# Patient Record
Sex: MALE | Race: WHITE | NOT HISPANIC OR LATINO | Employment: UNEMPLOYED | URBAN - METROPOLITAN AREA
[De-identification: names, ages, dates, MRNs, and addresses within clinical notes are randomized per-mention and may not be internally consistent; named-entity substitution may affect disease eponyms.]

---

## 2020-08-17 ENCOUNTER — HISTORICAL (OUTPATIENT)
Dept: ADMINISTRATIVE | Facility: HOSPITAL | Age: 65
End: 2020-08-17

## 2020-08-17 LAB
ALBUMIN SERPL-MCNC: 3.8 GM/DL (ref 3.4–5)
ALBUMIN/GLOB SERPL: 1.4 RATIO (ref 1.1–2)
ALP SERPL-CCNC: 73 UNIT/L (ref 40–150)
ALT SERPL-CCNC: 33 UNIT/L (ref 0–55)
AST SERPL-CCNC: 20 UNIT/L (ref 5–34)
BILIRUB SERPL-MCNC: 0.5 MG/DL
BILIRUBIN DIRECT+TOT PNL SERPL-MCNC: 0.2 MG/DL (ref 0–0.5)
BILIRUBIN DIRECT+TOT PNL SERPL-MCNC: 0.3 MG/DL (ref 0–0.8)
BUN SERPL-MCNC: 13.2 MG/DL (ref 8.4–25.7)
CALCIUM SERPL-MCNC: 8.2 MG/DL (ref 8.8–10)
CHLORIDE SERPL-SCNC: 99 MMOL/L (ref 98–107)
CO2 SERPL-SCNC: 31 MMOL/L (ref 23–31)
CREAT SERPL-MCNC: 0.98 MG/DL (ref 0.73–1.18)
GLOBULIN SER-MCNC: 2.7 GM/DL (ref 2.4–3.5)
GLUCOSE SERPL-MCNC: 172 MG/DL (ref 82–115)
POTASSIUM SERPL-SCNC: 4.1 MMOL/L (ref 3.5–5.1)
PROT SERPL-MCNC: 6.5 GM/DL (ref 5.8–7.6)
SODIUM SERPL-SCNC: 141 MMOL/L (ref 136–145)
TSH SERPL-ACNC: 0.69 UIU/ML (ref 0.35–4.94)

## 2022-04-10 ENCOUNTER — HISTORICAL (OUTPATIENT)
Dept: ADMINISTRATIVE | Facility: HOSPITAL | Age: 67
End: 2022-04-10

## 2022-04-26 VITALS
DIASTOLIC BLOOD PRESSURE: 80 MMHG | WEIGHT: 181.44 LBS | SYSTOLIC BLOOD PRESSURE: 133 MMHG | HEIGHT: 66 IN | BODY MASS INDEX: 29.16 KG/M2

## 2022-08-29 ENCOUNTER — HOSPITAL ENCOUNTER (EMERGENCY)
Facility: HOSPITAL | Age: 67
Discharge: HOME | End: 2022-08-29
Attending: EMERGENCY MEDICINE
Payer: MEDICARE

## 2022-08-29 VITALS
BODY MASS INDEX: 27.95 KG/M2 | TEMPERATURE: 98 F | HEART RATE: 90 BPM | WEIGHT: 173.94 LBS | DIASTOLIC BLOOD PRESSURE: 89 MMHG | RESPIRATION RATE: 18 BRPM | SYSTOLIC BLOOD PRESSURE: 149 MMHG | HEIGHT: 66 IN | OXYGEN SATURATION: 100 %

## 2022-08-29 DIAGNOSIS — R07.9 CHEST PAIN: ICD-10-CM

## 2022-08-29 DIAGNOSIS — I50.9 CONGESTIVE HEART FAILURE, UNSPECIFIED HF CHRONICITY, UNSPECIFIED HEART FAILURE TYPE: ICD-10-CM

## 2022-08-29 DIAGNOSIS — J44.1 COPD EXACERBATION: Primary | ICD-10-CM

## 2022-08-29 DIAGNOSIS — R06.02 SOB (SHORTNESS OF BREATH): ICD-10-CM

## 2022-08-29 LAB
ALBUMIN SERPL-MCNC: 4.4 GM/DL (ref 3.4–4.8)
ALBUMIN/GLOB SERPL: 1.3 RATIO (ref 1.1–2)
ALP SERPL-CCNC: 53 UNIT/L (ref 40–150)
ALT SERPL-CCNC: 18 UNIT/L (ref 0–55)
AST SERPL-CCNC: 14 UNIT/L (ref 5–34)
BASOPHILS # BLD AUTO: 0.06 X10(3)/MCL (ref 0–0.2)
BASOPHILS NFR BLD AUTO: 0.7 %
BILIRUBIN DIRECT+TOT PNL SERPL-MCNC: 0.6 MG/DL
BNP BLD-MCNC: 265.3 PG/ML
BUN SERPL-MCNC: 23.2 MG/DL (ref 8.4–25.7)
CALCIUM SERPL-MCNC: 9 MG/DL (ref 8.8–10)
CHLORIDE SERPL-SCNC: 96 MMOL/L (ref 98–107)
CK MB SERPL-MCNC: 4.5 NG/ML
CK SERPL-CCNC: 143 U/L (ref 30–200)
CO2 SERPL-SCNC: 26 MMOL/L (ref 23–31)
CREAT SERPL-MCNC: 1 MG/DL (ref 0.73–1.18)
D DIMER PPP IA.FEU-MCNC: 0.69 UG/ML FEU (ref 0–0.5)
EOSINOPHIL # BLD AUTO: 0.12 X10(3)/MCL (ref 0–0.9)
EOSINOPHIL NFR BLD AUTO: 1.4 %
ERYTHROCYTE [DISTWIDTH] IN BLOOD BY AUTOMATED COUNT: 12.2 % (ref 11.5–17)
EST. AVERAGE GLUCOSE BLD GHB EST-MCNC: 217.3 MG/DL
FLUAV AG UPPER RESP QL IA.RAPID: NOT DETECTED
FLUBV AG UPPER RESP QL IA.RAPID: NOT DETECTED
GFR SERPLBLD CREATININE-BSD FMLA CKD-EPI: >60 MLS/MIN/1.73/M2
GLOBULIN SER-MCNC: 3.3 GM/DL (ref 2.4–3.5)
GLUCOSE SERPL-MCNC: 239 MG/DL (ref 70–110)
GLUCOSE SERPL-MCNC: 243 MG/DL (ref 82–115)
HBA1C MFR BLD: 9.2 %
HCT VFR BLD AUTO: 38.6 % (ref 42–52)
HGB BLD-MCNC: 13.4 GM/DL (ref 14–18)
IMM GRANULOCYTES # BLD AUTO: 0.04 X10(3)/MCL (ref 0–0.04)
IMM GRANULOCYTES NFR BLD AUTO: 0.5 %
LYMPHOCYTES # BLD AUTO: 2.05 X10(3)/MCL (ref 0.6–4.6)
LYMPHOCYTES NFR BLD AUTO: 23.3 %
MCH RBC QN AUTO: 30.8 PG (ref 27–31)
MCHC RBC AUTO-ENTMCNC: 34.7 MG/DL (ref 33–36)
MCV RBC AUTO: 88.7 FL (ref 80–94)
MONOCYTES # BLD AUTO: 0.52 X10(3)/MCL (ref 0.1–1.3)
MONOCYTES NFR BLD AUTO: 5.9 %
NEUTROPHILS # BLD AUTO: 6 X10(3)/MCL (ref 2.1–9.2)
NEUTROPHILS NFR BLD AUTO: 68.2 %
NRBC BLD AUTO-RTO: 0 %
PLATELET # BLD AUTO: 217 X10(3)/MCL (ref 130–400)
PMV BLD AUTO: 10.5 FL (ref 7.4–10.4)
POCT GLUCOSE: 239 MG/DL (ref 70–110)
POTASSIUM SERPL-SCNC: 4.3 MMOL/L (ref 3.5–5.1)
PROT SERPL-MCNC: 7.7 GM/DL (ref 5.8–7.6)
RBC # BLD AUTO: 4.35 X10(6)/MCL (ref 4.7–6.1)
SARS-COV-2 RNA RESP QL NAA+PROBE: NOT DETECTED
SODIUM SERPL-SCNC: 135 MMOL/L (ref 136–145)
TROPONIN I SERPL-MCNC: 0.04 NG/ML (ref 0–0.04)
TROPONIN I SERPL-MCNC: 0.04 NG/ML (ref 0–0.04)
WBC # SPEC AUTO: 8.8 X10(3)/MCL (ref 4.5–11.5)

## 2022-08-29 PROCEDURE — 83880 ASSAY OF NATRIURETIC PEPTIDE: CPT | Performed by: EMERGENCY MEDICINE

## 2022-08-29 PROCEDURE — 93005 ELECTROCARDIOGRAM TRACING: CPT

## 2022-08-29 PROCEDURE — 83036 HEMOGLOBIN GLYCOSYLATED A1C: CPT | Performed by: EMERGENCY MEDICINE

## 2022-08-29 PROCEDURE — 96374 THER/PROPH/DIAG INJ IV PUSH: CPT

## 2022-08-29 PROCEDURE — 36415 COLL VENOUS BLD VENIPUNCTURE: CPT | Performed by: EMERGENCY MEDICINE

## 2022-08-29 PROCEDURE — 94640 AIRWAY INHALATION TREATMENT: CPT

## 2022-08-29 PROCEDURE — 25000003 PHARM REV CODE 250: Performed by: EMERGENCY MEDICINE

## 2022-08-29 PROCEDURE — 82553 CREATINE MB FRACTION: CPT | Performed by: EMERGENCY MEDICINE

## 2022-08-29 PROCEDURE — 82550 ASSAY OF CK (CPK): CPT | Performed by: EMERGENCY MEDICINE

## 2022-08-29 PROCEDURE — 25000242 PHARM REV CODE 250 ALT 637 W/ HCPCS: Performed by: EMERGENCY MEDICINE

## 2022-08-29 PROCEDURE — 99285 EMERGENCY DEPT VISIT HI MDM: CPT | Mod: 25

## 2022-08-29 PROCEDURE — 82962 GLUCOSE BLOOD TEST: CPT

## 2022-08-29 PROCEDURE — 87636 SARSCOV2 & INF A&B AMP PRB: CPT | Performed by: EMERGENCY MEDICINE

## 2022-08-29 PROCEDURE — 25500020 PHARM REV CODE 255

## 2022-08-29 PROCEDURE — 84484 ASSAY OF TROPONIN QUANT: CPT | Performed by: EMERGENCY MEDICINE

## 2022-08-29 PROCEDURE — 63600175 PHARM REV CODE 636 W HCPCS: Performed by: EMERGENCY MEDICINE

## 2022-08-29 PROCEDURE — 85379 FIBRIN DEGRADATION QUANT: CPT | Performed by: EMERGENCY MEDICINE

## 2022-08-29 PROCEDURE — 96361 HYDRATE IV INFUSION ADD-ON: CPT

## 2022-08-29 PROCEDURE — 80053 COMPREHEN METABOLIC PANEL: CPT | Performed by: EMERGENCY MEDICINE

## 2022-08-29 PROCEDURE — 85025 COMPLETE CBC W/AUTO DIFF WBC: CPT | Performed by: EMERGENCY MEDICINE

## 2022-08-29 RX ORDER — FLUTICASONE PROPIONATE AND SALMETEROL XINAFOATE 230; 21 UG/1; UG/1
2 AEROSOL, METERED RESPIRATORY (INHALATION) 2 TIMES DAILY
COMMUNITY
Start: 2022-08-12 | End: 2022-08-29 | Stop reason: SDUPTHER

## 2022-08-29 RX ORDER — FUROSEMIDE 20 MG/1
20 TABLET ORAL 3 TIMES DAILY
COMMUNITY
Start: 2022-08-04 | End: 2022-08-29 | Stop reason: SDUPTHER

## 2022-08-29 RX ORDER — CARVEDILOL 3.12 MG/1
3.12 TABLET ORAL
COMMUNITY
End: 2022-08-29 | Stop reason: SDUPTHER

## 2022-08-29 RX ORDER — IBUPROFEN 400 MG/1
400 TABLET ORAL
Status: COMPLETED | OUTPATIENT
Start: 2022-08-29 | End: 2022-08-29

## 2022-08-29 RX ORDER — SPIRONOLACTONE 25 MG/1
25 TABLET ORAL
COMMUNITY
End: 2022-08-29 | Stop reason: SDUPTHER

## 2022-08-29 RX ORDER — FUROSEMIDE 20 MG/1
20 TABLET ORAL 3 TIMES DAILY
Qty: 90 TABLET | Refills: 0 | Status: SHIPPED | OUTPATIENT
Start: 2022-08-29

## 2022-08-29 RX ORDER — PANTOPRAZOLE SODIUM 40 MG/1
40 TABLET, DELAYED RELEASE ORAL
Status: COMPLETED | OUTPATIENT
Start: 2022-08-29 | End: 2022-08-29

## 2022-08-29 RX ORDER — METFORMIN HYDROCHLORIDE 1000 MG/1
1000 TABLET ORAL 2 TIMES DAILY
Qty: 60 TABLET | Refills: 0 | Status: SHIPPED | OUTPATIENT
Start: 2022-08-29

## 2022-08-29 RX ORDER — FUROSEMIDE 20 MG/1
20 TABLET ORAL
Status: COMPLETED | OUTPATIENT
Start: 2022-08-29 | End: 2022-08-29

## 2022-08-29 RX ORDER — LOSARTAN POTASSIUM 25 MG/1
25 TABLET ORAL DAILY
Qty: 30 TABLET | Refills: 0 | Status: SHIPPED | OUTPATIENT
Start: 2022-08-29

## 2022-08-29 RX ORDER — LOSARTAN POTASSIUM 25 MG/1
25 TABLET ORAL ONCE
Status: COMPLETED | OUTPATIENT
Start: 2022-08-29 | End: 2022-08-29

## 2022-08-29 RX ORDER — FLUTICASONE PROPIONATE AND SALMETEROL XINAFOATE 230; 21 UG/1; UG/1
2 AEROSOL, METERED RESPIRATORY (INHALATION) 2 TIMES DAILY
Qty: 12 G | Refills: 1 | Status: SHIPPED | OUTPATIENT
Start: 2022-08-29

## 2022-08-29 RX ORDER — METFORMIN HYDROCHLORIDE 1000 MG/1
1000 TABLET ORAL 2 TIMES DAILY
COMMUNITY
Start: 2022-08-04 | End: 2022-08-29 | Stop reason: SDUPTHER

## 2022-08-29 RX ORDER — CYCLOBENZAPRINE HCL 10 MG
10 TABLET ORAL 3 TIMES DAILY
Qty: 90 TABLET | Refills: 0 | Status: SHIPPED | OUTPATIENT
Start: 2022-08-29

## 2022-08-29 RX ORDER — ACETAMINOPHEN 500 MG
1000 TABLET ORAL
Status: COMPLETED | OUTPATIENT
Start: 2022-08-29 | End: 2022-08-29

## 2022-08-29 RX ORDER — DEXAMETHASONE SODIUM PHOSPHATE 4 MG/ML
6 INJECTION, SOLUTION INTRA-ARTICULAR; INTRALESIONAL; INTRAMUSCULAR; INTRAVENOUS; SOFT TISSUE
Status: COMPLETED | OUTPATIENT
Start: 2022-08-29 | End: 2022-08-29

## 2022-08-29 RX ORDER — CLOPIDOGREL BISULFATE 75 MG/1
75 TABLET ORAL
COMMUNITY
End: 2022-08-29 | Stop reason: SDUPTHER

## 2022-08-29 RX ORDER — PANTOPRAZOLE SODIUM 40 MG/1
40 TABLET, DELAYED RELEASE ORAL DAILY
Qty: 30 TABLET | Refills: 0 | Status: SHIPPED | OUTPATIENT
Start: 2022-08-29 | End: 2022-09-28

## 2022-08-29 RX ORDER — CLOPIDOGREL BISULFATE 75 MG/1
75 TABLET ORAL ONCE
Status: COMPLETED | OUTPATIENT
Start: 2022-08-29 | End: 2022-08-29

## 2022-08-29 RX ORDER — CLOPIDOGREL BISULFATE 75 MG/1
75 TABLET ORAL DAILY
Qty: 30 TABLET | Refills: 0 | Status: SHIPPED | OUTPATIENT
Start: 2022-08-29

## 2022-08-29 RX ORDER — PANTOPRAZOLE SODIUM 40 MG/1
40 TABLET, DELAYED RELEASE ORAL DAILY
COMMUNITY
Start: 2022-08-04 | End: 2022-08-29 | Stop reason: SDUPTHER

## 2022-08-29 RX ORDER — IPRATROPIUM BROMIDE AND ALBUTEROL SULFATE 2.5; .5 MG/3ML; MG/3ML
3 SOLUTION RESPIRATORY (INHALATION)
Status: DISCONTINUED | OUTPATIENT
Start: 2022-08-29 | End: 2022-08-29

## 2022-08-29 RX ORDER — SPIRONOLACTONE 25 MG/1
25 TABLET ORAL DAILY
Qty: 30 TABLET | Refills: 0 | Status: SHIPPED | OUTPATIENT
Start: 2022-08-29 | End: 2022-09-28

## 2022-08-29 RX ORDER — IPRATROPIUM BROMIDE AND ALBUTEROL SULFATE 2.5; .5 MG/3ML; MG/3ML
9 SOLUTION RESPIRATORY (INHALATION) ONCE
Status: COMPLETED | OUTPATIENT
Start: 2022-08-29 | End: 2022-08-29

## 2022-08-29 RX ORDER — CYCLOBENZAPRINE HCL 10 MG
10 TABLET ORAL 3 TIMES DAILY
COMMUNITY
Start: 2022-08-10 | End: 2022-08-29 | Stop reason: SDUPTHER

## 2022-08-29 RX ORDER — CARVEDILOL 3.12 MG/1
3.12 TABLET ORAL DAILY
Qty: 30 TABLET | Refills: 0 | Status: SHIPPED | OUTPATIENT
Start: 2022-08-29 | End: 2022-09-28

## 2022-08-29 RX ORDER — CYCLOBENZAPRINE HCL 10 MG
10 TABLET ORAL
Status: COMPLETED | OUTPATIENT
Start: 2022-08-29 | End: 2022-08-29

## 2022-08-29 RX ORDER — METFORMIN HYDROCHLORIDE 500 MG/1
1000 TABLET ORAL ONCE
Status: COMPLETED | OUTPATIENT
Start: 2022-08-29 | End: 2022-08-29

## 2022-08-29 RX ORDER — LOSARTAN POTASSIUM 25 MG/1
25 TABLET ORAL
COMMUNITY
End: 2022-08-29 | Stop reason: SDUPTHER

## 2022-08-29 RX ORDER — SPIRONOLACTONE 25 MG/1
25 TABLET ORAL
Status: COMPLETED | OUTPATIENT
Start: 2022-08-29 | End: 2022-08-29

## 2022-08-29 RX ORDER — ASPIRIN 325 MG
325 TABLET ORAL
Status: COMPLETED | OUTPATIENT
Start: 2022-08-29 | End: 2022-08-29

## 2022-08-29 RX ADMIN — IOHEXOL 100 ML: 350 INJECTION, SOLUTION INTRAVENOUS at 11:08

## 2022-08-29 RX ADMIN — METFORMIN HYDROCHLORIDE 1000 MG: 500 TABLET ORAL at 09:08

## 2022-08-29 RX ADMIN — LOSARTAN POTASSIUM 25 MG: 25 TABLET, FILM COATED ORAL at 08:08

## 2022-08-29 RX ADMIN — PANTOPRAZOLE SODIUM 40 MG: 40 TABLET, DELAYED RELEASE ORAL at 08:08

## 2022-08-29 RX ADMIN — CLOPIDOGREL 75 MG: 75 TABLET, FILM COATED ORAL at 08:08

## 2022-08-29 RX ADMIN — IPRATROPIUM BROMIDE AND ALBUTEROL SULFATE 9 ML: .5; 3 SOLUTION RESPIRATORY (INHALATION) at 08:08

## 2022-08-29 RX ADMIN — SPIRONOLACTONE 25 MG: 25 TABLET, FILM COATED ORAL at 08:08

## 2022-08-29 RX ADMIN — ACETAMINOPHEN 1000 MG: 500 TABLET ORAL at 08:08

## 2022-08-29 RX ADMIN — IBUPROFEN 400 MG: 400 TABLET ORAL at 08:08

## 2022-08-29 RX ADMIN — SODIUM CHLORIDE, POTASSIUM CHLORIDE, SODIUM LACTATE AND CALCIUM CHLORIDE 1000 ML: 600; 310; 30; 20 INJECTION, SOLUTION INTRAVENOUS at 09:08

## 2022-08-29 RX ADMIN — DEXAMETHASONE SODIUM PHOSPHATE 6 MG: 4 INJECTION, SOLUTION INTRA-ARTICULAR; INTRALESIONAL; INTRAMUSCULAR; INTRAVENOUS; SOFT TISSUE at 08:08

## 2022-08-29 RX ADMIN — FUROSEMIDE 20 MG: 20 TABLET ORAL at 08:08

## 2022-08-29 RX ADMIN — CYCLOBENZAPRINE 10 MG: 10 TABLET, FILM COATED ORAL at 08:08

## 2022-08-29 RX ADMIN — ASPIRIN 325 MG ORAL TABLET 325 MG: 325 PILL ORAL at 08:08

## 2022-08-29 NOTE — ED PROVIDER NOTES
Encounter Date: 8/29/2022       History     Chief Complaint   Patient presents with    Shortness of Breath     Clearance for incarceration, c/o SOB and chest pain since yesterday evening. Hx of COPD, states uses breathing treatments at home every 4 hours.     Tyler Mai is a 68 yo male with history of CHF, COPD, DM, and HTN who presents to the ED by police for clearance due to shortness of breath and chest pain that began yesterday. He says that the episode happened gradually and has been slowly progressing. He also describes cough with some white sputum production. He reports missing his medications recently and that his last breathing treatment was a nebulizer while at the police station earlier today. He reports some relief from the treatment but continues to be symptomatic. He describes his chest pain that is diffuse across his chest but seems to radiate towards his pacemaker. It does not radiate to his back or his left arm. Of note, patient is complaining of chronic neck pain. He reports previous surgeries for his neck but has not any relief from them. He says that the pain radiates down to his legs and is a shock-like sensation. He currently is denying any fevers, chills, palpitations, dysuria, or constipation at this time.     The history is provided by the patient. No  was used.   Shortness of Breath  Associated symptoms include rhinorrhea, cough, wheezing and chest pain. Pertinent negatives include no fever, no headaches, no sore throat, no ear pain, no syncope, no vomiting and no abdominal pain. Associated medical issues include COPD.   Chest Pain  The current episode started yesterday. Chest pain occurs constantly. The quality of the pain is described as pleuritic. The pain does not radiate. Primary symptoms include shortness of breath, cough, wheezing and nausea. Pertinent negatives for primary symptoms include no fever, no fatigue, no syncope, no palpitations, no abdominal pain,  no vomiting and no dizziness.   The shortness of breath began yesterday. The patient's medical history is significant for CHF and COPD.   The sputum is white.   The patient's medical history is significant for COPD.   Pertinent negatives for associated symptoms include no weakness.   Review of patient's allergies indicates:  No Known Allergies  Past Medical History:   Diagnosis Date    CHF (congestive heart failure)     COPD (chronic obstructive pulmonary disease)     Diabetes mellitus     High cholesterol     Hypertension     Pacemaker     S/P triple vessel bypass      Past Surgical History:   Procedure Laterality Date    CHOLECYSTECTOMY      NECK SURGERY       History reviewed. No pertinent family history.  Social History     Tobacco Use    Smoking status: Former     Packs/day: 1.00     Types: Cigarettes     Review of Systems   Constitutional:  Negative for chills, fatigue and fever.   HENT:  Positive for rhinorrhea. Negative for congestion, ear pain, sinus pressure, sinus pain and sore throat.    Eyes:  Negative for photophobia and visual disturbance.   Respiratory:  Positive for cough, shortness of breath and wheezing.    Cardiovascular:  Positive for chest pain. Negative for palpitations and syncope.   Gastrointestinal:  Positive for diarrhea and nausea. Negative for abdominal distention, abdominal pain, constipation and vomiting.   Genitourinary:  Negative for dysuria.   Musculoskeletal:  Negative for myalgias.   Neurological:  Negative for dizziness, syncope, weakness, light-headedness and headaches.   All other systems reviewed and are negative.    Physical Exam     Initial Vitals [08/29/22 0746]   BP Pulse Resp Temp SpO2   (!) 146/80 91 (!) 25 98.3 °F (36.8 °C) 100 %      MAP       --         Physical Exam    Nursing note and vitals reviewed.  Constitutional: He appears well-developed and well-nourished.   HENT:   Head: Normocephalic and atraumatic.   Eyes: Conjunctivae and EOM are normal. Pupils are equal,  round, and reactive to light. No scleral icterus.   Neck: Neck supple.   Pain with head movement; tender to palpation; describes shocklike pain radiating down to his legs   Cardiovascular:  Normal rate, regular rhythm and normal heart sounds.           No murmur heard.  Pulmonary/Chest: He has wheezes. He exhibits no tenderness.   Abdominal: Abdomen is soft. Bowel sounds are normal. He exhibits no distension. There is no abdominal tenderness.   Musculoskeletal:         General: Normal range of motion.      Cervical back: Neck supple.     Neurological: He is alert and oriented to person, place, and time. He has normal reflexes.   Skin: Skin is warm. Capillary refill takes less than 2 seconds.       ED Course   Procedures  Labs Reviewed   COMPREHENSIVE METABOLIC PANEL - Abnormal; Notable for the following components:       Result Value    Sodium Level 135 (*)     Chloride 96 (*)     Glucose Level 243 (*)     Protein Total 7.7 (*)     All other components within normal limits   B-TYPE NATRIURETIC PEPTIDE - Abnormal; Notable for the following components:    Natriuretic Peptide 265.3 (*)     All other components within normal limits   D DIMER, QUANTITATIVE - Abnormal; Notable for the following components:    D-Dimer 0.69 (*)     All other components within normal limits   HEMOGLOBIN A1C - Abnormal; Notable for the following components:    Hemoglobin A1c 9.2 (*)     All other components within normal limits   CBC WITH DIFFERENTIAL - Abnormal; Notable for the following components:    RBC 4.35 (*)     Hgb 13.4 (*)     Hct 38.6 (*)     MPV 10.5 (*)     All other components within normal limits   POCT GLUCOSE, HAND-HELD DEVICE - Abnormal; Notable for the following components:    POC Glucose 239 (*)     All other components within normal limits   POCT GLUCOSE - Abnormal; Notable for the following components:    POCT Glucose 239 (*)     All other components within normal limits   TROPONIN I - Normal   CK - Normal   CK-MB -  Normal   COVID/FLU A&B PCR - Normal   TROPONIN I - Normal   CBC W/ AUTO DIFFERENTIAL    Narrative:     The following orders were created for panel order CBC auto differential.  Procedure                               Abnormality         Status                     ---------                               -----------         ------                     CBC with Differential[[id number]]        Abnormal            Final result                 Please view results for these tests on the individual orders.   EXTRA TUBES    Narrative:     The following orders were created for panel order EXTRA TUBES.  Procedure                               Abnormality         Status                     ---------                               -----------         ------                     Lavender Top Hold[[id number]]                                In process                   Please view results for these tests on the individual orders.   LAVENDER TOP HOLD     EKG Readings: (Independently Interpreted)   Initial Reading: No STEMI. Rhythm: Paced Rhythm. T Waves Flipped: II, III, AVF, V5 and V6. Axis: Normal. Other Findings: Prolonged QT Interval.   ECG Results              EKG 12-lead (Chest Pain) Age >30 (In process)  Result time 08/29/22 08:55:46      In process by Interface, Lab In [facility] (08/29/22 08:55:46)                   Narrative:    Test Reason : R07.9,    Vent. Rate : 088 BPM     Atrial Rate : 088 BPM     P-R Int : 140 ms          QRS Dur : 128 ms      QT Int : 416 ms       P-R-T Axes : 082 051 246 degrees     QTc Int : 503 ms    Atrial-sensed ventricular-paced rhythm  Abnormal ECG  No previous ECGs available    Referred By:             Confirmed By:                                   Imaging Results              CTA Chest Non-Coronary (PE Study) (Final result)  Result time 08/29/22 11:31:19      Final result by Rui Carrillo MD (08/29/22 11:31:19)                   Impression:      1. No pulmonary embolism identified.  2.  Partially visualized abdominal aortic aneurysm measuring up to 44 mm.      Electronically signed by: Rui Carrillo  Date:    08/29/2022  Time:    11:31               Narrative:    EXAMINATION:  CTA CHEST NON CORONARY    CLINICAL HISTORY:  Pulmonary embolism (PE) suspected, positive D-dimer;    TECHNIQUE:  CTA imaging of the chest after the administration of IV contrast. Axial, coronal and sagittal reconstructions, including MIP images, are reviewed. Dose length product 1105 mGycm. Automatic exposure control, adjustment of mA/kV or iterative reconstruction technique used to limit radiation dose.    COMPARISON:  No relevant comparison studies available at the time of dictation.    FINDINGS:  Diagnostic quality: Adequate    Pulmonary embolism: None identified.    Lung parenchyma: Mild emphysema.  No consolidation or suspicious pulmonary nodule.    Pleural effusion: None.    Adenopathy: No pathologically enlarged lymph node.    Heart and great vessels: Left-sided cardiac device with prior CABG.  No pericardial effusion.    Chest wall/axilla: No significant findings.    Upper abdomen: Partially visualized abdominal aortic aneurysm measuring 44 x 40 mm with areas of peripheral plaque.    Bones: No acute osseous process.                                       X-Ray Chest AP Portable (Final result)  Result time 08/29/22 09:28:58      Final result by Rui Carrillo MD (08/29/22 09:28:58)                   Impression:      No acute pulmonary process identified.      Electronically signed by: Rui Carrillo  Date:    08/29/2022  Time:    09:28               Narrative:    EXAMINATION:  XR CHEST AP PORTABLE    CLINICAL HISTORY:  Shortness of breath    TECHNIQUE:  Frontal view(s) of the chest.    COMPARISON:  Radiography 03/05/2018    FINDINGS:  Prior sternotomy with left-sided AICD.  Normal cardiac silhouette.  The lungs are well-inflated.  No consolidation identified.  No significant pleural effusion or discernible  pneumothorax.                                       Medications   aspirin tablet 325 mg (325 mg Oral Given 8/29/22 0835)   dexamethasone injection 6 mg (6 mg Intravenous Given 8/29/22 0835)   spironolactone tablet 25 mg (25 mg Oral Given 8/29/22 0833)   clopidogreL tablet 75 mg (75 mg Oral Given 8/29/22 0835)   cyclobenzaprine tablet 10 mg (10 mg Oral Given 8/29/22 0833)   acetaminophen tablet 1,000 mg (1,000 mg Oral Given 8/29/22 0833)   ibuprofen tablet 400 mg (400 mg Oral Given 8/29/22 0834)   furosemide tablet 20 mg (20 mg Oral Given 8/29/22 0835)   losartan tablet 25 mg (25 mg Oral Given 8/29/22 0852)   metFORMIN tablet 1,000 mg (1,000 mg Oral Given 8/29/22 0939)   pantoprazole EC tablet 40 mg (40 mg Oral Given 8/29/22 0833)   albuterol-ipratropium 2.5 mg-0.5 mg/3 mL nebulizer solution 9 mL (9 mLs Nebulization Given 8/29/22 0844)   lactated ringers bolus 1,000 mL (0 mLs Intravenous Stopped 8/29/22 1048)   iohexoL (OMNIPAQUE 350) 350 mg iodine/mL injection (100 mLs Intravenous Given 8/29/22 1100)     Medical Decision Making:   Initial Assessment:   68 yo male with history of CHF, COPD, DM, and HTN presents to the ED by police for clearance due to SOB and chest pain that started yesterday. He reports not taking his medications for the past few days and was only given one nebulizer treatment before coming to the ED. Patient was tachypneic and continuing to complain of symptoms while in the ED. The pain is diffusely across his chest but does not radiate to his left arm arm or back. Patient denies any fevers, chills, abdominal pain, dysuria, or constipation at this time.   ED Management:  0830  Patient presents to ED with SOB and chest pain. Ordered CBC, CMP, BNP, CK/CK-MB, CXR, D-dimer, troponin, and COVID/flu panel. Will give patient one dose of his home medications as he has missed his medications recently.    0930  Patient's labs resulted which showed mildly elevated D-dimer 0.68. Ordered CTA PE to assess for  pulmonary embolism. Also has elevated . Patient currently reports doing better and improvement in symptoms after receiving his home medications.     1137   CTA PE came back negative for pulmonary embolism. Patient did complain of worsening left sided chest pain after receive contrast the the CT study. Repeat troponins have been drawn to trend. Low suspicion of cardiac cause at this time. Will continue to follow.    1230  Second troponin negative. Patient chest pain improved since last evaluation. Patient stable and cleared for incarceration.                      Clinical Impression:   Final diagnoses:  [R07.9] Chest pain  [R06.02] SOB (shortness of breath)  [J44.1] COPD exacerbation (Primary)  [I50.9] Congestive heart failure, unspecified HF chronicity, unspecified heart failure type        ED Disposition Condition    Discharge Stable          ED Prescriptions    None       Follow-up Information       Follow up With Specialties Details Why Contact Info    [facility] Emergency Medicine  As needed, If symptoms worsen [facility] [id number] [phone]    [facility] Card[facility] Cardiology Schedule an appointment as soon as possible for a visit today  [facility] [address] [phone]             Darnell Nguyễn MD  Resident  08/29/22 [id number]

## 2024-02-02 ENCOUNTER — EXTERNAL HOME HEALTH (OUTPATIENT)
Dept: HOME HEALTH SERVICES | Facility: HOSPITAL | Age: 69
End: 2024-02-02

## 2024-02-19 ENCOUNTER — DOCUMENT SCAN (OUTPATIENT)
Dept: HOME HEALTH SERVICES | Facility: HOSPITAL | Age: 69
End: 2024-02-19
Payer: MEDICARE

## 2024-02-28 ENCOUNTER — DOCUMENT SCAN (OUTPATIENT)
Dept: HOME HEALTH SERVICES | Facility: HOSPITAL | Age: 69
End: 2024-02-28
Payer: MEDICARE

## 2024-08-09 DIAGNOSIS — G95.89 MYELOMALACIA OF CERVICAL CORD: ICD-10-CM

## 2024-08-09 DIAGNOSIS — M54.12 CERVICAL RADICULOPATHY: Primary | ICD-10-CM

## 2024-08-14 ENCOUNTER — TELEPHONE (OUTPATIENT)
Dept: NEUROSURGERY | Facility: CLINIC | Age: 69
End: 2024-08-14
Payer: MEDICARE

## 2024-08-14 NOTE — TELEPHONE ENCOUNTER
Please schedule the patient next available on either my or Lore's schedule. Should the patient wish to proceed with Dr. Zavala, please have the patient complete AP/Lat/Flex/Ext views of the cervical spine prior to the visit. Thanks

## 2024-08-14 NOTE — TELEPHONE ENCOUNTER
"I received the referral for Dr. Omer from Caryl Urias NP for cervical radiculopathy. Please review Caryl Urias's OVN in media for the patients history. Patient had a cervical fusion in 1996. I am currently getting the images from [facility] pushed to us. By reviewing the CT cervical report, " There is probably at least moderate central canal narrowing at C3-C4 and C4-C5. " Please review and advise on scheduling. Thank you!    STAFF: older images from [facility] will need to be obtained. Please ask the patient about any recent testing, any other MD's, other sx's and conservative treatments and obtain. *also need to ask which MD he would like to move forward with as he is being referred to Dr. Omer  "

## 2024-12-03 ENCOUNTER — OFFICE VISIT (OUTPATIENT)
Dept: NEUROSURGERY | Facility: CLINIC | Age: 69
End: 2024-12-03
Payer: MEDICARE

## 2024-12-03 VITALS
HEART RATE: 86 BPM | SYSTOLIC BLOOD PRESSURE: 122 MMHG | DIASTOLIC BLOOD PRESSURE: 66 MMHG | BODY MASS INDEX: 27.8 KG/M2 | WEIGHT: 173 LBS | HEIGHT: 66 IN | RESPIRATION RATE: 18 BRPM

## 2024-12-03 DIAGNOSIS — Z72.0 TOBACCO USE: ICD-10-CM

## 2024-12-03 DIAGNOSIS — M47.12 CERVICAL SPONDYLOSIS WITH MYELOPATHY: Primary | ICD-10-CM

## 2024-12-03 DIAGNOSIS — M48.02 SPINAL STENOSIS, CERVICAL REGION: ICD-10-CM

## 2024-12-03 PROCEDURE — 99205 OFFICE O/P NEW HI 60 MIN: CPT | Mod: ,,, | Performed by: PHYSICIAN ASSISTANT

## 2024-12-03 PROCEDURE — 3008F BODY MASS INDEX DOCD: CPT | Mod: CPTII,,, | Performed by: PHYSICIAN ASSISTANT

## 2024-12-03 PROCEDURE — 3288F FALL RISK ASSESSMENT DOCD: CPT | Mod: CPTII,,, | Performed by: PHYSICIAN ASSISTANT

## 2024-12-03 PROCEDURE — 4010F ACE/ARB THERAPY RXD/TAKEN: CPT | Mod: CPTII,,, | Performed by: PHYSICIAN ASSISTANT

## 2024-12-03 PROCEDURE — 1160F RVW MEDS BY RX/DR IN RCRD: CPT | Mod: CPTII,,, | Performed by: PHYSICIAN ASSISTANT

## 2024-12-03 PROCEDURE — 3078F DIAST BP <80 MM HG: CPT | Mod: CPTII,,, | Performed by: PHYSICIAN ASSISTANT

## 2024-12-03 PROCEDURE — 1159F MED LIST DOCD IN RCRD: CPT | Mod: CPTII,,, | Performed by: PHYSICIAN ASSISTANT

## 2024-12-03 PROCEDURE — 1125F AMNT PAIN NOTED PAIN PRSNT: CPT | Mod: CPTII,,, | Performed by: PHYSICIAN ASSISTANT

## 2024-12-03 PROCEDURE — 1101F PT FALLS ASSESS-DOCD LE1/YR: CPT | Mod: CPTII,,, | Performed by: PHYSICIAN ASSISTANT

## 2024-12-03 PROCEDURE — 3074F SYST BP LT 130 MM HG: CPT | Mod: CPTII,,, | Performed by: PHYSICIAN ASSISTANT

## 2024-12-03 RX ORDER — CARVEDILOL 3.12 MG/1
3.12 TABLET ORAL
COMMUNITY

## 2024-12-03 RX ORDER — PREGABALIN 100 MG/1
CAPSULE ORAL
COMMUNITY
Start: 2024-10-10 | End: 2024-12-03

## 2024-12-03 RX ORDER — OXYCODONE AND ACETAMINOPHEN 10; 325 MG/1; MG/1
1 TABLET ORAL
COMMUNITY

## 2024-12-03 RX ORDER — PREGABALIN 75 MG/1
75 CAPSULE ORAL 2 TIMES DAILY
COMMUNITY
End: 2024-12-03

## 2024-12-03 RX ORDER — SPIRONOLACTONE 25 MG/1
1 TABLET ORAL EVERY MORNING
COMMUNITY

## 2024-12-03 RX ORDER — LINACLOTIDE 145 UG/1
145 CAPSULE, GELATIN COATED ORAL EVERY MORNING
COMMUNITY
Start: 2024-11-18

## 2024-12-03 RX ORDER — ALBUTEROL SULFATE 90 UG/1
1 INHALANT RESPIRATORY (INHALATION) EVERY 4 HOURS PRN
COMMUNITY
Start: 2024-11-18

## 2024-12-03 RX ORDER — ROSUVASTATIN CALCIUM 40 MG/1
80 TABLET, COATED ORAL NIGHTLY
COMMUNITY
Start: 2024-11-18 | End: 2024-12-03

## 2024-12-03 RX ORDER — LOSARTAN POTASSIUM 25 MG/1
25 TABLET ORAL
COMMUNITY

## 2024-12-03 RX ORDER — FLUTICASONE FUROATE, UMECLIDINIUM BROMIDE AND VILANTEROL TRIFENATATE 200; 62.5; 25 UG/1; UG/1; UG/1
1 POWDER RESPIRATORY (INHALATION)
COMMUNITY
Start: 2024-11-20

## 2024-12-03 RX ORDER — SULFAMETHOXAZOLE AND TRIMETHOPRIM 800; 160 MG/1; MG/1
1 TABLET ORAL EVERY 12 HOURS
COMMUNITY
Start: 2024-10-17 | End: 2024-12-03

## 2024-12-03 RX ORDER — RANOLAZINE 500 MG/1
500 TABLET, EXTENDED RELEASE ORAL 2 TIMES DAILY
COMMUNITY

## 2024-12-03 RX ORDER — CLOPIDOGREL BISULFATE 75 MG/1
1 TABLET ORAL EVERY MORNING
COMMUNITY

## 2024-12-03 RX ORDER — ALBUTEROL SULFATE 2.5 MG/.5ML
2.5 SOLUTION RESPIRATORY (INHALATION)
COMMUNITY

## 2024-12-03 RX ORDER — ICOSAPENT ETHYL 1000 MG/1
2 CAPSULE ORAL 2 TIMES DAILY
COMMUNITY
Start: 2024-11-18

## 2024-12-03 RX ORDER — MUPIROCIN 20 MG/G
OINTMENT TOPICAL
COMMUNITY
Start: 2024-10-17 | End: 2024-12-03

## 2024-12-03 RX ORDER — ALBUTEROL SULFATE 0.83 MG/ML
2.5 SOLUTION RESPIRATORY (INHALATION) EVERY 4 HOURS PRN
COMMUNITY
Start: 2024-11-04

## 2024-12-03 NOTE — PROGRESS NOTES
[facility]  History & Physical  Neurosurgery      Tyler Mai   [id number]   1955       SUBJECTIVE:     CHIEF COMPLAINT:  Neck pain, numbness in bilateral upper and lower extremities, generalized weakness      HPI:  Tyler Mai is a 69 y.o. male who presents for neurosurgical evaluation.  The patient has been referred to Dr. Pandey by Caryl Urias NP.  His history is significant for CHF, COPD, DM, dyslipidemia, hypertension, and coronary artery disease.  He also has a pacemaker.  His history is also significant for having undergone C5-6 and C6-7 ACDF in 1995 and repeat C5-6 and C6-7 ACDF in 1996 both with Dr. Wall.  He has had chronic pain since that time.  He notes he was seen by Dr. Bella in the past.  Surgery was recommended at 2 levels above the fusion.  The patient declined surgery at that time.  Per the patient, Dr. Bella strongly encouraged him to have surgery or he would end up with deficits.  The patient was not interested in surgery at that time.    The patient saw significant worsening in his symptoms that began over a year ago.  His neck pain and left-greater-than-right shoulder and arm pain worsened.  He also developed increasing numbness and weakness in upper and lower extremities.  This has continued to worsened.  He has difficulty with dexterity more so on the left than right.  He is no longer able to write.  He is under the care of pain management in [facility].  He has been referred here by Caryl Urias NP.  He has also received pain medications from Gabb[facility] Coon.  In Caryl Urias's note, it is documented that he began to have weakness in his hands and need a wheelchair in 4/2024.  There has been a worsening in his symptoms since that time.  He was taking Percocet 10 three times a day as well as Flexeril.    He complains of constant neck pain with pain radiating into left-greater-than-right trapezius muscles and shoulders.  He also has intermittent pain in his feet.   There is numbness through bilateral arms, hands, and bilateral lower extremities from his waist down.  Intermittently, there is tingling in the left arm in bilateral feet.  He was able to stand on his own.  He requires a rolling walker to ambulate.  He lives in an apartment on the second floor.  He was able to walk up the stairs one at a time holding onto the hand railings.  Two days ago, he adjusted his hand .  He fell back being unable to catch himself.  He hit the back of his head, but did not have a loss of consciousness.  EMS was contacted.  They bandage his head.  The patient did not feel as though he needed to be taken to the hospital.  He reports a caused a whiplash type injury with increased neck pain.  The following morning, he woke up with more tingling and numbness.  His strength has remained the same.    He presents today for evaluation.  He is aware of the significant worsening in his symptoms.  He understands that he is progressively worsening.  He is interested in exploring surgical options to stop the decline in his function.  He has urinary frequency.  Yet, he feels he was able to empty his bladder.  He has constipation which has been a long term problem for him.  He has not undergone a course of therapy.  He does not feel as though he could handle therapy secondary to his pain.  He lives alone.  He presents today alone.  Transportation was provided by his insurance provider.    In addition, he has a history of CAD and CHF requiring pacemaker.  He has peripheral vascular disease.  There has been placement of stents in the left leg.  He notes that he was under the care of Dr. Rodríguez.  He believes he has a AAA, but it was unsure of the size.  He also reports he has 65% blockage in a carotid artery.  He was also under the care of Dr. Beebe from a cardiac standpoint.  Approximately 1 year ago, he was to undergo nuclear medicine stress test.  It was an issue with his insurance approving the test.   He states he was not contacted by the office to proceed with the test.  He did not feel like undergoing the test, so he did not call them back to inquire on the status.      Cardiologist Dr. Beebe  Vascular Dr. Rodríguez      Past Medical History:   Diagnosis Date    CAD (coronary artery disease)     CHF (congestive heart failure)     COPD (chronic obstructive pulmonary disease)     Diabetes mellitus     High cholesterol     Hypertension     Pacemaker     PVD (peripheral vascular disease)     S/P triple vessel bypass     Tobacco use        Past Surgical History:   Procedure Laterality Date    ANTERIOR CERVICAL DISCECTOMY W/ FUSION  1995    C5-6 and C6-7.  Dr. Wall    ANTERIOR CERVICAL DISCECTOMY W/ FUSION  1996    Redo C5-6, C6-7.  Dr. Wall    CHOLECYSTECTOMY      CORONARY ARTERY BYPASS GRAFT      INSERTION OF PACEMAKER         Family History   Problem Relation Name Age of Onset    Cancer Mother      Cancer Father      Lumbar disc disease Brother         Social History     Socioeconomic History    Marital status: Single   Tobacco Use    Smoking status: Every Day     Current packs/day: 1.00     Types: Cigarettes     Social Drivers of Health     Financial Resource Strain: Low Risk  (12/21/2023)    Received from [facility] Subsidiaries and Affiliates, [facility] Subsidi[facility] and Affiliates    Overall Financial Resource Strain (CARDIA)     Difficulty of Paying Living Expenses: Not very hard   Food Insecurity: No Food Insecurity (12/21/2023)    Received from [facility] Its Subsidiaries and Affiliates, [facility] Subsidiaries and Affiliates    Hunger Vital Sign     Worried About Running Out of Food in the Last Year: Never true     Ran Out of Food in the Last Year: Never true   Transportation Needs: No Transportation Needs (12/21/2023)     [facility], [facility] Its [facility] and Affiliates    PRAPARE - Transportation     Lack of Transportation (Medical): No     Lack of Transportation (Non-Medical): No   Housing Stability: Unknown (12/21/2023)    [facility], [facility] Its [facility] and Affiliates    Housing Stability Vital Sign     Unable to Pay for Housing in the Last Year: No     Number of Places Lived in the Last Year: 1        Review of patient's allergies indicates:   Allergen Reactions    Hydrochlorothiazide      Other Reaction(s): Pancreatitis        Current Outpatient Medications   Medication Instructions    albuterol (PROVENTIL) 2.5 mg, Every 4 hours PRN    albuterol (PROVENTIL/VENTOLIN HFA) 90 mcg/actuation inhaler 1 puff, Every 4 hours PRN    albuterol sulfate 2.5 mg    carvediloL (COREG) 3.125 mg    clopidogreL (PLAVIX) 75 mg tablet 1 tablet, Every morning    cyclobenzaprine (FLEXERIL) 10 mg, Oral, 3 times daily    furosemide (LASIX) 20 mg, Oral, 3 times daily    LINZESS 145 mcg, Every morning    losartan (COZAAR) 25 mg    oxyCODONE-acetaminophen (PERCOCET)  mg per tablet 1 tablet    pantoprazole (PROTONIX) 40 mg, Oral, Daily    ranolazine (RANEXA) 500 mg, 2 times daily    spironolactone (ALDACTONE) 25 MG tablet 1 tablet, Every morning    TRELEGY ELLIPTA 200-62.5-25 mcg inhaler 1 puff    VASCEPA 1 gram Cap 2 capsules, 2 times daily       Review of Systems   Constitutional:  Positive for activity change and fatigue. Negative for chills and fever.   HENT:  Negative for nosebleeds and sore throat.    Eyes:  Negative for pain and visual disturbance.   Respiratory:  Negative for cough, chest tightness and shortness of breath.    Cardiovascular:  Negative for chest pain.  "  Gastrointestinal:  Positive for constipation. Negative for diarrhea, nausea and vomiting.   Genitourinary:  Positive for frequency. Negative for difficulty urinating, dysuria and hematuria.   Musculoskeletal:  Positive for back pain, gait problem and neck pain. Negative for myalgias.   Skin:  Negative for rash.   Neurological:  Positive for weakness and numbness. Negative for dizziness, facial asymmetry and headaches.   Psychiatric/Behavioral:  Negative for confusion and sleep disturbance. The patient is not nervous/anxious.        OBJECTIVE:       Visit Vitals  /66 (BP Location: Left arm, Patient Position: Sitting)   Pulse 86   Resp 18   Ht 5' 6" (1.676 m)   Wt 78.5 kg (173 lb)   BMI 27.92 kg/m²        General:  Pleasant, Well-nourished, Well-groomed.    CV:  Neck is supple.  There are no carotid bruits.  Heart has regular rate and rhythm.    Lungs:  Lungs are clear to auscultation bilaterally with non-labored respirations.    Abdomen:  Soft, non-tender, non-distended.    Musculoskeletal:  Cervical ROM:  Severely limited with extension and right rotation, moderately limited with left rotation.  Neck pain is increased in all 4 directions.    Neurological:    Alert and oriented to person, place, time, and situation.  Muscle strength against resistance:  Strength  Deltoids Biceps Triceps Wrist Extension Wrist Flexion Intrinsics   Upper: R 4-/5 4-/5 4-/5 3/5  2/5    L 4-/5 4-/5 4-/5 3/5  1/5     Iliopsoas Quadriceps Knee  Flexion Tibialis  anterior Gastro- cnemius EHL   Lower: R 5-/5 4/5 4-/5       L 5-/5 5-/5 5-/5      Sensation is diffusely diminished in bilateral upper and lower extremities.  Reflexes:   Right Left   Triceps 3+ 3+   Biceps 3+ 3+   Brachioradialis 3+ 3+   Patellar 3+ 3+   Achilles     Woods's sign is positive bilaterally.  Wheelchair      Imaging:  All pertinent neuroimaging independently reviewed. Discussed these findings in detail with the patient.  CT of the cervical spine without " contrast was obtained on 03/04/2024.  This study shows solid fusion at C5-6 and C6-7.At C4-5, there is significant right facet hypertrophy as well as uncinate hypertrophy bilaterally.  This causes severe right and moderate left foraminal stenosis.  There is moderate right-sided central stenosis due to spurring.  There is anterolisthesis of C3 on C4 along with uncinate and facet hypertrophy that causes severe central and left foraminal with moderate right foraminal stenosis.   MRI of the cervical spine from 06/27/2013 is available for review.  This study shows severe central stenosis at C4-5 and moderate stenosis at C3-4.    MRI of the cervical spine was obtained on 12/06/2023 without contrast.  This study shows severe central stenosis at C3-4 greater than C4-5.  There is increased signal within the cord at the C3-4 level.  This has progressed compared to MRI from 07/29/2020.  There is also now moderate to severe stenosis at C2-3.  This is to my reading.    ASSESSMENT:     1. Cervical spondylosis with myelopathy        2. Spinal stenosis, cervical region  MRI Cervical Spine Without Contrast    X-Ray Cervical Spine 5 View W Flex Extxt      3. Tobacco use  Ambulatory referral/consult to Smoking Cessation Program        PLAN:     Options were discussed at length with the patient.  His neurological deficits are secondary to cervical myelopathy.  I discussed at length the need to proceed with surgery to decompress the spinal cord needs to be done as soon as possible.  The patient understands he will continue to worsened without surgery.  However, the patient has not followed up appropriately with his cardiologist.  We will obtain cardiac clearance for surgery.  His cervical MRI is old.  We will have him return to see Dr. Pandey with updated cervical MRI and x-rays.    We discussed the surgical options.  He will need a fusion at least at C3-4 and C4-5.  Risks, benefits, and possible complications were discussed briefly.    We  also discussed the importance of tobacco cessation in regards to his overall health as well as healing for a fusion.      In addition, he has not followed up with his cardiologist or vascular surgeon as was recommended.  We will send out cardiac clearance request.      A total of 40 minutes was spent face-to-face with the patient during this encounter.  Over half of that time was spent on counseling and coordination of care.  An additional 20+ minutes were used to review the notes from pain management, prior cervical MRI images and reports, CTA neck images, and work on office note.      Lore Bland PA-C      Disclaimer:  This note is prepared using voice recognition software and as such is likely to have errors despite attempts at proofreading.

## 2024-12-18 ENCOUNTER — TELEPHONE (OUTPATIENT)
Dept: NEUROSURGERY | Facility: CLINIC | Age: 69
End: 2024-12-18
Payer: MEDICARE

## 2024-12-18 NOTE — TELEPHONE ENCOUNTER
The patient rescheduled his MRI/XR at [facility] in [place] for 12/27/24 which is after the appointment with Dr. Pandey on 12/26/24. I spoke with him to see if there was any way we can get this scan scheduled sooner. He stated he did try but the lady told him that was their next available. I messaged Angelita Lee, RT at [facility] to see if there was any way we can get this scan sooner than 12/27/24. She stated she only has a nurse 1 day/week and she was there this morning. Her next day with them is Friday 12/27. Angelita stated they have to have a Nurse to monitor him due to his pacemaker. She suggested I reach out to [facility] to see if they could get him in sooner. I called [facility] scheduling and spoke with Syeda regarding the scans. Since the patient was originally scheduled at [facility], she has to get approval from [facility] to see if someone will be there to monitor his pacemaker. She will give me a call back as soon as she gets the approval.

## 2024-12-18 NOTE — TELEPHONE ENCOUNTER
Per Lore, we will keep the appointments as scheduled. He will come to see Dr. Pandey on 12/26/24 and Dr. Pandey will call with results of his MRI/XR. I spoke with the patient to advise of this. He verbalized understanding.

## 2024-12-26 ENCOUNTER — TELEPHONE (OUTPATIENT)
Dept: NEUROSURGERY | Facility: CLINIC | Age: 69
End: 2024-12-26

## 2024-12-26 NOTE — TELEPHONE ENCOUNTER
I called the patient.  I expressed to him his condition can no longer weight.  He needs to call the ambulance.  He needs to present to the emergency department.  I recommended he present to [facility].  The patient prefers [facility].  I expressed to him that we can not wait any longer to take care of condition.  He needs to present to the emergency department today.  He voiced understanding.    Estephania, please call tomorrow to make sure that he did though to the emergency department.

## 2024-12-26 NOTE — TELEPHONE ENCOUNTER
I returned the patients call in regards to this appointment scheduled with Dr. Pandey today. He stated he is just not doing good. He is having trouble walking right now. His sciatic nerve is really bothering him today and does not know how long this pain will last. He did tell me that he fell yesterday and EMS went to his house to help him to get up. He knows it is vital that he sees Dr. Pandey and completes the MRI. Dr. Pandey does not have an availability until 1/13/25. I told him I would send the message to our NP to see if she could possibly see him or advise on scheduling. I advised him to call [facility] to get his MRI/XR rescheduled in the meantime while I am working on this. He verbalized understanding. Angelique, from Lore's last note on 12/3/24, he is needing sx with Dr. Pandey STBRANDON. On the other hand, Dr. Pandey is not doing any surgeries in January..

## 2024-12-27 NOTE — TELEPHONE ENCOUNTER
The patient did not present to the ED yesterday and also cancelled his MRI that was scheduled for today. I tried to call him to encourage him again to present to the ED but he did not answer and I was unable to leave a vm.

## 2025-01-01 ENCOUNTER — HOSPITAL ENCOUNTER (INPATIENT)
Facility: HOSPITAL | Age: 70
LOS: 14 days | DRG: 870 | End: 2025-03-26
Attending: INTERNAL MEDICINE | Admitting: EMERGENCY MEDICINE
Payer: MEDICARE

## 2025-01-01 VITALS
SYSTOLIC BLOOD PRESSURE: 107 MMHG | BODY MASS INDEX: 24.59 KG/M2 | TEMPERATURE: 99 F | HEIGHT: 70 IN | HEART RATE: 93 BPM | DIASTOLIC BLOOD PRESSURE: 53 MMHG | WEIGHT: 171.75 LBS | OXYGEN SATURATION: 98 % | RESPIRATION RATE: 22 BRPM

## 2025-01-01 DIAGNOSIS — J96.02 ACUTE RESPIRATORY FAILURE WITH HYPOXIA AND HYPERCAPNIA: ICD-10-CM

## 2025-01-01 DIAGNOSIS — U07.1 COVID-19 VIRUS INFECTION: ICD-10-CM

## 2025-01-01 DIAGNOSIS — I50.9 ACUTE CONGESTIVE HEART FAILURE, UNSPECIFIED HEART FAILURE TYPE: Primary | ICD-10-CM

## 2025-01-01 DIAGNOSIS — J96.01 ACUTE RESPIRATORY FAILURE WITH HYPOXIA AND HYPERCAPNIA: ICD-10-CM

## 2025-01-01 DIAGNOSIS — N39.0 URINARY TRACT INFECTION WITHOUT HEMATURIA, SITE UNSPECIFIED: ICD-10-CM

## 2025-01-01 DIAGNOSIS — Z13.6 SCREENING FOR CARDIOVASCULAR CONDITION: ICD-10-CM

## 2025-01-01 DIAGNOSIS — J20.9 ACUTE BRONCHITIS, UNSPECIFIED ORGANISM: ICD-10-CM

## 2025-01-01 DIAGNOSIS — I46.9 CARDIAC ARREST: ICD-10-CM

## 2025-01-01 DIAGNOSIS — J96.90 RESPIRATORY FAILURE: ICD-10-CM

## 2025-01-01 LAB
ABO + RH BLD: NORMAL
ABS NEUT CALC (OHS): 22.39 X10(3)/MCL (ref 2.1–9.2)
ABS NEUT CALC (OHS): 30.39 X10(3)/MCL (ref 2.1–9.2)
ALBUMIN SERPL-MCNC: 1.7 G/DL (ref 3.4–4.8)
ALBUMIN SERPL-MCNC: 1.8 G/DL (ref 3.4–4.8)
ALBUMIN SERPL-MCNC: 2.1 G/DL (ref 3.4–4.8)
ALBUMIN SERPL-MCNC: 2.4 G/DL (ref 3.4–4.8)
ALBUMIN SERPL-MCNC: 2.5 G/DL (ref 3.4–4.8)
ALBUMIN SERPL-MCNC: 2.6 G/DL (ref 3.4–4.8)
ALBUMIN SERPL-MCNC: 2.7 G/DL (ref 3.4–4.8)
ALBUMIN SERPL-MCNC: 2.9 G/DL (ref 3.4–4.8)
ALBUMIN SERPL-MCNC: 3.2 G/DL (ref 3.4–4.8)
ALBUMIN/GLOB SERPL: 0.5 RATIO (ref 1.1–2)
ALBUMIN/GLOB SERPL: 0.6 RATIO (ref 1.1–2)
ALBUMIN/GLOB SERPL: 0.7 RATIO (ref 1.1–2)
ALBUMIN/GLOB SERPL: 0.8 RATIO (ref 1.1–2)
ALBUMIN/GLOB SERPL: 0.9 RATIO (ref 1.1–2)
ALBUMIN/GLOB SERPL: 0.9 RATIO (ref 1.1–2)
ALLENS TEST: ABNORMAL
ALP SERPL-CCNC: 100 UNIT/L (ref 40–150)
ALP SERPL-CCNC: 110 UNIT/L (ref 40–150)
ALP SERPL-CCNC: 120 UNIT/L (ref 40–150)
ALP SERPL-CCNC: 121 UNIT/L (ref 40–150)
ALP SERPL-CCNC: 130 UNIT/L (ref 40–150)
ALP SERPL-CCNC: 135 UNIT/L (ref 40–150)
ALP SERPL-CCNC: 149 UNIT/L (ref 40–150)
ALP SERPL-CCNC: 155 UNIT/L (ref 40–150)
ALP SERPL-CCNC: 177 UNIT/L (ref 40–150)
ALP SERPL-CCNC: 182 UNIT/L (ref 40–150)
ALP SERPL-CCNC: 211 UNIT/L (ref 40–150)
ALT SERPL-CCNC: 146 UNIT/L (ref 0–55)
ALT SERPL-CCNC: 1623 UNIT/L (ref 0–55)
ALT SERPL-CCNC: 243 UNIT/L (ref 0–55)
ALT SERPL-CCNC: 26 UNIT/L (ref 0–55)
ALT SERPL-CCNC: 26 UNIT/L (ref 0–55)
ALT SERPL-CCNC: 268 UNIT/L (ref 0–55)
ALT SERPL-CCNC: 42 UNIT/L (ref 0–55)
ALT SERPL-CCNC: 49 UNIT/L (ref 0–55)
ALT SERPL-CCNC: 661 UNIT/L (ref 0–55)
ALT SERPL-CCNC: 76 UNIT/L (ref 0–55)
ALT SERPL-CCNC: 90 UNIT/L (ref 0–55)
AMORPH URATE CRY URNS QL MICRO: ABNORMAL /HPF
ANION GAP SERPL CALC-SCNC: 10 MEQ/L
ANION GAP SERPL CALC-SCNC: 11 MEQ/L
ANION GAP SERPL CALC-SCNC: 12 MEQ/L
ANION GAP SERPL CALC-SCNC: 13 MEQ/L
ANION GAP SERPL CALC-SCNC: 14 MEQ/L
ANION GAP SERPL CALC-SCNC: 14 MEQ/L
ANION GAP SERPL CALC-SCNC: 15 MEQ/L
ANION GAP SERPL CALC-SCNC: 16 MEQ/L
ANION GAP SERPL CALC-SCNC: 7 MEQ/L
ANION GAP SERPL CALC-SCNC: 8 MEQ/L
ANION GAP SERPL CALC-SCNC: 9 MEQ/L
APICAL FOUR CHAMBER EJECTION FRACTION: 18 %
AST SERPL-CCNC: 24 UNIT/L (ref 5–34)
AST SERPL-CCNC: 258 UNIT/L (ref 11–45)
AST SERPL-CCNC: 27 UNIT/L (ref 5–34)
AST SERPL-CCNC: 29 UNIT/L (ref 5–34)
AST SERPL-CCNC: 2958 UNIT/L (ref 5–34)
AST SERPL-CCNC: 403 UNIT/L (ref 5–34)
AST SERPL-CCNC: 42 UNIT/L (ref 5–34)
AST SERPL-CCNC: 48 UNIT/L (ref 5–34)
AST SERPL-CCNC: 53 UNIT/L (ref 11–45)
AST SERPL-CCNC: 75 UNIT/L (ref 11–45)
AST SERPL-CCNC: 91 UNIT/L (ref 5–34)
AV PEAK GRADIENT: 18 MMHG
AV VALVE AREA BY VELOCITY RATIO: 1.2 CM²
AV VELOCITY RATIO: 0.43
BACTERIA #/AREA URNS AUTO: ABNORMAL /HPF
BACTERIA #/AREA URNS AUTO: ABNORMAL /HPF
BACTERIA BLD CULT: NORMAL
BACTERIA SPEC CULT: ABNORMAL
BACTERIA SPEC CULT: NORMAL
BACTERIA UR CULT: ABNORMAL
BASOPHILS # BLD AUTO: 0 X10(3)/MCL
BASOPHILS # BLD AUTO: 0.01 X10(3)/MCL
BASOPHILS # BLD AUTO: 0.02 X10(3)/MCL
BASOPHILS NFR BLD AUTO: 0 %
BASOPHILS NFR BLD AUTO: 0.1 %
BILIRUB SERPL-MCNC: 0.3 MG/DL
BILIRUB SERPL-MCNC: 0.4 MG/DL
BILIRUB SERPL-MCNC: 0.5 MG/DL
BILIRUB SERPL-MCNC: 0.5 MG/DL
BILIRUB SERPL-MCNC: 0.6 MG/DL
BILIRUB SERPL-MCNC: 0.8 MG/DL
BILIRUB SERPL-MCNC: 1 MG/DL
BILIRUB UR QL STRIP.AUTO: ABNORMAL
BILIRUB UR QL STRIP.AUTO: ABNORMAL
BLD PROD TYP BPU: NORMAL
BLOOD UNIT EXPIRATION DATE: NORMAL
BLOOD UNIT TYPE CODE: 5100
BNP BLD-MCNC: 3135.4 PG/ML
BSA FOR ECHO PROCEDURE: 1.95 M2
BUN SERPL-MCNC: 123 MG/DL (ref 8.4–25.7)
BUN SERPL-MCNC: 123 MG/DL (ref 8.4–25.7)
BUN SERPL-MCNC: 125 MG/DL (ref 8.4–25.7)
BUN SERPL-MCNC: 134 MG/DL (ref 8.4–25.7)
BUN SERPL-MCNC: 144 MG/DL (ref 8.4–25.7)
BUN SERPL-MCNC: 152 MG/DL (ref 8.4–25.7)
BUN SERPL-MCNC: 30 MG/DL (ref 8.4–25.7)
BUN SERPL-MCNC: 34 MG/DL (ref 8.4–25.7)
BUN SERPL-MCNC: 39 MG/DL (ref 8.4–25.7)
BUN SERPL-MCNC: 48 MG/DL (ref 8.4–25.7)
BUN SERPL-MCNC: 49 MG/DL (ref 8.4–25.7)
BUN SERPL-MCNC: 52 MG/DL (ref 8.4–25.7)
BUN SERPL-MCNC: 52 MG/DL (ref 8.4–25.7)
BUN SERPL-MCNC: 54 MG/DL (ref 8.4–25.7)
BUN SERPL-MCNC: 56 MG/DL (ref 8.4–25.7)
BUN SERPL-MCNC: 56 MG/DL (ref 8.4–25.7)
BUN SERPL-MCNC: 99 MG/DL (ref 8.4–25.7)
BUN SERPL-MCNC: >125 MG/DL (ref 8.4–25.7)
CALCIUM SERPL-MCNC: 7.6 MG/DL (ref 8.8–10)
CALCIUM SERPL-MCNC: 7.9 MG/DL (ref 8.8–10)
CALCIUM SERPL-MCNC: 8 MG/DL (ref 8.8–10)
CALCIUM SERPL-MCNC: 8.2 MG/DL (ref 8.8–10)
CALCIUM SERPL-MCNC: 8.2 MG/DL (ref 8.8–10)
CALCIUM SERPL-MCNC: 8.4 MG/DL (ref 8.8–10)
CALCIUM SERPL-MCNC: 8.4 MG/DL (ref 8.8–10)
CALCIUM SERPL-MCNC: 8.5 MG/DL (ref 8.8–10)
CALCIUM SERPL-MCNC: 8.6 MG/DL (ref 8.8–10)
CALCIUM SERPL-MCNC: 8.6 MG/DL (ref 8.8–10)
CALCIUM SERPL-MCNC: 8.7 MG/DL (ref 8.8–10)
CALCIUM SERPL-MCNC: 9.1 MG/DL (ref 8.8–10)
CALCIUM SERPL-MCNC: 9.1 MG/DL (ref 8.8–10)
CALCIUM SERPL-MCNC: 9.2 MG/DL (ref 8.8–10)
CALCIUM SERPL-MCNC: 9.2 MG/DL (ref 8.8–10)
CALCIUM SERPL-MCNC: 9.4 MG/DL (ref 8.8–10)
CHLORIDE SERPL-SCNC: 100 MMOL/L (ref 98–107)
CHLORIDE SERPL-SCNC: 100 MMOL/L (ref 98–107)
CHLORIDE SERPL-SCNC: 102 MMOL/L (ref 98–107)
CHLORIDE SERPL-SCNC: 102 MMOL/L (ref 98–107)
CHLORIDE SERPL-SCNC: 103 MMOL/L (ref 98–107)
CHLORIDE SERPL-SCNC: 103 MMOL/L (ref 98–107)
CHLORIDE SERPL-SCNC: 104 MMOL/L (ref 98–107)
CHLORIDE SERPL-SCNC: 104 MMOL/L (ref 98–107)
CHLORIDE SERPL-SCNC: 105 MMOL/L (ref 98–107)
CHLORIDE SERPL-SCNC: 106 MMOL/L (ref 98–107)
CHLORIDE SERPL-SCNC: 107 MMOL/L (ref 98–107)
CLARITY UR: ABNORMAL
CLARITY UR: ABNORMAL
CO2 SERPL-SCNC: 15 MMOL/L (ref 23–31)
CO2 SERPL-SCNC: 17 MMOL/L (ref 23–31)
CO2 SERPL-SCNC: 18 MMOL/L (ref 23–31)
CO2 SERPL-SCNC: 18 MMOL/L (ref 23–31)
CO2 SERPL-SCNC: 19 MMOL/L (ref 23–31)
CO2 SERPL-SCNC: 19 MMOL/L (ref 23–31)
CO2 SERPL-SCNC: 22 MMOL/L (ref 23–31)
CO2 SERPL-SCNC: 23 MMOL/L (ref 23–31)
CO2 SERPL-SCNC: 26 MMOL/L (ref 23–31)
CO2 SERPL-SCNC: 26 MMOL/L (ref 23–31)
CO2 SERPL-SCNC: 27 MMOL/L (ref 23–31)
CO2 SERPL-SCNC: 28 MMOL/L (ref 23–31)
CO2 SERPL-SCNC: 29 MMOL/L (ref 23–31)
CO2 SERPL-SCNC: 30 MMOL/L (ref 23–31)
COLOR UR AUTO: ABNORMAL
COLOR UR AUTO: ABNORMAL
CREAT SERPL-MCNC: 1.34 MG/DL (ref 0.72–1.25)
CREAT SERPL-MCNC: 1.48 MG/DL (ref 0.72–1.25)
CREAT SERPL-MCNC: 1.58 MG/DL (ref 0.72–1.25)
CREAT SERPL-MCNC: 1.59 MG/DL (ref 0.72–1.25)
CREAT SERPL-MCNC: 1.61 MG/DL (ref 0.72–1.25)
CREAT SERPL-MCNC: 1.65 MG/DL (ref 0.72–1.25)
CREAT SERPL-MCNC: 1.72 MG/DL (ref 0.72–1.25)
CREAT SERPL-MCNC: 1.75 MG/DL (ref 0.72–1.25)
CREAT SERPL-MCNC: 1.79 MG/DL (ref 0.72–1.25)
CREAT SERPL-MCNC: 2.88 MG/DL (ref 0.72–1.25)
CREAT SERPL-MCNC: 4.43 MG/DL (ref 0.72–1.25)
CREAT SERPL-MCNC: 5.12 MG/DL (ref 0.72–1.25)
CREAT SERPL-MCNC: 5.33 MG/DL (ref 0.72–1.25)
CREAT SERPL-MCNC: 5.39 MG/DL (ref 0.72–1.25)
CREAT SERPL-MCNC: 5.56 MG/DL (ref 0.72–1.25)
CREAT SERPL-MCNC: 5.74 MG/DL (ref 0.72–1.25)
CREAT SERPL-MCNC: 5.88 MG/DL (ref 0.72–1.25)
CREAT SERPL-MCNC: 5.94 MG/DL (ref 0.72–1.25)
CREAT/UREA NIT SERPL: 19
CREAT/UREA NIT SERPL: 19
CREAT/UREA NIT SERPL: 22
CREAT/UREA NIT SERPL: 23
CREAT/UREA NIT SERPL: 24
CREAT/UREA NIT SERPL: 24
CREAT/UREA NIT SERPL: 25
CREAT/UREA NIT SERPL: 26
CREAT/UREA NIT SERPL: 26
CREAT/UREA NIT SERPL: 27
CREAT/UREA NIT SERPL: 28
CREAT/UREA NIT SERPL: 33
CREAT/UREA NIT SERPL: 35
CREAT/UREA NIT SERPL: >22
CROSSMATCH INTERPRETATION: NORMAL
CV ECHO LV RWT: 0.48 CM
DELSYS: ABNORMAL
DISPENSE STATUS: NORMAL
DOP CALC AO PEAK VEL: 2.1 M/S
DOP CALC LVOT AREA: 2.8 CM2
DOP CALC LVOT DIAMETER: 1.9 CM
DOP CALC LVOT PEAK VEL: 0.9 M/S
DOP CALC MV VTI: 37.5 CM
E WAVE DECELERATION TIME: 232 MSEC
E/A RATIO: 2.12
ECHO LV POSTERIOR WALL: 1.3 CM (ref 0.6–1.1)
EOSINOPHIL # BLD AUTO: 0 X10(3)/MCL (ref 0–0.9)
EOSINOPHIL # BLD AUTO: 0.01 X10(3)/MCL (ref 0–0.9)
EOSINOPHIL # BLD AUTO: 0.01 X10(3)/MCL (ref 0–0.9)
EOSINOPHIL # BLD AUTO: 0.08 X10(3)/MCL (ref 0–0.9)
EOSINOPHIL # BLD AUTO: 0.15 X10(3)/MCL (ref 0–0.9)
EOSINOPHIL NFR BLD AUTO: 0 %
EOSINOPHIL NFR BLD AUTO: 0.1 %
EOSINOPHIL NFR BLD AUTO: 0.1 %
EOSINOPHIL NFR BLD AUTO: 0.8 %
EOSINOPHIL NFR BLD AUTO: 1.1 %
EOSINOPHIL NFR BLD MANUAL: 0.34 X10(3)/MCL (ref 0–0.9)
EOSINOPHIL NFR BLD MANUAL: 1 % (ref 0–8)
EP: 5
ERYTHROCYTE [DISTWIDTH] IN BLOOD BY AUTOMATED COUNT: 12.7 % (ref 11.5–17)
ERYTHROCYTE [DISTWIDTH] IN BLOOD BY AUTOMATED COUNT: 12.7 % (ref 11.5–17)
ERYTHROCYTE [DISTWIDTH] IN BLOOD BY AUTOMATED COUNT: 12.8 % (ref 11.5–17)
ERYTHROCYTE [DISTWIDTH] IN BLOOD BY AUTOMATED COUNT: 12.9 % (ref 11.5–17)
ERYTHROCYTE [DISTWIDTH] IN BLOOD BY AUTOMATED COUNT: 13.1 % (ref 11.5–17)
ERYTHROCYTE [DISTWIDTH] IN BLOOD BY AUTOMATED COUNT: 13.2 % (ref 11.5–17)
ERYTHROCYTE [DISTWIDTH] IN BLOOD BY AUTOMATED COUNT: 13.2 % (ref 11.5–17)
ERYTHROCYTE [DISTWIDTH] IN BLOOD BY AUTOMATED COUNT: 13.6 % (ref 11.5–17)
ERYTHROCYTE [DISTWIDTH] IN BLOOD BY AUTOMATED COUNT: 13.8 % (ref 11.5–17)
ERYTHROCYTE [DISTWIDTH] IN BLOOD BY AUTOMATED COUNT: 13.8 % (ref 11.5–17)
ERYTHROCYTE [DISTWIDTH] IN BLOOD BY AUTOMATED COUNT: 14 % (ref 11.5–17)
ERYTHROCYTE [DISTWIDTH] IN BLOOD BY AUTOMATED COUNT: 14.1 % (ref 11.5–17)
ERYTHROCYTE [SEDIMENTATION RATE] IN BLOOD BY WESTERGREN METHOD: 16 MM/H
ERYTHROCYTE [SEDIMENTATION RATE] IN BLOOD BY WESTERGREN METHOD: 20 MM/H
FIO2: 100
FIO2: 35
FIO2: 40
FLUAV AG UPPER RESP QL IA.RAPID: NOT DETECTED
FLUBV AG UPPER RESP QL IA.RAPID: NOT DETECTED
FRACTIONAL SHORTENING: 5.6 % (ref 28–44)
GFR SERPLBLD CREATININE-BSD FMLA CKD-EPI: 10 ML/MIN/1.73/M2
GFR SERPLBLD CREATININE-BSD FMLA CKD-EPI: 11 ML/MIN/1.73/M2
GFR SERPLBLD CREATININE-BSD FMLA CKD-EPI: 11 ML/MIN/1.73/M2
GFR SERPLBLD CREATININE-BSD FMLA CKD-EPI: 12 ML/MIN/1.73/M2
GFR SERPLBLD CREATININE-BSD FMLA CKD-EPI: 14 ML/MIN/1.73/M2
GFR SERPLBLD CREATININE-BSD FMLA CKD-EPI: 23 ML/MIN/1.73/M2
GFR SERPLBLD CREATININE-BSD FMLA CKD-EPI: 41 ML/MIN/1.73/M2
GFR SERPLBLD CREATININE-BSD FMLA CKD-EPI: 42 ML/MIN/1.73/M2
GFR SERPLBLD CREATININE-BSD FMLA CKD-EPI: 43 ML/MIN/1.73/M2
GFR SERPLBLD CREATININE-BSD FMLA CKD-EPI: 45 ML/MIN/1.73/M2
GFR SERPLBLD CREATININE-BSD FMLA CKD-EPI: 47 ML/MIN/1.73/M2
GFR SERPLBLD CREATININE-BSD FMLA CKD-EPI: 47 ML/MIN/1.73/M2
GFR SERPLBLD CREATININE-BSD FMLA CKD-EPI: 48 ML/MIN/1.73/M2
GFR SERPLBLD CREATININE-BSD FMLA CKD-EPI: 52 ML/MIN/1.73/M2
GFR SERPLBLD CREATININE-BSD FMLA CKD-EPI: 58 ML/MIN/1.73/M2
GLOBULIN SER-MCNC: 2.9 GM/DL (ref 2.4–3.5)
GLOBULIN SER-MCNC: 3 GM/DL (ref 2.4–3.5)
GLOBULIN SER-MCNC: 3 GM/DL (ref 2.4–3.5)
GLOBULIN SER-MCNC: 3.1 GM/DL (ref 2.4–3.5)
GLOBULIN SER-MCNC: 3.3 GM/DL (ref 2.4–3.5)
GLOBULIN SER-MCNC: 3.3 GM/DL (ref 2.4–3.5)
GLOBULIN SER-MCNC: 3.5 GM/DL (ref 2.4–3.5)
GLOBULIN SER-MCNC: 3.6 GM/DL (ref 2.4–3.5)
GLOBULIN SER-MCNC: 3.7 GM/DL (ref 2.4–3.5)
GLOBULIN SER-MCNC: 3.8 GM/DL (ref 2.4–3.5)
GLOBULIN SER-MCNC: 3.9 GM/DL (ref 2.4–3.5)
GLUCOSE SERPL-MCNC: 115 MG/DL (ref 82–115)
GLUCOSE SERPL-MCNC: 124 MG/DL (ref 82–115)
GLUCOSE SERPL-MCNC: 126 MG/DL (ref 70–110)
GLUCOSE SERPL-MCNC: 134 MG/DL (ref 82–115)
GLUCOSE SERPL-MCNC: 169 MG/DL (ref 82–115)
GLUCOSE SERPL-MCNC: 191 MG/DL (ref 82–115)
GLUCOSE SERPL-MCNC: 195 MG/DL (ref 82–115)
GLUCOSE SERPL-MCNC: 200 MG/DL (ref 82–115)
GLUCOSE SERPL-MCNC: 201 MG/DL (ref 82–115)
GLUCOSE SERPL-MCNC: 206 MG/DL (ref 82–115)
GLUCOSE SERPL-MCNC: 210 MG/DL (ref 82–115)
GLUCOSE SERPL-MCNC: 242 MG/DL (ref 82–115)
GLUCOSE SERPL-MCNC: 266 MG/DL (ref 82–115)
GLUCOSE SERPL-MCNC: 287 MG/DL (ref 82–115)
GLUCOSE SERPL-MCNC: 297 MG/DL (ref 82–115)
GLUCOSE SERPL-MCNC: 308 MG/DL (ref 82–115)
GLUCOSE SERPL-MCNC: 308 MG/DL (ref 82–115)
GLUCOSE SERPL-MCNC: 321 MG/DL (ref 82–115)
GLUCOSE SERPL-MCNC: 64 MG/DL (ref 82–115)
GLUCOSE UR QL STRIP: NEGATIVE
GLUCOSE UR QL STRIP: NEGATIVE
GRAM STN SPEC: ABNORMAL
GRAM STN SPEC: ABNORMAL
GRAN CASTS URNS QL MICRO: ABNORMAL /LPF
GROUP & RH: NORMAL
HBV SURFACE AG SERPL QL IA: NONREACTIVE
HCO3 UR-SCNC: 18.3 MMOL/L (ref 24–28)
HCO3 UR-SCNC: 18.5 MMOL/L (ref 24–28)
HCO3 UR-SCNC: 19.4 MMOL/L (ref 24–28)
HCO3 UR-SCNC: 20.1 MMOL/L (ref 24–28)
HCO3 UR-SCNC: 21.9 MMOL/L (ref 24–28)
HCO3 UR-SCNC: 23.4 MMOL/L (ref 24–28)
HCO3 UR-SCNC: 24.1 MMOL/L (ref 24–28)
HCO3 UR-SCNC: 24.2 MMOL/L (ref 24–28)
HCO3 UR-SCNC: 25.7 MMOL/L (ref 24–28)
HCO3 UR-SCNC: 26.8 MMOL/L (ref 24–28)
HCO3 UR-SCNC: 30.5 MMOL/L (ref 24–28)
HCT VFR BLD AUTO: 21.7 % (ref 42–52)
HCT VFR BLD AUTO: 23.8 % (ref 42–52)
HCT VFR BLD AUTO: 25.1 % (ref 42–52)
HCT VFR BLD AUTO: 26.3 % (ref 42–52)
HCT VFR BLD AUTO: 26.4 % (ref 42–52)
HCT VFR BLD AUTO: 26.5 % (ref 42–52)
HCT VFR BLD AUTO: 26.6 % (ref 42–52)
HCT VFR BLD AUTO: 26.8 % (ref 42–52)
HCT VFR BLD AUTO: 26.8 % (ref 42–52)
HCT VFR BLD AUTO: 26.9 % (ref 42–52)
HCT VFR BLD AUTO: 27.5 % (ref 42–52)
HCT VFR BLD AUTO: 28.4 % (ref 42–52)
HCT VFR BLD AUTO: 28.9 % (ref 42–52)
HCT VFR BLD AUTO: 38.8 % (ref 42–52)
HGB BLD-MCNC: 11.5 G/DL (ref 14–18)
HGB BLD-MCNC: 7.2 G/DL (ref 14–18)
HGB BLD-MCNC: 7.5 G/DL (ref 14–18)
HGB BLD-MCNC: 8.1 G/DL (ref 14–18)
HGB BLD-MCNC: 8.3 G/DL (ref 14–18)
HGB BLD-MCNC: 8.5 G/DL (ref 14–18)
HGB BLD-MCNC: 8.5 G/DL (ref 14–18)
HGB BLD-MCNC: 8.6 G/DL (ref 14–18)
HGB BLD-MCNC: 8.6 G/DL (ref 14–18)
HGB BLD-MCNC: 8.8 G/DL (ref 14–18)
HGB BLD-MCNC: 8.9 G/DL (ref 14–18)
HGB BLD-MCNC: 9.1 G/DL (ref 14–18)
HGB UR QL STRIP: ABNORMAL
HGB UR QL STRIP: ABNORMAL
IMM GRANULOCYTES # BLD AUTO: 0.04 X10(3)/MCL (ref 0–0.04)
IMM GRANULOCYTES # BLD AUTO: 0.05 X10(3)/MCL (ref 0–0.04)
IMM GRANULOCYTES # BLD AUTO: 0.07 X10(3)/MCL (ref 0–0.04)
IMM GRANULOCYTES # BLD AUTO: 0.08 X10(3)/MCL (ref 0–0.04)
IMM GRANULOCYTES # BLD AUTO: 0.08 X10(3)/MCL (ref 0–0.04)
IMM GRANULOCYTES # BLD AUTO: 0.11 X10(3)/MCL (ref 0–0.04)
IMM GRANULOCYTES # BLD AUTO: 0.12 X10(3)/MCL (ref 0–0.04)
IMM GRANULOCYTES # BLD AUTO: 0.13 X10(3)/MCL (ref 0–0.04)
IMM GRANULOCYTES # BLD AUTO: 0.15 X10(3)/MCL (ref 0–0.04)
IMM GRANULOCYTES # BLD AUTO: 0.16 X10(3)/MCL (ref 0–0.04)
IMM GRANULOCYTES # BLD AUTO: 0.18 X10(3)/MCL (ref 0–0.04)
IMM GRANULOCYTES # BLD AUTO: 0.22 X10(3)/MCL (ref 0–0.04)
IMM GRANULOCYTES NFR BLD AUTO: 0.4 %
IMM GRANULOCYTES NFR BLD AUTO: 0.5 %
IMM GRANULOCYTES NFR BLD AUTO: 0.6 %
IMM GRANULOCYTES NFR BLD AUTO: 0.8 %
IMM GRANULOCYTES NFR BLD AUTO: 0.8 %
IMM GRANULOCYTES NFR BLD AUTO: 1.2 %
IMM GRANULOCYTES NFR BLD AUTO: 1.2 %
IMM GRANULOCYTES NFR BLD AUTO: 1.5 %
INDIRECT COOMBS: NORMAL
INTERVENTRICULAR SEPTUM: 1 CM (ref 0.6–1.1)
IP: 10
KETONES UR QL STRIP: ABNORMAL
KETONES UR QL STRIP: ABNORMAL
LACTATE SERPL-SCNC: 0.9 MMOL/L (ref 0.5–2.2)
LEFT ATRIUM SIZE: 4.7 CM
LEFT INTERNAL DIMENSION IN SYSTOLE: 5.1 CM (ref 2.1–4)
LEFT VENTRICLE DIASTOLIC VOLUME INDEX: 72.59 ML/M2
LEFT VENTRICLE DIASTOLIC VOLUME: 143 ML
LEFT VENTRICLE END DIASTOLIC VOLUME APICAL 2 CHAMBER: 0 ML
LEFT VENTRICLE END DIASTOLIC VOLUME APICAL 4 CHAMBER: 113.89 ML
LEFT VENTRICLE MASS INDEX: 126.6 G/M2
LEFT VENTRICLE SYSTOLIC VOLUME INDEX: 62.9 ML/M2
LEFT VENTRICLE SYSTOLIC VOLUME: 124 ML
LEFT VENTRICULAR INTERNAL DIMENSION IN DIASTOLE: 5.4 CM (ref 3.5–6)
LEFT VENTRICULAR MASS: 249.4 G
LEUKOCYTE ESTERASE UR QL STRIP: ABNORMAL
LEUKOCYTE ESTERASE UR QL STRIP: NEGATIVE
LVED V (TEICH): 142.75 ML
LVES V (TEICH): 123.63 ML
LYMPHOCYTES # BLD AUTO: 0.34 X10(3)/MCL (ref 0.6–4.6)
LYMPHOCYTES # BLD AUTO: 0.41 X10(3)/MCL (ref 0.6–4.6)
LYMPHOCYTES # BLD AUTO: 0.44 X10(3)/MCL (ref 0.6–4.6)
LYMPHOCYTES # BLD AUTO: 0.45 X10(3)/MCL (ref 0.6–4.6)
LYMPHOCYTES # BLD AUTO: 0.55 X10(3)/MCL (ref 0.6–4.6)
LYMPHOCYTES # BLD AUTO: 0.58 X10(3)/MCL (ref 0.6–4.6)
LYMPHOCYTES # BLD AUTO: 0.6 X10(3)/MCL (ref 0.6–4.6)
LYMPHOCYTES # BLD AUTO: 0.6 X10(3)/MCL (ref 0.6–4.6)
LYMPHOCYTES # BLD AUTO: 0.63 X10(3)/MCL (ref 0.6–4.6)
LYMPHOCYTES # BLD AUTO: 0.69 X10(3)/MCL (ref 0.6–4.6)
LYMPHOCYTES # BLD AUTO: 0.76 X10(3)/MCL (ref 0.6–4.6)
LYMPHOCYTES # BLD AUTO: 0.87 X10(3)/MCL (ref 0.6–4.6)
LYMPHOCYTES # BLD AUTO: 1.1 X10(3)/MCL (ref 0.6–4.6)
LYMPHOCYTES # BLD AUTO: 1.13 X10(3)/MCL (ref 0.6–4.6)
LYMPHOCYTES NFR BLD AUTO: 1.9 %
LYMPHOCYTES NFR BLD AUTO: 11.1 %
LYMPHOCYTES NFR BLD AUTO: 13.1 %
LYMPHOCYTES NFR BLD AUTO: 2 %
LYMPHOCYTES NFR BLD AUTO: 2 %
LYMPHOCYTES NFR BLD AUTO: 2.7 %
LYMPHOCYTES NFR BLD AUTO: 3.1 %
LYMPHOCYTES NFR BLD AUTO: 3.9 %
LYMPHOCYTES NFR BLD AUTO: 3.9 %
LYMPHOCYTES NFR BLD AUTO: 4.6 %
LYMPHOCYTES NFR BLD AUTO: 4.7 %
LYMPHOCYTES NFR BLD AUTO: 5.3 %
LYMPHOCYTES NFR BLD AUTO: 6 %
LYMPHOCYTES NFR BLD AUTO: 6.2 %
LYMPHOCYTES NFR BLD MANUAL: 1.44 X10(3)/MCL (ref 0.6–4.6)
LYMPHOCYTES NFR BLD MANUAL: 2.05 X10(3)/MCL (ref 0.6–4.6)
LYMPHOCYTES NFR BLD MANUAL: 6 % (ref 13–40)
LYMPHOCYTES NFR BLD MANUAL: 6 % (ref 13–40)
MAGNESIUM SERPL-MCNC: 1.7 MG/DL (ref 1.6–2.6)
MAGNESIUM SERPL-MCNC: 1.7 MG/DL (ref 1.6–2.6)
MAGNESIUM SERPL-MCNC: 1.9 MG/DL (ref 1.6–2.6)
MAGNESIUM SERPL-MCNC: 2.1 MG/DL (ref 1.6–2.6)
MAGNESIUM SERPL-MCNC: 2.2 MG/DL (ref 1.6–2.6)
MAGNESIUM SERPL-MCNC: 2.3 MG/DL (ref 1.6–2.6)
MAGNESIUM SERPL-MCNC: 2.4 MG/DL (ref 1.6–2.6)
MAGNESIUM SERPL-MCNC: 2.5 MG/DL (ref 1.6–2.6)
MAGNESIUM SERPL-MCNC: 2.6 MG/DL (ref 1.6–2.6)
MCH RBC QN AUTO: 28.2 PG (ref 27–31)
MCH RBC QN AUTO: 28.3 PG (ref 27–31)
MCH RBC QN AUTO: 28.4 PG (ref 27–31)
MCH RBC QN AUTO: 28.4 PG (ref 27–31)
MCH RBC QN AUTO: 28.6 PG (ref 27–31)
MCH RBC QN AUTO: 28.7 PG (ref 27–31)
MCH RBC QN AUTO: 28.7 PG (ref 27–31)
MCH RBC QN AUTO: 28.8 PG (ref 27–31)
MCH RBC QN AUTO: 28.9 PG (ref 27–31)
MCH RBC QN AUTO: 28.9 PG (ref 27–31)
MCH RBC QN AUTO: 29 PG (ref 27–31)
MCH RBC QN AUTO: 29 PG (ref 27–31)
MCH RBC QN AUTO: 29.1 PG (ref 27–31)
MCH RBC QN AUTO: 29.3 PG (ref 27–31)
MCHC RBC AUTO-ENTMCNC: 29.6 G/DL (ref 33–36)
MCHC RBC AUTO-ENTMCNC: 31 G/DL (ref 33–36)
MCHC RBC AUTO-ENTMCNC: 31.2 G/DL (ref 33–36)
MCHC RBC AUTO-ENTMCNC: 31.3 G/DL (ref 33–36)
MCHC RBC AUTO-ENTMCNC: 31.3 G/DL (ref 33–36)
MCHC RBC AUTO-ENTMCNC: 31.5 G/DL (ref 33–36)
MCHC RBC AUTO-ENTMCNC: 31.5 G/DL (ref 33–36)
MCHC RBC AUTO-ENTMCNC: 31.7 G/DL (ref 33–36)
MCHC RBC AUTO-ENTMCNC: 31.7 G/DL (ref 33–36)
MCHC RBC AUTO-ENTMCNC: 32.3 G/DL (ref 33–36)
MCHC RBC AUTO-ENTMCNC: 32.4 G/DL (ref 33–36)
MCHC RBC AUTO-ENTMCNC: 32.5 G/DL (ref 33–36)
MCHC RBC AUTO-ENTMCNC: 32.6 G/DL (ref 33–36)
MCHC RBC AUTO-ENTMCNC: 32.7 G/DL (ref 33–36)
MCHC RBC AUTO-ENTMCNC: 33.2 G/DL (ref 33–36)
MCHC RBC AUTO-ENTMCNC: 33.5 G/DL (ref 33–36)
MCV RBC AUTO: 86.5 FL (ref 80–94)
MCV RBC AUTO: 86.6 FL (ref 80–94)
MCV RBC AUTO: 87.5 FL (ref 80–94)
MCV RBC AUTO: 87.5 FL (ref 80–94)
MCV RBC AUTO: 87.6 FL (ref 80–94)
MCV RBC AUTO: 88.1 FL (ref 80–94)
MCV RBC AUTO: 88.8 FL (ref 80–94)
MCV RBC AUTO: 89 FL (ref 80–94)
MCV RBC AUTO: 90.5 FL (ref 80–94)
MCV RBC AUTO: 90.5 FL (ref 80–94)
MCV RBC AUTO: 90.9 FL (ref 80–94)
MCV RBC AUTO: 91.6 FL (ref 80–94)
MCV RBC AUTO: 92.8 FL (ref 80–94)
MCV RBC AUTO: 93 FL (ref 80–94)
MCV RBC AUTO: 93.1 FL (ref 80–94)
MCV RBC AUTO: 97.2 FL (ref 80–94)
MODE: ABNORMAL
MONOCYTES # BLD AUTO: 0.24 X10(3)/MCL (ref 0.1–1.3)
MONOCYTES # BLD AUTO: 0.33 X10(3)/MCL (ref 0.1–1.3)
MONOCYTES # BLD AUTO: 0.41 X10(3)/MCL (ref 0.1–1.3)
MONOCYTES # BLD AUTO: 0.42 X10(3)/MCL (ref 0.1–1.3)
MONOCYTES # BLD AUTO: 0.45 X10(3)/MCL (ref 0.1–1.3)
MONOCYTES # BLD AUTO: 0.48 X10(3)/MCL (ref 0.1–1.3)
MONOCYTES # BLD AUTO: 0.49 X10(3)/MCL (ref 0.1–1.3)
MONOCYTES # BLD AUTO: 0.5 X10(3)/MCL (ref 0.1–1.3)
MONOCYTES # BLD AUTO: 0.53 X10(3)/MCL (ref 0.1–1.3)
MONOCYTES # BLD AUTO: 0.61 X10(3)/MCL (ref 0.1–1.3)
MONOCYTES # BLD AUTO: 0.71 X10(3)/MCL (ref 0.1–1.3)
MONOCYTES # BLD AUTO: 0.77 X10(3)/MCL (ref 0.1–1.3)
MONOCYTES # BLD AUTO: 0.88 X10(3)/MCL (ref 0.1–1.3)
MONOCYTES # BLD AUTO: 0.98 X10(3)/MCL (ref 0.1–1.3)
MONOCYTES NFR BLD AUTO: 1.7 %
MONOCYTES NFR BLD AUTO: 1.8 %
MONOCYTES NFR BLD AUTO: 2.4 %
MONOCYTES NFR BLD AUTO: 2.5 %
MONOCYTES NFR BLD AUTO: 2.7 %
MONOCYTES NFR BLD AUTO: 3.3 %
MONOCYTES NFR BLD AUTO: 3.9 %
MONOCYTES NFR BLD AUTO: 4.7 %
MONOCYTES NFR BLD AUTO: 6.9 %
MONOCYTES NFR BLD AUTO: 8.2 %
MONOCYTES NFR BLD AUTO: 8.9 %
MONOCYTES NFR BLD MANUAL: 0.24 X10(3)/MCL (ref 0.1–1.3)
MONOCYTES NFR BLD MANUAL: 0.68 X10(3)/MCL (ref 0.1–1.3)
MONOCYTES NFR BLD MANUAL: 1 % (ref 2–11)
MONOCYTES NFR BLD MANUAL: 2 % (ref 2–11)
MUCOUS THREADS URNS QL MICRO: ABNORMAL /LPF
MV MEAN GRADIENT: 3 MMHG
MV PEAK A VEL: 0.68 M/S
MV PEAK E VEL: 1.44 M/S
MV PEAK GRADIENT: 8 MMHG
MV STENOSIS PRESSURE HALF TIME: 67.16 MS
MV VALVE AREA P 1/2 METHOD: 3.28 CM2
MYELOCYTES NFR BLD MANUAL: 2 %
NEUTROPHILS # BLD AUTO: 11.34 X10(3)/MCL (ref 2.1–9.2)
NEUTROPHILS # BLD AUTO: 11.55 X10(3)/MCL (ref 2.1–9.2)
NEUTROPHILS # BLD AUTO: 11.7 X10(3)/MCL (ref 2.1–9.2)
NEUTROPHILS # BLD AUTO: 12.06 X10(3)/MCL (ref 2.1–9.2)
NEUTROPHILS # BLD AUTO: 12.06 X10(3)/MCL (ref 2.1–9.2)
NEUTROPHILS # BLD AUTO: 13.26 X10(3)/MCL (ref 2.1–9.2)
NEUTROPHILS # BLD AUTO: 13.3 X10(3)/MCL (ref 2.1–9.2)
NEUTROPHILS # BLD AUTO: 13.61 X10(3)/MCL (ref 2.1–9.2)
NEUTROPHILS # BLD AUTO: 15.76 X10(3)/MCL (ref 2.1–9.2)
NEUTROPHILS # BLD AUTO: 20.6 X10(3)/MCL (ref 2.1–9.2)
NEUTROPHILS # BLD AUTO: 21.36 X10(3)/MCL (ref 2.1–9.2)
NEUTROPHILS # BLD AUTO: 21.63 X10(3)/MCL (ref 2.1–9.2)
NEUTROPHILS # BLD AUTO: 6.74 X10(3)/MCL (ref 2.1–9.2)
NEUTROPHILS # BLD AUTO: 7.76 X10(3)/MCL (ref 2.1–9.2)
NEUTROPHILS NFR BLD AUTO: 78 %
NEUTROPHILS NFR BLD AUTO: 78.7 %
NEUTROPHILS NFR BLD AUTO: 85.3 %
NEUTROPHILS NFR BLD AUTO: 88.8 %
NEUTROPHILS NFR BLD AUTO: 89.5 %
NEUTROPHILS NFR BLD AUTO: 90.7 %
NEUTROPHILS NFR BLD AUTO: 92.1 %
NEUTROPHILS NFR BLD AUTO: 92.2 %
NEUTROPHILS NFR BLD AUTO: 92.7 %
NEUTROPHILS NFR BLD AUTO: 92.8 %
NEUTROPHILS NFR BLD AUTO: 93.1 %
NEUTROPHILS NFR BLD AUTO: 94.8 %
NEUTROPHILS NFR BLD AUTO: 95 %
NEUTROPHILS NFR BLD AUTO: 95.5 %
NEUTROPHILS NFR BLD MANUAL: 89 % (ref 47–80)
NEUTROPHILS NFR BLD MANUAL: 91 % (ref 47–80)
NEUTS BAND NFR BLD MANUAL: 2 % (ref 0–11)
NITRITE UR QL STRIP: NEGATIVE
NITRITE UR QL STRIP: POSITIVE
NRBC BLD AUTO-RTO: 0 %
NRBC BLD AUTO-RTO: 0.1 %
OHS CV RV/LV RATIO: 0 CM
OHS QRS DURATION: 124 MS
OHS QRS DURATION: 136 MS
OHS QTC CALCULATION: 464 MS
OHS QTC CALCULATION: 487 MS
PCO2 BLDA: 29.2 MMHG (ref 35–45)
PCO2 BLDA: 29.8 MMHG (ref 35–45)
PCO2 BLDA: 30.1 MMHG (ref 35–45)
PCO2 BLDA: 32.8 MMHG (ref 35–45)
PCO2 BLDA: 33 MMHG (ref 35–45)
PCO2 BLDA: 33.1 MMHG (ref 35–45)
PCO2 BLDA: 36.9 MMHG (ref 35–45)
PCO2 BLDA: 40.1 MMHG (ref 35–45)
PCO2 BLDA: 47.1 MMHG (ref 35–45)
PCO2 BLDA: 49.6 MMHG (ref 35–45)
PCO2 BLDA: 68 MMHG (ref 35–45)
PEEP: 5
PH SMN: 7.26 [PH] (ref 7.35–7.45)
PH SMN: 7.29 [PH] (ref 7.35–7.45)
PH SMN: 7.36 [PH] (ref 7.35–7.45)
PH SMN: 7.37 [PH] (ref 7.35–7.45)
PH SMN: 7.38 [PH] (ref 7.35–7.45)
PH SMN: 7.38 [PH] (ref 7.35–7.45)
PH SMN: 7.4 [PH] (ref 7.35–7.45)
PH SMN: 7.41 [PH] (ref 7.35–7.45)
PH SMN: 7.43 [PH] (ref 7.35–7.45)
PH SMN: 7.47 [PH] (ref 7.35–7.45)
PH SMN: 7.5 [PH] (ref 7.35–7.45)
PH UR STRIP: 5.5 [PH]
PH UR STRIP: 8.5 [PH]
PHOSPHATE SERPL-MCNC: 2.3 MG/DL (ref 2.3–4.7)
PHOSPHATE SERPL-MCNC: 3.7 MG/DL (ref 2.3–4.7)
PISA MRMAX VEL: 3.57 M/S
PISA TR MAX VEL: 2.4 M/S
PLATELET # BLD AUTO: 102 X10(3)/MCL (ref 130–400)
PLATELET # BLD AUTO: 114 X10(3)/MCL (ref 130–400)
PLATELET # BLD AUTO: 127 X10(3)/MCL (ref 130–400)
PLATELET # BLD AUTO: 139 X10(3)/MCL (ref 130–400)
PLATELET # BLD AUTO: 151 X10(3)/MCL (ref 130–400)
PLATELET # BLD AUTO: 153 X10(3)/MCL (ref 130–400)
PLATELET # BLD AUTO: 157 X10(3)/MCL (ref 130–400)
PLATELET # BLD AUTO: 160 X10(3)/MCL (ref 130–400)
PLATELET # BLD AUTO: 162 X10(3)/MCL (ref 130–400)
PLATELET # BLD AUTO: 189 X10(3)/MCL (ref 130–400)
PLATELET # BLD AUTO: 222 X10(3)/MCL (ref 130–400)
PLATELET # BLD AUTO: 234 X10(3)/MCL (ref 130–400)
PLATELET # BLD AUTO: 235 X10(3)/MCL (ref 130–400)
PLATELET # BLD AUTO: 242 X10(3)/MCL (ref 130–400)
PLATELET # BLD AUTO: 280 X10(3)/MCL (ref 130–400)
PLATELET # BLD AUTO: 88 X10(3)/MCL (ref 130–400)
PLATELET # BLD EST: ADEQUATE 10*3/UL
PLATELET # BLD EST: ADEQUATE 10*3/UL
PMV BLD AUTO: 10.3 FL (ref 7.4–10.4)
PMV BLD AUTO: 10.4 FL (ref 7.4–10.4)
PMV BLD AUTO: 10.4 FL (ref 7.4–10.4)
PMV BLD AUTO: 10.5 FL (ref 7.4–10.4)
PMV BLD AUTO: 10.6 FL (ref 7.4–10.4)
PMV BLD AUTO: 10.7 FL (ref 7.4–10.4)
PMV BLD AUTO: 10.9 FL (ref 7.4–10.4)
PMV BLD AUTO: 11.5 FL (ref 7.4–10.4)
PMV BLD AUTO: 12.1 FL (ref 7.4–10.4)
PMV BLD AUTO: 12.4 FL (ref 7.4–10.4)
PMV BLD AUTO: 12.4 FL (ref 7.4–10.4)
PMV BLD AUTO: 12.6 FL (ref 7.4–10.4)
PMV BLD AUTO: 12.6 FL (ref 7.4–10.4)
PMV BLD AUTO: 12.9 FL (ref 7.4–10.4)
PMV BLD AUTO: 13.6 FL (ref 7.4–10.4)
PMV BLD AUTO: 14 FL (ref 7.4–10.4)
PO2 BLDA: 102 MMHG (ref 80–100)
PO2 BLDA: 225 MMHG (ref 80–100)
PO2 BLDA: 43 MMHG (ref 80–100)
PO2 BLDA: 67 MMHG (ref 80–100)
PO2 BLDA: 74 MMHG (ref 80–100)
PO2 BLDA: 75 MMHG (ref 80–100)
PO2 BLDA: 81 MMHG (ref 80–100)
PO2 BLDA: 89 MMHG (ref 80–100)
PO2 BLDA: 92 MMHG (ref 80–100)
PO2 BLDA: 96 MMHG (ref 80–100)
PO2 BLDA: 97 MMHG (ref 80–100)
POC BE: -2 MMOL/L
POC BE: -2 MMOL/L
POC BE: -3 MMOL/L
POC BE: -4 MMOL/L
POC BE: -6 MMOL/L
POC BE: -6 MMOL/L
POC BE: -7 MMOL/L
POC BE: 1 MMOL/L
POC BE: 1 MMOL/L
POC BE: 3 MMOL/L
POC BE: 3 MMOL/L
POC SATURATED O2: 100 % (ref 95–100)
POC SATURATED O2: 72 % (ref 95–100)
POC SATURATED O2: 89 % (ref 95–100)
POC SATURATED O2: 94 % (ref 95–100)
POC SATURATED O2: 95 % (ref 95–100)
POC SATURATED O2: 96 % (ref 95–100)
POC SATURATED O2: 97 % (ref 95–100)
POC SATURATED O2: 98 % (ref 95–100)
POC TCO2: 19 MMOL/L (ref 23–27)
POC TCO2: 19 MMOL/L (ref 23–27)
POC TCO2: 20 MMOL/L (ref 23–27)
POC TCO2: 21 MMOL/L (ref 23–27)
POC TCO2: 23 MMOL/L (ref 23–27)
POC TCO2: 25 MMOL/L (ref 23–27)
POC TCO2: 25 MMOL/L (ref 23–27)
POC TCO2: 26 MMOL/L (ref 23–27)
POC TCO2: 27 MMOL/L (ref 23–27)
POC TCO2: 28 MMOL/L (ref 23–27)
POC TCO2: 32 MMOL/L (ref 23–27)
POCT GLUCOSE: 110 MG/DL (ref 70–110)
POCT GLUCOSE: 114 MG/DL (ref 70–110)
POCT GLUCOSE: 117 MG/DL (ref 70–110)
POCT GLUCOSE: 118 MG/DL (ref 70–110)
POCT GLUCOSE: 118 MG/DL (ref 70–110)
POCT GLUCOSE: 122 MG/DL (ref 70–110)
POCT GLUCOSE: 126 MG/DL (ref 70–110)
POCT GLUCOSE: 155 MG/DL (ref 70–110)
POCT GLUCOSE: 163 MG/DL (ref 70–110)
POCT GLUCOSE: 165 MG/DL (ref 70–110)
POCT GLUCOSE: 171 MG/DL (ref 70–110)
POCT GLUCOSE: 172 MG/DL (ref 70–110)
POCT GLUCOSE: 181 MG/DL (ref 70–110)
POCT GLUCOSE: 181 MG/DL (ref 70–110)
POCT GLUCOSE: 185 MG/DL (ref 70–110)
POCT GLUCOSE: 189 MG/DL (ref 70–110)
POCT GLUCOSE: 189 MG/DL (ref 70–110)
POCT GLUCOSE: 191 MG/DL (ref 70–110)
POCT GLUCOSE: 192 MG/DL (ref 70–110)
POCT GLUCOSE: 194 MG/DL (ref 70–110)
POCT GLUCOSE: 195 MG/DL (ref 70–110)
POCT GLUCOSE: 199 MG/DL (ref 70–110)
POCT GLUCOSE: 200 MG/DL (ref 70–110)
POCT GLUCOSE: 202 MG/DL (ref 70–110)
POCT GLUCOSE: 202 MG/DL (ref 70–110)
POCT GLUCOSE: 203 MG/DL (ref 70–110)
POCT GLUCOSE: 207 MG/DL (ref 70–110)
POCT GLUCOSE: 214 MG/DL (ref 70–110)
POCT GLUCOSE: 215 MG/DL (ref 70–110)
POCT GLUCOSE: 216 MG/DL (ref 70–110)
POCT GLUCOSE: 219 MG/DL (ref 70–110)
POCT GLUCOSE: 226 MG/DL (ref 70–110)
POCT GLUCOSE: 229 MG/DL (ref 70–110)
POCT GLUCOSE: 232 MG/DL (ref 70–110)
POCT GLUCOSE: 238 MG/DL (ref 70–110)
POCT GLUCOSE: 240 MG/DL (ref 70–110)
POCT GLUCOSE: 243 MG/DL (ref 70–110)
POCT GLUCOSE: 244 MG/DL (ref 70–110)
POCT GLUCOSE: 251 MG/DL (ref 70–110)
POCT GLUCOSE: 259 MG/DL (ref 70–110)
POCT GLUCOSE: 275 MG/DL (ref 70–110)
POCT GLUCOSE: 277 MG/DL (ref 70–110)
POCT GLUCOSE: 295 MG/DL (ref 70–110)
POCT GLUCOSE: 326 MG/DL (ref 70–110)
POCT GLUCOSE: 328 MG/DL (ref 70–110)
POCT GLUCOSE: 339 MG/DL (ref 70–110)
POCT GLUCOSE: 342 MG/DL (ref 70–110)
POCT GLUCOSE: 354 MG/DL (ref 70–110)
POCT GLUCOSE: 354 MG/DL (ref 70–110)
POCT GLUCOSE: 368 MG/DL (ref 70–110)
POCT GLUCOSE: 420 MG/DL (ref 70–110)
POCT GLUCOSE: 63 MG/DL (ref 70–110)
POCT GLUCOSE: 69 MG/DL (ref 70–110)
POCT GLUCOSE: 69 MG/DL (ref 70–110)
POCT GLUCOSE: 82 MG/DL (ref 70–110)
POTASSIUM SERPL-SCNC: 3.4 MMOL/L (ref 3.5–5.1)
POTASSIUM SERPL-SCNC: 3.7 MMOL/L (ref 3.5–5.1)
POTASSIUM SERPL-SCNC: 3.8 MMOL/L (ref 3.5–5.1)
POTASSIUM SERPL-SCNC: 3.9 MMOL/L (ref 3.5–5.1)
POTASSIUM SERPL-SCNC: 4.1 MMOL/L (ref 3.5–5.1)
POTASSIUM SERPL-SCNC: 4.1 MMOL/L (ref 3.5–5.1)
POTASSIUM SERPL-SCNC: 4.4 MMOL/L (ref 3.5–5.1)
POTASSIUM SERPL-SCNC: 4.4 MMOL/L (ref 3.5–5.1)
POTASSIUM SERPL-SCNC: 4.5 MMOL/L (ref 3.5–5.1)
POTASSIUM SERPL-SCNC: 4.7 MMOL/L (ref 3.5–5.1)
POTASSIUM SERPL-SCNC: 5 MMOL/L (ref 3.5–5.1)
POTASSIUM SERPL-SCNC: 5.1 MMOL/L (ref 3.5–5.1)
POTASSIUM SERPL-SCNC: 5.2 MMOL/L (ref 3.5–5.1)
POTASSIUM SERPL-SCNC: 5.3 MMOL/L (ref 3.5–5.1)
POTASSIUM SERPL-SCNC: 5.7 MMOL/L (ref 3.5–5.1)
POTASSIUM SERPL-SCNC: 5.8 MMOL/L (ref 3.5–5.1)
POTASSIUM SERPL-SCNC: 6.3 MMOL/L (ref 3.5–5.1)
POTASSIUM SERPL-SCNC: 6.6 MMOL/L (ref 3.5–5.1)
PROT SERPL-MCNC: 4.7 GM/DL (ref 5.8–7.6)
PROT SERPL-MCNC: 5 GM/DL (ref 5.8–7.6)
PROT SERPL-MCNC: 5.1 GM/DL (ref 5.8–7.6)
PROT SERPL-MCNC: 5.5 GM/DL (ref 5.8–7.6)
PROT SERPL-MCNC: 5.7 GM/DL (ref 5.8–7.6)
PROT SERPL-MCNC: 5.8 GM/DL (ref 5.8–7.6)
PROT SERPL-MCNC: 6.4 GM/DL (ref 5.8–7.6)
PROT SERPL-MCNC: 6.5 GM/DL (ref 5.8–7.6)
PROT SERPL-MCNC: 6.6 GM/DL (ref 5.8–7.6)
PROT SERPL-MCNC: 6.7 GM/DL (ref 5.8–7.6)
PROT SERPL-MCNC: 6.8 GM/DL (ref 5.8–7.6)
PROT UR QL STRIP: ABNORMAL
PROT UR QL STRIP: ABNORMAL
PV PEAK GRADIENT: 5 MMHG
PV PEAK VELOCITY: 1.07 M/S
RBC # BLD AUTO: 2.48 X10(6)/MCL (ref 4.7–6.1)
RBC # BLD AUTO: 2.56 X10(6)/MCL (ref 4.7–6.1)
RBC # BLD AUTO: 2.85 X10(6)/MCL (ref 4.7–6.1)
RBC # BLD AUTO: 2.94 X10(6)/MCL (ref 4.7–6.1)
RBC # BLD AUTO: 2.96 X10(6)/MCL (ref 4.7–6.1)
RBC # BLD AUTO: 3.01 X10(6)/MCL (ref 4.7–6.1)
RBC # BLD AUTO: 3.03 X10(6)/MCL (ref 4.7–6.1)
RBC # BLD AUTO: 3.03 X10(6)/MCL (ref 4.7–6.1)
RBC # BLD AUTO: 3.04 X10(6)/MCL (ref 4.7–6.1)
RBC # BLD AUTO: 3.05 X10(6)/MCL (ref 4.7–6.1)
RBC # BLD AUTO: 3.05 X10(6)/MCL (ref 4.7–6.1)
RBC # BLD AUTO: 3.06 X10(6)/MCL (ref 4.7–6.1)
RBC # BLD AUTO: 3.1 X10(6)/MCL (ref 4.7–6.1)
RBC # BLD AUTO: 3.14 X10(6)/MCL (ref 4.7–6.1)
RBC # BLD AUTO: 3.18 X10(6)/MCL (ref 4.7–6.1)
RBC # BLD AUTO: 3.99 X10(6)/MCL (ref 4.7–6.1)
RBC #/AREA URNS AUTO: >100 /HPF
RBC #/AREA URNS AUTO: ABNORMAL /HPF
RBC MORPH BLD: NORMAL
RBC MORPH BLD: NORMAL
RIGHT VENTRICLE DIASTOLIC BASEL DIMENSION: 0 CM
RIGHT VENTRICULAR END-DIASTOLIC DIMENSION: 0 CM
SAMPLE: ABNORMAL
SARS-COV-2 RNA RESP QL NAA+PROBE: DETECTED
SITE: ABNORMAL
SODIUM SERPL-SCNC: 131 MMOL/L (ref 136–145)
SODIUM SERPL-SCNC: 132 MMOL/L (ref 136–145)
SODIUM SERPL-SCNC: 133 MMOL/L (ref 136–145)
SODIUM SERPL-SCNC: 134 MMOL/L (ref 136–145)
SODIUM SERPL-SCNC: 135 MMOL/L (ref 136–145)
SODIUM SERPL-SCNC: 137 MMOL/L (ref 136–145)
SODIUM SERPL-SCNC: 137 MMOL/L (ref 136–145)
SODIUM SERPL-SCNC: 139 MMOL/L (ref 136–145)
SODIUM SERPL-SCNC: 140 MMOL/L (ref 136–145)
SODIUM SERPL-SCNC: 141 MMOL/L (ref 136–145)
SODIUM SERPL-SCNC: 142 MMOL/L (ref 136–145)
SODIUM SERPL-SCNC: 143 MMOL/L (ref 136–145)
SP GR UR STRIP.AUTO: 1.01 (ref 1–1.03)
SP GR UR STRIP.AUTO: 1.02 (ref 1–1.03)
SP02: 99
SPECIMEN OUTDATE: NORMAL
SPERM URNS QL MICRO: ABNORMAL /HPF
SQUAMOUS #/AREA URNS AUTO: ABNORMAL /HPF
SQUAMOUS #/AREA URNS AUTO: ABNORMAL /HPF
TOXIC GRANULES BLD QL SMEAR: ABNORMAL
TR MAX PG: 24 MMHG
TRI-PHOS CRY UR QL COMP ASSIST: ABNORMAL
TROPONIN I SERPL-MCNC: 0.11 NG/ML (ref 0–0.04)
TROPONIN I SERPL-MCNC: 0.18 NG/ML (ref 0–0.04)
TROPONIN I SERPL-MCNC: 0.2 NG/ML (ref 0–0.04)
TROPONIN I SERPL-MCNC: 0.22 NG/ML (ref 0–0.04)
TROPONIN I SERPL-MCNC: 0.52 NG/ML (ref 0–0.04)
TROPONIN I SERPL-MCNC: 3 NG/ML (ref 0–0.04)
TROPONIN I SERPL-MCNC: 3.43 NG/ML (ref 0–0.04)
UNIT NUMBER: NORMAL
UROBILINOGEN UR STRIP-ACNC: 1
UROBILINOGEN UR STRIP-ACNC: 1
VT: 500
WBC # BLD AUTO: 12.76 X10(3)/MCL (ref 4.5–11.5)
WBC # BLD AUTO: 12.89 X10(3)/MCL (ref 4.5–11.5)
WBC # BLD AUTO: 12.91 X10(3)/MCL (ref 4.5–11.5)
WBC # BLD AUTO: 13.08 X10(3)/MCL (ref 4.5–11.5)
WBC # BLD AUTO: 14.12 X10(3)/MCL (ref 4.5–11.5)
WBC # BLD AUTO: 14.28 X10(3)/MCL (ref 4.5–11.5)
WBC # BLD AUTO: 14.36 X10(3)/MCL (ref 4.5–11.5)
WBC # BLD AUTO: 14.79 X10(3)/MCL (ref 4.5–11.5)
WBC # BLD AUTO: 16.63 X10(3)/MCL (ref 4.5–11.5)
WBC # BLD AUTO: 21.68 X10(3)/MCL (ref 4.5–11.5)
WBC # BLD AUTO: 22.36 X10(3)/MCL (ref 4.5–11.5)
WBC # BLD AUTO: 23.23 X10(3)/MCL (ref 4.5–11.5)
WBC # BLD AUTO: 24.08 X10(3)/MCL (ref 4.5–11.5)
WBC # BLD AUTO: 34.15 X10(3)/MCL (ref 4.5–11.5)
WBC # BLD AUTO: 8.64 X10(3)/MCL (ref 4.5–11.5)
WBC # BLD AUTO: 9.87 X10(3)/MCL (ref 4.5–11.5)
WBC #/AREA URNS AUTO: ABNORMAL /HPF
WBC #/AREA URNS AUTO: ABNORMAL /HPF
Z-SCORE OF LEFT VENTRICULAR DIMENSION IN END DIASTOLE: -0.47
Z-SCORE OF LEFT VENTRICULAR DIMENSION IN END SYSTOLE: 3.02

## 2025-01-01 PROCEDURE — 93010 ELECTROCARDIOGRAM REPORT: CPT | Mod: ,,, | Performed by: INTERNAL MEDICINE

## 2025-01-01 PROCEDURE — 99900026 HC AIRWAY MAINTENANCE (STAT)

## 2025-01-01 PROCEDURE — 80048 BASIC METABOLIC PNL TOTAL CA: CPT | Performed by: INTERNAL MEDICINE

## 2025-01-01 PROCEDURE — 63600175 PHARM REV CODE 636 W HCPCS: Performed by: EMERGENCY MEDICINE

## 2025-01-01 PROCEDURE — 63600175 PHARM REV CODE 636 W HCPCS: Performed by: INTERNAL MEDICINE

## 2025-01-01 PROCEDURE — 94640 AIRWAY INHALATION TREATMENT: CPT

## 2025-01-01 PROCEDURE — 94003 VENT MGMT INPAT SUBQ DAY: CPT

## 2025-01-01 PROCEDURE — 99900035 HC TECH TIME PER 15 MIN (STAT)

## 2025-01-01 PROCEDURE — 27000207 HC ISOLATION

## 2025-01-01 PROCEDURE — 36600 WITHDRAWAL OF ARTERIAL BLOOD: CPT

## 2025-01-01 PROCEDURE — 80053 COMPREHEN METABOLIC PANEL: CPT | Performed by: INTERNAL MEDICINE

## 2025-01-01 PROCEDURE — 51702 INSERT TEMP BLADDER CATH: CPT

## 2025-01-01 PROCEDURE — 36415 COLL VENOUS BLD VENIPUNCTURE: CPT | Performed by: INTERNAL MEDICINE

## 2025-01-01 PROCEDURE — A4216 STERILE WATER/SALINE, 10 ML: HCPCS | Performed by: INTERNAL MEDICINE

## 2025-01-01 PROCEDURE — 82803 BLOOD GASES ANY COMBINATION: CPT

## 2025-01-01 PROCEDURE — 25000003 PHARM REV CODE 250: Performed by: INTERNAL MEDICINE

## 2025-01-01 PROCEDURE — 94761 N-INVAS EAR/PLS OXIMETRY MLT: CPT | Mod: XB

## 2025-01-01 PROCEDURE — 25000003 PHARM REV CODE 250: Performed by: EMERGENCY MEDICINE

## 2025-01-01 PROCEDURE — 83880 ASSAY OF NATRIURETIC PEPTIDE: CPT | Performed by: INTERNAL MEDICINE

## 2025-01-01 PROCEDURE — 27000221 HC OXYGEN, UP TO 24 HOURS

## 2025-01-01 PROCEDURE — 84484 ASSAY OF TROPONIN QUANT: CPT | Performed by: INTERNAL MEDICINE

## 2025-01-01 PROCEDURE — 36415 COLL VENOUS BLD VENIPUNCTURE: CPT | Performed by: EMERGENCY MEDICINE

## 2025-01-01 PROCEDURE — 94761 N-INVAS EAR/PLS OXIMETRY MLT: CPT

## 2025-01-01 PROCEDURE — 27100171 HC OXYGEN HIGH FLOW UP TO 24 HOURS

## 2025-01-01 PROCEDURE — 97530 THERAPEUTIC ACTIVITIES: CPT

## 2025-01-01 PROCEDURE — 25000242 PHARM REV CODE 250 ALT 637 W/ HCPCS: Performed by: EMERGENCY MEDICINE

## 2025-01-01 PROCEDURE — 94644 CONT INHLJ TX 1ST HOUR: CPT

## 2025-01-01 PROCEDURE — 63600175 PHARM REV CODE 636 W HCPCS: Mod: JZ,TB | Performed by: INTERNAL MEDICINE

## 2025-01-01 PROCEDURE — 85025 COMPLETE CBC W/AUTO DIFF WBC: CPT | Performed by: INTERNAL MEDICINE

## 2025-01-01 PROCEDURE — 36000706: Performed by: SURGERY

## 2025-01-01 PROCEDURE — 84484 ASSAY OF TROPONIN QUANT: CPT | Performed by: EMERGENCY MEDICINE

## 2025-01-01 PROCEDURE — 20000000 HC ICU ROOM

## 2025-01-01 PROCEDURE — 83735 ASSAY OF MAGNESIUM: CPT | Performed by: EMERGENCY MEDICINE

## 2025-01-01 PROCEDURE — 86901 BLOOD TYPING SEROLOGIC RH(D): CPT | Performed by: EMERGENCY MEDICINE

## 2025-01-01 PROCEDURE — 51798 US URINE CAPACITY MEASURE: CPT

## 2025-01-01 PROCEDURE — 81003 URINALYSIS AUTO W/O SCOPE: CPT | Performed by: INTERNAL MEDICINE

## 2025-01-01 PROCEDURE — 0240U COVID/FLU A&B PCR: CPT | Performed by: INTERNAL MEDICINE

## 2025-01-01 PROCEDURE — 11000001 HC ACUTE MED/SURG PRIVATE ROOM

## 2025-01-01 PROCEDURE — 94002 VENT MGMT INPAT INIT DAY: CPT

## 2025-01-01 PROCEDURE — 94660 CPAP INITIATION&MGMT: CPT

## 2025-01-01 PROCEDURE — 80053 COMPREHEN METABOLIC PANEL: CPT | Performed by: EMERGENCY MEDICINE

## 2025-01-01 PROCEDURE — 85025 COMPLETE CBC W/AUTO DIFF WBC: CPT | Performed by: EMERGENCY MEDICINE

## 2025-01-01 PROCEDURE — 0BH18EZ INSERTION OF ENDOTRACHEAL AIRWAY INTO TRACHEA, VIA NATURAL OR ARTIFICIAL OPENING ENDOSCOPIC: ICD-10-PCS | Performed by: INTERNAL MEDICINE

## 2025-01-01 PROCEDURE — 83735 ASSAY OF MAGNESIUM: CPT | Performed by: INTERNAL MEDICINE

## 2025-01-01 PROCEDURE — 80100014 HC HEMODIALYSIS 1:1

## 2025-01-01 PROCEDURE — 5A1955Z RESPIRATORY VENTILATION, GREATER THAN 96 CONSECUTIVE HOURS: ICD-10-PCS | Performed by: INTERNAL MEDICINE

## 2025-01-01 PROCEDURE — C1769 GUIDE WIRE: HCPCS | Performed by: SURGERY

## 2025-01-01 PROCEDURE — 87070 CULTURE OTHR SPECIMN AEROBIC: CPT | Performed by: EMERGENCY MEDICINE

## 2025-01-01 PROCEDURE — 63600175 PHARM REV CODE 636 W HCPCS

## 2025-01-01 PROCEDURE — 36000707: Performed by: SURGERY

## 2025-01-01 PROCEDURE — 85007 BL SMEAR W/DIFF WBC COUNT: CPT | Performed by: INTERNAL MEDICINE

## 2025-01-01 PROCEDURE — 84100 ASSAY OF PHOSPHORUS: CPT | Performed by: INTERNAL MEDICINE

## 2025-01-01 PROCEDURE — 06HY33Z INSERTION OF INFUSION DEVICE INTO LOWER VEIN, PERCUTANEOUS APPROACH: ICD-10-PCS | Performed by: SURGERY

## 2025-01-01 PROCEDURE — 99291 CRITICAL CARE FIRST HOUR: CPT

## 2025-01-01 PROCEDURE — 84100 ASSAY OF PHOSPHORUS: CPT | Performed by: EMERGENCY MEDICINE

## 2025-01-01 PROCEDURE — 21400001 HC TELEMETRY ROOM

## 2025-01-01 PROCEDURE — 63600175 PHARM REV CODE 636 W HCPCS: Performed by: SURGERY

## 2025-01-01 PROCEDURE — 37000008 HC ANESTHESIA 1ST 15 MINUTES: Performed by: SURGERY

## 2025-01-01 PROCEDURE — C1751 CATH, INF, PER/CENT/MIDLINE: HCPCS | Performed by: SURGERY

## 2025-01-01 PROCEDURE — 87340 HEPATITIS B SURFACE AG IA: CPT | Performed by: EMERGENCY MEDICINE

## 2025-01-01 PROCEDURE — 31720 CLEARANCE OF AIRWAYS: CPT

## 2025-01-01 PROCEDURE — 83605 ASSAY OF LACTIC ACID: CPT | Performed by: INTERNAL MEDICINE

## 2025-01-01 PROCEDURE — 37000009 HC ANESTHESIA EA ADD 15 MINS: Performed by: SURGERY

## 2025-01-01 PROCEDURE — 93005 ELECTROCARDIOGRAM TRACING: CPT

## 2025-01-01 PROCEDURE — P9016 RBC LEUKOCYTES REDUCED: HCPCS | Performed by: EMERGENCY MEDICINE

## 2025-01-01 PROCEDURE — 97163 PT EVAL HIGH COMPLEX 45 MIN: CPT

## 2025-01-01 PROCEDURE — 94640 AIRWAY INHALATION TREATMENT: CPT | Mod: XB

## 2025-01-01 PROCEDURE — 5A12012 PERFORMANCE OF CARDIAC OUTPUT, SINGLE, MANUAL: ICD-10-PCS | Performed by: EMERGENCY MEDICINE

## 2025-01-01 PROCEDURE — 87040 BLOOD CULTURE FOR BACTERIA: CPT | Performed by: INTERNAL MEDICINE

## 2025-01-01 PROCEDURE — 27200966 HC CLOSED SUCTION SYSTEM

## 2025-01-01 PROCEDURE — 96365 THER/PROPH/DIAG IV INF INIT: CPT

## 2025-01-01 PROCEDURE — 87070 CULTURE OTHR SPECIMN AEROBIC: CPT | Performed by: INTERNAL MEDICINE

## 2025-01-01 PROCEDURE — 94799 UNLISTED PULMONARY SVC/PX: CPT

## 2025-01-01 PROCEDURE — 25000242 PHARM REV CODE 250 ALT 637 W/ HCPCS: Performed by: INTERNAL MEDICINE

## 2025-01-01 PROCEDURE — 5A0935A ASSISTANCE WITH RESPIRATORY VENTILATION, LESS THAN 24 CONSECUTIVE HOURS, HIGH NASAL FLOW/VELOCITY: ICD-10-PCS | Performed by: EMERGENCY MEDICINE

## 2025-01-01 PROCEDURE — 31500 INSERT EMERGENCY AIRWAY: CPT

## 2025-01-01 PROCEDURE — 80048 BASIC METABOLIC PNL TOTAL CA: CPT | Performed by: EMERGENCY MEDICINE

## 2025-01-01 PROCEDURE — 86923 COMPATIBILITY TEST ELECTRIC: CPT | Performed by: EMERGENCY MEDICINE

## 2025-01-01 PROCEDURE — 30233N1 TRANSFUSION OF NONAUTOLOGOUS RED BLOOD CELLS INTO PERIPHERAL VEIN, PERCUTANEOUS APPROACH: ICD-10-PCS | Performed by: EMERGENCY MEDICINE

## 2025-01-01 PROCEDURE — 87077 CULTURE AEROBIC IDENTIFY: CPT | Performed by: INTERNAL MEDICINE

## 2025-01-01 PROCEDURE — 27000190 HC CPAP FULL FACE MASK W/VALVE

## 2025-01-01 RX ORDER — IPRATROPIUM BROMIDE AND ALBUTEROL SULFATE 2.5; .5 MG/3ML; MG/3ML
3 SOLUTION RESPIRATORY (INHALATION) EVERY 6 HOURS
Status: COMPLETED | OUTPATIENT
Start: 2025-01-01 | End: 2025-01-01

## 2025-01-01 RX ORDER — ACETAMINOPHEN 10 MG/ML
1000 INJECTION, SOLUTION INTRAVENOUS ONCE
Status: COMPLETED | OUTPATIENT
Start: 2025-01-01 | End: 2025-01-01

## 2025-01-01 RX ORDER — ONDANSETRON HYDROCHLORIDE 2 MG/ML
4 INJECTION, SOLUTION INTRAVENOUS EVERY 8 HOURS PRN
Status: DISCONTINUED | OUTPATIENT
Start: 2025-01-01 | End: 2025-01-01 | Stop reason: HOSPADM

## 2025-01-01 RX ORDER — MONTELUKAST SODIUM 10 MG/1
10 TABLET ORAL DAILY
COMMUNITY
Start: 2025-01-01

## 2025-01-01 RX ORDER — METOPROLOL TARTRATE 25 MG/1
25 TABLET, FILM COATED ORAL 2 TIMES DAILY
Status: DISCONTINUED | OUTPATIENT
Start: 2025-01-01 | End: 2025-01-01

## 2025-01-01 RX ORDER — FUROSEMIDE 10 MG/ML
20 INJECTION INTRAMUSCULAR; INTRAVENOUS
Status: DISCONTINUED | OUTPATIENT
Start: 2025-01-01 | End: 2025-01-01

## 2025-01-01 RX ORDER — MAGNESIUM SULFATE HEPTAHYDRATE 40 MG/ML
2 INJECTION, SOLUTION INTRAVENOUS ONCE
Status: COMPLETED | OUTPATIENT
Start: 2025-01-01 | End: 2025-01-01

## 2025-01-01 RX ORDER — INSULIN GLARGINE 100 [IU]/ML
15 INJECTION, SOLUTION SUBCUTANEOUS DAILY
Status: DISCONTINUED | OUTPATIENT
Start: 2025-01-01 | End: 2025-01-01 | Stop reason: HOSPADM

## 2025-01-01 RX ORDER — INSULIN ASPART 100 [IU]/ML
0-10 INJECTION, SOLUTION INTRAVENOUS; SUBCUTANEOUS
Status: DISCONTINUED | OUTPATIENT
Start: 2025-01-01 | End: 2025-01-01 | Stop reason: HOSPADM

## 2025-01-01 RX ORDER — PANTOPRAZOLE SODIUM 40 MG/10ML
40 INJECTION, POWDER, LYOPHILIZED, FOR SOLUTION INTRAVENOUS DAILY
Status: DISCONTINUED | OUTPATIENT
Start: 2025-01-01 | End: 2025-01-01

## 2025-01-01 RX ORDER — LEVOFLOXACIN 5 MG/ML
500 INJECTION, SOLUTION INTRAVENOUS
Status: DISCONTINUED | OUTPATIENT
Start: 2025-01-01 | End: 2025-01-01 | Stop reason: HOSPADM

## 2025-01-01 RX ORDER — BUDESONIDE 0.5 MG/2ML
0.5 INHALANT ORAL
Status: COMPLETED | OUTPATIENT
Start: 2025-01-01 | End: 2025-01-01

## 2025-01-01 RX ORDER — ACETAMINOPHEN 325 MG/1
650 TABLET ORAL EVERY 8 HOURS PRN
Status: DISCONTINUED | OUTPATIENT
Start: 2025-01-01 | End: 2025-01-01

## 2025-01-01 RX ORDER — OXYCODONE HYDROCHLORIDE 5 MG/1
10 TABLET ORAL EVERY 4 HOURS PRN
Refills: 0 | Status: DISCONTINUED | OUTPATIENT
Start: 2025-01-01 | End: 2025-01-01 | Stop reason: HOSPADM

## 2025-01-01 RX ORDER — NAPROXEN SODIUM 220 MG/1
81 TABLET, FILM COATED ORAL DAILY
Status: DISCONTINUED | OUTPATIENT
Start: 2025-01-01 | End: 2025-01-01 | Stop reason: HOSPADM

## 2025-01-01 RX ORDER — BISACODYL 5 MG
5 TABLET, DELAYED RELEASE (ENTERIC COATED) ORAL DAILY
Status: DISCONTINUED | OUTPATIENT
Start: 2025-01-01 | End: 2025-01-01 | Stop reason: HOSPADM

## 2025-01-01 RX ORDER — IPRATROPIUM BROMIDE AND ALBUTEROL SULFATE 2.5; .5 MG/3ML; MG/3ML
3 SOLUTION RESPIRATORY (INHALATION)
Status: COMPLETED | OUTPATIENT
Start: 2025-01-01 | End: 2025-01-01

## 2025-01-01 RX ORDER — FUROSEMIDE 10 MG/ML
40 INJECTION INTRAMUSCULAR; INTRAVENOUS ONCE
Status: COMPLETED | OUTPATIENT
Start: 2025-01-01 | End: 2025-01-01

## 2025-01-01 RX ORDER — HEPARIN SODIUM 5000 [USP'U]/ML
INJECTION, SOLUTION INTRAVENOUS; SUBCUTANEOUS
Status: DISCONTINUED | OUTPATIENT
Start: 2025-01-01 | End: 2025-01-01 | Stop reason: HOSPADM

## 2025-01-01 RX ORDER — QUETIAPINE FUMARATE 25 MG/1
25 TABLET, FILM COATED ORAL NIGHTLY
Status: DISCONTINUED | OUTPATIENT
Start: 2025-01-01 | End: 2025-01-01 | Stop reason: HOSPADM

## 2025-01-01 RX ORDER — IPRATROPIUM BROMIDE AND ALBUTEROL SULFATE 2.5; .5 MG/3ML; MG/3ML
3 SOLUTION RESPIRATORY (INHALATION) EVERY 6 HOURS
Status: DISCONTINUED | OUTPATIENT
Start: 2025-01-01 | End: 2025-01-01

## 2025-01-01 RX ORDER — EPINEPHRINE 0.1 MG/ML
INJECTION INTRAVENOUS CODE/TRAUMA/SEDATION MEDICATION
Status: COMPLETED | OUTPATIENT
Start: 2025-01-01 | End: 2025-01-01

## 2025-01-01 RX ORDER — PROPOFOL 10 MG/ML
0-50 INJECTION, EMULSION INTRAVENOUS CONTINUOUS
Status: DISCONTINUED | OUTPATIENT
Start: 2025-01-01 | End: 2025-01-01

## 2025-01-01 RX ORDER — ASPIRIN 325 MG
50000 TABLET, DELAYED RELEASE (ENTERIC COATED) ORAL
COMMUNITY
Start: 2025-01-01

## 2025-01-01 RX ORDER — FUROSEMIDE 10 MG/ML
40 INJECTION INTRAMUSCULAR; INTRAVENOUS 2 TIMES DAILY
Status: DISCONTINUED | OUTPATIENT
Start: 2025-01-01 | End: 2025-01-01

## 2025-01-01 RX ORDER — INSULIN ASPART 100 [IU]/ML
0-10 INJECTION, SOLUTION INTRAVENOUS; SUBCUTANEOUS EVERY 6 HOURS PRN
Status: DISCONTINUED | OUTPATIENT
Start: 2025-01-01 | End: 2025-01-01

## 2025-01-01 RX ORDER — HYDROCODONE BITARTRATE AND ACETAMINOPHEN 500; 5 MG/1; MG/1
TABLET ORAL
Status: DISCONTINUED | OUTPATIENT
Start: 2025-01-01 | End: 2025-01-01 | Stop reason: HOSPADM

## 2025-01-01 RX ORDER — LIDOCAINE HYDROCHLORIDE 10 MG/ML
INJECTION, SOLUTION INFILTRATION; PERINEURAL
Status: DISCONTINUED | OUTPATIENT
Start: 2025-01-01 | End: 2025-01-01 | Stop reason: HOSPADM

## 2025-01-01 RX ORDER — METHYLPREDNISOLONE SOD SUCC 125 MG
80 VIAL (EA) INJECTION EVERY 8 HOURS
Status: COMPLETED | OUTPATIENT
Start: 2025-01-01 | End: 2025-01-01

## 2025-01-01 RX ORDER — GLUCAGON 1 MG
1 KIT INJECTION
Status: DISCONTINUED | OUTPATIENT
Start: 2025-01-01 | End: 2025-01-01 | Stop reason: HOSPADM

## 2025-01-01 RX ORDER — MONTELUKAST SODIUM 10 MG/1
10 TABLET ORAL DAILY
Status: DISCONTINUED | OUTPATIENT
Start: 2025-01-01 | End: 2025-01-01 | Stop reason: HOSPADM

## 2025-01-01 RX ORDER — CEFEPIME HYDROCHLORIDE 1 G/1
1 INJECTION, POWDER, FOR SOLUTION INTRAMUSCULAR; INTRAVENOUS
Status: DISCONTINUED | OUTPATIENT
Start: 2025-01-01 | End: 2025-01-01

## 2025-01-01 RX ORDER — BISACODYL 10 MG/1
10 SUPPOSITORY RECTAL DAILY PRN
Status: DISCONTINUED | OUTPATIENT
Start: 2025-01-01 | End: 2025-01-01 | Stop reason: HOSPADM

## 2025-01-01 RX ORDER — POTASSIUM CHLORIDE 20 MEQ/1
20 TABLET, EXTENDED RELEASE ORAL DAILY
Status: DISCONTINUED | OUTPATIENT
Start: 2025-01-01 | End: 2025-01-01

## 2025-01-01 RX ORDER — PANTOPRAZOLE SODIUM 40 MG/1
40 TABLET, DELAYED RELEASE ORAL DAILY
Status: DISCONTINUED | OUTPATIENT
Start: 2025-01-01 | End: 2025-01-01

## 2025-01-01 RX ORDER — OXYCODONE HYDROCHLORIDE 10 MG/1
10 TABLET ORAL EVERY 4 HOURS PRN
COMMUNITY
Start: 2025-01-01

## 2025-01-01 RX ORDER — LEVOFLOXACIN 5 MG/ML
500 INJECTION, SOLUTION INTRAVENOUS
Status: DISCONTINUED | OUTPATIENT
Start: 2025-01-01 | End: 2025-01-01

## 2025-01-01 RX ORDER — SODIUM CHLORIDE 9 MG/ML
INJECTION, SOLUTION INTRAVENOUS CONTINUOUS
Status: DISCONTINUED | OUTPATIENT
Start: 2025-01-01 | End: 2025-01-01

## 2025-01-01 RX ORDER — RANOLAZINE 500 MG/1
500 TABLET, EXTENDED RELEASE ORAL 2 TIMES DAILY
Status: DISCONTINUED | OUTPATIENT
Start: 2025-01-01 | End: 2025-01-01 | Stop reason: HOSPADM

## 2025-01-01 RX ORDER — MULTIVITAMIN
1 TABLET ORAL DAILY
COMMUNITY

## 2025-01-01 RX ORDER — MORPHINE SULFATE 4 MG/ML
2 INJECTION, SOLUTION INTRAMUSCULAR; INTRAVENOUS ONCE
Status: COMPLETED | OUTPATIENT
Start: 2025-01-01 | End: 2025-01-01

## 2025-01-01 RX ORDER — MUPIROCIN 20 MG/G
OINTMENT TOPICAL 2 TIMES DAILY
Status: DISPENSED | OUTPATIENT
Start: 2025-01-01 | End: 2025-01-01

## 2025-01-01 RX ORDER — CYCLOBENZAPRINE HCL 5 MG
5 TABLET ORAL 3 TIMES DAILY PRN
COMMUNITY
End: 2025-01-01 | Stop reason: ALTCHOICE

## 2025-01-01 RX ORDER — ASCORBIC ACID 500 MG
500 TABLET ORAL DAILY
COMMUNITY

## 2025-01-01 RX ORDER — MORPHINE SULFATE 4 MG/ML
10 INJECTION, SOLUTION INTRAMUSCULAR; INTRAVENOUS
Status: DISCONTINUED | OUTPATIENT
Start: 2025-01-01 | End: 2025-01-01 | Stop reason: HOSPADM

## 2025-01-01 RX ORDER — CEFEPIME HYDROCHLORIDE 2 G/1
2 INJECTION, POWDER, FOR SOLUTION INTRAVENOUS
Status: DISCONTINUED | OUTPATIENT
Start: 2025-01-01 | End: 2025-01-01 | Stop reason: HOSPADM

## 2025-01-01 RX ORDER — MORPHINE SULFATE 4 MG/ML
10 INJECTION, SOLUTION INTRAMUSCULAR; INTRAVENOUS ONCE
Status: COMPLETED | OUTPATIENT
Start: 2025-01-01 | End: 2025-01-01

## 2025-01-01 RX ORDER — ENOXAPARIN SODIUM 100 MG/ML
30 INJECTION SUBCUTANEOUS EVERY 24 HOURS
Status: DISCONTINUED | OUTPATIENT
Start: 2025-01-01 | End: 2025-01-01

## 2025-01-01 RX ORDER — ALBUTEROL SULFATE 0.83 MG/ML
2.5 SOLUTION RESPIRATORY (INHALATION) EVERY 6 HOURS
COMMUNITY
Start: 2025-01-01

## 2025-01-01 RX ORDER — IOPAMIDOL 755 MG/ML
100 INJECTION, SOLUTION INTRAVASCULAR
Status: DISCONTINUED | OUTPATIENT
Start: 2025-01-01 | End: 2025-01-01 | Stop reason: HOSPADM

## 2025-01-01 RX ORDER — ENOXAPARIN SODIUM 100 MG/ML
40 INJECTION SUBCUTANEOUS EVERY 24 HOURS
Status: COMPLETED | OUTPATIENT
Start: 2025-01-01 | End: 2025-01-01

## 2025-01-01 RX ORDER — INSULIN GLARGINE 100 [IU]/ML
5 INJECTION, SOLUTION SUBCUTANEOUS DAILY
Status: DISCONTINUED | OUTPATIENT
Start: 2025-01-01 | End: 2025-01-01

## 2025-01-01 RX ORDER — PROPOFOL 10 MG/ML
INJECTION, EMULSION INTRAVENOUS
Status: DISPENSED
Start: 2025-01-01 | End: 2025-01-01

## 2025-01-01 RX ORDER — METOPROLOL TARTRATE 25 MG/1
12.5 TABLET ORAL 2 TIMES DAILY
Status: DISCONTINUED | OUTPATIENT
Start: 2025-01-01 | End: 2025-01-01

## 2025-01-01 RX ORDER — NALOXONE HCL 0.4 MG/ML
VIAL (ML) INJECTION
Status: COMPLETED
Start: 2025-01-01 | End: 2025-01-01

## 2025-01-01 RX ORDER — DULOXETIN HYDROCHLORIDE 60 MG/1
60 CAPSULE, DELAYED RELEASE ORAL DAILY
COMMUNITY
Start: 2025-01-01

## 2025-01-01 RX ORDER — CLOPIDOGREL BISULFATE 75 MG/1
75 TABLET ORAL DAILY
Status: DISCONTINUED | OUTPATIENT
Start: 2025-01-01 | End: 2025-01-01 | Stop reason: HOSPADM

## 2025-01-01 RX ORDER — INSULIN GLARGINE 100 [IU]/ML
10 INJECTION, SOLUTION SUBCUTANEOUS DAILY
Status: DISCONTINUED | OUTPATIENT
Start: 2025-01-01 | End: 2025-01-01

## 2025-01-01 RX ORDER — ACETAMINOPHEN 325 MG/1
650 TABLET ORAL EVERY 4 HOURS PRN
COMMUNITY

## 2025-01-01 RX ORDER — ACETAMINOPHEN 500 MG
1000 TABLET ORAL EVERY 6 HOURS PRN
Status: DISCONTINUED | OUTPATIENT
Start: 2025-01-01 | End: 2025-01-01 | Stop reason: HOSPADM

## 2025-01-01 RX ORDER — LEVOFLOXACIN 5 MG/ML
750 INJECTION, SOLUTION INTRAVENOUS
Status: DISCONTINUED | OUTPATIENT
Start: 2025-01-01 | End: 2025-01-01

## 2025-01-01 RX ORDER — PREGABALIN 75 MG/1
75 CAPSULE ORAL DAILY
Status: DISCONTINUED | OUTPATIENT
Start: 2025-01-01 | End: 2025-01-01 | Stop reason: HOSPADM

## 2025-01-01 RX ORDER — SODIUM CHLORIDE 0.9 % (FLUSH) 0.9 %
10 SYRINGE (ML) INJECTION EVERY 8 HOURS
Status: DISCONTINUED | OUTPATIENT
Start: 2025-01-01 | End: 2025-01-01 | Stop reason: HOSPADM

## 2025-01-01 RX ORDER — SODIUM CHLORIDE, SODIUM LACTATE, POTASSIUM CHLORIDE, CALCIUM CHLORIDE 600; 310; 30; 20 MG/100ML; MG/100ML; MG/100ML; MG/100ML
INJECTION, SOLUTION INTRAVENOUS CONTINUOUS
Status: DISCONTINUED | OUTPATIENT
Start: 2025-01-01 | End: 2025-01-01

## 2025-01-01 RX ORDER — NALOXONE HCL 0.4 MG/ML
0.4 VIAL (ML) INJECTION
Status: DISCONTINUED | OUTPATIENT
Start: 2025-01-01 | End: 2025-01-01 | Stop reason: HOSPADM

## 2025-01-01 RX ORDER — ATORVASTATIN CALCIUM 40 MG/1
40 TABLET, FILM COATED ORAL DAILY
Status: DISCONTINUED | OUTPATIENT
Start: 2025-01-01 | End: 2025-01-01 | Stop reason: HOSPADM

## 2025-01-01 RX ORDER — ZINC SULFATE 50(220)MG
220 CAPSULE ORAL DAILY
COMMUNITY

## 2025-01-01 RX ORDER — DULOXETIN HYDROCHLORIDE 60 MG/1
60 CAPSULE, DELAYED RELEASE ORAL DAILY
Status: DISCONTINUED | OUTPATIENT
Start: 2025-01-01 | End: 2025-01-01 | Stop reason: HOSPADM

## 2025-01-01 RX ADMIN — OXYCODONE 10 MG: 5 TABLET ORAL at 10:03

## 2025-01-01 RX ADMIN — IPRATROPIUM BROMIDE AND ALBUTEROL SULFATE 3 ML: 2.5; .5 SOLUTION RESPIRATORY (INHALATION) at 07:03

## 2025-01-01 RX ADMIN — SODIUM CHLORIDE, POTASSIUM CHLORIDE, SODIUM LACTATE AND CALCIUM CHLORIDE 500 ML: 600; 310; 30; 20 INJECTION, SOLUTION INTRAVENOUS at 03:03

## 2025-01-01 RX ADMIN — IPRATROPIUM BROMIDE AND ALBUTEROL SULFATE 3 ML: .5; 3 SOLUTION RESPIRATORY (INHALATION) at 07:03

## 2025-01-01 RX ADMIN — CLOPIDOGREL BISULFATE 75 MG: 75 TABLET ORAL at 10:03

## 2025-01-01 RX ADMIN — INSULIN ASPART 4 UNITS: 100 INJECTION, SOLUTION INTRAVENOUS; SUBCUTANEOUS at 07:03

## 2025-01-01 RX ADMIN — CEFEPIME 1 G: 1 INJECTION, POWDER, FOR SOLUTION INTRAMUSCULAR; INTRAVENOUS at 02:03

## 2025-01-01 RX ADMIN — SODIUM CHLORIDE, PRESERVATIVE FREE 10 ML: 5 INJECTION INTRAVENOUS at 05:03

## 2025-01-01 RX ADMIN — INSULIN ASPART 2 UNITS: 100 INJECTION, SOLUTION INTRAVENOUS; SUBCUTANEOUS at 09:03

## 2025-01-01 RX ADMIN — IPRATROPIUM BROMIDE AND ALBUTEROL SULFATE 3 ML: 2.5; .5 SOLUTION RESPIRATORY (INHALATION) at 08:03

## 2025-01-01 RX ADMIN — OXYCODONE 10 MG: 5 TABLET ORAL at 05:03

## 2025-01-01 RX ADMIN — SODIUM CHLORIDE, PRESERVATIVE FREE 10 ML: 5 INJECTION INTRAVENOUS at 06:03

## 2025-01-01 RX ADMIN — SODIUM CHLORIDE, PRESERVATIVE FREE 10 ML: 5 INJECTION INTRAVENOUS at 10:03

## 2025-01-01 RX ADMIN — METOPROLOL TARTRATE 12.5 MG: 25 TABLET, FILM COATED ORAL at 09:03

## 2025-01-01 RX ADMIN — SODIUM CHLORIDE: 9 INJECTION, SOLUTION INTRAVENOUS at 07:03

## 2025-01-01 RX ADMIN — CLOPIDOGREL BISULFATE 75 MG: 75 TABLET ORAL at 09:03

## 2025-01-01 RX ADMIN — RANOLAZINE 500 MG: 500 TABLET, EXTENDED RELEASE ORAL at 09:03

## 2025-01-01 RX ADMIN — CEFEPIME 1 G: 1 INJECTION, POWDER, FOR SOLUTION INTRAMUSCULAR; INTRAVENOUS at 10:03

## 2025-01-01 RX ADMIN — ASPIRIN 81 MG CHEWABLE TABLET 81 MG: 81 TABLET CHEWABLE at 09:03

## 2025-01-01 RX ADMIN — INSULIN GLARGINE 15 UNITS: 100 INJECTION, SOLUTION SUBCUTANEOUS at 09:03

## 2025-01-01 RX ADMIN — INSULIN ASPART 4 UNITS: 100 INJECTION, SOLUTION INTRAVENOUS; SUBCUTANEOUS at 05:03

## 2025-01-01 RX ADMIN — CEFEPIME 1 G: 1 INJECTION, POWDER, FOR SOLUTION INTRAMUSCULAR; INTRAVENOUS at 05:03

## 2025-01-01 RX ADMIN — METHYLPREDNISOLONE SODIUM SUCCINATE 60 MG: 40 INJECTION, POWDER, FOR SOLUTION INTRAMUSCULAR; INTRAVENOUS at 12:03

## 2025-01-01 RX ADMIN — PREGABALIN 75 MG: 75 CAPSULE ORAL at 09:03

## 2025-01-01 RX ADMIN — METHYLPREDNISOLONE SODIUM SUCCINATE 80 MG: 125 INJECTION, POWDER, FOR SOLUTION INTRAMUSCULAR; INTRAVENOUS at 09:03

## 2025-01-01 RX ADMIN — QUETIAPINE FUMARATE 25 MG: 25 TABLET ORAL at 08:03

## 2025-01-01 RX ADMIN — MONTELUKAST 10 MG: 10 TABLET, FILM COATED ORAL at 09:03

## 2025-01-01 RX ADMIN — MONTELUKAST 10 MG: 10 TABLET, FILM COATED ORAL at 08:03

## 2025-01-01 RX ADMIN — OXYCODONE 10 MG: 5 TABLET ORAL at 02:03

## 2025-01-01 RX ADMIN — CEFEPIME 1 G: 1 INJECTION, POWDER, FOR SOLUTION INTRAMUSCULAR; INTRAVENOUS at 09:03

## 2025-01-01 RX ADMIN — IPRATROPIUM BROMIDE AND ALBUTEROL SULFATE 3 ML: 2.5; .5 SOLUTION RESPIRATORY (INHALATION) at 12:03

## 2025-01-01 RX ADMIN — METHYLPREDNISOLONE SODIUM SUCCINATE 60 MG: 40 INJECTION, POWDER, FOR SOLUTION INTRAMUSCULAR; INTRAVENOUS at 06:03

## 2025-01-01 RX ADMIN — ATORVASTATIN CALCIUM 40 MG: 40 TABLET, FILM COATED ORAL at 10:03

## 2025-01-01 RX ADMIN — LEVOFLOXACIN 750 MG: 5 INJECTION, SOLUTION INTRAVENOUS at 02:03

## 2025-01-01 RX ADMIN — BISACODYL 5 MG: 5 TABLET, COATED ORAL at 09:03

## 2025-01-01 RX ADMIN — NALOXONE HYDROCHLORIDE 0.4 MG: 0.4 INJECTION, SOLUTION INTRAMUSCULAR; INTRAVENOUS; SUBCUTANEOUS at 01:03

## 2025-01-01 RX ADMIN — IPRATROPIUM BROMIDE AND ALBUTEROL SULFATE 3 ML: 2.5; .5 SOLUTION RESPIRATORY (INHALATION) at 01:03

## 2025-01-01 RX ADMIN — ACETAMINOPHEN 1000 MG: 10 INJECTION, SOLUTION INTRAVENOUS at 01:03

## 2025-01-01 RX ADMIN — IPRATROPIUM BROMIDE AND ALBUTEROL SULFATE 3 ML: 2.5; .5 SOLUTION RESPIRATORY (INHALATION) at 10:03

## 2025-01-01 RX ADMIN — PIPERACILLIN SODIUM AND TAZOBACTAM SODIUM 4.5 G: 4; .5 INJECTION, POWDER, LYOPHILIZED, FOR SOLUTION INTRAVENOUS at 10:03

## 2025-01-01 RX ADMIN — EPINEPHRINE 1 MG: 0.1 INJECTION, SOLUTION INTRAVENOUS at 02:03

## 2025-01-01 RX ADMIN — ATORVASTATIN CALCIUM 40 MG: 40 TABLET, FILM COATED ORAL at 09:03

## 2025-01-01 RX ADMIN — ENOXAPARIN SODIUM 40 MG: 40 INJECTION SUBCUTANEOUS at 06:03

## 2025-01-01 RX ADMIN — MAGNESIUM SULFATE HEPTAHYDRATE 2 G: 40 INJECTION, SOLUTION INTRAVENOUS at 10:03

## 2025-01-01 RX ADMIN — QUETIAPINE FUMARATE 25 MG: 25 TABLET ORAL at 09:03

## 2025-01-01 RX ADMIN — METHYLPREDNISOLONE SODIUM SUCCINATE 60 MG: 40 INJECTION, POWDER, FOR SOLUTION INTRAMUSCULAR; INTRAVENOUS at 05:03

## 2025-01-01 RX ADMIN — INSULIN ASPART 6 UNITS: 100 INJECTION, SOLUTION INTRAVENOUS; SUBCUTANEOUS at 04:03

## 2025-01-01 RX ADMIN — CEFEPIME 1 G: 1 INJECTION, POWDER, FOR SOLUTION INTRAMUSCULAR; INTRAVENOUS at 01:03

## 2025-01-01 RX ADMIN — LEVOFLOXACIN 500 MG: 500 INJECTION, SOLUTION INTRAVENOUS at 02:03

## 2025-01-01 RX ADMIN — PREGABALIN 100 MG: 25 CAPSULE ORAL at 09:03

## 2025-01-01 RX ADMIN — MONTELUKAST 10 MG: 10 TABLET, FILM COATED ORAL at 10:03

## 2025-01-01 RX ADMIN — IPRATROPIUM BROMIDE AND ALBUTEROL SULFATE 3 ML: .5; 3 SOLUTION RESPIRATORY (INHALATION) at 12:03

## 2025-01-01 RX ADMIN — DULOXETINE 60 MG: 60 CAPSULE, DELAYED RELEASE ORAL at 10:03

## 2025-01-01 RX ADMIN — SODIUM CHLORIDE, PRESERVATIVE FREE 10 ML: 5 INJECTION INTRAVENOUS at 02:03

## 2025-01-01 RX ADMIN — ENOXAPARIN SODIUM 40 MG: 40 INJECTION SUBCUTANEOUS at 04:03

## 2025-01-01 RX ADMIN — GLYCERIN 1 DROP: .002; .002; .01 SOLUTION/ DROPS OPHTHALMIC at 09:03

## 2025-01-01 RX ADMIN — PANTOPRAZOLE SODIUM 40 MG: 40 INJECTION, POWDER, FOR SOLUTION INTRAVENOUS at 09:03

## 2025-01-01 RX ADMIN — IPRATROPIUM BROMIDE AND ALBUTEROL SULFATE 3 ML: .5; 3 SOLUTION RESPIRATORY (INHALATION) at 01:03

## 2025-01-01 RX ADMIN — OXYCODONE 10 MG: 5 TABLET ORAL at 11:03

## 2025-01-01 RX ADMIN — INSULIN ASPART 4 UNITS: 100 INJECTION, SOLUTION INTRAVENOUS; SUBCUTANEOUS at 03:03

## 2025-01-01 RX ADMIN — GLYCERIN 1 DROP: .002; .002; .01 SOLUTION/ DROPS OPHTHALMIC at 11:03

## 2025-01-01 RX ADMIN — DULOXETINE 60 MG: 60 CAPSULE, DELAYED RELEASE ORAL at 09:03

## 2025-01-01 RX ADMIN — CEFEPIME 2 G: 2 INJECTION, POWDER, FOR SOLUTION INTRAVENOUS at 05:03

## 2025-01-01 RX ADMIN — METHYLPREDNISOLONE SODIUM SUCCINATE 60 MG: 40 INJECTION, POWDER, FOR SOLUTION INTRAMUSCULAR; INTRAVENOUS at 11:03

## 2025-01-01 RX ADMIN — PREGABALIN 100 MG: 25 CAPSULE ORAL at 02:03

## 2025-01-01 RX ADMIN — RANOLAZINE 500 MG: 500 TABLET, EXTENDED RELEASE ORAL at 08:03

## 2025-01-01 RX ADMIN — OXYCODONE 10 MG: 5 TABLET ORAL at 06:03

## 2025-01-01 RX ADMIN — ASPIRIN 81 MG CHEWABLE TABLET 81 MG: 81 TABLET CHEWABLE at 11:03

## 2025-01-01 RX ADMIN — CLOPIDOGREL BISULFATE 75 MG: 75 TABLET ORAL at 08:03

## 2025-01-01 RX ADMIN — INSULIN ASPART 2 UNITS: 100 INJECTION, SOLUTION INTRAVENOUS; SUBCUTANEOUS at 11:03

## 2025-01-01 RX ADMIN — Medication 0.4 MG: at 01:03

## 2025-01-01 RX ADMIN — MUPIROCIN 1 G: 20 OINTMENT TOPICAL at 08:03

## 2025-01-01 RX ADMIN — FUROSEMIDE 40 MG: 10 INJECTION, SOLUTION INTRAVENOUS at 09:03

## 2025-01-01 RX ADMIN — METOPROLOL TARTRATE 12.5 MG: 25 TABLET, FILM COATED ORAL at 08:03

## 2025-01-01 RX ADMIN — INSULIN ASPART 10 UNITS: 100 INJECTION, SOLUTION INTRAVENOUS; SUBCUTANEOUS at 10:03

## 2025-01-01 RX ADMIN — METHYLPREDNISOLONE SODIUM SUCCINATE 60 MG: 40 INJECTION, POWDER, FOR SOLUTION INTRAMUSCULAR; INTRAVENOUS at 01:03

## 2025-01-01 RX ADMIN — SODIUM CHLORIDE, PRESERVATIVE FREE 10 ML: 5 INJECTION INTRAVENOUS at 11:03

## 2025-01-01 RX ADMIN — ASPIRIN 81 MG CHEWABLE TABLET 81 MG: 81 TABLET CHEWABLE at 10:03

## 2025-01-01 RX ADMIN — MUPIROCIN 1 G: 20 OINTMENT TOPICAL at 09:03

## 2025-01-01 RX ADMIN — ACETAMINOPHEN 650 MG: 325 TABLET, FILM COATED ORAL at 10:03

## 2025-01-01 RX ADMIN — BISACODYL 5 MG: 5 TABLET, COATED ORAL at 08:03

## 2025-01-01 RX ADMIN — FUROSEMIDE 40 MG: 10 INJECTION, SOLUTION INTRAVENOUS at 01:03

## 2025-01-01 RX ADMIN — INSULIN ASPART 8 UNITS: 100 INJECTION, SOLUTION INTRAVENOUS; SUBCUTANEOUS at 04:03

## 2025-01-01 RX ADMIN — INSULIN GLARGINE 10 UNITS: 100 INJECTION, SOLUTION SUBCUTANEOUS at 09:03

## 2025-01-01 RX ADMIN — CEFEPIME 1 G: 1 INJECTION, POWDER, FOR SOLUTION INTRAMUSCULAR; INTRAVENOUS at 06:03

## 2025-01-01 RX ADMIN — MORPHINE SULFATE 2 MG: 4 INJECTION INTRAVENOUS at 10:03

## 2025-01-01 RX ADMIN — SODIUM CHLORIDE, PRESERVATIVE FREE 10 ML: 5 INJECTION INTRAVENOUS at 01:03

## 2025-01-01 RX ADMIN — INSULIN ASPART 2 UNITS: 100 INJECTION, SOLUTION INTRAVENOUS; SUBCUTANEOUS at 06:03

## 2025-01-01 RX ADMIN — METOPROLOL TARTRATE 25 MG: 25 TABLET, FILM COATED ORAL at 09:03

## 2025-01-01 RX ADMIN — OXYCODONE 10 MG: 5 TABLET ORAL at 08:03

## 2025-01-01 RX ADMIN — CLOPIDOGREL BISULFATE 75 MG: 75 TABLET ORAL at 11:03

## 2025-01-01 RX ADMIN — OXYCODONE 10 MG: 5 TABLET ORAL at 07:03

## 2025-01-01 RX ADMIN — INSULIN GLARGINE 15 UNITS: 100 INJECTION, SOLUTION SUBCUTANEOUS at 10:03

## 2025-01-01 RX ADMIN — INSULIN GLARGINE 5 UNITS: 100 INJECTION, SOLUTION SUBCUTANEOUS at 10:03

## 2025-01-01 RX ADMIN — INSULIN ASPART 4 UNITS: 100 INJECTION, SOLUTION INTRAVENOUS; SUBCUTANEOUS at 04:03

## 2025-01-01 RX ADMIN — METOPROLOL TARTRATE 12.5 MG: 25 TABLET, FILM COATED ORAL at 10:03

## 2025-01-01 RX ADMIN — SODIUM CHLORIDE, PRESERVATIVE FREE 10 ML: 5 INJECTION INTRAVENOUS at 09:03

## 2025-01-01 RX ADMIN — INSULIN ASPART 2 UNITS: 100 INJECTION, SOLUTION INTRAVENOUS; SUBCUTANEOUS at 12:03

## 2025-01-01 RX ADMIN — OXYCODONE 10 MG: 5 TABLET ORAL at 03:03

## 2025-01-01 RX ADMIN — INSULIN ASPART 4 UNITS: 100 INJECTION, SOLUTION INTRAVENOUS; SUBCUTANEOUS at 09:03

## 2025-01-01 RX ADMIN — PREGABALIN 75 MG: 75 CAPSULE ORAL at 10:03

## 2025-01-01 RX ADMIN — INSULIN ASPART 2 UNITS: 100 INJECTION, SOLUTION INTRAVENOUS; SUBCUTANEOUS at 04:03

## 2025-01-01 RX ADMIN — INSULIN ASPART 4 UNITS: 100 INJECTION, SOLUTION INTRAVENOUS; SUBCUTANEOUS at 10:03

## 2025-01-01 RX ADMIN — METHYLPREDNISOLONE SODIUM SUCCINATE 80 MG: 125 INJECTION, POWDER, FOR SOLUTION INTRAMUSCULAR; INTRAVENOUS at 05:03

## 2025-01-01 RX ADMIN — OXYCODONE 10 MG: 5 TABLET ORAL at 09:03

## 2025-01-01 RX ADMIN — INSULIN ASPART 4 UNITS: 100 INJECTION, SOLUTION INTRAVENOUS; SUBCUTANEOUS at 11:03

## 2025-01-01 RX ADMIN — MORPHINE SULFATE 10 MG: 4 INJECTION INTRAVENOUS at 04:03

## 2025-01-01 RX ADMIN — SODIUM CHLORIDE, POTASSIUM CHLORIDE, SODIUM LACTATE AND CALCIUM CHLORIDE: 600; 310; 30; 20 INJECTION, SOLUTION INTRAVENOUS at 11:03

## 2025-01-01 RX ADMIN — PANTOPRAZOLE SODIUM 40 MG: 40 TABLET, DELAYED RELEASE ORAL at 08:03

## 2025-01-01 RX ADMIN — SODIUM CHLORIDE, POTASSIUM CHLORIDE, SODIUM LACTATE AND CALCIUM CHLORIDE: 600; 310; 30; 20 INJECTION, SOLUTION INTRAVENOUS at 08:03

## 2025-01-01 RX ADMIN — PANTOPRAZOLE SODIUM 40 MG: 40 INJECTION, POWDER, FOR SOLUTION INTRAVENOUS at 10:03

## 2025-01-01 RX ADMIN — DEXTROSE MONOHYDRATE 12.5 G: 25 INJECTION, SOLUTION INTRAVENOUS at 07:03

## 2025-01-01 RX ADMIN — BISACODYL 10 MG: 10 SUPPOSITORY RECTAL at 04:03

## 2025-01-01 RX ADMIN — BUDESONIDE INHALATION 0.5 MG: 0.5 SUSPENSION RESPIRATORY (INHALATION) at 10:03

## 2025-01-01 RX ADMIN — INSULIN ASPART 2 UNITS: 100 INJECTION, SOLUTION INTRAVENOUS; SUBCUTANEOUS at 05:03

## 2025-01-01 RX ADMIN — ENOXAPARIN SODIUM 40 MG: 40 INJECTION SUBCUTANEOUS at 05:03

## 2025-01-01 RX ADMIN — SODIUM CHLORIDE, PRESERVATIVE FREE 10 ML: 5 INJECTION INTRAVENOUS at 03:03

## 2025-01-01 RX ADMIN — SODIUM CHLORIDE, POTASSIUM CHLORIDE, SODIUM LACTATE AND CALCIUM CHLORIDE: 600; 310; 30; 20 INJECTION, SOLUTION INTRAVENOUS at 06:03

## 2025-01-01 RX ADMIN — PREGABALIN 100 MG: 25 CAPSULE ORAL at 04:03

## 2025-01-01 RX ADMIN — INSULIN ASPART 1 UNITS: 100 INJECTION, SOLUTION INTRAVENOUS; SUBCUTANEOUS at 03:03

## 2025-01-01 RX ADMIN — SODIUM CHLORIDE: 9 INJECTION, SOLUTION INTRAVENOUS at 03:03

## 2025-01-01 RX ADMIN — SODIUM CHLORIDE: 9 INJECTION, SOLUTION INTRAVENOUS at 02:03

## 2025-01-01 RX ADMIN — PANTOPRAZOLE SODIUM 40 MG: 40 TABLET, DELAYED RELEASE ORAL at 09:03

## 2025-01-01 RX ADMIN — ATORVASTATIN CALCIUM 40 MG: 40 TABLET, FILM COATED ORAL at 08:03

## 2025-01-01 RX ADMIN — RANOLAZINE 500 MG: 500 TABLET, EXTENDED RELEASE ORAL at 10:03

## 2025-01-01 RX ADMIN — ATORVASTATIN CALCIUM 40 MG: 40 TABLET, FILM COATED ORAL at 11:03

## 2025-01-01 RX ADMIN — METHYLPREDNISOLONE SODIUM SUCCINATE 80 MG: 125 INJECTION, POWDER, FOR SOLUTION INTRAMUSCULAR; INTRAVENOUS at 10:03

## 2025-01-01 RX ADMIN — METOPROLOL TARTRATE 25 MG: 25 TABLET, FILM COATED ORAL at 11:03

## 2025-01-01 RX ADMIN — SODIUM CHLORIDE, POTASSIUM CHLORIDE, SODIUM LACTATE AND CALCIUM CHLORIDE: 600; 310; 30; 20 INJECTION, SOLUTION INTRAVENOUS at 04:03

## 2025-01-01 RX ADMIN — BISACODYL 5 MG: 5 TABLET, COATED ORAL at 10:03

## 2025-01-01 RX ADMIN — METHYLPREDNISOLONE SODIUM SUCCINATE 80 MG: 125 INJECTION, POWDER, FOR SOLUTION INTRAMUSCULAR; INTRAVENOUS at 02:03

## 2025-01-01 RX ADMIN — SODIUM CHLORIDE, POTASSIUM CHLORIDE, SODIUM LACTATE AND CALCIUM CHLORIDE: 600; 310; 30; 20 INJECTION, SOLUTION INTRAVENOUS at 01:03

## 2025-01-01 RX ADMIN — OXYCODONE 10 MG: 5 TABLET ORAL at 12:03

## 2025-01-01 RX ADMIN — MORPHINE SULFATE 10 MG: 4 INJECTION INTRAVENOUS at 12:03

## 2025-01-01 RX ADMIN — FUROSEMIDE 40 MG: 10 INJECTION, SOLUTION INTRAVENOUS at 10:03

## 2025-01-01 RX ADMIN — PREGABALIN 100 MG: 25 CAPSULE ORAL at 10:03

## 2025-01-01 RX ADMIN — INSULIN ASPART 2 UNITS: 100 INJECTION, SOLUTION INTRAVENOUS; SUBCUTANEOUS at 08:03

## 2025-01-01 RX ADMIN — LEVOFLOXACIN 750 MG: 5 INJECTION, SOLUTION INTRAVENOUS at 01:03

## 2025-01-01 RX ADMIN — INSULIN ASPART 6 UNITS: 100 INJECTION, SOLUTION INTRAVENOUS; SUBCUTANEOUS at 03:03

## 2025-01-01 RX ADMIN — SODIUM CHLORIDE: 9 INJECTION, SOLUTION INTRAVENOUS at 01:03

## 2025-01-01 RX ADMIN — POTASSIUM CHLORIDE 20 MEQ: 1500 TABLET, EXTENDED RELEASE ORAL at 10:03

## 2025-01-01 RX ADMIN — MUPIROCIN 1 G: 20 OINTMENT TOPICAL at 10:03

## 2025-01-01 RX ADMIN — PANTOPRAZOLE SODIUM 40 MG: 40 TABLET, DELAYED RELEASE ORAL at 10:03

## 2025-01-01 RX ADMIN — INSULIN ASPART 6 UNITS: 100 INJECTION, SOLUTION INTRAVENOUS; SUBCUTANEOUS at 12:03

## 2025-01-01 RX ADMIN — INSULIN ASPART 6 UNITS: 100 INJECTION, SOLUTION INTRAVENOUS; SUBCUTANEOUS at 05:03

## 2025-01-01 RX ADMIN — INSULIN ASPART 8 UNITS: 100 INJECTION, SOLUTION INTRAVENOUS; SUBCUTANEOUS at 03:03

## 2025-01-01 RX ADMIN — ASPIRIN 81 MG CHEWABLE TABLET 81 MG: 81 TABLET CHEWABLE at 08:03

## 2025-01-01 RX ADMIN — FUROSEMIDE 40 MG: 10 INJECTION, SOLUTION INTRAMUSCULAR; INTRAVENOUS at 09:03

## 2025-01-01 RX ADMIN — CEFEPIME 2 G: 2 INJECTION, POWDER, FOR SOLUTION INTRAVENOUS at 12:03

## 2025-01-01 RX ADMIN — DULOXETINE 60 MG: 60 CAPSULE, DELAYED RELEASE ORAL at 08:03

## 2025-01-01 RX ADMIN — SODIUM CHLORIDE: 9 INJECTION, SOLUTION INTRAVENOUS at 05:03

## 2025-01-01 RX ADMIN — OXYCODONE 10 MG: 5 TABLET ORAL at 01:03

## 2025-01-01 RX ADMIN — PREGABALIN 100 MG: 25 CAPSULE ORAL at 08:03

## 2025-01-01 RX ADMIN — INSULIN ASPART 5 UNITS: 100 INJECTION, SOLUTION INTRAVENOUS; SUBCUTANEOUS at 12:03

## 2025-01-01 RX ADMIN — SODIUM CHLORIDE: 9 INJECTION, SOLUTION INTRAVENOUS at 10:03

## 2025-01-01 RX ADMIN — FUROSEMIDE 40 MG: 10 INJECTION, SOLUTION INTRAMUSCULAR; INTRAVENOUS at 07:03

## 2025-01-01 RX ADMIN — METOPROLOL TARTRATE 12.5 MG: 25 TABLET, FILM COATED ORAL at 02:03

## 2025-01-01 RX ADMIN — OXYCODONE 10 MG: 5 TABLET ORAL at 04:03

## 2025-01-01 RX ADMIN — IPRATROPIUM BROMIDE AND ALBUTEROL SULFATE 3 ML: .5; 3 SOLUTION RESPIRATORY (INHALATION) at 10:03

## 2025-01-01 RX ADMIN — INSULIN ASPART 6 UNITS: 100 INJECTION, SOLUTION INTRAVENOUS; SUBCUTANEOUS at 10:03

## 2025-01-14 ENCOUNTER — TELEPHONE (OUTPATIENT)
Dept: NEUROSURGERY | Facility: CLINIC | Age: 70
End: 2025-01-14
Payer: MEDICARE

## 2025-01-14 ENCOUNTER — HOSPITAL ENCOUNTER (INPATIENT)
Facility: HOSPITAL | Age: 70
LOS: 17 days | Discharge: NURSING, REHAB OR LONG-TERM CARE | DRG: 430 | End: 2025-01-31
Attending: STUDENT IN AN ORGANIZED HEALTH CARE EDUCATION/TRAINING PROGRAM | Admitting: STUDENT IN AN ORGANIZED HEALTH CARE EDUCATION/TRAINING PROGRAM
Payer: MEDICARE

## 2025-01-14 DIAGNOSIS — I49.9 ARRHYTHMIA: ICD-10-CM

## 2025-01-14 DIAGNOSIS — I49.9 IRREGULAR CARDIAC RHYTHM: ICD-10-CM

## 2025-01-14 DIAGNOSIS — R53.1 WEAKNESS: ICD-10-CM

## 2025-01-14 DIAGNOSIS — G95.20 SPINAL CORD COMPRESSION: Primary | ICD-10-CM

## 2025-01-14 DIAGNOSIS — I25.10 CAD (CORONARY ARTERY DISEASE): ICD-10-CM

## 2025-01-14 DIAGNOSIS — R07.9 CHEST PAIN: ICD-10-CM

## 2025-01-14 PROBLEM — D64.9 ANEMIA: Status: ACTIVE | Noted: 2025-01-14

## 2025-01-14 PROBLEM — E87.1 HYPONATREMIA: Status: ACTIVE | Noted: 2025-01-14

## 2025-01-14 LAB
ALBUMIN SERPL-MCNC: 3.5 G/DL (ref 3.4–4.8)
ALBUMIN/GLOB SERPL: 1 RATIO (ref 1.1–2)
ALP SERPL-CCNC: 117 UNIT/L (ref 40–150)
ALT SERPL-CCNC: 24 UNIT/L (ref 0–55)
ANION GAP SERPL CALC-SCNC: 9 MEQ/L
APTT PPP: 27.5 SECONDS (ref 23.2–33.7)
AST SERPL-CCNC: 24 UNIT/L (ref 5–34)
BASOPHILS # BLD AUTO: 0.07 X10(3)/MCL
BASOPHILS NFR BLD AUTO: 0.6 %
BILIRUB SERPL-MCNC: 0.6 MG/DL
BUN SERPL-MCNC: 46.5 MG/DL (ref 8.4–25.7)
CALCIUM SERPL-MCNC: 9.8 MG/DL (ref 8.8–10)
CHLORIDE SERPL-SCNC: 103 MMOL/L (ref 98–107)
CO2 SERPL-SCNC: 23 MMOL/L (ref 23–31)
CREAT SERPL-MCNC: 1.83 MG/DL (ref 0.72–1.25)
CREAT/UREA NIT SERPL: 25
EOSINOPHIL # BLD AUTO: 0.36 X10(3)/MCL (ref 0–0.9)
EOSINOPHIL NFR BLD AUTO: 3.3 %
ERYTHROCYTE [DISTWIDTH] IN BLOOD BY AUTOMATED COUNT: 12.3 % (ref 11.5–17)
GFR SERPLBLD CREATININE-BSD FMLA CKD-EPI: 40 ML/MIN/1.73/M2
GLOBULIN SER-MCNC: 3.4 GM/DL (ref 2.4–3.5)
GLUCOSE SERPL-MCNC: 109 MG/DL (ref 82–115)
GROUP & RH: NORMAL
HCT VFR BLD AUTO: 34 % (ref 42–52)
HGB BLD-MCNC: 11.7 G/DL (ref 14–18)
IMM GRANULOCYTES # BLD AUTO: 0.18 X10(3)/MCL (ref 0–0.04)
IMM GRANULOCYTES NFR BLD AUTO: 1.6 %
INDIRECT COOMBS: NORMAL
INR PPP: 1
LYMPHOCYTES # BLD AUTO: 1.88 X10(3)/MCL (ref 0.6–4.6)
LYMPHOCYTES NFR BLD AUTO: 17.2 %
MCH RBC QN AUTO: 31.2 PG (ref 27–31)
MCHC RBC AUTO-ENTMCNC: 34.4 G/DL (ref 33–36)
MCV RBC AUTO: 90.7 FL (ref 80–94)
MONOCYTES # BLD AUTO: 1.15 X10(3)/MCL (ref 0.1–1.3)
MONOCYTES NFR BLD AUTO: 10.5 %
NEUTROPHILS # BLD AUTO: 7.3 X10(3)/MCL (ref 2.1–9.2)
NEUTROPHILS NFR BLD AUTO: 66.8 %
NRBC BLD AUTO-RTO: 0 %
PLATELET # BLD AUTO: 248 X10(3)/MCL (ref 130–400)
PMV BLD AUTO: 11 FL (ref 7.4–10.4)
POTASSIUM SERPL-SCNC: 4.7 MMOL/L (ref 3.5–5.1)
PROT SERPL-MCNC: 6.9 GM/DL (ref 5.8–7.6)
PROTHROMBIN TIME: 13.1 SECONDS (ref 12.5–14.5)
RBC # BLD AUTO: 3.75 X10(6)/MCL (ref 4.7–6.1)
SODIUM SERPL-SCNC: 135 MMOL/L (ref 136–145)
SPECIMEN OUTDATE: NORMAL
WBC # BLD AUTO: 10.94 X10(3)/MCL (ref 4.5–11.5)

## 2025-01-14 PROCEDURE — 85730 THROMBOPLASTIN TIME PARTIAL: CPT | Performed by: PHYSICIAN ASSISTANT

## 2025-01-14 PROCEDURE — 25000003 PHARM REV CODE 250: Performed by: STUDENT IN AN ORGANIZED HEALTH CARE EDUCATION/TRAINING PROGRAM

## 2025-01-14 PROCEDURE — 80053 COMPREHEN METABOLIC PANEL: CPT | Performed by: PHYSICIAN ASSISTANT

## 2025-01-14 PROCEDURE — 63600175 PHARM REV CODE 636 W HCPCS: Performed by: STUDENT IN AN ORGANIZED HEALTH CARE EDUCATION/TRAINING PROGRAM

## 2025-01-14 PROCEDURE — 85025 COMPLETE CBC W/AUTO DIFF WBC: CPT | Performed by: PHYSICIAN ASSISTANT

## 2025-01-14 PROCEDURE — 99285 EMERGENCY DEPT VISIT HI MDM: CPT | Mod: 25

## 2025-01-14 PROCEDURE — 86901 BLOOD TYPING SEROLOGIC RH(D): CPT | Performed by: STUDENT IN AN ORGANIZED HEALTH CARE EDUCATION/TRAINING PROGRAM

## 2025-01-14 PROCEDURE — 11000001 HC ACUTE MED/SURG PRIVATE ROOM

## 2025-01-14 PROCEDURE — 85610 PROTHROMBIN TIME: CPT | Performed by: PHYSICIAN ASSISTANT

## 2025-01-14 RX ORDER — OXYCODONE AND ACETAMINOPHEN 10; 325 MG/1; MG/1
1 TABLET ORAL EVERY 4 HOURS PRN
Status: DISCONTINUED | OUTPATIENT
Start: 2025-01-14 | End: 2025-01-24

## 2025-01-14 RX ORDER — ALUMINUM HYDROXIDE, MAGNESIUM HYDROXIDE, AND SIMETHICONE 1200; 120; 1200 MG/30ML; MG/30ML; MG/30ML
30 SUSPENSION ORAL 4 TIMES DAILY PRN
Status: DISCONTINUED | OUTPATIENT
Start: 2025-01-14 | End: 2025-01-31 | Stop reason: HOSPADM

## 2025-01-14 RX ORDER — CARVEDILOL 3.12 MG/1
3.12 TABLET ORAL 2 TIMES DAILY
Status: DISCONTINUED | OUTPATIENT
Start: 2025-01-14 | End: 2025-01-30

## 2025-01-14 RX ORDER — GLUCAGON 1 MG
1 KIT INJECTION
Status: DISCONTINUED | OUTPATIENT
Start: 2025-01-14 | End: 2025-01-31 | Stop reason: HOSPADM

## 2025-01-14 RX ORDER — PANTOPRAZOLE SODIUM 40 MG/1
40 TABLET, DELAYED RELEASE ORAL
Status: DISCONTINUED | OUTPATIENT
Start: 2025-01-15 | End: 2025-01-31 | Stop reason: HOSPADM

## 2025-01-14 RX ORDER — POLYETHYLENE GLYCOL 3350 17 G/17G
17 POWDER, FOR SOLUTION ORAL 3 TIMES DAILY PRN
Status: DISCONTINUED | OUTPATIENT
Start: 2025-01-14 | End: 2025-01-31 | Stop reason: HOSPADM

## 2025-01-14 RX ORDER — IPRATROPIUM BROMIDE AND ALBUTEROL SULFATE 2.5; .5 MG/3ML; MG/3ML
3 SOLUTION RESPIRATORY (INHALATION) EVERY 6 HOURS PRN
Status: DISCONTINUED | OUTPATIENT
Start: 2025-01-14 | End: 2025-01-31 | Stop reason: HOSPADM

## 2025-01-14 RX ORDER — MORPHINE SULFATE 4 MG/ML
2 INJECTION, SOLUTION INTRAMUSCULAR; INTRAVENOUS EVERY 6 HOURS PRN
Status: DISCONTINUED | OUTPATIENT
Start: 2025-01-14 | End: 2025-01-30

## 2025-01-14 RX ORDER — IBUPROFEN 200 MG
16 TABLET ORAL
Status: DISCONTINUED | OUTPATIENT
Start: 2025-01-14 | End: 2025-01-31 | Stop reason: HOSPADM

## 2025-01-14 RX ORDER — ACETAMINOPHEN 325 MG/1
650 TABLET ORAL EVERY 4 HOURS PRN
Status: DISCONTINUED | OUTPATIENT
Start: 2025-01-14 | End: 2025-01-31 | Stop reason: HOSPADM

## 2025-01-14 RX ORDER — RANOLAZINE 500 MG/1
500 TABLET, EXTENDED RELEASE ORAL 2 TIMES DAILY
Status: DISCONTINUED | OUTPATIENT
Start: 2025-01-14 | End: 2025-01-31 | Stop reason: HOSPADM

## 2025-01-14 RX ORDER — ONDANSETRON HYDROCHLORIDE 2 MG/ML
4 INJECTION, SOLUTION INTRAVENOUS EVERY 8 HOURS PRN
Status: DISCONTINUED | OUTPATIENT
Start: 2025-01-14 | End: 2025-01-31 | Stop reason: HOSPADM

## 2025-01-14 RX ORDER — TALC
9 POWDER (GRAM) TOPICAL NIGHTLY PRN
Status: DISCONTINUED | OUTPATIENT
Start: 2025-01-14 | End: 2025-01-31 | Stop reason: HOSPADM

## 2025-01-14 RX ORDER — SPIRONOLACTONE 25 MG/1
25 TABLET ORAL EVERY MORNING
Status: DISCONTINUED | OUTPATIENT
Start: 2025-01-15 | End: 2025-01-20

## 2025-01-14 RX ORDER — SIMETHICONE 80 MG
1 TABLET,CHEWABLE ORAL 4 TIMES DAILY PRN
Status: DISCONTINUED | OUTPATIENT
Start: 2025-01-14 | End: 2025-01-31 | Stop reason: HOSPADM

## 2025-01-14 RX ORDER — FUROSEMIDE 20 MG/1
20 TABLET ORAL 3 TIMES DAILY
Status: DISCONTINUED | OUTPATIENT
Start: 2025-01-16 | End: 2025-01-21

## 2025-01-14 RX ORDER — ONDANSETRON 4 MG/1
8 TABLET, ORALLY DISINTEGRATING ORAL EVERY 8 HOURS PRN
Status: DISCONTINUED | OUTPATIENT
Start: 2025-01-14 | End: 2025-01-31 | Stop reason: HOSPADM

## 2025-01-14 RX ORDER — SODIUM CHLORIDE 0.9 % (FLUSH) 0.9 %
10 SYRINGE (ML) INJECTION
Status: DISCONTINUED | OUTPATIENT
Start: 2025-01-14 | End: 2025-01-31 | Stop reason: HOSPADM

## 2025-01-14 RX ORDER — NALOXONE HCL 0.4 MG/ML
0.02 VIAL (ML) INJECTION
Status: DISCONTINUED | OUTPATIENT
Start: 2025-01-14 | End: 2025-01-31 | Stop reason: HOSPADM

## 2025-01-14 RX ORDER — CYCLOBENZAPRINE HCL 10 MG
10 TABLET ORAL 3 TIMES DAILY PRN
Status: DISCONTINUED | OUTPATIENT
Start: 2025-01-14 | End: 2025-01-31 | Stop reason: HOSPADM

## 2025-01-14 RX ORDER — IBUPROFEN 200 MG
24 TABLET ORAL
Status: DISCONTINUED | OUTPATIENT
Start: 2025-01-14 | End: 2025-01-31 | Stop reason: HOSPADM

## 2025-01-14 RX ORDER — LOSARTAN POTASSIUM 25 MG/1
25 TABLET ORAL DAILY
Status: DISCONTINUED | OUTPATIENT
Start: 2025-01-15 | End: 2025-01-20

## 2025-01-14 RX ORDER — BISACODYL 10 MG/1
10 SUPPOSITORY RECTAL DAILY PRN
Status: DISCONTINUED | OUTPATIENT
Start: 2025-01-14 | End: 2025-01-31 | Stop reason: HOSPADM

## 2025-01-14 RX ADMIN — CARVEDILOL 3.12 MG: 3.12 TABLET, FILM COATED ORAL at 09:01

## 2025-01-14 RX ADMIN — CYCLOBENZAPRINE 10 MG: 10 TABLET, FILM COATED ORAL at 09:01

## 2025-01-14 RX ADMIN — RANOLAZINE 500 MG: 500 TABLET, FILM COATED, EXTENDED RELEASE ORAL at 09:01

## 2025-01-14 RX ADMIN — MORPHINE SULFATE 2 MG: 4 INJECTION, SOLUTION INTRAMUSCULAR; INTRAVENOUS at 10:01

## 2025-01-14 RX ADMIN — OXYCODONE AND ACETAMINOPHEN 1 TABLET: 10; 325 TABLET ORAL at 09:01

## 2025-01-14 NOTE — TELEPHONE ENCOUNTER
----- Message from Miracle sent at 1/14/2025  3:26 PM CST -----  I can't add on to existing message.      Danielle ([phone])  at [facility] called to say that patient is being discharged today, 1/14/25 and needs to be scheduled for a follow up.

## 2025-01-14 NOTE — TELEPHONE ENCOUNTER
"Patient presented to [facility] ED on 1/5/25 and Dr. Blela was consulted on 1/6/2025. At that time, the patient refused sx. Consult note states,   " Thank you Dr. Ng for allowing me to see this 69-year-old gentleman who  has had previous neck surgery x2, many, many years ago who had progressive  worsening of spinal cord compression, difficulty using arms and legs, seen  by me several years ago and I recommended surgery, strongly refused, seen  by another doctor, he wanted to it recently, but refused, but in the last  few weeks, increasingly not able to get out of bed, able to get up, walk,  weakness, dragging his leg, essentially bed-bound, difficulty with some  help getting up.  He was told to go to the ER several times by Dr. Heidi Pandey who is a neurosurgeon, at [facility]'s office, but he  did not go and he finally came to the hospital to [facility].  He did not want to go general.  Currently, he is in the bed.  He has had  difficulty using hands to eat, to move, he has had weakness in his left  arm and right arm and right leg and left leg about 4 - out of 5, but  hyporeflexia.  He has upgoing toes bilaterally, he had decreased sensation  in the arms and legs.     I looked at the various MRIs including the cervical spine, which shows  severe cord compression at C2-C3, C3-C4, C4-C5 with some slippages as  well.  The previous surgery from the front at C5-C6 and C6-C7.     IMPRESSION:  The patient with history of severe cord compression causing  weakness, difficulty walking, and numbness.  He needs surgery with a  posterior cervical decompression and fusion from 2 down to 6.  Consent  discussed with him and obtained including bleeding, infection, weakness,  paralysis, reoperation, hemorrhage, possibly not better upon further  surgery.  This was all discussed in detail.  Again, he is now in total  agreement with proceeding with surgery."    Progress note dated 1/8/2025 states, "    Patient stating he " "does not want to proceed with surgical intervention. Discussed risk of prolonging intervention and possibility of the progression of his disease, including progressive weakness, difficulty with ambulation, and possibility of paralysis. He understands and does not wish to proceed.   -PTOT  -Pain control  -Will need rehab placement at time of disposition  -Appreciate recommendations of other services. "    Progress note dated 1/9/2025, "  Weakness.  Discussed surgery again-he DOES NOT WANT surgery.  Will sign off."    Since the patient is not interested in sx, I am not sure who he needs to F/U with. Please advise   " DISCHARGE

## 2025-01-14 NOTE — FIRST PROVIDER EVALUATION
"Medical screening examination initiated.  I have conducted a focused provider triage encounter, findings are as follows:    Brief history of present illness:  69-year-old male presents to ED for evaluation of severe neck pain.  Patient reports that he was seen and admitted at outpatient ED and told him he needed surgery however he had declined at that time due to having a UTI.  Patient reports that he has had multiple MRIs and in chart shows he has cervical myelopathy.  Patient states he is now amenable to having surgery and here for surgery.    Vitals:    01/14/25 1717   BP: 110/60   Pulse: 75   Resp: 16   Temp: 98.1 °F (36.7 °C)   TempSrc: Oral   SpO2: 95%   Weight: 78.5 kg (173 lb)   Height: 5' 6" (1.676 m)       Pertinent physical exam:  Patient awake and alert sitting in wheelchair.    Brief workup plan:  Labs    Preliminary workup initiated; this workup will be continued and followed by the physician or advanced practice provider that is assigned to the patient when roomed.  "

## 2025-01-14 NOTE — TELEPHONE ENCOUNTER
I need to add that the patient was amendable to surgery once his UTI cleared and his mental status improved.  However, his son did not want to proceed with Dr. Bella.  Dr. Neumann and Dr. Bowen were requested, but both declined.  It looks like he may have gone to the ER at [facility] this evening, but no notes available yet.

## 2025-01-14 NOTE — Clinical Note
Diagnosis: Spinal cord compression [231418]   Future Attending Provider: REYES, THAIRY G [[id number]]   Admit to which facility:: [facility] [[id number]]   Reason for IP Medical Treatment  (Clinical interventions that can only be accomplished in the IP setting? ) :: ngsy   Plans for Post-Acute care--if anticipated (pick the single best option):: A. No post acute care anticipated at this time   Special Needs:: No Special Needs [1]

## 2025-01-15 LAB
ANION GAP SERPL CALC-SCNC: 6 MEQ/L
BASOPHILS # BLD AUTO: 0.05 X10(3)/MCL
BASOPHILS NFR BLD AUTO: 0.5 %
BUN SERPL-MCNC: 47 MG/DL (ref 8.4–25.7)
CALCIUM SERPL-MCNC: 9.3 MG/DL (ref 8.8–10)
CHLORIDE SERPL-SCNC: 105 MMOL/L (ref 98–107)
CO2 SERPL-SCNC: 25 MMOL/L (ref 23–31)
CREAT SERPL-MCNC: 1.54 MG/DL (ref 0.72–1.25)
CREAT/UREA NIT SERPL: 31
EOSINOPHIL # BLD AUTO: 0.44 X10(3)/MCL (ref 0–0.9)
EOSINOPHIL NFR BLD AUTO: 4.4 %
ERYTHROCYTE [DISTWIDTH] IN BLOOD BY AUTOMATED COUNT: 12.4 % (ref 11.5–17)
GFR SERPLBLD CREATININE-BSD FMLA CKD-EPI: 49 ML/MIN/1.73/M2
GLUCOSE SERPL-MCNC: 98 MG/DL (ref 82–115)
HCT VFR BLD AUTO: 30.4 % (ref 42–52)
HGB BLD-MCNC: 10.3 G/DL (ref 14–18)
IMM GRANULOCYTES # BLD AUTO: 0.12 X10(3)/MCL (ref 0–0.04)
IMM GRANULOCYTES NFR BLD AUTO: 1.2 %
LYMPHOCYTES # BLD AUTO: 2.27 X10(3)/MCL (ref 0.6–4.6)
LYMPHOCYTES NFR BLD AUTO: 23 %
MCH RBC QN AUTO: 31 PG (ref 27–31)
MCHC RBC AUTO-ENTMCNC: 33.9 G/DL (ref 33–36)
MCV RBC AUTO: 91.6 FL (ref 80–94)
MONOCYTES # BLD AUTO: 0.89 X10(3)/MCL (ref 0.1–1.3)
MONOCYTES NFR BLD AUTO: 9 %
NEUTROPHILS # BLD AUTO: 6.12 X10(3)/MCL (ref 2.1–9.2)
NEUTROPHILS NFR BLD AUTO: 61.9 %
NRBC BLD AUTO-RTO: 0 %
PLATELET # BLD AUTO: 226 X10(3)/MCL (ref 130–400)
PMV BLD AUTO: 10.8 FL (ref 7.4–10.4)
POCT GLUCOSE: 103 MG/DL (ref 70–110)
POCT GLUCOSE: 114 MG/DL (ref 70–110)
POTASSIUM SERPL-SCNC: 4.5 MMOL/L (ref 3.5–5.1)
RBC # BLD AUTO: 3.32 X10(6)/MCL (ref 4.7–6.1)
SODIUM SERPL-SCNC: 136 MMOL/L (ref 136–145)
WBC # BLD AUTO: 9.89 X10(3)/MCL (ref 4.5–11.5)

## 2025-01-15 PROCEDURE — 80048 BASIC METABOLIC PNL TOTAL CA: CPT | Performed by: STUDENT IN AN ORGANIZED HEALTH CARE EDUCATION/TRAINING PROGRAM

## 2025-01-15 PROCEDURE — 85025 COMPLETE CBC W/AUTO DIFF WBC: CPT | Performed by: STUDENT IN AN ORGANIZED HEALTH CARE EDUCATION/TRAINING PROGRAM

## 2025-01-15 PROCEDURE — 25000003 PHARM REV CODE 250: Performed by: STUDENT IN AN ORGANIZED HEALTH CARE EDUCATION/TRAINING PROGRAM

## 2025-01-15 PROCEDURE — 63600175 PHARM REV CODE 636 W HCPCS: Performed by: STUDENT IN AN ORGANIZED HEALTH CARE EDUCATION/TRAINING PROGRAM

## 2025-01-15 PROCEDURE — 97162 PT EVAL MOD COMPLEX 30 MIN: CPT

## 2025-01-15 PROCEDURE — 99223 1ST HOSP IP/OBS HIGH 75: CPT | Mod: ,,, | Performed by: STUDENT IN AN ORGANIZED HEALTH CARE EDUCATION/TRAINING PROGRAM

## 2025-01-15 PROCEDURE — 21400001 HC TELEMETRY ROOM

## 2025-01-15 RX ADMIN — LOSARTAN POTASSIUM 25 MG: 25 TABLET, FILM COATED ORAL at 08:01

## 2025-01-15 RX ADMIN — PANTOPRAZOLE SODIUM 40 MG: 40 TABLET, DELAYED RELEASE ORAL at 07:01

## 2025-01-15 RX ADMIN — SPIRONOLACTONE 25 MG: 25 TABLET ORAL at 07:01

## 2025-01-15 RX ADMIN — MORPHINE SULFATE 2 MG: 4 INJECTION, SOLUTION INTRAMUSCULAR; INTRAVENOUS at 03:01

## 2025-01-15 RX ADMIN — MORPHINE SULFATE 2 MG: 4 INJECTION, SOLUTION INTRAMUSCULAR; INTRAVENOUS at 01:01

## 2025-01-15 RX ADMIN — OXYCODONE AND ACETAMINOPHEN 1 TABLET: 10; 325 TABLET ORAL at 05:01

## 2025-01-15 RX ADMIN — CARVEDILOL 3.12 MG: 3.12 TABLET, FILM COATED ORAL at 09:01

## 2025-01-15 RX ADMIN — RANOLAZINE 500 MG: 500 TABLET, FILM COATED, EXTENDED RELEASE ORAL at 08:01

## 2025-01-15 RX ADMIN — MELATONIN 9 MG: at 09:01

## 2025-01-15 RX ADMIN — OXYCODONE AND ACETAMINOPHEN 1 TABLET: 10; 325 TABLET ORAL at 09:01

## 2025-01-15 RX ADMIN — LINACLOTIDE 145 MCG: 145 CAPSULE, GELATIN COATED ORAL at 07:01

## 2025-01-15 RX ADMIN — OXYCODONE AND ACETAMINOPHEN 1 TABLET: 10; 325 TABLET ORAL at 01:01

## 2025-01-15 RX ADMIN — CARVEDILOL 3.12 MG: 3.12 TABLET, FILM COATED ORAL at 08:01

## 2025-01-15 RX ADMIN — RANOLAZINE 500 MG: 500 TABLET, FILM COATED, EXTENDED RELEASE ORAL at 09:01

## 2025-01-15 RX ADMIN — OXYCODONE AND ACETAMINOPHEN 1 TABLET: 10; 325 TABLET ORAL at 07:01

## 2025-01-15 NOTE — H&P
"[facility] MEDICINE - H&P ADMISSION NOTE    Patient Name: Tyler Mai  MRN: [id number]  Patient Class: IP- Inpatient   Admission Date: 1/14/2025   Admitting Physician: MAGGIE Service   Attending Physician: Thairy G Reyes, DO  Primary Care Provider: Vesta, Primary Doctor  Face-to-Face encounter date: 01/15/2025      CHIEF COMPLAINT     Chief Complaint   Patient presents with    Neck Pain     Sent to ED by Dr. Pandey for neck sx d/t cord compression. Pt reports increasing neck pain & difficulty using hands/legs. States he initially refused surgery d/t confusion from UTI        HISTORY OF PRESENTING ILLNESS   69-year-old male with a past medical history of hypertension, hyperlipidemia, diabetes, COPD (no home O2), heart failure, CAD status post CABG, Medtronic pacemaker, peripheral arterial disease status post left PCI, abdominal aortic aneurysm, tobacco dependence, prior C5, 6, 7 decompression with  who presents with worsening chronic cervical spondylosis with myelopathy.  He was seen by Neurosurgery on 12/03/2024 however he did not complete cardiac evaluation for clearance and did not go to scheduled MRI appointments.  They recommended fusion at least at C3-4 and C4-5.     He was seen at [facility] 01/05/2025: "He initially told Dr. Bella he did not want surgery. He changed his mind as his UTI was treated and cleared and a f/u urine culture from 1/11 has been negative for growth. His son does not want Dr. Bella to do the surgery and attempts were made to see if Dr. Neumann or Dr. Bowen would see the patient but both declined this. He is now recommended to discharge and report to a different hospital for a reevaluation because he and his family have declined Dr. Bella's offer to do surgery. The surgery is felt to be needed and he has been seen by Dr. Beebe cardiology who rates his cardiac risk as moderate at 5-7% of a cardiac event."    At the time of my evaluation patient is " seeking neurosurgical evaluation and intervention.  PAST MEDICAL HISTORY     Past Medical History:   Diagnosis Date    Anxiety disorder, unspecified     Arthritis     CAD (coronary artery disease)     Cervical radiculopathy     CHF (congestive heart failure)     Chronic pain syndrome     COPD (chronic obstructive pulmonary disease)     Diabetes mellitus     Gastro-esophageal reflux disease without esophagitis     High cholesterol     Hypertension     Lumbar radiculopathy     Myelomalacia of cervical cord     Other specified diseases of spinal cord     Pacemaker     PVD (peripheral vascular disease)     S/P triple vessel bypass     Tobacco use        PAST SURGICAL HISTORY     Past Surgical History:   Procedure Laterality Date    ANTERIOR CERVICAL DISCECTOMY W/ FUSION  1995    C5-6 and C6-7.  Dr. Wall    ANTERIOR CERVICAL DISCECTOMY W/ FUSION  1996    Redo C5-6, C6-7.  Dr. Wall    CHOLECYSTECTOMY      CORONARY ARTERY BYPASS GRAFT      INSERTION OF PACEMAKER      X2    TRIPPLE VESSEL BYPASS         FAMILY HISTORY   Reviewed and noncontributory to this case    SOCIAL HISTORY     Social History     Socioeconomic History    Marital status: Single   Tobacco Use    Smoking status: Every Day     Current packs/day: 1.00     Types: Cigarettes     Social Drivers of Health     Financial Resource Strain: Low Risk  (12/21/2023)    Received from [employer] [facility] Subsidiaries and Affiliates, [employer] [facility] Subsidiaries and Affiliates    Overall Financial Resource Strain (CARDIA)     Difficulty of Paying Living Expenses: Not very hard   Food Insecurity: No Food Insecurity (12/21/2023)    Received from [employer] [facility] Subsidiaries and Affiliates, [employer] [facility] Subsidiaries and Affiliates    Hunger Vital Sign     Worried About Running Out of Food in the Last Year:  Never true     Ran Out of Food in the Last Year: Never true   Transportation Needs: No Transportation Needs (12/21/2023)    [facility], [facility] and [facility]ates    PRAPARE - Transportation     Lack of Transportation (Medical): No     Lack of Transportation (Non-Medical): No   Housing Stability: Unknown (12/21/2023)    [facility], [facility]    Housing Stability Vital Sign     Unable to Pay for Housing in the Last Year: No     Number of Places Lived in the Last Year: 1       HOME MEDICATIONS     Prior to Admission medications    Medication Sig Start Date End Date Taking? Authorizing Provider   albuterol (PROVENTIL) 2.5 mg /3 mL (0.083 %) nebulizer solution Take 2.5 mg by nebulization every 4 (four) hours as needed. 11/4/24   Provider, Historical   albuterol (PROVENTIL/VENTOLIN HFA) 90 mcg/actuation inhaler 1 puff every 4 (four) hours as needed. 11/18/24   Provider, Historical   albuterol sulfate 2.5 mg/0.5 mL Nebu Inhale 2.5 mg into the lungs.    Provider, Historical   carvediloL (COREG) 3.125 MG tablet Take 3.125 mg by mouth.    Provider, Historical   clopidogreL (PLAVIX) 75 mg tablet Take 1 tablet by mouth every morning.    Provider, Historical   cyclobenzaprine (FLEXERIL) 10 MG tablet Take 1 tablet (10 mg total) by mouth 3 (three) times daily. 8/29/22   Darnell Nguyễn MD   furosemide (LASIX) 20 MG tablet Take 1 tablet (20 mg total) by mouth 3 (three) times daily. 8/29/22   Darnell Nguyễn MD   LINZESS 145 mcg Cap capsule Take 145 mcg by mouth every morning. 11/18/24   Provider, Historical   losartan (COZAAR) 25 MG tablet Take 25 mg by mouth.    Provider, Historical   oxyCODONE-acetaminophen  (PERCOCET)  mg per tablet Take 1 tablet by mouth.    Provider, Historical   pantoprazole (PROTONIX) 40 MG tablet Take 1 tablet (40 mg total) by mouth once daily. 8/29/22 9/28/22  Darnell Nguyễn MD   ranolazine (RANEXA) 500 MG Tb12 Take 500 mg by mouth 2 (two) times daily.    Provider, Historical   spironolactone (ALDACTONE) 25 MG tablet Take 1 tablet by mouth every morning.    Provider, Historical   TRELEGY ELLIPTA 200-62.5-25 mcg inhaler Inhale 1 puff into the lungs. 11/20/24   Provider, Historical   VASCEPA 1 gram Cap Take 2 capsules by mouth 2 (two) times daily. 11/18/24   Provider, Historical       ALLERGIES   Hydrochlorothiazide  REVIEW OF SYSTEMS   Except as documented above, all other systems reviewed and negative    PHYSICAL EXAM     Vitals:    01/15/25 0107   BP:    Pulse:    Resp: 15   Temp:       General:  In no acute distress, resting comfortably  Head and neck:  Atraumatic, normocephalic, moist mucous membranes, supple neck  Chest:  Clear to auscultation bilaterally  Heart:  S1, S2, no appreciable murmur  Abdomen:  Soft, nontender, BS +  MSK:  Warm, no lower extremity edema, no clubbing or cyanosis  Neuro:  Alert and oriented x4, reduced UEX ROM, R VITO weaker than let  Integumentary:  b/l VITO skin lesions   Psychiatry:  Appropriate mood and affect    ASSESSMENT AND PLAN   Cervical myelopathy   -MRI cervical spine 1/5 with severe C3-4 central canal stenosis   -Prior C5, 6, 7 decompression with    -Neurosurgery has been consulted to evaluate the case and give recommendations  -seen by Dr. Beebe cardiology who rates his cardiac risk as moderate at 5-7% of a cardiac event    HTN  -Continue carvedilol, losartan, spironolactone    Chronic systolic CHF   -Echo with severely decreased EF <20%, mild to moderate aortic stenosis, trace aortic regurgitation     Ischemic cardiomyopathy   -with implantable cardioverter-defibrillator (ICD) medtronic  -Stress test with inferior left ventricular  wall defect    Carotid artery stenosis, asymptomatic, left  -Ultrasound carotids bilateral extensive plaque in carotid arteries -5075% on right and greater than 75% on left    Peripheral arterial disease   -history of revascularization   -Arterial ultrasound revealed extensive plaquing bilaterally     CKD 3  -Creatinine oscillates 1.4-1.9    History of: hyperlipidemia, diabetes, COPD (no home O2), abdominal aortic aneurysm, tobacco dependence    DVT prophylaxis:  SCD given probably neurosx  Patient's screen for food insecurity, housing instability, transportation needs, utility difficulties, and interpersonal safety. No needs identified  __________________________________________________________________  LABS/MICRO/MEDS/DIAGNOSTICS       LABS  Recent Labs     01/14/25  1729   *   K 4.7   CO2 23   BUN 46.5*   CREATININE 1.83*   GLUCOSE 109   CALCIUM 9.8   ALKPHOS 117   AST 24   ALT 24   ALBUMIN 3.5     Recent Labs     01/15/25  0247   WBC 9.89   RBC 3.32*   HCT 30.4*   MCV 91.6          MICROBIOLOGY  Microbiology Results (last 7 days)       ** No results found for the last 168 hours. **            MEDICATIONS   carvediloL  3.125 mg Oral BID    [START ON 1/16/2025] furosemide  20 mg Oral TID    linaCLOtide  145 mcg Oral QAM    losartan  25 mg Oral Daily    pantoprazole  40 mg Oral Before breakfast    ranolazine  500 mg Oral BID    spironolactone  25 mg Oral QAM      INFUSIONS      DIAGNOSTIC TESTS  CT Cervical Spine Without Contrast   Final Result      Severe degenerative changes appears similar to prior with grossly unchanged canal narrowing.  If further evaluation is needed recommend MR.         Electronically signed by: Ancelmo Gurrola   Date:    01/14/2025   Time:    20:05             Patient information was obtained from patient, patient's family, past medical records and ER records.   All diagnosis and differential diagnosis have been reviewed; assessment and plan has been documented. I have  personally reviewed the labs and test results that are presently available; I have reviewed the patients medication list. I have reviewed the consulting providers response and recommendations. I have reviewed or attempted to review medical records based upon their availability.  All of the patient's questions have been addressed and answered. Patient's is agreeable to the above stated plan. I will continue to monitor closely and make adjustments to medical management as needed.  This note was created using [employer] voice recognition software that occasionally misinterpreted phrases or words.  Please contact me if any questions may rise regarding documentation to clarify verbiage.        Thairy G Reyes,    Internal Medicine  Department of Hospital Medicine  [facility] - Emergency Dept

## 2025-01-15 NOTE — TELEPHONE ENCOUNTER
Mr. Mai presented to the ER at [facility] on 01/14/2025.  Neurosurgery was consulted and the referral was submitted to Dr. Zavala.

## 2025-01-15 NOTE — CONSULTS
"[facility] - Ortho Neuro  Neurosurgery  Consult Note    Inpatient consult to Neurosurgery  Consult performed by: Radha Sykes PA  Consult ordered by: Reyes, Thairy G, DO        Subjective:     Chief Complaint/Reason for Admission: Weakness    History of Present Illness: Patient is a 69-year-old male with a past medical history of hypertension, hyperlipidemia, diabetes, COPD (no home O2), heart failure, CAD status post CABG, Medtronic pacemaker, peripheral arterial disease status post left PCI, abdominal aortic aneurysm, tobacco dependence, prior C5-7 decompression with , who presents with worsening chronic cervical spondylosis with myelopathy.  He was seen by neurosurgery on 12/03/2024 however he did not complete cardiac evaluation for clearance and did not go to scheduled MRI appointments.  They recommended fusion at least at C3-4 and C4-5.      He was seen at [facility] 01/05/2025: "He initially told Dr. Bella he did not want surgery. He changed his mind as his UTI was treated and cleared and a f/u urine culture from 1/11 has been negative for growth. His son does not want Dr. Bella to do the surgery and attempts were made to see Dr. Neumann or Dr. Bowen would see the patient but both declined this. He is now recommended to discharge and report to a different hospital for a reevaluation because he and his family have declined Dr. Bella's offer to do surgery. The surgery is felt to be needed and he has been seen by Dr. Beebe cardiology who rates his cardiac risk as moderate at 5-7% of a cardiac event."    MRI of C spine from 1/5/25 shows compression of the cervical cord at C3-4 level with mild increased signal hyperintensity within the central cervical cord beginning along the lower aspect of C3 and extending to the mid aspect of C4. Family has requested Dr. Zavala for evaluation and treatment recommendations.    On PE today he is sitting up in bed, NAD. His main c/o pain is bilateral " "anterior thigh pain, spasm and burning. He states this has been going on for almost a year and he has pushed off surgery for "way too long." He has burning in the anterior thighs and numbness in the back of both legs and bilateral hands. His  strength has worsened over the last 6 months. He has difficulty ambulating and carrying out ADLs. He denies saddle anesthesia and bladder and bowel dysfunction.    PTA Medications   Medication Sig    albuterol (PROVENTIL) 2.5 mg /3 mL (0.083 %) nebulizer solution Take 2.5 mg by nebulization every 4 (four) hours as needed.    albuterol (PROVENTIL/VENTOLIN HFA) 90 mcg/actuation inhaler 1 puff every 4 (four) hours as needed.    albuterol sulfate 2.5 mg/0.5 mL Nebu Inhale 2.5 mg into the lungs.    carvediloL (COREG) 3.125 MG tablet Take 3.125 mg by mouth.    clopidogreL (PLAVIX) 75 mg tablet Take 1 tablet by mouth every morning.    cyclobenzaprine (FLEXERIL) 10 MG tablet Take 1 tablet (10 mg total) by mouth 3 (three) times daily.    furosemide (LASIX) 20 MG tablet Take 1 tablet (20 mg total) by mouth 3 (three) times daily.    LINZESS 145 mcg Cap capsule Take 145 mcg by mouth every morning.    losartan (COZAAR) 25 MG tablet Take 25 mg by mouth.    oxyCODONE-acetaminophen (PERCOCET)  mg per tablet Take 1 tablet by mouth.    pantoprazole (PROTONIX) 40 MG tablet Take 1 tablet (40 mg total) by mouth once daily.    ranolazine (RANEXA) 500 MG Tb12 Take 500 mg by mouth 2 (two) times daily.    spironolactone (ALDACTONE) 25 MG tablet Take 1 tablet by mouth every morning.    TRELEGY ELLIPTA 200-62.5-25 mcg inhaler Inhale 1 puff into the lungs.    VASCEPA 1 gram Cap Take 2 capsules by mouth 2 (two) times daily.       Review of patient's allergies indicates:   Allergen Reactions    Hydrochlorothiazide      Other Reaction(s): Pancreatitis       Past Medical History:   Diagnosis Date    Anxiety disorder, unspecified     Arthritis     CAD (coronary artery disease)     Cervical " radiculopathy     CHF (congestive heart failure)     Chronic pain syndrome     COPD (chronic obstructive pulmonary disease)     Diabetes mellitus     Gastro-esophageal reflux disease without esophagitis     High cholesterol     Hypertension     Lumbar radiculopathy     Myelomalacia of cervical cord     Other specified diseases of spinal cord     Pacemaker     PVD (peripheral vascular disease)     S/P triple vessel bypass     Tobacco use      Past Surgical History:   Procedure Laterality Date    ANTERIOR CERVICAL DISCECTOMY W/ FUSION  1995    C5-6 and C6-7.  Dr. Wall    ANTERIOR CERVICAL DISCECTOMY W/ FUSION  1996    Redo C5-6, C6-7.  Dr. Wall    CHOLECYSTECTOMY      CORONARY ARTERY BYPASS GRAFT      INSERTION OF PACEMAKER      X2    TRIPPLE VESSEL BYPASS       Family History       Problem Relation (Age of Onset)    Cancer Mother, Father    Lumbar disc disease Brother          Tobacco Use    Smoking status: Every Day     Current packs/day: 1.00     Types: Cigarettes    Smokeless tobacco: Not on file   Substance and Sexual Activity    Alcohol use: Not on file    Drug use: Not on file    Sexual activity: Not on file     Review of Systems  Objective:   12 pt ROS WNL, except for HPI    Weight: 78.5 kg (173 lb)  Body mass index is 27.92 kg/m².  Vital Signs (Most Recent):  Temp: 98.1 °F (36.7 °C) (01/15/25 1057)  Pulse: 71 (01/15/25 1057)  Resp: 18 (01/15/25 1001)  BP: (!) 149/64 (01/15/25 1057)  SpO2: (!) 94 % (01/15/25 1057) Vital Signs (24h Range):  Temp:  [98.1 °F (36.7 °C)] 98.1 °F (36.7 °C)  Pulse:  [63-77] 71  Resp:  [9-19] 18  SpO2:  [94 %-96 %] 94 %  BP: (110-159)/(58-91) 149/64     Physical Exam:  Nursing note and vitals reviewed.    Constitutional: He appears well-developed. No distress.     Eyes: Pupils are equal, round, and reactive to light. EOM are normal.     Cardiovascular: Intact distal pulses.     Abdominal: Soft.     Psych/Behavior: He is alert. He is oriented to person, place, and time. He has a  normal mood and affect.     Musculoskeletal:        Neck: Range of motion is limited. There is no tenderness.        Back: Range of motion is full. There is no tenderness.      Comments: Bilateral UE: +4/5 deltoids, 4/5 biceps and triceps, -4/5   Right LE: 4/5 hip flexion, knee ext. -5/5 DF/PF/EHL  Left LE: 5/5 hip flexion, knee ext. -5/5 DF/PF/EHL     Neurological:        Sensory: There is no sensory deficit in the trunk. There is sensory deficit in the extremities. Sensory deficit is located Bilateral hands. Bilateral buttocks donw to ankles.        DTRs: DTRs are DTRS NORMAL AND SYMMETRICnormal and symmetric. Tricep reflexes are 3+ on the right side and 3+ on the left side. Bicep reflexes are 3+ on the right side and 3+ on the left side. Patellar reflexes are 3+ on the right side and 3+ on the left side. Achilles reflexes are 2+ on the right side and 2+ on the left side. He displays Babinski's sign on the right side. He displays no Babinski's sign on the left side.        Cranial nerves: Cranial nerve(s) II, III, IV, V, VI, VII, VIII, IX, X, XI and XII are intact.     +Woods's bilateral  +clonus on the right    Significant Labs:  Recent Labs   Lab 01/14/25  1729 01/15/25  0247   * 136   K 4.7 4.5    105   CO2 23 25   BUN 46.5* 47.0*   CREATININE 1.83* 1.54*   CALCIUM 9.8 9.3     Recent Labs   Lab 01/14/25  1729 01/15/25  0247   WBC 10.94 9.89   HGB 11.7* 10.3*   HCT 34.0* 30.4*    226     Recent Labs   Lab 01/14/25  1729   INR 1.0   APTT 27.5     Microbiology Results (last 7 days)       ** No results found for the last 168 hours. **            Significant Diagnostics:  MR Cervical Spine without Intravenous Contrast 1/5/25 from [facility]     CLINICAL HISTORY:   Hx:Myelopathy, chronic, cervical spine.     TECHNIQUE:   Magnetic resonance images of the cervical spine without intravenous contrast in multiple planes.     CONTRAST:   Without     COMPARISON:   CT/OT - CT CERVICAL SPINE WO CONTRAST -  03/04/2024 12:41 AM EST     FINDINGS:     VERTEBRAE:   Bony fusion of C5-C7 vertebral segments..   Remaining cervical segments are unremarkable.     ALIGNMENT:   Straightening cervical lordosis is noted with chronic 5.5 mm anterior subluxation of C3 on C4.     SPINAL CORD:   There is compression of the cervical cord at C3-4 level with mild increased signal hyperintensity within the central cervical cord beginning along the lower aspect of C3 and extending to the mid aspect of C4.  No syrinx is detected.       DISCS/DEGENERATIVE CHANGES:   C2-3: Mild disc desiccation and broad-based disc bulge.  Bilateral facet arthropathy.  Mild central canal narrowing.  Moderate bilateral foraminal stenoses.   C3-4: Advanced disc interspace narrowing.  Anterolisthesis.  Severe central canal stenosis and bilateral foraminal stenoses.  AP diameter of central canal measures 2.2 mm.   C4-5: Disc desiccation with broad-based desiccated disc bulge.  Bilateral uncovertebral facet arthropathy with moderate central canal stenosis along with right foraminal narrowing.  Severe left foraminal narrowing.   C5-6: Intervertebral body fusion.  Bilateral bony foraminal stenosis.  Patent central canal.   C6-7: Intervertebral body fusion.  Severe right and moderate left bony foraminal stenosis.  Patent central canal.   C7-T1: Patent central canal.  Mild left and moderate right foraminal narrowing.       Assessment/Plan:     Active Diagnoses:    Diagnosis Date Noted POA    PRINCIPAL PROBLEM:  Spinal cord compression [G95.20] 01/14/2025 Yes    Anemia [D64.9] 01/14/2025 Yes    Hyponatremia [E87.1] 01/14/2025 Yes      Problems Resolved During this Admission:     Patient with increasing difficulty with ambulation, ADLs  MRI images from [facility] [address] are now available for review  He has been told that he needs cervical decompression for quite some time now  I will have Dr. Zavala review; further recs to follow    Thank you for your consult. I will follow-up with  patient. Please contact us if you have any additional questions.    BRIGITTE Watkins  Neurosurgery  [facility]

## 2025-01-15 NOTE — PLAN OF CARE
Problem: Physical Therapy  Goal: Physical Therapy Goal  Description: Goals to be met by: 2/15/25     Patient will increase functional independence with mobility by performin. Supine to sit with Set-up [place]  2. Sit to supine with Set-up [place]  3. Sit to stand transfer with Supervision  4. Bed to chair transfer with Supervision using Rolling Walker  5. Gait  x 300 feet with Supervision using Rolling Walker.   6. Ascend/descend 15 stairs with bilateral Handrails & Supervision     Outcome: Progressing

## 2025-01-15 NOTE — ED PROVIDER NOTES
Encounter Date: 1/14/2025    SCRIBE #1 NOTE: I, Lo Morales, am scribing for, and in the presence of,  Herb Baird MD. I have scribed the following portions of the note - Other sections scribed: HPI, ROS, PE.       History     Chief Complaint   Patient presents with    Neck Pain     Sent to ED by Dr. Pandey for neck sx d/t cord compression. Pt reports increasing neck pain & difficulty using hands/legs. States he initially refused surgery d/t confusion from UTI      Patient is a 69 year old male with a history of cervical myelopathy, CAD, CHF, COPD, DM, HLD, HTN, and PVD that presents to the ED for neurosurgery. Patient presents here following discharge from [facility]. He reports associated symptoms of bilateral arm weakness, bilateral leg weakness, and worsening neck pain. Notes right leg is weaker than left. He also complains of bilateral hand, bilateral leg, and bilateral feet numbness. He states he has been unable to care for himself at home because of the weakness. He has to use a wheelchair to get around. Patient's daughter at bedside reports frequent falls at home. No urinary or fecal incontinence.       Per chart review, the patient was admitted to [facility] for cervical myelopathy with the plan of surgery. Previous extensive admission at [facility] for UTI which cleared and resolved with antibiotics; initially, did not have surgery secondary to UTI. Now amendable for surgical intervention since UTI has resolved. Patient refused surgery with on-call neurosurgeon at [facility] and discharged at noon today. He was advised to come here for the surgery. MRI on 01/05 showed severe C3-4 level central canal stenosis with increased signal hyperintensity within the cervical cord from mid to lower aspect of C3 through mid aspect of C4 with central cord myelomalacia.  No syrinx is detected. Prior C5-C7 intervertebral body fusion.       The history is provided by medical records, a relative and the patient.     Review of patient's  allergies indicates:   Allergen Reactions    Hydrochlorothiazide      Other Reaction(s): Pancreatitis     Past Medical History:   Diagnosis Date    Anxiety disorder, unspecified     Arthritis     CAD (coronary artery disease)     Cervical radiculopathy     CHF (congestive heart failure)     Chronic pain syndrome     COPD (chronic obstructive pulmonary disease)     Diabetes mellitus     Gastro-esophageal reflux disease without esophagitis     High cholesterol     Hypertension     Lumbar radiculopathy     Myelomalacia of cervical cord     Other specified diseases of spinal cord     Pacemaker     PVD (peripheral vascular disease)     S/P triple vessel bypass     Tobacco use      Past Surgical History:   Procedure Laterality Date    ANTERIOR CERVICAL DISCECTOMY W/ FUSION  1995    C5-6 and C6-7.  Dr. Wall    ANTERIOR CERVICAL DISCECTOMY W/ FUSION  1996    Redo C5-6, C6-7.  Dr. Wall    CHOLECYSTECTOMY      CORONARY ARTERY BYPASS GRAFT      INSERTION OF PACEMAKER      X2    TRIPPLE VESSEL BYPASS       Family History   Problem Relation Name Age of Onset    Cancer Mother      Cancer Father      Lumbar disc disease Brother       Social History     Tobacco Use    Smoking status: Every Day     Current packs/day: 1.00     Types: Cigarettes     Review of Systems   Constitutional:  Negative for chills and fever.   HENT:  Negative for congestion, rhinorrhea and sore throat.    Eyes:  Negative for visual disturbance.   Respiratory:  Negative for cough and shortness of breath.    Cardiovascular:  Negative for chest pain.   Gastrointestinal:  Negative for abdominal pain, nausea and vomiting.   Genitourinary:  Negative for dysuria and hematuria.   Musculoskeletal:  Positive for neck pain. Negative for joint swelling.   Skin:  Negative for rash.   Neurological:  Positive for weakness and numbness.   Psychiatric/Behavioral:  Negative for confusion.    All other systems reviewed and are negative.      Physical Exam     Initial Vitals  [01/14/25 1717]   BP Pulse Resp Temp SpO2   110/60 75 16 98.1 °F (36.7 °C) 95 %      MAP       --         Physical Exam    Nursing note and vitals reviewed.  Constitutional: He is not diaphoretic. No distress.   HENT:   Head: Normocephalic and atraumatic.   Neck: Neck supple.   Normal range of motion.  Cardiovascular:  Normal rate and regular rhythm.           Pulmonary/Chest: Breath sounds normal. No respiratory distress.   Abdominal: Abdomen is soft. He exhibits no distension. There is no abdominal tenderness.   Musculoskeletal:      Cervical back: Normal range of motion and neck supple.     Neurological: He is alert and oriented to person, place, and time.   3 out of 5 strength to bilateral upper extremities. 3 out of 5 strength to left lower extremity. 2 out of 5 strength to right lower extremity. Decreased sensation to light tough of all extremities.    Skin: Skin is warm. Capillary refill takes less than 2 seconds.   Psychiatric: He has a normal mood and affect.         ED Course   Procedures  Labs Reviewed   COMPREHENSIVE METABOLIC PANEL - Abnormal       Result Value    Sodium 135 (*)     Potassium 4.7      Chloride 103      CO2 23      Glucose 109      Blood Urea Nitrogen 46.5 (*)     Creatinine 1.83 (*)     Calcium 9.8      Protein Total 6.9      Albumin 3.5      Globulin 3.4      Albumin/Globulin Ratio 1.0 (*)     Bilirubin Total 0.6            ALT 24      AST 24      eGFR 40      Anion Gap 9.0      BUN/Creatinine Ratio 25     CBC WITH DIFFERENTIAL - Abnormal    WBC 10.94      RBC 3.75 (*)     Hgb 11.7 (*)     Hct 34.0 (*)     MCV 90.7      MCH 31.2 (*)     MCHC 34.4      RDW 12.3      Platelet 248      MPV 11.0 (*)     Neut % 66.8      Lymph % 17.2      Mono % 10.5      Eos % 3.3      Basophil % 0.6      Imm Grans % 1.6      Neut # 7.30      Lymph # 1.88      Mono # 1.15      Eos # 0.36      Baso # 0.07      Imm Gran # 0.18 (*)     NRBC% 0.0     APTT - Normal    PTT 27.5     PROTIME-INR - Normal     PT 13.1      INR 1.0     CBC W/ AUTO DIFFERENTIAL    Narrative:     The following orders were created for panel order CBC auto differential.  Procedure                               Abnormality         Status                     ---------                               -----------         ------                     CBC with Differential[[id number]]       Abnormal            Final result                 Please view results for these tests on the individual orders.   TYPE & SCREEN          Imaging Results              CT Cervical Spine Without Contrast (Final result)  Result time 01/14/25 20:05:21      Final result by Ancelmo Gurrola MD (01/14/25 20:05:21)                   Impression:      Severe degenerative changes appears similar to prior with grossly unchanged canal narrowing.  If further evaluation is needed recommend MR.      Electronically signed by: Ancelmo Gurrola  Date:    01/14/2025  Time:    20:05               Narrative:    EXAMINATION:  CT CERVICAL SPINE WITHOUT CONTRAST    CLINICAL HISTORY:  Spinal stenosis, cervical;    TECHNIQUE:  CT of the cervical spine Without contrast. Sagittal and coronal reconstructions were performed on the source images.    Automatic exposure control was utilized to reduce the patient's radiation dose.    DLP = 412    COMPARISON:  03/04/2024    FINDINGS:  Straightening of the normal lordotic curvature with severe degenerative changes throughout the cervical spine.  Anterolisthesis of approximately 4 mm C3 on C4 is grossly unchanged with severe bilateral neural foraminal narrowing.  Canal with at this region measures approximately 8 mm which is unchanged when remeasured.  No further evaluation is needed recommend MR for evaluation of cord signal.  Remaining neural foraminal narrowing C5 through C7 similar to prior.                                       Medications   carvediloL tablet 3.125 mg (has no administration in time range)   cyclobenzaprine tablet 10 mg (has no  administration in time range)   furosemide tablet 20 mg (has no administration in time range)   linaCLOtide capsule 145 mcg (has no administration in time range)   losartan tablet 25 mg (has no administration in time range)   oxyCODONE-acetaminophen  mg per tablet 1 tablet (has no administration in time range)   pantoprazole EC tablet 40 mg (has no administration in time range)   ranolazine 12 hr tablet 500 mg (has no administration in time range)   spironolactone tablet 25 mg (has no administration in time range)   sodium chloride 0.9% flush 10 mL (has no administration in time range)   albuterol-ipratropium 2.5 mg-0.5 mg/3 mL nebulizer solution 3 mL (has no administration in time range)   melatonin tablet 9 mg (has no administration in time range)   ondansetron disintegrating tablet 8 mg (has no administration in time range)   ondansetron injection 4 mg (has no administration in time range)   polyethylene glycol packet 17 g (has no administration in time range)   bisacodyL suppository 10 mg (has no administration in time range)   simethicone chewable tablet 80 mg (has no administration in time range)   aluminum-magnesium hydroxide-simethicone 200-200-20 mg/5 mL suspension 30 mL (has no administration in time range)   acetaminophen tablet 650 mg (has no administration in time range)   morphine injection 2 mg (has no administration in time range)   naloxone 0.4 mg/mL injection 0.02 mg (has no administration in time range)   glucose chewable tablet 16 g (has no administration in time range)   glucose chewable tablet 24 g (has no administration in time range)   dextrose 50% injection 12.5 g (has no administration in time range)   dextrose 50% injection 25 g (has no administration in time range)   glucagon (human recombinant) injection 1 mg (has no administration in time range)     Medical Decision Making  69-year-old with a history of progressive cervical myelopathy that was going to be surgically intervened on  at outside hospital is presenting because he left the other hospital because he wanted a different surgeon  He has had progressive weakness in all extremities inability to walk or perform any ADLs  Last MRI on the 5th of this month showed severe stenosis and spinal cord impingement at C3-C4  Discussed with neurosurgery will admit to medicine for considerations of surgical intervention therapy etc.    Differential diagnosis includes, but is not limited to:  Spinal cord compression fracture progressive myelopathy electrolyte derangement    Problems Addressed:  Spinal cord compression: acute illness or injury that poses a threat to life or bodily functions    Amount and/or Complexity of Data Reviewed  Independent Historian:      Details: Patient's daughter at bedside corresponds with the patient's story.   External Data Reviewed: notes.     Details: Per chart review, the patient was admitted to [facility] for cervical myelopathy with the plan of surgery. Previous extensive admission at [facility] for UTI which cleared and resolved with antibiotics; initially, did not have surgery secondary to UTI. Now amendable for surgical intervention since UTI has resolved. Patient refused surgery with on-call neurosurgeon at [facility] and discharged at noon today. He was advised to come here for the surgery. MRI on 01/05 showed severe C3-4 level central canal stenosis with increased signal hyperintensity within the cervical cord from mid to lower aspect of C3 through mid aspect of C4 with central cord myelomalacia.  No syrinx is detected. Prior C5-C7 intervertebral body fusion.   Labs: ordered.  Radiology: ordered.  Discussion of management or test interpretation with external provider(s): Discussed with neurosurgery Dr. Zavala recommends admission to medicine  Discussed with hospitalist will admit    Risk  Decision regarding hospitalization.            Scribe Attestation:   Scribe #1: I performed the above scribed service and the  documentation accurately describes the services I performed. I attest to the accuracy of the note.    Attending Attestation:           Physician Attestation for Scribe:  Physician Attestation Statement for Scribe #1: I, Herb Baird MD, reviewed documentation, as scribed by Lo Morales in my presence, and it is both accurate and complete.             ED Course as of 01/14/25 2114 Tue Jan 14, 2025 1923 Paged neurosurgery  [ED]   1934 Discussed with MD Lucas [ED]   1937 Paged [facility] [place] medicine  [ED]   1937 Dr. Zavala neurosurgery recommends admission to medicine  [AC]   2016 Hospitlaist will admit [AC]      ED Course User Index  [AC] Herb Baird IV, MD  [ED] Lo Morales                             Clinical Impression:  Final diagnoses:  [G95.20] Spinal cord compression  [R53.1] Weakness (Primary)          ED Disposition Condition    Admit Stable                Herb Baird IV, MD  01/14/25 2114

## 2025-01-15 NOTE — PLAN OF CARE
01/15/25 1228   Discharge Assessment   Assessment Type Discharge Planning Assessment   Confirmed/corrected address, phone number and insurance Yes   Confirmed Demographics Correct on Facesheet   Source of Information patient   Communicated ESPERANZA with patient/caregiver Date not available/Unable to determine   Reason For Admission spinal cord compression   People in Home alone   Do you expect to return to your current living situation? Other (see comments)  (tbd)   Do you have help at home or someone to help you manage your care at home? No   Prior to hospitilization cognitive status: Unable to Assess   Current cognitive status: Alert/Oriented   Home Accessibility stairs to enter home   Number of Stairs, Main Entrance other (see comments)  (15)   Stair Railings, Main Entrance railings safe and in good condition   Equipment Currently Used at Home shower chair;rollator   Readmission within 30 days? No   Patient currently being followed by outpatient case management? No   Do you currently have service(s) that help you manage your care at home? No   Do you take prescription medications? Yes   Do you have prescription coverage? Yes   Coverage [employer], [place] [employer] [place] [employer]   Do you have any problems affording any of your prescribed medications? No   Is the patient taking medications as prescribed? yes   Who is going to help you get home at discharge? family   How do you get to doctors appointments? family or friend will provide;car, drives self   Are you on dialysis? No   Do you take coumadin? No   Discharge Plan A Skilled Nursing Facility   Discharge Plan B Other  (tbd)   DME Needed Upon Discharge  other (see comments)  (tbd)   Discharge Plan discussed with: Patient   Transition of Care Barriers None     Completed assessment with pt at bedside. Introduced self and explained role as SW. Pt verb understanding to all questions asked. Pt does not currently have PCP. Noted placed in chart for discharging  to assist with  PCP arrangements at d/c. Pharmacy is [facility] in [place]. D/c dispo pending nsgy recs. Pt seems agreeable with skilled placement if needed.    SULEIMAN TempleW

## 2025-01-15 NOTE — PT/OT/SLP EVAL
Physical Therapy Evaluation    Patient Name:  Tyler Mai   MRN:  [id number]    Recommendations:     Discharge Recommendations: Moderate Intensity Therapy   Discharge Equipment Recommendations:  (TBD by next level of care)   Barriers to discharge:  ongoing medical needs    Assessment:     Tyler Mai is a 69 y.o. male admitted with a medical diagnosis of Spinal cord compression. Possible surgery in the near future. Prior to admit, patient lived alone in a 2nd-floor apartment. At baseline, he is independent and ambulates with a rollator. He presents with the following impairments/functional limitations: impaired endurance, weakness, impaired functional mobility, impaired self care skills, gait instability, impaired balance, decreased lower extremity function, decreased upper extremity function, decreased safety awareness, pain. He required MAX A for bed mobility. MAX A for sit<>stand. Unable to take steps 2/2 severe weakness. Patient will benefit from continued PT services while in hospital to improve functional mobility & return to PLOF. Recommending MOD intensity therapy at discharge. Progress as tolerated/     Rehab Prognosis: Good; patient would benefit from acute skilled PT services to address these deficits and reach maximum level of function.    Recent Surgery: * No surgery found *      Plan:     During this hospitalization, patient to be seen 5 x/week to address the identified rehab impairments via gait training, therapeutic activities, therapeutic exercises, neuromuscular re-education and progress toward the following goals:    Plan of Care Expires:  02/15/25    Subjective     Chief Complaint: pain  Patient/Family Comments/goals: none  Pain/Comfort:  Pain Rating 1: 10/10  Location 1: neck  Pain Addressed 1: Reposition, Distraction  Pain Rating Post-Intervention 1: 10/10    Patients cultural, spiritual, Holiness conflicts given the current situation: no    Living Environment:  Prior to admit, patient  lived alone in a 2nd-floor apartment. At baseline, he is independent and ambulates with a rollator. Equipment used at home: rollator.  DME owned (not currently used): none.  Upon discharge, patient will have assistance from TBD.    Objective:     Communicated with nurse prior to session.  Patient found supine with telemetry, blood pressure cuff, pulse ox (continuous)  upon PT entry to room.    General Precautions: Standard, fall  Orthopedic Precautions:N/A   Braces: N/A  Respiratory Status: Room air    Exams:  Cognitive Exam:  Patient is oriented to Person, Place, Time, and Situation  Sensation: -       Intact  RLE ROM: WFL  RLE Strength: -3/5 grossly  LLE ROM: WFL  LLE Strength: 3/5 grossly    Functional Mobility:  Bed Mobility:  Supine to Sit: maximal assistance  Sit to Supine: maximal assistance  Transfers:  Sit to Stand:  maximal assistance with rolling walker  Balance: fair/poor      AM-PAC 6 CLICK MOBILITY  Total Score:10     Education Provided:  Role and goals of PT, transfer training, bed mobility, gait training, balance training, safety awareness, assistive device, strengthening exercises, and importance of participating in PT to return to PLOF    Patient left supine with all lines intact, call button in reach, and bed alarm on. Nurse notified    GOALS:   Multidisciplinary Problems       Physical Therapy Goals          Problem: Physical Therapy    Goal Priority Disciplines Outcome Interventions   Physical Therapy Goal     PT, PT/OT Progressing    Description: Goals to be met by: 2/15/25     Patient will increase functional independence with mobility by performin. Supine to sit with Set-up [place]  2. Sit to supine with Set-up [place]  3. Sit to stand transfer with Supervision  4. Bed to chair transfer with Supervision using Rolling Walker  5. Gait  x 300 feet with Supervision using Rolling Walker.   6. Ascend/descend 15 stairs with bilateral Handrails & Supervision                           History:     Past Medical History:   Diagnosis Date    Anxiety disorder, unspecified     Arthritis     CAD (coronary artery disease)     Cervical radiculopathy     CHF (congestive heart failure)     Chronic pain syndrome     COPD (chronic obstructive pulmonary disease)     Diabetes mellitus     Gastro-esophageal reflux disease without esophagitis     High cholesterol     Hypertension     Lumbar radiculopathy     Myelomalacia of cervical cord     Other specified diseases of spinal cord     Pacemaker     PVD (peripheral vascular disease)     S/P triple vessel bypass     Tobacco use        Past Surgical History:   Procedure Laterality Date    ANTERIOR CERVICAL DISCECTOMY W/ FUSION  1995    C5-6 and C6-7.  Dr. Wall    ANTERIOR CERVICAL DISCECTOMY W/ FUSION  1996    Redo C5-6, C6-7.  Dr. Wall    CHOLECYSTECTOMY      CORONARY ARTERY BYPASS GRAFT      INSERTION OF PACEMAKER      X2    TRIPPLE VESSEL BYPASS         Time Tracking:     PT Received On: 01/15/25  PT Start Time: 0857     PT Stop Time: 0917  PT Total Time (min): 20 min     Billable Minutes: Evaluation 20 minutes      01/15/2025

## 2025-01-15 NOTE — ED NOTES
Pt has difficulty lifting arms up, equal , unable to hold arms up against resistance, right leg difficulty with lifting off bed,   Slight edema to right foot.  Multi dry wounds to lower legs and toes, superficial dry scabbed over, no drainage or redness

## 2025-01-15 NOTE — PROGRESS NOTES
"[facility] HOSPITAL MEDICINE ~ PROGRESS NOTE        CHIEF COMPLAINT   Hospital follow up    HOSPITAL COURSE   69-year-old male with a past medical history of hypertension, hyperlipidemia, diabetes, COPD (no home O2), heart failure, CAD status post CABG, Medtronic pacemaker, peripheral arterial disease status post left PCI, abdominal aortic aneurysm, tobacco dependence, prior C5, 6, 7 decompression with  who presents with worsening chronic cervical spondylosis with myelopathy.  He was seen by Neurosurgery on 12/03/2024 however he did not complete cardiac evaluation for clearance and did not go to scheduled MRI appointments.  They recommended fusion at least at C3-4 and C4-5.      He was seen at [facility] 01/05/2025: "He initially told Dr. Bella he did not want surgery. He changed his mind as his UTI was treated and cleared and a f/u urine culture from 1/11 has been negative for growth. His son does not want Dr. Bella to do the surgery and attempts were made to see if Dr. Neumann or Dr. Bowen would see the patient but both declined this. He is now recommended to discharge and report to a different hospital for a reevaluation because he and his family have declined Dr. Bella's offer to do surgery. The surgery is felt to be needed and he has been seen by Dr. Beebe cardiology who rates his cardiac risk as moderate at 5-7% of a cardiac event."     At the time of my evaluation patient is seeking neurosurgical evaluation and intervention.    Today  Seen and examined this morning.  Reports weakness but is able to lift all extremities with against gravity.        OBJECTIVE/PHYSICAL EXAM     VITAL SIGNS (MOST RECENT):  Temp: 98.1 °F (36.7 °C) (01/15/25 1057)  Pulse: 71 (01/15/25 1057)  Resp: 18 (01/15/25 1001)  BP: (!) 149/64 (01/15/25 1057)  SpO2: (!) 94 % (01/15/25 1057) VITAL SIGNS (24 HOUR RANGE):  Temp:  [98.1 °F (36.7 °C)] 98.1 °F (36.7 °C)  Pulse:  [63-77] 71  Resp:  [9-19] 18  SpO2: "  [94 %-96 %] 94 %  BP: (110-159)/(58-91) 149/64   GENERAL: In no acute distress, afebrile  HEENT:  CHEST: Clear to auscultation bilaterally  HEART: S1, S2, no appreciable murmur  ABDOMEN: Soft, nontender, BS +  MSK: Warm, no lower extremity edema, no clubbing or cyanosis  NEUROLOGIC: Alert and oriented x4, all extremities antigravity, right side weaker than the left   INTEGUMENTARY:  PSYCHIATRY:        ASSESSMENT/PLAN   Severe cervical spondylosis  Severe C3-4 central canal stenosis with central cord myelomalacia    History of: As listed above      Neurosurgery consulted.  Pending evaluation.  History of systolic heart failure, currently compensated.  PT OT    DVT prophylaxis:     Anticipated discharge and disposition:   __________________________________________________________________________    NUTRITIONAL STATUS     Patient meets ASPEN criteria for   malnutrition of   per RD assessment as evidenced by:                       A minimum of two characteristics is recommended for diagnosis of either severe or non-severe malnutrition.     LABS/MICRO/MEDS/DIAGNOSTICS       LABS  Recent Labs     01/14/25  1729 01/15/25  0247   * 136   K 4.7 4.5   CO2 23 25   BUN 46.5* 47.0*   CREATININE 1.83* 1.54*   GLUCOSE 109 98   CALCIUM 9.8 9.3   ALKPHOS 117  --    AST 24  --    ALT 24  --    ALBUMIN 3.5  --      Recent Labs     01/15/25  0247   WBC 9.89   RBC 3.32*   HCT 30.4*   MCV 91.6          MICROBIOLOGY  Microbiology Results (last 7 days)       ** No results found for the last 168 hours. **               MEDICATIONS   carvediloL  3.125 mg Oral BID    [START ON 1/16/2025] furosemide  20 mg Oral TID    linaCLOtide  145 mcg Oral QAM    losartan  25 mg Oral Daily    pantoprazole  40 mg Oral Before breakfast    ranolazine  500 mg Oral BID    spironolactone  25 mg Oral QAM         INFUSIONS         DIAGNOSTIC TESTS  CT Cervical Spine Without Contrast   Final Result      Severe degenerative changes appears similar to  prior with grossly unchanged canal narrowing.  If further evaluation is needed recommend MR.         Electronically signed by: Ancelmo Gurrola   Date:    01/14/2025   Time:    20:05           No echocardiogram results found for the past 14 days.         Case related differential diagnoses have been reviewed; assessment and plan has been documented. I have personally reviewed the labs and test results that are currently available; I have reviewed the patients medication list. I have reviewed the consulting providers recommendations. I have reviewed or attempted to review medical records based upon their availability.  All of the patient's and/or family's questions have been addressed and answered to the best of my ability.  I will continue to monitor closely and make adjustments to medical management as needed.  This document was created using M*Modal Fluency Direct.  Transcription errors may have been made.  Please contact me if any questions may rise regarding documentation to clarify transcription.        Jorge Deluna MD   Internal Medicine  Department of Hospital Medicine  [facility]

## 2025-01-15 NOTE — NURSING
Admission documentation completed by the admit nurse. Home med rec incomplete, pt states he doesn't know all he takes and he's doesn't have a list, will ask someone to see if they can bring meds in. Pt did elect for meds to bed with [facility]. 4 Eyes and standing weight to be completed by the admitting floor nurse.

## 2025-01-16 LAB
BACTERIA #/AREA URNS AUTO: ABNORMAL /HPF
BILIRUB UR QL STRIP.AUTO: NEGATIVE
CLARITY UR: CLEAR
COLOR UR AUTO: ABNORMAL
GLUCOSE UR QL STRIP: ABNORMAL
HGB UR QL STRIP: NEGATIVE
KETONES UR QL STRIP: NEGATIVE
LEUKOCYTE ESTERASE UR QL STRIP: 75
NITRITE UR QL STRIP: NEGATIVE
OHS QRS DURATION: 126 MS
OHS QTC CALCULATION: 460 MS
PH UR STRIP: 5.5 [PH]
POCT GLUCOSE: 105 MG/DL (ref 70–110)
POCT GLUCOSE: 118 MG/DL (ref 70–110)
POCT GLUCOSE: 153 MG/DL (ref 70–110)
POCT GLUCOSE: 169 MG/DL (ref 70–110)
PROT UR QL STRIP: NEGATIVE
RBC #/AREA URNS AUTO: ABNORMAL /HPF
SP GR UR STRIP.AUTO: 1.01 (ref 1–1.03)
SQUAMOUS #/AREA URNS LPF: ABNORMAL /HPF
UROBILINOGEN UR STRIP-ACNC: NORMAL
WBC #/AREA URNS AUTO: ABNORMAL /HPF

## 2025-01-16 PROCEDURE — 93010 ELECTROCARDIOGRAM REPORT: CPT | Mod: ,,, | Performed by: STUDENT IN AN ORGANIZED HEALTH CARE EDUCATION/TRAINING PROGRAM

## 2025-01-16 PROCEDURE — 97530 THERAPEUTIC ACTIVITIES: CPT

## 2025-01-16 PROCEDURE — 63600175 PHARM REV CODE 636 W HCPCS: Performed by: INTERNAL MEDICINE

## 2025-01-16 PROCEDURE — 81001 URINALYSIS AUTO W/SCOPE: CPT | Performed by: INTERNAL MEDICINE

## 2025-01-16 PROCEDURE — 21400001 HC TELEMETRY ROOM

## 2025-01-16 PROCEDURE — 93005 ELECTROCARDIOGRAM TRACING: CPT

## 2025-01-16 PROCEDURE — 97166 OT EVAL MOD COMPLEX 45 MIN: CPT

## 2025-01-16 PROCEDURE — 63600175 PHARM REV CODE 636 W HCPCS: Performed by: STUDENT IN AN ORGANIZED HEALTH CARE EDUCATION/TRAINING PROGRAM

## 2025-01-16 PROCEDURE — 25000003 PHARM REV CODE 250: Performed by: STUDENT IN AN ORGANIZED HEALTH CARE EDUCATION/TRAINING PROGRAM

## 2025-01-16 RX ORDER — INSULIN GLARGINE 100 [IU]/ML
10 INJECTION, SOLUTION SUBCUTANEOUS NIGHTLY
Status: ON HOLD | COMMUNITY
End: 2025-02-19 | Stop reason: HOSPADM

## 2025-01-16 RX ORDER — ENOXAPARIN SODIUM 100 MG/ML
40 INJECTION SUBCUTANEOUS EVERY 24 HOURS
Status: DISCONTINUED | OUTPATIENT
Start: 2025-01-16 | End: 2025-01-19

## 2025-01-16 RX ORDER — FLUTICASONE FUROATE AND VILANTEROL 200; 25 UG/1; UG/1
1 POWDER RESPIRATORY (INHALATION) DAILY
Status: DISCONTINUED | OUTPATIENT
Start: 2025-01-17 | End: 2025-01-31 | Stop reason: HOSPADM

## 2025-01-16 RX ORDER — ROSUVASTATIN CALCIUM 40 MG/1
40 TABLET, COATED ORAL NIGHTLY
COMMUNITY

## 2025-01-16 RX ADMIN — OXYCODONE AND ACETAMINOPHEN 1 TABLET: 10; 325 TABLET ORAL at 08:01

## 2025-01-16 RX ADMIN — FUROSEMIDE 20 MG: 20 TABLET ORAL at 03:01

## 2025-01-16 RX ADMIN — SPIRONOLACTONE 25 MG: 25 TABLET ORAL at 06:01

## 2025-01-16 RX ADMIN — RANOLAZINE 500 MG: 500 TABLET, FILM COATED, EXTENDED RELEASE ORAL at 08:01

## 2025-01-16 RX ADMIN — LOSARTAN POTASSIUM 25 MG: 25 TABLET, FILM COATED ORAL at 08:01

## 2025-01-16 RX ADMIN — CARVEDILOL 3.12 MG: 3.12 TABLET, FILM COATED ORAL at 08:01

## 2025-01-16 RX ADMIN — FUROSEMIDE 20 MG: 20 TABLET ORAL at 08:01

## 2025-01-16 RX ADMIN — PANTOPRAZOLE SODIUM 40 MG: 40 TABLET, DELAYED RELEASE ORAL at 06:01

## 2025-01-16 RX ADMIN — OXYCODONE AND ACETAMINOPHEN 1 TABLET: 10; 325 TABLET ORAL at 02:01

## 2025-01-16 RX ADMIN — OXYCODONE AND ACETAMINOPHEN 1 TABLET: 10; 325 TABLET ORAL at 10:01

## 2025-01-16 RX ADMIN — CYCLOBENZAPRINE 10 MG: 10 TABLET, FILM COATED ORAL at 10:01

## 2025-01-16 RX ADMIN — MELATONIN 9 MG: at 08:01

## 2025-01-16 RX ADMIN — ENOXAPARIN SODIUM 40 MG: 40 INJECTION SUBCUTANEOUS at 07:01

## 2025-01-16 RX ADMIN — OXYCODONE AND ACETAMINOPHEN 1 TABLET: 10; 325 TABLET ORAL at 03:01

## 2025-01-16 RX ADMIN — OXYCODONE AND ACETAMINOPHEN 1 TABLET: 10; 325 TABLET ORAL at 06:01

## 2025-01-16 RX ADMIN — MORPHINE SULFATE 2 MG: 4 INJECTION, SOLUTION INTRAMUSCULAR; INTRAVENOUS at 01:01

## 2025-01-16 NOTE — PROGRESS NOTES
[facility]  Wound Care    Patient Name:  Tyler Mai   MRN:  [id number]  Date: 1/16/2025  Diagnosis: Spinal cord compression    History:     Past Medical History:   Diagnosis Date    Anxiety disorder, unspecified     Arthritis     CAD (coronary artery disease)     Cervical radiculopathy     CHF (congestive heart failure)     Chronic pain syndrome     COPD (chronic obstructive pulmonary disease)     Diabetes mellitus     Gastro-esophageal reflux disease without esophagitis     High cholesterol     Hypertension     Lumbar radiculopathy     Myelomalacia of cervical cord     Other specified diseases of spinal cord     Pacemaker     PVD (peripheral vascular disease)     S/P triple vessel bypass     Tobacco use        Social History     Socioeconomic History    Marital status: Single   Tobacco Use    Smoking status: Every Day     Current packs/day: 1.00     Types: Cigarettes     Social Drivers of Health     Financial Resource Strain: Low Risk  (12/21/2023)    Received from [facility] and Its Subsidiaries and Affiliates, [email or web address] and Its Subsidiaries and Affiliates    Overall Financial Resource Strain (CARDIA)     Difficulty of Paying Living Expenses: Not very hard   Food Insecurity: No Food Insecurity (12/21/2023)    Received from [facility] and Its Subsidiaries and Affiliates, [email or web address] and Its Subsidiaries and Affiliates    Hunger Vital Sign     Worried About Running Out of Food in the Last Year: Never true     Ran Out of Food in the Last Year: Never true   Transportation Needs: No Transportation Needs (12/21/2023)    Received from [facility] and Its Subsidiaries and Affiliates, [email or web address] and Its Subsidiaries and Affiliates    PRAPARE - Transportation      Lack of Transportation (Medical): No     Lack of Transportation (Non-Medical): No   Housing Stability: Unknown (12/21/2023)    [facility] and Its Subsidiaries and Affiliates, [facility]    Housing Stability Vital Sign     Unable to Pay for Housing in the Last Year: No     Number of Places Lived in the Last Year: 1       Precautions:     Allergies as of 01/14/2025 - Reviewed 01/14/2025   Allergen Reaction Noted    Hydrochlorothiazide  12/03/2024       WO Assessment Details/Treatment      01/16/25 1028   WOCN Assessment   Visit Date 01/16/25   Visit Time 1028   Consult Type New   [facility] Wound   Intervention chart review;assessed;applied;orders   Teaching on-going        Wound 01/14/25 2200 Other (comment) Right anterior Leg   Date First Assessed/Time First Assessed: 01/14/25 2200   Primary Wound Type: (c) Other (comment)  Side: Right  Orientation: anterior  Location: Leg   Wound Image         Wound 01/14/25 2202 Other (comment) Left anterior Leg   Date First Assessed/Time First Assessed: 01/14/25 2202   Primary Wound Type: (c) Other (comment)  Side: Left  Orientation: anterior  Location: Leg   Wound Image         Wound 01/14/25 2202 Other (comment) Right anterior Foot   Date First Assessed/Time First Assessed: 01/14/25 2202   Primary Wound Type: (c) Other (comment)  Side: Right  Orientation: anterior  Location: Foot   Wound Image         Wound 01/14/25 2203 Other (comment) Left anterior Foot   Date First Assessed/Time First Assessed: 01/14/25 2203   Primary Wound Type: (c) Other (comment)  Side: Left  Orientation: anterior  Location: Foot   Wound Image         Wound 01/15/25 1058 Right Buttocks   Date First Assessed/Time First Assessed: 01/15/25 1058   Side: Right  Location: Buttocks   Wound Image      WOCN consulted for BLE and butt. Discussed plan of care with assigned nurse.  Introduced self and explained reason for visit, patient agreeable to be seen. Treatment recommendations to be put in place. Upon assessment, all wounds dried and closed. Cleanse areas with soap and water, dry well. Apply aquaphor BID and PRN. Patient demonstrated ability to mobilize self with assistance. Nursing to continue with treatment recommendations and other preventative measures. Answered all questions to the satisfaction of the patient. Will follow up.   01/16/2025

## 2025-01-16 NOTE — PROGRESS NOTES
"[facility] HOSPITAL MEDICINE ~ PROGRESS NOTE        CHIEF COMPLAINT   Hospital follow up    HOSPITAL COURSE   69-year-old male with a past medical history of hypertension, hyperlipidemia, diabetes, COPD (no home O2), heart failure, CAD status post CABG, Medtronic pacemaker, peripheral arterial disease status post left PCI, abdominal aortic aneurysm, tobacco dependence, prior C5, 6, 7 decompression with  who presents with worsening chronic cervical spondylosis with myelopathy.  He was seen by Neurosurgery on 12/03/2024 however he did not complete cardiac evaluation for clearance and did not go to scheduled MRI appointments.  They recommended fusion at least at C3-4 and C4-5.      He was seen at [facility] 01/05/2025: "He initially told Dr. Bella he did not want surgery. He changed his mind as his UTI was treated and cleared and a f/u urine culture from 1/11 has been negative for growth. His son does not want Dr. Bella to do the surgery and attempts were made to see if Dr. Neumann or Dr. Bowen would see the patient but both declined this. He is now recommended to discharge and report to a different hospital for a reevaluation because he and his family have declined Dr. Bella's offer to do surgery. The surgery is felt to be needed and he has been seen by Dr. Beebe cardiology who rates his cardiac risk as moderate at 5-7% of a cardiac event."     At the time of my evaluation patient is seeking neurosurgical evaluation and intervention.    Today  Reports working with therapy yesterday.  Seen by Neurosurgery recommending a urinalysis to completely treat UTI if present, requested cardiac clearance.        OBJECTIVE/PHYSICAL EXAM     VITAL SIGNS (MOST RECENT):  Temp: 97.9 °F (36.6 °C) (01/16/25 0708)  Pulse: 69 (01/16/25 0859)  Resp: 18 (01/16/25 0708)  BP: 137/72 (01/16/25 0859)  SpO2: 95 % (01/16/25 0708) VITAL SIGNS (24 HOUR RANGE):  Temp:  [97.9 °F (36.6 °C)-98.4 °F (36.9 °C)] 97.9 °F " (36.6 °C)  Pulse:  [59-71] 69  Resp:  [17-18] 18  SpO2:  [92 %-95 %] 95 %  BP: (130-149)/(59-72) 137/72   GENERAL: In no acute distress, afebrile  HEENT:  CHEST: Clear to auscultation bilaterally  HEART: S1, S2, no appreciable murmur  ABDOMEN: Soft, nontender, BS +  MSK: Warm, no lower extremity edema, no clubbing or cyanosis  NEUROLOGIC: Alert and oriented x4, all extremities antigravity, right side weaker than the left   INTEGUMENTARY:  PSYCHIATRY:        ASSESSMENT/PLAN   Severe cervical spondylosis  Severe C3-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  Tentatively plan for surgery on 23rd.  History of systolic heart failure, currently compensated.  Cardiology consulted for cardiac risk stratification and optimization.  PT OT  Check urinalysis    DVT prophylaxis:  Lovenox 40    Anticipated discharge and disposition:   __________________________________________________________________________    NUTRITIONAL STATUS     Patient meets ASPEN criteria for   malnutrition of   per RD assessment as evidenced by:                       A minimum of two characteristics is recommended for diagnosis of either severe or non-severe malnutrition.     LABS/MICRO/MEDS/DIAGNOSTICS       LABS  Recent Labs     01/14/25  1729 01/15/25  0247   * 136   K 4.7 4.5   CO2 23 25   BUN 46.5* 47.0*   CREATININE 1.83* 1.54*   GLUCOSE 109 98   CALCIUM 9.8 9.3   ALKPHOS 117  --    AST 24  --    ALT 24  --    ALBUMIN 3.5  --      Recent Labs     01/15/25  0247   WBC 9.89   RBC 3.32*   HCT 30.4*   MCV 91.6          MICROBIOLOGY  Microbiology Results (last 7 days)       ** No results found for the last 168 hours. **               MEDICATIONS   carvediloL  3.125 mg Oral BID    furosemide  20 mg Oral TID    linaCLOtide  145 mcg Oral QAM    losartan  25 mg Oral Daily    pantoprazole  40 mg Oral Before breakfast    ranolazine  500 mg Oral BID    spironolactone  25 mg  Oral QAM         INFUSIONS         DIAGNOSTIC TESTS  CT Cervical Spine Without Contrast   Final Result      Severe degenerative changes appears similar to prior with grossly unchanged canal narrowing.  If further evaluation is needed recommend          Electronically signed by: Ancelmo Gurrola   Date:    01/14/2025   Time:    20:05           No echocardiogram results found for the past 14 days.         Case related differential diagnoses have been reviewed; assessment and plan has been documented. I have personally reviewed the labs and test results that are currently available; I have reviewed the patients medication list. I have reviewed the consulting providers recommendations. I have reviewed or attempted to review medical records based upon their availability.  All of the patient's and/or family's questions have been addressed and answered to the best of my ability.  I will continue to monitor closely and make adjustments to medical management as needed.  This document was created using M*Modal Fluency Direct.  Transcription errors may have been made.  Please contact me if any questions may rise regarding documentation to clarify transcription.        Jorge Deluna MD   Internal Medicine  Department of Hospital Medicine  [facility]

## 2025-01-16 NOTE — PT/OT/SLP PROGRESS
Physical Therapy Treatment    Patient Name:  Tyler Mai   MRN:  [id number]    Recommendations:     Discharge therapy intensity: Moderate Intensity Therapy   Discharge Equipment Recommendations:  (TBD by next level of care)  Barriers to discharge: Impaired mobility    Assessment:     Tyler Mai is a 69 y.o. male admitted with a medical diagnosis of cervical cord compression, pt to have fusion next week.  .  He presents with the following impairments/functional limitations: weakness, impaired balance, impaired endurance, impaired self care skills, impaired functional mobility . Pt was soaked with urine so Therapist had to change bed linen absorbent pads, gown and pt's pure wick had to be replaced    Rehab Prognosis: Good; patient would benefit from acute skilled PT services to address these deficits and reach maximum level of function.    Recent Surgery: Procedure(s) (LRB):  FUSION, SPINE, POSTERIOR SPINAL COLUMN, CERVICAL, USING COMPUTER-ASSISTED NAVIGATION (N/A)      Plan:     During this hospitalization, patient would benefit from acute PT services 5 x/week to address the identified rehab impairments via therapeutic activities, therapeutic exercises and progress toward the following goals:    Plan of Care Expires:  02/15/25    Subjective     Chief Complaint:   Patient/Family Comments/goals:   Pain/Comfort:         Objective:     Communicated with nurse prior to session.  Patient found supine with telemetry upon PT entry to room.     General Precautions: Standard, fall  Orthopedic Precautions: N/A  Braces: N/A  Respiratory Status: Room air  Blood Pressure:   Skin Integrity: Visible skin intact      Functional Mobility:  Bed Mobility:     Rolling Left:  maximal assistance  Rolling Right: maximal assistance  Scooting: maximal assistance  Supine to Sit: maximal assistance  Sit to Supine: maximal assistance  Transfers:     Sit to Stand:  maximal assistance with no AD pt's therapist stood in front and blocked his  knee. Pt then with moda,was able to side steps 4 times up toward head of bed    Therapeutic Activities/Exercises:      Education:  Patient provided with verbal education education regarding PT role/goals/POC.  Understanding was verbalized.     Patient left supine with all lines intact and call button in reach    GOALS:   Multidisciplinary Problems       Physical Therapy Goals          Problem: Physical Therapy    Goal Priority Disciplines Outcome Interventions   Physical Therapy Goal     PT, PT/OT Progressing    Description: Goals to be met by: 2/15/25     Patient will increase functional independence with mobility by performin. Supine to sit with Set-up [place]  2. Sit to supine with Set-up [place]  3. Sit to stand transfer with Supervision  4. Bed to chair transfer with Supervision using Rolling Walker  5. Gait  x 300 feet with Supervision using Rolling Walker.   6. Ascend/descend 15 stairs with bilateral Handrails & Supervision                          Time Tracking:     PT Received On: 25  PT Start Time: 1410     PT Stop Time: 1445  PT Total Time (min): 35 min     Billable Minutes: Therapeutic Activity 35    Treatment Type: Treatment  PT/PTA: PT     Number of PTA visits since last PT visit: 2025

## 2025-01-16 NOTE — PT/OT/SLP EVAL
Occupational Therapy  Evaluation    Name: Tyler Mai  MRN: [id number]  Admitting Diagnosis: cord compression   Recent Surgery: Procedure(s) (LRB):  FUSION, SPINE, POSTERIOR SPINAL COLUMN, CERVICAL, USING COMPUTER-ASSISTED NAVIGATION (N/A)      Recommendations:     Discharge therapy intensity: Moderate Intensity Therapy   Discharge Equipment Recommendations:  to be determined by next level of care  Barriers to discharge:       Assessment:     Tyler Mai is a 69 y.o. male with a medical diagnosis of cervical cord compression, pt to have fusion next week.  He presents with the following performance deficits affecting function: weakness, impaired endurance, impaired self care skills, impaired functional mobility, gait instability, impaired balance, impaired sensation, decreased coordination, pain, orthopedic precautions.   Pt lives alone, multiple steps to enter home, no support. Pt to have surgery next week, MD cleared for therapy until then. Pt required max A log roll, mod A sit <> stand at RW, unable to take steps with RW 2/2 low endurance, pain and weakness. Recommend moderate intensity at this time.     Rehab Prognosis: Fair; patient would benefit from acute skilled OT services to address these deficits and reach maximum level of function.       Plan:     Patient to be seen 5 x/week to address the above listed problems via self-care/home management, therapeutic activities, therapeutic exercises  Plan of Care Expires: 02/14/25  Plan of Care Reviewed with: patient    Subjective     Chief Complaint: neck pain   Patient/Family Comments/goals: get stronger     Occupational Profile:  Living Environment: pt lives alone, 2nd story apartment, tub shower   Previous level of function: mod I with ADLs using rollator  Roles and Routines: retired   Equipment Used at Home: shower chair, rollator, walker, rolling  Assistance upon Discharge: none     Pain/Comfort:  Pain Rating 1: 7/10  Location 1: neck  Pain Addressed 1:  Reposition    Patients cultural, spiritual, Yazidi conflicts given the current situation:      Objective:     OT communicated with nrsg prior to session.      Patient was found HOB elevated with   upon OT entry to room.    General Precautions: Standard, fall  Orthopedic Precautions: spinal precautions  Braces:        Bed Mobility:    Patient completed Rolling/Turning to Left with  maximal assistance  Patient completed Supine to Sit with maximal assistance  Patient completed Sit to Supine with maximal assistance    Functional Mobility/Transfers:  Patient completed Sit <> Stand Transfer with moderate assistance  with  rolling walker   Functional Mobility: stood at RW with mod A x2 ; cues for erect posture ; tolerated 1min ; limited by pain/dizziness/weakness    Activities of Daily Living:  Lower Body Dressing: maximal assistance    Toileting: maximal assistance      AMPAC 6 Click ADL:  AMPAC Total Score: 16    Functional Cognition:  Affect: Appropriate to situation and Cooperative    Visual Perceptual Skills:  Denies     Upper Extremity Function:  Right Upper Extremity:   Range of Motion: -3/5   Coordination: Impaired.    Sensation: Impaired. Tingling in hand     Left Upper Extremity:  Range of Motion: Impaired. -3/5   Coordination: Impaired.    Sensation: Impaired. Tingling in hand     Balance:   Static sitting balance: CGA  Static standing balance:Impaired. Mod A at RW    Therapeutic Positioning  Risk for acquired pressure injuries is increased due to altered skin already present.    OT interventions performed during the course of today's session:   Therapeutic positioning was provided at the conclusion of session to offload all bony prominences for the prevention and/or reduction of pressure injuries, for edema management, for pain management, and for contracture prevention    Skin assessment: all bony prominences were assessed    Findings: known area of altered skin integrity at sacrum and BLE     OT  recommendations for therapeutic positioning throughout hospitalization:   Follow [facility] Pressure Injury Prevention Protocol  Geomat recommended for sacral protection while [facility]  [employer]      Patient Education:  Patient provided with verbal education education regarding OT role/goals/POC, fall prevention, and safety awareness.  Understanding was verbalized.     Patient left right sidelying with all lines intact, call button in reach, wedge under L side, and pressure relief boots.    GOALS:   Multidisciplinary Problems       Occupational Therapy Goals          Problem: Occupational Therapy    Goal Priority Disciplines Outcome Interventions   Occupational Therapy Goal     OT, PT/OT Progressing    Description: Goals to be met by: in 1 month      Patient will increase functional independence with ADLs by performing:    UE Dressing with Supervision.  LE Dressing with Supervision.  Grooming while EOB with Supervision.  Toileting from bedside commode with Supervision for hygiene and clothing management.   Toilet transfer to bedside commode with Stand-by Assistance.                         History:     Past Medical History:   Diagnosis Date    Anxiety disorder, unspecified     Arthritis     CAD (coronary artery disease)     Cervical radiculopathy     CHF (congestive heart failure)     Chronic pain syndrome     COPD (chronic obstructive pulmonary disease)     Diabetes mellitus     Gastro-esophageal reflux disease without esophagitis     High cholesterol     Hypertension     Lumbar radiculopathy     Myelomalacia of cervical cord     Other specified diseases of spinal cord     Pacemaker     PVD (peripheral vascular disease)     S/P triple vessel bypass     Tobacco use          Past Surgical History:   Procedure Laterality Date    ANTERIOR CERVICAL DISCECTOMY W/ FUSION  1995    C5-6 and C6-7.  Dr. Wall    ANTERIOR CERVICAL DISCECTOMY W/ FUSION  1996    Redo C5-6, C6-7.  Dr. Wall    CHOLECYSTECTOMY      CORONARY ARTERY  BYPASS GRAFT      INSERTION OF PACEMAKER      X2    TRIPPLE VESSEL BYPASS         Time Tracking:     OT Date of Treatment:    OT Start Time: 0926  OT Stop Time: 0942  OT Total Time (min): 16 min    Billable Minutes:Evaluation Moderate Complexity     1/16/2025

## 2025-01-16 NOTE — CONSULTS
Inpatient consult to Cardiology  Consult performed by: Tang Eisenberg FNP  Consult ordered by: Jorge Deluna MD  Reason for consult: CRA        [facility] *    Cardiology  Consult Note    Patient Name: Tyler Mai  MRN: [id number]  Admission Date: 1/14/2025  Hospital Length of Stay: 2 days  Code Status: Full Code   Attending Provider: Reyes, Thairy G, DO   Consulting Provider: MARIVEL Pringle  Primary Care Physician: Vesta, Primary Doctor  Principal Problem:Spinal cord compression    Patient information was obtained from past medical records, ER records, and primary team.     Subjective:   Chief Complaint/Reason for Consult: CRA for Neurosurgical Procedure    HPI:   Mr. Mai is a 69 year old male, unknown to [facility] (Dr. Beebe), who presented to the hospital with chronic cervical spondylosis with myelopathy. Patient with chronic back disease and extensive cardiac history. Neurosurgery has seen the patient and are planning posterior cervical decompression and fusion. C2-6, possible additional levels. [facility] consulted for CRA for upcoming high risk neurosurgical intervention.    PMH: CAD, Anxiety, Arthritis, Cervical Radiculopathy, Pacemaker, PVD, CABG, COPD, Chronic Pain Syndrome, CHF  PSH: Anterior Cervical Disc with Fusion, LHC, CABG, Pacemaker  Family History: Mother- Cancer, Father- Cancer  Social History: Tobacco- Active Smoker, Alcohol- Negative, Substance Abuse- Negative    Previous Cardiac Diagnostics:   Cardiac Perfusion Scan (1.8.25):  There is a fixed defect in the inferior wall of the left ventricle. Otherwise, there is normal radiotracer uptake. There is no evidence of reversible defect. There is mild chamber dilatation with flattening of the time activity curve. The ejection fraction is 43% which is below the 50% lower limit of normal.     Carotid US (1.8.25):  Bilaterally, there is bilateral extensive atherosclerotic plaque causing velocity elevation and spectral broadening within the  waveforms in the internal carotid arteries. Narrowing is estimated at 50-75% on the right and greater than 75% on the left using NASCET criteria.   Bilateral antegrade vertebral artery flow.     Arterial Doppler (3.4.24):  Severe diffuse bilateral lower extremity arterial occlusive disease.   High-grade stenosis of the right SFA origin.  Diffuse monophasic flow in the entire right lower extremity below this level.   Arterial inflow disease on left side with monophasic waveform of left common femoral artery suggesting more proximal significant arterial occlusive disease.  Diffuse monophasic arterial waveforms throughout the left lower extremity.   Recommend further evaluation with CTA abdomen pelvis with bilateral lower extremity run off     CTA Neck (2.6.24):  Significant atheromatous calcifications of the bilateral carotid bulbs with near occlusion of the left internal carotid artery and 50-69% of the right internal carotid artery.   Mild focal luminal narrowing of the proximal left common carotid, proximal left subclavian artery, and origin of the left external carotid artery.   Mild noncalcified luminal narrowing of the origin of the left vertebral artery.     Echocardiogram (1.6.25):  Left Ventricle: Borderline increased wall thickness. Severely decreased (<20%) ejection fraction.   Aortic Valve: There is moderate global calcification of the aortic valve present. Mild to moderate aortic stenosis. Trace aortic   regurgitation   Mitral Valve: There is mild valve leaflet thickening. No stenosis.   Trace mitral regurgitation.     Biventricular ICD Placement (6.20.18):  Ischemic cardiomyopathy, LBBB, chronic systolic CHF     Cardiac Catheterization (2.26.18):  Coronary angiogram findings:   1. Coronary dominance: Right      2. Left Main: Moderate stenosis of the distal vessel of 40% before the   bifurcation the LAD and left circumflex vessel.   3. Left Anterior Descending: The proximal vessel has mild disease before    the takeoff of a large first diagonal branch.  First diagonal branch has a   severe 70% lesion.  Just after the takeoff of the first diagonal involving   the bifurcation there is severe calcified shelf estimated 60%.  Rest PDP a   measurement was positive at 0.78 at that same vessel.  The remainder of   the LAD has mild luminal irregularities.   4. Left Circumflex: Nondominant vessel with mild luminal irregularities.   5. Right: Dominant.  Proximal vessel has mild luminal irregularities.  The   mid vessel has a severe 98% stenosis just before the bend of the vessel.    The distal vessel bifurcates the PDA and PL branch has mild luminal   disease.   Hemodynamic Findings:   LVEDP: 10 mmHg   Aortic pullback gradient: 0 mmHg   PD/PA measurement: 0.78.  IFR measurement: 0.72 adenosine infusion was not   administered due to severe rest measurment   Summary:   Severe multivessel native coronary artery disease involving the   proximal to mid LAD, ostial first diagonal, and mid right coronary artery.   In addition there is moderate left main stenosis.     Review of patient's allergies indicates:   Allergen Reactions    Hydrochlorothiazide      Other Reaction(s): Pancreatitis     Current Facility-Administered Medications on File Prior to Encounter   Medication    [DISCONTINUED] GENERIC EXTERNAL MEDICATION    [DISCONTINUED] GENERIC EXTERNAL MEDICATION    [DISCONTINUED] GENERIC EXTERNAL MEDICATION     Current Outpatient Medications on File Prior to Encounter   Medication Sig    albuterol (PROVENTIL) 2.5 mg /3 mL (0.083 %) nebulizer solution Take 2.5 mg by nebulization every 4 (four) hours as needed.    albuterol (PROVENTIL/VENTOLIN HFA) 90 mcg/actuation inhaler 1 puff every 4 (four) hours as needed.    albuterol sulfate 2.5 mg/0.5 mL Nebu Inhale 2.5 mg into the lungs.    carvediloL (COREG) 3.125 MG tablet Take 3.125 mg by mouth.    clopidogreL (PLAVIX) 75 mg tablet Take 1 tablet by mouth every morning.    cyclobenzaprine  (FLEXERIL) 10 MG tablet Take 1 tablet (10 mg total) by mouth 3 (three) times daily.    furosemide (LASIX) 20 MG tablet Take 1 tablet (20 mg total) by mouth 3 (three) times daily.    LINZESS 145 mcg Cap capsule Take 145 mcg by mouth every morning.    losartan (COZAAR) 25 MG tablet Take 25 mg by mouth.    oxyCODONE-acetaminophen (PERCOCET)  mg per tablet Take 1 tablet by mouth.    pantoprazole (PROTONIX) 40 MG tablet Take 1 tablet (40 mg total) by mouth once daily.    ranolazine (RANEXA) 500 MG Tb12 Take 500 mg by mouth 2 (two) times daily.    spironolactone (ALDACTONE) 25 MG tablet Take 1 tablet by mouth every morning.    TRELEGY ELLIPTA 200-62.5-25 mcg inhaler Inhale 1 puff into the lungs.    VASCEPA 1 gram Cap Take 2 capsules by mouth 2 (two) times daily.     Review of Systems   Respiratory:  Negative for chest tightness and shortness of breath.    Cardiovascular:  Negative for chest pain and leg swelling.   All other systems reviewed and are negative.    Objective:     Vital Signs (Most Recent):  Temp: 98.6 °F (37 °C) (01/16/25 1051)  Pulse: 69 (01/16/25 1051)  Resp: 18 (01/16/25 1046)  BP: 129/64 (01/16/25 1051)  SpO2: (!) 94 % (01/16/25 1051) Vital Signs (24h Range):  Temp:  [97.9 °F (36.6 °C)-98.6 °F (37 °C)] 98.6 °F (37 °C)  Pulse:  [59-72] 69  Resp:  [17-18] 18  SpO2:  [92 %-95 %] 94 %  BP: (129-147)/(59-72) 129/64   Weight: 78.5 kg (173 lb)  Body mass index is 27.92 kg/m².  SpO2: (!) 94 %     No intake or output data in the 24 hours ending 01/16/25 1224  Lines/Drains/Airways       Peripheral Intravenous Line  Duration                  Peripheral IV - Single Lumen 01/14/25 1853 20 G Anterior;Right Forearm 1 day                  Significant Labs:   Chemistries:   Recent Labs   Lab 01/14/25  1729 01/15/25  0247   * 136   K 4.7 4.5    105   CO2 23 25   BUN 46.5* 47.0*   CREATININE 1.83* 1.54*   CALCIUM 9.8 9.3   BILITOT 0.6  --    ALKPHOS 117  --    ALT 24  --    AST 24  --    GLUCOSE 109 98         CBC/Anemia Labs: Coags:    Recent Labs   Lab 01/14/25  1729 01/15/25  0247   WBC 10.94 9.89   HGB 11.7* 10.3*   HCT 34.0* 30.4*    226   MCV 90.7 91.6   RDW 12.3 12.4    Recent Labs   Lab 01/14/25  1729   INR 1.0   APTT 27.5        Significant Imaging:  Imaging Results              CT Cervical Spine Without Contrast (Final result)  Result time 01/14/25 20:05:21      Final result by Ancelmo Gurrola MD (01/14/25 20:05:21)                   Impression:      Severe degenerative changes appears similar to prior with grossly unchanged canal narrowing.  If further evaluation is needed recommend MR.      Electronically signed by: Ancelmo Gurrola  Date:    01/14/2025  Time:    20:05               Narrative:    EXAMINATION:  CT CERVICAL SPINE WITHOUT CONTRAST    CLINICAL HISTORY:  Spinal stenosis, cervical;    TECHNIQUE:  CT of the cervical spine Without contrast. Sagittal and coronal reconstructions were performed on the source images.    Automatic exposure control was utilized to reduce the patient's radiation dose.    DLP = 412    COMPARISON:  03/04/2024    FINDINGS:  Straightening of the normal lordotic curvature with severe degenerative changes throughout the cervical spine.  Anterolisthesis of approximately 4 mm C3 on C4 is grossly unchanged with severe bilateral neural foraminal narrowing.  Canal with at this region measures approximately 8 mm which is unchanged when remeasured.  No further evaluation is needed recommend MR for evaluation of cord signal.  Remaining neural foraminal narrowing C5 through C7 similar to prior.                                    EKG:       Telemetry:  Sinus Rhythm     Physical Exam  Vitals and nursing note reviewed.   Constitutional:       Appearance: Normal appearance.   HENT:      Head: Normocephalic.      Mouth/Throat:      Mouth: Mucous membranes are moist.      Pharynx: Oropharynx is clear.   Cardiovascular:      Rate and Rhythm: Normal rate and regular rhythm.      Heart  sounds: Normal heart sounds.   Pulmonary:      Effort: Pulmonary effort is normal. No respiratory distress.      Breath sounds: Normal breath sounds. No wheezing or rales.   Abdominal:      Palpations: Abdomen is soft.   Musculoskeletal:         General: Normal range of motion.      Cervical back: Neck supple.   Skin:     General: Skin is warm and dry.   Neurological:      General: No focal deficit present.      Mental Status: He is alert and oriented to person, place, and time. Mental status is at baseline.   Psychiatric:         Mood and Affect: Mood normal.         Behavior: Behavior normal.       Home Medications:   Current Facility-Administered Medications on File Prior to Encounter   Medication Dose Route Frequency Provider Last Rate Last Admin    [DISCONTINUED] GENERIC EXTERNAL MEDICATION     Provider, Generic External Data        [DISCONTINUED] GENERIC EXTERNAL MEDICATION     Provider, Generic External Data        [DISCONTINUED] GENERIC EXTERNAL MEDICATION     Provider, Generic External Data         Current Outpatient Medications on File Prior to Encounter   Medication Sig Dispense Refill    albuterol (PROVENTIL) 2.5 mg /3 mL (0.083 %) nebulizer solution Take 2.5 mg by nebulization every 4 (four) hours as needed.      albuterol (PROVENTIL/VENTOLIN HFA) 90 mcg/actuation inhaler 1 puff every 4 (four) hours as needed.      albuterol sulfate 2.5 mg/0.5 mL Nebu Inhale 2.5 mg into the lungs.      carvediloL (COREG) 3.125 MG tablet Take 3.125 mg by mouth.      clopidogreL (PLAVIX) 75 mg tablet Take 1 tablet by mouth every morning.      cyclobenzaprine (FLEXERIL) 10 MG tablet Take 1 tablet (10 mg total) by mouth 3 (three) times daily. 90 tablet 0    furosemide (LASIX) 20 MG tablet Take 1 tablet (20 mg total) by mouth 3 (three) times daily. 90 tablet 0    LINZESS 145 mcg Cap capsule Take 145 mcg by mouth every morning.      losartan (COZAAR) 25 MG tablet Take 25 mg by mouth.      oxyCODONE-acetaminophen (PERCOCET)   mg per tablet Take 1 tablet by mouth.      pantoprazole (PROTONIX) 40 MG tablet Take 1 tablet (40 mg total) by mouth once daily. 30 tablet 0    ranolazine (RANEXA) 500 MG Tb12 Take 500 mg by mouth 2 (two) times daily.      spironolactone (ALDACTONE) 25 MG tablet Take 1 tablet by mouth every morning.      TRELEGY ELLIPTA 200-62.5-25 mcg inhaler Inhale 1 puff into the lungs.      VASCEPA 1 gram Cap Take 2 capsules by mouth 2 (two) times daily.       Current Schedule Inpatient Medications:   carvediloL  3.125 mg Oral BID    enoxparin  40 mg Subcutaneous Q24H (prophylaxis, 1700)    furosemide  20 mg Oral TID    linaCLOtide  145 mcg Oral QAM    losartan  25 mg Oral Daily    pantoprazole  40 mg Oral Before breakfast    ranolazine  500 mg Oral BID    spironolactone  25 mg Oral QAM       Assessment:   Cardiac Risk Stratification for Neurosurgical Procedure (Posterior cervical decompression and fusion. 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  No cardiac testing required prior to proceeding with surgery.  Follow up with Dr. Beebe in the outpatient setting of ongoing routine cardiac care.  EKG obtained and reviewed by Dr. Wright.  Will be available. Call if needed.     Thank you for your consult.     Tang Eisenberg,  FNP  Cardiology  [facility]

## 2025-01-16 NOTE — PLAN OF CARE
Problem: Occupational Therapy  Goal: Occupational Therapy Goal  Description: Goals to be met by: in 1 month      Patient will increase functional independence with ADLs by performing:    UE Dressing with Supervision.  LE Dressing with Supervision.  Grooming while EOB with Supervision.  Toileting from bedside commode with Supervision for hygiene and clothing management.   Toilet transfer to bedside commode with Stand-by Assistance.    Outcome: Progressing

## 2025-01-16 NOTE — NURSING
Pt states he does not want any information given to Tyler Sidhu., son. Pt is A&Ox4. [facility] nurse is aware.

## 2025-01-16 NOTE — NURSING
Spoke with patient son, Tyler, concerning bringing medication list. He said he will call back with list.

## 2025-01-17 LAB — POCT GLUCOSE: 102 MG/DL (ref 70–110)

## 2025-01-17 PROCEDURE — 25000242 PHARM REV CODE 250 ALT 637 W/ HCPCS: Performed by: INTERNAL MEDICINE

## 2025-01-17 PROCEDURE — 51798 US URINE CAPACITY MEASURE: CPT

## 2025-01-17 PROCEDURE — 63600175 PHARM REV CODE 636 W HCPCS: Performed by: STUDENT IN AN ORGANIZED HEALTH CARE EDUCATION/TRAINING PROGRAM

## 2025-01-17 PROCEDURE — 21400001 HC TELEMETRY ROOM

## 2025-01-17 PROCEDURE — 63600175 PHARM REV CODE 636 W HCPCS: Performed by: INTERNAL MEDICINE

## 2025-01-17 PROCEDURE — 25000003 PHARM REV CODE 250: Performed by: STUDENT IN AN ORGANIZED HEALTH CARE EDUCATION/TRAINING PROGRAM

## 2025-01-17 RX ADMIN — OXYCODONE AND ACETAMINOPHEN 1 TABLET: 10; 325 TABLET ORAL at 10:01

## 2025-01-17 RX ADMIN — CARVEDILOL 3.12 MG: 3.12 TABLET, FILM COATED ORAL at 09:01

## 2025-01-17 RX ADMIN — RANOLAZINE 500 MG: 500 TABLET, FILM COATED, EXTENDED RELEASE ORAL at 11:01

## 2025-01-17 RX ADMIN — OXYCODONE AND ACETAMINOPHEN 1 TABLET: 10; 325 TABLET ORAL at 11:01

## 2025-01-17 RX ADMIN — TIOTROPIUM BROMIDE INHALATION SPRAY 2 PUFF: 3.12 SPRAY, METERED RESPIRATORY (INHALATION) at 09:01

## 2025-01-17 RX ADMIN — LOSARTAN POTASSIUM 25 MG: 25 TABLET, FILM COATED ORAL at 09:01

## 2025-01-17 RX ADMIN — FUROSEMIDE 20 MG: 20 TABLET ORAL at 02:01

## 2025-01-17 RX ADMIN — MORPHINE SULFATE 2 MG: 4 INJECTION, SOLUTION INTRAMUSCULAR; INTRAVENOUS at 01:01

## 2025-01-17 RX ADMIN — FUROSEMIDE 20 MG: 20 TABLET ORAL at 09:01

## 2025-01-17 RX ADMIN — RANOLAZINE 500 MG: 500 TABLET, FILM COATED, EXTENDED RELEASE ORAL at 09:01

## 2025-01-17 RX ADMIN — LINACLOTIDE 145 MCG: 145 CAPSULE, GELATIN COATED ORAL at 09:01

## 2025-01-17 RX ADMIN — ENOXAPARIN SODIUM 40 MG: 40 INJECTION SUBCUTANEOUS at 05:01

## 2025-01-17 RX ADMIN — FLUTICASONE FUROATE AND VILANTEROL TRIFENATATE 1 PUFF: 200; 25 POWDER RESPIRATORY (INHALATION) at 09:01

## 2025-01-17 RX ADMIN — OXYCODONE AND ACETAMINOPHEN 1 TABLET: 10; 325 TABLET ORAL at 05:01

## 2025-01-17 RX ADMIN — SPIRONOLACTONE 25 MG: 25 TABLET ORAL at 09:01

## 2025-01-17 RX ADMIN — FUROSEMIDE 20 MG: 20 TABLET ORAL at 11:01

## 2025-01-17 RX ADMIN — WHITE PETROLATUM: 1.75 OINTMENT TOPICAL at 09:01

## 2025-01-17 RX ADMIN — PANTOPRAZOLE SODIUM 40 MG: 40 TABLET, DELAYED RELEASE ORAL at 05:01

## 2025-01-17 RX ADMIN — OXYCODONE AND ACETAMINOPHEN 1 TABLET: 10; 325 TABLET ORAL at 03:01

## 2025-01-17 RX ADMIN — CARVEDILOL 3.12 MG: 3.12 TABLET, FILM COATED ORAL at 11:01

## 2025-01-17 NOTE — PROGRESS NOTES
"[facility] HOSPITAL MEDICINE ~ PROGRESS NOTE        CHIEF COMPLAINT   Hospital follow up    HOSPITAL COURSE   69-year-old male with a past medical history of hypertension, hyperlipidemia, diabetes, COPD (no home O2), heart failure, CAD status post CABG, Medtronic pacemaker, peripheral arterial disease status post left PCI, abdominal aortic aneurysm, tobacco dependence, prior C5, 6, 7 decompression with  who presents with worsening chronic cervical spondylosis with myelopathy.  He was seen by Neurosurgery on 12/03/2024 however he did not complete cardiac evaluation for clearance and did not go to scheduled MRI appointments.  They recommended fusion at least at C3-4 and C4-5.      He was seen at [facility] 01/05/2025: "He initially told Dr. Bella he did not want surgery. He changed his mind as his UTI was treated and cleared and a f/u urine culture from 1/11 has been negative for growth. His son does not want Dr. Bella to do the surgery and attempts were made to see if Dr. Neumann or Dr. Bowen would see the patient but both declined this. He is now recommended to discharge and report to a different hospital for a reevaluation because he and his family have declined Dr. Bella's offer to do surgery. The surgery is felt to be needed and he has been seen by Dr. Beebe cardiology who rates his cardiac risk as moderate at 5-7% of a cardiac event."     At the time of my evaluation patient is seeking neurosurgical evaluation and intervention.    Today  Seen this morning no complaints, no dysuria.  UA did not reflex to culture since it did not meet hospital policy for UTI.          OBJECTIVE/PHYSICAL EXAM     VITAL SIGNS (MOST RECENT):  Temp: 97.9 °F (36.6 °C) (01/17/25 0720)  Pulse: 71 (01/17/25 0720)  Resp: 18 (01/17/25 0720)  BP: 137/70 (01/17/25 0720)  SpO2: (!) 92 % (01/17/25 0720) VITAL SIGNS (24 HOUR RANGE):  Temp:  [97.4 °F (36.3 °C)-98.6 °F (37 °C)] 97.9 °F (36.6 °C)  Pulse:  [62-72] " 71  Resp:  [17-18] 18  SpO2:  [92 %-96 %] 92 %  BP: (122-143)/(61-70) 137/70   GENERAL: In no acute distress, afebrile  HEENT:  CHEST: Clear to auscultation bilaterally  HEART: S1, S2, no appreciable murmur  ABDOMEN: Soft, nontender, BS +  MSK: Warm, no lower extremity edema, no clubbing or cyanosis  NEUROLOGIC: Alert and oriented x4, all extremities antigravity, right side weaker than the left   INTEGUMENTARY:  PSYCHIATRY:        ASSESSMENT/PLAN   Severe cervical spondylosis  Severe C3-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  Tentatively plan for surgery on 23rd.  Cardiology signed off with recs in their note.   PT OT  UA did not reflex to culture as it did not meet hospital policy Uti criteria.  Patient asymptomatic.     DVT prophylaxis:  Lovenox 40    Anticipated discharge and disposition:   __________________________________________________________________________    NUTRITIONAL STATUS     Patient meets ASPEN criteria for   malnutrition of   per RD assessment as evidenced by:                       A minimum of two characteristics is recommended for diagnosis of either severe or non-severe malnutrition.     LABS/MICRO/MEDS/DIAGNOSTICS       LABS  Recent Labs     01/14/25  1729 01/15/25  0247   * 136   K 4.7 4.5   CO2 23 25   BUN 46.5* 47.0*   CREATININE 1.83* 1.54*   GLUCOSE 109 98   CALCIUM 9.8 9.3   ALKPHOS 117  --    AST 24  --    ALT 24  --    ALBUMIN 3.5  --      Recent Labs     01/15/25  0247   WBC 9.89   RBC 3.32*   HCT 30.4*   MCV 91.6          MICROBIOLOGY  Microbiology Results (last 7 days)       ** No results found for the last 168 hours. **               MEDICATIONS   carvediloL  3.125 mg Oral BID    enoxparin  40 mg Subcutaneous Q24H (prophylaxis, 1700)    fluticasone furoate-vilanteroL  1 puff Inhalation Daily    furosemide  20 mg Oral TID    linaCLOtide  145 mcg Oral QAM    losartan  25 mg Oral Daily     pantoprazole  40 mg Oral Before breakfast    ranolazine  500 mg Oral BID    spironolactone  25 mg Oral QAM    tiotropium bromide  2 puff Inhalation Daily    white petrolatum   Topical (Top) Daily         INFUSIONS         DIAGNOSTIC TESTS  CT Cervical Spine Without Contrast   Final Result      Severe degenerative changes appears similar to prior with grossly unchanged canal narrowing.  If further evaluation is needed recommend MRJenelle         Electronically signed by: Ancelmo Gurrola   Date:    01/14/2025   Time:    20:05           No echocardiogram results found for the past 14 days.         Case related differential diagnoses have been reviewed; assessment and plan has been documented. I have personally reviewed the labs and test results that are currently available; I have reviewed the patients medication list. I have reviewed the consulting providers recommendations. I have reviewed or attempted to review medical records based upon their availability.  All of the patient's and/or family's questions have been addressed and answered to the best of my ability.  I will continue to monitor closely and make adjustments to medical management as needed.  This document was created using M*Modal Fluency Direct.  Transcription errors may have been made.  Please contact me if any questions may rise regarding documentation to clarify transcription.        Jorge Deluna MD   Internal Medicine  Department of Hospital Medicine  [facility]

## 2025-01-17 NOTE — PLAN OF CARE
Due to scheduling changes, case tentatively moved to Tuesday 1/21  Continue serial neurological exams  Make sure he has full set of labs, coags, type and hold, CXR  NPO after midnight on Monday night

## 2025-01-18 LAB
ANION GAP SERPL CALC-SCNC: 11 MEQ/L
BUN SERPL-MCNC: 41.2 MG/DL (ref 8.4–25.7)
CALCIUM SERPL-MCNC: 9.1 MG/DL (ref 8.8–10)
CHLORIDE SERPL-SCNC: 99 MMOL/L (ref 98–107)
CO2 SERPL-SCNC: 23 MMOL/L (ref 23–31)
CREAT SERPL-MCNC: 1.84 MG/DL (ref 0.72–1.25)
CREAT/UREA NIT SERPL: 22
GFR SERPLBLD CREATININE-BSD FMLA CKD-EPI: 40 ML/MIN/1.73/M2
GLUCOSE SERPL-MCNC: 147 MG/DL (ref 82–115)
POTASSIUM SERPL-SCNC: 4.9 MMOL/L (ref 3.5–5.1)
SODIUM SERPL-SCNC: 133 MMOL/L (ref 136–145)

## 2025-01-18 PROCEDURE — 99900035 HC TECH TIME PER 15 MIN (STAT)

## 2025-01-18 PROCEDURE — 99900031 HC PATIENT EDUCATION (STAT)

## 2025-01-18 PROCEDURE — 25000003 PHARM REV CODE 250: Performed by: STUDENT IN AN ORGANIZED HEALTH CARE EDUCATION/TRAINING PROGRAM

## 2025-01-18 PROCEDURE — 94799 UNLISTED PULMONARY SVC/PX: CPT

## 2025-01-18 PROCEDURE — 25000242 PHARM REV CODE 250 ALT 637 W/ HCPCS: Performed by: INTERNAL MEDICINE

## 2025-01-18 PROCEDURE — 36415 COLL VENOUS BLD VENIPUNCTURE: CPT | Performed by: INTERNAL MEDICINE

## 2025-01-18 PROCEDURE — 63600175 PHARM REV CODE 636 W HCPCS: Performed by: INTERNAL MEDICINE

## 2025-01-18 PROCEDURE — 63600175 PHARM REV CODE 636 W HCPCS: Performed by: STUDENT IN AN ORGANIZED HEALTH CARE EDUCATION/TRAINING PROGRAM

## 2025-01-18 PROCEDURE — 80048 BASIC METABOLIC PNL TOTAL CA: CPT | Performed by: INTERNAL MEDICINE

## 2025-01-18 PROCEDURE — 21400001 HC TELEMETRY ROOM

## 2025-01-18 RX ADMIN — FUROSEMIDE 20 MG: 20 TABLET ORAL at 02:01

## 2025-01-18 RX ADMIN — MORPHINE SULFATE 2 MG: 4 INJECTION, SOLUTION INTRAMUSCULAR; INTRAVENOUS at 11:01

## 2025-01-18 RX ADMIN — OXYCODONE AND ACETAMINOPHEN 1 TABLET: 10; 325 TABLET ORAL at 06:01

## 2025-01-18 RX ADMIN — CARVEDILOL 3.12 MG: 3.12 TABLET, FILM COATED ORAL at 08:01

## 2025-01-18 RX ADMIN — CARVEDILOL 3.12 MG: 3.12 TABLET, FILM COATED ORAL at 09:01

## 2025-01-18 RX ADMIN — OXYCODONE AND ACETAMINOPHEN 1 TABLET: 10; 325 TABLET ORAL at 09:01

## 2025-01-18 RX ADMIN — OXYCODONE AND ACETAMINOPHEN 1 TABLET: 10; 325 TABLET ORAL at 05:01

## 2025-01-18 RX ADMIN — LOSARTAN POTASSIUM 25 MG: 25 TABLET, FILM COATED ORAL at 09:01

## 2025-01-18 RX ADMIN — OXYCODONE AND ACETAMINOPHEN 1 TABLET: 10; 325 TABLET ORAL at 02:01

## 2025-01-18 RX ADMIN — RANOLAZINE 500 MG: 500 TABLET, FILM COATED, EXTENDED RELEASE ORAL at 08:01

## 2025-01-18 RX ADMIN — SPIRONOLACTONE 25 MG: 25 TABLET ORAL at 09:01

## 2025-01-18 RX ADMIN — FUROSEMIDE 20 MG: 20 TABLET ORAL at 08:01

## 2025-01-18 RX ADMIN — LINACLOTIDE 145 MCG: 145 CAPSULE, GELATIN COATED ORAL at 09:01

## 2025-01-18 RX ADMIN — WHITE PETROLATUM: 1.75 OINTMENT TOPICAL at 09:01

## 2025-01-18 RX ADMIN — PANTOPRAZOLE SODIUM 40 MG: 40 TABLET, DELAYED RELEASE ORAL at 05:01

## 2025-01-18 RX ADMIN — ENOXAPARIN SODIUM 40 MG: 40 INJECTION SUBCUTANEOUS at 06:01

## 2025-01-18 RX ADMIN — TIOTROPIUM BROMIDE INHALATION SPRAY 2 PUFF: 3.12 SPRAY, METERED RESPIRATORY (INHALATION) at 09:01

## 2025-01-18 RX ADMIN — FUROSEMIDE 20 MG: 20 TABLET ORAL at 09:01

## 2025-01-18 RX ADMIN — OXYCODONE AND ACETAMINOPHEN 1 TABLET: 10; 325 TABLET ORAL at 10:01

## 2025-01-18 RX ADMIN — CYCLOBENZAPRINE 10 MG: 10 TABLET, FILM COATED ORAL at 11:01

## 2025-01-18 RX ADMIN — FLUTICASONE FUROATE AND VILANTEROL TRIFENATATE 1 PUFF: 200; 25 POWDER RESPIRATORY (INHALATION) at 09:01

## 2025-01-18 RX ADMIN — RANOLAZINE 500 MG: 500 TABLET, FILM COATED, EXTENDED RELEASE ORAL at 09:01

## 2025-01-18 NOTE — PROGRESS NOTES
"[facility] ~ [facility]        CHIEF COMPLAINT   Hospital follow up    HOSPITAL COURSE   69-year-old male with a past medical history of hypertension, hyperlipidemia, diabetes, COPD (no home O2), heart failure, CAD status post CABG, Medtronic pacemaker, peripheral arterial disease status post left PCI, abdominal aortic aneurysm, tobacco dependence, prior C5, 6, 7 decompression with  who presents with worsening chronic cervical spondylosis with myelopathy.  He was seen by Neurosurgery on 12/03/2024 however he did not complete cardiac evaluation for clearance and did not go to scheduled MRI appointments.  They recommended fusion at least at C3-4 and C4-5.      He was seen at [facility] 01/05/2025: "He initially told Dr. Bella he did not want surgery. He changed his mind as his UTI was treated and cleared and a f/u urine culture from 1/11 has been negative for growth. His son does not want Dr. Bella to do the surgery and attempts were made to see if Dr. Neumann or Dr. Bowen would see the patient but both declined this. He is now recommended to discharge and report to a different hospital for a reevaluation because he and his family have declined Dr. Bella's offer to do surgery. The surgery is felt to be needed and he has been seen by Dr. Beebe cardiology who rates his cardiac risk as moderate at 5-7% of a cardiac event."   At the time of my evaluation patient is seeking neurosurgical evaluation and intervention.    Today  Seen and examined  No issues  Sx planned ofr the 21st now  Neurological symptoms same as before        OBJECTIVE/PHYSICAL EXAM     VITAL SIGNS (MOST RECENT):  Temp: 98.6 °F (37 °C) (01/18/25 0748)  Pulse: 70 (01/18/25 0941)  Resp: 17 (01/18/25 0941)  BP: 137/67 (01/18/25 0748)  SpO2: (!) 90 % (01/18/25 0748) VITAL SIGNS (24 HOUR RANGE):  Temp:  [97.9 °F (36.6 °C)-98.9 °F (37.2 °C)] 98.6 °F (37 °C)  Pulse:  [63-73] 70  Resp:  [16-19] 17  SpO2:  " "[90 %-95 %] 90 %  BP: (116-148)/(62-71) 137/67   GENERAL: In no acute distress, afebrile  HEENT:  CHEST: Clear to auscultation bilaterally  HEART: S1, S2, no appreciable murmur  ABDOMEN: Soft, nontender, BS +  MSK: Warm, no lower extremity edema, no clubbing or cyanosis  NEUROLOGIC: Alert and oriented x4, all extremities antigravity, right side weaker than the left   INTEGUMENTARY:  PSYCHIATRY:        ASSESSMENT/PLAN   Severe cervical spondylosis  Severe C3-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  Tentatively plan for surgery on 21rd.  Cardiology signed off with recs in their note.   PT OT  UA did not reflex to culture as it did not meet hospital policy Uti criteria.  Patient asymptomatic.     DVT prophylaxis:  Lovenox 40    Anticipated discharge and disposition:   __________________________________________________________________________    NUTRITIONAL STATUS     Patient meets ASPEN criteria for   malnutrition of   per RD assessment as evidenced by:                       A minimum of two characteristics is recommended for diagnosis of either severe or non-severe malnutrition.     LABS/MICRO/MEDS/DIAGNOSTICS       LABS  No results for input(s): "NA", "K", "CHLORIDE", "CO2", "BUN", "CREATININE", "GLUCOSE", "CALCIUM", "ALKPHOS", "AST", "ALT", "ALBUMIN" in the last 72 hours.    No results for input(s): "WBC", "RBC", "HCT", "MCV", "PLT" in the last 72 hours.    Invalid input(s): "HG"      MICROBIOLOGY  Microbiology Results (last 7 days)       ** No results found for the last 168 hours. **               MEDICATIONS   carvediloL  3.125 mg Oral BID    enoxparin  40 mg Subcutaneous Q24H (prophylaxis, 1700)    fluticasone furoate-vilanteroL  1 puff Inhalation Daily    furosemide  20 mg Oral TID    linaCLOtide  145 mcg Oral QAM    losartan  25 mg Oral Daily    pantoprazole  40 mg Oral Before breakfast    ranolazine  500 mg Oral BID    spironolactone  " 25 mg Oral QAM    tiotropium bromide  2 puff Inhalation Daily    white petrolatum   Topical (Top) Daily         INFUSIONS         DIAGNOSTIC TESTS  CT Cervical Spine Without Contrast   Final Result      Severe degenerative changes appears similar to prior with grossly unchanged canal narrowing.  If further evaluation is needed recommend MRJenelle         Electronically signed by: Ancelmo Gurrola   Date:    01/14/2025   Time:    20:05           No echocardiogram results found for the past 14 days.         Case related differential diagnoses have been reviewed; assessment and plan has been documented. I have personally reviewed the labs and test results that are currently available; I have reviewed the patients medication list. I have reviewed the consulting providers recommendations. I have reviewed or attempted to review medical records based upon their availability.  All of the patient's and/or family's questions have been addressed and answered to the best of my ability.  I will continue to monitor closely and make adjustments to medical management as needed.  This document was created using sofatronic*FL3XX Fluency Direct.  Transcription errors may have been made.  Please contact me if any questions may rise regarding documentation to clarify transcription.        Jorge Deluna MD   Internal Medicine  Department of [facility] Medicine  [facility]

## 2025-01-19 LAB
CREAT UR-MCNC: 64.9 MG/DL (ref 63–166)
PROT UR STRIP-MCNC: 23.3 MG/DL
SODIUM UR-SCNC: 82 MMOL/L
URINE PROTEIN/CREATININE RATIO (OLG): 0.4

## 2025-01-19 PROCEDURE — 25000242 PHARM REV CODE 250 ALT 637 W/ HCPCS: Performed by: INTERNAL MEDICINE

## 2025-01-19 PROCEDURE — 63600175 PHARM REV CODE 636 W HCPCS: Performed by: STUDENT IN AN ORGANIZED HEALTH CARE EDUCATION/TRAINING PROGRAM

## 2025-01-19 PROCEDURE — 94760 N-INVAS EAR/PLS OXIMETRY 1: CPT

## 2025-01-19 PROCEDURE — 63600175 PHARM REV CODE 636 W HCPCS: Performed by: INTERNAL MEDICINE

## 2025-01-19 PROCEDURE — 84300 ASSAY OF URINE SODIUM: CPT | Performed by: INTERNAL MEDICINE

## 2025-01-19 PROCEDURE — 25000003 PHARM REV CODE 250: Performed by: STUDENT IN AN ORGANIZED HEALTH CARE EDUCATION/TRAINING PROGRAM

## 2025-01-19 PROCEDURE — 21400001 HC TELEMETRY ROOM

## 2025-01-19 PROCEDURE — 97530 THERAPEUTIC ACTIVITIES: CPT | Mod: CQ

## 2025-01-19 PROCEDURE — 94799 UNLISTED PULMONARY SVC/PX: CPT

## 2025-01-19 PROCEDURE — 99900031 HC PATIENT EDUCATION (STAT)

## 2025-01-19 PROCEDURE — 82570 ASSAY OF URINE CREATININE: CPT | Performed by: INTERNAL MEDICINE

## 2025-01-19 RX ORDER — CEFTRIAXONE 2 G/1
2 INJECTION, POWDER, FOR SOLUTION INTRAMUSCULAR; INTRAVENOUS
Status: DISCONTINUED | OUTPATIENT
Start: 2025-01-21 | End: 2025-01-31 | Stop reason: HOSPADM

## 2025-01-19 RX ORDER — CEFAZOLIN 2 G/1
2 INJECTION, POWDER, FOR SOLUTION INTRAMUSCULAR; INTRAVENOUS
Status: DISCONTINUED | OUTPATIENT
Start: 2025-01-21 | End: 2025-01-19

## 2025-01-19 RX ADMIN — RANOLAZINE 500 MG: 500 TABLET, FILM COATED, EXTENDED RELEASE ORAL at 09:01

## 2025-01-19 RX ADMIN — FUROSEMIDE 20 MG: 20 TABLET ORAL at 09:01

## 2025-01-19 RX ADMIN — OXYCODONE AND ACETAMINOPHEN 1 TABLET: 10; 325 TABLET ORAL at 01:01

## 2025-01-19 RX ADMIN — CARVEDILOL 3.12 MG: 3.12 TABLET, FILM COATED ORAL at 09:01

## 2025-01-19 RX ADMIN — TIOTROPIUM BROMIDE INHALATION SPRAY 2 PUFF: 3.12 SPRAY, METERED RESPIRATORY (INHALATION) at 09:01

## 2025-01-19 RX ADMIN — LINACLOTIDE 145 MCG: 145 CAPSULE, GELATIN COATED ORAL at 06:01

## 2025-01-19 RX ADMIN — OXYCODONE AND ACETAMINOPHEN 1 TABLET: 10; 325 TABLET ORAL at 07:01

## 2025-01-19 RX ADMIN — MORPHINE SULFATE 2 MG: 4 INJECTION, SOLUTION INTRAMUSCULAR; INTRAVENOUS at 12:01

## 2025-01-19 RX ADMIN — FUROSEMIDE 20 MG: 20 TABLET ORAL at 01:01

## 2025-01-19 RX ADMIN — OXYCODONE AND ACETAMINOPHEN 1 TABLET: 10; 325 TABLET ORAL at 09:01

## 2025-01-19 RX ADMIN — OXYCODONE AND ACETAMINOPHEN 1 TABLET: 10; 325 TABLET ORAL at 11:01

## 2025-01-19 RX ADMIN — FLUTICASONE FUROATE AND VILANTEROL TRIFENATATE 1 PUFF: 200; 25 POWDER RESPIRATORY (INHALATION) at 09:01

## 2025-01-19 RX ADMIN — PANTOPRAZOLE SODIUM 40 MG: 40 TABLET, DELAYED RELEASE ORAL at 06:01

## 2025-01-19 RX ADMIN — MORPHINE SULFATE 2 MG: 4 INJECTION, SOLUTION INTRAMUSCULAR; INTRAVENOUS at 04:01

## 2025-01-19 RX ADMIN — WHITE PETROLATUM: 1.75 OINTMENT TOPICAL at 09:01

## 2025-01-19 RX ADMIN — ENOXAPARIN SODIUM 40 MG: 40 INJECTION SUBCUTANEOUS at 05:01

## 2025-01-19 RX ADMIN — SPIRONOLACTONE 25 MG: 25 TABLET ORAL at 06:01

## 2025-01-19 NOTE — PROGRESS NOTES
"[facility] HOSPITAL MEDICINE ~ PROGRESS NOTE        CHIEF COMPLAINT   Hospital follow up    HOSPITAL COURSE   69-year-old male with a past medical history of hypertension, hyperlipidemia, diabetes, COPD (no home O2), heart failure, CAD status post CABG, Medtronic pacemaker, peripheral arterial disease status post left PCI, abdominal aortic aneurysm, tobacco dependence, prior C5, 6, 7 decompression with  who presents with worsening chronic cervical spondylosis with myelopathy.  He was seen by Neurosurgery on 12/03/2024 however he did not complete cardiac evaluation for clearance and did not go to scheduled MRI appointments.  They recommended fusion at least at C3-4 and C4-5.      He was seen at [facility] 01/05/2025: "He initially told Dr. Bella he did not want surgery. He changed his mind as his UTI was treated and cleared and a f/u urine culture from 1/11 has been negative for growth. His son does not want Dr. Bella to do the surgery and attempts were made to see if Dr. Neumann or Dr. Bowen would see the patient but both declined this. He is now recommended to discharge and report to a different hospital for a reevaluation because he and his family have declined Dr. Bella's offer to do surgery. The surgery is felt to be needed and he has been seen by Dr. Beebe cardiology who rates his cardiac risk as moderate at 5-7% of a cardiac event."   At the time of my evaluation patient is seeking neurosurgical evaluation and intervention.    Today  Reports has never seen a nephrologist before and not told about CKD.  I suspect that he has baseline CKD and he is currently at baseline.  Encouraged him to drink plenty of fluids        OBJECTIVE/PHYSICAL EXAM     VITAL SIGNS (MOST RECENT):  Temp: 98.3 °F (36.8 °C) (01/19/25 0758)  Pulse: 79 (01/19/25 0758)  Resp: 18 (01/19/25 0758)  BP: 93/61 (01/19/25 0758)  SpO2: (!) 92 % (01/19/25 0758) VITAL SIGNS (24 HOUR RANGE):  Temp:  [97.7 °F (36.5 " "°C)-98.8 °F (37.1 °C)] 98.3 °F (36.8 °C)  Pulse:  [59-79] 79  Resp:  [15-23] 18  SpO2:  [92 %-95 %] 92 %  BP: ()/(57-69) 93/61   GENERAL: In no acute distress, afebrile  HEENT:  CHEST: Clear to auscultation bilaterally  HEART: S1, S2, no appreciable murmur  ABDOMEN: Soft, nontender, BS +  MSK: Warm, no lower extremity edema, no clubbing or cyanosis  NEUROLOGIC: Alert and oriented x4, all extremities antigravity, right side weaker than the left   INTEGUMENTARY:  PSYCHIATRY:        ASSESSMENT/PLAN   Severe cervical spondylosis  Severe C3-4 central canal stenosis with central cord myelomalacia  Likely underlying CKD undiagnosed    History of: As listed above      Neurosurgery following.  Tentatively plan for surgery on 21rd.  Cardiology signed off with recs in their note.   PT OT  UA did not reflex to culture as it did not meet hospital policy Uti criteria.  Patient asymptomatic.   Monitor renal function, likely underlying CKD and at baseline currently.  Encouraged increased fluid intake.  Check ultrasound kidneys, urine lytes.  Continue losartan and Aldactone for now.    DVT prophylaxis:  Lovenox 40    Anticipated discharge and disposition:   __________________________________________________________________________    NUTRITIONAL STATUS     Patient meets ASPEN criteria for   malnutrition of   per RD assessment as evidenced by:                       A minimum of two characteristics is recommended for diagnosis of either severe or non-severe malnutrition.     LABS/MICRO/MEDS/DIAGNOSTICS       LABS  Recent Labs     01/18/25  1040   *   K 4.9   CO2 23   BUN 41.2*   CREATININE 1.84*   GLUCOSE 147*   CALCIUM 9.1       No results for input(s): "WBC", "RBC", "HCT", "MCV", "PLT" in the last 72 hours.    Invalid input(s): "HG"      MICROBIOLOGY  Microbiology Results (last 7 days)       ** No results found for the last 168 hours. **               MEDICATIONS   carvediloL  3.125 mg Oral BID    enoxparin  40 mg " Subcutaneous Q24H (prophylaxis, 1700)    fluticasone furoate-vilanteroL  1 puff Inhalation Daily    furosemide  20 mg Oral TID    linaCLOtide  145 mcg Oral QAM    losartan  25 mg Oral Daily    pantoprazole  40 mg Oral Before breakfast    ranolazine  500 mg Oral BID    spironolactone  25 mg Oral QAM    tiotropium bromide  2 puff Inhalation Daily    white petrolatum   Topical (Top) Daily         INFUSIONS         DIAGNOSTIC TESTS  CT Cervical Spine Without Contrast   Final Result      Severe degenerative changes appears similar to prior with grossly unchanged canal narrowing.  If further evaluation is needed recommend          Electronically signed by: Ancelmo Gurrola   Date:    01/14/2025   Time:    20:05           No echocardiogram results found for the past 14 days.         Case related differential diagnoses have been reviewed; assessment and plan has been documented. I have personally reviewed the labs and test results that are currently available; I have reviewed the patients medication list. I have reviewed the consulting providers recommendations. I have reviewed or attempted to review medical records based upon their availability.  All of the patient's and/or family's questions have been addressed and answered to the best of my ability.  I will continue to monitor closely and make adjustments to medical management as needed.  This document was created using M*Modal Fluency Direct.  Transcription errors may have been made.  Please contact me if any questions may rise regarding documentation to clarify transcription.        Jorge Deluna MD   Internal Medicine  Department of [facility] Medicine  [facility]

## 2025-01-19 NOTE — PLAN OF CARE
Problem: Adult Inpatient Plan of Care  Goal: Absence of Hospital-Acquired Illness or Injury  Outcome: Progressing  Intervention: Prevent Infection  Flowsheets (Taken 1/19/2025 0339)  Infection Prevention:   environmental surveillance performed   hand hygiene promoted   personal protective equipment utilized   rest/sleep promoted   single patient room provided  Goal: Optimal Comfort and Wellbeing  Outcome: Progressing  Intervention: Provide Person-Centered Care  Flowsheets (Taken 1/19/2025 0339)  Trust Relationship/Rapport:   care explained   choices provided   emotional support provided   empathic listening provided   questions answered   questions encouraged   reassurance provided   thoughts/feelings acknowledged     Problem: Infection  Goal: Absence of Infection Signs and Symptoms  Outcome: Progressing  Intervention: Prevent or Manage Infection  Flowsheets (Taken 1/19/2025 0339)  Fever Reduction/Comfort Measures:   lightweight bedding   lightweight clothing     Problem: Wound  Goal: Optimal Pain Control and Function  Outcome: Progressing  Intervention: Prevent or Manage Pain  Flowsheets (Taken 1/19/2025 0339)  Sleep/Rest Enhancement:   awakenings minimized   regular sleep/rest pattern promoted  Pain Management Interventions:   care clustered   medication offered   pain management plan reviewed with patient/caregiver   position adjusted   quiet environment facilitated

## 2025-01-19 NOTE — PT/OT/SLP PROGRESS
Physical Therapy Treatment    Patient Name:  Tyler Mai   MRN:  [id number]    Recommendations:     Discharge therapy intensity: Moderate Intensity Therapy   Discharge Equipment Recommendations:  (TBD by next level of care)  Barriers to discharge: Impaired mobility and Ongoing medical needs    Assessment:     Tyler Mai is a 69 y.o. male admitted with a medical diagnosis of Spinal cord compression. Possible surgery Tuesday 1/21. Prior to admit, patient lived alone in a 2nd-floor apartment. At baseline, he is independent and ambulates with a rollator..  He presents with the following impairments/functional limitations: weakness, impaired balance, impaired endurance, impaired self care skills, impaired functional mobility.    Rehab Prognosis: Good; patient would benefit from acute skilled PT services to address these deficits and reach maximum level of function.    Recent Surgery: Procedure(s) (LRB):  FUSION, SPINE, POSTERIOR SPINAL COLUMN, CERVICAL, USING COMPUTER-ASSISTED NAVIGATION (N/A)      Plan:     During this hospitalization, patient would benefit from acute PT services 5 x/week to address the identified rehab impairments via therapeutic activities, therapeutic exercises and progress toward the following goals:    Plan of Care Expires:  02/15/25    Subjective     Chief Complaint: pain 9/10 cervical spine  Patient/Family Comments/goals: to get better  Pain/Comfort:         Objective:     Communicated with nursing prior to session.  Patient found HOB elevated with telemetry, pulse ox (continuous), peripheral IV, Condom Catheter upon PT entry to room.     General Precautions: Standard, fall  Orthopedic Precautions: spinal precautions  Braces: N/A  Respiratory Status: Room air  Blood Pressure: NT    Functional Mobility:  Bed Mobility:     Supine to Sit: moderate assistance and of 2 persons  Transfers:     Sit to Stand:  moderate assistance with rolling walker  Bed to Chair: minimum assistance with  rolling  walker  using  Step Transfer  Gait: lateral steps toward HOB w/RW, Sergio    Therapeutic Activities/Exercises:      Education:  Patient provided with verbal education education regarding PT role/goals/POC, spinal precautions, fall prevention, and safety awareness.  Understanding was verbalized, however additional teaching warranted.     Patient left up in chair with all lines intact, call button in reach, geomat cushion, lizzeth pad in place, nurse notified, and nursing present    GOALS:   Multidisciplinary Problems       Physical Therapy Goals          Problem: Physical Therapy    Goal Priority Disciplines Outcome Interventions   Physical Therapy Goal     PT, PT/OT Progressing    Description: Goals to be met by: 2/15/25     Patient will increase functional independence with mobility by performin. Supine to sit with Set-up Otoe  2. Sit to supine with Set-up Otoe  3. Sit to stand transfer with Supervision  4. Bed to chair transfer with Supervision using Rolling Walker  5. Gait  x 300 feet with Supervision using Rolling Walker.   6. Ascend/descend 15 stairs with bilateral Handrails & Supervision                          Time Tracking:     PT Received On: 25  PT Start Time: 1041     PT Stop Time: 1113  PT Total Time (min): 32 min     Billable Minutes: Therapeutic Activity 32    Treatment Type: Treatment  PT/PTA: PTA     Number of PTA visits since last PT visit: 2     2025

## 2025-01-20 LAB
ALBUMIN SERPL-MCNC: 3.4 G/DL (ref 3.4–4.8)
ALBUMIN/GLOB SERPL: 1 RATIO (ref 1.1–2)
ALP SERPL-CCNC: 121 UNIT/L (ref 40–150)
ALT SERPL-CCNC: 17 UNIT/L (ref 0–55)
ANION GAP SERPL CALC-SCNC: 10 MEQ/L
APTT PPP: 31.1 SECONDS (ref 23.2–33.7)
AST SERPL-CCNC: 15 UNIT/L (ref 5–34)
BASOPHILS # BLD AUTO: 0.08 X10(3)/MCL
BASOPHILS NFR BLD AUTO: 0.7 %
BILIRUB SERPL-MCNC: 0.6 MG/DL
BUN SERPL-MCNC: 51.9 MG/DL (ref 8.4–25.7)
CALCIUM SERPL-MCNC: 9.3 MG/DL (ref 8.8–10)
CHLORIDE SERPL-SCNC: 99 MMOL/L (ref 98–107)
CO2 SERPL-SCNC: 25 MMOL/L (ref 23–31)
CREAT SERPL-MCNC: 2.09 MG/DL (ref 0.72–1.25)
CREAT/UREA NIT SERPL: 25
EOSINOPHIL # BLD AUTO: 0.23 X10(3)/MCL (ref 0–0.9)
EOSINOPHIL NFR BLD AUTO: 1.9 %
ERYTHROCYTE [DISTWIDTH] IN BLOOD BY AUTOMATED COUNT: 12 % (ref 11.5–17)
GFR SERPLBLD CREATININE-BSD FMLA CKD-EPI: 34 ML/MIN/1.73/M2
GLOBULIN SER-MCNC: 3.3 GM/DL (ref 2.4–3.5)
GLUCOSE SERPL-MCNC: 112 MG/DL (ref 82–115)
GROUP & RH: NORMAL
HCT VFR BLD AUTO: 33 % (ref 42–52)
HGB BLD-MCNC: 11.1 G/DL (ref 14–18)
IMM GRANULOCYTES # BLD AUTO: 0.06 X10(3)/MCL (ref 0–0.04)
IMM GRANULOCYTES NFR BLD AUTO: 0.5 %
INDIRECT COOMBS: NORMAL
INR PPP: 1
LYMPHOCYTES # BLD AUTO: 1.94 X10(3)/MCL (ref 0.6–4.6)
LYMPHOCYTES NFR BLD AUTO: 16.2 %
MCH RBC QN AUTO: 30.5 PG (ref 27–31)
MCHC RBC AUTO-ENTMCNC: 33.6 G/DL (ref 33–36)
MCV RBC AUTO: 90.7 FL (ref 80–94)
MONOCYTES # BLD AUTO: 1.02 X10(3)/MCL (ref 0.1–1.3)
MONOCYTES NFR BLD AUTO: 8.5 %
NEUTROPHILS # BLD AUTO: 8.65 X10(3)/MCL (ref 2.1–9.2)
NEUTROPHILS NFR BLD AUTO: 72.2 %
NRBC BLD AUTO-RTO: 0 %
PLATELET # BLD AUTO: 333 X10(3)/MCL (ref 130–400)
PMV BLD AUTO: 11.1 FL (ref 7.4–10.4)
POTASSIUM SERPL-SCNC: 4.7 MMOL/L (ref 3.5–5.1)
PROT SERPL-MCNC: 6.7 GM/DL (ref 5.8–7.6)
PROTHROMBIN TIME: 13.4 SECONDS (ref 12.5–14.5)
RBC # BLD AUTO: 3.64 X10(6)/MCL (ref 4.7–6.1)
SODIUM SERPL-SCNC: 134 MMOL/L (ref 136–145)
SPECIMEN OUTDATE: NORMAL
WBC # BLD AUTO: 11.98 X10(3)/MCL (ref 4.5–11.5)

## 2025-01-20 PROCEDURE — 25000242 PHARM REV CODE 250 ALT 637 W/ HCPCS: Performed by: INTERNAL MEDICINE

## 2025-01-20 PROCEDURE — 85730 THROMBOPLASTIN TIME PARTIAL: CPT | Performed by: INTERNAL MEDICINE

## 2025-01-20 PROCEDURE — 36415 COLL VENOUS BLD VENIPUNCTURE: CPT | Performed by: INTERNAL MEDICINE

## 2025-01-20 PROCEDURE — 86900 BLOOD TYPING SEROLOGIC ABO: CPT | Performed by: INTERNAL MEDICINE

## 2025-01-20 PROCEDURE — 80053 COMPREHEN METABOLIC PANEL: CPT | Performed by: INTERNAL MEDICINE

## 2025-01-20 PROCEDURE — 63600175 PHARM REV CODE 636 W HCPCS: Performed by: STUDENT IN AN ORGANIZED HEALTH CARE EDUCATION/TRAINING PROGRAM

## 2025-01-20 PROCEDURE — 85025 COMPLETE CBC W/AUTO DIFF WBC: CPT | Performed by: INTERNAL MEDICINE

## 2025-01-20 PROCEDURE — 21400001 HC TELEMETRY ROOM

## 2025-01-20 PROCEDURE — 63600175 PHARM REV CODE 636 W HCPCS: Performed by: INTERNAL MEDICINE

## 2025-01-20 PROCEDURE — 63600175 PHARM REV CODE 636 W HCPCS

## 2025-01-20 PROCEDURE — 85610 PROTHROMBIN TIME: CPT | Performed by: INTERNAL MEDICINE

## 2025-01-20 PROCEDURE — 25000003 PHARM REV CODE 250: Performed by: STUDENT IN AN ORGANIZED HEALTH CARE EDUCATION/TRAINING PROGRAM

## 2025-01-20 RX ORDER — CEFTRIAXONE 1 G/1
1 INJECTION, POWDER, FOR SOLUTION INTRAMUSCULAR; INTRAVENOUS
Status: DISCONTINUED | OUTPATIENT
Start: 2025-01-20 | End: 2025-01-23

## 2025-01-20 RX ORDER — ENOXAPARIN SODIUM 100 MG/ML
40 INJECTION SUBCUTANEOUS EVERY 24 HOURS
Status: DISCONTINUED | OUTPATIENT
Start: 2025-01-20 | End: 2025-01-23

## 2025-01-20 RX ADMIN — SPIRONOLACTONE 25 MG: 25 TABLET ORAL at 06:01

## 2025-01-20 RX ADMIN — TIOTROPIUM BROMIDE INHALATION SPRAY 2 PUFF: 3.12 SPRAY, METERED RESPIRATORY (INHALATION) at 08:01

## 2025-01-20 RX ADMIN — ENOXAPARIN SODIUM 40 MG: 40 INJECTION SUBCUTANEOUS at 04:01

## 2025-01-20 RX ADMIN — WHITE PETROLATUM: 1.75 OINTMENT TOPICAL at 08:01

## 2025-01-20 RX ADMIN — MORPHINE SULFATE 2 MG: 4 INJECTION, SOLUTION INTRAMUSCULAR; INTRAVENOUS at 01:01

## 2025-01-20 RX ADMIN — OXYCODONE AND ACETAMINOPHEN 1 TABLET: 10; 325 TABLET ORAL at 08:01

## 2025-01-20 RX ADMIN — FLUTICASONE FUROATE AND VILANTEROL TRIFENATATE 1 PUFF: 200; 25 POWDER RESPIRATORY (INHALATION) at 08:01

## 2025-01-20 RX ADMIN — FUROSEMIDE 20 MG: 20 TABLET ORAL at 08:01

## 2025-01-20 RX ADMIN — CARVEDILOL 3.12 MG: 3.12 TABLET, FILM COATED ORAL at 08:01

## 2025-01-20 RX ADMIN — LOSARTAN POTASSIUM 25 MG: 25 TABLET, FILM COATED ORAL at 08:01

## 2025-01-20 RX ADMIN — MORPHINE SULFATE 2 MG: 4 INJECTION, SOLUTION INTRAMUSCULAR; INTRAVENOUS at 07:01

## 2025-01-20 RX ADMIN — OXYCODONE AND ACETAMINOPHEN 1 TABLET: 10; 325 TABLET ORAL at 11:01

## 2025-01-20 RX ADMIN — LINACLOTIDE 145 MCG: 145 CAPSULE, GELATIN COATED ORAL at 06:01

## 2025-01-20 RX ADMIN — OXYCODONE AND ACETAMINOPHEN 1 TABLET: 10; 325 TABLET ORAL at 04:01

## 2025-01-20 RX ADMIN — CEFTRIAXONE SODIUM 1 G: 1 INJECTION, POWDER, FOR SOLUTION INTRAMUSCULAR; INTRAVENOUS at 11:01

## 2025-01-20 RX ADMIN — CYCLOBENZAPRINE 10 MG: 10 TABLET, FILM COATED ORAL at 08:01

## 2025-01-20 RX ADMIN — RANOLAZINE 500 MG: 500 TABLET, FILM COATED, EXTENDED RELEASE ORAL at 08:01

## 2025-01-20 RX ADMIN — PANTOPRAZOLE SODIUM 40 MG: 40 TABLET, DELAYED RELEASE ORAL at 06:01

## 2025-01-20 RX ADMIN — FUROSEMIDE 20 MG: 20 TABLET ORAL at 04:01

## 2025-01-20 NOTE — CONSULTS
Nephrology Initial Consult Note    Patient Name: Tyler Mai  Age: 69 y.o.  : 1955  MRN: [id number]  Admission Date: 2025    Reason for Consult:      Chronic kidney disease stage 4  Self, Aaareferral    HPI:     Tyler Mai is a 69 y.o. male who presents for cervical spondylosis, and cervical stenosis.  Past medical history significant for CKD 4, hypertension, hyperlipidemia, diabetes, COPD, CHF, coronary artery disease status post CABG, peripheral artery disease, AA, and tobacco dependence.  Patient was recently at [facility] for cervical stenosis, but did not want to have surgery with Dr. Bella so was transferred here for further neurosurgery evaluation.  He does appear to have chronic kidney disease at baseline.  His baseline creatinine appears to be around 1.5-2.  Creatinine since admission has been 1.8-2.0.  Renal ultrasound was negative.  He is 0.4 grams/grams of proteinuria.  He does have history of longstanding diabetes, and hypertension which is likely his cause of underlying CKD.  He overall feels well today.  He does endorse decreased water intake.  No chest pain, shortness of breath, nausea, vomiting, dysuria, hematuria, or lower extremity edema.        Current Facility-Administered Medications   Medication Dose Route Frequency Provider Last Rate Last Admin    acetaminophen tablet 650 mg  650 mg Oral Q4H PRN Reyes, Thairy G, DO        albuterol-ipratropium 2.5 mg-0.5 mg/3 mL nebulizer solution 3 mL  3 mL Nebulization Q6H PRN Reyes, Thairy G, DO        aluminum-magnesium hydroxide-simethicone 200-200-20 mg/5 mL suspension 30 mL  30 mL Oral QID PRN Reyes, Thairy G, DO        bisacodyL suppository 10 mg  10 mg Rectal Daily PRN Reyes, Thairy G, DO        carvediloL tablet 3.125 mg  3.125 mg Oral BID Reyes, Thairy G, DO   3.125 mg at 25 0827    cefTRIAXone injection 1 g  1 g Intravenous Q24H Jorge Deluna MD   1 g at 25 1159    [START ON 2025] cefTRIAXone injection 2 g  2 g  Intravenous On Call Procedure Izzy Lopez NP        cyclobenzaprine tablet 10 mg  10 mg Oral TID PRN ReyesKristi fink G, DO   10 mg at 01/20/25 0827    dextrose 50% injection 12.5 g  12.5 g Intravenous PRN Reyes, Yaory G, DO        dextrose 50% injection 25 g  25 g Intravenous PRN Reyes, Yaory G, DO        enoxaparin injection 40 mg  40 mg Subcutaneous Q24H (prophylaxis, 1700) Izzy Lopez NP        fluticasone furoate-vilanteroL 200-25 mcg/dose diskus inhaler 1 puff  1 puff Inhalation Daily Jorge Deluna MD   1 puff at 01/20/25 0828    furosemide tablet 20 mg  20 mg Oral TID Reyes, Thairy G, DO   20 mg at 01/20/25 0827    glucagon (human recombinant) injection 1 mg  1 mg Intramuscular PRN Reyes, Kristi G, DO        glucose chewable tablet 16 g  16 g Oral PRN Reyes, Thairy G, DO        glucose chewable tablet 24 g  24 g Oral PRN Reyes, Thairy G, DO        linaCLOtide capsule 145 mcg  145 mcg Oral QAM Reyes, Thairy G, DO   145 mcg at 01/20/25 0643    melatonin tablet 9 mg  9 mg Oral Nightly PRN Reyes, Thairy G, DO   9 mg at 01/16/25 2047    morphine injection 2 mg  2 mg Intravenous Q6H PRN Reyes, Thairy G, DO   2 mg at 01/20/25 1312    naloxone 0.4 mg/mL injection 0.02 mg  0.02 mg Intravenous PRN Reyes, Thairy G, DO        ondansetron disintegrating tablet 8 mg  8 mg Oral Q8H PRN Reyes, Thairy G, DO        ondansetron injection 4 mg  4 mg Intravenous Q8H PRN Reyes, Thairy G, DO        oxyCODONE-acetaminophen  mg per tablet 1 tablet  1 tablet Oral Q4H PRN Reyes, Thairy G, DO   1 tablet at 01/20/25 1158    pantoprazole EC tablet 40 mg  40 mg Oral Before breakfast Reyes, Thairy G, DO   40 mg at 01/20/25 0643    polyethylene glycol packet 17 g  17 g Oral TID PRN Reyes, Thairy G, DO        ranolazine 12 hr tablet 500 mg  500 mg Oral BID Reyes, Thairy G, DO   500 mg at 01/20/25 0827    simethicone chewable tablet 80 mg  1 tablet Oral QID PRN Reyes, Thairy G, DO        sodium chloride 0.9% flush 10 mL  10 mL  Intravenous PRN Reyes, Thairy G, DO        tiotropium bromide 2.5 mcg/actuation inhaler 2 puff  2 puff Inhalation Daily Jorge Deluna MD   2 puff at 01/20/25 0828    white petrolatum 41 % ointment   Topical (Top) Daily Reyes, Thairy G, DO   Given at 01/20/25 0829       No, Primary Doctor    Past Medical History:   Diagnosis Date    Anxiety disorder, unspecified     Arthritis     CAD (coronary artery disease)     Cervical radiculopathy     CHF (congestive heart failure)     Chronic pain syndrome     COPD (chronic obstructive pulmonary disease)     Diabetes mellitus     Gastro-esophageal reflux disease without esophagitis     High cholesterol     Hypertension     Lumbar radiculopathy     Myelomalacia of cervical cord     Other specified diseases of spinal cord     Pacemaker     PVD (peripheral vascular disease)     S/P triple vessel bypass     Tobacco use       Past Surgical History:   Procedure Laterality Date    ANTERIOR CERVICAL DISCECTOMY W/ FUSION  1995    C5-6 and C6-7.  Dr. Wall    ANTERIOR CERVICAL DISCECTOMY W/ FUSION  1996    Redo C5-6, C6-7.  Dr. Wall    CHOLECYSTECTOMY      CORONARY ARTERY BYPASS GRAFT      INSERTION OF PACEMAKER      X2    TRIPPLE VESSEL BYPASS        Family History   Problem Relation Name Age of Onset    Cancer Mother      Cancer Father      Lumbar disc disease Brother       Social History     Tobacco Use    Smoking status: Every Day     Current packs/day: 1.00     Types: Cigarettes    Smokeless tobacco: Not on file   Substance Use Topics    Alcohol use: Not on file     Medications Prior to Admission   Medication Sig Dispense Refill Last Dose/Taking    albuterol (PROVENTIL) 2.5 mg /3 mL (0.083 %) nebulizer solution Take 2.5 mg by nebulization every 4 (four) hours as needed.       albuterol (PROVENTIL/VENTOLIN HFA) 90 mcg/actuation inhaler 1 puff every 4 (four) hours as needed.       albuterol sulfate 2.5 mg/0.5 mL Nebu Inhale 2.5 mg into the lungs.       carvediloL (COREG) 3.125 MG  tablet Take 3.125 mg by mouth 2 (two) times daily.       clopidogreL (PLAVIX) 75 mg tablet Take 1 tablet by mouth every morning.       cyclobenzaprine (FLEXERIL) 10 MG tablet Take 1 tablet (10 mg total) by mouth 3 (three) times daily. 90 tablet 0     furosemide (LASIX) 20 MG tablet Take 1 tablet (20 mg total) by mouth 3 (three) times daily. (Patient taking differently: Take 20 mg by mouth 3 (three) times daily as needed (Hypertension and Edema).) 90 tablet 0     insulin glargine U-100, Lantus, (BASAGLAR KWIKPEN U-100 INSULIN) 100 unit/mL (3 mL) InPn pen Inject 10 Units into the skin every evening.       LINZESS 145 mcg Cap capsule Take 145 mcg by mouth every morning.       losartan (COZAAR) 25 MG tablet Take 25 mg by mouth 2 (two) times a day.       oxyCODONE-acetaminophen (PERCOCET)  mg per tablet Take 1 tablet by mouth.       pantoprazole (PROTONIX) 40 MG tablet Take 1 tablet (40 mg total) by mouth once daily. 30 tablet 0     ranolazine (RANEXA) 500 MG Tb12 Take 500 mg by mouth 2 (two) times daily.       rosuvastatin (CRESTOR) 40 MG Tab Take 40 mg by mouth every evening.       spironolactone (ALDACTONE) 25 MG tablet Take 1 tablet by mouth every morning.       TRELEGY ELLIPTA 200-62.5-25 mcg inhaler Inhale 1 puff into the lungs.        Review of patient's allergies indicates:   Allergen Reactions    Hydrochlorothiazide      Other Reaction(s): Pancreatitis            Review of Systems:     Comprehensive 10pt ROS negative except as noted per history.      Objective:       VITAL SIGNS: 24 HR MIN & MAX LAST    Temp  Min: 97.9 °F (36.6 °C)  Max: 98.5 °F (36.9 °C)  98.1 °F (36.7 °C)        BP  Min: 116/63  Max: 143/70  128/72     Pulse  Min: 68  Max: 80  80     Resp  Min: 17  Max: 23  18    SpO2  Min: 92 %  Max: 96 %  96 %      GEN: Chronically ill appearing WM in NAD  HEENT: Conjunctiva anicteric, pupils equal   CV: RRR +S1,S2 without murmur  PULM: CTAB, unlabored  ABD: Soft, NT/ND abdomen with NABS  EXT: No  cyanosis or edema  SKIN: Warm and dry  PSYCH: Awake, alert and appropriately conversant.   Dialysis access:  No dialysis access            Component Value Date/Time     (L) 01/20/2025 0620     (L) 01/18/2025 1040     (L) 01/07/2025 0617     01/06/2025 0455    K 4.7 01/20/2025 0620    K 4.9 01/18/2025 1040    K 3.7 01/07/2025 1338    K 4 01/07/2025 0617    CO2 25 01/20/2025 0620    CO2 23 01/18/2025 1040    CO2 17 (L) 01/07/2025 0617    CO2 18 (L) 01/06/2025 0455    BUN 51.9 (H) 01/20/2025 0620    BUN 41.2 (H) 01/18/2025 1040    BUN 56 (H) 01/07/2025 0617    BUN 48 (H) 01/06/2025 0455    CREATININE 2.09 (H) 01/20/2025 0620    CREATININE 1.84 (H) 01/18/2025 1040    CREATININE 1.98 (H) 01/07/2025 0617    CREATININE 1.64 (H) 01/06/2025 0455    CALCIUM 9.3 01/20/2025 0620    CALCIUM 9.1 01/18/2025 1040    CALCIUM 8.5 (L) 01/07/2025 0617    CALCIUM 8.7 (L) 01/06/2025 0455            Component Value Date/Time    WBC 11.98 (H) 01/20/2025 0620    WBC 9.89 01/15/2025 0247    HGB 11.1 (L) 01/20/2025 0620    HGB 10.3 (L) 01/15/2025 0247    HCT 33.0 (L) 01/20/2025 0620    HCT 30.4 (L) 01/15/2025 0247    HCT 26.0 (L) 03/02/2018 0857    HCT 28.0 (L) 03/02/2018 0825     01/20/2025 0620     01/15/2025 0247             X-Ray Chest 1 View   Final Result      Increase interstitial markings and linear densities at the left base might be related to subsegmental atelectatic changes and or fibrotic streaks.      Otherwise no active pulmonary disease         Electronically signed by: Sampson Beltran   Date:    01/20/2025   Time:    08:16      US Retroperitoneal Complete   Final Result      1. No hydronephrosis.   2. Debris in the bladder, recommend correlation with urinalysis.         Electronically signed by: Mikel Izaguirre   Date:    01/19/2025   Time:    10:06      CT Cervical Spine Without Contrast   Final Result      Severe degenerative changes appears similar to prior with grossly unchanged canal  narrowing.  If further evaluation is needed recommend MR.         Electronically signed by: Ancelmo Gurrola   Date:    01/14/2025   Time:    20:05          Assessment / Plan:       Active Hospital Problems    Diagnosis  POA    *Spinal cord compression [G95.20]  Yes    Anemia [D64.9]  Yes    Hyponatremia [E87.1]  Yes      Resolved Hospital Problems   No resolved problems to display.       CKD 4 - baseline creatinine 1.5-2.0  Severe cervical spondylosis  Severe cervical canal stenosis with myelomalacia    Plan:  Renal function appears to be at baseline.  Were held for CKD overall negative.  Patient likely has underlying diabetic versus hypertensive nephropathy.  Encourage increasing p.o. water intake.      Emile Ling DO  Nephrology  [facility]  Clinic number: [phone]      Note was created with the assistance of electronic Dictation Services.  Note was reviewed to decrease errors, however, these may still be present.  Please contact me about questions or concerns with the dictation.

## 2025-01-20 NOTE — PROGRESS NOTES
Inpatient Nutrition Evaluation    Admit Date: 1/14/2025   Total duration of encounter: 6 days   Patient Age: 69 y.o.    Nutrition Recommendation/Prescription     Continue current diet (Diet Adult Regular) as tolerated.   Monitor PO intakes, labs, and wt changes    Nutrition Assessment     Chart Review    Reason Seen: length of stay    Malnutrition Screening Tool Results   Have you recently lost weight without trying?: No  Have you been eating poorly because of a decreased appetite?: No   MST Score: 0   Diagnosis:  Spinal cord compression  Anemia  Hyponatremia     Relevant Medical History:   CKD 4, hypertension, hyperlipidemia, diabetes, COPD, CHF, coronary artery disease status post CABG, peripheral artery disease, AA, and tobacco dependence     Scheduled Medications:  carvediloL, 3.125 mg, BID  cefTRIAXone (Rocephin) IV (PEDS and ADULTS), 1 g, Q24H  enoxparin, 40 mg, Q24H (prophylaxis, 1700)  fluticasone furoate-vilanteroL, 1 puff, Daily  furosemide, 20 mg, TID  linaCLOtide, 145 mcg, QAM  pantoprazole, 40 mg, Before breakfast  ranolazine, 500 mg, BID  tiotropium bromide, 2 puff, Daily  white petrolatum, , Daily    Continuous Infusions:   PRN Medications:   Current Facility-Administered Medications:     acetaminophen, 650 mg, Oral, Q4H PRN    albuterol-ipratropium, 3 mL, Nebulization, Q6H PRN    aluminum-magnesium hydroxide-simethicone, 30 mL, Oral, QID PRN    bisacodyL, 10 mg, Rectal, Daily PRN    [START ON 1/21/2025] cefTRIAXone (Rocephin) IV (PEDS and ADULTS), 2 g, Intravenous, On Call Procedure    cyclobenzaprine, 10 mg, Oral, TID PRN    dextrose 50%, 12.5 g, Intravenous, PRN    dextrose 50%, 25 g, Intravenous, PRN    glucagon (human recombinant), 1 mg, Intramuscular, PRN    glucose, 16 g, Oral, PRN    glucose, 24 g, Oral, PRN    melatonin, 9 mg, Oral, Nightly PRN    morphine, 2 mg, Intravenous, Q6H PRN    naloxone, 0.02 mg, Intravenous, PRN    ondansetron, 8 mg, Oral, Q8H PRN    ondansetron, 4 mg, Intravenous,  "Q8H PRN    oxyCODONE-acetaminophen, 1 tablet, Oral, Q4H PRN    polyethylene glycol, 17 g, Oral, TID PRN    simethicone, 1 tablet, Oral, QID PRN    sodium chloride 0.9%, 10 mL, Intravenous, PRN    Recent Labs   Lab 01/14/25  1729 01/15/25  0247 01/18/25  1040 01/20/25  0620   * 136 133* 134*   K 4.7 4.5 4.9 4.7   CALCIUM 9.8 9.3 9.1 9.3    105 99 99   CO2 23 25 23 25   BUN 46.5* 47.0* 41.2* 51.9*   CREATININE 1.83* 1.54* 1.84* 2.09*   EGFRNORACEVR 40 49 40 34   GLUCOSE 109 98 147* 112   BILITOT 0.6  --   --  0.6   ALKPHOS 117  --   --  121   ALT 24  --   --  17   AST 24  --   --  15   ALBUMIN 3.5  --   --  3.4   WBC 10.94 9.89  --  11.98*   HGB 11.7* 10.3*  --  11.1*   HCT 34.0* 30.4*  --  33.0*     Nutrition Orders:  Diet Adult Regular      Appetite/Oral Intake: good/50-75% of meals  Factors Affecting Nutritional Intake: none identified  Food/Pentecostal/Cultural Preferences: none reported  Food Allergies: no known food allergies  Last Bowel Movement: 01/13/25  Wound(s):  wound to b/l legs, feel, and R buttocks    Comments    1/20/25: pt reports good appetite and PO intakes since admit and PTA. Pt denies any unintended wt loss. Pt not interested in trying any ONS.     Anthropometrics    Height: 5' 6" (167.6 cm), Height Method: Stated  Last Weight: 78 kg (172 lb) (01/18/25 0728), Weight Method: Bed Scale  BMI (Calculated): 27.8  BMI Classification: overweight (BMI 25-29.9)        Ideal Body Weight (IBW), Male: 142 lb     % Ideal Body Weight, Male (lb): 121.83 %                          Usual Weight Provided By: patient denies unintentional weight loss    Wt Readings from Last 5 Encounters:   01/18/25 78 kg (172 lb)   12/03/24 78.5 kg (173 lb)   08/29/22 78.9 kg (173 lb 15.1 oz)   03/16/16 82.3 kg (181 lb 7 oz)     Weight Change(s) Since Admission:   Wt Readings from Last 1 Encounters:   01/18/25 0728 78 kg (172 lb)   01/14/25 1717 78.5 kg (173 lb)   Admit Weight: 78.5 kg (173 lb) (01/14/25 1717), Weight " Method: Stated    Patient Education     Not applicable.    Nutrition Goals & Monitoring     Dietitian will monitor: energy intake and weight    Nutrition Risk/Follow-Up: low (follow-up in 5-7 days)  Patients assigned 'low nutrition risk' status do not qualify for a full nutritional assessment but will be monitored and re-evaluated in a 5-7 day time period. Please consult if re-evaluation needed sooner.

## 2025-01-20 NOTE — PROGRESS NOTES
"[facility] HOSPITAL MEDICINE ~ PROGRESS NOTE        CHIEF COMPLAINT   Hospital follow up    HOSPITAL COURSE   69-year-old male with a past medical history of hypertension, hyperlipidemia, diabetes, COPD (no home O2), heart failure, CAD status post CABG, [employer] pacemaker, peripheral arterial disease status post left PCI, abdominal aortic aneurysm, tobacco dependence, prior C5, 6, 7 decompression with  who presents with worsening chronic cervical spondylosis with myelopathy.  He was seen by Neurosurgery on 12/03/2024 however he did not complete cardiac evaluation for clearance and did not go to scheduled MRI appointments.  They recommended fusion at least at C3-4 and C4-5.      He was seen at [facility] 01/05/2025: "He initially told Dr. Bella he did not want surgery. He changed his mind as his UTI was treated and cleared and a f/u urine culture from 1/11 has been negative for growth. His son does not want Dr. Bella to do the surgery and attempts were made to see if Dr. Neumann or Dr. Bowen would see the patient but both declined this. He is now recommended to discharge and report to a different hospital for a reevaluation because he and his family have declined Dr. Bella's offer to do surgery. The surgery is felt to be needed and he has been seen by Dr. Beebe cardiology who rates his cardiac risk as moderate at 5-7% of a cardiac event."   At the time of my evaluation patient is seeking neurosurgical evaluation and intervention.    Today  Seen and examined this morning.  No complaints.  Notified by Neurosurgery that surgery has now been postponed until Thursday due to weather.  Patient reports having no kidney issues that he is aware of or being followed by a nephrologist.  Will ask Nephrology here to see him as well.        OBJECTIVE/PHYSICAL EXAM     VITAL SIGNS (MOST RECENT):  Temp: 98.1 °F (36.7 °C) (01/20/25 1100)  Pulse: 80 (01/20/25 1100)  Resp: 18 (01/20/25 1100)  BP: " 128/72 (01/20/25 1100)  SpO2: 96 % (01/20/25 1100) VITAL SIGNS (24 HOUR RANGE):  Temp:  [97.9 °F (36.6 °C)-98.5 °F (36.9 °C)] 98.1 °F (36.7 °C)  Pulse:  [68-83] 80  Resp:  [17-23] 18  SpO2:  [92 %-96 %] 96 %  BP: (116-143)/(62-72) 128/72   GENERAL: In no acute distress, afebrile  HEENT:  CHEST: Clear to auscultation bilaterally  HEART: S1, S2, no appreciable murmur  ABDOMEN: Soft, nontender, BS +  MSK: Warm, no lower extremity edema, no clubbing or cyanosis  NEUROLOGIC: Alert and oriented x4, all extremities antigravity, right side weaker than the left   INTEGUMENTARY:  PSYCHIATRY:        ASSESSMENT/PLAN   Severe cervical spondylosis  Severe C3-4 central canal stenosis with central cord myelomalacia  JOHN versus CKD    History of: As listed above      Neurosurgery following.  Tentatively plan for surgery on 23rd.  Cardiology signed off with recs in their note.   PT OT  UA did not reflex to culture as it did not meet hospital policy Uti criteria.  Patient asymptomatic.  I will go ahead and empirically give him some Rocephin.  Nephrology consulted.  Patient with no history of CKD known.  Encouraged increased fluid intake.  Hold losartan and Aldactone.    DVT prophylaxis:  Lovenox 40    Anticipated discharge and disposition:   __________________________________________________________________________    NUTRITIONAL STATUS     Patient meets ASPEN criteria for   malnutrition of   per RD assessment as evidenced by:                       A minimum of two characteristics is recommended for diagnosis of either severe or non-severe malnutrition.     LABS/MICRO/MEDS/DIAGNOSTICS       LABS  Recent Labs     01/20/25  0620   *   K 4.7   CO2 25   BUN 51.9*   CREATININE 2.09*   GLUCOSE 112   CALCIUM 9.3   ALKPHOS 121   AST 15   ALT 17   ALBUMIN 3.4       Recent Labs     01/20/25  0620   WBC 11.98*   RBC 3.64*   HCT 33.0*   MCV 90.7            MICROBIOLOGY  Microbiology Results (last 7 days)       ** No results found for  the last 168 hours. **               MEDICATIONS   carvediloL  3.125 mg Oral BID    enoxparin  40 mg Subcutaneous Q24H (prophylaxis, 1700)    fluticasone furoate-vilanteroL  1 puff Inhalation Daily    furosemide  20 mg Oral TID    linaCLOtide  145 mcg Oral QAM    losartan  25 mg Oral Daily    pantoprazole  40 mg Oral Before breakfast    ranolazine  500 mg Oral BID    spironolactone  25 mg Oral QAM    tiotropium bromide  2 puff Inhalation Daily    white petrolatum   Topical (Top) Daily         INFUSIONS         DIAGNOSTIC TESTS  X-Ray Chest 1 View   Final Result      Increase interstitial markings and linear densities at the left base might be related to subsegmental atelectatic changes and or fibrotic streaks.      Otherwise no active pulmonary disease         Electronically signed by: Sampson Beltran   Date:    01/20/2025   Time:    08:16      US Retroperitoneal Complete   Final Result      1. No hydronephrosis.   2. Debris in the bladder, recommend correlation with urinalysis.         Electronically signed by: Mikel Izaguirre   Date:    01/19/2025   Time:    10:06      CT Cervical Spine Without Contrast   Final Result      Severe degenerative changes appears similar to prior with grossly unchanged canal narrowing.  If further evaluation is needed recommend MR.         Electronically signed by: Ancelmo Gurrola   Date:    01/14/2025   Time:    20:05           No echocardiogram results found for the past 14 days.         Case related differential diagnoses have been reviewed; assessment and plan has been documented. I have personally reviewed the labs and test results that are currently available; I have reviewed the patients medication list. I have reviewed the consulting providers recommendations. I have reviewed or attempted to review medical records based upon their availability.  All of the patient's and/or family's questions have been addressed and answered to the best of my ability.  I will continue to monitor  closely and make adjustments to medical management as needed.  This document was created using M*Modal Fluency Direct.  Transcription errors may have been made.  Please contact me if any questions may rise regarding documentation to clarify transcription.        Jorge Deluna MD   Internal Medicine  Department of Hospital Medicine [facility]

## 2025-01-20 NOTE — PLAN OF CARE
Problem: Adult Inpatient Plan of Care  Goal: Optimal Comfort and Wellbeing  Outcome: Progressing  Intervention: Provide Person-Centered Care  Flowsheets (Taken 1/20/2025 0405)  Trust Relationship/Rapport:   care explained   choices provided   emotional support provided   empathic listening provided   questions answered   questions encouraged   reassurance provided   thoughts/feelings acknowledged     Problem: Infection  Goal: Absence of Infection Signs and Symptoms  Outcome: Progressing  Intervention: Prevent or Manage Infection  Flowsheets (Taken 1/20/2025 0405)  Fever Reduction/Comfort Measures:   lightweight bedding   lightweight clothing     Problem: Pain Acute  Goal: Optimal Pain Control and Function  Outcome: Progressing  Intervention: Prevent or Manage Pain  Flowsheets (Taken 1/20/2025 0405)  Sleep/Rest Enhancement:   awakenings minimized   regular sleep/rest pattern promoted  Sensory Stimulation Regulation:   care clustered   television on  Medication Review/Management: medications reviewed  Intervention: Optimize Psychosocial Wellbeing  Flowsheets (Taken 1/20/2025 0405)  Supportive Measures:   active listening utilized   decision-making supported   positive reinforcement provided   self-care encouraged   self-responsibility promoted

## 2025-01-21 LAB
ALBUMIN SERPL-MCNC: 3.3 G/DL (ref 3.4–4.8)
BUN SERPL-MCNC: 55.4 MG/DL (ref 8.4–25.7)
CALCIUM SERPL-MCNC: 9.9 MG/DL (ref 8.8–10)
CHLORIDE SERPL-SCNC: 96 MMOL/L (ref 98–107)
CO2 SERPL-SCNC: 27 MMOL/L (ref 23–31)
CREAT SERPL-MCNC: 2.25 MG/DL (ref 0.72–1.25)
GFR SERPLBLD CREATININE-BSD FMLA CKD-EPI: 31 ML/MIN/1.73/M2
GLUCOSE SERPL-MCNC: 109 MG/DL (ref 82–115)
PHOSPHATE SERPL-MCNC: 3.7 MG/DL (ref 2.3–4.7)
POTASSIUM SERPL-SCNC: 4.7 MMOL/L (ref 3.5–5.1)
SODIUM SERPL-SCNC: 135 MMOL/L (ref 136–145)

## 2025-01-21 PROCEDURE — 21400001 HC TELEMETRY ROOM

## 2025-01-21 PROCEDURE — 36415 COLL VENOUS BLD VENIPUNCTURE: CPT | Performed by: STUDENT IN AN ORGANIZED HEALTH CARE EDUCATION/TRAINING PROGRAM

## 2025-01-21 PROCEDURE — 25000003 PHARM REV CODE 250: Performed by: STUDENT IN AN ORGANIZED HEALTH CARE EDUCATION/TRAINING PROGRAM

## 2025-01-21 PROCEDURE — 63600175 PHARM REV CODE 636 W HCPCS: Performed by: STUDENT IN AN ORGANIZED HEALTH CARE EDUCATION/TRAINING PROGRAM

## 2025-01-21 PROCEDURE — 25000242 PHARM REV CODE 250 ALT 637 W/ HCPCS: Performed by: INTERNAL MEDICINE

## 2025-01-21 PROCEDURE — 63600175 PHARM REV CODE 636 W HCPCS

## 2025-01-21 PROCEDURE — 63600175 PHARM REV CODE 636 W HCPCS: Performed by: INTERNAL MEDICINE

## 2025-01-21 PROCEDURE — 80069 RENAL FUNCTION PANEL: CPT | Performed by: STUDENT IN AN ORGANIZED HEALTH CARE EDUCATION/TRAINING PROGRAM

## 2025-01-21 RX ORDER — SODIUM CHLORIDE 9 MG/ML
INJECTION, SOLUTION INTRAVENOUS CONTINUOUS
Status: DISCONTINUED | OUTPATIENT
Start: 2025-01-21 | End: 2025-01-21

## 2025-01-21 RX ORDER — SODIUM CHLORIDE 9 MG/ML
INJECTION, SOLUTION INTRAVENOUS CONTINUOUS
Status: ACTIVE | OUTPATIENT
Start: 2025-01-21 | End: 2025-01-21

## 2025-01-21 RX ADMIN — SODIUM CHLORIDE: 9 INJECTION, SOLUTION INTRAVENOUS at 10:01

## 2025-01-21 RX ADMIN — MORPHINE SULFATE 2 MG: 4 INJECTION, SOLUTION INTRAMUSCULAR; INTRAVENOUS at 08:01

## 2025-01-21 RX ADMIN — CYCLOBENZAPRINE 10 MG: 10 TABLET, FILM COATED ORAL at 01:01

## 2025-01-21 RX ADMIN — CEFTRIAXONE SODIUM 1 G: 1 INJECTION, POWDER, FOR SOLUTION INTRAMUSCULAR; INTRAVENOUS at 12:01

## 2025-01-21 RX ADMIN — RANOLAZINE 500 MG: 500 TABLET, FILM COATED, EXTENDED RELEASE ORAL at 08:01

## 2025-01-21 RX ADMIN — OXYCODONE AND ACETAMINOPHEN 1 TABLET: 10; 325 TABLET ORAL at 06:01

## 2025-01-21 RX ADMIN — OXYCODONE AND ACETAMINOPHEN 1 TABLET: 10; 325 TABLET ORAL at 10:01

## 2025-01-21 RX ADMIN — MORPHINE SULFATE 2 MG: 4 INJECTION, SOLUTION INTRAMUSCULAR; INTRAVENOUS at 09:01

## 2025-01-21 RX ADMIN — ENOXAPARIN SODIUM 40 MG: 40 INJECTION SUBCUTANEOUS at 04:01

## 2025-01-21 RX ADMIN — CARVEDILOL 3.12 MG: 3.12 TABLET, FILM COATED ORAL at 08:01

## 2025-01-21 RX ADMIN — PANTOPRAZOLE SODIUM 40 MG: 40 TABLET, DELAYED RELEASE ORAL at 06:01

## 2025-01-21 RX ADMIN — FLUTICASONE FUROATE AND VILANTEROL TRIFENATATE 1 PUFF: 200; 25 POWDER RESPIRATORY (INHALATION) at 08:01

## 2025-01-21 RX ADMIN — LINACLOTIDE 145 MCG: 145 CAPSULE, GELATIN COATED ORAL at 06:01

## 2025-01-21 RX ADMIN — OXYCODONE AND ACETAMINOPHEN 1 TABLET: 10; 325 TABLET ORAL at 01:01

## 2025-01-21 RX ADMIN — OXYCODONE AND ACETAMINOPHEN 1 TABLET: 10; 325 TABLET ORAL at 07:01

## 2025-01-21 RX ADMIN — TIOTROPIUM BROMIDE INHALATION SPRAY 2 PUFF: 3.12 SPRAY, METERED RESPIRATORY (INHALATION) at 08:01

## 2025-01-21 RX ADMIN — WHITE PETROLATUM: 1.75 OINTMENT TOPICAL at 08:01

## 2025-01-21 RX ADMIN — MORPHINE SULFATE 2 MG: 4 INJECTION, SOLUTION INTRAMUSCULAR; INTRAVENOUS at 04:01

## 2025-01-21 RX ADMIN — OXYCODONE AND ACETAMINOPHEN 1 TABLET: 10; 325 TABLET ORAL at 02:01

## 2025-01-21 RX ADMIN — CYCLOBENZAPRINE 10 MG: 10 TABLET, FILM COATED ORAL at 12:01

## 2025-01-21 NOTE — PLAN OF CARE
Problem: Adult Inpatient Plan of Care  Goal: Optimal Comfort and Wellbeing  Outcome: Progressing  Intervention: Provide Person-Centered Care  Flowsheets (Taken 1/21/2025 0143)  Trust Relationship/Rapport:   care explained   choices provided   emotional support provided   empathic listening provided   questions answered   questions encouraged   reassurance provided   thoughts/feelings acknowledged     Problem: Infection  Goal: Absence of Infection Signs and Symptoms  Outcome: Progressing  Intervention: Prevent or Manage Infection  Flowsheets (Taken 1/21/2025 0143)  Fever Reduction/Comfort Measures:   lightweight bedding   lightweight clothing     Problem: Pain Acute  Goal: Optimal Pain Control and Function  Outcome: Not Progressing  Intervention: Develop Pain Management Plan  Flowsheets (Taken 1/21/2025 0143)  Pain Management Interventions:   around-the-clock dosing utilized   care clustered   medication offered   pain management plan reviewed with patient/caregiver   pillow support provided   position adjusted

## 2025-01-21 NOTE — PROGRESS NOTES
Nephrology consult follow up note    HPI:      Tyler Mai is a 69 y.o. male  who presents for cervical spondylosis, and cervical stenosis.  Past medical history significant for CKD 4, hypertension, hyperlipidemia, diabetes, COPD, CHF, coronary artery disease status post CABG, peripheral artery disease, AA, and tobacco dependence.  Patient was recently at 0 [facility] for cervical stenosis, but did not want to have surgery with Dr. Bella so was transferred here for further neurosurgery evaluation.  He does appear to have chronic kidney disease at baseline.  His baseline creatinine appears to be around 1.5-2.  Creatinine since admission has been 1.8-2.0.  Renal ultrasound was negative.  He is 0.4 grams/grams of proteinuria.  He does have history of longstanding diabetes, and hypertension which is likely his cause of underlying CKD.     Interval history:     01/21/2025  No acute overnight.  Patient states that he drank a little more water yesterday, but probably only about 1 L. no chest pain, shortness of breath, nausea, vomiting, or lower extremity edema.     Review of Systems:     Comprehensive 10pt ROS negative except as noted per history.    Past medical, family, surgical, and social history reviewed and unchanged from initial consult note.     Objective:       VITAL SIGNS: 24 HR MIN & MAX LAST    Temp  Min: 97.9 °F (36.6 °C)  Max: 98.2 °F (36.8 °C)  98.2 °F (36.8 °C)        BP  Min: 116/68  Max: 152/65  (!) 148/66     Pulse  Min: 58  Max: 80  67     Resp  Min: 14  Max: 23  20    SpO2  Min: 92 %  Max: 97 %  (!) 92 %      GEN: Chronically ill appearing WM in NAD  CV: RRR +S1,S2 without murmur  PULM: CTAB, unlabored  ABD: Soft, NT/ND abdomen with NABS  EXT: No cyanosis or edema  SKIN: Warm and dry  PSYCH: Awake, alert and appropriately conversant.   Dialysis access:  No dialysis access            Component Value Date/Time     (L) 01/21/2025 0509     (L) 01/20/2025 0620     (L) 01/07/2025 0617    NA  140 01/06/2025 0455    K 4.7 01/21/2025 0509    K 4.7 01/20/2025 0620    K 3.7 01/07/2025 1338    K 4 01/07/2025 0617    CO2 27 01/21/2025 0509    CO2 25 01/20/2025 0620    CO2 17 (L) 01/07/2025 0617    CO2 18 (L) 01/06/2025 0455    BUN 55.4 (H) 01/21/2025 0509    BUN 51.9 (H) 01/20/2025 0620    BUN 56 (H) 01/07/2025 0617    BUN 48 (H) 01/06/2025 0455    CREATININE 2.25 (H) 01/21/2025 0509    CREATININE 2.09 (H) 01/20/2025 0620    CREATININE 1.98 (H) 01/07/2025 0617    CREATININE 1.64 (H) 01/06/2025 0455    CALCIUM 9.9 01/21/2025 0509    CALCIUM 9.3 01/20/2025 0620    CALCIUM 8.5 (L) 01/07/2025 0617    CALCIUM 8.7 (L) 01/06/2025 0455    PHOS 3.7 01/21/2025 0509            Component Value Date/Time    WBC 11.98 (H) 01/20/2025 0620    WBC 9.89 01/15/2025 0247    HGB 11.1 (L) 01/20/2025 0620    HGB 10.3 (L) 01/15/2025 0247    HCT 33.0 (L) 01/20/2025 0620    HCT 30.4 (L) 01/15/2025 0247    HCT 26.0 (L) 03/02/2018 0857    HCT 28.0 (L) 03/02/2018 0825     01/20/2025 0620     01/15/2025 0247         Imaging reviewed      Assessment / Plan:       Active Hospital Problems    Diagnosis  POA    *Spinal cord compression [G95.20]  Yes    Anemia [D64.9]  Yes    Hyponatremia [E87.1]  Yes      Resolved Hospital Problems   No resolved problems to display.       CKD 4 - baseline creatinine 1.5-2.0  Severe cervical spondylosis  Severe cervical canal stenosis with myelomalacia     Plan:  Renal function slightly worsened today.  Patient is still making good urine output.  P.o. water intake has been poor.  We will give 1 L IV fluids today.    Emile Ling DO  Nephrology  [facility]  Clinic number: [phone]      Note was created with the assistance of electronic Dictation Services.  Note was reviewed to decrease errors, however, these may still be present.  Please contact me about questions or concerns with the dictation.

## 2025-01-21 NOTE — PROGRESS NOTES
"[facility] HOSPITAL MEDICINE ~ PROGRESS NOTE        CHIEF COMPLAINT   Hospital follow up    HOSPITAL COURSE   69-year-old male with a past medical history of hypertension, hyperlipidemia, diabetes, COPD (no home O2), heart failure, CAD status post CABG, [employer]tronic pacemaker, peripheral arterial disease status post left PCI, abdominal aortic aneurysm, tobacco dependence, prior C5, 6, 7 decompression with  who presents with worsening chronic cervical spondylosis with myelopathy.  He was seen by Neurosurgery on 12/03/2024 however he did not complete cardiac evaluation for clearance and did not go to scheduled MRI appointments.  They recommended fusion at least at C3-4 and C4-5.      He was seen at [facility] 01/05/2025: "He initially told Dr. Bella he did not want surgery. He changed his mind as his UTI was treated and cleared and a f/u urine culture from 1/11 has been negative for growth. His son does not want Dr. Bella to do the surgery and attempts were made to see if Dr. Neumann or Dr. Bowen would see the patient but both declined this. He is now recommended to discharge and report to a different hospital for a reevaluation because he and his family have declined Dr. Bella's offer to do surgery. The surgery is felt to be needed and he has been seen by Dr. Beebe cardiology who rates his cardiac risk as moderate at 5-7% of a cardiac event."   At the time of my evaluation patient is seeking neurosurgical evaluation and intervention.    Today  Seen and examined this morning.  Nephrology consulted yesterday felt he has CKD.  Otherwise no new issues at this time.        OBJECTIVE/PHYSICAL EXAM     VITAL SIGNS (MOST RECENT):  Temp: 98.2 °F (36.8 °C) (01/21/25 0512)  Pulse: 63 (01/21/25 0512)  Resp: 16 (01/21/25 0609)  BP: (!) 152/65 (01/21/25 0512)  SpO2: (!) 92 % (01/21/25 0512) VITAL SIGNS (24 HOUR RANGE):  Temp:  [97.9 °F (36.6 °C)-98.2 °F (36.8 °C)] 98.2 °F (36.8 °C)  Pulse:  [58-80] " 63  Resp:  [14-23] 16  SpO2:  [92 %-97 %] 92 %  BP: (116-152)/(63-72) 152/65   GENERAL: In no acute distress, afebrile  HEENT:  CHEST: Clear to auscultation bilaterally  HEART: S1, S2, no appreciable murmur  ABDOMEN: Soft, nontender, BS +  MSK: Warm, no lower extremity edema, no clubbing or cyanosis  NEUROLOGIC: Alert and oriented x4, all extremities antigravity, right side weaker than the left   INTEGUMENTARY:  PSYCHIATRY:        ASSESSMENT/PLAN   Severe cervical spondylosis  Severe C3-4 central canal stenosis with central cord myelomalacia  JOHN versus CKD    History of: As listed above      Neurosurgery following.  Tentatively plan for surgery on 23rd.  Cardiology signed off with recs in their note.   PT OT  UA did not reflex to culture as it did not meet hospital policy Uti criteria.  Patient asymptomatic.  I will go ahead and empirically give him some Rocephin.  Nephrology following.  Hold losartan and Aldactone.  Stop oral Lasix for now.    Has history of heart failure EF less than 20%.    DVT prophylaxis:  Lovenox 40    Anticipated discharge and disposition:   __________________________________________________________________________    NUTRITIONAL STATUS     Patient meets ASPEN criteria for   malnutrition of   per RD assessment as evidenced by:                       A minimum of two characteristics is recommended for diagnosis of either severe or non-severe malnutrition.     LABS/MICRO/MEDS/DIAGNOSTICS       LABS  Recent Labs     01/20/25  0620 01/21/25  0509   * 135*   K 4.7 4.7   CO2 25 27   BUN 51.9* 55.4*   CREATININE 2.09* 2.25*   GLUCOSE 112 109   CALCIUM 9.3 9.9   ALKPHOS 121  --    AST 15  --    ALT 17  --    ALBUMIN 3.4 3.3*       Recent Labs     01/20/25  0620   WBC 11.98*   RBC 3.64*   HCT 33.0*   MCV 90.7            MICROBIOLOGY  Microbiology Results (last 7 days)       ** No results found for the last 168 hours. **               MEDICATIONS   carvediloL  3.125 mg Oral BID     cefTRIAXone (Rocephin) IV (PEDS and ADULTS)  1 g Intravenous Q24H    enoxparin  40 mg Subcutaneous Q24H (prophylaxis, 1700)    fluticasone furoate-vilanteroL  1 puff Inhalation Daily    furosemide  20 mg Oral TID    linaCLOtide  145 mcg Oral QAM    pantoprazole  40 mg Oral Before breakfast    ranolazine  500 mg Oral BID    tiotropium bromide  2 puff Inhalation Daily    white petrolatum   Topical (Top) Daily         INFUSIONS         DIAGNOSTIC TESTS  X-Ray Chest 1 View   Final Result      Increase interstitial markings and linear densities at the left base might be related to subsegmental atelectatic changes and or fibrotic streaks.      Otherwise no active pulmonary disease         Electronically signed by: Sampson Beltran   Date:    01/20/2025   Time:    08:16      US Retroperitoneal Complete   Final Result      1. No hydronephrosis.   2. Debris in the bladder, recommend correlation with urinalysis.         Electronically signed by: Mikel Izaguirre   Date:    01/19/2025   Time:    10:06      CT Cervical Spine Without Contrast   Final Result      Severe degenerative changes appears similar to prior with grossly unchanged canal narrowing.  If further evaluation is needed recommend MR.         Electronically signed by: Ancelmo Gurrola   Date:    01/14/2025   Time:    20:05           No echocardiogram results found for the past 14 days.         Case related differential diagnoses have been reviewed; assessment and plan has been documented. I have personally reviewed the labs and test results that are currently available; I have reviewed the patients medication list. I have reviewed the consulting providers recommendations. I have reviewed or attempted to review medical records based upon their availability.  All of the patient's and/or family's questions have been addressed and answered to the best of my ability.  I will continue to monitor closely and make adjustments to medical management as needed.  This document was  created using M*Modal Fluency Direct.  Transcription errors may have been made.  Please contact me if any questions may rise regarding documentation to clarify transcription.        Jorge Deluna MD   Internal Medicine  Department of [facility]  [facility]

## 2025-01-22 LAB
ALBUMIN SERPL-MCNC: 3.1 G/DL (ref 3.4–4.8)
BASOPHILS # BLD AUTO: 0.09 X10(3)/MCL
BASOPHILS NFR BLD AUTO: 0.9 %
BUN SERPL-MCNC: 51.6 MG/DL (ref 8.4–25.7)
CALCIUM SERPL-MCNC: 9.5 MG/DL (ref 8.8–10)
CHLORIDE SERPL-SCNC: 98 MMOL/L (ref 98–107)
CO2 SERPL-SCNC: 25 MMOL/L (ref 23–31)
CREAT SERPL-MCNC: 1.85 MG/DL (ref 0.72–1.25)
EOSINOPHIL # BLD AUTO: 0.25 X10(3)/MCL (ref 0–0.9)
EOSINOPHIL NFR BLD AUTO: 2.5 %
ERYTHROCYTE [DISTWIDTH] IN BLOOD BY AUTOMATED COUNT: 11.6 % (ref 11.5–17)
GFR SERPLBLD CREATININE-BSD FMLA CKD-EPI: 39 ML/MIN/1.73/M2
GLUCOSE SERPL-MCNC: 104 MG/DL (ref 82–115)
HCT VFR BLD AUTO: 30.7 % (ref 42–52)
HGB BLD-MCNC: 10.4 G/DL (ref 14–18)
IMM GRANULOCYTES # BLD AUTO: 0.04 X10(3)/MCL (ref 0–0.04)
IMM GRANULOCYTES NFR BLD AUTO: 0.4 %
LYMPHOCYTES # BLD AUTO: 2.05 X10(3)/MCL (ref 0.6–4.6)
LYMPHOCYTES NFR BLD AUTO: 20.8 %
MAGNESIUM SERPL-MCNC: 1.2 MG/DL (ref 1.6–2.6)
MCH RBC QN AUTO: 31 PG (ref 27–31)
MCHC RBC AUTO-ENTMCNC: 33.9 G/DL (ref 33–36)
MCV RBC AUTO: 91.4 FL (ref 80–94)
MONOCYTES # BLD AUTO: 1.02 X10(3)/MCL (ref 0.1–1.3)
MONOCYTES NFR BLD AUTO: 10.3 %
NEUTROPHILS # BLD AUTO: 6.41 X10(3)/MCL (ref 2.1–9.2)
NEUTROPHILS NFR BLD AUTO: 65.1 %
NRBC BLD AUTO-RTO: 0 %
PHOSPHATE SERPL-MCNC: 3.3 MG/DL (ref 2.3–4.7)
PLATELET # BLD AUTO: 330 X10(3)/MCL (ref 130–400)
PMV BLD AUTO: 10.6 FL (ref 7.4–10.4)
POTASSIUM SERPL-SCNC: 4.4 MMOL/L (ref 3.5–5.1)
RBC # BLD AUTO: 3.36 X10(6)/MCL (ref 4.7–6.1)
SODIUM SERPL-SCNC: 132 MMOL/L (ref 136–145)
WBC # BLD AUTO: 9.86 X10(3)/MCL (ref 4.5–11.5)

## 2025-01-22 PROCEDURE — 63600175 PHARM REV CODE 636 W HCPCS

## 2025-01-22 PROCEDURE — 63600175 PHARM REV CODE 636 W HCPCS: Performed by: STUDENT IN AN ORGANIZED HEALTH CARE EDUCATION/TRAINING PROGRAM

## 2025-01-22 PROCEDURE — 25000003 PHARM REV CODE 250: Performed by: INTERNAL MEDICINE

## 2025-01-22 PROCEDURE — 25000003 PHARM REV CODE 250: Performed by: STUDENT IN AN ORGANIZED HEALTH CARE EDUCATION/TRAINING PROGRAM

## 2025-01-22 PROCEDURE — 80069 RENAL FUNCTION PANEL: CPT | Performed by: INTERNAL MEDICINE

## 2025-01-22 PROCEDURE — 83735 ASSAY OF MAGNESIUM: CPT | Performed by: INTERNAL MEDICINE

## 2025-01-22 PROCEDURE — 25000242 PHARM REV CODE 250 ALT 637 W/ HCPCS: Performed by: INTERNAL MEDICINE

## 2025-01-22 PROCEDURE — 36415 COLL VENOUS BLD VENIPUNCTURE: CPT | Performed by: INTERNAL MEDICINE

## 2025-01-22 PROCEDURE — 21400001 HC TELEMETRY ROOM

## 2025-01-22 PROCEDURE — 63600175 PHARM REV CODE 636 W HCPCS: Performed by: INTERNAL MEDICINE

## 2025-01-22 PROCEDURE — 85025 COMPLETE CBC W/AUTO DIFF WBC: CPT | Performed by: INTERNAL MEDICINE

## 2025-01-22 RX ORDER — LANOLIN ALCOHOL/MO/W.PET/CERES
400 CREAM (GRAM) TOPICAL 2 TIMES DAILY
Status: DISCONTINUED | OUTPATIENT
Start: 2025-01-22 | End: 2025-01-31 | Stop reason: HOSPADM

## 2025-01-22 RX ORDER — MAGNESIUM SULFATE HEPTAHYDRATE 40 MG/ML
2 INJECTION, SOLUTION INTRAVENOUS ONCE
Status: COMPLETED | OUTPATIENT
Start: 2025-01-22 | End: 2025-01-22

## 2025-01-22 RX ADMIN — TIOTROPIUM BROMIDE INHALATION SPRAY 2 PUFF: 3.12 SPRAY, METERED RESPIRATORY (INHALATION) at 09:01

## 2025-01-22 RX ADMIN — OXYCODONE AND ACETAMINOPHEN 1 TABLET: 10; 325 TABLET ORAL at 12:01

## 2025-01-22 RX ADMIN — MAGNESIUM SULFATE HEPTAHYDRATE 2 G: 40 INJECTION, SOLUTION INTRAVENOUS at 09:01

## 2025-01-22 RX ADMIN — CARVEDILOL 3.12 MG: 3.12 TABLET, FILM COATED ORAL at 08:01

## 2025-01-22 RX ADMIN — MORPHINE SULFATE 2 MG: 4 INJECTION, SOLUTION INTRAMUSCULAR; INTRAVENOUS at 03:01

## 2025-01-22 RX ADMIN — ENOXAPARIN SODIUM 40 MG: 40 INJECTION SUBCUTANEOUS at 05:01

## 2025-01-22 RX ADMIN — Medication 400 MG: at 08:01

## 2025-01-22 RX ADMIN — OXYCODONE AND ACETAMINOPHEN 1 TABLET: 10; 325 TABLET ORAL at 09:01

## 2025-01-22 RX ADMIN — OXYCODONE AND ACETAMINOPHEN 1 TABLET: 10; 325 TABLET ORAL at 05:01

## 2025-01-22 RX ADMIN — CEFTRIAXONE SODIUM 1 G: 1 INJECTION, POWDER, FOR SOLUTION INTRAMUSCULAR; INTRAVENOUS at 12:01

## 2025-01-22 RX ADMIN — MORPHINE SULFATE 2 MG: 4 INJECTION, SOLUTION INTRAMUSCULAR; INTRAVENOUS at 07:01

## 2025-01-22 RX ADMIN — FLUTICASONE FUROATE AND VILANTEROL TRIFENATATE 1 PUFF: 200; 25 POWDER RESPIRATORY (INHALATION) at 09:01

## 2025-01-22 RX ADMIN — PANTOPRAZOLE SODIUM 40 MG: 40 TABLET, DELAYED RELEASE ORAL at 06:01

## 2025-01-22 RX ADMIN — LINACLOTIDE 145 MCG: 145 CAPSULE, GELATIN COATED ORAL at 06:01

## 2025-01-22 RX ADMIN — MORPHINE SULFATE 2 MG: 4 INJECTION, SOLUTION INTRAMUSCULAR; INTRAVENOUS at 08:01

## 2025-01-22 RX ADMIN — OXYCODONE AND ACETAMINOPHEN 1 TABLET: 10; 325 TABLET ORAL at 01:01

## 2025-01-22 RX ADMIN — RANOLAZINE 500 MG: 500 TABLET, FILM COATED, EXTENDED RELEASE ORAL at 08:01

## 2025-01-22 RX ADMIN — OXYCODONE AND ACETAMINOPHEN 1 TABLET: 10; 325 TABLET ORAL at 04:01

## 2025-01-22 NOTE — PROGRESS NOTES
Exam overall stable  Non urgent surgeries have again been cancelled for tomorrow  I'm hoping I can proceed on Friday afternoon  Explained to patient

## 2025-01-22 NOTE — PROGRESS NOTES
"[facility] HOSPITAL MEDICINE ~ PROGRESS NOTE        CHIEF COMPLAINT   Hospital follow up    HOSPITAL COURSE   69-year-old male with a past medical history of hypertension, hyperlipidemia, diabetes, COPD (no home O2), heart failure, CAD status post CABG, Medtronic pacemaker, peripheral arterial disease status post left PCI, abdominal aortic aneurysm, tobacco dependence, prior C5, 6, 7 decompression with  who presents with worsening chronic cervical spondylosis with myelopathy.  He was seen by Neurosurgery on 12/03/2024 however he did not complete cardiac evaluation for clearance and did not go to scheduled MRI appointments.  They recommended fusion at least at C3-4 and C4-5.      He was seen at [facility] 01/05/2025: "He initially told Dr. Bella he did not want surgery. He changed his mind as his UTI was treated and cleared and a f/u urine culture from 1/11 has been negative for growth. His son does not want Dr. Bella to do the surgery and attempts were made to see if Dr. Neumann or Dr. Bowen would see the patient but both declined this. He is now recommended to discharge and report to a different hospital for a reevaluation because he and his family have declined Dr. Bella's offer to do surgery. The surgery is felt to be needed and he has been seen by Dr. Beebe cardiology who rates his cardiac risk as moderate at 5-7% of a cardiac event."   At the time of my evaluation patient is seeking neurosurgical evaluation and intervention.    Today  Seen and examined this morning.  Creatinine 1.85.  Otherwise has no complaints.        OBJECTIVE/PHYSICAL EXAM     VITAL SIGNS (MOST RECENT):  Temp: 97.4 °F (36.3 °C) (01/22/25 0721)  Pulse: 63 (01/22/25 0933)  Resp: 18 (01/22/25 0933)  BP: 125/70 (01/22/25 0721)  SpO2: 95 % (01/22/25 0721) VITAL SIGNS (24 HOUR RANGE):  Temp:  [97.4 °F (36.3 °C)-98.5 °F (36.9 °C)] 97.4 °F (36.3 °C)  Pulse:  [61-88] 63  Resp:  [17-20] 18  SpO2:  [93 %-98 %] 95 " %  BP: (125-156)/(69-78) 125/70   GENERAL: In no acute distress, afebrile  HEENT:  CHEST: Clear to auscultation bilaterally  HEART: S1, S2, no appreciable murmur  ABDOMEN: Soft, nontender, BS +  MSK: Warm, no lower extremity edema, no clubbing or cyanosis  NEUROLOGIC: Alert and oriented x4, all extremities antigravity, right side weaker than the left   INTEGUMENTARY:  PSYCHIATRY:        ASSESSMENT/PLAN   Severe cervical spondylosis  Severe C3-4 central canal stenosis with central cord myelomalacia  CKD currently at baseline    History of: As listed above      Neurosurgery following.  Tentatively plan for surgery on 23rd.  Cardiology signed off with recs in their note.   PT OT  UA did not reflex to culture as it did not meet hospital policy Uti criteria.  Patient asymptomatic.  I will go ahead and empirically give him some Rocephin.  She would not hold him up for surgery.  Nephrology following.  Hold losartan and Aldactone.  Stop oral Lasix for now.    Has history of heart failure EF less than 20%.    DVT prophylaxis:  Lovenox 40    Anticipated discharge and disposition:   __________________________________________________________________________    NUTRITIONAL STATUS     Patient meets ASPEN criteria for   malnutrition of   per RD assessment as evidenced by:                       A minimum of two characteristics is recommended for diagnosis of either severe or non-severe malnutrition.     LABS/MICRO/MEDS/DIAGNOSTICS       LABS  Recent Labs     01/20/25  0620 01/21/25  0509 01/22/25  0519   *   < > 132*   K 4.7   < > 4.4   CO2 25   < > 25   BUN 51.9*   < > 51.6*   CREATININE 2.09*   < > 1.85*   GLUCOSE 112   < > 104   CALCIUM 9.3   < > 9.5   ALKPHOS 121  --   --    AST 15  --   --    ALT 17  --   --    ALBUMIN 3.4   < > 3.1*    < > = values in this interval not displayed.       Recent Labs     01/22/25  0519   WBC 9.86   RBC 3.36*   HCT 30.7*   MCV 91.4            MICROBIOLOGY  Microbiology Results (last  7 days)       ** No results found for the last 168 hours. **               MEDICATIONS   carvediloL  3.125 mg Oral BID    cefTRIAXone (Rocephin) IV (PEDS and ADULTS)  1 g Intravenous Q24H    enoxparin  40 mg Subcutaneous Q24H (prophylaxis, 1700)    fluticasone furoate-vilanteroL  1 puff Inhalation Daily    linaCLOtide  145 mcg Oral QAM    magnesium sulfate 2 g IVPB  2 g Intravenous Once    pantoprazole  40 mg Oral Before breakfast    ranolazine  500 mg Oral BID    tiotropium bromide  2 puff Inhalation Daily    white petrolatum   Topical (Top) Daily         INFUSIONS         DIAGNOSTIC TESTS  X-Ray Chest 1 View   Final Result      Increase interstitial markings and linear densities at the left base might be related to subsegmental atelectatic changes and or fibrotic streaks.      Otherwise no active pulmonary disease         Electronically signed by: Sampson Beltran   Date:    01/20/2025   Time:    08:16      US Retroperitoneal Complete   Final Result      1. No hydronephrosis.   2. Debris in the bladder, recommend correlation with urinalysis.         Electronically signed by: Mikel Izaguirre   Date:    01/19/2025   Time:    10:06      CT Cervical Spine Without Contrast   Final Result      Severe degenerative changes appears similar to prior with grossly unchanged canal narrowing.  If further evaluation is needed recommend MR.         Electronically signed by: Ancelmo Gurrola   Date:    01/14/2025   Time:    20:05           No echocardiogram results found for the past 14 days.         Case related differential diagnoses have been reviewed; assessment and plan has been documented. I have personally reviewed the labs and test results that are currently available; I have reviewed the patients medication list. I have reviewed the consulting providers recommendations. I have reviewed or attempted to review medical records based upon their availability.  All of the patient's and/or family's questions have been addressed and  answered to the best of my ability.  I will continue to monitor closely and make adjustments to medical management as needed.  This document was created using M*Modal Fluency Direct.  Transcription errors may have been made.  Please contact me if any questions may rise regarding documentation to clarify transcription.        Jorge Deluna MD   Internal Medicine  Department of [facility]  [facility]

## 2025-01-22 NOTE — PLAN OF CARE
Problem: Adult Inpatient Plan of Care  Goal: Optimal Comfort and Wellbeing  Outcome: Progressing  Intervention: Provide Person-Centered Care  Flowsheets (Taken 1/22/2025 0335)  Trust Relationship/Rapport:   care explained   choices provided   emotional support provided   empathic listening provided   questions answered   questions encouraged   reassurance provided   thoughts/feelings acknowledged     Problem: Infection  Goal: Absence of Infection Signs and Symptoms  Outcome: Progressing  Intervention: Prevent or Manage Infection  Flowsheets (Taken 1/22/2025 0335)  Fever Reduction/Comfort Measures:   lightweight bedding   lightweight clothing     Problem: Wound  Goal: Optimal Pain Control and Function  Outcome: Not Progressing  Intervention: Prevent or Manage Pain  Flowsheets (Taken 1/22/2025 0335)  Sleep/Rest Enhancement: relaxation techniques promoted  Pain Management Interventions:   around-the-clock dosing utilized   care clustered   medication offered   pain management plan reviewed with patient/caregiver   pillow support provided   position adjusted

## 2025-01-22 NOTE — PROGRESS NOTES
Nephrology consult follow up note    HPI:      Tyler Mai is a 69 y.o. male  who presents for cervical spondylosis, and cervical stenosis.  Past medical history significant for CKD 4, hypertension, hyperlipidemia, diabetes, COPD, CHF, coronary artery disease status post CABG, peripheral artery disease, AA, and tobacco dependence.  Patient was recently at 0 [facility] for cervical stenosis, but did not want to have surgery with Dr. Bella so was transferred here for further neurosurgery evaluation.  He does appear to have chronic kidney disease at baseline.  His baseline creatinine appears to be around 1.5-2.  Creatinine since admission has been 1.8-2.0.  Renal ultrasound was negative.  He is 0.4 grams/grams of proteinuria.  He does have history of longstanding diabetes, and hypertension which is likely his cause of underlying CKD.     Interval history:     01/21/2025  No acute overnight.  Patient states that he drank a little more water yesterday, but probably only about 1 L. no chest pain, shortness of breath, nausea, vomiting, or lower extremity edema.    01/22/2025  No acute events overnight.  Patient estimates he only drank about 1 L of water yesterday.  No chest pain, shortness of breath, nausea, vomiting, or lower extremity edema.     Review of Systems:     Comprehensive 10pt ROS negative except as noted per history.    Past medical, family, surgical, and social history reviewed and unchanged from initial consult note.     Objective:       VITAL SIGNS: 24 HR MIN & MAX LAST    Temp  Min: 97.4 °F (36.3 °C)  Max: 98.5 °F (36.9 °C)  97.4 °F (36.3 °C)        BP  Min: 125/70  Max: 156/78  125/70     Pulse  Min: 61  Max: 88  63     Resp  Min: 17  Max: 20  18    SpO2  Min: 93 %  Max: 98 %  95 %      GEN: Chronically ill appearing WM in NAD  CV: RRR +S1,S2 without murmur  PULM: CTAB, unlabored  ABD: Soft, NT/ND abdomen with NABS  EXT: No cyanosis or edema  SKIN: Warm and dry  PSYCH: Awake, alert and appropriately  conversant.   Dialysis access:  No dialysis access            Component Value Date/Time     (L) 01/22/2025 0519     (L) 01/21/2025 0509     (L) 01/07/2025 0617     01/06/2025 0455    K 4.4 01/22/2025 0519    K 4.7 01/21/2025 0509    K 3.7 01/07/2025 1338    K 4 01/07/2025 0617    CO2 25 01/22/2025 0519    CO2 27 01/21/2025 0509    CO2 17 (L) 01/07/2025 0617    CO2 18 (L) 01/06/2025 0455    BUN 51.6 (H) 01/22/2025 0519    BUN 55.4 (H) 01/21/2025 0509    BUN 56 (H) 01/07/2025 0617    BUN 48 (H) 01/06/2025 0455    CREATININE 1.85 (H) 01/22/2025 0519    CREATININE 2.25 (H) 01/21/2025 0509    CREATININE 1.98 (H) 01/07/2025 0617    CREATININE 1.64 (H) 01/06/2025 0455    CALCIUM 9.5 01/22/2025 0519    CALCIUM 9.9 01/21/2025 0509    CALCIUM 8.5 (L) 01/07/2025 0617    CALCIUM 8.7 (L) 01/06/2025 0455    PHOS 3.3 01/22/2025 0519            Component Value Date/Time    WBC 9.86 01/22/2025 0519    WBC 11.98 (H) 01/20/2025 0620    HGB 10.4 (L) 01/22/2025 0519    HGB 11.1 (L) 01/20/2025 0620    HCT 30.7 (L) 01/22/2025 0519    HCT 33.0 (L) 01/20/2025 0620    HCT 26.0 (L) 03/02/2018 0857    HCT 28.0 (L) 03/02/2018 0825     01/22/2025 0519     01/20/2025 0620         Imaging reviewed      Assessment / Plan:       Active Hospital Problems    Diagnosis  POA    *Spinal cord compression [G95.20]  Yes    Anemia [D64.9]  Yes    Hyponatremia [E87.1]  Yes      Resolved Hospital Problems   No resolved problems to display.       CKD 4 - baseline creatinine 1.5-2.0  Severe cervical spondylosis  Severe cervical canal stenosis with myelomalacia     Plan:  Renal function improved with 1 L IV fluids yesterday, likely now at baseline.  Encourage p.o. water intake.  Give 2 g IV magnesium today for hypomagnesemia.    Emile Ling DO  Nephrology  [facility]  Clinic number: [phone]      Note was created with the assistance of electronic Dictation Services.  Note was reviewed to decrease errors,  however, these may still be present.  Please contact me about questions or concerns with the dictation.

## 2025-01-23 LAB
ALBUMIN SERPL-MCNC: 3.3 G/DL (ref 3.4–4.8)
ALBUMIN/GLOB SERPL: 0.9 RATIO (ref 1.1–2)
ALP SERPL-CCNC: 116 UNIT/L (ref 40–150)
ALT SERPL-CCNC: 14 UNIT/L (ref 0–55)
ANION GAP SERPL CALC-SCNC: 6 MEQ/L
APTT PPP: 29.9 SECONDS (ref 23.2–33.7)
AST SERPL-CCNC: 15 UNIT/L (ref 5–34)
BASOPHILS # BLD AUTO: 0.1 X10(3)/MCL
BASOPHILS NFR BLD AUTO: 1.2 %
BILIRUB SERPL-MCNC: 0.4 MG/DL
BUN SERPL-MCNC: 44.7 MG/DL (ref 8.4–25.7)
CALCIUM SERPL-MCNC: 9.8 MG/DL (ref 8.8–10)
CHLORIDE SERPL-SCNC: 100 MMOL/L (ref 98–107)
CO2 SERPL-SCNC: 28 MMOL/L (ref 23–31)
CREAT SERPL-MCNC: 1.46 MG/DL (ref 0.72–1.25)
CREAT/UREA NIT SERPL: 31
EOSINOPHIL # BLD AUTO: 0.19 X10(3)/MCL (ref 0–0.9)
EOSINOPHIL NFR BLD AUTO: 2.4 %
ERYTHROCYTE [DISTWIDTH] IN BLOOD BY AUTOMATED COUNT: 11.7 % (ref 11.5–17)
EST. AVERAGE GLUCOSE BLD GHB EST-MCNC: 114 MG/DL
GFR SERPLBLD CREATININE-BSD FMLA CKD-EPI: 52 ML/MIN/1.73/M2
GLOBULIN SER-MCNC: 3.8 GM/DL (ref 2.4–3.5)
GLUCOSE SERPL-MCNC: 98 MG/DL (ref 82–115)
GROUP & RH: NORMAL
HBA1C MFR BLD: 5.6 %
HCT VFR BLD AUTO: 32.8 % (ref 42–52)
HGB BLD-MCNC: 10.9 G/DL (ref 14–18)
IMM GRANULOCYTES # BLD AUTO: 0.02 X10(3)/MCL (ref 0–0.04)
IMM GRANULOCYTES NFR BLD AUTO: 0.2 %
INDIRECT COOMBS: NORMAL
INR PPP: 1
LYMPHOCYTES # BLD AUTO: 1.97 X10(3)/MCL (ref 0.6–4.6)
LYMPHOCYTES NFR BLD AUTO: 24.4 %
MAGNESIUM SERPL-MCNC: 1.7 MG/DL (ref 1.6–2.6)
MCH RBC QN AUTO: 30.7 PG (ref 27–31)
MCHC RBC AUTO-ENTMCNC: 33.2 G/DL (ref 33–36)
MCV RBC AUTO: 92.4 FL (ref 80–94)
MONOCYTES # BLD AUTO: 0.74 X10(3)/MCL (ref 0.1–1.3)
MONOCYTES NFR BLD AUTO: 9.2 %
NEUTROPHILS # BLD AUTO: 5.06 X10(3)/MCL (ref 2.1–9.2)
NEUTROPHILS NFR BLD AUTO: 62.6 %
NRBC BLD AUTO-RTO: 0 %
PHOSPHATE SERPL-MCNC: 2.7 MG/DL (ref 2.3–4.7)
PLATELET # BLD AUTO: 367 X10(3)/MCL (ref 130–400)
PMV BLD AUTO: 10.6 FL (ref 7.4–10.4)
POCT GLUCOSE: 130 MG/DL (ref 70–110)
POTASSIUM SERPL-SCNC: 4.2 MMOL/L (ref 3.5–5.1)
PROT SERPL-MCNC: 7.1 GM/DL (ref 5.8–7.6)
PROTHROMBIN TIME: 13.3 SECONDS (ref 12.5–14.5)
RBC # BLD AUTO: 3.55 X10(6)/MCL (ref 4.7–6.1)
SODIUM SERPL-SCNC: 134 MMOL/L (ref 136–145)
SPECIMEN OUTDATE: NORMAL
WBC # BLD AUTO: 8.08 X10(3)/MCL (ref 4.5–11.5)

## 2025-01-23 PROCEDURE — 80053 COMPREHEN METABOLIC PANEL: CPT | Performed by: INTERNAL MEDICINE

## 2025-01-23 PROCEDURE — 36415 COLL VENOUS BLD VENIPUNCTURE: CPT | Performed by: INTERNAL MEDICINE

## 2025-01-23 PROCEDURE — 21400001 HC TELEMETRY ROOM

## 2025-01-23 PROCEDURE — 63600175 PHARM REV CODE 636 W HCPCS: Performed by: STUDENT IN AN ORGANIZED HEALTH CARE EDUCATION/TRAINING PROGRAM

## 2025-01-23 PROCEDURE — 85730 THROMBOPLASTIN TIME PARTIAL: CPT | Performed by: INTERNAL MEDICINE

## 2025-01-23 PROCEDURE — 83735 ASSAY OF MAGNESIUM: CPT | Performed by: INTERNAL MEDICINE

## 2025-01-23 PROCEDURE — 63600175 PHARM REV CODE 636 W HCPCS

## 2025-01-23 PROCEDURE — 85610 PROTHROMBIN TIME: CPT | Performed by: INTERNAL MEDICINE

## 2025-01-23 PROCEDURE — 84100 ASSAY OF PHOSPHORUS: CPT | Performed by: INTERNAL MEDICINE

## 2025-01-23 PROCEDURE — 86850 RBC ANTIBODY SCREEN: CPT | Performed by: INTERNAL MEDICINE

## 2025-01-23 PROCEDURE — 25000003 PHARM REV CODE 250: Performed by: STUDENT IN AN ORGANIZED HEALTH CARE EDUCATION/TRAINING PROGRAM

## 2025-01-23 PROCEDURE — 25000003 PHARM REV CODE 250: Performed by: INTERNAL MEDICINE

## 2025-01-23 PROCEDURE — 83036 HEMOGLOBIN GLYCOSYLATED A1C: CPT | Performed by: INTERNAL MEDICINE

## 2025-01-23 PROCEDURE — 85025 COMPLETE CBC W/AUTO DIFF WBC: CPT | Performed by: INTERNAL MEDICINE

## 2025-01-23 PROCEDURE — 63600175 PHARM REV CODE 636 W HCPCS: Performed by: INTERNAL MEDICINE

## 2025-01-23 PROCEDURE — 25000242 PHARM REV CODE 250 ALT 637 W/ HCPCS: Performed by: INTERNAL MEDICINE

## 2025-01-23 RX ORDER — IBUPROFEN 200 MG
24 TABLET ORAL
Status: DISCONTINUED | OUTPATIENT
Start: 2025-01-23 | End: 2025-01-31 | Stop reason: HOSPADM

## 2025-01-23 RX ORDER — IBUPROFEN 200 MG
16 TABLET ORAL
Status: DISCONTINUED | OUTPATIENT
Start: 2025-01-23 | End: 2025-01-31 | Stop reason: HOSPADM

## 2025-01-23 RX ORDER — INSULIN ASPART 100 [IU]/ML
0-5 INJECTION, SOLUTION INTRAVENOUS; SUBCUTANEOUS
Status: DISCONTINUED | OUTPATIENT
Start: 2025-01-23 | End: 2025-01-31 | Stop reason: HOSPADM

## 2025-01-23 RX ORDER — CEFTRIAXONE 1 G/1
1 INJECTION, POWDER, FOR SOLUTION INTRAMUSCULAR; INTRAVENOUS
Status: COMPLETED | OUTPATIENT
Start: 2025-01-24 | End: 2025-01-24

## 2025-01-23 RX ORDER — GLUCAGON 1 MG
1 KIT INJECTION
Status: DISCONTINUED | OUTPATIENT
Start: 2025-01-23 | End: 2025-01-31 | Stop reason: HOSPADM

## 2025-01-23 RX ADMIN — OXYCODONE AND ACETAMINOPHEN 1 TABLET: 10; 325 TABLET ORAL at 11:01

## 2025-01-23 RX ADMIN — CEFTRIAXONE SODIUM 1 G: 1 INJECTION, POWDER, FOR SOLUTION INTRAMUSCULAR; INTRAVENOUS at 11:01

## 2025-01-23 RX ADMIN — OXYCODONE AND ACETAMINOPHEN 1 TABLET: 10; 325 TABLET ORAL at 05:01

## 2025-01-23 RX ADMIN — OXYCODONE AND ACETAMINOPHEN 1 TABLET: 10; 325 TABLET ORAL at 06:01

## 2025-01-23 RX ADMIN — TIOTROPIUM BROMIDE INHALATION SPRAY 2 PUFF: 3.12 SPRAY, METERED RESPIRATORY (INHALATION) at 09:01

## 2025-01-23 RX ADMIN — RANOLAZINE 500 MG: 500 TABLET, FILM COATED, EXTENDED RELEASE ORAL at 09:01

## 2025-01-23 RX ADMIN — WHITE PETROLATUM: 1.75 OINTMENT TOPICAL at 09:01

## 2025-01-23 RX ADMIN — Medication 400 MG: at 09:01

## 2025-01-23 RX ADMIN — ENOXAPARIN SODIUM 40 MG: 40 INJECTION SUBCUTANEOUS at 04:01

## 2025-01-23 RX ADMIN — MORPHINE SULFATE 2 MG: 4 INJECTION, SOLUTION INTRAMUSCULAR; INTRAVENOUS at 03:01

## 2025-01-23 RX ADMIN — MORPHINE SULFATE 2 MG: 4 INJECTION, SOLUTION INTRAMUSCULAR; INTRAVENOUS at 09:01

## 2025-01-23 RX ADMIN — CARVEDILOL 3.12 MG: 3.12 TABLET, FILM COATED ORAL at 09:01

## 2025-01-23 RX ADMIN — LINACLOTIDE 145 MCG: 145 CAPSULE, GELATIN COATED ORAL at 06:01

## 2025-01-23 RX ADMIN — FLUTICASONE FUROATE AND VILANTEROL TRIFENATATE 1 PUFF: 200; 25 POWDER RESPIRATORY (INHALATION) at 09:01

## 2025-01-23 RX ADMIN — PANTOPRAZOLE SODIUM 40 MG: 40 TABLET, DELAYED RELEASE ORAL at 06:01

## 2025-01-23 NOTE — PLAN OF CARE
Problem: Adult Inpatient Plan of Care  Goal: Optimal Comfort and Wellbeing  Outcome: Progressing  Intervention: Provide Person-Centered Care  Flowsheets (Taken 1/23/2025 0305)  Trust Relationship/Rapport:   care explained   choices provided   emotional support provided   empathic listening provided   questions answered   questions encouraged   reassurance provided   thoughts/feelings acknowledged     Problem: Infection  Goal: Absence of Infection Signs and Symptoms  Outcome: Progressing  Intervention: Prevent or Manage Infection  Flowsheets (Taken 1/23/2025 0305)  Fever Reduction/Comfort Measures:   lightweight bedding   lightweight clothing     Problem: Wound  Goal: Optimal Pain Control and Function  Outcome: Progressing  Intervention: Prevent or Manage Pain  Flowsheets (Taken 1/23/2025 0305)  Sleep/Rest Enhancement:   awakenings minimized   regular sleep/rest pattern promoted  Pain Management Interventions:   around-the-clock dosing utilized   care clustered   medication offered   pain management plan reviewed with patient/caregiver   pillow support provided   position adjusted     Problem: Pain Acute  Goal: Optimal Pain Control and Function  Outcome: Progressing  Intervention: Prevent or Manage Pain  Flowsheets (Taken 1/23/2025 0305)  Sleep/Rest Enhancement:   awakenings minimized   regular sleep/rest pattern promoted

## 2025-01-23 NOTE — PROGRESS NOTES
Nephrology consult follow up note    HPI:      Tyler Mai is a 69 y.o. male  who presents for cervical spondylosis, and cervical stenosis.  Past medical history significant for CKD 4, hypertension, hyperlipidemia, diabetes, COPD, CHF, coronary artery disease status post CABG, peripheral artery disease, AA, and tobacco dependence.  Patient was recently at [facility] for cervical stenosis, but did not want to have surgery with Dr. Bella so was transferred here for further neurosurgery evaluation.  He does appear to have chronic kidney disease at baseline.  His baseline creatinine appears to be around 1.5-2.  Creatinine since admission has been 1.8-2.0.  Renal ultrasound was negative.  He is 0.4 grams/grams of proteinuria.  He does have history of longstanding diabetes, and hypertension which is likely his cause of underlying CKD.     Interval history:     01/21/2025  No acute overnight.  Patient states that he drank a little more water yesterday, but probably only about 1 L. no chest pain, shortness of breath, nausea, vomiting, or lower extremity edema.    01/22/2025  No acute events overnight.  Patient estimates he only drank about 1 L of water yesterday.  No chest pain, shortness of breath, nausea, vomiting, or lower extremity edema.    1/23/2025  No acute events overnight.  No new complaints.  1.4 L urine output yesterday.  His p.o. intake has been poor.     Review of Systems:     Comprehensive 10pt ROS negative except as noted per history.    Past medical, family, surgical, and social history reviewed and unchanged from initial consult note.     Objective:       VITAL SIGNS: 24 HR MIN & MAX LAST    Temp  Min: 97.4 °F (36.3 °C)  Max: 98.1 °F (36.7 °C)  97.6 °F (36.4 °C)        BP  Min: 136/72  Max: 182/76  137/70     Pulse  Min: 58  Max: 69  68     Resp  Min: 16  Max: 19  16    SpO2  Min: 94 %  Max: 99 %  95 %      GEN: Chronically ill appearing WM in NAD  CV: RRR +S1,S2 without murmur  PULM: CTAB, unlabored  ABD:  Soft, NT/ND abdomen with NABS  EXT: No cyanosis or edema  SKIN: Warm and dry  PSYCH: Awake, alert and appropriately conversant.   Dialysis access:  No dialysis access            Component Value Date/Time     (L) 01/23/2025 0536     (L) 01/22/2025 0519     (L) 01/07/2025 0617     01/06/2025 0455    K 4.2 01/23/2025 0536    K 4.4 01/22/2025 0519    K 3.7 01/07/2025 1338    K 4 01/07/2025 0617    CO2 28 01/23/2025 0536    CO2 25 01/22/2025 0519    CO2 17 (L) 01/07/2025 0617    CO2 18 (L) 01/06/2025 0455    BUN 44.7 (H) 01/23/2025 0536    BUN 51.6 (H) 01/22/2025 0519    BUN 56 (H) 01/07/2025 0617    BUN 48 (H) 01/06/2025 0455    CREATININE 1.46 (H) 01/23/2025 0536    CREATININE 1.85 (H) 01/22/2025 0519    CREATININE 1.98 (H) 01/07/2025 0617    CREATININE 1.64 (H) 01/06/2025 0455    CALCIUM 9.8 01/23/2025 0536    CALCIUM 9.5 01/22/2025 0519    CALCIUM 8.5 (L) 01/07/2025 0617    CALCIUM 8.7 (L) 01/06/2025 0455    PHOS 2.7 01/23/2025 0536            Component Value Date/Time    WBC 8.08 01/23/2025 0536    WBC 9.86 01/22/2025 0519    HGB 10.9 (L) 01/23/2025 0536    HGB 10.4 (L) 01/22/2025 0519    HCT 32.8 (L) 01/23/2025 0536    HCT 30.7 (L) 01/22/2025 0519    HCT 26.0 (L) 03/02/2018 0857    HCT 28.0 (L) 03/02/2018 0825     01/23/2025 0536     01/22/2025 0519         Imaging reviewed      Assessment / Plan:       Active Hospital Problems    Diagnosis  POA    *Spinal cord compression [G95.20]  Yes    Anemia [D64.9]  Yes    Hyponatremia [E87.1]  Yes      Resolved Hospital Problems   No resolved problems to display.       CKD 4 - baseline creatinine 1.5-2.0  Severe cervical spondylosis  Severe cervical canal stenosis with myelomalacia     Plan:  Renal function improved, likely at baseline.  Encourage p.o. water intake.  Maintain euvolemia with p.o. intake, or IV fluids if needed. We will sign off at this time.  Please call if we can be of further assistance.    Emile Ling,  DO  Nephrology  [facility]  Clinic number: [phone]      Note was created with the assistance of electronic Dictation Services.  Note was reviewed to decrease errors, however, these may still be present.  Please contact me about questions or concerns with the dictation.

## 2025-01-23 NOTE — PROGRESS NOTES
"[facility] Hospital Medicine Progress Note        Chief Complaint: Inpatient Follow-up     HPI: 69-year-old male with a past medical history of hypertension, hyperlipidemia, diabetes, COPD (no home O2), heart failure, CAD status post CABG, Medtronic pacemaker, peripheral arterial disease status post left PCI, abdominal aortic aneurysm, tobacco dependence, prior C5, 6, 7 decompression with  who presents with worsening chronic cervical spondylosis with myelopathy.  He was seen by Neurosurgery on 12/03/2024 however he did not complete cardiac evaluation for clearance and did not go to scheduled MRI appointments.  They recommended fusion at least at C3-4 and C4-5.      He was seen at [facility] 01/05/2025: "He initially told Dr. Bella he did not want surgery. He changed his mind as his UTI was treated and cleared and a f/u urine culture from 1/11 has been negative for growth. His son does not want Dr. Bella to do the surgery and attempts were made to see if Dr. Neumann or Dr. Bowen would see the patient but both declined this. He is now recommended to discharge and report to a different hospital for a reevaluation because he and his family have declined Dr. Bella's offer to do surgery. The surgery is felt to be needed and he has been seen by Dr. Beebe cardiology who rates his cardiac risk as moderate at 5-7% of a cardiac event."   At the time of my evaluation patient is seeking neurosurgical evaluation and intervention.    Interval Hx:   Neurosurgery following, tentatively planning for surgery on Friday.  Nephrology following.  Renal function improving    Patient was seen and examined, no complaints reported afebrile, hemodynamically stable, most recent lab work was reviewed.    Objective/physical exam:  General: In no acute distress, afebrile  Chest: Clear to auscultation bilaterally  Heart:  +S1, S2, no appreciable murmur  Abdomen: Soft, nontender, BS +  MSK: Warm, no lower " extremity edema, no clubbing or cyanosis  Neurologic: Alert and oriented , with right-sided weakness    VITAL SIGNS: 24 HRS MIN & MAX LAST   Temp  Min: 97.6 °F (36.4 °C)  Max: 98.1 °F (36.7 °C) 97.6 °F (36.4 °C)   BP  Min: 136/72  Max: 182/76 137/70   Pulse  Min: 58  Max: 69  68   Resp  Min: 16  Max: 18 18   SpO2  Min: 94 %  Max: 97 % 95 %     I have reviewed the following labs:  Recent Labs   Lab 01/20/25  0620 01/22/25  0519 01/23/25  0536   WBC 11.98* 9.86 8.08   RBC 3.64* 3.36* 3.55*   HGB 11.1* 10.4* 10.9*   HCT 33.0* 30.7* 32.8*   MCV 90.7 91.4 92.4   MCH 30.5 31.0 30.7   MCHC 33.6 33.9 33.2   RDW 12.0 11.6 11.7    330 367   MPV 11.1* 10.6* 10.6*     Recent Labs   Lab 01/20/25  0620 01/21/25  0509 01/22/25  0519 01/23/25  0536   * 135* 132* 134*   K 4.7 4.7 4.4 4.2   CL 99 96* 98 100   CO2 25 27 25 28   BUN 51.9* 55.4* 51.6* 44.7*   CREATININE 2.09* 2.25* 1.85* 1.46*   CALCIUM 9.3 9.9 9.5 9.8   MG  --   --  1.20* 1.70   ALBUMIN 3.4 3.3* 3.1* 3.3*   ALKPHOS 121  --   --  116   ALT 17  --   --  14   AST 15  --   --  15   BILITOT 0.6  --   --  0.4     Microbiology Results (last 7 days)       ** No results found for the last 168 hours. **             See below for Radiology    Assessment/Plan:    Severe cervical spondylosis  Severe C3-4 central canal stenosis with central cord myelomalacia  CKD currently at baseline  Normocytic anemia    Past medical history of hypertension, hyperlipidemia, diabetes, COPD (no home O2), heart failure, CAD status post CABG, Medtronic pacemaker, peripheral arterial disease status post left PCI, abdominal aortic aneurysm, tobacco dependence, prior C5, 6, 7 decompression     Plan   Neurosurgery following, planning for surgery tomorrow.  Also seen by Cardiology-low risk for procedure, no cardiac testing indicated.  Would need outpatient follow up for routine cardiac care.  Nephrology on board.  Renal function has improved.  Started on ceftriaxone to cover for any underlying  UTI.  Deescalate after the course  Continue supportive care, appropriate home medications.  On sliding scale    VTE prophylaxis:  Lovenox      Anticipated discharge and Disposition:   TBD      All diagnosis and differential diagnosis have been reviewed; assessment and plan has been documented; I have personally reviewed the labs and test results that are presently available; I have reviewed the patients medication list; I have reviewed the consulting providers response and recommendations. I have reviewed or attempted to review medical records based upon their availability    All of the patient's questions have been  addressed and answered. Patient's is agreeable to the above stated plan. I will continue to monitor closely and make adjustments to medical management as needed.    Portions of this note dictated using EMR integrated voice recognition software, and may be subject to voice recognition errors not corrected at proofreading. Please contact writer for clarification if needed.   _____________________________________________________________________    Malnutrition Status:  Nutrition consulted. Most recent weight and BMI monitored-     Measurements:  Wt Readings from Last 1 Encounters:   01/18/25 78 kg (172 lb)   Body mass index is 27.76 kg/m².    Patient has been screened and assessed by RD.    Malnutrition Type:  Context:    Level:      Malnutrition Characteristic Summary:       Interventions/Recommendations (treatment strategy):        Scheduled Med:   carvediloL  3.125 mg Oral BID    cefTRIAXone (Rocephin) IV (PEDS and ADULTS)  1 g Intravenous Q24H    enoxparin  40 mg Subcutaneous Q24H (prophylaxis, 1700)    fluticasone furoate-vilanteroL  1 puff Inhalation Daily    linaCLOtide  145 mcg Oral QAM    magnesium oxide  400 mg Oral BID    pantoprazole  40 mg Oral Before breakfast    ranolazine  500 mg Oral BID    tiotropium bromide  2 puff Inhalation Daily    white petrolatum   Topical (Top) Daily       Continuous Infusions:     PRN Meds:    Current Facility-Administered Medications:     acetaminophen, 650 mg, Oral, Q4H PRN    albuterol-ipratropium, 3 mL, Nebulization, Q6H PRN    aluminum-magnesium hydroxide-simethicone, 30 mL, Oral, QID PRN    bisacodyL, 10 mg, Rectal, Daily PRN    cefTRIAXone (Rocephin) IV (PEDS and ADULTS), 2 g, Intravenous, On Call Procedure    cyclobenzaprine, 10 mg, Oral, TID PRN    dextrose 50%, 12.5 g, Intravenous, PRN    dextrose 50%, 25 g, Intravenous, PRN    glucagon (human recombinant), 1 mg, Intramuscular, PRN    glucose, 16 g, Oral, PRN    glucose, 24 g, Oral, PRN    melatonin, 9 mg, Oral, Nightly PRN    morphine, 2 mg, Intravenous, Q6H PRN    naloxone, 0.02 mg, Intravenous, PRN    ondansetron, 8 mg, Oral, Q8H PRN    ondansetron, 4 mg, Intravenous, Q8H PRN    oxyCODONE-acetaminophen, 1 tablet, Oral, Q4H PRN    polyethylene glycol, 17 g, Oral, TID PRN    simethicone, 1 tablet, Oral, QID PRN    sodium chloride 0.9%, 10 mL, Intravenous, PRN     Radiology:  I have personally reviewed the following imaging and agree with the radiologist.     X-Ray Chest 1 View  Narrative: EXAMINATION:  XR CHEST 1 VIEW    CPT 41971    CLINICAL HISTORY:  pre op;    COMPARISON:  August 29, 2022    FINDINGS:  Examination reveals mediastinal silhouette to be within normal limits cardiac silhouette is not enlarged some increase interstitial markings are identified which are slightly more confluent at the left base with some linear densities that might represent subsegmental atelectatic changes and or fibrotic streaks.    No focal consolidative changes  Impression: Increase interstitial markings and linear densities at the left base might be related to subsegmental atelectatic changes and or fibrotic streaks.    Otherwise no active pulmonary disease    Electronically signed by: Sampson Beltran  Date:    01/20/2025  Time:    08:16      Donna Garcia MD  Department of Hospital Medicine   [facility]   01/23/2025

## 2025-01-24 ENCOUNTER — ANESTHESIA (OUTPATIENT)
Dept: SURGERY | Facility: HOSPITAL | Age: 70
DRG: 430 | End: 2025-01-24
Payer: MEDICARE

## 2025-01-24 ENCOUNTER — ANESTHESIA EVENT (OUTPATIENT)
Dept: SURGERY | Facility: HOSPITAL | Age: 70
DRG: 430 | End: 2025-01-24
Payer: MEDICARE

## 2025-01-24 LAB
BASOPHILS # BLD AUTO: 0.09 X10(3)/MCL
BASOPHILS NFR BLD AUTO: 1 %
EOSINOPHIL # BLD AUTO: 0.19 X10(3)/MCL (ref 0–0.9)
EOSINOPHIL NFR BLD AUTO: 2.1 %
ERYTHROCYTE [DISTWIDTH] IN BLOOD BY AUTOMATED COUNT: 11.7 % (ref 11.5–17)
HCT VFR BLD AUTO: 31.3 % (ref 42–52)
HGB BLD-MCNC: 10.8 G/DL (ref 14–18)
IMM GRANULOCYTES # BLD AUTO: 0.03 X10(3)/MCL (ref 0–0.04)
IMM GRANULOCYTES NFR BLD AUTO: 0.3 %
LYMPHOCYTES # BLD AUTO: 1.66 X10(3)/MCL (ref 0.6–4.6)
LYMPHOCYTES NFR BLD AUTO: 18.3 %
MCH RBC QN AUTO: 31.3 PG (ref 27–31)
MCHC RBC AUTO-ENTMCNC: 34.5 G/DL (ref 33–36)
MCV RBC AUTO: 90.7 FL (ref 80–94)
MONOCYTES # BLD AUTO: 0.74 X10(3)/MCL (ref 0.1–1.3)
MONOCYTES NFR BLD AUTO: 8.1 %
NEUTROPHILS # BLD AUTO: 6.38 X10(3)/MCL (ref 2.1–9.2)
NEUTROPHILS NFR BLD AUTO: 70.2 %
NRBC BLD AUTO-RTO: 0 %
PLATELET # BLD AUTO: 368 X10(3)/MCL (ref 130–400)
PMV BLD AUTO: 10.5 FL (ref 7.4–10.4)
POCT GLUCOSE: 118 MG/DL (ref 70–110)
POCT GLUCOSE: 122 MG/DL (ref 70–110)
POCT GLUCOSE: 151 MG/DL (ref 70–110)
POCT GLUCOSE: 193 MG/DL (ref 70–110)
POCT GLUCOSE: 206 MG/DL (ref 70–110)
RBC # BLD AUTO: 3.45 X10(6)/MCL (ref 4.7–6.1)
WBC # BLD AUTO: 9.09 X10(3)/MCL (ref 4.5–11.5)

## 2025-01-24 PROCEDURE — 63600175 PHARM REV CODE 636 W HCPCS

## 2025-01-24 PROCEDURE — 22842 INSERT SPINE FIXATION DEVICE: CPT | Mod: ,,, | Performed by: STUDENT IN AN ORGANIZED HEALTH CARE EDUCATION/TRAINING PROGRAM

## 2025-01-24 PROCEDURE — C1713 ANCHOR/SCREW BN/BN,TIS/BN: HCPCS | Performed by: STUDENT IN AN ORGANIZED HEALTH CARE EDUCATION/TRAINING PROGRAM

## 2025-01-24 PROCEDURE — 27000221 HC OXYGEN, UP TO 24 HOURS

## 2025-01-24 PROCEDURE — 63600175 PHARM REV CODE 636 W HCPCS: Performed by: STUDENT IN AN ORGANIZED HEALTH CARE EDUCATION/TRAINING PROGRAM

## 2025-01-24 PROCEDURE — 20936 SP BONE AGRFT LOCAL ADD-ON: CPT | Mod: ,,, | Performed by: STUDENT IN AN ORGANIZED HEALTH CARE EDUCATION/TRAINING PROGRAM

## 2025-01-24 PROCEDURE — 36620 INSERTION CATHETER ARTERY: CPT | Mod: 59,,, | Performed by: STUDENT IN AN ORGANIZED HEALTH CARE EDUCATION/TRAINING PROGRAM

## 2025-01-24 PROCEDURE — 25000003 PHARM REV CODE 250: Performed by: STUDENT IN AN ORGANIZED HEALTH CARE EDUCATION/TRAINING PROGRAM

## 2025-01-24 PROCEDURE — 22614 ARTHRD PST TQ 1NTRSPC EA ADD: CPT | Mod: AS,,,

## 2025-01-24 PROCEDURE — 22614 ARTHRD PST TQ 1NTRSPC EA ADD: CPT | Mod: ,,, | Performed by: STUDENT IN AN ORGANIZED HEALTH CARE EDUCATION/TRAINING PROGRAM

## 2025-01-24 PROCEDURE — 71000033 HC RECOVERY, INTIAL HOUR: Performed by: STUDENT IN AN ORGANIZED HEALTH CARE EDUCATION/TRAINING PROGRAM

## 2025-01-24 PROCEDURE — 20930 SP BONE ALGRFT MORSEL ADD-ON: CPT | Mod: ,,, | Performed by: STUDENT IN AN ORGANIZED HEALTH CARE EDUCATION/TRAINING PROGRAM

## 2025-01-24 PROCEDURE — 36415 COLL VENOUS BLD VENIPUNCTURE: CPT | Performed by: INTERNAL MEDICINE

## 2025-01-24 PROCEDURE — 63600175 PHARM REV CODE 636 W HCPCS: Performed by: ANESTHESIOLOGY

## 2025-01-24 PROCEDURE — 37000008 HC ANESTHESIA 1ST 15 MINUTES: Performed by: STUDENT IN AN ORGANIZED HEALTH CARE EDUCATION/TRAINING PROGRAM

## 2025-01-24 PROCEDURE — 85025 COMPLETE CBC W/AUTO DIFF WBC: CPT | Performed by: INTERNAL MEDICINE

## 2025-01-24 PROCEDURE — 99900031 HC PATIENT EDUCATION (STAT)

## 2025-01-24 PROCEDURE — 63015 REMOVE SPINE LAMINA >2 CRVCL: CPT | Mod: AS,,,

## 2025-01-24 PROCEDURE — 25000242 PHARM REV CODE 250 ALT 637 W/ HCPCS: Performed by: INTERNAL MEDICINE

## 2025-01-24 PROCEDURE — 37000009 HC ANESTHESIA EA ADD 15 MINS: Performed by: STUDENT IN AN ORGANIZED HEALTH CARE EDUCATION/TRAINING PROGRAM

## 2025-01-24 PROCEDURE — 25000003 PHARM REV CODE 250: Performed by: INTERNAL MEDICINE

## 2025-01-24 PROCEDURE — 25000003 PHARM REV CODE 250

## 2025-01-24 PROCEDURE — 63600175 PHARM REV CODE 636 W HCPCS: Performed by: INTERNAL MEDICINE

## 2025-01-24 PROCEDURE — 22600 ARTHRD PST TQ 1NTRSPC CRV: CPT | Mod: 51,AS,,

## 2025-01-24 PROCEDURE — 99233 SBSQ HOSP IP/OBS HIGH 50: CPT | Mod: 57,,, | Performed by: STUDENT IN AN ORGANIZED HEALTH CARE EDUCATION/TRAINING PROGRAM

## 2025-01-24 PROCEDURE — 36000712 HC OR TIME LEV V 1ST 15 MIN: Performed by: STUDENT IN AN ORGANIZED HEALTH CARE EDUCATION/TRAINING PROGRAM

## 2025-01-24 PROCEDURE — 63600175 PHARM REV CODE 636 W HCPCS: Mod: JZ,TB | Performed by: ANESTHESIOLOGY

## 2025-01-24 PROCEDURE — 22842 INSERT SPINE FIXATION DEVICE: CPT | Mod: AS,,,

## 2025-01-24 PROCEDURE — 63015 REMOVE SPINE LAMINA >2 CRVCL: CPT | Mod: ,,, | Performed by: STUDENT IN AN ORGANIZED HEALTH CARE EDUCATION/TRAINING PROGRAM

## 2025-01-24 PROCEDURE — P9047 ALBUMIN (HUMAN), 25%, 50ML: HCPCS | Performed by: STUDENT IN AN ORGANIZED HEALTH CARE EDUCATION/TRAINING PROGRAM

## 2025-01-24 PROCEDURE — 61783 SCAN PROC SPINAL: CPT | Mod: 59,,, | Performed by: STUDENT IN AN ORGANIZED HEALTH CARE EDUCATION/TRAINING PROGRAM

## 2025-01-24 PROCEDURE — 8E09XBG COMPUTER ASSISTED PROCEDURE OF HEAD AND NECK REGION, WITH COMPUTERIZED TOMOGRAPHY: ICD-10-PCS | Performed by: STUDENT IN AN ORGANIZED HEALTH CARE EDUCATION/TRAINING PROGRAM

## 2025-01-24 PROCEDURE — 01N10ZZ RELEASE CERVICAL NERVE, OPEN APPROACH: ICD-10-PCS | Performed by: STUDENT IN AN ORGANIZED HEALTH CARE EDUCATION/TRAINING PROGRAM

## 2025-01-24 PROCEDURE — 0RG20AJ FUSION OF 2 OR MORE CERVICAL VERTEBRAL JOINTS WITH INTERBODY FUSION DEVICE, POSTERIOR APPROACH, ANTERIOR COLUMN, OPEN APPROACH: ICD-10-PCS | Performed by: STUDENT IN AN ORGANIZED HEALTH CARE EDUCATION/TRAINING PROGRAM

## 2025-01-24 PROCEDURE — 27201423 OPTIME MED/SURG SUP & DEVICES STERILE SUPPLY: Performed by: STUDENT IN AN ORGANIZED HEALTH CARE EDUCATION/TRAINING PROGRAM

## 2025-01-24 PROCEDURE — 0RG2071 FUSION OF 2 OR MORE CERVICAL VERTEBRAL JOINTS WITH AUTOLOGOUS TISSUE SUBSTITUTE, POSTERIOR APPROACH, POSTERIOR COLUMN, OPEN APPROACH: ICD-10-PCS | Performed by: STUDENT IN AN ORGANIZED HEALTH CARE EDUCATION/TRAINING PROGRAM

## 2025-01-24 PROCEDURE — 36620 INSERTION CATHETER ARTERY: CPT | Performed by: STUDENT IN AN ORGANIZED HEALTH CARE EDUCATION/TRAINING PROGRAM

## 2025-01-24 PROCEDURE — 21400001 HC TELEMETRY ROOM

## 2025-01-24 PROCEDURE — 22600 ARTHRD PST TQ 1NTRSPC CRV: CPT | Mod: 51,,, | Performed by: STUDENT IN AN ORGANIZED HEALTH CARE EDUCATION/TRAINING PROGRAM

## 2025-01-24 PROCEDURE — 36000713 HC OR TIME LEV V EA ADD 15 MIN: Performed by: STUDENT IN AN ORGANIZED HEALTH CARE EDUCATION/TRAINING PROGRAM

## 2025-01-24 PROCEDURE — 71000039 HC RECOVERY, EACH ADD'L HOUR: Performed by: STUDENT IN AN ORGANIZED HEALTH CARE EDUCATION/TRAINING PROGRAM

## 2025-01-24 RX ORDER — SODIUM CHLORIDE 9 MG/ML
INJECTION, SOLUTION INTRAVENOUS CONTINUOUS
Status: DISCONTINUED | OUTPATIENT
Start: 2025-01-24 | End: 2025-01-30

## 2025-01-24 RX ORDER — METHOCARBAMOL 750 MG/1
750 TABLET, FILM COATED ORAL 4 TIMES DAILY
Status: DISCONTINUED | OUTPATIENT
Start: 2025-01-24 | End: 2025-01-31 | Stop reason: HOSPADM

## 2025-01-24 RX ORDER — HEPARIN SODIUM 5000 [USP'U]/ML
5000 INJECTION, SOLUTION INTRAVENOUS; SUBCUTANEOUS EVERY 8 HOURS
Status: DISCONTINUED | OUTPATIENT
Start: 2025-01-26 | End: 2025-01-25

## 2025-01-24 RX ORDER — LABETALOL HYDROCHLORIDE 5 MG/ML
10 INJECTION, SOLUTION INTRAVENOUS EVERY 4 HOURS PRN
Status: DISCONTINUED | OUTPATIENT
Start: 2025-01-24 | End: 2025-01-31 | Stop reason: HOSPADM

## 2025-01-24 RX ORDER — ALBUMIN HUMAN 250 G/1000ML
SOLUTION INTRAVENOUS
Status: DISCONTINUED | OUTPATIENT
Start: 2025-01-24 | End: 2025-01-24

## 2025-01-24 RX ORDER — HYDROMORPHONE HYDROCHLORIDE 2 MG/ML
0.2 INJECTION, SOLUTION INTRAMUSCULAR; INTRAVENOUS; SUBCUTANEOUS EVERY 5 MIN PRN
Status: DISCONTINUED | OUTPATIENT
Start: 2025-01-24 | End: 2025-01-24 | Stop reason: HOSPADM

## 2025-01-24 RX ORDER — FENTANYL CITRATE 50 UG/ML
INJECTION, SOLUTION INTRAMUSCULAR; INTRAVENOUS
Status: DISCONTINUED | OUTPATIENT
Start: 2025-01-24 | End: 2025-01-24

## 2025-01-24 RX ORDER — CEFTRIAXONE 2 G/1
2 INJECTION, POWDER, FOR SOLUTION INTRAMUSCULAR; INTRAVENOUS
Status: DISCONTINUED | OUTPATIENT
Start: 2025-01-24 | End: 2025-01-24

## 2025-01-24 RX ORDER — ONDANSETRON HYDROCHLORIDE 2 MG/ML
INJECTION, SOLUTION INTRAMUSCULAR; INTRAVENOUS
Status: DISCONTINUED | OUTPATIENT
Start: 2025-01-24 | End: 2025-01-24

## 2025-01-24 RX ORDER — IPRATROPIUM BROMIDE AND ALBUTEROL SULFATE 2.5; .5 MG/3ML; MG/3ML
3 SOLUTION RESPIRATORY (INHALATION)
Status: DISCONTINUED | OUTPATIENT
Start: 2025-01-24 | End: 2025-01-24 | Stop reason: HOSPADM

## 2025-01-24 RX ORDER — ROCURONIUM BROMIDE 10 MG/ML
INJECTION, SOLUTION INTRAVENOUS
Status: DISCONTINUED | OUTPATIENT
Start: 2025-01-24 | End: 2025-01-24

## 2025-01-24 RX ORDER — DEXAMETHASONE SODIUM PHOSPHATE 4 MG/ML
INJECTION, SOLUTION INTRA-ARTICULAR; INTRALESIONAL; INTRAMUSCULAR; INTRAVENOUS; SOFT TISSUE
Status: DISCONTINUED | OUTPATIENT
Start: 2025-01-24 | End: 2025-01-24

## 2025-01-24 RX ORDER — LIDOCAINE HYDROCHLORIDE AND EPINEPHRINE 5; 5 MG/ML; UG/ML
INJECTION, SOLUTION INFILTRATION; PERINEURAL
Status: DISCONTINUED | OUTPATIENT
Start: 2025-01-24 | End: 2025-01-24 | Stop reason: HOSPADM

## 2025-01-24 RX ORDER — HYDRALAZINE HYDROCHLORIDE 20 MG/ML
10 INJECTION INTRAMUSCULAR; INTRAVENOUS EVERY 4 HOURS PRN
Status: DISCONTINUED | OUTPATIENT
Start: 2025-01-24 | End: 2025-01-24

## 2025-01-24 RX ORDER — PROPOFOL 10 MG/ML
VIAL (ML) INTRAVENOUS
Status: DISCONTINUED | OUTPATIENT
Start: 2025-01-24 | End: 2025-01-24

## 2025-01-24 RX ORDER — ONDANSETRON 4 MG/1
8 TABLET, ORALLY DISINTEGRATING ORAL EVERY 6 HOURS PRN
Status: CANCELLED | OUTPATIENT
Start: 2025-01-24

## 2025-01-24 RX ORDER — BUPIVACAINE HYDROCHLORIDE 5 MG/ML
INJECTION, SOLUTION EPIDURAL; INTRACAUDAL
Status: DISCONTINUED | OUTPATIENT
Start: 2025-01-24 | End: 2025-01-24 | Stop reason: HOSPADM

## 2025-01-24 RX ORDER — MUPIROCIN 20 MG/G
OINTMENT TOPICAL 2 TIMES DAILY
Status: DISPENSED | OUTPATIENT
Start: 2025-01-24 | End: 2025-01-29

## 2025-01-24 RX ORDER — PHENYLEPHRINE HCL IN 0.9% NACL 1 MG/10 ML
SYRINGE (ML) INTRAVENOUS
Status: DISCONTINUED | OUTPATIENT
Start: 2025-01-24 | End: 2025-01-24

## 2025-01-24 RX ORDER — HEPARIN SODIUM 5000 [USP'U]/ML
5000 INJECTION, SOLUTION INTRAVENOUS; SUBCUTANEOUS EVERY 8 HOURS
Status: DISCONTINUED | OUTPATIENT
Start: 2025-01-25 | End: 2025-01-24

## 2025-01-24 RX ORDER — ONDANSETRON HYDROCHLORIDE 2 MG/ML
4 INJECTION, SOLUTION INTRAVENOUS DAILY PRN
Status: DISCONTINUED | OUTPATIENT
Start: 2025-01-24 | End: 2025-01-24 | Stop reason: HOSPADM

## 2025-01-24 RX ORDER — HYDROMORPHONE HYDROCHLORIDE 2 MG/ML
0.4 INJECTION, SOLUTION INTRAMUSCULAR; INTRAVENOUS; SUBCUTANEOUS EVERY 5 MIN PRN
Status: DISCONTINUED | OUTPATIENT
Start: 2025-01-24 | End: 2025-01-24 | Stop reason: HOSPADM

## 2025-01-24 RX ORDER — SUCCINYLCHOLINE CHLORIDE 20 MG/ML
INJECTION INTRAMUSCULAR; INTRAVENOUS
Status: DISCONTINUED | OUTPATIENT
Start: 2025-01-24 | End: 2025-01-24

## 2025-01-24 RX ORDER — LIDOCAINE HYDROCHLORIDE 10 MG/ML
1 INJECTION, SOLUTION EPIDURAL; INFILTRATION; INTRACAUDAL; PERINEURAL ONCE
Status: CANCELLED | OUTPATIENT
Start: 2025-01-24 | End: 2025-01-24

## 2025-01-24 RX ORDER — GLUCAGON 1 MG
1 KIT INJECTION
Status: DISCONTINUED | OUTPATIENT
Start: 2025-01-24 | End: 2025-01-24 | Stop reason: HOSPADM

## 2025-01-24 RX ORDER — MEPERIDINE HYDROCHLORIDE 25 MG/ML
12.5 INJECTION INTRAMUSCULAR; INTRAVENOUS; SUBCUTANEOUS EVERY 10 MIN PRN
Status: DISCONTINUED | OUTPATIENT
Start: 2025-01-24 | End: 2025-01-24 | Stop reason: HOSPADM

## 2025-01-24 RX ORDER — MIDAZOLAM HYDROCHLORIDE 2 MG/2ML
2 INJECTION, SOLUTION INTRAMUSCULAR; INTRAVENOUS ONCE AS NEEDED
Status: CANCELLED | OUTPATIENT
Start: 2025-01-24 | End: 2036-06-22

## 2025-01-24 RX ORDER — PROCHLORPERAZINE EDISYLATE 5 MG/ML
5 INJECTION INTRAMUSCULAR; INTRAVENOUS EVERY 30 MIN PRN
Status: DISCONTINUED | OUTPATIENT
Start: 2025-01-24 | End: 2025-01-24 | Stop reason: HOSPADM

## 2025-01-24 RX ORDER — CEFAZOLIN SODIUM 1 G/3ML
INJECTION, POWDER, FOR SOLUTION INTRAMUSCULAR; INTRAVENOUS
Status: DISCONTINUED | OUTPATIENT
Start: 2025-01-24 | End: 2025-01-24 | Stop reason: HOSPADM

## 2025-01-24 RX ORDER — HYDRALAZINE HYDROCHLORIDE 20 MG/ML
5 INJECTION INTRAMUSCULAR; INTRAVENOUS
Status: DISCONTINUED | OUTPATIENT
Start: 2025-01-24 | End: 2025-01-24 | Stop reason: HOSPADM

## 2025-01-24 RX ORDER — MIDAZOLAM HYDROCHLORIDE 1 MG/ML
INJECTION INTRAMUSCULAR; INTRAVENOUS
Status: DISCONTINUED | OUTPATIENT
Start: 2025-01-24 | End: 2025-01-24

## 2025-01-24 RX ORDER — METHOCARBAMOL 100 MG/ML
750 INJECTION, SOLUTION INTRAMUSCULAR; INTRAVENOUS ONCE AS NEEDED
Status: COMPLETED | OUTPATIENT
Start: 2025-01-24 | End: 2025-01-24

## 2025-01-24 RX ORDER — CEFTRIAXONE 2 G/1
2 INJECTION, POWDER, FOR SOLUTION INTRAMUSCULAR; INTRAVENOUS
Status: COMPLETED | OUTPATIENT
Start: 2025-01-25 | End: 2025-01-25

## 2025-01-24 RX ORDER — LIDOCAINE HYDROCHLORIDE 20 MG/ML
INJECTION, SOLUTION EPIDURAL; INFILTRATION; INTRACAUDAL; PERINEURAL
Status: DISCONTINUED | OUTPATIENT
Start: 2025-01-24 | End: 2025-01-24

## 2025-01-24 RX ORDER — SODIUM CHLORIDE, SODIUM GLUCONATE, SODIUM ACETATE, POTASSIUM CHLORIDE AND MAGNESIUM CHLORIDE 30; 37; 368; 526; 502 MG/100ML; MG/100ML; MG/100ML; MG/100ML; MG/100ML
INJECTION, SOLUTION INTRAVENOUS CONTINUOUS
Status: CANCELLED | OUTPATIENT
Start: 2025-01-24 | End: 2025-02-23

## 2025-01-24 RX ORDER — OXYCODONE AND ACETAMINOPHEN 5; 325 MG/1; MG/1
1 TABLET ORAL EVERY 4 HOURS PRN
Status: DISCONTINUED | OUTPATIENT
Start: 2025-01-24 | End: 2025-01-30

## 2025-01-24 RX ORDER — BACITRACIN 500 [USP'U]/G
OINTMENT TOPICAL
Status: DISCONTINUED | OUTPATIENT
Start: 2025-01-24 | End: 2025-01-24 | Stop reason: HOSPADM

## 2025-01-24 RX ORDER — GLYCOPYRROLATE 0.2 MG/ML
INJECTION INTRAMUSCULAR; INTRAVENOUS
Status: DISCONTINUED | OUTPATIENT
Start: 2025-01-24 | End: 2025-01-24

## 2025-01-24 RX ORDER — ACETAMINOPHEN 10 MG/ML
1000 INJECTION, SOLUTION INTRAVENOUS ONCE
Status: COMPLETED | OUTPATIENT
Start: 2025-01-24 | End: 2025-01-24

## 2025-01-24 RX ORDER — OXYCODONE AND ACETAMINOPHEN 10; 325 MG/1; MG/1
1 TABLET ORAL EVERY 4 HOURS PRN
Status: DISCONTINUED | OUTPATIENT
Start: 2025-01-24 | End: 2025-01-31 | Stop reason: HOSPADM

## 2025-01-24 RX ORDER — HYDRALAZINE HYDROCHLORIDE 20 MG/ML
5 INJECTION INTRAMUSCULAR; INTRAVENOUS EVERY 6 HOURS PRN
Status: DISCONTINUED | OUTPATIENT
Start: 2025-01-24 | End: 2025-01-31 | Stop reason: HOSPADM

## 2025-01-24 RX ADMIN — PROPOFOL 100 MCG/KG/MIN: 10 INJECTION, EMULSION INTRAVENOUS at 03:01

## 2025-01-24 RX ADMIN — HYDRALAZINE HYDROCHLORIDE 5 MG: 20 INJECTION INTRAMUSCULAR; INTRAVENOUS at 06:01

## 2025-01-24 RX ADMIN — HYDROMORPHONE HYDROCHLORIDE 0.4 MG: 2 INJECTION INTRAMUSCULAR; INTRAVENOUS; SUBCUTANEOUS at 06:01

## 2025-01-24 RX ADMIN — PROPOFOL 100 MG: 10 INJECTION, EMULSION INTRAVENOUS at 01:01

## 2025-01-24 RX ADMIN — PROPOFOL 50 MCG/KG/MIN: 10 INJECTION, EMULSION INTRAVENOUS at 01:01

## 2025-01-24 RX ADMIN — SODIUM CHLORIDE: 9 INJECTION, SOLUTION INTRAVENOUS at 09:01

## 2025-01-24 RX ADMIN — SUCCINYLCHOLINE CHLORIDE 140 MG: 20 INJECTION, SOLUTION INTRAMUSCULAR; INTRAVENOUS at 01:01

## 2025-01-24 RX ADMIN — MORPHINE SULFATE 2 MG: 4 INJECTION, SOLUTION INTRAMUSCULAR; INTRAVENOUS at 05:01

## 2025-01-24 RX ADMIN — CEFTRIAXONE SODIUM 1 G: 1 INJECTION, POWDER, FOR SOLUTION INTRAMUSCULAR; INTRAVENOUS at 11:01

## 2025-01-24 RX ADMIN — MELATONIN 9 MG: at 09:01

## 2025-01-24 RX ADMIN — PANTOPRAZOLE SODIUM 40 MG: 40 TABLET, DELAYED RELEASE ORAL at 05:01

## 2025-01-24 RX ADMIN — WHITE PETROLATUM: 1.75 OINTMENT TOPICAL at 11:01

## 2025-01-24 RX ADMIN — RANOLAZINE 500 MG: 500 TABLET, FILM COATED, EXTENDED RELEASE ORAL at 08:01

## 2025-01-24 RX ADMIN — ONDANSETRON 4 MG: 2 INJECTION INTRAMUSCULAR; INTRAVENOUS at 05:01

## 2025-01-24 RX ADMIN — DEXAMETHASONE SODIUM PHOSPHATE 4 MG: 4 INJECTION, SOLUTION INTRA-ARTICULAR; INTRALESIONAL; INTRAMUSCULAR; INTRAVENOUS; SOFT TISSUE at 01:01

## 2025-01-24 RX ADMIN — ACETAMINOPHEN 1000 MG: 10 INJECTION, SOLUTION INTRAVENOUS at 06:01

## 2025-01-24 RX ADMIN — TIOTROPIUM BROMIDE INHALATION SPRAY 2 PUFF: 3.12 SPRAY, METERED RESPIRATORY (INHALATION) at 08:01

## 2025-01-24 RX ADMIN — ALBUMIN (HUMAN) 100 ML: 12.5 SOLUTION INTRAVENOUS at 03:01

## 2025-01-24 RX ADMIN — OXYCODONE AND ACETAMINOPHEN 1 TABLET: 10; 325 TABLET ORAL at 02:01

## 2025-01-24 RX ADMIN — Medication 200 MCG: at 02:01

## 2025-01-24 RX ADMIN — ROCURONIUM BROMIDE 5 MG: 10 SOLUTION INTRAVENOUS at 01:01

## 2025-01-24 RX ADMIN — Medication 400 MG: at 08:01

## 2025-01-24 RX ADMIN — OXYCODONE AND ACETAMINOPHEN 1 TABLET: 10; 325 TABLET ORAL at 08:01

## 2025-01-24 RX ADMIN — FENTANYL CITRATE 100 MCG: 50 INJECTION, SOLUTION INTRAMUSCULAR; INTRAVENOUS at 01:01

## 2025-01-24 RX ADMIN — MIDAZOLAM HYDROCHLORIDE 2 MG: 1 INJECTION, SOLUTION INTRAMUSCULAR; INTRAVENOUS at 01:01

## 2025-01-24 RX ADMIN — MUPIROCIN: 20 OINTMENT TOPICAL at 09:01

## 2025-01-24 RX ADMIN — SODIUM CHLORIDE, SODIUM GLUCONATE, SODIUM ACETATE, POTASSIUM CHLORIDE AND MAGNESIUM CHLORIDE: 526; 502; 368; 37; 30 INJECTION, SOLUTION INTRAVENOUS at 01:01

## 2025-01-24 RX ADMIN — RANOLAZINE 500 MG: 500 TABLET, FILM COATED, EXTENDED RELEASE ORAL at 09:01

## 2025-01-24 RX ADMIN — CARVEDILOL 3.12 MG: 3.12 TABLET, FILM COATED ORAL at 08:01

## 2025-01-24 RX ADMIN — Medication 400 MG: at 09:01

## 2025-01-24 RX ADMIN — LINACLOTIDE 145 MCG: 145 CAPSULE, GELATIN COATED ORAL at 08:01

## 2025-01-24 RX ADMIN — METHOCARBAMOL 750 MG: 100 INJECTION INTRAMUSCULAR; INTRAVENOUS at 07:01

## 2025-01-24 RX ADMIN — FLUTICASONE FUROATE AND VILANTEROL TRIFENATATE 1 PUFF: 200; 25 POWDER RESPIRATORY (INHALATION) at 08:01

## 2025-01-24 RX ADMIN — OXYCODONE AND ACETAMINOPHEN 1 TABLET: 10; 325 TABLET ORAL at 10:01

## 2025-01-24 RX ADMIN — HYDRALAZINE HYDROCHLORIDE 5 MG: 20 INJECTION INTRAMUSCULAR; INTRAVENOUS at 07:01

## 2025-01-24 RX ADMIN — GLYCOPYRROLATE 0.2 MG: 0.2 INJECTION INTRAMUSCULAR; INTRAVENOUS at 01:01

## 2025-01-24 RX ADMIN — SODIUM CHLORIDE 0.05 MCG/KG/MIN: 9 INJECTION, SOLUTION INTRAVENOUS at 01:01

## 2025-01-24 RX ADMIN — CEFTRIAXONE SODIUM 1 G: 2 INJECTION, POWDER, FOR SOLUTION INTRAMUSCULAR; INTRAVENOUS at 01:01

## 2025-01-24 RX ADMIN — LIDOCAINE HYDROCHLORIDE 40 MG: 20 INJECTION, SOLUTION INTRAVENOUS at 01:01

## 2025-01-24 RX ADMIN — CARVEDILOL 3.12 MG: 3.12 TABLET, FILM COATED ORAL at 09:01

## 2025-01-24 RX ADMIN — HYDROMORPHONE HYDROCHLORIDE 0.4 MG: 2 INJECTION INTRAMUSCULAR; INTRAVENOUS; SUBCUTANEOUS at 07:01

## 2025-01-24 RX ADMIN — CYCLOBENZAPRINE 10 MG: 10 TABLET, FILM COATED ORAL at 09:01

## 2025-01-24 RX ADMIN — Medication 200 MCG: at 01:01

## 2025-01-24 NOTE — ANESTHESIA PREPROCEDURE EVALUATION
01/24/2025  Tyler Mai is a 69 y.o., male with CAD status post CABG (EF 20-43%), CHF with pacemaker, moderate aortic stenosis peripheral artery disease status post left peripheral intervention, bilateral carotid stenosis (left greater than right), AAA, COPD and chronic pain syndrome admitted to Internal Medicine January 15th with increasing neck pain and worsening chronic cervical spondylolysis with myelopathy for which patient has turned down multiple surgeries and poor follow-up to preoperative workup (cardiology and MRI appointments).  Patient was eventually deemed to be a moderate risk by cardiology and during our hospitalization is now deemed a low risk.  He presents today with improving chronic renal insufficiency and moderate anemia for posterior cervical fusion C2-6 with decompression and MEPS.    Transthoracic echo January 6, 2025 ([facility])  EF less than 20% (25-30% in 2021)  Mild-to-moderate AS, trace AR/MR      Nucleolar stress January 8 2025   No evidence of reversible defect   EF 43-50%    Left heart catheterization 2018   Left main distal 40%   Lad 70% 1st diagonal  Left circumflex mild luminal irregularities  RCA distal 98% stenosis   LVEDP 10     Pre-op Assessment    I have reviewed the Patient Summary Reports.    I have reviewed the NPO Status.   I have reviewed the Medications.     Review of Systems  Anesthesia Hx:               Denies Personal Hx of Anesthesia complications.                    Social:  Non-Smoker       Hematology/Oncology:       -- Anemia:                                  Cardiovascular:     Hypertension       CHF           Functional Capacity 2 METS    Aortic Stenosis (AS), moderate       Congestive Heart Failure (CHF)      Peripheral Arterial Disease      Carotoid Artery Disease    Abdominal Aortic Aneurysm            Pulmonary:   COPD, mild                      Hepatic/GI:     GERD, well controlled                Endocrine:  Diabetes, type 2               Physical Exam  General: Well nourished, Cooperative, Alert and Oriented    Airway:  Mallampati: II   Mouth Opening: Normal  TM Distance: Normal  Tongue: Normal  Neck ROM: Normal ROM  Full beard  Dental:  Periodontal disease  All teeth chipped missing are cracked  Chest/Lungs:  Clear to auscultation, Normal Respiratory Rate    Heart:  Rate: Normal  Rhythm: Regular Rhythm        Anesthesia Plan  Type of Anesthesia, risks & benefits discussed:    Anesthesia Type: Gen ETT  Intra-op Monitoring Plan: Standard ASA Monitors, Art Line and CO  Post Op Pain Control Plan: multimodal analgesia and IV/PO Opioids PRN  Induction:  IV  Airway Plan: Direct  Informed Consent: Informed consent signed with the Patient and all parties understand the risks and agree with anesthesia plan.  All questions answered.   ASA Score: 4  Day of Surgery Review of History & Physical: H&P Update referred to the surgeon/provider.  Anesthesia Plan Notes: Flow trac   Lg PIV x2 vs CVC    Ready For Surgery From Anesthesia Perspective.     .

## 2025-01-24 NOTE — PROGRESS NOTES
[facility] Neuro  Neurosurgery  Progress Note    Subjective:     Interval History:   No acute events overnight. Patient is ready for surgery today. His symptoms remains the same.     Post-Op Info:  Procedure(s) (LRB):  FUSION, SPINE, POSTERIOR SPINAL COLUMN, CERVICAL, USING COMPUTER-ASSISTED NAVIGATION (N/A)          Medications:  Continuous Infusions:  Scheduled Meds:   carvediloL  3.125 mg Oral BID    cefTRIAXone (Rocephin) IV (PEDS and ADULTS)  1 g Intravenous Q24H    fluticasone furoate-vilanteroL  1 puff Inhalation Daily    linaCLOtide  145 mcg Oral QAM    magnesium oxide  400 mg Oral BID    pantoprazole  40 mg Oral Before breakfast    ranolazine  500 mg Oral BID    tiotropium bromide  2 puff Inhalation Daily    white petrolatum   Topical (Top) Daily     PRN Meds:  Current Facility-Administered Medications:     acetaminophen, 650 mg, Oral, Q4H PRN    albuterol-ipratropium, 3 mL, Nebulization, Q6H PRN    aluminum-magnesium hydroxide-simethicone, 30 mL, Oral, QID PRN    bisacodyL, 10 mg, Rectal, Daily PRN    cefTRIAXone (Rocephin) IV (PEDS and ADULTS), 2 g, Intravenous, On Call Procedure    cyclobenzaprine, 10 mg, Oral, TID PRN    dextrose 50%, 12.5 g, Intravenous, PRN    dextrose 50%, 12.5 g, Intravenous, PRN    dextrose 50%, 25 g, Intravenous, PRN    dextrose 50%, 25 g, Intravenous, PRN    glucagon (human recombinant), 1 mg, Intramuscular, PRN    glucagon (human recombinant), 1 mg, Intramuscular, PRN    glucose, 16 g, Oral, PRN    glucose, 16 g, Oral, PRN    glucose, 24 g, Oral, PRN    glucose, 24 g, Oral, PRN    insulin aspart U-100, 0-5 Units, Subcutaneous, QID (AC + HS) PRN    melatonin, 9 mg, Oral, Nightly PRN    morphine, 2 mg, Intravenous, Q6H PRN    naloxone, 0.02 mg, Intravenous, PRN    ondansetron, 8 mg, Oral, Q8H PRN    ondansetron, 4 mg, Intravenous, Q8H PRN    oxyCODONE-acetaminophen, 1 tablet, Oral, Q4H PRN    polyethylene glycol, 17 g, Oral, TID PRN    simethicone, 1 tablet,  Oral, QID PRN    sodium chloride 0.9%, 10 mL, Intravenous, PRN     Review of Systems  Objective:     Weight: 78 kg (172 lb)  Body mass index is 27.76 kg/m².  Vital Signs (Most Recent):  Temp: 97.7 °F (36.5 °C) (01/24/25 0810)  Pulse: 80 (01/24/25 0858)  Resp: 18 (01/24/25 0858)  BP: (!) 146/75 (01/24/25 0854)  SpO2: 98 % (01/24/25 0858) Vital Signs (24h Range):  Temp:  [97.7 °F (36.5 °C)-98.4 °F (36.9 °C)] 97.7 °F (36.5 °C)  Pulse:  [57-80] 80  Resp:  [17-18] 18  SpO2:  [96 %-99 %] 98 %  BP: (133-147)/(71-77) 146/75                              Neurosurgery Physical Exam  Alert  Follows command  Bilateral UE: 4+/5 deltoids, 4+/5 biceps and triceps, 4-/5   Bilateral UE: 5/5  Woods positive bilaterally    Significant Labs:  Recent Labs   Lab 01/23/25  0536   *   K 4.2      CO2 28   BUN 44.7*   CREATININE 1.46*   CALCIUM 9.8   MG 1.70     Recent Labs   Lab 01/23/25  0536 01/24/25  0403   WBC 8.08 9.09   HGB 10.9* 10.8*   HCT 32.8* 31.3*    368     Recent Labs   Lab 01/23/25  0536   INR 1.0   APTT 29.9     Microbiology Results (last 7 days)       ** No results found for the last 168 hours. **            Significant Diagnostics:      Assessment/Plan:    Plan:  - Floor status  - Neuro/motor checks Q4H  - PT, OT  - SCDs for DVT prophylaxis  - Full set of labs, coags, type and screen, EKG, and chest x-ray completed  - NPO now  - Hold antiplatelets and anticoagulants for surgery  - Plan for C2-6 PCDF and C2/3- C4/5 decompression today with Dr. Zavala      Active Diagnoses:    Diagnosis Date Noted POA    PRINCIPAL PROBLEM:  Spinal cord compression [G95.20] 01/14/2025 Yes    Anemia [D64.9] 01/14/2025 Yes    Hyponatremia [E87.1] 01/14/2025 Yes      Problems Resolved During this Admission:     YESSICA Garcia-BC  Neurosurgery  [facility]

## 2025-01-24 NOTE — ANESTHESIA PROCEDURE NOTES
Intubation    Date/Time: 1/24/2025 1:28 PM    Performed by: Che Baeza CRNA  Authorized by: Kiel Gonzalez Jr., MD    Intubation:     Induction:  Intravenous    Intubated:  Postinduction    Mask Ventilation:  Easy with oral airway    Attempts:  1    Attempted By:  CRNA    Method of Intubation:  Video laryngoscopy    Blade:  Manuel 4    Laryngeal View Grade: Grade IIA - cords partially seen      Difficult Airway Encountered?: No      Complications:  None    Airway Device:  Oral endotracheal tube    Airway Device Size:  7.5    Style/Cuff Inflation:  Cuffed    Inflation Amount (mL):  6    Tube secured:  21    Secured at:  The lips    Placement Verified By:  Capnometry    Complicating Factors:  None    Findings Post-Intubation:  BS equal bilateral and atraumatic/condition of teeth unchanged

## 2025-01-24 NOTE — ANESTHESIA PROCEDURE NOTES
Arterial    Diagnosis: posterior cervical fusion    Patient location during procedure: holding area  Timeout: 1/24/2025 1:45 PM  Procedure end time: 1/24/2025 1:55 PM    Staffing  Authorizing Provider: Kiel Gonzalez Jr., MD  Performing Provider: Che Baeza CRNA    Staffing  Performed by: Che Baeza CRNA  Authorized by: Kiel Gonzalez Jr., MD    Anesthesiologist was present at the time of the procedure.    Preanesthetic Checklist  Completed: patient identified, IV checked, site marked, risks and benefits discussed, surgical consent, monitors and equipment checked, pre-op evaluation, timeout performed and anesthesia consent givenArterial  Skin Prep: chlorhexidine gluconate  Local Infiltration: lidocaine  Orientation: right  Location: radial    Catheter Size: 20 G Insertion Attempts: 1  Assessment  Dressing: secured with tape and tegaderm  Patient: Tolerated well

## 2025-01-24 NOTE — PT/OT/SLP PROGRESS
Physical Therapy Treatment    Patient Name:  Tyler Mai   MRN:  [id number]    Patient going to OR with neurosurgery this afternoon. PT to f/u tomorrow.

## 2025-01-25 LAB
ALBUMIN SERPL-MCNC: 3.6 G/DL (ref 3.4–4.8)
ALBUMIN/GLOB SERPL: 1.1 RATIO (ref 1.1–2)
ALP SERPL-CCNC: 104 UNIT/L (ref 40–150)
ALT SERPL-CCNC: 14 UNIT/L (ref 0–55)
ANION GAP SERPL CALC-SCNC: 6 MEQ/L
AST SERPL-CCNC: 17 UNIT/L (ref 5–34)
BASOPHILS # BLD AUTO: 0.04 X10(3)/MCL
BASOPHILS NFR BLD AUTO: 0.4 %
BILIRUB SERPL-MCNC: 0.4 MG/DL
BUN SERPL-MCNC: 29.7 MG/DL (ref 8.4–25.7)
CALCIUM SERPL-MCNC: 9.8 MG/DL (ref 8.8–10)
CHLORIDE SERPL-SCNC: 100 MMOL/L (ref 98–107)
CO2 SERPL-SCNC: 27 MMOL/L (ref 23–31)
CREAT SERPL-MCNC: 1.31 MG/DL (ref 0.72–1.25)
CREAT/UREA NIT SERPL: 23
EOSINOPHIL # BLD AUTO: 0.01 X10(3)/MCL (ref 0–0.9)
EOSINOPHIL NFR BLD AUTO: 0.1 %
ERYTHROCYTE [DISTWIDTH] IN BLOOD BY AUTOMATED COUNT: 11.7 % (ref 11.5–17)
GFR SERPLBLD CREATININE-BSD FMLA CKD-EPI: 60 ML/MIN/1.73/M2
GLOBULIN SER-MCNC: 3.4 GM/DL (ref 2.4–3.5)
GLUCOSE SERPL-MCNC: 103 MG/DL (ref 82–115)
HCT VFR BLD AUTO: 31.9 % (ref 42–52)
HGB BLD-MCNC: 10.6 G/DL (ref 14–18)
IMM GRANULOCYTES # BLD AUTO: 0.04 X10(3)/MCL (ref 0–0.04)
IMM GRANULOCYTES NFR BLD AUTO: 0.4 %
LYMPHOCYTES # BLD AUTO: 1.23 X10(3)/MCL (ref 0.6–4.6)
LYMPHOCYTES NFR BLD AUTO: 12.3 %
MCH RBC QN AUTO: 30.5 PG (ref 27–31)
MCHC RBC AUTO-ENTMCNC: 33.2 G/DL (ref 33–36)
MCV RBC AUTO: 91.9 FL (ref 80–94)
MONOCYTES # BLD AUTO: 0.8 X10(3)/MCL (ref 0.1–1.3)
MONOCYTES NFR BLD AUTO: 8 %
NEUTROPHILS # BLD AUTO: 7.89 X10(3)/MCL (ref 2.1–9.2)
NEUTROPHILS NFR BLD AUTO: 78.8 %
NRBC BLD AUTO-RTO: 0 %
PLATELET # BLD AUTO: 396 X10(3)/MCL (ref 130–400)
PMV BLD AUTO: 10.1 FL (ref 7.4–10.4)
POCT GLUCOSE: 107 MG/DL (ref 70–110)
POCT GLUCOSE: 108 MG/DL (ref 70–110)
POCT GLUCOSE: 141 MG/DL (ref 70–110)
POCT GLUCOSE: 169 MG/DL (ref 70–110)
POCT GLUCOSE: 191 MG/DL (ref 70–110)
POTASSIUM SERPL-SCNC: 4.3 MMOL/L (ref 3.5–5.1)
PROT SERPL-MCNC: 7 GM/DL (ref 5.8–7.6)
RBC # BLD AUTO: 3.47 X10(6)/MCL (ref 4.7–6.1)
SODIUM SERPL-SCNC: 133 MMOL/L (ref 136–145)
WBC # BLD AUTO: 10.01 X10(3)/MCL (ref 4.5–11.5)

## 2025-01-25 PROCEDURE — 63600175 PHARM REV CODE 636 W HCPCS: Performed by: INTERNAL MEDICINE

## 2025-01-25 PROCEDURE — 51798 US URINE CAPACITY MEASURE: CPT

## 2025-01-25 PROCEDURE — 94799 UNLISTED PULMONARY SVC/PX: CPT

## 2025-01-25 PROCEDURE — 25000003 PHARM REV CODE 250

## 2025-01-25 PROCEDURE — 25000003 PHARM REV CODE 250: Performed by: INTERNAL MEDICINE

## 2025-01-25 PROCEDURE — 80053 COMPREHEN METABOLIC PANEL: CPT

## 2025-01-25 PROCEDURE — 21400001 HC TELEMETRY ROOM

## 2025-01-25 PROCEDURE — 27000221 HC OXYGEN, UP TO 24 HOURS

## 2025-01-25 PROCEDURE — 25000242 PHARM REV CODE 250 ALT 637 W/ HCPCS: Performed by: INTERNAL MEDICINE

## 2025-01-25 PROCEDURE — 99900031 HC PATIENT EDUCATION (STAT)

## 2025-01-25 PROCEDURE — 63600175 PHARM REV CODE 636 W HCPCS: Performed by: STUDENT IN AN ORGANIZED HEALTH CARE EDUCATION/TRAINING PROGRAM

## 2025-01-25 PROCEDURE — 36415 COLL VENOUS BLD VENIPUNCTURE: CPT

## 2025-01-25 PROCEDURE — 51701 INSERT BLADDER CATHETER: CPT

## 2025-01-25 PROCEDURE — 85025 COMPLETE CBC W/AUTO DIFF WBC: CPT

## 2025-01-25 PROCEDURE — 25000003 PHARM REV CODE 250: Performed by: STUDENT IN AN ORGANIZED HEALTH CARE EDUCATION/TRAINING PROGRAM

## 2025-01-25 PROCEDURE — 94760 N-INVAS EAR/PLS OXIMETRY 1: CPT

## 2025-01-25 PROCEDURE — 97168 OT RE-EVAL EST PLAN CARE: CPT

## 2025-01-25 RX ORDER — HEPARIN SODIUM 5000 [USP'U]/ML
5000 INJECTION, SOLUTION INTRAVENOUS; SUBCUTANEOUS EVERY 8 HOURS
Status: DISCONTINUED | OUTPATIENT
Start: 2025-01-25 | End: 2025-01-30

## 2025-01-25 RX ADMIN — MORPHINE SULFATE 2 MG: 4 INJECTION, SOLUTION INTRAMUSCULAR; INTRAVENOUS at 06:01

## 2025-01-25 RX ADMIN — OXYCODONE AND ACETAMINOPHEN 1 TABLET: 10; 325 TABLET ORAL at 02:01

## 2025-01-25 RX ADMIN — FLUTICASONE FUROATE AND VILANTEROL TRIFENATATE 1 PUFF: 200; 25 POWDER RESPIRATORY (INHALATION) at 08:01

## 2025-01-25 RX ADMIN — CARVEDILOL 3.12 MG: 3.12 TABLET, FILM COATED ORAL at 08:01

## 2025-01-25 RX ADMIN — Medication 400 MG: at 08:01

## 2025-01-25 RX ADMIN — TIOTROPIUM BROMIDE INHALATION SPRAY 2 PUFF: 3.12 SPRAY, METERED RESPIRATORY (INHALATION) at 08:01

## 2025-01-25 RX ADMIN — MUPIROCIN: 20 OINTMENT TOPICAL at 09:01

## 2025-01-25 RX ADMIN — METHOCARBAMOL 750 MG: 750 TABLET ORAL at 08:01

## 2025-01-25 RX ADMIN — WHITE PETROLATUM: 1.75 OINTMENT TOPICAL at 08:01

## 2025-01-25 RX ADMIN — LINACLOTIDE 145 MCG: 145 CAPSULE, GELATIN COATED ORAL at 05:01

## 2025-01-25 RX ADMIN — CEFTRIAXONE SODIUM 2 G: 2 INJECTION, POWDER, FOR SOLUTION INTRAMUSCULAR; INTRAVENOUS at 11:01

## 2025-01-25 RX ADMIN — MORPHINE SULFATE 2 MG: 4 INJECTION, SOLUTION INTRAMUSCULAR; INTRAVENOUS at 08:01

## 2025-01-25 RX ADMIN — OXYCODONE AND ACETAMINOPHEN 1 TABLET: 10; 325 TABLET ORAL at 08:01

## 2025-01-25 RX ADMIN — OXYCODONE AND ACETAMINOPHEN 1 TABLET: 10; 325 TABLET ORAL at 11:01

## 2025-01-25 RX ADMIN — METHOCARBAMOL 750 MG: 750 TABLET ORAL at 05:01

## 2025-01-25 RX ADMIN — RANOLAZINE 500 MG: 500 TABLET, FILM COATED, EXTENDED RELEASE ORAL at 08:01

## 2025-01-25 RX ADMIN — MORPHINE SULFATE 2 MG: 4 INJECTION, SOLUTION INTRAMUSCULAR; INTRAVENOUS at 12:01

## 2025-01-25 RX ADMIN — CYCLOBENZAPRINE 10 MG: 10 TABLET, FILM COATED ORAL at 11:01

## 2025-01-25 RX ADMIN — HYDRALAZINE HYDROCHLORIDE 5 MG: 20 INJECTION INTRAMUSCULAR; INTRAVENOUS at 05:01

## 2025-01-25 RX ADMIN — PANTOPRAZOLE SODIUM 40 MG: 40 TABLET, DELAYED RELEASE ORAL at 05:01

## 2025-01-25 RX ADMIN — HEPARIN SODIUM 5000 UNITS: 5000 INJECTION, SOLUTION INTRAVENOUS; SUBCUTANEOUS at 10:01

## 2025-01-25 RX ADMIN — OXYCODONE AND ACETAMINOPHEN 1 TABLET: 10; 325 TABLET ORAL at 06:01

## 2025-01-25 RX ADMIN — HEPARIN SODIUM 5000 UNITS: 5000 INJECTION, SOLUTION INTRAVENOUS; SUBCUTANEOUS at 05:01

## 2025-01-25 RX ADMIN — OXYCODONE AND ACETAMINOPHEN 1 TABLET: 10; 325 TABLET ORAL at 04:01

## 2025-01-25 NOTE — ANESTHESIA POSTPROCEDURE EVALUATION
Anesthesia Post Evaluation    Patient: Tyler Mai    Procedure(s) Performed: Procedure(s) (LRB):  FUSION, SPINE, POSTERIOR SPINAL COLUMN, CERVICAL, USING COMPUTER-ASSISTED NAVIGATION (N/A)    Final Anesthesia Type: general      Patient location during evaluation: PACU  Patient participation: Yes- Able to Participate  Level of consciousness: awake and alert  Post-procedure vital signs: reviewed and stable  Pain management: adequate  Airway patency: patent    PONV status at discharge: No PONV  Anesthetic complications: no      Cardiovascular status: blood pressure returned to baseline  Respiratory status: unassisted and spontaneous ventilation  Hydration status: euvolemic  Follow-up needed               Vitals Value Taken Time   /61 01/24/25 1901   Temp 36.9 °C (98.4 °F) 01/24/25 1815   Pulse 78 01/24/25 1902   Resp 23 01/24/25 1902   SpO2 97 % 01/24/25 1902   Vitals shown include unfiled device data.      No case tracking events are documented in the log.      Pain/Christopher Score: Pain Rating Prior to Med Admin: 7 (1/24/2025  6:45 PM)  Pain Rating Post Med Admin: 3 (1/24/2025  9:54 AM)

## 2025-01-25 NOTE — PROGRESS NOTES
[facility] Neuro  Wound Care    Patient Name:  Tyler Mai   MRN:  [id number]  Date: 1/25/2025  Diagnosis: Spinal cord compression    History:     Past Medical History:   Diagnosis Date    Anxiety disorder, unspecified     Arthritis     CAD (coronary artery disease)     Cervical radiculopathy     CHF (congestive heart failure)     Chronic pain syndrome     COPD (chronic obstructive pulmonary disease)     Diabetes mellitus     Gastro-esophageal reflux disease without esophagitis     High cholesterol     Hypertension     Lumbar radiculopathy     Myelomalacia of cervical cord     Other specified diseases of spinal cord     Pacemaker     PVD (peripheral vascular disease)     S/P triple vessel bypass     Tobacco use        Social History     Socioeconomic History    Marital status: Single   Tobacco Use    Smoking status: Every Day     Current packs/day: 1.00     Types: Cigarettes     Social Drivers of Health     Financial Resource Strain: Patient Declined (1/18/2025)    Overall Financial Resource Strain (CARDIA)     Difficulty of Paying Living Expenses: Patient declined   Food Insecurity: Patient Declined (1/18/2025)    Hunger Vital Sign     Worried About Running Out of Food in the Last Year: Patient declined     Ran Out of Food in the Last Year: Patient declined   Transportation Needs: Patient Declined (1/18/2025)    TRANSPORTATION NEEDS     Transportation : Patient declined   Stress: Patient Declined (1/18/2025)    [employer] - Occupational Stress Questionnaire     Feeling of Stress : Patient declined   Housing Stability: Patient Declined (1/18/2025)    Housing Stability Vital Sign     Unable to Pay for Housing in the Last Year: Patient declined     Homeless in the Last Year: Patient declined       Precautions:     Allergies as of 01/14/2025 - Reviewed 01/14/2025   Allergen Reaction Noted    Hydrochlorothiazide  12/03/2024       WO Assessment Details/Treatment       01/25/25 0851   WOCN Assessment   Visit Date 01/25/25   Visit Time 0851   Consult Type New   WO Speciality Wound   Intervention assessed;applied;chart review;coordination of care;orders   Teaching on-going        Wound 01/14/25 2202 Other (comment) Right Heel   Date First Assessed/Time First Assessed: 01/14/25 2202   Primary Wound Type: (c) Other (comment)  Side: Right  Location: Heel   Wound Image    Dressing Appearance Open to air   Drainage Amount None   Drainage Characteristics/Odor No odor   Appearance Intact;Red   Black (%), Wound Tissue Color 0 %   Red (%), Wound Tissue Color 100 %   Yellow (%), Wound Tissue Color 0 %   Periwound Area Dry;Intact   Care Cleansed with:;Sterile normal saline;Applied:;Povidone iodine   Dressing Applied;Foam        Wound 01/25/25 Other (comment) Left Heel   Date First Assessed: 01/25/25   Primary Wound Type: Other (comment)  Side: Left  Location: Heel   Wound Image    Dressing Appearance Open to air   Drainage Amount None   Drainage Characteristics/Odor No odor   Appearance Black   Black (%), Wound Tissue Color 100 %   Red (%), Wound Tissue Color 0 %   Yellow (%), Wound Tissue Color 0 %   Periwound Area Dry   Care Cleansed with:;Sterile normal saline;Applied:;Povidone iodine   Dressing Applied;Foam     WOCN consulted for L heel. Nurse Harriet at bedside. Patient awake. Patient able to assist with turns. Introduced wound care team. Treatment recommendations: Cleanse bilateral heels with normal saline, paint with betadine, apply small foam, change q2days. Applied bilateral heel boots. Educated patient on importance of heel floating to prevent worsening wounds. Patient verbalized understanding. Nursing to continue with current treatment recommendations and preventative measures. Wound care will continue to follow.     01/25/2025

## 2025-01-25 NOTE — PROGRESS NOTES
[facility] Neuro  Neurosurgery  Progress Note    Subjective:     Interval History:     Postop day 1. Status post-  C2-6 PCDF and C2/3- C4/5 decompression     Patient is sitting up in bed about to have his urinary catheter removed   He is wearing the hard cervical collar   He is passing flatus   He has increase mobility to his bilateral upper extremities with a 5/5 hand grasp at this time.  He is having incisional pain as expected.    His incision is clean and dry.    Post-Op Info:  Procedure(s) (LRB):  FUSION, SPINE, POSTERIOR SPINAL COLUMN, CERVICAL, USING COMPUTER-ASSISTED NAVIGATION (N/A)   1 Day Post-Op      Medications:  Continuous Infusions:   0.9% NaCl   Intravenous Continuous   Stopped at 01/25/25 0424     Scheduled Meds:   carvediloL  3.125 mg Oral BID    fluticasone furoate-vilanteroL  1 puff Inhalation Daily    [START ON 1/26/2025] heparin (porcine)  5,000 Units Subcutaneous Q8H    linaCLOtide  145 mcg Oral QAM    magnesium oxide  400 mg Oral BID    methocarbamoL  750 mg Oral QID    mupirocin   Nasal BID    pantoprazole  40 mg Oral Before breakfast    ranolazine  500 mg Oral BID    tiotropium bromide  2 puff Inhalation Daily    white petrolatum   Topical (Top) Daily     PRN Meds:  Current Facility-Administered Medications:     acetaminophen, 650 mg, Oral, Q4H PRN    albuterol-ipratropium, 3 mL, Nebulization, Q6H PRN    aluminum-magnesium hydroxide-simethicone, 30 mL, Oral, QID PRN    bisacodyL, 10 mg, Rectal, Daily PRN    cefTRIAXone (Rocephin) IV (PEDS and ADULTS), 2 g, Intravenous, On Call Procedure    cyclobenzaprine, 10 mg, Oral, TID PRN    dextrose 50%, 12.5 g, Intravenous, PRN    dextrose 50%, 12.5 g, Intravenous, PRN    dextrose 50%, 25 g, Intravenous, PRN    dextrose 50%, 25 g, Intravenous, PRN    glucagon (human recombinant), 1 mg, Intramuscular, PRN    glucagon (human recombinant), 1 mg, Intramuscular, PRN    glucose, 16 g, Oral, PRN    glucose, 16 g, Oral, PRN    glucose,  "24 g, Oral, PRN    glucose, 24 g, Oral, PRN    hydrALAZINE, 5 mg, Intravenous, Q6H PRN    insulin aspart U-100, 0-5 Units, Subcutaneous, QID (AC + HS) PRN    labetalol, 10 mg, Intravenous, Q4H PRN    melatonin, 9 mg, Oral, Nightly PRN    morphine, 2 mg, Intravenous, Q6H PRN    naloxone, 0.02 mg, Intravenous, PRN    ondansetron, 8 mg, Oral, Q8H PRN    ondansetron, 4 mg, Intravenous, Q8H PRN    oxyCODONE-acetaminophen, 1 tablet, Oral, Q4H PRN    oxyCODONE-acetaminophen, 1 tablet, Oral, Q4H PRN    polyethylene glycol, 17 g, Oral, TID PRN    simethicone, 1 tablet, Oral, QID PRN    sodium chloride 0.9%, 10 mL, Intravenous, PRN     Review of Systems  Objective:     Weight: 78 kg (172 lb)  Body mass index is 27.76 kg/m².  Vital Signs (Most Recent):  Temp: 97.9 °F (36.6 °C) (01/25/25 0806)  Pulse: 82 (01/25/25 0845)  Resp: 18 (01/25/25 0845)  BP: (!) 150/67 (01/25/25 0845)  SpO2: 96 % (01/25/25 0845) Vital Signs (24h Range):  Temp:  [97.5 °F (36.4 °C)-98.4 °F (36.9 °C)] 97.9 °F (36.6 °C)  Pulse:  [60-88] 82  Resp:  [15-25] 18  SpO2:  [93 %-100 %] 96 %  BP: (125-182)/(52-84) 150/67                              Closed/Suction Drain 01/24/25 Tube - 1 Posterior Neck Accordion (Active)   Site Description Unable to view 01/24/25 1959   Dressing Type Gauze;Transparent (Tegaderm) 01/24/25 1959   Dressing Status Dry;Clean;Intact 01/24/25 1959   Dressing Intervention Integrity maintained 01/24/25 1959   Drainage Bloody 01/24/25 1959   Status To bulb suction 01/24/25 1959   Output (mL) 30 mL 01/25/25 0350            Urethral Catheter 01/24/25 1320 Silicone;Straight-tip 16 Fr. (Active)   Site Assessment Clean;Intact 01/24/25 1959   Collection Container Standard drainage bag 01/24/25 1959   Securement Method secured to top of thigh w/ adhesive device 01/24/25 1959   Reason for Continuing Urinary Catheterization Post operative 01/24/25 1959   CAUTI Prevention Bundle Securement Device in place with 1" slack;Drainage bag/urimeter off the " "floor;Intact seal between catheter & drainage tubing;Sheeting clip in use;No dependent loops or kinks;Drainage bag/urimeter not overfilled (<2/3 full);Drainage bag/urimeter below bladder 01/24/25 1959       Neurosurgery Physical Exam    GCS 15   Awake and oriented   Improved upper extremity motor strength with hand grasp now 5/5.  4+ out of 5 to all other groups.  Bilateral lower extremities 5/5  Surgical incision clean and dry  Hard cervical collar in place    Significant Labs:  Recent Labs   Lab 01/25/25  0546   *   K 4.3      CO2 27   BUN 29.7*   CREATININE 1.31*   CALCIUM 9.8     Recent Labs   Lab 01/24/25  0403 01/25/25  0545   WBC 9.09 10.01   HGB 10.8* 10.6*   HCT 31.3* 31.9*    396     No results for input(s): "LABPT", "INR", "APTT" in the last 48 hours.  Microbiology Results (last 7 days)       ** No results found for the last 168 hours. **              Assessment/Plan:    PTOT   Urinary catheter being discontinued   Bowel management in place   SCDs/on heparin subQ for DVT prophylaxis  Fall precautions   Keep the wound clean and dry  Multimodal pain control         Active Diagnoses:    Diagnosis Date Noted POA    PRINCIPAL PROBLEM:  Spinal cord compression [G95.20] 01/14/2025 Yes    Anemia [D64.9] 01/14/2025 Yes    Hyponatremia [E87.1] 01/14/2025 Yes      Problems Resolved During this Admission:       Philip [facility]  Neurosurgery  [facility] Neuro    "

## 2025-01-25 NOTE — PROGRESS NOTES
"[facility] Hospital Medicine Progress Note        Chief Complaint: Inpatient Follow-up     HPI: 69-year-old male with a past medical history of hypertension, hyperlipidemia, diabetes, COPD (no home O2), heart failure, CAD status post CABG, Medtronic pacemaker, peripheral arterial disease status post left PCI, abdominal aortic aneurysm, tobacco dependence, prior C5, 6, 7 decompression with  who presents with worsening chronic cervical spondylosis with myelopathy.  He was seen by Neurosurgery on 12/03/2024 however he did not complete cardiac evaluation for clearance and did not go to scheduled MRI appointments.  They recommended fusion at least at C3-4 and C4-5.      He was seen at [facility] 01/05/2025: "He initially told Dr. Bella he did not want surgery. He changed his mind as his UTI was treated and cleared and a f/u urine culture from 1/11 has been negative for growth. His son does not want Dr. Bella to do the surgery and attempts were made to see if Dr. Neumann or Dr. Bowen would see the patient but both declined this. He is now recommended to discharge and report to a different hospital for a reevaluation because he and his family have declined Dr. Bella's offer to do surgery. The surgery is felt to be needed and he has been seen by Dr. Beebe cardiology who rates his cardiac risk as moderate at 5-7% of a cardiac event."   At the time of md evaluation patient is seeking neurosurgical evaluation and intervention.  Seen by Nephrology, renal function improved      Interval Hx:   Neurosurgery following, taking to surgery today.  No acute overnight events reported    Patient was seen and examined in the hallway when going to surgery, no major concerns expressed, wished him good luck,  afebrile, hemodynamically stable, most recent lab work was reviewed.    Objective/physical exam:  General: In no acute distress, afebrile  Chest: Clear to auscultation bilaterally  Heart:  +S1, S2, " normal rate  Abdomen: Soft, nontender, BS +  MSK: Warm, no lower extremity edema, no clubbing or cyanosis  Neurologic: Alert and oriented , with right-sided weakness    VITAL SIGNS: 24 HRS MIN & MAX LAST   Temp  Min: 97.7 °F (36.5 °C)  Max: 98.4 °F (36.9 °C) 98.4 °F (36.9 °C)   BP  Min: 131/70  Max: 168/75 (!) 168/75   Pulse  Min: 60  Max: 82  77   Resp  Min: 15  Max: 22 16   SpO2  Min: 95 %  Max: 99 % 99 %     I have reviewed the following labs:  Recent Labs   Lab 01/22/25  0519 01/23/25  0536 01/24/25  0403   WBC 9.86 8.08 9.09   RBC 3.36* 3.55* 3.45*   HGB 10.4* 10.9* 10.8*   HCT 30.7* 32.8* 31.3*   MCV 91.4 92.4 90.7   MCH 31.0 30.7 31.3*   MCHC 33.9 33.2 34.5   RDW 11.6 11.7 11.7    367 368   MPV 10.6* 10.6* 10.5*     Recent Labs   Lab 01/20/25  0620 01/21/25  0509 01/22/25  0519 01/23/25  0536   * 135* 132* 134*   K 4.7 4.7 4.4 4.2   CL 99 96* 98 100   CO2 25 27 25 28   BUN 51.9* 55.4* 51.6* 44.7*   CREATININE 2.09* 2.25* 1.85* 1.46*   CALCIUM 9.3 9.9 9.5 9.8   MG  --   --  1.20* 1.70   ALBUMIN 3.4 3.3* 3.1* 3.3*   ALKPHOS 121  --   --  116   ALT 17  --   --  14   AST 15  --   --  15   BILITOT 0.6  --   --  0.4     Microbiology Results (last 7 days)       ** No results found for the last 168 hours. **             See below for Radiology    Assessment/Plan:    Severe cervical spondylosis  Severe C3-4 central canal stenosis with central cord myelomalacia  CKD currently at baseline  Normocytic anemia    Past medical history of hypertension, hyperlipidemia, diabetes, COPD (no home O2), heart failure, CAD status post CABG, Medtronic pacemaker, peripheral arterial disease status post left PCI, abdominal aortic aneurysm, tobacco dependence, prior C5, 6, 7 decompression     Plan   Neurosurgery following, surgery planned for today.  Also seen by Cardiology-low risk for procedure, no cardiac testing indicated.  Would need outpatient follow up for routine cardiac care.  Continue neuro checks, keep map above  65.  Nephrology on board.  Renal function has improved.  Started on ceftriaxone to cover for any underlying UTI.    Continue supportive care, appropriate home medications.  On sliding scale  Therapy evaluation eventually    VTE prophylaxis:  Lovenox> SCD      Anticipated discharge and Disposition:   TBD, pending surgery      All diagnosis and differential diagnosis have been reviewed; assessment and plan has been documented; I have personally reviewed the labs and test results that are presently available; I have reviewed the patients medication list; I have reviewed the consulting providers response and recommendations. I have reviewed or attempted to review medical records based upon their availability    All of the patient's questions have been  addressed and answered. Patient's is agreeable to the above stated plan. I will continue to monitor closely and make adjustments to medical management as needed.    Portions of this note dictated using EMR integrated voice recognition software, and may be subject to voice recognition errors not corrected at proofreading. Please contact writer for clarification if needed.   _____________________________________________________________________    Malnutrition Status:  Nutrition consulted. Most recent weight and BMI monitored-     Measurements:  Wt Readings from Last 1 Encounters:   01/18/25 78 kg (172 lb)   Body mass index is 27.76 kg/m².    Patient has been screened and assessed by RD.    Malnutrition Type:  Context:    Level:      Malnutrition Characteristic Summary:       Interventions/Recommendations (treatment strategy):        Scheduled Med:   carvediloL  3.125 mg Oral BID    cefTRIAXone (Rocephin) IV (PEDS and ADULTS)  2 g Intravenous Q24H    fluticasone furoate-vilanteroL  1 puff Inhalation Daily    linaCLOtide  145 mcg Oral QAM    magnesium oxide  400 mg Oral BID    methocarbamoL  750 mg Oral QID    pantoprazole  40 mg Oral Before breakfast    ranolazine  500 mg  Oral BID    tiotropium bromide  2 puff Inhalation Daily    white petrolatum   Topical (Top) Daily      Continuous Infusions:   0.9% NaCl   Intravenous Continuous          PRN Meds:    Current Facility-Administered Medications:     acetaminophen, 650 mg, Oral, Q4H PRN    albuterol-ipratropium, 3 mL, Nebulization, Q6H PRN    albuterol-ipratropium, 3 mL, Nebulization, Q1H PRN    aluminum-magnesium hydroxide-simethicone, 30 mL, Oral, QID PRN    bisacodyL, 10 mg, Rectal, Daily PRN    cefTRIAXone (Rocephin) IV (PEDS and ADULTS), 2 g, Intravenous, On Call Procedure    cyclobenzaprine, 10 mg, Oral, TID PRN    dextrose 50%, 12.5 g, Intravenous, PRN    dextrose 50%, 12.5 g, Intravenous, PRN    dextrose 50%, 12.5 g, Intravenous, PRN    dextrose 50%, 25 g, Intravenous, PRN    dextrose 50%, 25 g, Intravenous, PRN    glucagon (human recombinant), 1 mg, Intramuscular, PRN    glucagon (human recombinant), 1 mg, Intramuscular, PRN    glucagon (human recombinant), 1 mg, Intramuscular, PRN    glucose, 16 g, Oral, PRN    glucose, 16 g, Oral, PRN    glucose, 24 g, Oral, PRN    glucose, 24 g, Oral, PRN    hydrALAZINE, 10 mg, Intravenous, Q4H PRN    hydrALAZINE, 5 mg, Intravenous, Q15 Min PRN    HYDROmorphone, 0.2 mg, Intravenous, Q5 Min PRN    HYDROmorphone, 0.4 mg, Intravenous, Q5 Min PRN    insulin aspart U-100, 0-5 Units, Subcutaneous, QID (AC + HS) PRN    labetalol, 10 mg, Intravenous, Q4H PRN    melatonin, 9 mg, Oral, Nightly PRN    meperidine, 12.5 mg, Intravenous, Q10 Min PRN    morphine, 2 mg, Intravenous, Q6H PRN    naloxone, 0.02 mg, Intravenous, PRN    ondansetron, 8 mg, Oral, Q8H PRN    ondansetron, 4 mg, Intravenous, Q8H PRN    ondansetron, 4 mg, Intravenous, Daily PRN    oxyCODONE-acetaminophen, 1 tablet, Oral, Q4H PRN    oxyCODONE-acetaminophen, 1 tablet, Oral, Q4H PRN    polyethylene glycol, 17 g, Oral, TID PRN    prochlorperazine, 5 mg, Intravenous, Q30 Min PRN    simethicone, 1 tablet, Oral, QID PRN    sodium chloride  0.9%, 10 mL, Intravenous, PRN     Radiology:  I have personally reviewed the following imaging and agree with the radiologist.     SURG FL Surgery Fluoro Usage  See OP Notes for results.     IMPRESSION: See OP Notes for results.     This procedure was auto-finalized by: Virtual Radiologist      Donna Garcia MD  Department of Hospital Medicine   [facility]   01/24/2025

## 2025-01-25 NOTE — PT/OT/SLP RE-EVAL
Occupational Therapy   Re-evaluation    Name: Tyler Mia  MRN: [id number]  Admitting Diagnosis: severe cervical spondylosis  Recent Surgery: Procedure(s) (LRB):  FUSION, SPINE, POSTERIOR SPINAL COLUMN, CERVICAL, USING COMPUTER-ASSISTED NAVIGATION (N/A) 1 Day Post-Op    Recommendations:     Discharge therapy intensity: Moderate Intensity Therapy   Discharge Equipment Recommendations:  to be determined by next level of care  Barriers to discharge:       Assessment:     Tyler Mai is a 69 y.o. male with a medical diagnosis of severe cervical spondylosis, severe C3-4 central s/p fusion stenosis with central cord myelomalacia, normocytic anemia, neuropathy.  He presents with the following performance deficits affecting function: weakness, impaired endurance, impaired self care skills, impaired functional mobility, gait instability, impaired sensation, impaired balance, decreased upper extremity function, decreased coordination, pain, orthopedic precautions.    Pt seen today for re-eval following cervical fusion. Pt c/o increased neck pain with ASPEN donned, increased pain in extremities of BUE/BLE 2/2 neuropathy. Max motivation to participate today. Pt sat EOB x10min with ASPEN donned, required SBA sitting balance, max A bed mobility via log roll. Pt only agreed to sit EOB, refused to stand, max motivation required to bring wash cloth to face.     Rehab Prognosis: Good; patient would benefit from acute skilled OT services to address these deficits and reach maximum level of function.       Plan:     Patient to be seen 5 x/week to address the above listed problems via self-care/home management, therapeutic activities, therapeutic exercises  Plan of Care Expires: 02/25/25  Plan of Care Reviewed with: patient    Subjective     Chief Complaint: neck pain   Patient/Family Comments/goals: get better    Pain/Comfort:  Pain Rating 1: 10/10  Location 1: neck  Pain Addressed 1: Reposition    Patients cultural, spiritual,  Rastafarian conflicts given the current situation:      Objective:     OT communicated with nrsg prior to session.      Patient was found HOB elevated with   upon OT entry to room.    General Precautions: Standard, fall  Orthopedic Precautions: spinal precautions  Braces: Aspen collar      Bed Mobility:    Patient completed Supine to Sit with maximal assistance  Patient completed Sit to Supine with maximal assistance    Functional Mobility/Transfers:  Pt refused to stand     Activities of Daily Living:  Grooming: stand by assistance    Upper Body Dressing: maximal assistance    Lower Body Dressing: maximal assistance    Toileting: maximal assistance      AMPA 6 Click ADL:  AMPA Total Score: 14    Functional Cognition:  Affect: Agitated      Upper Extremity Function:  Right Upper Extremity:   Range of Motion: -3/5   Sensation: Impaired. Neuropathy     Left Upper Extremity:  Range of Motion: -3/5   Sensation: neuropathy     Balance:   Static sitting balance: WFL    Therapeutic Positioning  Risk for acquired pressure injuries is significantly increased due to relative decrease in mobility d/t hospitalization , impaired mobility, and altered skin already present.    OT interventions performed during the course of today's session in an effort to prevent and/or reduce acquired pressure injuries:   Therapeutic positioning was provided at the conclusion of session to offload all bony prominences for the prevention and/or reduction of pressure injuries, for pain management, and for contracture prevention    Skin assessment: all bony prominences were assessed    Findings: known area of altered skin integrity at sacrum and bilateral heels     OT recommendations for therapeutic positioning throughout hospitalization:   Follow [facility] Pressure Injury Prevention Protocol    Patient Education:  Patient provided with verbal education education regarding OT role/goals/POC, post op precautions, fall prevention, safety awareness, and  pressure ulcer prevention.  Understanding was verbalized.     Patient left right sidelying with all lines intact, call button in reach, and family present    GOALS:   Multidisciplinary Problems       Occupational Therapy Goals          Problem: Occupational Therapy    Goal Priority Disciplines Outcome Interventions   Occupational Therapy Goal     OT, PT/OT Progressing    Description: Goals to be met by: in 1 month      Patient will increase functional independence with ADLs by performing:    UE Dressing with Supervision.  LE Dressing with Supervision.  Grooming while EOB with Supervision.  Toileting from bedside commode with Supervision for hygiene and clothing management.   Toilet transfer to bedside commode with Stand-by Assistance.                         History:     Past Medical History:   Diagnosis Date    Anxiety disorder, unspecified     Arthritis     CAD (coronary artery disease)     Cervical radiculopathy     CHF (congestive heart failure)     Chronic pain syndrome     COPD (chronic obstructive pulmonary disease)     Diabetes mellitus     Gastro-esophageal reflux disease without esophagitis     High cholesterol     Hypertension     Lumbar radiculopathy     Myelomalacia of cervical cord     Other specified diseases of spinal cord     Pacemaker     PVD (peripheral vascular disease)     S/P triple vessel bypass     Tobacco use          Past Surgical History:   Procedure Laterality Date    ANTERIOR CERVICAL DISCECTOMY W/ FUSION  1995    C5-6 and C6-7.  Dr. Wall    ANTERIOR CERVICAL DISCECTOMY W/ FUSION  1996    Redo C5-6, C6-7.  Dr. Wall    CHOLECYSTECTOMY      CORONARY ARTERY BYPASS GRAFT      INSERTION OF PACEMAKER      X2    TRIPPLE VESSEL BYPASS         Time Tracking:     OT Date of Treatment:    OT Start Time: 1006  OT Stop Time: 1028  OT Total Time (min): 22 min    Billable Minutes:Re-eval 22 min    1/25/2025

## 2025-01-25 NOTE — TRANSFER OF CARE
"Anesthesia Transfer of Care Note    Patient: Tyler Mai    Procedure(s) Performed: Procedure(s) (LRB):  FUSION, SPINE, POSTERIOR SPINAL COLUMN, CERVICAL, USING COMPUTER-ASSISTED NAVIGATION (N/A)    Patient location: PACU    Anesthesia Type: general    Transport from OR: Transported from OR on room air with adequate spontaneous ventilation    Post pain: adequate analgesia    Post assessment: no apparent anesthetic complications    Post vital signs: stable    Level of consciousness: sedated    Nausea/Vomiting: no nausea/vomiting    Complications: none    Transfer of care protocol was followed      Last vitals: Visit Vitals  BP (!) 157/71   Pulse 60   Temp 36.7 °C (98.1 °F) (Oral)   Resp 15   Ht 5' 6" (1.676 m)   Wt 78 kg (172 lb)   SpO2 96%   BMI 27.76 kg/m²     "

## 2025-01-25 NOTE — PT/OT/SLP PROGRESS
Physical Therapy      Patient Name:  Tyler Mai   MRN:  [id number]    Pt declined PT session this afternoon. He had just finished eating his lunch while seated EOB, and was complaining of pain. Pt states he will sit up again to eat his dinner tonight. Will f/u tomorrow, pt agreeable to ambulation tomorrow. Will f/u when able.

## 2025-01-25 NOTE — BRIEF OP NOTE
[facility] - Periop Services  Brief Operative Note    SUMMARY     Surgery Date: 1/24/2025     Surgeons and Role:     * Darrell Watson MD - Primary    Assisting Surgeon: None    Pre-op Diagnosis:  Cervical spondylosis with myelopathy [M47.12]    Post-op Diagnosis:  Post-Op Diagnosis Codes:     * Cervical spondylosis with myelopathy [M47.12]    Procedure(s) (LRB):  FUSION, SPINE, POSTERIOR SPINAL COLUMN, CERVICAL, USING COMPUTER-ASSISTED NAVIGATION (N/A)    C2-6 PCDF and C2/3- C4/5 decompression    Anesthesia: General    Implants:  Implant Name Type Inv. Item Serial No.  Lot No. LRB No. Used Action   alpha graft  DBM fiber strip - atec    [employer]  N/A 1 Implanted   alpha graft DBM fiber strip    [employer]  N/A 1 Implanted   Screw invictus 3.5x 18 mm      N/A 2 Implanted   screw invictus 3.5x14mm      N/A 4 Implanted   Screw invictus 3.5mm x 12 mm      N/A 4 Implanted   PHANI invictus Ti LORD. 3.5 x 70 mm      N/A 2 Implanted   set screw invictus      N/A 10 Implanted       Operative Findings:   Adequate placement of instrumentation that is confirmed by fluoroscopy.   Stable neuromonitoring.      Estimated Blood Loss: 150ml    Estimated Blood Loss has been documented.         Specimens:   Specimen (24h ago, onward)      None            [id number]    Plan:  - PACU and then back to room  - Neuro/motor checks Q4H  - MAPs>65, SBP<160/90  - Pain control: Percocet and Robaxin  - Rocephin 2g x 24 hours  - Hemovac drain to full suction  - Advance diet as tolerated  - HOB 30 degrees  - Aspen hard cervical collar at all times, [place] collar for shower  - PT, OT  - SCDs for DVT prophylaxis, start subQ heparin tomorrow evening  - Post-op x-ray uprights cervical spine      YESSICA Garcia-BC  Neurosurgery  [facility]

## 2025-01-25 NOTE — PROGRESS NOTES
Procedure site assessed and intact. Pt attached to v/s machine and vitals reviewed. Procedure site assessed and intact. Hemovac intact as well as Gray, Ivs, and ART line removal site. Pre-op symptoms compared to post op. Everyone understands pt info with no further questions.

## 2025-01-25 NOTE — PROGRESS NOTES
"[facility] Hospital Medicine Progress Note        Chief Complaint: Inpatient Follow-up     HPI: 69-year-old male with a past medical history of hypertension, hyperlipidemia, diabetes, COPD (no home O2), heart failure, CAD status post CABG, Medtronic pacemaker, peripheral arterial disease status post left PCI, abdominal aortic aneurysm, tobacco dependence, prior C5, 6, 7 decompression with  who presents with worsening chronic cervical spondylosis with myelopathy.  He was seen by Neurosurgery on 12/03/2024 however he did not complete cardiac evaluation for clearance and did not go to scheduled MRI appointments.  They recommended fusion at least at C3-4 and C4-5.      He was seen at [facility] 01/05/2025: "He initially told Dr. Bella he did not want surgery. He changed his mind as his UTI was treated and cleared and a f/u urine culture from 1/11 has been negative for growth. His son does not want Dr. Bella to do the surgery and attempts were made to see if Dr. Neumann or Dr. Bowen would see the patient but both declined this. He was recommended to discharge and report to a different hospital for a reevaluation because he and his family have declined Dr. Bella's offer to do surgery. The surgery is felt to be needed and he has been seen by Dr. Beebe cardiology who rates his cardiac risk as moderate at 5-7% of a cardiac event." At the time of MD evaluation later  patient was seeking neurosurgical evaluation and intervention.  Neurosurgery was informed.  Received ceftriaxone to cover any UTI. Seen by Nephrology for JOHN, renal function improved eventually.    Interval Hx:   Status post-C2-6 PCDF and C2/3- C4/5 decompression .  Neurosurgery following.    Patient was seen and examined , no major concerns expressed, neuro exam seems to be the same, afebrile, hemodynamically stable, most recent lab work was reviewed.    Objective/physical exam:  General: In no acute distress, " afebrile  HEENT:  Cervical collar  Chest: Clear to auscultation bilaterally  Heart:  +S1, S2, normal rate  Abdomen: Soft, nontender, BS +  MSK: Warm, no lower extremity edema, no clubbing or cyanosis  Neurologic: Alert and oriented , with right-sided weakness>left, numbness and tingling of extremities    VITAL SIGNS: 24 HRS MIN & MAX LAST   Temp  Min: 97.5 °F (36.4 °C)  Max: 98.4 °F (36.9 °C) 98.3 °F (36.8 °C)   BP  Min: 125/58  Max: 182/80 (!) 141/64   Pulse  Min: 70  Max: 88  87   Resp  Min: 16  Max: 25 18   SpO2  Min: 93 %  Max: 100 % 95 %     I have reviewed the following labs:  Recent Labs   Lab 01/23/25  0536 01/24/25  0403 01/25/25  0545   WBC 8.08 9.09 10.01   RBC 3.55* 3.45* 3.47*   HGB 10.9* 10.8* 10.6*   HCT 32.8* 31.3* 31.9*   MCV 92.4 90.7 91.9   MCH 30.7 31.3* 30.5   MCHC 33.2 34.5 33.2   RDW 11.7 11.7 11.7    368 396   MPV 10.6* 10.5* 10.1     Recent Labs   Lab 01/20/25  0620 01/21/25  0509 01/22/25  0519 01/23/25  0536 01/25/25  0546   *   < > 132* 134* 133*   K 4.7   < > 4.4 4.2 4.3   CL 99   < > 98 100 100   CO2 25   < > 25 28 27   BUN 51.9*   < > 51.6* 44.7* 29.7*   CREATININE 2.09*   < > 1.85* 1.46* 1.31*   CALCIUM 9.3   < > 9.5 9.8 9.8   MG  --   --  1.20* 1.70  --    ALBUMIN 3.4   < > 3.1* 3.3* 3.6   ALKPHOS 121  --   --  116 104   ALT 17  --   --  14 14   AST 15  --   --  15 17   BILITOT 0.6  --   --  0.4 0.4    < > = values in this interval not displayed.     Microbiology Results (last 7 days)       ** No results found for the last 168 hours. 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  Follow up on further postop recommendations.  Monitor hemoglobin  Nephrology followed.  Renal function has improved.  Received ceftriaxone to cover for any underlying UTI.    Would need outpatient follow up for routine cardiac care.  Continue supportive care, appropriate home medications.  On sliding scale  PTOT-recommending moderate intensity therapy  We will communicate with case management    VTE prophylaxis:  Lovenox> SCD> heparin subQ      Anticipated discharge and Disposition:   TBD, pending neurosurgery 's clearance , we will need SNF/Rehab      All diagnosis and differential diagnosis have been reviewed; assessment and plan has been documented; I have personally reviewed the labs and test results that are presently available; I have reviewed the patients medication list; I have reviewed the consulting providers response and recommendations. I have reviewed or attempted to review medical records based upon their availability    All of the patient's questions have been  addressed and answered. Patient's is agreeable to the above stated plan. I will continue to monitor closely and make adjustments to medical management as needed.    Portions of this note dictated using EMR integrated voice recognition software, and may be subject to voice recognition errors not corrected at proofreading. Please contact writer for clarification if needed.   _____________________________________________________________________    Malnutrition Status:  Nutrition consulted. Most recent weight and BMI monitored-     Measurements:  Wt Readings from Last 1 Encounters:   01/18/25 78 kg (172 lb)   Body mass index is 27.76 kg/m².    Patient has been screened and assessed by RD.    Malnutrition Type:  Context:    Level:      Malnutrition Characteristic Summary:        Interventions/Recommendations (treatment strategy):        Scheduled Med:   carvediloL  3.125 mg Oral BID    fluticasone furoate-vilanteroL  1 puff Inhalation Daily    [START ON 1/26/2025] heparin (porcine)  5,000 Units Subcutaneous Q8H    linaCLOtide  145 mcg Oral QAM    magnesium oxide  400 mg Oral BID    methocarbamoL  750 mg Oral QID    mupirocin   Nasal BID    pantoprazole  40 mg Oral Before breakfast    ranolazine  500 mg Oral BID    tiotropium bromide  2 puff Inhalation Daily    white petrolatum   Topical (Top) Daily      Continuous Infusions:   0.9% NaCl   Intravenous Continuous   Stopped at 01/25/25 0424      PRN Meds:    Current Facility-Administered Medications:     acetaminophen, 650 mg, Oral, Q4H PRN    albuterol-ipratropium, 3 mL, Nebulization, Q6H PRN    aluminum-magnesium hydroxide-simethicone, 30 mL, Oral, QID PRN    bisacodyL, 10 mg, Rectal, Daily PRN    cefTRIAXone (Rocephin) IV (PEDS and ADULTS), 2 g, Intravenous, On Call Procedure    cyclobenzaprine, 10 mg, Oral, TID PRN    dextrose 50%, 12.5 g, Intravenous, PRN    dextrose 50%, 12.5 g, Intravenous, PRN    dextrose 50%, 25 g, Intravenous, PRN    dextrose 50%, 25 g, Intravenous, PRN    glucagon (human recombinant), 1 mg, Intramuscular, PRN    glucagon (human recombinant), 1 mg, Intramuscular, PRN    glucose, 16 g, Oral, PRN    glucose, 16 g, Oral, PRN    glucose, 24 g, Oral, PRN    glucose, 24 g, Oral, PRN    hydrALAZINE, 5 mg, Intravenous, Q6H PRN    insulin aspart U-100, 0-5 Units, Subcutaneous, QID (AC + HS) PRN    labetalol, 10 mg, Intravenous, Q4H PRN    melatonin, 9 mg, Oral, Nightly PRN    morphine, 2 mg, Intravenous, Q6H PRN    naloxone, 0.02 mg, Intravenous, PRN    ondansetron, 8 mg, Oral, Q8H PRN    ondansetron, 4 mg, Intravenous, Q8H PRN    oxyCODONE-acetaminophen, 1 tablet, Oral, Q4H PRN    oxyCODONE-acetaminophen, 1 tablet, Oral, Q4H PRN    polyethylene glycol, 17 g, Oral, TID PRN    simethicone, 1 tablet, Oral, QID PRN    sodium  chloride 0.9%, 10 mL, Intravenous, PRN     Radiology:  I have personally reviewed the following imaging and agree with the radiologist.     X-Ray Cervical Spine 2 or 3 Views  Narrative: EXAMINATION:  XR CERVICAL SPINE 2 OR 3 VIEWS    CLINICAL HISTORY:  s/p C2-6 PCDF;    COMPARISON:  CT cervical spine dated 01/14/2025.    FINDINGS:  There are postoperative changes with posterior spinal fusion hardware at C2 through C6.  The hardware appears intact.  Cervical alignment has improved.  The remaining vertebral body heights are preserved.  There is moderate disc height loss at C3-C4, mild at C4-C5, with multilevel marginal osteophytes.  The soft tissues are unremarkable.  Impression: Postoperative changes without acute abnormality.    Electronically signed by: Katelyn Blanco  Date:    01/25/2025  Time:    10:39      Donna Garcia MD  Department of Hospital Medicine   [facility]   01/25/2025

## 2025-01-26 LAB
HCT VFR BLD AUTO: 29.5 % (ref 42–52)
HGB BLD-MCNC: 10.1 G/DL (ref 14–18)
POCT GLUCOSE: 123 MG/DL (ref 70–110)
POCT GLUCOSE: 150 MG/DL (ref 70–110)
POCT GLUCOSE: 282 MG/DL (ref 70–110)

## 2025-01-26 PROCEDURE — 25000003 PHARM REV CODE 250: Performed by: STUDENT IN AN ORGANIZED HEALTH CARE EDUCATION/TRAINING PROGRAM

## 2025-01-26 PROCEDURE — 99900031 HC PATIENT EDUCATION (STAT)

## 2025-01-26 PROCEDURE — 25000003 PHARM REV CODE 250: Performed by: INTERNAL MEDICINE

## 2025-01-26 PROCEDURE — 63600175 PHARM REV CODE 636 W HCPCS

## 2025-01-26 PROCEDURE — 27000221 HC OXYGEN, UP TO 24 HOURS

## 2025-01-26 PROCEDURE — 51798 US URINE CAPACITY MEASURE: CPT

## 2025-01-26 PROCEDURE — 25000003 PHARM REV CODE 250

## 2025-01-26 PROCEDURE — 63600175 PHARM REV CODE 636 W HCPCS: Performed by: INTERNAL MEDICINE

## 2025-01-26 PROCEDURE — 21400001 HC TELEMETRY ROOM

## 2025-01-26 PROCEDURE — 36415 COLL VENOUS BLD VENIPUNCTURE: CPT | Performed by: INTERNAL MEDICINE

## 2025-01-26 PROCEDURE — 51702 INSERT TEMP BLADDER CATH: CPT

## 2025-01-26 PROCEDURE — 85014 HEMATOCRIT: CPT | Performed by: INTERNAL MEDICINE

## 2025-01-26 PROCEDURE — 97164 PT RE-EVAL EST PLAN CARE: CPT

## 2025-01-26 PROCEDURE — 25000242 PHARM REV CODE 250 ALT 637 W/ HCPCS: Performed by: INTERNAL MEDICINE

## 2025-01-26 PROCEDURE — 94760 N-INVAS EAR/PLS OXIMETRY 1: CPT

## 2025-01-26 PROCEDURE — 63600175 PHARM REV CODE 636 W HCPCS: Performed by: STUDENT IN AN ORGANIZED HEALTH CARE EDUCATION/TRAINING PROGRAM

## 2025-01-26 RX ORDER — TAMSULOSIN HYDROCHLORIDE 0.4 MG/1
0.4 CAPSULE ORAL DAILY
Status: DISCONTINUED | OUTPATIENT
Start: 2025-01-27 | End: 2025-01-31 | Stop reason: HOSPADM

## 2025-01-26 RX ORDER — AMLODIPINE BESYLATE 5 MG/1
5 TABLET ORAL 2 TIMES DAILY
Status: DISCONTINUED | OUTPATIENT
Start: 2025-01-26 | End: 2025-01-31 | Stop reason: HOSPADM

## 2025-01-26 RX ADMIN — HEPARIN SODIUM 5000 UNITS: 5000 INJECTION, SOLUTION INTRAVENOUS; SUBCUTANEOUS at 05:01

## 2025-01-26 RX ADMIN — HEPARIN SODIUM 5000 UNITS: 5000 INJECTION, SOLUTION INTRAVENOUS; SUBCUTANEOUS at 01:01

## 2025-01-26 RX ADMIN — OXYCODONE AND ACETAMINOPHEN 1 TABLET: 10; 325 TABLET ORAL at 09:01

## 2025-01-26 RX ADMIN — HYDRALAZINE HYDROCHLORIDE 5 MG: 20 INJECTION INTRAMUSCULAR; INTRAVENOUS at 04:01

## 2025-01-26 RX ADMIN — HEPARIN SODIUM 5000 UNITS: 5000 INJECTION, SOLUTION INTRAVENOUS; SUBCUTANEOUS at 09:01

## 2025-01-26 RX ADMIN — OXYCODONE AND ACETAMINOPHEN 1 TABLET: 10; 325 TABLET ORAL at 01:01

## 2025-01-26 RX ADMIN — CARVEDILOL 3.12 MG: 3.12 TABLET, FILM COATED ORAL at 08:01

## 2025-01-26 RX ADMIN — RANOLAZINE 500 MG: 500 TABLET, FILM COATED, EXTENDED RELEASE ORAL at 09:01

## 2025-01-26 RX ADMIN — FLUTICASONE FUROATE AND VILANTEROL TRIFENATATE 1 PUFF: 200; 25 POWDER RESPIRATORY (INHALATION) at 08:01

## 2025-01-26 RX ADMIN — LABETALOL HYDROCHLORIDE 10 MG: 5 INJECTION, SOLUTION INTRAVENOUS at 05:01

## 2025-01-26 RX ADMIN — PANTOPRAZOLE SODIUM 40 MG: 40 TABLET, DELAYED RELEASE ORAL at 05:01

## 2025-01-26 RX ADMIN — RANOLAZINE 500 MG: 500 TABLET, FILM COATED, EXTENDED RELEASE ORAL at 08:01

## 2025-01-26 RX ADMIN — MORPHINE SULFATE 2 MG: 4 INJECTION, SOLUTION INTRAMUSCULAR; INTRAVENOUS at 02:01

## 2025-01-26 RX ADMIN — INSULIN ASPART 3 UNITS: 100 INJECTION, SOLUTION INTRAVENOUS; SUBCUTANEOUS at 04:01

## 2025-01-26 RX ADMIN — METHOCARBAMOL 750 MG: 750 TABLET ORAL at 08:01

## 2025-01-26 RX ADMIN — LINACLOTIDE 145 MCG: 145 CAPSULE, GELATIN COATED ORAL at 05:01

## 2025-01-26 RX ADMIN — METHOCARBAMOL 750 MG: 750 TABLET ORAL at 09:01

## 2025-01-26 RX ADMIN — CARVEDILOL 3.12 MG: 3.12 TABLET, FILM COATED ORAL at 09:01

## 2025-01-26 RX ADMIN — MORPHINE SULFATE 2 MG: 4 INJECTION, SOLUTION INTRAMUSCULAR; INTRAVENOUS at 09:01

## 2025-01-26 RX ADMIN — TIOTROPIUM BROMIDE INHALATION SPRAY 2 PUFF: 3.12 SPRAY, METERED RESPIRATORY (INHALATION) at 08:01

## 2025-01-26 RX ADMIN — METHOCARBAMOL 750 MG: 750 TABLET ORAL at 01:01

## 2025-01-26 RX ADMIN — Medication 400 MG: at 08:01

## 2025-01-26 RX ADMIN — METHOCARBAMOL 750 MG: 750 TABLET ORAL at 04:01

## 2025-01-26 RX ADMIN — OXYCODONE AND ACETAMINOPHEN 1 TABLET: 10; 325 TABLET ORAL at 05:01

## 2025-01-26 RX ADMIN — AMLODIPINE BESYLATE 5 MG: 5 TABLET ORAL at 09:01

## 2025-01-26 RX ADMIN — MORPHINE SULFATE 2 MG: 4 INJECTION, SOLUTION INTRAMUSCULAR; INTRAVENOUS at 08:01

## 2025-01-26 RX ADMIN — WHITE PETROLATUM: 1.75 OINTMENT TOPICAL at 09:01

## 2025-01-26 RX ADMIN — MUPIROCIN: 20 OINTMENT TOPICAL at 09:01

## 2025-01-26 RX ADMIN — MELATONIN 9 MG: at 09:01

## 2025-01-26 RX ADMIN — Medication 400 MG: at 09:01

## 2025-01-26 NOTE — PROGRESS NOTES
[facility] Neuro  Neurosurgery  Progress Note    Subjective:     Interval History:     Postop day 2-   Status post-  C2-6 PCDF and C2/3- C4/5 decompression     Patient is sitting up in bed states he is having a better day.  States his pain is well controlled at this time.  His motor strength has improved in his upper extremities.      He is wearing the hard cervical collar   He is passing flatus -taking stool softeners but refuses MiraLax.  He has increase mobility to his bilateral upper extremities with a 5/5 hand grasp at this time.  Personally inspected his surgical incision.  Found to be clean and dry.    His Hemovac put out 65 mL in the last shift.  Serosanguineous.    Post-Op Info:  Procedure(s) (LRB):  FUSION, SPINE, POSTERIOR SPINAL COLUMN, CERVICAL, USING COMPUTER-ASSISTED NAVIGATION (N/A)   2 Days Post-Op      Medications:  Continuous Infusions:   0.9% NaCl   Intravenous Continuous   Stopped at 01/25/25 0424     Scheduled Meds:   carvediloL  3.125 mg Oral BID    fluticasone furoate-vilanteroL  1 puff Inhalation Daily    heparin (porcine)  5,000 Units Subcutaneous Q8H    linaCLOtide  145 mcg Oral QAM    magnesium oxide  400 mg Oral BID    methocarbamoL  750 mg Oral QID    mupirocin   Nasal BID    pantoprazole  40 mg Oral Before breakfast    ranolazine  500 mg Oral BID    tiotropium bromide  2 puff Inhalation Daily    white petrolatum   Topical (Top) Daily     PRN Meds:  Current Facility-Administered Medications:     acetaminophen, 650 mg, Oral, Q4H PRN    albuterol-ipratropium, 3 mL, Nebulization, Q6H PRN    aluminum-magnesium hydroxide-simethicone, 30 mL, Oral, QID PRN    bisacodyL, 10 mg, Rectal, Daily PRN    cefTRIAXone (Rocephin) IV (PEDS and ADULTS), 2 g, Intravenous, On Call Procedure    cyclobenzaprine, 10 mg, Oral, TID PRN    dextrose 50%, 12.5 g, Intravenous, PRN    dextrose 50%, 12.5 g, Intravenous, PRN    dextrose 50%, 25 g, Intravenous, PRN    dextrose 50%, 25 g,  Intravenous, PRN    glucagon (human recombinant), 1 mg, Intramuscular, PRN    glucagon (human recombinant), 1 mg, Intramuscular, PRN    glucose, 16 g, Oral, PRN    glucose, 16 g, Oral, PRN    glucose, 24 g, Oral, PRN    glucose, 24 g, Oral, PRN    hydrALAZINE, 5 mg, Intravenous, Q6H PRN    insulin aspart U-100, 0-5 Units, Subcutaneous, QID (AC + HS) PRN    labetalol, 10 mg, Intravenous, Q4H PRN    melatonin, 9 mg, Oral, Nightly PRN    morphine, 2 mg, Intravenous, Q6H PRN    naloxone, 0.02 mg, Intravenous, PRN    ondansetron, 8 mg, Oral, Q8H PRN    ondansetron, 4 mg, Intravenous, Q8H PRN    oxyCODONE-acetaminophen, 1 tablet, Oral, Q4H PRN    oxyCODONE-acetaminophen, 1 tablet, Oral, Q4H PRN    polyethylene glycol, 17 g, Oral, TID PRN    simethicone, 1 tablet, Oral, QID PRN    sodium chloride 0.9%, 10 mL, Intravenous, PRN     Review of Systems  Objective:     Weight: 78 kg (172 lb)  Body mass index is 27.76 kg/m².  Vital Signs (Most Recent):  Temp: 98.3 °F (36.8 °C) (01/26/25 0747)  Pulse: 80 (01/26/25 0830)  Resp: 18 (01/26/25 0944)  BP: (!) 144/68 (01/26/25 0829)  SpO2: 95 % (01/26/25 0830) Vital Signs (24h Range):  Temp:  [97.9 °F (36.6 °C)-98.3 °F (36.8 °C)] 98.3 °F (36.8 °C)  Pulse:  [75-88] 80  Resp:  [17-18] 18  SpO2:  [94 %-96 %] 95 %  BP: (141-187)/(62-81) 144/68                              Closed/Suction Drain 01/24/25 Tube - 1 Posterior Neck Accordion (Active)   Site Description Unable to view 01/24/25 1959   Dressing Type Gauze;Transparent (Tegaderm) 01/24/25 1959   Dressing Status Dry;Clean;Intact 01/24/25 1959   Dressing Intervention Integrity maintained 01/24/25 1959   Drainage Bloody 01/24/25 1959   Status To bulb suction 01/24/25 1959   Output (mL) 30 mL 01/25/25 0350            Urethral Catheter 01/24/25 1320 Silicone;Straight-tip 16 Fr. (Active)   Site Assessment Clean;Intact 01/24/25 1959   Collection Container Standard drainage bag 01/24/25 1959   Securement Method secured to top of thigh w/  "adhesive device 01/24/25 1959   Reason for Continuing Urinary Catheterization Post operative 01/24/25 1959   CAUTI Prevention Bundle Securement Device in place with 1" slack;Drainage bag/urimeter off the floor;Intact seal between catheter & drainage tubing;Sheeting clip in use;No dependent loops or kinks;Drainage bag/urimeter not overfilled (<2/3 full);Drainage bag/urimeter below bladder 01/24/25 1959       Neurosurgery Physical Exam    GCS 15   Awake and oriented   Improved upper extremity motor strength with hand grasp now 5/5.  4+ out of 5 to all other groups.  Bilateral lower extremities 5/5  Surgical incision clean and dry  Hard cervical collar in place    Significant Labs:  Recent Labs   Lab 01/25/25  0546   *   K 4.3      CO2 27   BUN 29.7*   CREATININE 1.31*   CALCIUM 9.8     Recent Labs   Lab 01/25/25  0545 01/26/25  0534   WBC 10.01  --    HGB 10.6* 10.1*   HCT 31.9* 29.5*     --      No results for input(s): "LABPT", "INR", "APTT" in the last 48 hours.  Microbiology Results (last 7 days)       ** No results found for the last 168 hours. **              Assessment/Plan:    PTOT   In and out urinary catheter due to urinary retention.  Bowel management-stool softeners-patient refusing MiraLax.  SCDs/on heparin subQ for DVT prophylaxis  Fall precautions   Keep the wound clean and dry  Multimodal pain control    Keep the Hemovac for now.  We will see how much it puts out for the entire shift.  If less than 50 mL discontinue.       Active Diagnoses:    Diagnosis Date Noted POA    PRINCIPAL PROBLEM:  Spinal cord compression [G95.20] 01/14/2025 Yes    Anemia [D64.9] 01/14/2025 Yes    Hyponatremia [E87.1] 01/14/2025 Yes      Problems Resolved During this Admission:       YESSICA Gandhi-BC  Neurosurgery  [facility] - Ortho Neuro    "

## 2025-01-26 NOTE — PROGRESS NOTES
"[facility] Hospital Medicine Progress Note        Chief Complaint: Inpatient Follow-up     HPI: 69-year-old male with a past medical history of hypertension, hyperlipidemia, diabetes, COPD (no home O2), heart failure, CAD status post CABG, Medtronic pacemaker, peripheral arterial disease status post left PCI, abdominal aortic aneurysm, tobacco dependence, prior C5, 6, 7 decompression with  who presents with worsening chronic cervical spondylosis with myelopathy.  He was seen by Neurosurgery on 12/03/2024 however he did not complete cardiac evaluation for clearance and did not go to scheduled MRI appointments.  They recommended fusion at least at C3-4 and C4-5.      He was seen at [facility] 01/05/2025: "He initially told Dr. Bella he did not want surgery. He changed his mind as his UTI was treated and cleared and a f/u urine culture from 1/11 has been negative for growth. His son does not want Dr. Bella to do the surgery and attempts were made to see if Dr. Neumann or Dr. Bowen would see the patient but both declined this. He was recommended to discharge and report to a different hospital for a reevaluation because he and his family have declined Dr. Bella's offer to do surgery. The surgery is felt to be needed and he has been seen by Dr. Beebe cardiology who rates his cardiac risk as moderate at 5-7% of a cardiac event." At the time of MD evaluation later  patient was seeking neurosurgical evaluation and intervention.  Neurosurgery was informed.  Received ceftriaxone to cover any UTI. Seen by Nephrology for JOHN, renal function improved eventually.    Interval Hx:   Status post-C2-6 PCDF and C2/3- C4/5 decompression .  Neurosurgery following.  Retaining urine multiple times    Patient was seen and examined , recovering from surgery, no major new concerns, family at bedside, afebrile, hemodynamically stable, most recent lab work was reviewed.    Objective/physical exam:  General: " In no acute distress, afebrile  HEENT:  Cervical collar  Chest: Clear to auscultation bilaterally  Heart:  +S1, S2, normal rate  Abdomen: Soft, nontender, BS +  MSK: Warm, no lower extremity edema, no clubbing or cyanosis  Neurologic: Alert and oriented , with right-sided weakness>left, numbness and tingling of extremities    VITAL SIGNS: 24 HRS MIN & MAX LAST   Temp  Min: 97.9 °F (36.6 °C)  Max: 98.3 °F (36.8 °C) 98 °F (36.7 °C)   BP  Min: 119/62  Max: 187/77 119/62   Pulse  Min: 75  Max: 88  81   Resp  Min: 14  Max: 18 14   SpO2  Min: 94 %  Max: 96 % 95 %     I have reviewed the following labs:  Recent Labs   Lab 01/23/25  0536 01/24/25  0403 01/25/25  0545 01/26/25  0534   WBC 8.08 9.09 10.01  --    RBC 3.55* 3.45* 3.47*  --    HGB 10.9* 10.8* 10.6* 10.1*   HCT 32.8* 31.3* 31.9* 29.5*   MCV 92.4 90.7 91.9  --    MCH 30.7 31.3* 30.5  --    MCHC 33.2 34.5 33.2  --    RDW 11.7 11.7 11.7  --     368 396  --    MPV 10.6* 10.5* 10.1  --      Recent Labs   Lab 01/20/25  0620 01/21/25  0509 01/22/25  0519 01/23/25  0536 01/25/25  0546   *   < > 132* 134* 133*   K 4.7   < > 4.4 4.2 4.3   CL 99   < > 98 100 100   CO2 25   < > 25 28 27   BUN 51.9*   < > 51.6* 44.7* 29.7*   CREATININE 2.09*   < > 1.85* 1.46* 1.31*   CALCIUM 9.3   < > 9.5 9.8 9.8   MG  --   --  1.20* 1.70  --    ALBUMIN 3.4   < > 3.1* 3.3* 3.6   ALKPHOS 121  --   --  116 104   ALT 17  --   --  14 14   AST 15  --   --  15 17   BILITOT 0.6  --   --  0.4 0.4    < > = values in this interval not displayed.     Microbiology Results (last 7 days)       ** No results found for the last 168 hours. 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  Continues with Hemovac .Follow up on further postop recommendations.  Monitor hemoglobin.  Nephrology followed.  Renal function has improved.  Received ceftriaxone to cover for any underlying UTI.    Would need outpatient follow up for routine cardiac care.  Now with urinary retention, required in and out multiple times.  Wound Care following for left heel on left heel  Continue supportive care, appropriate home medications.  On sliding scale.Blood pressure elevated, add amlodipine  PTOT-recommending moderate intensity therapy .We will communicate with case management .      VTE prophylaxis:  Lovenox> SCD> heparin subQ      Anticipated discharge and Disposition:   TBD, pending neurosurgery 's clearance and improvement in urinary retention , we will need SNF/Rehab      All diagnosis and differential diagnosis have been reviewed; assessment and plan has been documented; I have personally reviewed the labs and test results that are presently available; I have reviewed the patients medication list; I have reviewed the consulting providers response and recommendations. I have reviewed or attempted to review medical records based upon their availability    All of the patient's questions have been  addressed and answered. Patient's is agreeable to the above stated plan. I will continue to monitor closely and make adjustments to medical management as needed.    Portions of this note dictated using EMR integrated voice recognition software, and may be subject to voice recognition errors not corrected at proofreading. Please contact writer for clarification if needed.   _____________________________________________________________________    Malnutrition  Status:  Nutrition consulted. Most recent weight and BMI monitored-     Measurements:  Wt Readings from Last 1 Encounters:   01/18/25 78 kg (172 lb)   Body mass index is 27.76 kg/m².    Patient has been screened and assessed by RD.    Malnutrition Type:  Context:    Level:      Malnutrition Characteristic Summary:       Interventions/Recommendations (treatment strategy):        Scheduled Med:   carvediloL  3.125 mg Oral BID    fluticasone furoate-vilanteroL  1 puff Inhalation Daily    heparin (porcine)  5,000 Units Subcutaneous Q8H    linaCLOtide  145 mcg Oral QAM    magnesium oxide  400 mg Oral BID    methocarbamoL  750 mg Oral QID    mupirocin   Nasal BID    pantoprazole  40 mg Oral Before breakfast    ranolazine  500 mg Oral BID    tiotropium bromide  2 puff Inhalation Daily    white petrolatum   Topical (Top) Daily      Continuous Infusions:   0.9% NaCl   Intravenous Continuous   Stopped at 01/25/25 0424      PRN Meds:    Current Facility-Administered Medications:     acetaminophen, 650 mg, Oral, Q4H PRN    albuterol-ipratropium, 3 mL, Nebulization, Q6H PRN    aluminum-magnesium hydroxide-simethicone, 30 mL, Oral, QID PRN    bisacodyL, 10 mg, Rectal, Daily PRN    cefTRIAXone (Rocephin) IV (PEDS and ADULTS), 2 g, Intravenous, On Call Procedure    cyclobenzaprine, 10 mg, Oral, TID PRN    dextrose 50%, 12.5 g, Intravenous, PRN    dextrose 50%, 12.5 g, Intravenous, PRN    dextrose 50%, 25 g, Intravenous, PRN    dextrose 50%, 25 g, Intravenous, PRN    glucagon (human recombinant), 1 mg, Intramuscular, PRN    glucagon (human recombinant), 1 mg, Intramuscular, PRN    glucose, 16 g, Oral, PRN    glucose, 16 g, Oral, PRN    glucose, 24 g, Oral, PRN    glucose, 24 g, Oral, PRN    hydrALAZINE, 5 mg, Intravenous, Q6H PRN    insulin aspart U-100, 0-5 Units, Subcutaneous, QID (AC + HS) PRN    labetalol, 10 mg, Intravenous, Q4H PRN    melatonin, 9 mg, Oral, Nightly PRN    morphine, 2 mg, Intravenous, Q6H PRN    naloxone, 0.02  mg, Intravenous, PRN    ondansetron, 8 mg, Oral, Q8H PRN    ondansetron, 4 mg, Intravenous, Q8H PRN    oxyCODONE-acetaminophen, 1 tablet, Oral, Q4H PRN    oxyCODONE-acetaminophen, 1 tablet, Oral, Q4H PRN    polyethylene glycol, 17 g, Oral, TID PRN    simethicone, 1 tablet, Oral, QID PRN    sodium chloride 0.9%, 10 mL, Intravenous, PRN     Radiology:  I have personally reviewed the following imaging and agree with the radiologist.     X-Ray Cervical Spine 2 or 3 Views  Narrative: EXAMINATION:  XR CERVICAL SPINE 2 OR 3 VIEWS    CLINICAL HISTORY:  s/p C2-6 PCDF;    COMPARISON:  CT cervical spine dated 01/14/2025.    FINDINGS:  There are postoperative changes with posterior spinal fusion hardware at C2 through C6.  The hardware appears intact.  Cervical alignment has improved.  The remaining vertebral body heights are preserved.  There is moderate disc height loss at C3-C4, mild at C4-C5, with multilevel marginal osteophytes.  The soft tissues are unremarkable.  Impression: Postoperative changes without acute abnormality.    Electronically signed by: Katelyn Blanco  Date:    01/25/2025  Time:    10:39      Donna Garcia MD  Department of Hospital Medicine   [facility]   01/26/2025

## 2025-01-26 NOTE — PLAN OF CARE
Problem: Physical Therapy  Goal: Physical Therapy Goal  Description: Goals to be met by: 2/15/25     Patient will increase functional independence with mobility by performin. Supine to sit with Set-up [place]  2. Sit to supine with Set-up [place]  3. Sit to stand transfer with Supervision  4. Bed to chair transfer with Supervision using Rolling Walker  5. Gait  x 300 feet with Supervision using Rolling Walker.   6. Ascend/descend 15 stairs with bilateral Handrails & Supervision     Outcome: Progressing

## 2025-01-26 NOTE — PT/OT/SLP RE-EVAL
Physical Therapy Re-Evaluation    Patient Name:  Tyler Mai   MRN:  [id number]    Recommendations:     Discharge therapy intensity: High Intensity Therapy   Discharge Equipment Recommendations:  (TBD by next level of care)   Barriers to discharge: None    Assessment:     Tyler Mai is a 69 y.o. male admitted with a medical diagnosis of Spinal cord compression.  He presents with the following impairments/functional limitations: weakness, impaired endurance, impaired self care skills, impaired functional mobility, gait instability, impaired balance, decreased safety awareness, pain. S/p C2-6 PCDF 1/24. Mobility significantly improved. MIN A - MOD A for all mobility. Patient would benefit from high intensity therapy at discharge. Progress as tolerated.     Rehab Prognosis: Good; patient would benefit from acute skilled PT services to address these deficits and reach maximum level of function.    Recent Surgery: Procedure(s) (LRB):  FUSION, SPINE, POSTERIOR SPINAL COLUMN, CERVICAL, USING COMPUTER-ASSISTED NAVIGATION (N/A) 2 Days Post-Op    Plan:     During this hospitalization, patient would benefit from acute PT services 5 x/week to address the identified rehab impairments via gait training, therapeutic activities, therapeutic exercises, neuromuscular re-education and progress toward the following goals:    Plan of Care Expires:  02/15/25    PT/PTA conference to discuss PT POC and patient's progression towards goals held with Kimberly Shearer PTA.     Subjective     Chief Complaint: pain  Patient/Family Comments/goals: none  Pain/Comfort:  Pain Rating 1: 9/10  Location 1: neck  Pain Addressed 1: Distraction, Reposition  Pain Rating Post-Intervention 1: 9/10    Patients cultural, spiritual, [employer] conflicts given the current situation: no    Objective:     Communicated with nurse prior to session.  Patient found supine with telemetry, pulse ox (continuous), Condom Catheter, peripheral IV, SCD, pressure relief  boots  upon PT entry to room.    General Precautions: Standard, fall  Orthopedic Precautions:spinal precautions   Braces: Aspen collar  Respiratory Status: Room air    Exams:  Cognitive Exam:  Patient is oriented to Person, Place, Time, and Situation  Sensation: -       Intact  RLE ROM: WFL  RLE Strength: 4/5 grossly  LLE ROM: WFL  LLE Strength: 4/5 grossly  Skin integrity: Visible skin intact      Functional Mobility:  Bed Mobility:  Supine to Sit: moderate assistance  Sit to Supine: moderate assistance  Transfers:  Sit to Stand:  minimum assistance with rolling walker  Gait: 4 side-steps with RW & CGA. Limited by pain  Balance: fair      AM-PAC 6 CLICK MOBILITY  Total Score:15     Education Provided:  Role and goals of PT, transfer training, bed mobility, gait training, balance training, safety awareness, assistive device, strengthening exercises, and importance of participating in PT to return to PLOF.    Patient left supine with all lines intact, call button in reach, and bed alarm on.    GOALS:   Multidisciplinary Problems       Physical Therapy Goals          Problem: Physical Therapy    Goal Priority Disciplines Outcome Interventions   Physical Therapy Goal     PT, PT/OT Progressing    Description: Goals to be met by: 2/15/25     Patient will increase functional independence with mobility by performin. Supine to sit with Set-up [place]  2. Sit to supine with Set-up [place]  3. Sit to stand transfer with Supervision  4. Bed to chair transfer with Supervision using Rolling Walker  5. Gait  x 300 feet with Supervision using Rolling Walker.   6. Ascend/descend 15 stairs with bilateral Handrails & Supervision                          History:     Past Medical History:   Diagnosis Date    Anxiety disorder, unspecified     Arthritis     CAD (coronary artery disease)     Cervical radiculopathy     CHF (congestive heart failure)     Chronic pain syndrome     COPD (chronic obstructive pulmonary  disease)     Diabetes mellitus     Gastro-esophageal reflux disease without esophagitis     High cholesterol     Hypertension     Lumbar radiculopathy     Myelomalacia of cervical cord     Other specified diseases of spinal cord     Pacemaker     PVD (peripheral vascular disease)     S/P triple vessel bypass     Tobacco use        Past Surgical History:   Procedure Laterality Date    ANTERIOR CERVICAL DISCECTOMY W/ FUSION  1995    C5-6 and C6-7.  Dr. Wall    ANTERIOR CERVICAL DISCECTOMY W/ FUSION  1996    Redo C5-6, C6-7.  Dr. Wall    CHOLECYSTECTOMY      CORONARY ARTERY BYPASS GRAFT      INSERTION OF PACEMAKER      X2    TRIPPLE VESSEL BYPASS         Time Tracking:     PT Received On: 01/26/25  PT Start Time: 0849     PT Stop Time: 0909  PT Total Time (min): 20 min     Billable Minutes: Re-eval 20 minutes      01/26/2025

## 2025-01-27 LAB
ANION GAP SERPL CALC-SCNC: 10 MEQ/L
BASOPHILS # BLD AUTO: 0.09 X10(3)/MCL
BASOPHILS NFR BLD AUTO: 1 %
BUN SERPL-MCNC: 21.6 MG/DL (ref 8.4–25.7)
CALCIUM SERPL-MCNC: 9.4 MG/DL (ref 8.8–10)
CHLORIDE SERPL-SCNC: 100 MMOL/L (ref 98–107)
CO2 SERPL-SCNC: 27 MMOL/L (ref 23–31)
CREAT SERPL-MCNC: 1.16 MG/DL (ref 0.72–1.25)
CREAT/UREA NIT SERPL: 19
EOSINOPHIL # BLD AUTO: 0.17 X10(3)/MCL (ref 0–0.9)
EOSINOPHIL NFR BLD AUTO: 1.9 %
ERYTHROCYTE [DISTWIDTH] IN BLOOD BY AUTOMATED COUNT: 11.6 % (ref 11.5–17)
GFR SERPLBLD CREATININE-BSD FMLA CKD-EPI: >60 ML/MIN/1.73/M2
GLUCOSE SERPL-MCNC: 110 MG/DL (ref 82–115)
HCT VFR BLD AUTO: 29.3 % (ref 42–52)
HGB BLD-MCNC: 9.8 G/DL (ref 14–18)
IMM GRANULOCYTES # BLD AUTO: 0.04 X10(3)/MCL (ref 0–0.04)
IMM GRANULOCYTES NFR BLD AUTO: 0.5 %
LYMPHOCYTES # BLD AUTO: 1.95 X10(3)/MCL (ref 0.6–4.6)
LYMPHOCYTES NFR BLD AUTO: 22.3 %
MCH RBC QN AUTO: 31 PG (ref 27–31)
MCHC RBC AUTO-ENTMCNC: 33.4 G/DL (ref 33–36)
MCV RBC AUTO: 92.7 FL (ref 80–94)
MONOCYTES # BLD AUTO: 0.91 X10(3)/MCL (ref 0.1–1.3)
MONOCYTES NFR BLD AUTO: 10.4 %
NEUTROPHILS # BLD AUTO: 5.59 X10(3)/MCL (ref 2.1–9.2)
NEUTROPHILS NFR BLD AUTO: 63.9 %
NRBC BLD AUTO-RTO: 0 %
OHS QRS DURATION: 144 MS
OHS QTC CALCULATION: 470 MS
PLATELET # BLD AUTO: 329 X10(3)/MCL (ref 130–400)
PMV BLD AUTO: 10.4 FL (ref 7.4–10.4)
POCT GLUCOSE: 143 MG/DL (ref 70–110)
POCT GLUCOSE: 150 MG/DL (ref 70–110)
POTASSIUM SERPL-SCNC: 4.4 MMOL/L (ref 3.5–5.1)
RBC # BLD AUTO: 3.16 X10(6)/MCL (ref 4.7–6.1)
SODIUM SERPL-SCNC: 137 MMOL/L (ref 136–145)
TROPONIN I SERPL-MCNC: 0.02 NG/ML (ref 0–0.04)
WBC # BLD AUTO: 8.75 X10(3)/MCL (ref 4.5–11.5)

## 2025-01-27 PROCEDURE — 25000003 PHARM REV CODE 250

## 2025-01-27 PROCEDURE — 25000003 PHARM REV CODE 250: Performed by: INTERNAL MEDICINE

## 2025-01-27 PROCEDURE — 93005 ELECTROCARDIOGRAM TRACING: CPT

## 2025-01-27 PROCEDURE — 85025 COMPLETE CBC W/AUTO DIFF WBC: CPT | Performed by: INTERNAL MEDICINE

## 2025-01-27 PROCEDURE — 93010 ELECTROCARDIOGRAM REPORT: CPT | Mod: ,,, | Performed by: INTERNAL MEDICINE

## 2025-01-27 PROCEDURE — 63600175 PHARM REV CODE 636 W HCPCS

## 2025-01-27 PROCEDURE — 80048 BASIC METABOLIC PNL TOTAL CA: CPT | Performed by: INTERNAL MEDICINE

## 2025-01-27 PROCEDURE — 99024 POSTOP FOLLOW-UP VISIT: CPT | Mod: ,,, | Performed by: STUDENT IN AN ORGANIZED HEALTH CARE EDUCATION/TRAINING PROGRAM

## 2025-01-27 PROCEDURE — 21400001 HC TELEMETRY ROOM

## 2025-01-27 PROCEDURE — 97116 GAIT TRAINING THERAPY: CPT | Mod: CQ

## 2025-01-27 PROCEDURE — 84484 ASSAY OF TROPONIN QUANT: CPT

## 2025-01-27 PROCEDURE — 63600175 PHARM REV CODE 636 W HCPCS: Performed by: INTERNAL MEDICINE

## 2025-01-27 PROCEDURE — 25000242 PHARM REV CODE 250 ALT 637 W/ HCPCS: Performed by: INTERNAL MEDICINE

## 2025-01-27 PROCEDURE — 63600175 PHARM REV CODE 636 W HCPCS: Performed by: STUDENT IN AN ORGANIZED HEALTH CARE EDUCATION/TRAINING PROGRAM

## 2025-01-27 PROCEDURE — 36415 COLL VENOUS BLD VENIPUNCTURE: CPT

## 2025-01-27 PROCEDURE — 36415 COLL VENOUS BLD VENIPUNCTURE: CPT | Performed by: INTERNAL MEDICINE

## 2025-01-27 PROCEDURE — 25000003 PHARM REV CODE 250: Performed by: STUDENT IN AN ORGANIZED HEALTH CARE EDUCATION/TRAINING PROGRAM

## 2025-01-27 RX ORDER — POLYETHYLENE GLYCOL 3350 17 G/17G
17 POWDER, FOR SOLUTION ORAL 2 TIMES DAILY
Status: DISPENSED | OUTPATIENT
Start: 2025-01-27 | End: 2025-01-29

## 2025-01-27 RX ORDER — AMOXICILLIN 250 MG
1 CAPSULE ORAL 2 TIMES DAILY
Status: DISPENSED | OUTPATIENT
Start: 2025-01-27 | End: 2025-01-29

## 2025-01-27 RX ORDER — MAGNESIUM SULFATE 1 G/100ML
1 INJECTION INTRAVENOUS ONCE
Status: COMPLETED | OUTPATIENT
Start: 2025-01-27 | End: 2025-01-27

## 2025-01-27 RX ADMIN — RANOLAZINE 500 MG: 500 TABLET, FILM COATED, EXTENDED RELEASE ORAL at 09:01

## 2025-01-27 RX ADMIN — MELATONIN 9 MG: at 09:01

## 2025-01-27 RX ADMIN — MUPIROCIN: 20 OINTMENT TOPICAL at 09:01

## 2025-01-27 RX ADMIN — MORPHINE SULFATE 2 MG: 4 INJECTION, SOLUTION INTRAMUSCULAR; INTRAVENOUS at 01:01

## 2025-01-27 RX ADMIN — CARVEDILOL 3.12 MG: 3.12 TABLET, FILM COATED ORAL at 10:01

## 2025-01-27 RX ADMIN — TAMSULOSIN HYDROCHLORIDE 0.4 MG: 0.4 CAPSULE ORAL at 10:01

## 2025-01-27 RX ADMIN — OXYCODONE AND ACETAMINOPHEN 1 TABLET: 10; 325 TABLET ORAL at 06:01

## 2025-01-27 RX ADMIN — Medication 400 MG: at 10:01

## 2025-01-27 RX ADMIN — OXYCODONE AND ACETAMINOPHEN 1 TABLET: 10; 325 TABLET ORAL at 03:01

## 2025-01-27 RX ADMIN — OXYCODONE AND ACETAMINOPHEN 1 TABLET: 10; 325 TABLET ORAL at 10:01

## 2025-01-27 RX ADMIN — HEPARIN SODIUM 5000 UNITS: 5000 INJECTION, SOLUTION INTRAVENOUS; SUBCUTANEOUS at 09:01

## 2025-01-27 RX ADMIN — RANOLAZINE 500 MG: 500 TABLET, FILM COATED, EXTENDED RELEASE ORAL at 10:01

## 2025-01-27 RX ADMIN — Medication 400 MG: at 09:01

## 2025-01-27 RX ADMIN — LINACLOTIDE 145 MCG: 145 CAPSULE, GELATIN COATED ORAL at 06:01

## 2025-01-27 RX ADMIN — METHOCARBAMOL 750 MG: 750 TABLET ORAL at 09:01

## 2025-01-27 RX ADMIN — AMLODIPINE BESYLATE 5 MG: 5 TABLET ORAL at 10:01

## 2025-01-27 RX ADMIN — MORPHINE SULFATE 2 MG: 4 INJECTION, SOLUTION INTRAMUSCULAR; INTRAVENOUS at 05:01

## 2025-01-27 RX ADMIN — FLUTICASONE FUROATE AND VILANTEROL TRIFENATATE 1 PUFF: 200; 25 POWDER RESPIRATORY (INHALATION) at 10:01

## 2025-01-27 RX ADMIN — MAGNESIUM SULFATE IN DEXTROSE 1 G: 10 INJECTION, SOLUTION INTRAVENOUS at 12:01

## 2025-01-27 RX ADMIN — METHOCARBAMOL 750 MG: 750 TABLET ORAL at 03:01

## 2025-01-27 RX ADMIN — WHITE PETROLATUM: 1.75 OINTMENT TOPICAL at 10:01

## 2025-01-27 RX ADMIN — METHOCARBAMOL 750 MG: 750 TABLET ORAL at 10:01

## 2025-01-27 RX ADMIN — PANTOPRAZOLE SODIUM 40 MG: 40 TABLET, DELAYED RELEASE ORAL at 06:01

## 2025-01-27 RX ADMIN — CARVEDILOL 3.12 MG: 3.12 TABLET, FILM COATED ORAL at 09:01

## 2025-01-27 RX ADMIN — OXYCODONE AND ACETAMINOPHEN 1 TABLET: 10; 325 TABLET ORAL at 12:01

## 2025-01-27 RX ADMIN — HEPARIN SODIUM 5000 UNITS: 5000 INJECTION, SOLUTION INTRAVENOUS; SUBCUTANEOUS at 06:01

## 2025-01-27 RX ADMIN — MUPIROCIN: 20 OINTMENT TOPICAL at 10:01

## 2025-01-27 RX ADMIN — OXYCODONE AND ACETAMINOPHEN 1 TABLET: 10; 325 TABLET ORAL at 07:01

## 2025-01-27 RX ADMIN — METHOCARBAMOL 750 MG: 750 TABLET ORAL at 01:01

## 2025-01-27 RX ADMIN — SENNOSIDES AND DOCUSATE SODIUM 1 TABLET: 50; 8.6 TABLET ORAL at 09:01

## 2025-01-27 RX ADMIN — AMLODIPINE BESYLATE 5 MG: 5 TABLET ORAL at 09:01

## 2025-01-27 RX ADMIN — TIOTROPIUM BROMIDE INHALATION SPRAY 2 PUFF: 3.12 SPRAY, METERED RESPIRATORY (INHALATION) at 10:01

## 2025-01-27 RX ADMIN — HEPARIN SODIUM 5000 UNITS: 5000 INJECTION, SOLUTION INTRAVENOUS; SUBCUTANEOUS at 01:01

## 2025-01-27 NOTE — PROGRESS NOTES
"[facility] Hospital Medicine Progress Note        Chief Complaint: Inpatient Follow-up     HPI: 69-year-old male with a past medical history of hypertension, hyperlipidemia, diabetes, COPD (no home O2), heart failure, CAD status post CABG, Medtronic pacemaker, peripheral arterial disease status post left PCI, abdominal aortic aneurysm, tobacco dependence, prior C5, 6, 7 decompression with  who presents with worsening chronic cervical spondylosis with myelopathy.  He was seen by Neurosurgery on 12/03/2024 however he did not complete cardiac evaluation for clearance and did not go to scheduled MRI appointments.  They recommended fusion at least at C3-4 and C4-5.      He was seen at [facility] 01/05/2025: "He initially told Dr. Bella he did not want surgery. He changed his mind as his UTI was treated and cleared and a f/u urine culture from 1/11 has been negative for growth. His son does not want Dr. Bella to do the surgery and attempts were made to see if Dr. Neumann or Dr. Bowen would see the patient but both declined this. He was recommended to discharge and report to a different hospital for a reevaluation because he and his family have declined Dr. Bella's offer to do surgery. The surgery is felt to be needed and he has been seen by Dr. Beebe cardiology who rates his cardiac risk as moderate at 5-7% of a cardiac event." At the time of MD evaluation later  patient was seeking neurosurgical evaluation and intervention.  Neurosurgery was informed.  Received ceftriaxone to cover any UTI. Seen by Nephrology for JOHN, renal function improved eventually.    Interval Hx:   Status post-C2-6 PCDF and C2/3- C4/5 decompression .  Neurosurgery following.  Retaining urine multiple times,had to place back.Adjusting bowel regimen    Patient was seen and examined , recovering from surgery, no major new concerns, requests pain meds,family at bedside, afebrile, hemodynamically stable, most recent " lab work was reviewed.    Objective/physical exam:  General: In no acute distress, afebrile  HEENT:  Cervical collar  Chest: Clear to auscultation bilaterally  Heart:  +S1, S2, normal rate  Abdomen: Soft, nontender, BS +  MSK: Warm, no lower extremity edema, no clubbing or cyanosis  Neurologic: Alert and oriented , with right-sided weakness>left, numbness and tingling of extremities    VITAL SIGNS: 24 HRS MIN & MAX LAST   Temp  Min: 98 °F (36.7 °C)  Max: 99.2 °F (37.3 °C) 99.2 °F (37.3 °C)   BP  Min: 123/61  Max: 169/70 123/61   Pulse  Min: 62  Max: 83  71   Resp  Min: 17  Max: 18 17   SpO2  Min: 94 %  Max: 96 % (!) 94 %     I have reviewed the following labs:  Recent Labs   Lab 01/24/25  0403 01/25/25  0545 01/26/25  0534 01/27/25  0423   WBC 9.09 10.01  --  8.75   RBC 3.45* 3.47*  --  3.16*   HGB 10.8* 10.6* 10.1* 9.8*   HCT 31.3* 31.9* 29.5* 29.3*   MCV 90.7 91.9  --  92.7   MCH 31.3* 30.5  --  31.0   MCHC 34.5 33.2  --  33.4   RDW 11.7 11.7  --  11.6    396  --  329   MPV 10.5* 10.1  --  10.4     Recent Labs   Lab 01/22/25  0519 01/23/25  0536 01/25/25  0546 01/27/25  0423   * 134* 133* 137   K 4.4 4.2 4.3 4.4   CL 98 100 100 100   CO2 25 28 27 27   BUN 51.6* 44.7* 29.7* 21.6   CREATININE 1.85* 1.46* 1.31* 1.16   CALCIUM 9.5 9.8 9.8 9.4   MG 1.20* 1.70  --   --    ALBUMIN 3.1* 3.3* 3.6  --    ALKPHOS  --  116 104  --    ALT  --  14 14  --    AST  --  15 17  --    BILITOT  --  0.4 0.4  --      Microbiology Results (last 7 days)       ** No results found for the last 168 hours. **             See below for Radiology    Assessment/Plan:  Severe cervical spondylosis  Severe C3-4 central canal stenosis with central cord myelomalacia-Status post-C2-6 PCDF and C2/3- C4/5 decompression   CKD currently at baseline  Normocytic anemia  Urinary retention  Constipation    Past medical history of hypertension, hyperlipidemia, diabetes, COPD (no home O2), heart failure, CAD status post CABG, Medtronic pacemaker,  peripheral arterial disease status post left PCI, abdominal aortic aneurysm, tobacco dependence, prior C5, 6, 7 decompression     Plan   Continue Neuro checks, keep map above 65.Neurosurgery following, status post surgery.  Removed Hemovac .Follow up on further postop recommendations.  Monitor hemoglobin.  Nephrology followed.  Renal function has improved.  Received ceftriaxone to cover for any underlying UTI.    Would need outpatient follow up for routine cardiac care.  Now with urinary retention, required in and out multiple times.Now with herrera.Started flomax. Need to address constipation.  Wound Care following for left heel wound  Continue supportive care, appropriate home medications.  On sliding scale.  Monitor blood pressure, better after amlodipine was added  PTOT-recommending moderate intensity therapy .  Case management on board    VTE prophylaxis:  Lovenox> SCD> heparin subQ      Anticipated discharge and Disposition:   TBD, pending neurosurgery 's clearance and improvement in urinary retention , we will need SNF/Rehab      All diagnosis and differential diagnosis have been reviewed; assessment and plan has been documented; I have personally reviewed the labs and test results that are presently available; I have reviewed the patients medication list; I have reviewed the consulting providers response and recommendations. I have reviewed or attempted to review medical records based upon their availability    All of the patient's questions have been  addressed and answered. Patient's is agreeable to the above stated plan. I will continue to monitor closely and make adjustments to medical management as needed.    Portions of this note dictated using EMR integrated voice recognition software, and may be subject to voice recognition errors not corrected at proofreading. Please contact writer for clarification if needed.   _____________________________________________________________________    Malnutrition  Status:  Nutrition consulted. Most recent weight and BMI monitored-     Measurements:  Wt Readings from Last 1 Encounters:   01/18/25 78 kg (172 lb)   Body mass index is 27.76 kg/m².    Patient has been screened and assessed by RD.    Malnutrition Type:  Context:    Level:      Malnutrition Characteristic Summary:       Interventions/Recommendations (treatment strategy):        Scheduled Med:   amLODIPine  5 mg Oral BID    carvediloL  3.125 mg Oral BID    fluticasone furoate-vilanteroL  1 puff Inhalation Daily    heparin (porcine)  5,000 Units Subcutaneous Q8H    linaCLOtide  145 mcg Oral QAM    magnesium oxide  400 mg Oral BID    methocarbamoL  750 mg Oral QID    mupirocin   Nasal BID    pantoprazole  40 mg Oral Before breakfast    ranolazine  500 mg Oral BID    tamsulosin  0.4 mg Oral Daily    tiotropium bromide  2 puff Inhalation Daily    white petrolatum   Topical (Top) Daily      Continuous Infusions:   0.9% NaCl   Intravenous Continuous   Stopped at 01/25/25 0424      PRN Meds:    Current Facility-Administered Medications:     acetaminophen, 650 mg, Oral, Q4H PRN    albuterol-ipratropium, 3 mL, Nebulization, Q6H PRN    aluminum-magnesium hydroxide-simethicone, 30 mL, Oral, QID PRN    bisacodyL, 10 mg, Rectal, Daily PRN    cefTRIAXone (Rocephin) IV (PEDS and ADULTS), 2 g, Intravenous, On Call Procedure    cyclobenzaprine, 10 mg, Oral, TID PRN    dextrose 50%, 12.5 g, Intravenous, PRN    dextrose 50%, 12.5 g, Intravenous, PRN    dextrose 50%, 25 g, Intravenous, PRN    dextrose 50%, 25 g, Intravenous, PRN    glucagon (human recombinant), 1 mg, Intramuscular, PRN    glucagon (human recombinant), 1 mg, Intramuscular, PRN    glucose, 16 g, Oral, PRN    glucose, 16 g, Oral, PRN    glucose, 24 g, Oral, PRN    glucose, 24 g, Oral, PRN    hydrALAZINE, 5 mg, Intravenous, Q6H PRN    insulin aspart U-100, 0-5 Units, Subcutaneous, QID (AC + HS) PRN    labetalol, 10 mg, Intravenous, Q4H PRN    melatonin, 9 mg, Oral, Nightly  PRN    morphine, 2 mg, Intravenous, Q6H PRN    naloxone, 0.02 mg, Intravenous, PRN    ondansetron, 8 mg, Oral, Q8H PRN    ondansetron, 4 mg, Intravenous, Q8H PRN    oxyCODONE-acetaminophen, 1 tablet, Oral, Q4H PRN    oxyCODONE-acetaminophen, 1 tablet, Oral, Q4H PRN    polyethylene glycol, 17 g, Oral, TID PRN    simethicone, 1 tablet, Oral, QID PRN    sodium chloride 0.9%, 10 mL, Intravenous, PRN     Radiology:  I have personally reviewed the following imaging and agree with the radiologist.     X-Ray Cervical Spine 2 or 3 Views  Narrative: EXAMINATION:  XR CERVICAL SPINE 2 OR 3 VIEWS    CLINICAL HISTORY:  s/p C2-6 PCDF;    COMPARISON:  CT cervical spine dated 01/14/2025.    FINDINGS:  There are postoperative changes with posterior spinal fusion hardware at C2 through C6.  The hardware appears intact.  Cervical alignment has improved.  The remaining vertebral body heights are preserved.  There is moderate disc height loss at C3-C4, mild at C4-C5, with multilevel marginal osteophytes.  The soft tissues are unremarkable.  Impression: Postoperative changes without acute abnormality.    Electronically signed by: Katelyn Blanco  Date:    01/25/2025  Time:    10:39      Donna Garcia MD  Department of Hospital Medicine   [facility]   01/27/2025

## 2025-01-27 NOTE — PROGRESS NOTES
[facility]  Neurosurgery  Progress Note    Subjective:     Interval History:   POD #3: C2-6 PCDF and C2/3- C4/5 decompression   No acute events overnight. Patient is sitting on the side of the bed and eating breakfast. No family at the bedside. Pain is controlled. His strength in his arms is improving. He has numbness in bilateral hands and feet. Hard cervical collar in placed.       Post-Op Info:  Procedure(s) (LRB):  FUSION, SPINE, POSTERIOR SPINAL COLUMN, CERVICAL, USING COMPUTER-ASSISTED NAVIGATION (N/A)   3 Days Post-Op      Medications:  Continuous Infusions:   0.9% NaCl   Intravenous Continuous   Stopped at 01/25/25 0424     Scheduled Meds:   amLODIPine  5 mg Oral BID    carvediloL  3.125 mg Oral BID    fluticasone furoate-vilanteroL  1 puff Inhalation Daily    heparin (porcine)  5,000 Units Subcutaneous Q8H    linaCLOtide  145 mcg Oral QAM    magnesium oxide  400 mg Oral BID    methocarbamoL  750 mg Oral QID    mupirocin   Nasal BID    pantoprazole  40 mg Oral Before breakfast    ranolazine  500 mg Oral BID    tamsulosin  0.4 mg Oral Daily    tiotropium bromide  2 puff Inhalation Daily    white petrolatum   Topical (Top) Daily     PRN Meds:  Current Facility-Administered Medications:     acetaminophen, 650 mg, Oral, Q4H PRN    albuterol-ipratropium, 3 mL, Nebulization, Q6H PRN    aluminum-magnesium hydroxide-simethicone, 30 mL, Oral, QID PRN    bisacodyL, 10 mg, Rectal, Daily PRN    cefTRIAXone (Rocephin) IV (PEDS and ADULTS), 2 g, Intravenous, On Call Procedure    cyclobenzaprine, 10 mg, Oral, TID PRN    dextrose 50%, 12.5 g, Intravenous, PRN    dextrose 50%, 12.5 g, Intravenous, PRN    dextrose 50%, 25 g, Intravenous, PRN    dextrose 50%, 25 g, Intravenous, PRN    glucagon (human recombinant), 1 mg, Intramuscular, PRN    glucagon (human recombinant), 1 mg, Intramuscular, PRN    glucose, 16 g, Oral, PRN    glucose, 16 g, Oral, PRN    glucose, 24 g, Oral, PRN    glucose, 24 g,  "Oral, PRN    hydrALAZINE, 5 mg, Intravenous, Q6H PRN    insulin aspart U-100, 0-5 Units, Subcutaneous, QID (AC + HS) PRN    labetalol, 10 mg, Intravenous, Q4H PRN    melatonin, 9 mg, Oral, Nightly PRN    morphine, 2 mg, Intravenous, Q6H PRN    naloxone, 0.02 mg, Intravenous, PRN    ondansetron, 8 mg, Oral, Q8H PRN    ondansetron, 4 mg, Intravenous, Q8H PRN    oxyCODONE-acetaminophen, 1 tablet, Oral, Q4H PRN    oxyCODONE-acetaminophen, 1 tablet, Oral, Q4H PRN    polyethylene glycol, 17 g, Oral, TID PRN    simethicone, 1 tablet, Oral, QID PRN    sodium chloride 0.9%, 10 mL, Intravenous, PRN     Review of Systems  Objective:     Weight: 78 kg (172 lb)  Body mass index is 27.76 kg/m².  Vital Signs (Most Recent):  Temp: 98.4 °F (36.9 °C) (01/27/25 0718)  Pulse: 62 (01/27/25 0718)  Resp: 18 (01/27/25 0607)  BP: (!) 160/67 (01/27/25 0718)  SpO2: 95 % (01/27/25 0718) Vital Signs (24h Range):  Temp:  [98 °F (36.7 °C)-98.4 °F (36.9 °C)] 98.4 °F (36.9 °C)  Pulse:  [62-83] 62  Resp:  [14-18] 18  SpO2:  [94 %-96 %] 95 %  BP: (119-169)/(62-85) 160/67                              Urethral Catheter 01/26/25 1330 Latex 16 Fr. (Active)   $ Gray Insertion Bedside Insertion Performed 01/26/25 1330   Site Assessment Clean;Intact 01/26/25 2000   Collection Container Standard drainage bag 01/26/25 2000   Securement Method secured to top of thigh w/ adhesive device 01/26/25 1330   Catheter Care Performed yes 01/26/25 2000   Reason for Continuing Urinary Catheterization Urinary retention 01/26/25 2000   CAUTI Prevention Bundle Securement Device in place with 1" slack;Intact seal between catheter & drainage tubing;Drainage bag/urimeter off the floor;Sheeting clip in use;No dependent loops or kinks;Drainage bag/urimeter not overfilled (<2/3 full);Drainage bag/urimeter below bladder 01/26/25 2000   Output (mL) 400 mL 01/26/25 1330       Neurosurgery Physical Exam  GCS 15  Awake and appropriately response to questions  Bilateral upper " "extremity motor strength is 5/5  Woods positive bilaterally  Numbness in bilateral hands and feet  Surgical dressing: clean, dry, intact  Aspen hard cervical collar in place      Significant Labs:  Recent Labs   Lab 01/27/25  0423      K 4.4      CO2 27   BUN 21.6   CREATININE 1.16   CALCIUM 9.4     Recent Labs   Lab 01/26/25  0534 01/27/25  0423   WBC  --  8.75   HGB 10.1* 9.8*   HCT 29.5* 29.3*   PLT  --  329     No results for input(s): "LABPT", "INR", "APTT" in the last 48 hours.  Microbiology Results (last 7 days)       ** No results found for the last 168 hours. **            Significant Diagnostics:      Assessment/Plan:      Plan:  - Floor status  - Neuro/motor checks Q4H  - Pain control: Percocet and Robaxin  - Hard collar on at all times, Isabella collar for shower  - Daily dressing changes  - Fall precautions  - Continue with PT, OT  - Consult case management for possible rehab placement  - SCDs and subQ heparin for DVT prophylaxis          Patient will need to follow up 2 weeks after being discharge from the hospital for staples removal with xray uprights of cervical spine with VIBHA Magana. Lopez removal by 2/7/2025. Aspen collar on at all times and Isabella collar for shower. Continue Percocet and Robaxin for pain management. Neurosurgery is signing off. Please reach out with any questions or concerns.      Active Diagnoses:    Diagnosis Date Noted POA    PRINCIPAL PROBLEM:  Spinal cord compression [G95.20] 01/14/2025 Yes    Anemia [D64.9] 01/14/2025 Yes    Hyponatremia [E87.1] 01/14/2025 Yes      Problems Resolved During this Admission:       YESSICA Garcia-BC  Neurosurgery  [facility]    "

## 2025-01-27 NOTE — PT/OT/SLP PROGRESS
Physical Therapy Treatment    Patient Name:  Tyler Mai   MRN:  [id number]    Recommendations:     Discharge therapy intensity: High Intensity Therapy   Discharge Equipment Recommendations:  (TBD by next level of care)  Barriers to discharge: Decreased caregiver support, Impaired mobility, and Ongoing medical needs    Assessment:     Tyler Mai is a 69 y.o. male admitted with a medical diagnosis of Spinal cord compression.  He presents with the following impairments/functional limitations: weakness, impaired endurance, impaired self care skills, impaired functional mobility, gait instability, impaired balance, decreased safety awareness, pain. S/p C2-6 PCDF 1/24.    Rehab Prognosis: Good; patient would benefit from acute skilled PT services to address these deficits and reach maximum level of function.    Recent Surgery: Procedure(s) (LRB):  FUSION, SPINE, POSTERIOR SPINAL COLUMN, CERVICAL, USING COMPUTER-ASSISTED NAVIGATION (N/A) 3 Days Post-Op    Plan:     During this hospitalization, patient would benefit from acute PT services 5 x/week to address the identified rehab impairments via gait training, therapeutic activities, therapeutic exercises, neuromuscular re-education and progress toward the following goals:    Plan of Care Expires:  02/15/25    Subjective     Chief Complaint: pain     Objective:     Communicated with nurse prior to session.  Patient found supine with telemetry, pulse ox (continuous), Condom Catheter, peripheral IV, SCD, pressure relief boots upon PT entry to room.     General Precautions: Standard, fall  Orthopedic Precautions: spinal precautions  Braces: Aspen collar  Respiratory Status: Room air  Blood Pressure:   Skin Integrity: Visible skin intact      Functional Mobility:  SBA to get EOB and min assist STS  Gait 10 ft and 30 ft RW min assist.   Improved gait distance with decrease level of assist.     Education Provided:  Role and goals of PT, transfer training, bed mobility,  gait training, balance training, safety awareness, assistive device, strengthening exercises, and importance of participating in PT to return to PLOF.     Patient left up in chair with call button in reach    GOALS:   Multidisciplinary Problems       Physical Therapy Goals          Problem: Physical Therapy    Goal Priority Disciplines Outcome Interventions   Physical Therapy Goal     PT, PT/OT Progressing    Description: Goals to be met by: 2/15/25     Patient will increase functional independence with mobility by performin. Supine to sit with Set-up Avella  2. Sit to supine with Set-up Avella  3. Sit to stand transfer with Supervision  4. Bed to chair transfer with Supervision using Rolling Walker  5. Gait  x 300 feet with Supervision using Rolling Walker.   6. Ascend/descend 15 stairs with bilateral Handrails & Supervision                          Time Tracking:     Billable Minutes: Gait Training 23    Treatment Type: Treatment  PT/PTA: PTA     Number of PTA visits since last PT visit: 2025

## 2025-01-27 NOTE — PLAN OF CARE
Met with pt at bedside to discuss d/c POC and rehab placement. Requested if SW could call his son to discuss placement, as he was already looking into facilities. SW attempted to contact pt's son, Tyler, but had to leave .     Kym Hatch, LCSW    [id number] Spoke with pt's son regarding d/c POC and post-acute placement. Son is agreeable with rehab placement and requested referral be sent to [facility]. If denied by insurance, requested referral be sent to [facility] ([facility]). SHYAM met with pt at bedside and provided updated. Stated he was agreeable with this plan. Referral sent to [facility] via [facility].

## 2025-01-27 NOTE — PROGRESS NOTES
Inpatient Nutrition Evaluation    Admit Date: 1/14/2025   Total duration of encounter: 13 days   Patient Age: 69 y.o.    Nutrition Recommendation/Prescription     Continue current diet (Diet Adult Regular) as tolerated.   Monitor PO intakes, labs, and wt changes    Nutrition Assessment     Chart Review    Reason Seen: length of stay    Malnutrition Screening Tool Results   Have you recently lost weight without trying?: No  Have you been eating poorly because of a decreased appetite?: No   MST Score: 0   Diagnosis:  Spinal cord compression  Anemia  Hyponatremia     Relevant Medical History:   CKD 4, hypertension, hyperlipidemia, diabetes, COPD, CHF, coronary artery disease status post CABG, peripheral artery disease, AA, and tobacco dependence     Scheduled Medications:  amLODIPine, 5 mg, BID  carvediloL, 3.125 mg, BID  fluticasone furoate-vilanteroL, 1 puff, Daily  heparin (porcine), 5,000 Units, Q8H  linaCLOtide, 145 mcg, QAM  magnesium oxide, 400 mg, BID  methocarbamoL, 750 mg, QID  mupirocin, , BID  pantoprazole, 40 mg, Before breakfast  ranolazine, 500 mg, BID  tamsulosin, 0.4 mg, Daily  tiotropium bromide, 2 puff, Daily  white petrolatum, , Daily    Continuous Infusions:  0.9% NaCl, Last Rate: Stopped (01/25/25 0424)    PRN Medications:   Current Facility-Administered Medications:     acetaminophen, 650 mg, Oral, Q4H PRN    albuterol-ipratropium, 3 mL, Nebulization, Q6H PRN    aluminum-magnesium hydroxide-simethicone, 30 mL, Oral, QID PRN    bisacodyL, 10 mg, Rectal, Daily PRN    cefTRIAXone (Rocephin) IV (PEDS and ADULTS), 2 g, Intravenous, On Call Procedure    cyclobenzaprine, 10 mg, Oral, TID PRN    dextrose 50%, 12.5 g, Intravenous, PRN    dextrose 50%, 12.5 g, Intravenous, PRN    dextrose 50%, 25 g, Intravenous, PRN    dextrose 50%, 25 g, Intravenous, PRN    glucagon (human recombinant), 1 mg, Intramuscular, PRN    glucagon (human recombinant), 1 mg, Intramuscular, PRN    glucose, 16 g, Oral, PRN     glucose, 16 g, Oral, PRN    glucose, 24 g, Oral, PRN    glucose, 24 g, Oral, PRN    hydrALAZINE, 5 mg, Intravenous, Q6H PRN    insulin aspart U-100, 0-5 Units, Subcutaneous, QID (AC + HS) PRN    labetalol, 10 mg, Intravenous, Q4H PRN    melatonin, 9 mg, Oral, Nightly PRN    morphine, 2 mg, Intravenous, Q6H PRN    naloxone, 0.02 mg, Intravenous, PRN    ondansetron, 8 mg, Oral, Q8H PRN    ondansetron, 4 mg, Intravenous, Q8H PRN    oxyCODONE-acetaminophen, 1 tablet, Oral, Q4H PRN    oxyCODONE-acetaminophen, 1 tablet, Oral, Q4H PRN    polyethylene glycol, 17 g, Oral, TID PRN    simethicone, 1 tablet, Oral, QID PRN    sodium chloride 0.9%, 10 mL, Intravenous, PRN    Recent Labs   Lab 01/21/25  0509 01/22/25  0519 01/23/25  0536 01/23/25  1641 01/24/25  0403 01/25/25  0545 01/25/25  0546 01/26/25  0534 01/27/25  0423   * 132* 134*  --   --   --  133*  --  137   K 4.7 4.4 4.2  --   --   --  4.3  --  4.4   CALCIUM 9.9 9.5 9.8  --   --   --  9.8  --  9.4   PHOS 3.7 3.3 2.7  --   --   --   --   --   --    MG  --  1.20* 1.70  --   --   --   --   --   --    CL 96* 98 100  --   --   --  100  --  100   CO2 27 25 28  --   --   --  27  --  27   BUN 55.4* 51.6* 44.7*  --   --   --  29.7*  --  21.6   CREATININE 2.25* 1.85* 1.46*  --   --   --  1.31*  --  1.16   EGFRNORACEVR 31 39 52  --   --   --  60  --  >60   GLUCOSE 109 104 98  --   --   --  103  --  110   BILITOT  --   --  0.4  --   --   --  0.4  --   --    ALKPHOS  --   --  116  --   --   --  104  --   --    ALT  --   --  14  --   --   --  14  --   --    AST  --   --  15  --   --   --  17  --   --    ALBUMIN 3.3* 3.1* 3.3*  --   --   --  3.6  --   --    HGBA1C  --   --   --  5.6  --   --   --   --   --    WBC  --  9.86 8.08  --  9.09 10.01  --   --  8.75   HGB  --  10.4* 10.9*  --  10.8* 10.6*  --  10.1* 9.8*   HCT  --  30.7* 32.8*  --  31.3* 31.9*  --  29.5* 29.3*     Nutrition Orders:  Diet Adult Regular      Appetite/Oral Intake: good/50-75% of meals  Factors Affecting  "Nutritional Intake: none identified  Food/Temple/Cultural Preferences: none reported  Food Allergies: no known food allergies  Last Bowel Movement: 01/13/25 (educated;pt still refusing prns)  Wound(s):     Wound 01/20/25 2000 Skin Tear inferior Penis-Tissue loss description: Partial thickness wound to b/l legs, feel, and R buttocks    Comments    1/20/25: pt reports good appetite and PO intakes since admit and PTA. Pt denies any unintended wt loss. Pt not interested in trying any ONS.     1/27/25 pt tolerating oral diet, good appetite and intake reported.    Anthropometrics    Height: 5' 6" (167.6 cm), Height Method: Stated  Last Weight: 78 kg (172 lb) (01/18/25 0728), Weight Method: Bed Scale  BMI (Calculated): 27.8  BMI Classification: overweight (BMI 25-29.9)        Ideal Body Weight (IBW), Male: 142 lb     % Ideal Body Weight, Male (lb): 121.83 %                          Usual Weight Provided By: patient denies unintentional weight loss    Wt Readings from Last 5 Encounters:   01/18/25 78 kg (172 lb)   12/03/24 78.5 kg (173 lb)   08/29/22 78.9 kg (173 lb 15.1 oz)   03/16/16 82.3 kg (181 lb 7 oz)     Weight Change(s) Since Admission:   Wt Readings from Last 1 Encounters:   01/18/25 0728 78 kg (172 lb)   01/14/25 1717 78.5 kg (173 lb)   Admit Weight: 78.5 kg (173 lb) (01/14/25 1717), Weight Method: Stated    Patient Education     Not applicable.    Nutrition Goals & Monitoring     Dietitian will monitor: energy intake and weight    Nutrition Risk/Follow-Up: low (follow-up in 5-7 days)  Patients assigned 'low nutrition risk' status do not qualify for a full nutritional assessment but will be monitored and re-evaluated in a 5-7 day time period. Please consult if re-evaluation needed sooner.    "

## 2025-01-28 ENCOUNTER — TELEPHONE (OUTPATIENT)
Dept: NEUROSURGERY | Facility: CLINIC | Age: 70
End: 2025-01-28
Payer: MEDICARE

## 2025-01-28 LAB
HCT VFR BLD AUTO: 27.3 % (ref 42–52)
HGB BLD-MCNC: 9.2 G/DL (ref 14–18)
POCT GLUCOSE: 112 MG/DL (ref 70–110)
POCT GLUCOSE: 134 MG/DL (ref 70–110)
POCT GLUCOSE: 173 MG/DL (ref 70–110)
POCT GLUCOSE: 184 MG/DL (ref 70–110)

## 2025-01-28 PROCEDURE — 63600175 PHARM REV CODE 636 W HCPCS: Performed by: STUDENT IN AN ORGANIZED HEALTH CARE EDUCATION/TRAINING PROGRAM

## 2025-01-28 PROCEDURE — 25000003 PHARM REV CODE 250

## 2025-01-28 PROCEDURE — 97110 THERAPEUTIC EXERCISES: CPT | Mod: CQ

## 2025-01-28 PROCEDURE — 85014 HEMATOCRIT: CPT | Performed by: INTERNAL MEDICINE

## 2025-01-28 PROCEDURE — 25000003 PHARM REV CODE 250: Performed by: INTERNAL MEDICINE

## 2025-01-28 PROCEDURE — 36415 COLL VENOUS BLD VENIPUNCTURE: CPT | Performed by: INTERNAL MEDICINE

## 2025-01-28 PROCEDURE — 25000003 PHARM REV CODE 250: Performed by: STUDENT IN AN ORGANIZED HEALTH CARE EDUCATION/TRAINING PROGRAM

## 2025-01-28 PROCEDURE — 63600175 PHARM REV CODE 636 W HCPCS: Performed by: INTERNAL MEDICINE

## 2025-01-28 PROCEDURE — 21400001 HC TELEMETRY ROOM

## 2025-01-28 PROCEDURE — 25000242 PHARM REV CODE 250 ALT 637 W/ HCPCS: Performed by: INTERNAL MEDICINE

## 2025-01-28 PROCEDURE — 97116 GAIT TRAINING THERAPY: CPT | Mod: CQ

## 2025-01-28 RX ADMIN — MELATONIN 9 MG: at 09:01

## 2025-01-28 RX ADMIN — RANOLAZINE 500 MG: 500 TABLET, FILM COATED, EXTENDED RELEASE ORAL at 09:01

## 2025-01-28 RX ADMIN — MORPHINE SULFATE 2 MG: 4 INJECTION, SOLUTION INTRAMUSCULAR; INTRAVENOUS at 11:01

## 2025-01-28 RX ADMIN — METHOCARBAMOL 750 MG: 750 TABLET ORAL at 09:01

## 2025-01-28 RX ADMIN — ONDANSETRON 8 MG: 4 TABLET, ORALLY DISINTEGRATING ORAL at 01:01

## 2025-01-28 RX ADMIN — HEPARIN SODIUM 5000 UNITS: 5000 INJECTION, SOLUTION INTRAVENOUS; SUBCUTANEOUS at 01:01

## 2025-01-28 RX ADMIN — SENNOSIDES AND DOCUSATE SODIUM 1 TABLET: 50; 8.6 TABLET ORAL at 09:01

## 2025-01-28 RX ADMIN — CARVEDILOL 3.12 MG: 3.12 TABLET, FILM COATED ORAL at 09:01

## 2025-01-28 RX ADMIN — AMLODIPINE BESYLATE 5 MG: 5 TABLET ORAL at 09:01

## 2025-01-28 RX ADMIN — MORPHINE SULFATE 2 MG: 4 INJECTION, SOLUTION INTRAMUSCULAR; INTRAVENOUS at 07:01

## 2025-01-28 RX ADMIN — TIOTROPIUM BROMIDE INHALATION SPRAY 2 PUFF: 3.12 SPRAY, METERED RESPIRATORY (INHALATION) at 09:01

## 2025-01-28 RX ADMIN — MORPHINE SULFATE 2 MG: 4 INJECTION, SOLUTION INTRAMUSCULAR; INTRAVENOUS at 01:01

## 2025-01-28 RX ADMIN — TAMSULOSIN HYDROCHLORIDE 0.4 MG: 0.4 CAPSULE ORAL at 09:01

## 2025-01-28 RX ADMIN — MORPHINE SULFATE 2 MG: 4 INJECTION, SOLUTION INTRAMUSCULAR; INTRAVENOUS at 06:01

## 2025-01-28 RX ADMIN — METHOCARBAMOL 750 MG: 750 TABLET ORAL at 01:01

## 2025-01-28 RX ADMIN — OXYCODONE AND ACETAMINOPHEN 1 TABLET: 10; 325 TABLET ORAL at 09:01

## 2025-01-28 RX ADMIN — PANTOPRAZOLE SODIUM 40 MG: 40 TABLET, DELAYED RELEASE ORAL at 06:01

## 2025-01-28 RX ADMIN — OXYCODONE AND ACETAMINOPHEN 1 TABLET: 10; 325 TABLET ORAL at 01:01

## 2025-01-28 RX ADMIN — FLUTICASONE FUROATE AND VILANTEROL TRIFENATATE 1 PUFF: 200; 25 POWDER RESPIRATORY (INHALATION) at 09:01

## 2025-01-28 RX ADMIN — HEPARIN SODIUM 5000 UNITS: 5000 INJECTION, SOLUTION INTRAVENOUS; SUBCUTANEOUS at 06:01

## 2025-01-28 RX ADMIN — Medication 400 MG: at 09:01

## 2025-01-28 RX ADMIN — WHITE PETROLATUM: 1.75 OINTMENT TOPICAL at 09:01

## 2025-01-28 RX ADMIN — OXYCODONE AND ACETAMINOPHEN 1 TABLET: 10; 325 TABLET ORAL at 04:01

## 2025-01-28 RX ADMIN — OXYCODONE AND ACETAMINOPHEN 1 TABLET: 10; 325 TABLET ORAL at 05:01

## 2025-01-28 RX ADMIN — METHYLNALTREXONE BROMIDE 12 MG: 12 INJECTION, SOLUTION SUBCUTANEOUS at 11:01

## 2025-01-28 RX ADMIN — MUPIROCIN: 20 OINTMENT TOPICAL at 09:01

## 2025-01-28 RX ADMIN — OXYCODONE AND ACETAMINOPHEN 1 TABLET: 10; 325 TABLET ORAL at 12:01

## 2025-01-28 RX ADMIN — LINACLOTIDE 145 MCG: 145 CAPSULE, GELATIN COATED ORAL at 06:01

## 2025-01-28 RX ADMIN — HEPARIN SODIUM 5000 UNITS: 5000 INJECTION, SOLUTION INTRAVENOUS; SUBCUTANEOUS at 09:01

## 2025-01-28 RX ADMIN — METHOCARBAMOL 750 MG: 750 TABLET ORAL at 05:01

## 2025-01-28 NOTE — PLAN OF CARE
Received update from Sofiya at [facility] stating they are interested in pt for rehab, but that he will have to have a BM before they can submit for auth. SW made nurse and pt aware of this.    Kym Hatch LCSW

## 2025-01-28 NOTE — PROGRESS NOTES
"[facility] Hospital Medicine Progress Note        Chief Complaint: Inpatient Follow-up     HPI: 69-year-old male with a past medical history of hypertension, hyperlipidemia, diabetes, COPD (no home O2), heart failure, CAD status post CABG, Medtronic pacemaker, peripheral arterial disease status post left PCI, abdominal aortic aneurysm, tobacco dependence, prior C5, 6, 7 decompression with  who presents with worsening chronic cervical spondylosis with myelopathy.  He was seen by Neurosurgery on 12/03/2024 however he did not complete cardiac evaluation for clearance and did not go to scheduled MRI appointments.  They recommended fusion at least at C3-4 and C4-5.      He was seen at [facility] 01/05/2025: "He initially told Dr. Bella he did not want surgery. He changed his mind as his UTI was treated and cleared and a f/u urine culture from 1/11 has been negative for growth. His son does not want Dr. Bella to do the surgery and attempts were made to see if Dr. Neumann or Dr. Bowen would see the patient but both declined this. He was recommended to discharge and report to a different hospital for a reevaluation because he and his family have declined Dr. Bella's offer to do surgery. The surgery is felt to be needed and he has been seen by Dr. Beebe cardiology who rates his cardiac risk as moderate at 5-7% of a cardiac event." At the time of MD evaluation later  patient was seeking neurosurgical evaluation and intervention.  Neurosurgery was informed.  Received ceftriaxone to cover any UTI. Seen by Nephrology for JOHN, renal function improved eventually.Status post-C2-6 PCDF and C2/3- C4/5 decompression .      Interval Hx:   Requiring Gray for urinary retention, continues to be constipated.  Ordered Relistor    Patient was seen and examined, no major new concerns,afebrile, hemodynamically stable, most recent lab work was reviewed.    Objective/physical exam:  General: In no acute " distress, afebrile  HEENT:  Cervical collar  Chest: Clear to auscultation bilaterally  Heart:  +S1, S2, normal rate  Abdomen: Soft, nontender, BS +  MSK: Warm, no lower extremity edema, no clubbing or cyanosis  Neurologic: Alert and oriented , with right-sided weakness>left, numbness and tingling of extremities    VITAL SIGNS: 24 HRS MIN & MAX LAST   Temp  Min: 97.8 °F (36.6 °C)  Max: 98.6 °F (37 °C) 98.6 °F (37 °C)   BP  Min: 128/70  Max: 157/65 (!) 140/70   Pulse  Min: 67  Max: 83  83   Resp  Min: 16  Max: 18 18   SpO2  Min: 95 %  Max: 96 % 96 %     I have reviewed the following labs:  Recent Labs   Lab 01/24/25  0403 01/25/25  0545 01/26/25  0534 01/27/25  0423 01/28/25  0458   WBC 9.09 10.01  --  8.75  --    RBC 3.45* 3.47*  --  3.16*  --    HGB 10.8* 10.6* 10.1* 9.8* 9.2*   HCT 31.3* 31.9* 29.5* 29.3* 27.3*   MCV 90.7 91.9  --  92.7  --    MCH 31.3* 30.5  --  31.0  --    MCHC 34.5 33.2  --  33.4  --    RDW 11.7 11.7  --  11.6  --     396  --  329  --    MPV 10.5* 10.1  --  10.4  --      Recent Labs   Lab 01/22/25  0519 01/23/25  0536 01/25/25  0546 01/27/25  0423   * 134* 133* 137   K 4.4 4.2 4.3 4.4   CL 98 100 100 100   CO2 25 28 27 27   BUN 51.6* 44.7* 29.7* 21.6   CREATININE 1.85* 1.46* 1.31* 1.16   CALCIUM 9.5 9.8 9.8 9.4   MG 1.20* 1.70  --   --    ALBUMIN 3.1* 3.3* 3.6  --    ALKPHOS  --  116 104  --    ALT  --  14 14  --    AST  --  15 17  --    BILITOT  --  0.4 0.4  --      Microbiology Results (last 7 days)       ** No results found for the last 168 hours. 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  Removed Hemovac .Follow up on further postop recommendations.  Monitor hemoglobin.  Nephrology followed.  Renal function has improved.  Received ceftriaxone to cover for any underlying UTI.    Would need outpatient follow up for routine cardiac care.  Now with urinary retention, required in and out multiple times.Now with herrera.Started flomax. Need to address constipation.  Relistor today  Wound Care following for left heel wound  Continue supportive care, appropriate home medications.  On sliding scale.  Monitor blood pressure, better after amlodipine was added  PTOT-recommending moderate intensity therapy .    Case management on board    VTE prophylaxis:  Lovenox> SCD> heparin subQ      Anticipated discharge and Disposition:   TBD, pending neurosurgery 's clearance and improvement in urinary retention , we will need SNF/Rehab      All diagnosis and differential diagnosis have been reviewed; assessment and plan has been documented; I have personally reviewed the labs and test results that are presently available; I have reviewed the patients medication list; I have reviewed the consulting providers response and recommendations. I have reviewed or attempted to review medical records based upon their availability    All of the patient's questions have been  addressed and answered. Patient's is agreeable to the above stated plan. I will continue to monitor closely and make adjustments to medical management as needed.    Portions of this note dictated using EMR integrated voice recognition software, and may be subject to voice recognition errors not corrected at proofreading. Please contact writer for clarification if needed.    _____________________________________________________________________    Malnutrition Status:  Nutrition consulted. Most recent weight and BMI monitored-     Measurements:  Wt Readings from Last 1 Encounters:   01/18/25 78 kg (172 lb)   Body mass index is 27.76 kg/m².    Patient has been screened and assessed by RD.    Malnutrition Type:  Context:    Level:      Malnutrition Characteristic Summary:       Interventions/Recommendations (treatment strategy):        Scheduled Med:   amLODIPine  5 mg Oral BID    carvediloL  3.125 mg Oral BID    fluticasone furoate-vilanteroL  1 puff Inhalation Daily    heparin (porcine)  5,000 Units Subcutaneous Q8H    linaCLOtide  145 mcg Oral QAM    magnesium oxide  400 mg Oral BID    methocarbamoL  750 mg Oral QID    mupirocin   Nasal BID    pantoprazole  40 mg Oral Before breakfast    polyethylene glycol  17 g Oral BID    ranolazine  500 mg Oral BID    senna-docusate 8.6-50 mg  1 tablet Oral BID    tamsulosin  0.4 mg Oral Daily    tiotropium bromide  2 puff Inhalation Daily    white petrolatum   Topical (Top) Daily      Continuous Infusions:   0.9% NaCl   Intravenous Continuous   Stopped at 01/25/25 0424      PRN Meds:    Current Facility-Administered Medications:     acetaminophen, 650 mg, Oral, Q4H PRN    albuterol-ipratropium, 3 mL, Nebulization, Q6H PRN    aluminum-magnesium hydroxide-simethicone, 30 mL, Oral, QID PRN    bisacodyL, 10 mg, Rectal, Daily PRN    cefTRIAXone (Rocephin) IV (PEDS and ADULTS), 2 g, Intravenous, On Call Procedure    cyclobenzaprine, 10 mg, Oral, TID PRN    dextrose 50%, 12.5 g, Intravenous, PRN    dextrose 50%, 12.5 g, Intravenous, PRN    dextrose 50%, 25 g, Intravenous, PRN    dextrose 50%, 25 g, Intravenous, PRN    glucagon (human recombinant), 1 mg, Intramuscular, PRN    glucagon (human recombinant), 1 mg, Intramuscular, PRN    glucose, 16 g, Oral, PRN    glucose, 16 g, Oral, PRN    glucose, 24 g, Oral, PRN    glucose, 24 g, Oral, PRN     hydrALAZINE, 5 mg, Intravenous, Q6H PRN    insulin aspart U-100, 0-5 Units, Subcutaneous, QID (AC + HS) PRN    labetalol, 10 mg, Intravenous, Q4H PRN    melatonin, 9 mg, Oral, Nightly PRN    morphine, 2 mg, Intravenous, Q6H PRN    naloxone, 0.02 mg, Intravenous, PRN    ondansetron, 8 mg, Oral, Q8H PRN    ondansetron, 4 mg, Intravenous, Q8H PRN    oxyCODONE-acetaminophen, 1 tablet, Oral, Q4H PRN    oxyCODONE-acetaminophen, 1 tablet, Oral, Q4H PRN    polyethylene glycol, 17 g, Oral, TID PRN    simethicone, 1 tablet, Oral, QID PRN    sodium chloride 0.9%, 10 mL, Intravenous, PRN     Radiology:  I have personally reviewed the following imaging and agree with the radiologist.     X-Ray Cervical Spine 2 or 3 Views  Narrative: EXAMINATION:  XR CERVICAL SPINE 2 OR 3 VIEWS    CLINICAL HISTORY:  s/p C2-6 PCDF;    COMPARISON:  CT cervical spine dated 01/14/2025.    FINDINGS:  There are postoperative changes with posterior spinal fusion hardware at C2 through C6.  The hardware appears intact.  Cervical alignment has improved.  The remaining vertebral body heights are preserved.  There is moderate disc height loss at C3-C4, mild at C4-C5, with multilevel marginal osteophytes.  The soft tissues are unremarkable.  Impression: Postoperative changes without acute abnormality.    Electronically signed by: Katelyn Blanco  Date:    01/25/2025  Time:    10:39      Donna Garcia MD  Department of Hospital Medicine   [facility]   01/28/2025

## 2025-01-28 NOTE — PT/OT/SLP PROGRESS
Physical Therapy Treatment    Patient Name:  Tyler Mai   MRN:  [id number]    Recommendations:     Discharge therapy intensity: High Intensity Therapy   Discharge Equipment Recommendations:  (TBD by next level of care)  Barriers to discharge: Decreased caregiver support, Impaired mobility, and Ongoing medical needs    Assessment:     Tyler Mai is a 69 y.o. male admitted with a medical diagnosis of Spinal cord compression.  He presents with the following impairments/functional limitations: weakness, impaired endurance, impaired self care skills, impaired functional mobility, gait instability, impaired balance, decreased safety awareness, pain. S/p C2-6 PCDF 1/24.      Rehab Prognosis: Good; patient would benefit from acute skilled PT services to address these deficits and reach maximum level of function.    Recent Surgery: Procedure(s) (LRB):  FUSION, SPINE, POSTERIOR SPINAL COLUMN, CERVICAL, USING COMPUTER-ASSISTED NAVIGATION (N/A) 4 Days Post-Op    Plan:     During this hospitalization, patient would benefit from acute PT services 5 x/week to address the identified rehab impairments via gait training, therapeutic activities, therapeutic exercises, neuromuscular re-education and progress toward the following goals:    Plan of Care Expires:  02/15/25    Subjective     Chief Complaint: left scapula spasm and nerve pain.     Objective:     Communicated with nurse prior to session.  Patient found supine with telemetry, pulse ox (continuous), Condom Catheter, peripheral IV, SCD, pressure relief boots upon PT entry to room.     General Precautions: Standard, fall  Orthopedic Precautions: spinal precautions  Braces: Aspen collar  Respiratory Status: Room air  Blood Pressure:   Skin Integrity: Visible skin intact      Functional Mobility:  Left scapula mobs, upper trap and left shoulder ROM and passive stretching.   Mod assist STS and gait 65 ft x 2 RW min assist due to LOB from ataxic gait.   Pt performed LE PRE's  to increase strenth, ROM, and endurance to improve overall independence.      Education Provided:  Role and goals of PT, transfer training, bed mobility, gait training, balance training, safety awareness, assistive device, strengthening exercises, and importance of participating in PT to return to PLOF.    Patient left up in chair with call button in reach    GOALS:   Multidisciplinary Problems       Physical Therapy Goals          Problem: Physical Therapy    Goal Priority Disciplines Outcome Interventions   Physical Therapy Goal     PT, PT/OT Progressing    Description: Goals to be met by: 2/15/25     Patient will increase functional independence with mobility by performin. Supine to sit with Set-up Harlan  2. Sit to supine with Set-up Harlan  3. Sit to stand transfer with Supervision  4. Bed to chair transfer with Supervision using Rolling Walker  5. Gait  x 300 feet with Supervision using Rolling Walker.   6. Ascend/descend 15 stairs with bilateral Handrails & Supervision                          Time Tracking:       Billable Minutes: Gait 12 Exer 11    Treatment Type: Treatment  PT/PTA: PTA     Number of PTA visits since last PT visit: 2     2025

## 2025-01-28 NOTE — OP NOTE
NEUROSURGERY OPERATIVE NOTE     DATE OF OPERATION:   1/24/2025     PREOPERATIVE DIAGNOSIS:   Cervical stenosis, cervical spondylosis with myelopathy     POSTOPERATIVE DIAGNOSIS:   Same     SURGEON: Darrell Zavala MD  ASSISTANT: Izzy Lopez NP     PROCEDURE:  Posterior approach to the cervical spine.  Placement of bilateral pars screws at C2 and bilateral lateral mass screws from C3 to C6 using Second & Fourth OCT System. Placement of local bone and AlphaGraft DBM for arthrodesis from C2 to C6  C3-C5 total laminectomies for decompression  Use of neuromonitoring  Use of C-am for localization and O-arm/Stealth for image-guidance.     Implants:  - Vertebral bodies C2 had 3.5 x 18mm screws placed bilaterally  - C3 and C4 had 3.5 x 14 mm screws bilaterally  - C5 and C6 had 3.5 x 12 mm screws bilaterally     Findings  Successful placement of instrumentation and decompression  Stable neuromonitoring     ANESTHESIA:   General endotracheal      BLOOD LOSS: 150 mL     SPECIMEN(s):   none     COMPLICATIONS:   None      DRAINS:   1 medium Hemovac to full suction     INDICATION   69 year-old male with multiple medical issues and poor compliance presents with worsening balance over several weeks. Imaging demonstrates cervical spondylosis with severe stenosis. Myelopathy on exam. Discussed symptoms and imaging findings. I recommended posterior cervical decompression and fusion (C2-6). Risks and benefits of the procedure were discussed and informed consent was obtained.     PROCEDURE IN DETAIL  The patient was brought to the operating room.  General endotracheal intubation was instituted by the anesthesia team. A radiolucent Cuellar head clamp was applied.  The patient was then turned prone with his arms and legs appropriately padded.  Neuro monitoring with SSEP, EMG, and motor-evoked potentials were completed before positioning, after positioning, during surgery, and after surgery. All signals remaind stable throughout.     It was  anticipated that a midline incision be made on the patient's posterior neck.  A C-arm image was performed that correctly identified the C2 level before proceeding.  The patient's posterior neck was prepped and draped in a normal sterile fashion.  A timeout was performed that correctly identified the patient, preoperative antibiotics, and procedure.     A solution of 1% lidocaine with epinephrine was infused into the anticipated incisional site.  The initial skin incision was made with an 10 scalpel blade.  Bovie cautery was used for hemostasis and for carrying out this incision through the muscles and fascia to the spinous processes.  Dissection was carried out to the lateral edges of the facets.  Self-retaining retractors were placed for visualization.  The Bovie cautery was used to dissect the soft tissues from the spinous processes and lamina. Hemostasis was achieved with the Bovie cautery, bipolar, and FloSeal. After exposure, a lateral C-arm image was performed, which correctly identified the C2 - C6 levels. An O arm image was obtained.     Using neuro navigation with the O arm, bilateral pars screws were placed at C2 and bilateral lateral mass screws were placed at C3, C4, C5 and C6. A technique involving a navigated drill to create an area of entry, use of a navigated hand drill, application of a ball-tip probe, and then placement of a screw under Stealh neuronavigation guidance was the sequence of events that was followed bilaterally at C2, C3, C4, C5 as well as C6.  An C arm image was conducted and demonstrated that there was good placement of posterior cervical instrumentation from C2 to C6.     At this time, total laminectomies were performed from C3 to C5. A High speed drill was used to drill troughs bilaterally and a combination of Kerrison and Leksell rongeurs were used to complete the laminectomies. The ligamentum flavum was lifted up with a curved curette and then a Kerrison punch was used to remove  this ligament, thereby exposing the underlying dura, which had been significantly compressed.      Bipolar cautery was used for hemostasis. At the conclusion of the case, there was good decompression of the spinal cord at the C2/3 to C4/5 levels.  Rods were placed, spanning the C2-C6 screws on each side.  Caps were placed and tightened.  The cervical lamina were decorticated. Morselized autograft and DBM strips were placed bilaterally. Then, the surgical field was copiously irrigated. FloSeal was used for hemostasis and then irrigated out.  The retractors were removed.     Closure was then in order.  A medium size Hemovac drain was placed and secured into place.  The fascia was closed with interrupted 0 Vicryl sutures. The muscle was infused with a solution of Marcaine with epinephrine.  The subcutaneous layer was closed with interrupted, buried 2-0 Vicryl sutures.  The skin was everted and closed with staples. 4 x 4's, telfa and Medipore tape were placed on top. The patient was brought back into the supine position. Head yung was removed. He was extubated and transferred to recovery in stable condition. All needle and sponge counts were correct at the end of the case.     Darrell Zavala MD  Neurosurgery  [facility]

## 2025-01-28 NOTE — PT/OT/SLP PROGRESS
1000: pt working with PT and requesting for RB following.   1222: attempted to work with pt, pt c/o increased pain 9/10 and wanting to eat his lunch stating he has had to send his lunch away x2 for being too cold.

## 2025-01-29 LAB
POCT GLUCOSE: 127 MG/DL (ref 70–110)
POCT GLUCOSE: 159 MG/DL (ref 70–110)
POCT GLUCOSE: 187 MG/DL (ref 70–110)
POCT GLUCOSE: 209 MG/DL (ref 70–110)

## 2025-01-29 PROCEDURE — 97535 SELF CARE MNGMENT TRAINING: CPT | Mod: CO

## 2025-01-29 PROCEDURE — 97116 GAIT TRAINING THERAPY: CPT | Mod: CQ

## 2025-01-29 PROCEDURE — 25000003 PHARM REV CODE 250: Performed by: INTERNAL MEDICINE

## 2025-01-29 PROCEDURE — 25000003 PHARM REV CODE 250

## 2025-01-29 PROCEDURE — 25000003 PHARM REV CODE 250: Performed by: STUDENT IN AN ORGANIZED HEALTH CARE EDUCATION/TRAINING PROGRAM

## 2025-01-29 PROCEDURE — 21400001 HC TELEMETRY ROOM

## 2025-01-29 PROCEDURE — 25000242 PHARM REV CODE 250 ALT 637 W/ HCPCS: Performed by: INTERNAL MEDICINE

## 2025-01-29 PROCEDURE — 63600175 PHARM REV CODE 636 W HCPCS: Performed by: STUDENT IN AN ORGANIZED HEALTH CARE EDUCATION/TRAINING PROGRAM

## 2025-01-29 PROCEDURE — 63600175 PHARM REV CODE 636 W HCPCS: Performed by: INTERNAL MEDICINE

## 2025-01-29 RX ORDER — LOSARTAN POTASSIUM 25 MG/1
25 TABLET ORAL DAILY
Status: DISCONTINUED | OUTPATIENT
Start: 2025-01-30 | End: 2025-01-31 | Stop reason: HOSPADM

## 2025-01-29 RX ADMIN — METHOCARBAMOL 750 MG: 750 TABLET ORAL at 08:01

## 2025-01-29 RX ADMIN — OXYCODONE AND ACETAMINOPHEN 1 TABLET: 10; 325 TABLET ORAL at 10:01

## 2025-01-29 RX ADMIN — Medication 400 MG: at 08:01

## 2025-01-29 RX ADMIN — OXYCODONE AND ACETAMINOPHEN 1 TABLET: 10; 325 TABLET ORAL at 08:01

## 2025-01-29 RX ADMIN — METHOCARBAMOL 750 MG: 750 TABLET ORAL at 01:01

## 2025-01-29 RX ADMIN — MELATONIN 9 MG: at 10:01

## 2025-01-29 RX ADMIN — MORPHINE SULFATE 2 MG: 4 INJECTION, SOLUTION INTRAMUSCULAR; INTRAVENOUS at 06:01

## 2025-01-29 RX ADMIN — METHOCARBAMOL 750 MG: 750 TABLET ORAL at 04:01

## 2025-01-29 RX ADMIN — HEPARIN SODIUM 5000 UNITS: 5000 INJECTION, SOLUTION INTRAVENOUS; SUBCUTANEOUS at 01:01

## 2025-01-29 RX ADMIN — WHITE PETROLATUM: 1.75 OINTMENT TOPICAL at 08:01

## 2025-01-29 RX ADMIN — MORPHINE SULFATE 2 MG: 4 INJECTION, SOLUTION INTRAMUSCULAR; INTRAVENOUS at 12:01

## 2025-01-29 RX ADMIN — RANOLAZINE 500 MG: 500 TABLET, FILM COATED, EXTENDED RELEASE ORAL at 08:01

## 2025-01-29 RX ADMIN — MUPIROCIN: 20 OINTMENT TOPICAL at 08:01

## 2025-01-29 RX ADMIN — PANTOPRAZOLE SODIUM 40 MG: 40 TABLET, DELAYED RELEASE ORAL at 06:01

## 2025-01-29 RX ADMIN — TAMSULOSIN HYDROCHLORIDE 0.4 MG: 0.4 CAPSULE ORAL at 08:01

## 2025-01-29 RX ADMIN — LINACLOTIDE 145 MCG: 145 CAPSULE, GELATIN COATED ORAL at 06:01

## 2025-01-29 RX ADMIN — ONDANSETRON 8 MG: 4 TABLET, ORALLY DISINTEGRATING ORAL at 10:01

## 2025-01-29 RX ADMIN — TIOTROPIUM BROMIDE INHALATION SPRAY 2 PUFF: 3.12 SPRAY, METERED RESPIRATORY (INHALATION) at 10:01

## 2025-01-29 RX ADMIN — FLUTICASONE FUROATE AND VILANTEROL TRIFENATATE 1 PUFF: 200; 25 POWDER RESPIRATORY (INHALATION) at 10:01

## 2025-01-29 RX ADMIN — OXYCODONE AND ACETAMINOPHEN 1 TABLET: 10; 325 TABLET ORAL at 06:01

## 2025-01-29 RX ADMIN — CARVEDILOL 3.12 MG: 3.12 TABLET, FILM COATED ORAL at 08:01

## 2025-01-29 RX ADMIN — AMLODIPINE BESYLATE 5 MG: 5 TABLET ORAL at 08:01

## 2025-01-29 RX ADMIN — HEPARIN SODIUM 5000 UNITS: 5000 INJECTION, SOLUTION INTRAVENOUS; SUBCUTANEOUS at 06:01

## 2025-01-29 RX ADMIN — OXYCODONE AND ACETAMINOPHEN 1 TABLET: 10; 325 TABLET ORAL at 01:01

## 2025-01-29 RX ADMIN — OXYCODONE AND ACETAMINOPHEN 1 TABLET: 10; 325 TABLET ORAL at 02:01

## 2025-01-29 RX ADMIN — HEPARIN SODIUM 5000 UNITS: 5000 INJECTION, SOLUTION INTRAVENOUS; SUBCUTANEOUS at 09:01

## 2025-01-29 NOTE — PLAN OF CARE
Pt currently pending auth for admit at [facility]. Awaiting further update from Sofiya in admissions regarding determination.     Kym Hatch LCSW

## 2025-01-29 NOTE — PLAN OF CARE
[facility] reviewed referral, and submitted to insurance for auth. Will update once determination made

## 2025-01-29 NOTE — PT/OT/SLP PROGRESS
Occupational Therapy   Treatment    Name: Tyler Mai  MRN: [id number]  Admitting Diagnosis:  Spinal cord compression  5 Days Post-Op    Recommendations:     Recommended therapy intensity at discharge: High Intensity Therapy   Discharge Equipment Recommendations:  to be determined by next level of care  Barriers to discharge:       Assessment:     Tyler Mai is a 69 y.o. male with a medical diagnosis of Spinal cord compression.  Performance deficits affecting function are weakness, impaired endurance, impaired self care skills, impaired functional mobility, gait instability, impaired sensation, impaired balance, decreased upper extremity function, decreased coordination, pain, orthopedic precautions.     Rehab Prognosis:  Fair; patient would benefit from acute skilled OT services to address these deficits and reach maximum level of function.       Plan:     Patient to be seen 5 x/week to address the above listed problems via self-care/home management, therapeutic activities, therapeutic exercises  Plan of Care Expires: 02/25/25  Plan of Care Reviewed with: patient    Subjective     Pain/Comfort:       Objective:     Communicated with: RN prior to session.  Patient found up in chair with   upon OT entry to room.    General Precautions: Standard, fall    Orthopedic Precautions:spinal precautions  Braces: Aspen collar     Occupational Performance:     Functional Mobility/Transfers:  Patient completed Sit <> Stand Transfer with minimum assistance  with  rolling walker   Patient completed Toilet Transfer Step Transfer technique with minimum assistance with  rolling walker  Functional Mobility: no LOB    Patient Education:  Patient provided with verbal education education regarding OT role/goals/POC.  Understanding was verbalized.      Patient left up in chair with all lines intact and call button in reach.    GOALS:   Multidisciplinary Problems       Occupational Therapy Goals          Problem: Occupational  Therapy    Goal Priority Disciplines Outcome Interventions   Occupational Therapy Goal     OT, PT/OT Progressing    Description: Goals to be met by: in 1 month      Patient will increase functional independence with ADLs by performing:    UE Dressing with Supervision.  LE Dressing with Supervision.  Grooming while EOB with Supervision.  Toileting from bedside commode with Supervision for hygiene and clothing management.   Toilet transfer to bedside commode with Stand-by Assistance.                         Time Tracking:     OT Date of Treatment: 01/29/25  OT Start Time: 1051  OT Stop Time: 1101  OT Total Time (min): 10 min    Billable Minutes:Self Care/Home Management 1    OT/SUZANNE: SUZANNE     Number of SUZANNE visits since last OT visit: 1 1/29/2025

## 2025-01-29 NOTE — PROGRESS NOTES
"[facility] Hospital Medicine Progress Note        Chief Complaint: Inpatient Follow-up     HPI: 69-year-old male with a past medical history of hypertension, hyperlipidemia, diabetes, COPD (no home O2), heart failure, CAD status post CABG, Medtronic pacemaker, peripheral arterial disease status post left PCI, abdominal aortic aneurysm, tobacco dependence, prior C5, 6, 7 decompression with  who presents with worsening chronic cervical spondylosis with myelopathy.  He was seen by Neurosurgery on 12/03/2024 however he did not complete cardiac evaluation for clearance and did not go to scheduled MRI appointments.  They recommended fusion at least at C3-4 and C4-5.      He was seen at [facility] 01/05/2025: "He initially told Dr. Bella he did not want surgery. He changed his mind as his UTI was treated and cleared and a f/u urine culture from 1/11 has been negative for growth. His son does not want Dr. Bella to do the surgery and attempts were made to see if Dr. Neumann or Dr. Bowen would see the patient but both declined this. He was recommended to discharge and report to a different hospital for a reevaluation because he and his family have declined Dr. Bella's offer to do surgery. The surgery is felt to be needed and he has been seen by Dr. Beebe cardiology who rates his cardiac risk as moderate at 5-7% of a cardiac event." At the time of MD evaluation later  patient was seeking neurosurgical evaluation and intervention.  Neurosurgery was informed.  Received ceftriaxone to cover any UTI. Seen by Nephrology for JOHN, renal function improved eventually.Status post-C2-6 PCDF and C2/3- C4/5 decompression 1/24.  Requiring Gray for urinary retention.  Appears to be constipated.      Interval Hx:    Continues on Gray,Had bowel movement with  Relistor.  Case management working on discharge planning to  ortho rehab    Patient was seen and examined, would like to try voiding trial " tomorrow, no major new concerns,afebrile, hemodynamically stable, most recent lab work was reviewed.    Objective/physical exam:  General: In no acute distress, afebrile  HEENT:  Cervical collar  Chest: Clear to auscultation bilaterally  Heart:  +S1, S2, normal rate  Abdomen: Soft, nontender, BS +  MSK: Warm, no lower extremity edema, no clubbing or cyanosis  Neurologic: Alert and oriented , with right-sided weakness>left side    VITAL SIGNS: 24 HRS MIN & MAX LAST   Temp  Min: 97.8 °F (36.6 °C)  Max: 98.3 °F (36.8 °C) 98.3 °F (36.8 °C)   BP  Min: 119/69  Max: 194/67 (!) 160/70   Pulse  Min: 73  Max: 82  76   Resp  Min: 16  Max: 19 16   SpO2  Min: 95 %  Max: 97 % 96 %     I have reviewed the following labs:  Recent Labs   Lab 01/24/25  0403 01/25/25  0545 01/26/25  0534 01/27/25  0423 01/28/25  0458   WBC 9.09 10.01  --  8.75  --    RBC 3.45* 3.47*  --  3.16*  --    HGB 10.8* 10.6* 10.1* 9.8* 9.2*   HCT 31.3* 31.9* 29.5* 29.3* 27.3*   MCV 90.7 91.9  --  92.7  --    MCH 31.3* 30.5  --  31.0  --    MCHC 34.5 33.2  --  33.4  --    RDW 11.7 11.7  --  11.6  --     396  --  329  --    MPV 10.5* 10.1  --  10.4  --      Recent Labs   Lab 01/23/25  0536 01/25/25  0546 01/27/25  0423   * 133* 137   K 4.2 4.3 4.4    100 100   CO2 28 27 27   BUN 44.7* 29.7* 21.6   CREATININE 1.46* 1.31* 1.16   CALCIUM 9.8 9.8 9.4   MG 1.70  --   --    ALBUMIN 3.3* 3.6  --    ALKPHOS 116 104  --    ALT 14 14  --    AST 15 17  --    BILITOT 0.4 0.4  --      Microbiology Results (last 7 days)       ** No results found for the last 168 hours. 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  Continues on collar.  Would need outpatient follow up with repeat imaging.  Nephrology followed.  Renal function has improved.  Now with urinary retention, required in and out multiple times.Now with herrera.Started flomax.  Severely constipated, received Relistor.  Attempt  voiding trial, patient would like to attempt tomorrow .   Wound Care following for left heel wound.  Monitor blood pressure.  Intermittent accelerations likely secondary to pain .  Continue supportive care, appropriate home medications.  On sliding scale.    Would need outpatient follow up for routine cardiac care.  PTOT-recommending moderate intensity therapy .    Case management on board, working on discharge planning    VTE prophylaxis:  Lovenox> SCD> heparin subQ      Anticipated discharge and Disposition:   TBD, pending  improvement in urinary retention , we will need SNF/Rehab      All diagnosis and differential diagnosis have been reviewed; assessment and plan has been documented; I have personally reviewed the labs and test results that are presently available; I have reviewed the patients medication list; I have reviewed the consulting providers response and recommendations. I have reviewed or attempted to review medical records based upon their availability    All of the patient's questions have been  addressed and answered. Patient's is agreeable to the above stated plan. I will continue to monitor closely and make adjustments to medical management as needed.    Portions of this note dictated using EMR integrated voice recognition software, and may be subject to voice recognition errors not corrected at proofreading. Please contact writer for clarification if needed.    _____________________________________________________________________    Malnutrition Status:  Nutrition consulted. Most recent weight and BMI monitored-     Measurements:  Wt Readings from Last 1 Encounters:   01/18/25 78 kg (172 lb)   Body mass index is 27.76 kg/m².    Patient has been screened and assessed by RD.    Malnutrition Type:  Context:    Level:      Malnutrition Characteristic Summary:       Interventions/Recommendations (treatment strategy):        Scheduled Med:   amLODIPine  5 mg Oral BID    carvediloL  3.125 mg Oral BID    fluticasone furoate-vilanteroL  1 puff Inhalation Daily    heparin (porcine)  5,000 Units Subcutaneous Q8H    linaCLOtide  145 mcg Oral QAM    magnesium oxide  400 mg Oral BID    methocarbamoL  750 mg Oral QID    mupirocin   Nasal BID    pantoprazole  40 mg Oral Before breakfast    polyethylene glycol  17 g Oral BID    ranolazine  500 mg Oral BID    senna-docusate 8.6-50 mg  1 tablet Oral BID    tamsulosin  0.4 mg Oral Daily    tiotropium bromide  2 puff Inhalation Daily    white petrolatum   Topical (Top) Daily      Continuous Infusions:   0.9% NaCl   Intravenous Continuous   Stopped at 01/25/25 0424      PRN Meds:    Current Facility-Administered Medications:     acetaminophen, 650 mg, Oral, Q4H PRN    albuterol-ipratropium, 3 mL, Nebulization, Q6H PRN    aluminum-magnesium hydroxide-simethicone, 30 mL, Oral, QID PRN    bisacodyL, 10 mg, Rectal, Daily PRN    cefTRIAXone (Rocephin) IV (PEDS and ADULTS), 2 g, Intravenous, On Call Procedure    cyclobenzaprine, 10 mg, Oral, TID PRN    dextrose 50%, 12.5 g, Intravenous, PRN    dextrose 50%, 12.5 g, Intravenous, PRN    dextrose 50%, 25 g, Intravenous, PRN    dextrose 50%, 25 g, Intravenous, PRN    glucagon (human recombinant), 1 mg, Intramuscular, PRN    glucagon (human recombinant), 1 mg, Intramuscular, PRN    glucose, 16 g, Oral, PRN    glucose, 16 g, Oral, PRN    glucose, 24 g, Oral, PRN    glucose, 24 g, Oral, PRN     hydrALAZINE, 5 mg, Intravenous, Q6H PRN    insulin aspart U-100, 0-5 Units, Subcutaneous, QID (AC + HS) PRN    labetalol, 10 mg, Intravenous, Q4H PRN    melatonin, 9 mg, Oral, Nightly PRN    morphine, 2 mg, Intravenous, Q6H PRN    naloxone, 0.02 mg, Intravenous, PRN    ondansetron, 8 mg, Oral, Q8H PRN    ondansetron, 4 mg, Intravenous, Q8H PRN    oxyCODONE-acetaminophen, 1 tablet, Oral, Q4H PRN    oxyCODONE-acetaminophen, 1 tablet, Oral, Q4H PRN    polyethylene glycol, 17 g, Oral, TID PRN    simethicone, 1 tablet, Oral, QID PRN    sodium chloride 0.9%, 10 mL, Intravenous, PRN     Radiology:  I have personally reviewed the following imaging and agree with the radiologist.     X-Ray Cervical Spine 2 or 3 Views  Narrative: EXAMINATION:  XR CERVICAL SPINE 2 OR 3 VIEWS    CLINICAL HISTORY:  s/p C2-6 PCDF;    COMPARISON:  CT cervical spine dated 01/14/2025.    FINDINGS:  There are postoperative changes with posterior spinal fusion hardware at C2 through C6.  The hardware appears intact.  Cervical alignment has improved.  The remaining vertebral body heights are preserved.  There is moderate disc height loss at C3-C4, mild at C4-C5, with multilevel marginal osteophytes.  The soft tissues are unremarkable.  Impression: Postoperative changes without acute abnormality.    Electronically signed by: Katelyn Blanco  Date:    01/25/2025  Time:    10:39      Donna Garcia MD  Department of Hospital Medicine   [facility]   01/29/2025

## 2025-01-29 NOTE — PT/OT/SLP PROGRESS
Physical Therapy Treatment    Patient Name:  Tyler Mai   MRN:  [id number]    Recommendations:     Discharge therapy intensity: High Intensity Therapy   Discharge Equipment Recommendations:  (TBD by next level of care)  Barriers to discharge: Decreased caregiver support, Impaired mobility, and Ongoing medical needs    Assessment:     Tyler Mai is a 69 y.o. male admitted with a medical diagnosis of Spinal cord compression.  He presents with the following impairments/functional limitations: weakness, impaired endurance, impaired self care skills, impaired functional mobility, gait instability, impaired balance, decreased safety awareness, pain. S/p C2-6 PCDF 1/24.      Rehab Prognosis: Good; patient would benefit from acute skilled PT services to address these deficits and reach maximum level of function.    Recent Surgery: Procedure(s) (LRB):  FUSION, SPINE, POSTERIOR SPINAL COLUMN, CERVICAL, USING COMPUTER-ASSISTED NAVIGATION (N/A) 5 Days Post-Op    Plan:     During this hospitalization, patient would benefit from acute PT services 5 x/week to address the identified rehab impairments via gait training, therapeutic activities, therapeutic exercises, neuromuscular re-education and progress toward the following goals:    Plan of Care Expires:  02/15/25    Subjective     Chief Complaint: increased pain due to off of the morphine now.    Objective:     Communicated with nurse prior to session.  Patient found supine with telemetry, pulse ox (continuous), Condom Catheter, peripheral IV, SCD, pressure relief boots upon PT entry to room.     General Precautions: Standard, fall  Orthopedic Precautions: spinal precautions  Braces: Aspen collar  Respiratory Status: Room air  Blood Pressure:   Skin Integrity: Visible skin intact      Functional Mobility:  Min assist to get EOB and same for STS with posterior LOB  Gait 80 ft and 75 ft RW min assist. Cues to correct narrow CICI gait pattern.     Education Provided:  Role  and goals of PT, transfer training, bed mobility, gait training, balance training, safety awareness, assistive device, strengthening exercises, and importance of participating in PT to return to PLOF.     Patient left up in chair with call button in reach    GOALS:   Multidisciplinary Problems       Physical Therapy Goals          Problem: Physical Therapy    Goal Priority Disciplines Outcome Interventions   Physical Therapy Goal     PT, PT/OT Progressing    Description: Goals to be met by: 2/15/25     Patient will increase functional independence with mobility by performin. Supine to sit with Set-up Lowber  2. Sit to supine with Set-up Lowber  3. Sit to stand transfer with Supervision  4. Bed to chair transfer with Supervision using Rolling Walker  5. Gait  x 300 feet with Supervision using Rolling Walker.   6. Ascend/descend 15 stairs with bilateral Handrails & Supervision                          Time Tracking:     Billable Minutes: Gait Training 25    Treatment Type: Treatment  PT/PTA: PTA     Number of PTA visits since last PT visit: 3     2025

## 2025-01-29 NOTE — PLAN OF CARE
Problem: Adult Inpatient Plan of Care  Goal: Optimal Comfort and Wellbeing  Outcome: Progressing  Intervention: Provide Person-Centered Care  Flowsheets (Taken 1/29/2025 0340)  Trust Relationship/Rapport:   care explained   choices provided   emotional support provided   empathic listening provided   questions answered   questions encouraged   reassurance provided   thoughts/feelings acknowledged     Problem: Infection  Goal: Absence of Infection Signs and Symptoms  Outcome: Progressing  Intervention: Prevent or Manage Infection  Flowsheets (Taken 1/29/2025 0340)  Fever Reduction/Comfort Measures:   lightweight bedding   lightweight clothing     Problem: Wound  Goal: Optimal Pain Control and Function  Outcome: Progressing  Intervention: Prevent or Manage Pain  Flowsheets (Taken 1/29/2025 0340)  Sleep/Rest Enhancement:   awakenings minimized   regular sleep/rest pattern promoted  Pain Management Interventions:   around-the-clock dosing utilized   care clustered   medication offered   position adjusted   pillow support provided   pain management plan reviewed with patient/caregiver     Problem: Pain Acute  Goal: Optimal Pain Control and Function  Outcome: Progressing  Intervention: Optimize Psychosocial Wellbeing  Flowsheets (Taken 1/29/2025 0340)  Supportive Measures:   active listening utilized   goal-setting facilitated   positive reinforcement provided  Diversional Activities: television

## 2025-01-30 LAB
POCT GLUCOSE: 129 MG/DL (ref 70–110)
POCT GLUCOSE: 139 MG/DL (ref 70–110)
POCT GLUCOSE: 157 MG/DL (ref 70–110)

## 2025-01-30 PROCEDURE — 63600175 PHARM REV CODE 636 W HCPCS: Performed by: STUDENT IN AN ORGANIZED HEALTH CARE EDUCATION/TRAINING PROGRAM

## 2025-01-30 PROCEDURE — 25000242 PHARM REV CODE 250 ALT 637 W/ HCPCS: Performed by: INTERNAL MEDICINE

## 2025-01-30 PROCEDURE — 63600175 PHARM REV CODE 636 W HCPCS: Performed by: INTERNAL MEDICINE

## 2025-01-30 PROCEDURE — 51701 INSERT BLADDER CATHETER: CPT

## 2025-01-30 PROCEDURE — 25000003 PHARM REV CODE 250: Performed by: INTERNAL MEDICINE

## 2025-01-30 PROCEDURE — 97116 GAIT TRAINING THERAPY: CPT | Mod: CQ

## 2025-01-30 PROCEDURE — 25000003 PHARM REV CODE 250

## 2025-01-30 PROCEDURE — 25000003 PHARM REV CODE 250: Performed by: STUDENT IN AN ORGANIZED HEALTH CARE EDUCATION/TRAINING PROGRAM

## 2025-01-30 PROCEDURE — 21400001 HC TELEMETRY ROOM

## 2025-01-30 RX ORDER — ENOXAPARIN SODIUM 100 MG/ML
40 INJECTION SUBCUTANEOUS EVERY 24 HOURS
Status: DISCONTINUED | OUTPATIENT
Start: 2025-01-30 | End: 2025-01-31 | Stop reason: HOSPADM

## 2025-01-30 RX ORDER — MORPHINE SULFATE 15 MG/1
15 TABLET, FILM COATED, EXTENDED RELEASE ORAL EVERY 12 HOURS
Status: DISCONTINUED | OUTPATIENT
Start: 2025-01-30 | End: 2025-01-30

## 2025-01-30 RX ORDER — CARVEDILOL 3.12 MG/1
6.25 TABLET ORAL 2 TIMES DAILY
Status: DISCONTINUED | OUTPATIENT
Start: 2025-01-30 | End: 2025-01-31 | Stop reason: HOSPADM

## 2025-01-30 RX ADMIN — OXYCODONE AND ACETAMINOPHEN 1 TABLET: 10; 325 TABLET ORAL at 05:01

## 2025-01-30 RX ADMIN — LOSARTAN POTASSIUM 25 MG: 25 TABLET, FILM COATED ORAL at 09:01

## 2025-01-30 RX ADMIN — RANOLAZINE 500 MG: 500 TABLET, FILM COATED, EXTENDED RELEASE ORAL at 08:01

## 2025-01-30 RX ADMIN — HEPARIN SODIUM 5000 UNITS: 5000 INJECTION, SOLUTION INTRAVENOUS; SUBCUTANEOUS at 05:01

## 2025-01-30 RX ADMIN — Medication 400 MG: at 09:01

## 2025-01-30 RX ADMIN — LINACLOTIDE 145 MCG: 145 CAPSULE, GELATIN COATED ORAL at 05:01

## 2025-01-30 RX ADMIN — TIOTROPIUM BROMIDE INHALATION SPRAY 2 PUFF: 3.12 SPRAY, METERED RESPIRATORY (INHALATION) at 09:01

## 2025-01-30 RX ADMIN — OXYCODONE AND ACETAMINOPHEN 1 TABLET: 10; 325 TABLET ORAL at 02:01

## 2025-01-30 RX ADMIN — AMLODIPINE BESYLATE 5 MG: 5 TABLET ORAL at 08:01

## 2025-01-30 RX ADMIN — ENOXAPARIN SODIUM 40 MG: 40 INJECTION SUBCUTANEOUS at 06:01

## 2025-01-30 RX ADMIN — Medication 400 MG: at 08:01

## 2025-01-30 RX ADMIN — OXYCODONE AND ACETAMINOPHEN 1 TABLET: 10; 325 TABLET ORAL at 10:01

## 2025-01-30 RX ADMIN — MORPHINE SULFATE 2 MG: 4 INJECTION, SOLUTION INTRAMUSCULAR; INTRAVENOUS at 12:01

## 2025-01-30 RX ADMIN — MORPHINE SULFATE 2 MG: 4 INJECTION, SOLUTION INTRAMUSCULAR; INTRAVENOUS at 08:01

## 2025-01-30 RX ADMIN — CARVEDILOL 6.25 MG: 3.12 TABLET, FILM COATED ORAL at 09:01

## 2025-01-30 RX ADMIN — TAMSULOSIN HYDROCHLORIDE 0.4 MG: 0.4 CAPSULE ORAL at 09:01

## 2025-01-30 RX ADMIN — RANOLAZINE 500 MG: 500 TABLET, FILM COATED, EXTENDED RELEASE ORAL at 09:01

## 2025-01-30 RX ADMIN — PREGABALIN 75 MG: 50 CAPSULE ORAL at 08:01

## 2025-01-30 RX ADMIN — OXYCODONE AND ACETAMINOPHEN 1 TABLET: 10; 325 TABLET ORAL at 06:01

## 2025-01-30 RX ADMIN — CARVEDILOL 6.25 MG: 3.12 TABLET, FILM COATED ORAL at 08:01

## 2025-01-30 RX ADMIN — WHITE PETROLATUM: 1.75 OINTMENT TOPICAL at 09:01

## 2025-01-30 RX ADMIN — METHOCARBAMOL 750 MG: 750 TABLET ORAL at 08:01

## 2025-01-30 RX ADMIN — METHOCARBAMOL 750 MG: 750 TABLET ORAL at 09:01

## 2025-01-30 RX ADMIN — MELATONIN 9 MG: at 08:01

## 2025-01-30 RX ADMIN — METHOCARBAMOL 750 MG: 750 TABLET ORAL at 02:01

## 2025-01-30 RX ADMIN — PANTOPRAZOLE SODIUM 40 MG: 40 TABLET, DELAYED RELEASE ORAL at 05:01

## 2025-01-30 RX ADMIN — AMLODIPINE BESYLATE 5 MG: 5 TABLET ORAL at 09:01

## 2025-01-30 RX ADMIN — FLUTICASONE FUROATE AND VILANTEROL TRIFENATATE 1 PUFF: 200; 25 POWDER RESPIRATORY (INHALATION) at 09:01

## 2025-01-30 NOTE — PLAN OF CARE
F/u with Sofiya at [facility]. Pt still pending auth for rehab placement at this time.    Kym Hatch LCSW

## 2025-01-30 NOTE — PROGRESS NOTES
"[facility] H[facility]PITAL MEDICINE ~ PROGRESS NOTE        CHIEF COMPLAINT   Hospital follow up    HOSPITAL COURSE   69-year-old male with a past medical history of hypertension, hyperlipidemia, diabetes, COPD (no home O2), heart failure, CAD status post CABG, Medtronic pacemaker, peripheral arterial disease status post left PCI, abdominal aortic aneurysm, tobacco dependence, prior C5, 6, 7 decompression with  who presents with worsening chronic cervical spondylosis with myelopathy.  He was seen by Neurosurgery on 12/03/2024 however he did not complete cardiac evaluation for clearance and did not go to scheduled MRI appointments.  They recommended fusion at least at C3-4 and C4-5.      He was seen at [facility] 01/05/2025: "He initially told Dr. Bella he did not want surgery. He changed his mind as his UTI was treated and cleared and a f/u urine culture from 1/11 has been negative for growth. His son does not want Dr. Bella to do the surgery and attempts were made to see if Dr. Neumann or Dr. Bowen would see the patient but both declined this. He was recommended to discharge and report to a different hospital for a reevaluation because he and his family have declined Dr. Bella's offer to do surgery. The surgery is felt to be needed and he has been seen by Dr. Beebe cardiology who rates his cardiac risk as moderate at 5-7% of a cardiac event." At the time of MD evaluation later  patient was seeking neurosurgical evaluation and intervention.  Neurosurgery was informed.  Received ceftriaxone to cover any UTI. Seen by Nephrology for JOHN, renal function improved eventually.Status post-C2-6 PCDF and C2/3- C4/5 decompression 1/24.  Requiring Gray for urinary retention.  Appears to be constipated.    Today  Seen and examined this morning.  Reports about the same pre and post surgery but he thinks he maybe slowly improving.  Discuss it will take some time and rehab to see how he will do " "ultimately.        OBJECTIVE/PHYSICAL EXAM     VITAL SIGNS (MOST RECENT):  Temp: 97.9 °F (36.6 °C) (01/30/25 0802)  Pulse: 83 (01/30/25 0941)  Resp: 18 (01/30/25 1030)  BP: (!) 155/74 (01/30/25 0802)  SpO2: 97 % (01/30/25 0802) VITAL SIGNS (24 HOUR RANGE):  Temp:  [97.9 °F (36.6 °C)-98.4 °F (36.9 °C)] 97.9 °F (36.6 °C)  Pulse:  [71-83] 83  Resp:  [16-19] 18  SpO2:  [94 %-97 %] 97 %  BP: (119-172)/(69-76) 155/74   GENERAL: In no acute distress, afebrile  HEENT:  CHEST: Clear to auscultation bilaterally  HEART: S1, S2, no appreciable murmur  ABDOMEN: Soft, nontender, BS +  MSK: Warm, no lower extremity edema, no clubbing or cyanosis  NEUROLOGIC: Alert and oriented x4, moving all extremities with good strength   INTEGUMENTARY:  PSYCHIATRY:        ASSESSMENT/PLAN   Severe cervical spondylosis  Severe C3-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  Discontinue staples on day 14.  Outpatient follow-up with Izzy NP.  Keep Gray for now, tamsulosin.  Plan for a voiding trial in the next couple of days.  Discontinue IV fluids  Reports pain is still uncontrolled.  Takes Percocet 10 at home every 4 hours prescribed by his pain management specialist.  I see that he is only prescribed 90 tablets per month.  Pending insurance authorization for placement to rehab    DVT prophylaxis:  Lovenox 40    Anticipated discharge and disposition:   __________________________________________________________________________    NUTRITIONAL STATUS     Patient meets ASPEN criteria for   malnutrition of   per RD assessment as evidenced by:                       A minimum of two characteristics is recommended for diagnosis of either severe or non-severe malnutrition.     LABS/MICRO/MEDS/DIAGNOSTICS       LABS  No results for input(s): "NA", "K", "CHLORIDE", "CO2", "BUN", " ""CREATININE", "GLUCOSE", "CALCIUM", "ALKPHOS", "AST", "ALT", "ALBUMIN" in the last 72 hours.  Recent Labs     01/28/25  0458   HCT 27.3*       MICROBIOLOGY  Microbiology Results (last 7 days)       ** No results found for the last 168 hours. **               MEDICATIONS   amLODIPine  5 mg Oral BID    carvediloL  6.25 mg Oral BID    enoxparin  40 mg Subcutaneous Q24H (prophylaxis, 1700)    fluticasone furoate-vilanteroL  1 puff Inhalation Daily    linaCLOtide  145 mcg Oral QAM    losartan  25 mg Oral Daily    magnesium oxide  400 mg Oral BID    methocarbamoL  750 mg Oral QID    pantoprazole  40 mg Oral Before breakfast    ranolazine  500 mg Oral BID    tamsulosin  0.4 mg Oral Daily    tiotropium bromide  2 puff Inhalation Daily    white petrolatum   Topical (Top) Daily         INFUSIONS   0.9% NaCl   Intravenous Continuous   Stopped at 01/25/25 0424          DIAGNOSTIC TESTS  X-Ray Cervical Spine 2 or 3 Views   Final Result      Postoperative changes without acute abnormality.         Electronically signed by: Katelyn Blanco   Date:    01/25/2025   Time:    10:39      SURG FL Surgery Fluoro Usage   Final Result      X-Ray Chest 1 View   Final Result      Increase interstitial markings and linear densities at the left base might be related to subsegmental atelectatic changes and or fibrotic streaks.      Otherwise no active pulmonary disease         Electronically signed by: Sampson Beltran   Date:    01/20/2025   Time:    08:16      US Retroperitoneal Complete   Final Result      1. No hydronephrosis.   2. Debris in the bladder, recommend correlation with urinalysis.         Electronically signed by: Mikel Izaguirre   Date:    01/19/2025   Time:    10:06      CT Cervical Spine Without Contrast   Final Result      Severe degenerative changes appears similar to prior with grossly unchanged canal narrowing.  If further evaluation is needed recommend MR.         Electronically signed by: Ancelmo Gurrola "   Date:    01/14/2025   Time:    20:05           No echocardiogram results found for the past 14 days.         Case related differential diagnoses have been reviewed; assessment and plan has been documented. I have personally reviewed the labs and test results that are currently available; I have reviewed the patients medication list. I have reviewed the consulting providers recommendations. I have reviewed or attempted to review medical records based upon their availability.  All of the patient's and/or family's questions have been addressed and answered to the best of my ability.  I will continue to monitor closely and make adjustments to medical management as needed.  This document was created using [employer] Fluency Direct.  Transcription errors may have been made.  Please contact me if any questions may rise regarding documentation to clarify transcription.        Jorge Deluna MD   Internal Medicine  Department of Hospital Medicine  [facility]

## 2025-01-30 NOTE — CONSULTS
[facility] - Rehab Inpatient Services  Inpatient Rehab Prescreen    PATIENT INFORMATION     Assessment date/time: 1/31/25 0714    Name: John FERNANDO Pau Phone: [phone]   Address:   [address] [place] [address]   [place] [address] SSN:    YOB: 1955 Age: 69 y.o. Gender: male   Race: White   Marital Status: Single   Advance Directives: Full code     COVERAGE INFORMATION     Patient Medicare #:      Primary Insurance Type: AETNA MANAGED MEDICARE/AETNA MEDICARE DUAL DSNP  /  Secondary Insurance Type: MEDICAID/[place]ICA[place] OF [place]  /    Policy #:  [id number]  Policy #:  [id number]    Insurance contact name/number: Availity portal: approved rehab starting on 1/30/25- 2/12/25 Insurance contact name/number:    Authorization #: [id number] Authorization #:    Verified Coverage: Insurance Carrier   Pending Coverage:    Prescription Coverage:  Insurance details/comments:      PHYSICIAN/REFERRAL INFORMATION     Primary Care Physician: Vesta, Primary Doctor Attending Physician: John De Anda MD   Consulting physician/specialist:  Referring Physician:    Referring facility:  Referring contact name/phone:    Physician details/comments:      CONTACT INFORMATION   Extended Emergency Contact Information  Primary Emergency Contact: JOHN ARTIS JR  Mobile Phone: [phone]  Relation: Son    PRIOR LIVING SITUATION    Patient lives alone in a 2nd story apartment with about 15 steps to enter with bilateral railings     Bedroom location/setup: 2nd story apartment    Bathroom location/setup: tub/shower combo with shower chair and hand held shower head without railing, and normal height commode without railing   Equipment at home: rollator walker and rolling walker    Prior device use:  rollator walker      PRIOR LEVEL OF FUNCTION     Did a helper need to assist with the following activities prior to the current illness, exacerbation, or injury?   Self-care:  No, independent prior to 3 months ago     Indoor mobility:  No, independent prior to 3 months ago    Stairs: No, independent prior to 3 months ago    Functional Cognition: No, independent    Comments: Patient reported independent for mobility and all ADLs prior to 3 months ago    Caregivers providing assistance: Tyler Mai- rosemary- [phone]   Pre-hospital home care service: none  Name of Agency:    Home/personal responsibilities: personal ADLs, driving, simple meals, cleans, does laundry, grocery shops, manages finances, and manages medications   Pre-hospital vocational category: Retired for disability   Pre-hospital vocational effort: Retired for disability   Occupation/profession:  Return to work/school plan:    Educational history:    Hobbies/leisure activities:  Resources used prior to admission:    Available resources:  Resource information comments:      REHABILITATION DIAGNOSIS     History of present illness: Patient is a 69 year old male with a history of cervical myelopathy, CAD, CHF, COPD, DM, HLD, HTN, and PVD that presents to the ED at [facility] on 1/14/25 for neurosurgery. Patient presented here following discharge from [facility]. He reports associated symptoms of bilateral arm weakness, bilateral leg weakness, and worsening neck pain. Notes right leg is weaker than left. He also complains of bilateral hand, bilateral leg, and bilateral feet numbness. He stateed he has been unable to care for himself at home because of the weakness. He had to use a wheelchair to get around. Patient's daughter at bedside reports frequent falls at home. No urinary or fecal incontinence. Per chart review, the patient was admitted to [facility] for cervical myelopathy with the plan of surgery. Previous extensive admission at [facility] for UTI which cleared and resolved with antibiotics; initially, did not have surgery secondary to UTI. Now amendable for surgical intervention since UTI has resolved. Patient refused surgery with on-call neurosurgeon at [facility] and  discharged at noon today. He was advised to come here for the surgery. MRI on 01/05 showed severe C3-4 level central canal stenosis with increased signal hyperintensity within the cervical cord from mid to lower aspect of C3 through mid aspect of C4 with central cord myelomalacia. No syrinx is detected. Prior C5-C7 intervertebral body fusion. Neurosurgery was consulted with recommendation for surgical intervention needed. Cardiology consulted with low risk for MACE for high risk neurosurgical procedure. On 1/24, patient underwent C2-6 PCDF and C2-3- C4-5 decompression by Dr. Ramírez. 1 Hemovac drain placed to site. Hard cervical collar in place. On 1/25, wound care was consulted for right heel and left heel wound with recommendation to cleanse bilateral heels with normal saline, paint with betadine, apply small foam, and change every 2 days. Recommended bilateral heel boots. Gray was removed. Labs showed low Na of 133, elevated BUN/Cr of 29.7/ 1.31, and low H&H of 10.6 & 31.9. BP ranging 125/58- 182/80. PT/OT efvals completed with deficits noted with recommendation for high intensity therapy needed. ON 1/26, Labs showed low H&H of 10.1 & 29.5. Patient having urinary retention requiring in and out caths. On 1/27, hemovac removed. Neurosurgery recommended to DC staples on POD 14, will need follow up in 2 weeks after DC from hospital with XR uprights of cervical spine with Izzy, NP, continued Percocet and Robaxin for pain management, and signed off of case. Gray had to be reinserted for urinary retention, and started Flomax. BP ranging 123/61- 169/70. On 1/28, patient started on Relistar for constipation with 2 large Bms noted. Labs showed low H&H of 9.2 & 27.3. On 1/30, wound care reassessed with 2 wounds noted to bilateral anterior legs. Cleansed with soap and water, and applied aquaphor to sites. Prior to hospitalization, patient was living alone in a 2nd story apartment with 15 steps to enter with bilateral railing,  has a tub/shower combo with shower chair without railing, and a normal height commode without railing. Patient reported he was independent for mobility and all ADLs with use of rollator walker 3 months ago, still driving, cooking simple meals, cleaning, doing laundry, grocery shopping, managing medications, and managing finances. Currently, patient is AAOx4; setup assist for eating, minimal assist for bed mobility, contact guard assist for transfers with RW, walked 28ft with RW at minimal assist, minimal assist for toilet transfer, total assist for toileting due to herrera in place, stand by assist for grooming, max assist for upper body dressing, and max assist for lower body dressing. Pt. Requires acute inpatient rehab admission with 24-hour nursing and active physician oversight to monitor and manage acute medical comorbid conditions, labs, pain, and functional deficits.  Patient/family will also require teaching and integration of improving functional skills into daily living.  He will also require an individualized, interdisciplinary approach to his care, receiving PT, OT services 3 hours per day, 5 days per week.    Required care cannot be provided at a lower level of care. Patient is anticipated to require approximately 10-12 days LOS with expected discharge home alone with HH   Impairment group (IGC):   Other Non-Traumatic Spinal Cord Dysfunction 04.130 Etiologic diagnosis/description: cervical spondylosis and stenosis with myelopathy and cord compression s/p C2-6 PCDF and C2-3- C4-5 decompression   Date of onset:  1/5/25 Date of surgery: 1/24/25   Allergies: Hydrochlorothiazide   Comorbid condition: Anemia, Anxiety, Congestive heart failure, COPD, Coronary artery disease, Depression, Diabetes, and Hypertension, urinary retention, CKD, hyperlipidemia, PPM, peripheral arterial disease, abdominal aortic aneurysm, and tobacco use   Medical/functional conditions requiring inpatient rehabilitation: This patient  requires medical management/24-hour nursing of complex   comorbidities (cervical spondylosis and stenosis with myelopathy and cord compression s/p C2-6 PCDF and C2-3- C4-5 decompression, Anemia, Anxiety, Congestive heart failure, COPD, Coronary artery disease, Depression, Diabetes, and Hypertension, urinary retention, CKD, hyperlipidemia, PPM, peripheral arterial disease, abdominal aortic aneurysm, and tobacco use ), labs, medications (see medications list), pain, sleep   hygiene, anticoagulation, nutrition, hydration, neurological,   pulmonary, cardiac status, and preventive healthcare.      This patient requires intense therapy and an integrated,   interdisciplinary approach to address safety, impaired mobility,   impaired ADL's (dressing, toileting, grooming, showering), surgical wound care, pulmonary insufficiency, bowel/bladder problems,   preventive healthcare, medication management, integration of   improving functional skills into daily living, and home caregiver   support and training.     Risk for medical/clinical complications: This patient is at risk for the following complications: DVT/PE, pneumonia,   malnutrition, neurological decline, respiratory insufficiency,   worsening activity intolerance, complications from anticoagulation,   Infection, falls, skin breakdown, inadequate   sleep, and constipation.       SPECIAL REHABILITATION NEEDS     IV: 20G right forearm, 18 G left wrist Preferred Language: english         Peripheral IV - Single Lumen 01/14/25 1853 20 G Anterior;Right Forearm (Active)   Site Assessment Clean;Dry;Intact;No redness;No swelling;No warmth 01/30/25 1000   Line Securement Device Secured with sutureless device 01/30/25 0800   Extremity Assessment Distal to IV No abnormal discoloration;No redness;No swelling;No warmth 01/30/25 1000   Line Status Flushed;Capped;Saline locked 01/30/25 1000   Dressing Status Clean;Dry;Intact 01/30/25 1000   Dressing Intervention Integrity maintained  01/30/25 1000            Peripheral IV - Single Lumen 01/24/25 1400 18 G Anterior;Left Wrist (Active)   Site Assessment Clean;Dry;Intact;No redness;No swelling;No warmth;No drainage 01/30/25 1000   Line Securement Device Secured with sutureless device 01/29/25 2000   Extremity Assessment Distal to IV No abnormal discoloration;No redness;No swelling;No warmth 01/30/25 1000   Line Status Flushed;Capped;Saline locked 01/30/25 1000   Dressing Status Clean;Dry;Intact 01/30/25 1000   Dressing Intervention Integrity maintained 01/30/25 1000          PRECAUTIONS     Safety/fall precautions: High fall risk and Spine precautions: cervical spine     PAST MEDICAL, SOCIAL, FAMILY HISTORY     Pertinent past medical history:   Past Medical History:   Diagnosis Date    Anxiety disorder, unspecified     Arthritis     CAD (coronary artery disease)     Cervical radiculopathy     CHF (congestive heart failure)     Chronic pain syndrome     COPD (chronic obstructive pulmonary disease)     Diabetes mellitus     Gastro-esophageal reflux disease without esophagitis     High cholesterol     Hypertension     Lumbar radiculopathy     Myelomalacia of cervical cord     Other specified diseases of spinal cord     Pacemaker     PVD (peripheral vascular disease)     S/P triple vessel bypass     Tobacco use       Has the patient had two or more falls in the past year or any fall with injury in the past year: Yes    Has the patient had major surgery during the 100 days prior to admission: Yes   Family Medical History:   Family History   Problem Relation Name Age of Onset    Cancer Mother      Cancer Father      Lumbar disc disease Brother        Substance use history:   Social History     Substance and Sexual Activity   Alcohol Use None     Social History     Tobacco Use   Smoking Status Every Day    Current packs/day: 1.00    Types: Cigarettes   Smokeless Tobacco Not on file     Social History     Substance and Sexual Activity   Drug Use Not on  file      VITALS   BP:  96/60     Temp: 97.6 °F (36.4 °C)     HR: 64     Resp: 18     SpO2: 98 %     Height: No data recorded   Weight: 78 kg (172 lb)     BMI: 27.8          MED/LABS/DIAGNOSITICS   No current facility-administered medications for this encounter.     No current outpatient medications on file.     Facility-Administered Medications Ordered in Other Encounters   Medication Dose Route Frequency Provider Last Rate Last Admin    acetaminophen tablet 650 mg  650 mg Oral Q4H PRN Reyes, Thairy G, DO        albuterol-ipratropium 2.5 mg-0.5 mg/3 mL nebulizer solution 3 mL  3 mL Nebulization Q6H PRN Reyes, Thairy G, DO        aluminum-magnesium hydroxide-simethicone 200-200-20 mg/5 mL suspension 30 mL  30 mL Oral QID PRN Reyes, Thairy G, DO        amLODIPine tablet 5 mg  5 mg Oral BID Donna Garcia MD   5 mg at 01/30/25 0938    bisacodyL suppository 10 mg  10 mg Rectal Daily PRN Reyes, Thairy G, DO        carvediloL tablet 6.25 mg  6.25 mg Oral BID Jorge Deluna MD   6.25 mg at 01/30/25 0937    cefTRIAXone injection 2 g  2 g Intravenous On Call Procedure Izzy Lopez NP   1 g at 01/24/25 1330    cyclobenzaprine tablet 10 mg  10 mg Oral TID PRN Reyes, Thairy G, DO   10 mg at 01/25/25 1118    dextrose 50% injection 12.5 g  12.5 g Intravenous PRN Reyes, Thairy G, DO        dextrose 50% injection 12.5 g  12.5 g Intravenous PRN Donna Garcia MD        dextrose 50% injection 25 g  25 g Intravenous PRN Reyes, Thairy G, DO        dextrose 50% injection 25 g  25 g Intravenous PRN Donna Garcia MD        enoxaparin injection 40 mg  40 mg Subcutaneous Q24H (prophylaxis, 1700) Jorge Deluna MD        fluticasone furoate-vilanteroL 200-25 mcg/dose diskus inhaler 1 puff  1 puff Inhalation Daily Jorge Deluna MD   1 puff at 01/30/25 0941    glucagon (human recombinant) injection 1 mg  1 mg Intramuscular PRN Reyes, Thairy G, DO        glucagon (human recombinant) injection 1 mg  1 mg Intramuscular PRN Donna Garcia MD         glucose chewable tablet 16 g  16 g Oral PRN Reyes, Thairy G, DO        glucose chewable tablet 16 g  16 g Oral PRN Donna Garcia MD        glucose chewable tablet 24 g  24 g Oral PRN Reyes, Thairy G, DO        glucose chewable tablet 24 g  24 g Oral PRN Donna Garcia MD        hydrALAZINE injection 5 mg  5 mg Intravenous Q6H PRN Donna Garcia MD   5 mg at 01/26/25 0404    insulin aspart U-100 injection 0-5 Units  0-5 Units Subcutaneous QID (AC + HS) PRN Donna Garcia MD   3 Units at 01/26/25 1647    labetaloL injection 10 mg  10 mg Intravenous Q4H PRN Izzy Lopez NP   10 mg at 01/26/25 0518    linaCLOtide capsule 145 mcg  145 mcg Oral QAM Reyes, Thairy G, DO   145 mcg at 01/30/25 0545    losartan tablet 25 mg  25 mg Oral Daily Donna Garcia MD   25 mg at 01/30/25 0937    magnesium oxide tablet 400 mg  400 mg Oral BID Jorge Deluna MD   400 mg at 01/30/25 0937    melatonin tablet 9 mg  9 mg Oral Nightly PRN Reyes, Thairy G, DO   9 mg at 01/29/25 2249    methocarbamoL tablet 750 mg  750 mg Oral QID Izzy Lopez NP   750 mg at 01/30/25 1410    naloxone 0.4 mg/mL injection 0.02 mg  0.02 mg Intravenous PRN Reyes, Thairy G, DO        ondansetron disintegrating tablet 8 mg  8 mg Oral Q8H PRN Reyes, Thairy G, DO   8 mg at 01/29/25 2250    ondansetron injection 4 mg  4 mg Intravenous Q8H PRN Reyes, Thairy G,         oxyCODONE-acetaminophen  mg per tablet 1 tablet  1 tablet Oral Q4H PRN Izzy Lopez NP   1 tablet at 01/30/25 1437    pantoprazole EC tablet 40 mg  40 mg Oral Before breakfast Reyes, Thairy G, DO   40 mg at 01/30/25 0545    polyethylene glycol packet 17 g  17 g Oral TID PRN Reyes, Thairy G, DO        pregabalin capsule 75 mg  75 mg Oral BID Jorge Deluna MD        ranolazine 12 hr tablet 500 mg  500 mg Oral BID Reyes, Thairy G, DO   500 mg at 01/30/25 0937    simethicone chewable tablet 80 mg  1 tablet Oral QID PRN Reyes, Thairy G, DO        sodium chloride 0.9% flush 10 mL  10 mL  Intravenous PRN Reyes, Thairy G, DO        tamsulosin 24 hr capsule 0.4 mg  0.4 mg Oral Daily Donna Garcia MD   0.4 mg at 01/30/25 0937    tiotropium bromide 2.5 mcg/actuation inhaler 2 puff  2 puff Inhalation Daily Jorge Deluna MD   2 puff at 01/30/25 0941    white petrolatum 41 % ointment   Topical (Top) Daily Reyes, Thairy G, DO   Given at 01/30/25 0938      Pertinent lab results:   Lab Results   Component Value Date    WBC 8.75 01/27/2025    HGB 9.2 (L) 01/28/2025    HCT 27.3 (L) 01/28/2025     01/27/2025     01/27/2025    K 4.4 01/27/2025    BUN 21.6 01/27/2025    CO2 27 01/27/2025     01/27/2025      Pertinent diagnostic studies:    Additional labs and diagnostic studies required prior to admission:      QI: GG Care Tool     QI: GG Care Tool  Therapy Evaluation   Swallowing/  Nutritional Status   Diet/Feeding/  Swallowing Setup assist    Eating       Oral Hygiene   Grooming stand by assistance     Toileting Hygiene       Shower/Bathe   Bathing    Upper Body Dressing   Dressing Upper maximal assistance     Lower Body Dressing   Dressing Lower maximal assistance     Putting On/Taking Off Footwear       Toilet Transfer   Toileting Total assist with herrera    Bladder Continence Status   Bladder     Bowel Continence Status   Bowel     Roll Left and Right   Bed Mobility Rolling Right: minimum assistance  Scooting: minimum assistance  Supine to Sit: minimum assistance   Sit to Lying       Lying to Sitting on Side of Bed       Sit to Stand       Chair/Bed-to- Chair   Transfers   Sit to Stand:  contact guard assistance with rolling walker  Patient completed Toilet Transfer Step Transfer technique with minimum assistance with  rolling walker   Car Transfer       Walk 10 Feet   Equipment      Walk 50 Feet with Two Turns   Balance Static sitting balance: WFL   Walk 150 Feet   Endurance    Walk 10 Feet on Uneven Surfaces   Gait  patient amb 28ft with rolling walker with step through gait pattern.  Patient presents with forward flexed posture and decreased miguelito- patient stated 2/2 pain.    Picking up an Object       Wheel 50 Feet with Two Turns   Wheelchair    Wheel 150 Feet       1 Step (Curb)   Stairs    4 Steps       12 Steps       Expression of Ideas and Wants   Communication Independent    Understanding  Verbal and Non-Verbal Content       Cognitive Patterns   Cognition Independent       Safety Precautions       Lower Extremity      Strength RLE Strength: 4/5 grossly  LLE Strength: 4/5 grossly      ROM BLE WFL      Upper Extremity      Strength Right Upper Extremity:   Range of Motion: -3/5   Coordination: Impaired.    Sensation: Impaired. Tingling in hand      Left Upper Extremity:  Range of Motion: Impaired. -3/5   Coordination: Impaired.    Sensation: Impaired. Tingling in hand       ROM      Care Score Value Definitions  6: Independent. [place] provides no assistance with tasks. A device may or may not have been used.  5: Set-up or clean-up assistance. [place] sets up or cleans up, but does not assist with tasks. [place] may have assisted prior to or following the activity.  4: Supervision or touching assistance. [place] provides verbal cues or touching/steadying or contact guard assistance. Assistance may be provided throughout the activity or intermittently.  3: Partial/moderate assistance. [place] does less than half the effort. [place] lifts, holds, or supports trunk or limbs, but provides less than half the effort.  2: Substantial/maximal assistance. [place] does more than half the effort. [place] lifts or holds trunk or limbs, and provides more than half the effort.  1: Dependent. [place] does all of the effort, or the assistance of two or more helpers is required for the patient to complete the activity.  Care Score Activity Not Attempted Value Definitions  7: Patient refused.  9: Not applicable. Not attempted and the patient did not perform this activity prior to the current illness, exacerbation,  or injury.  10: Not attempted due to environmental limitations (e.g., lack of equipment, weather constraints).  88: Not attempted due to medical condition or safety concerns.    DISCHARGE GOALS/ANTICPATED INTERVENTIONS/SERVICES     Expected Level of Improvement for Safe Discharge: Patient/caregivers anticipate discharge home at or near baseline level of functioning and/or decreased burden of care.   Patient/Family/Caregiver Goals: Patient desires to increase current level of functioning to modified independence for mobility and all ADLs so that he is able to discharge home alone safely    Required Treatments/Services: Rehabilitation Nursing, Respiratory Therapy, Dietitian/nutrition, Social , and Case Management   Required Therapy Therapy Type Min/Day Days/Week Duration of Therapy   Physical Therapy 90 5 10-12 days   Occupational Therapy 90 5 10-12 days   Speech and Language Pathology      Prosthetic/Orthotics      Recreational Therapy      Anticipated Services upon Discharge:  home health with therapy    Additional rehabilitation needs:  none    Expected Discharge Destination:  home alone with son possibly helping at times    Barriers to Discharge: Limited caregiver availability   Discharge Support: Patient has a caregiver available   Patient/Family/Caregiver Orientation: Patient/family/caregiver oriented and agreeable to inpatient rehabilitation plan   Estimated Length of Stay: 10-12 days   Projected Admission Date: 1/31/25   Medical Prognosis: good   Physicians Review and Admission Determination:   I have discussed patient with Nurse Liaison. I have reviewed the prescreen. Patient is in need of, appropriate for and should tolerate and benefit from an aggressive IRF stay. Patient has functional and medical needs best addressed at this level of care.

## 2025-01-30 NOTE — PROGRESS NOTES
[facility] - [facility]  Wound Care    Patient Name:  Tyler Mai   MRN:  [id number]  Date: 1/30/2025  Diagnosis: Spinal cord compression    History:     Past Medical History:   Diagnosis Date    Anxiety disorder, unspecified     Arthritis     CAD (coronary artery disease)     Cervical radiculopathy     CHF (congestive heart failure)     Chronic pain syndrome     COPD (chronic obstructive pulmonary disease)     Diabetes mellitus     Gastro-esophageal reflux disease without esophagitis     High cholesterol     Hypertension     Lumbar radiculopathy     Myelomalacia of cervical cord     Other specified diseases of spinal cord     Pacemaker     PVD (peripheral vascular disease)     S/P triple vessel bypass     Tobacco use        Social History     Socioeconomic History    Marital status: Single   Tobacco Use    Smoking status: Every Day     Current packs/day: 1.00     Types: Cigarettes     Social Drivers of Health     Financial Resource Strain: Patient Declined (1/18/2025)    Overall Financial Resource Strain (CARDIA)     Difficulty of Paying Living Expenses: Patient declined   Food Insecurity: Patient Declined (1/18/2025)    Hunger Vital Sign     Worried About Running Out of Food in the Last Year: Patient declined     Ran Out of Food in the Last Year: Patient declined   Transportation Needs: Patient Declined (1/18/2025)    TRANSPORTATION NEEDS     Transportation : Patient declined   Stress: Patient Declined (1/18/2025)    [employer] [place] [employer] - Occupational Stress Questionnaire     Feeling of Stress : Patient declined   Housing Stability: Patient Declined (1/18/2025)    Housing Stability Vital Sign     Unable to Pay for Housing in the Last Year: Patient declined     Homeless in the Last Year: Patient declined       Precautions:     Allergies as of 01/14/2025 - Reviewed 01/14/2025   Allergen Reaction Noted    Hydrochlorothiazide  12/03/2024       WO Assessment Details/Treatment       01/30/25 1418        Wound 01/14/25 2200 Other (comment) Right anterior Leg   Date First Assessed/Time First Assessed: 01/14/25 2200   Primary Wound Type: (c) Other (comment)  Side: Right  Orientation: anterior  Location: Leg   Wound Image    Dressing Appearance Open to air   Drainage Amount None   Drainage Characteristics/Odor No odor   Appearance Closed/resurfaced   Tissue loss description Partial thickness   Black (%), Wound Tissue Color 0 %   Red (%), Wound Tissue Color 100 %   Yellow (%), Wound Tissue Color 0 %   Periwound Area Dry   Wound Edges Other (see comments)  (multiple scattered areas)   Care Cleansed with:;Soap and water   Dressing Applied;Other (comment)  (aquaphor)        Wound 01/14/25 2202 Other (comment) Left anterior Leg   Date First Assessed/Time First Assessed: 01/14/25 2202   Primary Wound Type: (c) Other (comment)  Side: Left  Orientation: anterior  Location: Leg   Wound Image    Dressing Appearance Open to air   Drainage Amount None   Drainage Characteristics/Odor No odor   Appearance Closed/resurfaced   Tissue loss description Partial thickness   Black (%), Wound Tissue Color 0 %   Red (%), Wound Tissue Color 100 %   Yellow (%), Wound Tissue Color 0 %   Periwound Area Dry   Wound Edges Other (see comments)  (Multiple scattered areas)   Care Cleansed with:;Soap and water   Dressing Applied;Other (comment)  (aquaphor)        Wound 01/14/25 2202 Other (comment) Right Heel   Date First Assessed/Time First Assessed: 01/14/25 2202   Primary Wound Type: (c) Other (comment)  Side: Right  Location: Heel   Wound Image    Dressing Appearance Intact;Dry;Clean   Drainage Amount None   Drainage Characteristics/Odor No odor   Appearance Red;Blistered   Tissue loss description Partial thickness   Black (%), Wound Tissue Color 0 %   Red (%), Wound Tissue Color 100 %   Yellow (%), Wound Tissue Color 0 %   Periwound Area Intact;Dry;Pink   Wound Edges Defined   Wound Length (cm) 5 cm   Wound Width (cm) 5  cm   Wound Depth (cm) 0.2 cm   Wound Volume (cm^3) 5 cm^3   Wound Surface Area (cm^2) 25 cm^2   Care Cleansed with:;Soap and water   Dressing Applied;Foam        Wound 01/25/25 Other (comment) Left Heel   Date First Assessed: 01/25/25   Primary Wound Type: Other (comment)  Side: Left  Location: Heel   Wound Image    Wound Length (cm) 1.1 cm   Wound Width (cm) 0.8 cm   Wound Depth (cm) 0.1 cm   Wound Volume (cm^3) 0.088 cm^3   Wound Surface Area (cm^2) 0.88 cm^2   Care Cleansed with:;Soap and water   Dressing Applied;Foam     WOCN follow up for multiple wounds. Discussed plan of care with nurse Chou prior to visiting the patient. Treatment recommendations put in place. Nursing to continue with recommendations and other preventative measure. Will follow up.  01/30/2025

## 2025-01-30 NOTE — PT/OT/SLP PROGRESS
"Physical Therapy Treatment    Patient Name:  Tyler Mai   MRN:  [id number]    Recommendations:     Discharge therapy intensity: High Intensity Therapy   Discharge Equipment Recommendations: to be determined by next level of care  Barriers to discharge: None    Assessment:     Tyler Mai is a 69 y.o. male admitted with a medical diagnosis of Spinal cord compression.  He presents with the following impairments/functional limitations: weakness, impaired endurance, impaired self care skills, impaired functional mobility, gait instability, impaired balance, decreased safety awareness, pain.    Treatment session limited by pain despite pain medication administered by nurse prior to session. Patient left up in chair with call bell in reach. Patient instructed to call nurse for assistance.     Rehab Prognosis: Good; patient would benefit from acute skilled PT services to address these deficits and reach maximum level of function.    Recent Surgery: Procedure(s) (LRB):  FUSION, SPINE, POSTERIOR SPINAL COLUMN, CERVICAL, USING COMPUTER-ASSISTED NAVIGATION (N/A) 6 Days Post-Op    Plan:     During this hospitalization, patient would benefit from acute PT services 5 x/week to address the identified rehab impairments via gait training, therapeutic activities, therapeutic exercises, neuromuscular re-education and progress toward the following goals:    Plan of Care Expires:  02/15/25    Subjective     Chief Complaint: "it hurts all over"  Patient/Family Comments/goals: none stated  Pain/Comfort:  Pain Rating 1: 10/10 ("all over")  Pain Addressed 1: Pre-medicate for activity, Reposition, Distraction      Objective:     Communicated with nurse prior to session.  Patient found HOB elevated with telemetry, pulse ox (continuous), Condom Catheter, peripheral IV, SCD, pressure relief boots upon PT entry to room.     General Precautions: Standard, fall  Orthopedic Precautions: spinal precautions  Braces: Aspen collar  Respiratory " Status: Room air  Blood Pressure: nt  Skin Integrity: Visible skin intact      Functional Mobility:  Bed Mobility:     Rolling Right: minimum assistance  Scooting: minimum assistance  Supine to Sit: minimum assistance  Transfers:     Sit to Stand:  contact guard assistance with rolling walker  Gait: patient amb 28ft with rolling walker with step through gait pattern. Patient presents with forward flexed posture and decreased miguelito- patient stated 2/2 pain.     Education:  Patient provided with verbal education education regarding PT role/goals/POC, post-op precautions, fall prevention, and safety awareness.  Understanding was verbalized.     Patient left up in chair with all lines intact and call button in reach    GOALS:   Multidisciplinary Problems       Physical Therapy Goals          Problem: Physical Therapy    Goal Priority Disciplines Outcome Interventions   Physical Therapy Goal     PT, PT/OT Progressing    Description: Goals to be met by: 2/15/25     Patient will increase functional independence with mobility by performin. Supine to sit with Set-up [place]  2. Sit to supine with Set-up [place]  3. Sit to stand transfer with Supervision  4. Bed to chair transfer with Supervision using Rolling Walker  5. Gait  x 300 feet with Supervision using Rolling Walker.   6. Ascend/descend 15 stairs with bilateral Handrails & Supervision                          Time Tracking:     PT Received On: 25  PT Start Time: 1030     PT Stop Time: 1053  PT Total Time (min): 23 min     Billable Minutes: Gait Training 23    Treatment Type: Treatment  PT/PTA: PTA     Number of PTA visits since last PT visit: 4     2025

## 2025-01-31 ENCOUNTER — HOSPITAL ENCOUNTER (INPATIENT)
Facility: HOSPITAL | Age: 70
LOS: 19 days | Discharge: NURSING, REHAB OR LONG-TERM CARE | DRG: 552 | End: 2025-02-19
Attending: INTERNAL MEDICINE | Admitting: INTERNAL MEDICINE
Payer: MEDICARE

## 2025-01-31 VITALS
HEART RATE: 72 BPM | SYSTOLIC BLOOD PRESSURE: 123 MMHG | TEMPERATURE: 98 F | BODY MASS INDEX: 27.64 KG/M2 | HEIGHT: 66 IN | WEIGHT: 172 LBS | OXYGEN SATURATION: 97 % | DIASTOLIC BLOOD PRESSURE: 62 MMHG | RESPIRATION RATE: 18 BRPM

## 2025-01-31 DIAGNOSIS — Z98.1 STATUS POST CERVICAL SPINAL FUSION: Primary | ICD-10-CM

## 2025-01-31 DIAGNOSIS — E11.69 TYPE 2 DIABETES MELLITUS WITH OTHER SPECIFIED COMPLICATION, UNSPECIFIED WHETHER LONG TERM INSULIN USE: ICD-10-CM

## 2025-01-31 PROBLEM — Z98.890 PERIPHERAL ARTERIAL DISEASE WITH HISTORY OF REVASCULARIZATION: Status: ACTIVE | Noted: 2025-01-07

## 2025-01-31 PROBLEM — E87.6 HYPOKALEMIA: Status: RESOLVED | Noted: 2025-01-05 | Resolved: 2025-01-31

## 2025-01-31 PROBLEM — I25.5 ISCHEMIC CARDIOMYOPATHY WITH IMPLANTABLE CARDIOVERTER-DEFIBRILLATOR (ICD): Status: ACTIVE | Noted: 2018-05-22

## 2025-01-31 PROBLEM — I25.10 ARTERIOSCLEROSIS OF CORONARY ARTERY: Status: ACTIVE | Noted: 2025-01-31

## 2025-01-31 PROBLEM — Z95.810 ISCHEMIC CARDIOMYOPATHY WITH IMPLANTABLE CARDIOVERTER-DEFIBRILLATOR (ICD): Status: ACTIVE | Noted: 2018-05-22

## 2025-01-31 PROBLEM — I65.22 CAROTID ARTERY STENOSIS, ASYMPTOMATIC, LEFT: Status: ACTIVE | Noted: 2025-01-07

## 2025-01-31 PROBLEM — E11.9 TYPE 2 DIABETES MELLITUS: Status: ACTIVE | Noted: 2025-01-31

## 2025-01-31 PROBLEM — Z72.0 TOBACCO USER: Status: ACTIVE | Noted: 2025-01-31

## 2025-01-31 PROBLEM — I10 HTN (HYPERTENSION): Status: ACTIVE | Noted: 2018-05-20

## 2025-01-31 PROBLEM — G95.9 CERVICAL MYELOPATHY: Status: ACTIVE | Noted: 2025-01-05

## 2025-01-31 PROBLEM — I73.9 PERIPHERAL ARTERIAL DISEASE WITH HISTORY OF REVASCULARIZATION: Status: ACTIVE | Noted: 2025-01-07

## 2025-01-31 PROBLEM — G89.29 CHRONIC LOW BACK PAIN: Status: ACTIVE | Noted: 2025-01-31

## 2025-01-31 PROBLEM — M54.50 CHRONIC LOW BACK PAIN: Status: ACTIVE | Noted: 2025-01-31

## 2025-01-31 PROBLEM — E87.6 HYPOKALEMIA: Status: ACTIVE | Noted: 2025-01-05

## 2025-01-31 PROBLEM — E87.1 HYPONATREMIA: Status: RESOLVED | Noted: 2025-01-14 | Resolved: 2025-01-31

## 2025-01-31 PROBLEM — I50.22 CHRONIC SYSTOLIC CHF (CONGESTIVE HEART FAILURE), NYHA CLASS 3: Status: ACTIVE | Noted: 2018-06-19

## 2025-01-31 PROBLEM — E78.5 HYPERLIPIDEMIA: Status: ACTIVE | Noted: 2025-01-31

## 2025-01-31 LAB
POCT GLUCOSE: 169 MG/DL (ref 70–110)
POCT GLUCOSE: 177 MG/DL (ref 70–110)
POCT GLUCOSE: 192 MG/DL (ref 70–110)

## 2025-01-31 PROCEDURE — 11800000 HC REHAB PRIVATE ROOM

## 2025-01-31 PROCEDURE — 25000003 PHARM REV CODE 250: Performed by: NURSE PRACTITIONER

## 2025-01-31 PROCEDURE — 25000242 PHARM REV CODE 250 ALT 637 W/ HCPCS: Performed by: INTERNAL MEDICINE

## 2025-01-31 PROCEDURE — 94761 N-INVAS EAR/PLS OXIMETRY MLT: CPT

## 2025-01-31 PROCEDURE — 99900031 HC PATIENT EDUCATION (STAT)

## 2025-01-31 PROCEDURE — 94799 UNLISTED PULMONARY SVC/PX: CPT

## 2025-01-31 PROCEDURE — 25000003 PHARM REV CODE 250: Performed by: STUDENT IN AN ORGANIZED HEALTH CARE EDUCATION/TRAINING PROGRAM

## 2025-01-31 PROCEDURE — 25000242 PHARM REV CODE 250 ALT 637 W/ HCPCS: Performed by: NURSE PRACTITIONER

## 2025-01-31 PROCEDURE — 63600175 PHARM REV CODE 636 W HCPCS: Performed by: NURSE PRACTITIONER

## 2025-01-31 PROCEDURE — 25000003 PHARM REV CODE 250: Performed by: INTERNAL MEDICINE

## 2025-01-31 PROCEDURE — 25000003 PHARM REV CODE 250

## 2025-01-31 RX ORDER — HYDRALAZINE HYDROCHLORIDE 20 MG/ML
10 INJECTION INTRAMUSCULAR; INTRAVENOUS EVERY 4 HOURS PRN
Status: DISCONTINUED | OUTPATIENT
Start: 2025-01-31 | End: 2025-02-19 | Stop reason: HOSPADM

## 2025-01-31 RX ORDER — NAPROXEN SODIUM 220 MG/1
81 TABLET, FILM COATED ORAL DAILY
Status: DISCONTINUED | OUTPATIENT
Start: 2025-01-31 | End: 2025-02-19 | Stop reason: HOSPADM

## 2025-01-31 RX ORDER — CYCLOBENZAPRINE HCL 10 MG
10 TABLET ORAL 3 TIMES DAILY PRN
Status: DISCONTINUED | OUTPATIENT
Start: 2025-01-31 | End: 2025-02-03

## 2025-01-31 RX ORDER — ONDANSETRON 4 MG/1
8 TABLET, ORALLY DISINTEGRATING ORAL EVERY 8 HOURS PRN
Status: DISCONTINUED | OUTPATIENT
Start: 2025-01-31 | End: 2025-02-19 | Stop reason: HOSPADM

## 2025-01-31 RX ORDER — METHOCARBAMOL 750 MG/1
750 TABLET, FILM COATED ORAL 4 TIMES DAILY
Status: DISCONTINUED | OUTPATIENT
Start: 2025-01-31 | End: 2025-02-02

## 2025-01-31 RX ORDER — TALC
6 POWDER (GRAM) TOPICAL NIGHTLY PRN
Status: DISCONTINUED | OUTPATIENT
Start: 2025-01-31 | End: 2025-02-19 | Stop reason: HOSPADM

## 2025-01-31 RX ORDER — AMLODIPINE BESYLATE 5 MG/1
5 TABLET ORAL 2 TIMES DAILY
Status: ON HOLD
Start: 2025-01-31 | End: 2025-02-19 | Stop reason: HOSPADM

## 2025-01-31 RX ORDER — LABETALOL HCL 20 MG/4 ML
10 SYRINGE (ML) INTRAVENOUS EVERY 4 HOURS PRN
Status: DISCONTINUED | OUTPATIENT
Start: 2025-01-31 | End: 2025-02-19 | Stop reason: HOSPADM

## 2025-01-31 RX ORDER — PREGABALIN 75 MG/1
75 CAPSULE ORAL 2 TIMES DAILY
Status: ON HOLD
Start: 2025-01-31 | End: 2025-02-19 | Stop reason: HOSPADM

## 2025-01-31 RX ORDER — ONDANSETRON 4 MG/1
4 TABLET, ORALLY DISINTEGRATING ORAL EVERY 6 HOURS PRN
Status: DISCONTINUED | OUTPATIENT
Start: 2025-01-31 | End: 2025-02-19 | Stop reason: HOSPADM

## 2025-01-31 RX ORDER — PREGABALIN 75 MG/1
75 CAPSULE ORAL 3 TIMES DAILY
Status: DISCONTINUED | OUTPATIENT
Start: 2025-01-31 | End: 2025-02-03

## 2025-01-31 RX ORDER — METHOCARBAMOL 750 MG/1
750 TABLET, FILM COATED ORAL 4 TIMES DAILY
Status: ON HOLD
Start: 2025-01-31 | End: 2025-02-19 | Stop reason: HOSPADM

## 2025-01-31 RX ORDER — LOSARTAN POTASSIUM 25 MG/1
25 TABLET ORAL DAILY
Status: DISCONTINUED | OUTPATIENT
Start: 2025-02-01 | End: 2025-02-06

## 2025-01-31 RX ORDER — POLYETHYLENE GLYCOL 3350 17 G/17G
17 POWDER, FOR SOLUTION ORAL 2 TIMES DAILY PRN
Status: DISCONTINUED | OUTPATIENT
Start: 2025-01-31 | End: 2025-02-13

## 2025-01-31 RX ORDER — ONDANSETRON HYDROCHLORIDE 2 MG/ML
4 INJECTION, SOLUTION INTRAVENOUS EVERY 8 HOURS PRN
Status: DISCONTINUED | OUTPATIENT
Start: 2025-01-31 | End: 2025-02-19 | Stop reason: HOSPADM

## 2025-01-31 RX ORDER — LANOLIN ALCOHOL/MO/W.PET/CERES
400 CREAM (GRAM) TOPICAL 2 TIMES DAILY
Status: ON HOLD
Start: 2025-01-31 | End: 2025-02-19 | Stop reason: HOSPADM

## 2025-01-31 RX ORDER — NITROGLYCERIN 0.4 MG/1
0.4 TABLET SUBLINGUAL EVERY 5 MIN PRN
Status: DISCONTINUED | OUTPATIENT
Start: 2025-01-31 | End: 2025-02-19 | Stop reason: HOSPADM

## 2025-01-31 RX ORDER — ENOXAPARIN SODIUM 100 MG/ML
40 INJECTION SUBCUTANEOUS EVERY 24 HOURS
Status: DISCONTINUED | OUTPATIENT
Start: 2025-01-31 | End: 2025-02-19 | Stop reason: HOSPADM

## 2025-01-31 RX ORDER — CYCLOBENZAPRINE HCL 10 MG
10 TABLET ORAL 3 TIMES DAILY PRN
Status: ON HOLD
Start: 2025-01-31 | End: 2025-02-19 | Stop reason: HOSPADM

## 2025-01-31 RX ORDER — OXYCODONE HYDROCHLORIDE 5 MG/1
5 TABLET ORAL EVERY 4 HOURS PRN
Status: DISCONTINUED | OUTPATIENT
Start: 2025-01-31 | End: 2025-02-01

## 2025-01-31 RX ORDER — CLOPIDOGREL BISULFATE 75 MG/1
75 TABLET ORAL DAILY
Status: DISCONTINUED | OUTPATIENT
Start: 2025-01-31 | End: 2025-02-19 | Stop reason: HOSPADM

## 2025-01-31 RX ORDER — AMLODIPINE BESYLATE 5 MG/1
5 TABLET ORAL 2 TIMES DAILY
Status: DISCONTINUED | OUTPATIENT
Start: 2025-01-31 | End: 2025-02-08

## 2025-01-31 RX ORDER — LANOLIN ALCOHOL/MO/W.PET/CERES
400 CREAM (GRAM) TOPICAL 2 TIMES DAILY
Status: DISCONTINUED | OUTPATIENT
Start: 2025-01-31 | End: 2025-02-10

## 2025-01-31 RX ORDER — FINASTERIDE 5 MG/1
5 TABLET, FILM COATED ORAL DAILY
Status: DISCONTINUED | OUTPATIENT
Start: 2025-01-31 | End: 2025-02-19 | Stop reason: HOSPADM

## 2025-01-31 RX ORDER — DULOXETIN HYDROCHLORIDE 30 MG/1
30 CAPSULE, DELAYED RELEASE ORAL DAILY
Status: DISCONTINUED | OUTPATIENT
Start: 2025-02-01 | End: 2025-02-19 | Stop reason: HOSPADM

## 2025-01-31 RX ORDER — CARVEDILOL 6.25 MG/1
6.25 TABLET ORAL 2 TIMES DAILY
Status: ON HOLD
Start: 2025-01-31 | End: 2025-02-19 | Stop reason: HOSPADM

## 2025-01-31 RX ORDER — PREGABALIN 75 MG/1
75 CAPSULE ORAL 2 TIMES DAILY
Status: DISCONTINUED | OUTPATIENT
Start: 2025-01-31 | End: 2025-01-31

## 2025-01-31 RX ORDER — RANOLAZINE 500 MG/1
500 TABLET, EXTENDED RELEASE ORAL 2 TIMES DAILY
Status: DISCONTINUED | OUTPATIENT
Start: 2025-01-31 | End: 2025-02-19 | Stop reason: HOSPADM

## 2025-01-31 RX ORDER — OXYCODONE HYDROCHLORIDE 5 MG/1
10 TABLET ORAL EVERY 4 HOURS PRN
Status: DISCONTINUED | OUTPATIENT
Start: 2025-01-31 | End: 2025-02-01

## 2025-01-31 RX ORDER — METOPROLOL TARTRATE 1 MG/ML
10 INJECTION, SOLUTION INTRAVENOUS
Status: DISCONTINUED | OUTPATIENT
Start: 2025-01-31 | End: 2025-02-19 | Stop reason: HOSPADM

## 2025-01-31 RX ORDER — TAMSULOSIN HYDROCHLORIDE 0.4 MG/1
0.4 CAPSULE ORAL DAILY
Status: ON HOLD
Start: 2025-01-31 | End: 2025-02-19 | Stop reason: HOSPADM

## 2025-01-31 RX ORDER — HYDROXYZINE PAMOATE 50 MG/1
50 CAPSULE ORAL NIGHTLY PRN
Status: DISCONTINUED | OUTPATIENT
Start: 2025-01-31 | End: 2025-02-19 | Stop reason: HOSPADM

## 2025-01-31 RX ORDER — ACETAMINOPHEN 325 MG/1
650 TABLET ORAL 3 TIMES DAILY
Status: DISCONTINUED | OUTPATIENT
Start: 2025-01-31 | End: 2025-02-01

## 2025-01-31 RX ORDER — ACETAMINOPHEN 325 MG/1
650 TABLET ORAL EVERY 6 HOURS PRN
Status: DISCONTINUED | OUTPATIENT
Start: 2025-01-31 | End: 2025-02-19 | Stop reason: HOSPADM

## 2025-01-31 RX ORDER — BENZONATATE 100 MG/1
100 CAPSULE ORAL 3 TIMES DAILY PRN
Status: DISCONTINUED | OUTPATIENT
Start: 2025-01-31 | End: 2025-02-19 | Stop reason: HOSPADM

## 2025-01-31 RX ORDER — TAMSULOSIN HYDROCHLORIDE 0.4 MG/1
0.4 CAPSULE ORAL NIGHTLY
Status: DISCONTINUED | OUTPATIENT
Start: 2025-02-01 | End: 2025-02-04

## 2025-01-31 RX ORDER — CARVEDILOL 6.25 MG/1
6.25 TABLET ORAL 2 TIMES DAILY
Status: DISCONTINUED | OUTPATIENT
Start: 2025-01-31 | End: 2025-02-08

## 2025-01-31 RX ORDER — PANTOPRAZOLE SODIUM 40 MG/1
40 TABLET, DELAYED RELEASE ORAL DAILY
Status: DISCONTINUED | OUTPATIENT
Start: 2025-02-01 | End: 2025-02-19 | Stop reason: HOSPADM

## 2025-01-31 RX ORDER — DOCUSATE SODIUM 100 MG/1
100 CAPSULE, LIQUID FILLED ORAL 2 TIMES DAILY
Status: DISCONTINUED | OUTPATIENT
Start: 2025-01-31 | End: 2025-02-13

## 2025-01-31 RX ORDER — LIDOCAINE 50 MG/G
1 PATCH TOPICAL
Status: DISCONTINUED | OUTPATIENT
Start: 2025-01-31 | End: 2025-02-19 | Stop reason: HOSPADM

## 2025-01-31 RX ORDER — ALPRAZOLAM 0.25 MG/1
0.25 TABLET ORAL 3 TIMES DAILY PRN
Status: DISCONTINUED | OUTPATIENT
Start: 2025-01-31 | End: 2025-02-19 | Stop reason: HOSPADM

## 2025-01-31 RX ORDER — LOSARTAN POTASSIUM 25 MG/1
25 TABLET ORAL DAILY
Status: ON HOLD
Start: 2025-01-31 | End: 2025-02-19 | Stop reason: HOSPADM

## 2025-01-31 RX ORDER — ATORVASTATIN CALCIUM 40 MG/1
40 TABLET, FILM COATED ORAL DAILY
Status: DISCONTINUED | OUTPATIENT
Start: 2025-01-31 | End: 2025-02-19 | Stop reason: HOSPADM

## 2025-01-31 RX ORDER — MUPIROCIN 20 MG/G
OINTMENT TOPICAL 2 TIMES DAILY
Status: DISPENSED | OUTPATIENT
Start: 2025-02-01 | End: 2025-02-06

## 2025-01-31 RX ADMIN — CLOPIDOGREL BISULFATE 75 MG: 75 TABLET, FILM COATED ORAL at 01:01

## 2025-01-31 RX ADMIN — LINACLOTIDE 145 MCG: 145 CAPSULE, GELATIN COATED ORAL at 04:01

## 2025-01-31 RX ADMIN — OXYCODONE 10 MG: 5 TABLET ORAL at 01:01

## 2025-01-31 RX ADMIN — FLUTICASONE FUROATE AND VILANTEROL TRIFENATATE 1 PUFF: 200; 25 POWDER RESPIRATORY (INHALATION) at 09:01

## 2025-01-31 RX ADMIN — RANOLAZINE 500 MG: 500 TABLET, EXTENDED RELEASE ORAL at 08:01

## 2025-01-31 RX ADMIN — DOCUSATE SODIUM 100 MG: 100 CAPSULE, LIQUID FILLED ORAL at 01:01

## 2025-01-31 RX ADMIN — ACETAMINOPHEN 650 MG: 325 TABLET ORAL at 01:01

## 2025-01-31 RX ADMIN — AMLODIPINE BESYLATE 5 MG: 5 TABLET ORAL at 08:01

## 2025-01-31 RX ADMIN — PREGABALIN 75 MG: 75 CAPSULE ORAL at 08:01

## 2025-01-31 RX ADMIN — OXYCODONE AND ACETAMINOPHEN 1 TABLET: 10; 325 TABLET ORAL at 09:01

## 2025-01-31 RX ADMIN — ASPIRIN 81 MG CHEWABLE TABLET 81 MG: 81 TABLET CHEWABLE at 01:01

## 2025-01-31 RX ADMIN — Medication 400 MG: at 09:01

## 2025-01-31 RX ADMIN — ACETAMINOPHEN 650 MG: 325 TABLET ORAL at 08:01

## 2025-01-31 RX ADMIN — PREGABALIN 75 MG: 75 CAPSULE ORAL at 01:01

## 2025-01-31 RX ADMIN — FLUTICASONE FUROATE 1 PUFF: 200 POWDER RESPIRATORY (INHALATION) at 02:01

## 2025-01-31 RX ADMIN — TIOTROPIUM BROMIDE INHALATION SPRAY 2 PUFF: 3.12 SPRAY, METERED RESPIRATORY (INHALATION) at 09:01

## 2025-01-31 RX ADMIN — LIDOCAINE 5% 1 PATCH: 700 PATCH TOPICAL at 05:01

## 2025-01-31 RX ADMIN — LOSARTAN POTASSIUM 25 MG: 25 TABLET, FILM COATED ORAL at 09:01

## 2025-01-31 RX ADMIN — ATORVASTATIN CALCIUM 40 MG: 40 TABLET, FILM COATED ORAL at 01:01

## 2025-01-31 RX ADMIN — ENOXAPARIN SODIUM 40 MG: 40 INJECTION SUBCUTANEOUS at 05:01

## 2025-01-31 RX ADMIN — PANTOPRAZOLE SODIUM 40 MG: 40 TABLET, DELAYED RELEASE ORAL at 04:01

## 2025-01-31 RX ADMIN — FINASTERIDE 5 MG: 5 TABLET, FILM COATED ORAL at 01:01

## 2025-01-31 RX ADMIN — OXYCODONE 10 MG: 5 TABLET ORAL at 08:01

## 2025-01-31 RX ADMIN — WHITE PETROLATUM: 1.75 OINTMENT TOPICAL at 09:01

## 2025-01-31 RX ADMIN — UMECLIDINIUM BROMIDE AND VILANTEROL TRIFENATATE 1 PUFF: 62.5; 25 POWDER RESPIRATORY (INHALATION) at 02:01

## 2025-01-31 RX ADMIN — METHOCARBAMOL 750 MG: 750 TABLET ORAL at 10:01

## 2025-01-31 RX ADMIN — OXYCODONE 10 MG: 5 TABLET ORAL at 04:01

## 2025-01-31 RX ADMIN — OXYCODONE AND ACETAMINOPHEN 1 TABLET: 10; 325 TABLET ORAL at 04:01

## 2025-01-31 RX ADMIN — METHOCARBAMOL 750 MG: 750 TABLET ORAL at 09:01

## 2025-01-31 RX ADMIN — AMLODIPINE BESYLATE 5 MG: 5 TABLET ORAL at 09:01

## 2025-01-31 RX ADMIN — DOCUSATE SODIUM 100 MG: 100 CAPSULE, LIQUID FILLED ORAL at 08:01

## 2025-01-31 RX ADMIN — CARVEDILOL 6.25 MG: 6.25 TABLET, FILM COATED ORAL at 08:01

## 2025-01-31 RX ADMIN — METHOCARBAMOL 750 MG: 750 TABLET ORAL at 01:01

## 2025-01-31 RX ADMIN — TAMSULOSIN HYDROCHLORIDE 0.4 MG: 0.4 CAPSULE ORAL at 09:01

## 2025-01-31 RX ADMIN — CARVEDILOL 6.25 MG: 3.12 TABLET, FILM COATED ORAL at 09:01

## 2025-01-31 RX ADMIN — METHOCARBAMOL 750 MG: 750 TABLET ORAL at 05:01

## 2025-01-31 RX ADMIN — PREGABALIN 75 MG: 50 CAPSULE ORAL at 09:01

## 2025-01-31 RX ADMIN — RANOLAZINE 500 MG: 500 TABLET, FILM COATED, EXTENDED RELEASE ORAL at 09:01

## 2025-01-31 RX ADMIN — Medication 400 MG: at 08:01

## 2025-01-31 NOTE — PLAN OF CARE
01/31/25 1336   Discharge Reassessment   Assessment Type Discharge Planning Assessment   Did the patient's condition or plan change since previous assessment? No   Discharge Plan discussed with: Patient   Communicated ESPERANZA with patient/caregiver Date not available/Unable to determine   Discharge Plan A Home Health   DME Needed Upon Discharge  other (see comments)  (TBD by rehab team)   Transition of Care Barriers None   Why the patient remains in the hospital Requires continued medical care   Post-Acute Status   Patient choice form signed by patient/caregiver List from [facility] Post-Acute Care   Discharge Delays None known at this time     Will work on establishing pt with PCP (does not have one).

## 2025-01-31 NOTE — PLAN OF CARE
01/31/25 1508   Depression Screen (Over the Past Two Weeks)   Have You Felt Down, Depressed or Hopeless? no   Have You Felt Little Interest or Pleasure in Doing Things? yes   Depression Patient Health Questionnaire (PHQ-9)   Over the last two weeks how often have you been bothered by little interest or pleasure in doing things 1   Over the last two weeks how often have you been bothered by feeling down, depressed or hopeless 0     Admission PHQ.

## 2025-01-31 NOTE — PLAN OF CARE
Tyler Mai age: 69 *spinal precautions, Aspen collar    Dx: Cervical spondylosis and stenosis with myelopathy and cord compression s/p C2-6 PCDF and C2-3, C4-5 decompression 1/24/2025. Pt. Has a history of cervical myelopathy, CAD, CHF, COPD, DM, HLD, HTN, and PVD.   *See chart for full and complete medical history*     Hx mental health/substance abuse: Denies mental health history. Pt. Smokes cigarettes.   Insurance:  [employer] Medicare Managed/Medicaid    Is there evidence of an acute change in mental status from the patients baseline?  No   Education//Work Hx: Pt. Completed high school. He has no  history. Pt. Is disabled.   Pt. . He was living alone and completely independent.    Children: (2)/ (Friends/Family):  1) Tyler Mai Jr, son ([phone])   Power of  (no) Living Will (no)   Prior Level of Fx: Prior to 3 months ago, pt. Was independent with ADLs, driving, making simple meals, doing chores, doing laundry, grocery shopping, managing finances, and managing medications. Pt. Was using a rollator for mobility. He describes that over the last 3 months, he was physically declining. He does not have a medic alert.   Residence: Pt. Lives alone in [place] in a [facility] story apartment. He has a tub/shower combination with a HHS and a shower chair. No grab bars. He does not have an elevated toilet. No grab bars.    DME: 2 Rollators, RW, shower chair, and HHS. Will use DME company approved per insurance.    HH/OP tx: Denies history of HH.  He will use HH company approved per insurance.      FOC was obtained for DME and HH companies approved per insurance      Pharmacy: [facility] in [place] / (has prescription drug coverage)       MD(s): PCP: none

## 2025-01-31 NOTE — PLAN OF CARE
Received update from Sofiya at [facility] stating pt received auth for rehab admit today. SHYAM messaged MD to make aware. Pending d/c orders and med rec.    Kym Hatch LCSW

## 2025-01-31 NOTE — PLAN OF CARE
Problem: Rehabilitation ([facility]) Plan of Care  Goal: Plan of Care Review  Outcome: Progressing  Goal: Patient-Specific Goal (Individualized)  Outcome: Progressing  Goal: Absence of New-Onset Illness or Injury  Outcome: Progressing  Goal: Optimal Comfort and Wellbeing  Outcome: Progressing  Goal: Home and Community Transition Plan Established  Outcome: Progressing     Problem: Wound  Goal: Optimal Coping  Outcome: Progressing  Goal: Optimal Functional Ability  Outcome: Progressing  Goal: Absence of Infection Signs and Symptoms  Outcome: Progressing  Goal: Improved Oral Intake  Outcome: Progressing  Goal: Optimal Pain Control and Function  Outcome: Progressing  Goal: Skin Health and Integrity  Outcome: Progressing  Goal: Optimal Wound Healing  Outcome: Progressing     Problem: Infection  Goal: Absence of Infection Signs and Symptoms  Outcome: Progressing     Problem: Diabetes Comorbidity  Goal: Blood Glucose Level Within Targeted Range  Outcome: Progressing

## 2025-01-31 NOTE — PLAN OF CARE
01/31/25 0848   Final Note   Assessment Type Final Discharge Note   Anticipated Discharge Disposition Rehab   Hospital Resources/Appts/Education Provided Post-Acute resouces added to AVS   Post-Acute Status   Post-Acute Authorization Placement   Post-Acute Placement Status Set-up Complete/Auth obtained   Discharge Delays None known at this time     Pt discharging to [facility]. Transport arranged with [facility] for 1100 pickup. SHYAM met with pt at bedside and contacted his son and provided update. Notified nurse and  of pickup time.     Kym Hatch LCSW

## 2025-01-31 NOTE — NURSING
Report called to Sarina at [facility] rehab, denies questions.    Tele box removed, tele monitors notified of discharge    IV sites and herrera cath left in per Dr Antonio    Pt dressed by CNA and transportation will get pt to [facility] at 1100

## 2025-01-31 NOTE — CONSULTS
Dos 1/31/25  I have evaluated the patient on initial IRF admit. I have been involved in rehab prescreen. I have reviewed records.I have reviewed admit orders.  I find the patient appropriate for, in need of and should tolerate an aggressive IRF program with good potential for functional improvement.  I am in agreement with initial Plan of Care  I have been involved in the creation of this initial PM&R consult with Rylie Fang MD  Chief Complaint  S/p PCDF    Reason for Consultation  Physiatry    History of Present Illness  Admit MD: Tyler De Anda MD  Consult Physiatry: Philip Fang MD  HPI: 69 year old WM with a PMH of cervical myelopathy, CAD, CHF, COPD, DM, HLD, HTN, and PVD presented to the ED at [facility] on 1/14/25 for Neurosurgery. Patient presented in ED following discharge from [facility]. He reports associated symptoms of bilateral arm weakness, bilateral leg weakness, and worsening neck pain. Notes right leg is weaker than left. He also complains of bilateral hand, bilateral leg, and bilateral feet numbness. He stated that he has been unable to care for himself at home because of the weakness, and having to use a wheelchair to get around. Patient's daughter at bedside reports frequent falls at home. No urinary or fecal incontinence. Per chart review, the patient was admitted to [facility] for cervical myelopathy with the plan of surgery. Previous extensive admission at [facility] for UTI which cleared and resolved with antibiotics; initially, did not have surgery secondary to UTI. Now amendable for surgical intervention since UTI has resolved. Patient refused surgery with on-call neurosurgeon at [facility] and discharged at noon today. He was advised to come here for the surgery. MRI on 01/05 showed severe C3-4 level central canal stenosis with increased signal hyperintensity within the cervical cord from mid to lower aspect of C3 through mid aspect of C4 with central cord myelomalacia. No syrinx is  detected. Prior C5-C7 intervertebral body fusion. Neurosurgery was consulted with recommendation for surgical intervention needed. Cardiology consulted with low risk for MACE for high risk neurosurgical procedure. On 1/24, patient underwent C2-6 PCDF and C2-3- C4-5 decompression by Dr. Ramírez. 1 Hemovac drain placed to site. Hard cervical collar in place. On 1/25, wound care was consulted for right heel and left heel wound with recommendation to cleanse bilateral heels with normal saline, paint with betadine, apply small foam, and change every 2 days. Recommended bilateral heel boots. Herrera was removed. Labs showed low Na of 133, elevated BUN/Cr of 29.7/ 1.31, and low H&H of 10.6 & 31.9. BP ranging 125/58- 182/80. PT/OT efvals completed with deficits noted with recommendation for high intensity therapy needed. ON 1/26, Labs showed low H&H of 10.1 & 29.5. Patient having urinary retention requiring in and out caths. On 1/27, hemovac removed. Neurosurgery recommended to DC staples on POD 14, will need follow up in 2 weeks after DC from hospital with XR uprights of cervical spine with Izzy, NP, continued Percocet and Robaxin for pain management, and signed off of case. Herrera had to be reinserted for urinary retention, and started Flomax. BP ranging 123/61- 169/70. On 1/28, patient started on Relistar for constipation with 2 large Bms noted. Labs showed low H&H of 9.2 & 27.3. On 1/30, wound care reassessed with 2 wounds noted to bilateral anterior legs. Cleansed with soap and water, and applied aquaphor to sites. Patient is AAOx4.   Participating with therapy. Functional status includes setup assist needed for eating, minimal assist for bed mobility, contact guard assist for transfers with RW, walked 28ft with RW at minimal assist, minimal assist for toilet transfer, total assist for toileting due to herrera in place, stand by assist for grooming, max assist for upper body dressing, and max assist for lower body dressing.  Patient was evaluated, accepted, and admitted to inpatient rehab to improve functional status. Transferred to [facility] on 1/31 without incident.     1/31: Seen in patient room, seated on edge of bed with lunch tray in front of him. Reports being hungry and having a good appetite. He is having difficulty with eating 2/2 paresthesias in hands, as well as coordination and apraxia. Admits to spilling quite a bit, and dropping cups. Believes that he is getting enough nutrition with taking his time to eat. Complains of burning, stabbing pain to left trapezius. Application of Biofreeze helpful. States that he does have shooting pains down his arms at times too. Reports good sleep. Bowels last moved on 1/28 following Relistor, which he requests to not be given again. Tells me he deals with chronic constipation at home, and goes 8 days or so without a BM. States that dulcolax seems to be helpful while he takes Opioids. Pleasant and appreciative of my time. Therapy to evaluate. VSSAF.              Review of Systems  Barriers to Discharge:  Social: Completed high school. He has no  history.  Is disabled. . He was living alone and completely independent. Children: (2).    Medical: Cervical myelopathy s/p PCDF, CAD s/p CABG x3, HFrEF/ICMO, HTN, COPD, DM type 2, HLD, HLD, PVD, chronic constipation, hypomagnesemia, BPH, GERD, normocytic anemia      Functional:    Prior Level of Fx: Prior to 3 months ago, was independent with ADLs, driving, making simple meals, doing chores, doing laundry, grocery shopping, managing finances, and managing medications. Pt. Was using a rollator for mobility. He describes that over the last 3 months, he was physically declining. He does not have a medic alert.   Residence:  Lives alone in [place] in a 2nd story apartment. He has a tub/shower combination with a HHS and a shower chair. No grab bars. He does not have an elevated toilet. No grab bars.    DME: 2 Rollators, RW, shower  chair, and [facility].   Psychiatric: Denies mental health history. Pt. Smokes cigarettes.     Depression/Anxiety: notes some anxiety in regards to eating and getting enough nutrition to his mouth     ADD DULoxetine DR capsule 30 mg qd, start 2/1  ALPRAZolam tablet 0.25 mg TID PRN Anxiety  Pain: L>R side of neck, numbness (hands, legs, feet), shooting pains  ADD DULoxetine DR capsule 30 mg qd, start 2/1  ADD acetaminophen tablet 650 mg TID  methocarbamoL tablet 750 mg QID  pregabalin capsule 75 mg BID. Increase TID  ADD LIDOcaine 5 % patch 1 patch neck q24hr, 1800  acetaminophen tablet 650 mg q6h PRN mild pain  oxyCODONE immediate release tablet 5 mg q4h PRN mod pain  oxyCODONE immediate release tablet 10 mg q4h PRN severe pain     cyclobenzaprine tablet 10 mg TID PRN muscle spasm  Bowels/Bladder: Last BM 1/28 x 2 (large) following Relistor/Gray catheter reinserted 2/2 urinary retention     docusate sodium capsule 100 mg BID  linaCLOtide capsule 145 mcg qAM  tamsulosin 24 hr capsule 0.4 mg qHS  Appetite: good. Hungry. Intake difficult functionally     Sleep: good with melatonin  ADD melatonin tablet 6 mg qHS PRN Insomnia              Physical Exam  General: well-developed, well-nourished, in no acute distress  Eye: EOMI, clear conjunctiva, eyelids normal  HENT: normocephalic, oropharynx and nasal mucosal surfaces moist  Neck: hard collar, supple  Respiratory: equal chest rise, no SOB, no audible wheeze  Cardiovascular: regular rate and rhythm, no edema  Gastrointestinal: soft, non-tender, non-distended   Musculoskeletal: BUE/BLE weakness  Integumentary: no rashes, bilateral heel wounds, bilateral anterior leg wounds, posterior cervical incision-staples-dressing-c/d/i  Neurologic: cranial nerves intact, BUE/BLE weakness, numbness (hands, legs, feet), balance deficits  *MD performed and documented physical examination           Labs:   Latest Reference Range & Units 01/28/25 04:58   Hemoglobin 14.0 - 18.0 g/dL 9.2 (L)    Hematocrit 42.0 - 52.0 % 27.3 (L)   (L): Data is abnormally low   Latest Reference Range & Units 01/27/25 04:23   WBC 4.50 - 11.50 x10(3)/mcL 8.75   RBC 4.70 - 6.10 x10(6)/mcL 3.16 (L)   Hemoglobin 14.0 - 18.0 g/dL 9.8 (L)   Hematocrit 42.0 - 52.0 % 29.3 (L)   MCV 80.0 - 94.0 fL 92.7   MCH 27.0 - 31.0 pg 31.0   MCHC 33.0 - 36.0 g/dL 33.4   RDW 11.5 - 17.0 % 11.6   Platelet Count 130 - 400 x10(3)/mcL 329   MPV 7.4 - 10.4 fL 10.4   Neut % % 63.9   LYMPH % % 22.3   Mono % % 10.4   Eos % % 1.9   Basophil % % 1.0   Immature Granulocytes % 0.5   Neut # 2.1 - 9.2 x10(3)/mcL 5.59   Lymph # 0.6 - 4.6 x10(3)/mcL 1.95   Mono # 0.1 - 1.3 x10(3)/mcL 0.91   Eos # 0 - 0.9 x10(3)/mcL 0.17   Baso # <=0.2 x10(3)/mcL 0.09   Immature Grans (Abs) 0.00 - 0.04 x10(3)/mcL 0.04   nRBC % 0.0   Sodium 136 - 145 mmol/L 137   Potassium 3.5 - 5.1 mmol/L 4.4   Chloride 98 - 107 mmol/L 100   CO2 23 - 31 mmol/L 27   Anion Gap mEq/L 10.0   BUN 8.4 - 25.7 mg/dL 21.6   Creatinine 0.72 - 1.25 mg/dL 1.16   BUN/CREAT RATIO  19   eGFR mL/min/1.73/m2 >60   Glucose 82 - 115 mg/dL 110   Calcium 8.8 - 10.0 mg/dL 9.4   (L): Data is abnormally low   Latest Reference Range & Units 01/23/25 16:41 01/27/25 00:36   Troponin I 0.000 - 0.045 ng/mL  0.020   Hemoglobin A1C External <=7.0 % 5.6    Estimated Avg Glucose mg/dL 114.0       [facility] Reference Range & Units 01/16/25 13:04 01/19/25 15:57   Color, UA Yellow, Light-Yellow, Colorless, Straw, Dark-Yellow  Light-Yellow    Appearance, UA Clear  Clear    Specific Gravity,UA 1.005 - 1.030  1.014    pH, UA 5.0 - 8.5  5.5    Protein, UA Negative  Negative    Glucose, UA Negative, Normal  2+ !    Ketones, UA Negative  Negative    Blood, UA Negative  Negative    NITRITE UA Negative  Negative    Bilirubin (UA) Negative  Negative    Urobilinogen, UA 0.2, 1.0, Normal  Normal    Leukocyte Esterase, UA Negative  75 !    RBC, UA None Seen, 0-2, 3-5, 0-5 /HPF 0-5    WBC, UA None Seen, 0-2, 3-5, 0-5 /HPF 6-10 !    Bacteria, UA  None Seen, Trace /HPF Trace    Squamous Epithelial Cells, UA None Seen, Trace, Rare /HPF None Seen    Urine Creatinine 63.0 - 166.0 mg/dL  64.9   Urine Protein mg/dL  23.3   URINE PROTEIN/CREATININE RATIO (OHS)   0.4   Sodium, Urine mmol/L  82.0   !: Data is abnormal            Diagnostics:  MRI Cervical Spine Without Contrast  Order: [id number]  Impression  1.  Severe C3-4 level central canal stenosis with increased signal hyperintensity within the cervical cord from mid to lower aspect of C3 through mid aspect of C4 with central cord myelomalacia.  No syrinx is detected.  2.  Prior C5-C7 intervertebral body fusion.  Electronically signed on 01/05/2025 09:37:19 PM US/[facility]      CT Head Without Contrast  Order: [id number]  Impression  Chronic changes of the brain. No acute intracranial findings are demonstrated. No significant interval changes from prior examination.  Exam End: 01/07/25 07:46       VAS US Arterial Legs Bilateral  Order: [id number]  Impression  Extensive plaquing about the bilateral lower extremity arterial trees.There are elevated velocities in the BILATERAL external iliac, common femoral and proximal superficial femoral arteries with monophasic waveforms in keeping with severe (50-99%) stenoses. Similar finding in the LEFT distal superficial femoral artery.  Suspect left common femoral in stent stenosis.  Significant arterial disease below the knees.  The RIGHT dorsal pedis, anterior tibial, posterior tibial peroneal arteries show trickle color flow with monophasic waveforms.  The LEFT dorsal pedis and anterior tibial arteries show similar trickle color flow with monophasic waveforms.  Exam End: 01/07/25 13:15         VAS US Carotid Bilateral   Impression:   1. Bilaterally, there is bilateral extensive atherosclerotic plaque causing velocity elevation and spectral broadening within the waveforms in the internal carotid arteries. Narrowing is estimated at 50-75% on the right and greater than 75%  on the left using NASCET criteria.   2.  Bilateral antegrade vertebral artery flow.   Exam End: 01/08/25 11:20       US Abdomen Limited   FINDINGS: Sonographic evaluation was performed of the right inguinal area revealing no evidence of hernia. Benign lymph nodes are noted.  Exam End: 01/08/25 14:58         CT CERVICAL SPINE WITHOUT CONTRAST   FINDINGS:  Straightening of the normal lordotic curvature with severe degenerative changes throughout the cervical spine.  Anterolisthesis of approximately 4 mm C3 on C4 is grossly unchanged with severe bilateral neural foraminal narrowing.  Canal with at this region measures approximately 8 mm which is unchanged when remeasured.  No further evaluation is needed recommend MR for evaluation of cord signal.  Remaining neural foraminal narrowing C5 through C7 similar to prior.  Impression:  Severe degenerative changes appears similar to prior with grossly unchanged canal narrowing.  If further evaluation is needed recommend MR.  Date:                                            01/14/2025  Time:                                           20:05      US RETROPERITONEAL COMPLETE   FINDINGS:  Grayscale and color Doppler sonographic evaluation of the kidneys and urinary bladder.  The right kidney measures 9-10 cm. The left kidney measures 8-9 cm.   No hydronephrosis.  There is a 1-2 cm left renal cyst for which no follow-up is needed.  Otherwise grossly normal renal parenchymal echogenicity.  There is some layering debris in the urinary bladder.  Impression:  1. No hydronephrosis.  2. Debris in the bladder, recommend correlation with urinalysis.  Date:                                            01/19/2025  Time:                                           10:06      XR CHEST 1 VIEW   Impression:  Increase interstitial markings and linear densities at the left base might be related to subsegmental atelectatic changes and or fibrotic streaks.  Otherwise no active pulmonary disease  Date:                                             01/20/2025  Time:                                           08:16        Assessment/Plan  Hospital   Anemia   Hyponatremia   Spinal cord compression   Status post cervical spinal fusion   Hypokalemia     Non-Hospital   HTN (hypertension)   Hyperlipidemia   Ischemic cardiomyopathy with implantable cardioverter-defibrillator (ICD)   Tobacco user   Type 2 diabetes mellitus   Peripheral arterial disease with history of revascularization   Chronic systolic CHF (congestive heart failure), NYHA class 3   Chronic low back pain   Cervical myelopathy   Carotid artery stenosis, asymptomatic, left   Arteriosclerosis of coronary artery   S/P triple vessel bypass   COPD (chronic obstructive pulmonary disease)   Gastro-esophageal reflux disease without esophagitis   Anxiety disorder, unspecified   Cervical radiculopathy   Lumbar radiculopathy   Myelomalacia of cervical cord       Wounds: bilateral heel wounds, bilateral anterior leg wounds, posterior cervical incision-staples-dressing-c/d/i  On 1/24, patient underwent C2-6 PCDF and C2-3- C4-5 decompression by Dr. Ramírez.  (Hemovac drain placed to site)  Precautions: spinal  Bracing: Hard cervical collar, bilateral heel boots in bed  Swallowing: Regular Diet  Function: Tolerating therapy. Continue PT/OT  VTE Prophylaxis:   enoxaparin injection 40 mg q24hr  Code Status: FULL CODE   Discharge: Lives alone in [place] in a 2nd story apartment. Completed high school. He has no  history.  Is disabled. . He was living alone and completely independent. Children: (2). Date pending.               Shannan Puri NP, conducted additional independent physical examination and assisted with medical documentation.

## 2025-01-31 NOTE — CONSULTS
Inpatient Nutrition Assessment    Admit Date: 1/31/2025   Total duration of encounter: 1 day   Patient Age: 69 y.o.    Nutrition Recommendation/Prescription     Regular Diet. Monitor glucose for need to add Diabetic restriction.  Bowel regimen per MD.  Add Boost Glucose Control TID (provides 190 kcal and 16 g protein per serving).  Add Ajrrell BID for wound support (provides 90 kcal and 2.5 g protein per serving).  Monitor wt, labs, and po intake.    Communication of Recommendations: reviewed with patient    Nutrition Assessment     Malnutrition Assessment/Nutrition-Focused Physical Exam       Malnutrition Level: other (see comments) (Unable to assess) (01/31/25 1518)     Weight Loss (Malnutrition): other (see comments) (Unable to assess) (01/31/25 1518)              Muscle Mass (Malnutrition): mild depletion (01/31/25 1518)  [place] (Muscle Loss): mild depletion                       Fluid Accumulation (Malnutrition): other (see comments) (Not present) (01/31/25 1518)     Hand  Strength, Right (Malnutrition): Unable to assess (01/31/25 1518)  A minimum of two characteristics is recommended for diagnosis of either severe or non-severe malnutrition.    Chart Review    Reason Seen: physician consult for eval and treat    Malnutrition Screening Tool Results   Have you recently lost weight without trying?: Yes: 2-13 lbs  Have you been eating poorly because of a decreased appetite?: No   MST Score: 1   Diagnosis:   Status post cervical spinal fusion 1/31/2025      Anemia 1/14/2025      Spinal cord compression 1/14/2025     Relevant Medical History: CKD 4, hypertension, hyperlipidemia, diabetes, COPD, CHF, coronary artery disease status post CABG, peripheral artery disease, AA, and tobacco dependence     Scheduled Medications:  acetaminophen, 650 mg, TID  amLODIPine, 5 mg, BID  aspirin, 81 mg, Daily  atorvastatin, 40 mg, Daily  carvediloL, 6.25 mg, BID  clopidogreL, 75 mg, Daily  docusate sodium, 100 mg,  BID  [START ON 2/1/2025] DULoxetine, 30 mg, Daily  enoxparin, 40 mg, Q24H (prophylaxis, 1700)  finasteride, 5 mg, Daily  umeclidinium-vilanteroL, 1 puff, Daily   And  fluticasone furoate, 1 puff, Daily  LIDOcaine, 1 patch, Q24H  [START ON 2/1/2025] linaCLOtide, 145 mcg, QAM  [START ON 2/1/2025] losartan, 25 mg, Daily  magnesium oxide, 400 mg, BID  methocarbamoL, 750 mg, QID  [START ON 2/1/2025] mupirocin, , BID  [START ON 2/1/2025] pantoprazole, 40 mg, Daily  pregabalin, 75 mg, TID  ranolazine, 500 mg, BID  [START ON 2/1/2025] tamsulosin, 0.4 mg, QHS    Continuous Infusions:   PRN Medications:  acetaminophen, 650 mg, Q6H PRN  ALPRAZolam, 0.25 mg, TID PRN  benzonatate, 100 mg, TID PRN  cyclobenzaprine, 10 mg, TID PRN  hydrALAZINE, 10 mg, Q4H PRN  hydrOXYzine pamoate, 50 mg, Nightly PRN  labetalol, 10 mg, Q4H PRN  melatonin, 6 mg, Nightly PRN  metoprolol, 10 mg, Q2H PRN  nitroGLYCERIN, 0.4 mg, Q5 Min PRN  ondansetron, 4 mg, Q6H PRN  ondansetron, 8 mg, Q8H PRN  ondansetron, 4 mg, Q8H PRN  oxyCODONE, 10 mg, Q4H PRN  oxyCODONE, 5 mg, Q4H PRN  polyethylene glycol, 17 g, BID PRN    Calorie Containing IV Medications: no significant kcals from medications at this time    Recent Labs   Lab 01/25/25  0545 01/25/25  0546 01/26/25  0534 01/27/25  0423 01/28/25  7134   NA  --  133*  --  137  --    K  --  4.3  --  4.4  --    CALCIUM  --  9.8  --  9.4  --    CL  --  100  --  100  --    CO2  --  27  --  27  --    BUN  --  29.7*  --  21.6  --    CREATININE  --  1.31*  --  1.16  --    EGFRNORACEVR  --  60  --  >60  --    GLUCOSE  --  103  --  110  --    BILITOT  --  0.4  --   --   --    ALKPHOS  --  104  --   --   --    ALT  --  14  --   --   --    AST  --  17  --   --   --    ALBUMIN  --  3.6  --   --   --    WBC 10.01  --   --  8.75  --    HGB 10.6*  --  10.1* 9.8* 9.2*   HCT 31.9*  --  29.5* 29.3* 27.3*     Nutrition Orders:  Diet Adult Regular Standard Tray      Appetite/Oral Intake: good/% of meals  Factors Affecting  "Nutritional Intake:  functionality  Social Needs Impacting Access to Food: none identified  Food/Confucianism/Cultural Preferences:  likes fruit (pineapple, cantaloupe)  Food Allergies: no known food allergies  Last Bowel Movement: 01/28/25  Wound(s):     Wound 01/14/25 2202 Other (comment) Right Heel-Tissue loss description: Partial thickness     Comments    1/31/25: Pt reports good appetite and intake. Pt reports constipation, says he will sometimes go 8 days w/o a BM. Pt usually takes dulcolax and Linzess-will have his son bring it for him. Pt open to chocolate ONS. Pt says he thinks he may have lost some weight over the last 6 months to a year 2/2 decreased po intake. Per EMR, weight seems it remained stable over last few years. Most recent stated wt is most likely incorrect. Will need to monitor and trend wts. Pt denies issues c/s.     Anthropometrics    Height: 5' 6" (167.6 cm), Height Method: Stated  Last Weight: 62.2 kg (137 lb 2 oz) (01/31/25 1321),   wt most likely incorrect  BMI (Calculated): 22.1   BMI Classification: normal (BMI 18.5-24.9)         Ideal Body Weight (IBW), Male: 142 lb     % Ideal Body Weight, Male (lb): 96.57 %                          Usual Weight Provided By: patient    Wt Readings from Last 5 Encounters:   01/31/25 62.2 kg (137 lb 2 oz)   01/18/25 78 kg (172 lb)   12/03/24 78.5 kg (173 lb)   08/29/22 78.9 kg (173 lb 15.1 oz)   03/16/16 82.3 kg (181 lb 7 oz)     Weight Change(s) Since Admission: stated wt per EMR. Pt reported he weighs about 170#  Wt Readings from Last 1 Encounters:   01/31/25 1321 62.2 kg (137 lb 2 oz)   Admit Weight: 62.2 kg (137 lb 2 oz) (01/31/25 1321),      Estimated Needs    Weight Used For Calorie Calculations: 78 kg (171 lb 15.3 oz)  Energy Calorie Requirements (kcal): 6694-6422 kcal (1.1-1.2 SFxMSJ)  Energy Need Method: Fillmore-St Jeor  Weight Used For Protein Calculations: 78 kg (171 lb 15.3 oz)  Protein Requirements: 70-78 g (0.9-10 g/kg)  Fluid Requirements " (mL): 1950 ml/d (25 mL/kg)  CHO Requirement: 218-266 g/d (45-55% of EER)     Enteral Nutrition     Patient not receiving enteral nutrition at this time.    Parenteral Nutrition     Patient not receiving parenteral nutrition support at this time.    Evaluation of Received Nutrient Intake    Calories: meeting estimated needs  Protein: meeting estimated needs    Patient Education     Not applicable.    Nutrition Diagnosis     PES: No nutrition diagnosis at this time unknown etiology as evidenced by n/a. (new)     PES:            Nutrition Interventions     Intervention(s): general/healthful diet, commercial beverage, multivitamin/mineral supplement therapy, and collaboration with other providers  Intervention(s): Oral nutritional supplement    Goal: Maintain weight throughout hospitalization. (new)  Goal: Consume % of oral supplements by follow-up. (new)    Nutrition Goals & Monitoring     Dietitian will monitor: food and beverage intake, weight, electrolyte/renal panel, glucose/endocrine profile, and gastrointestinal profile  Discharge planning: resume home regimen  Nutrition Risk/Follow-Up: low (follow-up in 5-7 days)   Please consult if re-assessment needed sooner.

## 2025-01-31 NOTE — H&P
[facility] ([facility])  Rehab Admission H&P    Patient Name: Tyler Mai  MRN: [id number]  Age: 69 y.o. Sex: male  : 1955  Hospital Length of Stay: 0 days  Date of Service: 2025   Chief Complaint:  Cervical myelopathy s/p C2-6 PCDF and C2/3-C4/5 decompression on 2025    Subjective:   History of Present Illness   69-year-old white male presented to [facility] ED on 2025 complaining of bilateral arm weakness, bilateral leg weakness, and worsening neck pain.  Reports right leg weaker than the left.  Recently admitted to [facility] for cervical myelopathy with plans for surgery, but patient had severe UTI requiring antibiotic therapy.  MRI on  significant for severe C3-4 level central canal stenosis with increased signal hyperintensity within the cervical cord from mid to lower aspect of C3 through mid aspect of C4 with central cord myelomalacia.  PMH significant for cervical myelopathy with history of C5-C6 fusion, CAD, HFrEF, ICMO, COPD, DM type 2, HLD, HTN, and PVD.  Workup significant for cervical myelopathy.  Patient inpatient family initially declined the neurosurgeon on call surgery.  Then surgery postpone due to inclement weather.  Tolerated posterior C3-C5 total laminectomies for decompression, placement of bilateral pars screws at C2 and bilateral lateral mass screws from C3 to C6 using [facility] Invictus OCT System, and placement of local bone and AlphaGraft DBM for arthrodesis from C2 to C6 on  without perioperative complications.  Postoperatively neurosurgery documented increase mobility to bilateral upper extremities with a 5/5 hand grasp.  Recommended aspirin hard collar at all times, and [place] collar for showers..  Recommended removal staples on .  Gray continued due to urinary retention.  Tolerated transfer to [facility] inpatient rehab unit on  without incident.    Lying in bed.  Reports good sleep and appetite.  Last BM .  Reports see  regularly has bowel movements every 8 days.  Vital signs at goal with no recorded fevers.  Recent lab work and imaging reported below.    Past Medical History: Cervical myelopathy s/p PCDF, CAD s/p CABG x3, HFrEF/ICMO, HTN, COPD, DM type 2, HLD, HLD, PVD, chronic constipation, hypomagnesemia, BPH, GERD, normocytic anemia  Procedure history:  ACDF , ACDF , cholecystectomy, CABG x3, pacemaker x2  Family History:  Mother: Lung cancer +nicotine dependence + at age 82.  Father:  Lung cancer +brain cancer + in his late 60s  Social History:   (+) TOB. 1PPD 55 pack years    (-) ETOH.   (-) Illicit drug use.   Completed high school.  Disabled.  .  Has 2 children.  Prior level of function:  Prior to 3 months ago was independent with ADLs, driving, making simple meals, doing chores, doing laundry, grocery shopping, manages finances and medications.  He was using a Rollator for mobility.  Describes with the last 3 months he was physically declining.  Residence:  Lives alone in a 2 story apartment.  Has a tub/shower combination with the hand-held shower and a shower chair.  No grab bars.  He does not have an elevated toilet.  No grab bars adjacent.  DME:  Rollator x2, rolling walker, shower chair, and hand-held shower.  Anticipated discharge destination:  Home with home health    Review of patient's allergies indicates:   Allergen Reactions    Hydrochlorothiazide      Other Reaction(s): Pancreatitis      No current facility-administered medications for this encounter.    Current Outpatient Medications:     amLODIPine (NORVASC) 5 MG tablet, Take 1 tablet (5 mg total) by mouth 2 (two) times daily., Disp: , Rfl:     carvediloL (COREG) 6.25 MG tablet, Take 1 tablet (6.25 mg total) by mouth 2 (two) times daily., Disp: , Rfl:     cyclobenzaprine (FLEXERIL) 10 MG tablet, Take 1 tablet (10 mg total) by mouth 3 (three) times daily as needed for Muscle spasms., Disp: , Rfl:     losartan (COZAAR) 25 MG  tablet, Take 1 tablet (25 mg total) by mouth once daily., Disp: , Rfl:     magnesium oxide (MAG-OX) 400 mg (241.3 mg magnesium) tablet, Take 1 tablet (400 mg total) by mouth 2 (two) times daily., Disp: , Rfl:     methocarbamoL (ROBAXIN) 750 MG Tab, Take 1 tablet (750 mg total) by mouth 4 (four) times daily. for 10 days, Disp: , Rfl:     pregabalin (LYRICA) 75 MG capsule, Take 1 capsule (75 mg total) by mouth 2 (two) times daily., Disp: , Rfl:     tamsulosin (FLOMAX) 0.4 mg Cap, Take 1 capsule (0.4 mg total) by mouth once daily., Disp: , Rfl:     Facility-Administered Medications Ordered in Other Encounters:     acetaminophen tablet 650 mg, 650 mg, Oral, Q4H PRN, Reyes, Thairy G, DO    albuterol-ipratropium 2.5 mg-0.5 mg/3 mL nebulizer solution 3 mL, 3 mL, Nebulization, Q6H PRN, Reyes, Thairy G, DO    aluminum-magnesium hydroxide-simethicone 200-200-20 mg/5 mL suspension 30 mL, 30 mL, Oral, QID PRN, Reyes, Thairy G,     amLODIPine tablet 5 mg, 5 mg, Oral, BID, Donna Garcia MD, 5 mg at 01/31/25 0913    bisacodyL suppository 10 mg, 10 mg, Rectal, Daily PRN, Reyes, Thairy G, DO    carvediloL tablet 6.25 mg, 6.25 mg, Oral, BID, Jorge Deluna MD, 6.25 mg at 01/31/25 0913    cefTRIAXone injection 2 g, 2 g, Intravenous, On Call Procedure, Izzy Lopez NP, 1 g at 01/24/25 1330    cyclobenzaprine tablet 10 mg, 10 mg, Oral, TID PRN, Reyes, Thairy G, , 10 mg at 01/25/25 1118    dextrose 50% injection 12.5 g, 12.5 g, Intravenous, PRN, Reyes, Thairy G,     dextrose 50% injection 12.5 g, 12.5 g, Intravenous, PRN, Donna Garcia MD    dextrose 50% injection 25 g, 25 g, Intravenous, PRN, Reyes, Thairy G, DO    dextrose 50% injection 25 g, 25 g, Intravenous, PRN, Donna Garcia MD    enoxaparin injection 40 mg, 40 mg, Subcutaneous, Q24H (prophylaxis, 1700), Jorge Deluna MD, 40 mg at 01/30/25 1805    fluticasone furoate-vilanteroL 200-25 mcg/dose diskus inhaler 1 puff, 1 puff, Inhalation, Daily, Jorge Deluna MD, 1 puff at  01/31/25 0914    glucagon (human recombinant) injection 1 mg, 1 mg, Intramuscular, PRN, Reyes, Thairy G, DO    glucagon (human recombinant) injection 1 mg, 1 mg, Intramuscular, PRN, Donna Garcia MD    glucose chewable tablet 16 g, 16 g, Oral, PRN, Reyes, Thairy G, DO    glucose chewable tablet 16 g, 16 g, Oral, PRN, Donna Garcia MD    glucose chewable tablet 24 g, 24 g, Oral, PRN, Reyes, Thairy G, DO    glucose chewable tablet 24 g, 24 g, Oral, PRN, Donna Garcia MD    hydrALAZINE injection 5 mg, 5 mg, Intravenous, Q6H PRN, Donna Garcia MD, 5 mg at 01/26/25 0404    insulin aspart U-100 injection 0-5 Units, 0-5 Units, Subcutaneous, QID (AC + HS) PRN, Donna Garcia MD, 3 Units at 01/26/25 1647    labetaloL injection 10 mg, 10 mg, Intravenous, Q4H PRN, Izzy Lopez NP, 10 mg at 01/26/25 0518    linaCLOtide capsule 145 mcg, 145 mcg, Oral, QAM, Reyes, Thairy G, DO, 145 mcg at 01/31/25 0435    losartan tablet 25 mg, 25 mg, Oral, Daily, Donna Garcia MD, 25 mg at 01/31/25 0913    magnesium oxide tablet 400 mg, 400 mg, Oral, BID, Jorge Deluna MD, 400 mg at 01/31/25 0913    melatonin tablet 9 mg, 9 mg, Oral, Nightly PRN, Reyes, Thairy G, DO, 9 mg at 01/30/25 2026    methocarbamoL tablet 750 mg, 750 mg, Oral, QID, Izzy Lopez, NP, 750 mg at 01/31/25 0912    naloxone 0.4 mg/mL injection 0.02 mg, 0.02 mg, Intravenous, PRN, Reyes, Thairy G, DO    ondansetron disintegrating tablet 8 mg, 8 mg, Oral, Q8H PRN, Reyes, Thairy G, DO, 8 mg at 01/29/25 2250    ondansetron injection 4 mg, 4 mg, Intravenous, Q8H PRN, Reyes, Thairy G, DO    oxyCODONE-acetaminophen  mg per tablet 1 tablet, 1 tablet, Oral, Q4H PRN, Izzy Lopez NP, 1 tablet at 01/31/25 0912    pantoprazole EC tablet 40 mg, 40 mg, Oral, Before breakfast, Reyes, Thairy G, DO, 40 mg at 01/31/25 0436    polyethylene glycol packet 17 g, 17 g, Oral, TID PRN, Reyes, Thairy G, DO    pregabalin capsule 75 mg, 75 mg, Oral, BID, Jorge Deluna MD, 75 mg at 01/31/25  "0912    ranolazine 12 hr tablet 500 mg, 500 mg, Oral, BID, Reyes, Thairy G, DO, 500 mg at 01/31/25 0913    simethicone chewable tablet 80 mg, 1 tablet, Oral, QID PRN, Reyes, Thairy G, DO    sodium chloride 0.9% flush 10 mL, 10 mL, Intravenous, PRN, Reyes, Thairy G, DO    tamsulosin 24 hr capsule 0.4 mg, 0.4 mg, Oral, Daily, Donna Garcia MD, 0.4 mg at 01/31/25 0913    tiotropium bromide 2.5 mcg/actuation inhaler 2 puff, 2 puff, Inhalation, Daily, Jorge Deluna MD, 2 puff at 01/31/25 0914    white petrolatum 41 % ointment, , Topical (Top), Daily, Reyes, Thairy G, DO, Given at 01/31/25 0913     Review of Systems   Complete 12-point review of symptoms negative except for what's mentioned in HPI     Objective:     Ht 5' 6" (1.676 m)   BMI 27.76 kg/m²     Physical Exam  Constitutional:       Appearance: Normal appearance.   HENT:      Head: Normocephalic.      Mouth/Throat:      Mouth: Mucous membranes are moist.   Eyes:      Pupils: Pupils are equal, round, and reactive to light.   Neck:      Comments: Posterior neck incision clean and intact.  Hard collar intact  Cardiovascular:      Rate and Rhythm: Normal rate and regular rhythm.      Heart sounds: Normal heart sounds.   Pulmonary:      Effort: Pulmonary effort is normal.      Breath sounds: Normal breath sounds.   Abdominal:      General: Bowel sounds are normal.      Palpations: Abdomen is soft.   Genitourinary:     Comments: Indwelling catheter with yellow urine/clear  Musculoskeletal:      Cervical back: Neck supple.      Comments: Diffuse muscle atrophy   Skin:     General: Skin is warm and dry.   Neurological:      Mental Status: He is alert and oriented to person, place, and time.      Motor: Weakness present.   Psychiatric:         Mood and Affect: Mood normal.         Behavior: Behavior normal.         Thought Content: Thought content normal.         Judgment: Judgment normal.     *MD performed and documented physical examination   "     Lines/Drains/Airways       Drain  Duration                  Urethral Catheter 01/26/25 1330 Latex 16 Fr. 4 days              Peripheral Intravenous Line  Duration                  Peripheral IV - Single Lumen 01/14/25 1853 20 G Anterior;Right Forearm 16 days         Peripheral IV - Single Lumen 01/24/25 1400 18 G Anterior;Left Wrist 6 days                    Labs  Hgb 14.0 - 18.0 g/dL 9.2 Low    Hct 42.0 - 52.0 % 27.3 Low      Sodium 136 - 145 mmol/L 137   Potassium 3.5 - 5.1 mmol/L 4.4   Chloride 98 - 107 mmol/L 100   CO2 23 - 31 mmol/L 27   Glucose 82 - 115 mg/dL 110   Blood Urea Nitrogen 8.4 - 25.7 mg/dL 21.6   Creatinine 0.72 - 1.25 mg/dL 1.16   BUN/Creatinine Ratio  19   Calcium 8.8 - 10.0 mg/dL 9.4   Anion Gap mEq/L 10.0   eGFR mL/min/1.73/m2 >60     Radiology  Cervical XR two-view on 01/25/2025, IMPRESSION: There are postoperative changes with posterior spinal fusion hardware at C2 through C6. The hardware appears intact. Cervical alignment has improved. The remaining vertebral body heights are preserved. There is moderate disc height loss at C3-C4, mild at C4-C5, with multilevel marginal osteophytes. The soft tissues are unremarkable.     Assessment/Plan:     69 y.o. WM admitted on 1/31/2025    Cervical myelopathy   - s/p C2-6 PCDF and C2/3-C4/5 decompression on 01/24/2025  - remove staples on 02/07  - continue aspirin hard collar at all times, okay for [place] collar for showers only  - continue    Robaxin 750 mg q.i.d.     Flexeril 10 mg t.i.d. p.r.n.    Oxycodone 5 mg q.4 hours p.r.n.     Oxycodone 10 mg q.4 hours p.r.n.    Lyrica 75 mg b.i.d. (initiated 1/31)  - Consult physiatry for rehab and pain management  - PT/OT/RT/ST to eval and treat   - to follow up with Neurosurgery outpatient    Coronary artery disease  - s/p  CABG x 3  - denies recent chest pain or discomfort  - resume    ASA 81 mg daily (resumed 1/31)    Plavix 75 mg daily (resume 1/31)  - continue    Coreg 6.25 mg b.i.d.      Lipitor 40 mg daily    Losartan 25 mg daily   - ECG and Nitro PRN  - continue cardiac diet  - to follow-up with cardiology outpatient    HFrEF/ICMO  - stable  - continue    Coreg 6.25 mg b.i.d.     Losartan 25 mg daily     Ranexa 500 mg b.i.d.   - obtain daily standing weights  - to follow up with cardiology outpatient    Nicotine dependence  - smoking cessation counseling given on admission  - 1 PPD with approximately 55 pack years    HTN  - BP at goal  - continue    Coreg 6.25 mg b.i.d.     Losartan 25 mg daily     Norvasc 5 mg daily     Ranexa 500 mg b.i.d.     Flomax 0.4 mg at bedtime    Hydralazine 10 mg every 2 hours as needed for BP > 160/90    Labetalol 10 mg every 2 hours as needed for BP > 160/90  - low sodium diet     COPD   - stable  - not on home 02  - continue    Trelegy INH daily       DM type II  - HgA1c 5.6 on 01/23/2024  - continue    Lantus 10 units every evening (resumed 1/31)    ISS   - CBGs AC/HS    HLD  - FLP outpatient  - resume Crestor upon discharge  - continue    Lipitor 40 mg daily     PVD  - stable  - resume    ASA 81 mg daily (resumed 1/31)    Plavix 75 mg daily (resume 1/31)  - continue    Coreg 6.25 mg b.i.d.     Lipitor 40 mg daily  - to follow up vascular surgery outpatient      Chronic constipation  - last BM 1/29, reports is normal for him to have a bowel movement every 8 days  - continue    Linzess 145 mcg before breakfast     Colace 100 mg b.i.d.    Hypomagnesemia  - stable  - continue    Magnesium oxide 400 mg b.i.d.     BPH/urinary retention  - current  - initiate    Proscar 5 mg daily (initiated 1/31)  - continue    Flomax 0.4 mg at bedtime     GERD  - Avoid spicy foods, and nothing to eat or drink within x2 hours of bedtime or laying flat (water is ok)   - Avoid NSAIDs (Advil, ibuprofen, naproxen...) and chavira-2 inhibitors (Mobic, Celebrex)    - continue    Protonix 40 mg daily     Normocytic anemia  - asymptomatic  - H/H stable   - no evidence of active bleeds  - will  closely monitor and transfuse if needed     MEDICAL PLAN:  - Admit to [facility]  - Medical reconciliation completed and included in the electronic health record  - Draw admit labs including CBC, CMP, and Prealbumin  - Maintain weightbearing status as ordered  - Monitor postoperative surgical incision changing dressing daily  - Teach skin protection and wound prevention techniques  - Initiate fall precaution  - Initiate bowel training program  - Initiate bladder training as indicated  - Pain management  - IS q2 hours while awake    THERAPEUTIC PLAN:  - Physical therapy 60-90 minutes 5 to week to increase functional mobility, maintain weightbearing precautions, increase lower extremity restrengthening, increase overall activity tolerance, teach balance and safety measures as well as fall precautions, make equipment and orthotic recommendations, be involved in family training and discharge planning, monitor pain levels and vital signs during therapy adjusting treatment accordingly  - Occupational therapy 60-90 minutes 5 times a week to increase functional independence with activities of daily living, maintain weightbearing precautions, teach use of adaptive equipment, increase upper extremity restrengthening, increase overall activity tolerance, teach balance and safety measures as well as fall precautions, make equipment and orthotic recommendations, be involved in family training and discharge planning, monitor pain levels and vital signs during therapy adjusting treatment accordingly  - Speech and language pathology will do an in-depth evaluation of patient's cognition, phonation, and swallowing. They will initiate therapy 30- 60 minutes 5 times a week as indicated  - Recreational therapy will incorporate all patient's functional abilities  into recreational activities that will require visual, spatial, and perceptual abilities; gross and fine motor coordination  skills; the cognition to follow multistep commands; dynamic and static balance and reaching abilities. They will also incorporate animal assisted therapy into their weekly program  - Rehabilitation nursing will act as patient family physician liaison. They will integrate patient's functional abilities, after discussions with therapist, into everyday activities with the CNA's,  LPN's and RNs that will include but are not limited to dressing, bathing, feeding, grooming, toileting, transfers, hygiene etc. They will administer medications watching for wanted as well as unwanted effects. They will initiate DVT/VTE prophylaxis as ordered. Will monitor vital signs, lab values, and pain levels, making appropriate physician notification. They will monitor skin integrity including postop incision, making daily dressing changes. They will teach skin protection and wound prevention techniques. They will initiate bowel training They will initiate bladder training as indicated. They will initiate fall precautions  - Rehabilitation counseling/case management will act as patient and family liaison. They will set up family conferences, family training, aid in discharge planning, and aid in the procurement of assistive devices  - Patient will have 24 hour availability to physiatric care  - Patient will have nutritional services involved, monitoring oral input and visceral protein stores. They will make dietary and supplement recommendations and follow-up as necessary  - Patient will have weekly interdisciplinary team conferences  - Patient will have initial family conference and follow up conferences as indicated  - Patient will have family training prior to discharge     Estimated length of stay - 14-17 days  Anticipated discharge destination - Home with   Medical status - stabilizing postoperatively; will need to monitor pain levels, postoperative skin integrity, watch for evidence of deep vein thrombosis, monitor postoperative  H&H, monitor vital signs closely.  Medical prognosis - Good for post-op healing and functional improvement  Previous functional Status:  Independent  Present Functional Status:  Min-max assist    VTE Prophylaxis: Lovenox 40 mg daily   COVID-19 testing:  Unknown  COVID-19 vaccination status:  Vaccinated (Moderna):  Bivalent booster on 04/03/2023    POA:  No  Living will:  No  Contacts:  Tyler Mai Jr (son) [phone]    CODE STATUS:  Full code  Internal Medicine (attending): Tyler De Anda MD  Physiatry (consulting):  Philip Fang MD    OUTPATIENT PROVIDERS  PCP: None  Neurosurgery: Darrell Zavala MD    DISPOSITION: Condition stable. Tolerated transfer.  Recent labs and imaging reviewed.  Rehab admission orders initiated.  Med reconciliation completed.  Consult physiatry for rehab and pain management.  PT/OT/RT/ST to eval and treat.  Obtain admission lab work in the morning.  Resume aspirin and Plavix.  Initiate Proscar 5 mg daily.  Lyrica 75 mg b.i.d. initiated by internal medicine prior to discharge from [facility].  Monitor closely.  Notify of acute changes.    PHYSICIAN ATTESTATION: I have been involved in the patient's rehabilitation prescreening process and I have now completed the post admission physician evaluation. Patient is in need of, appropriate for, and should tolerate the above outlined inpatient rehabilitation plan. I believe this is necessary to reach maximal postoperative healing and maximal functional abilities. I do not believe this would be possible in a timely fashion in a less intensive setting      Jarrell Ramirez NP conducted independent physical examination and assisted with medical documentation.    Total time spent on this encounter including chart review and direct MD + NP 1-on-1 patient interaction: 107 minutes   Over 50% of this time was spent in counseling and coordination of care

## 2025-01-31 NOTE — DISCHARGE SUMMARY
"[facility] Medicine Discharge Summary    Admit Date: 1/31/2025  Discharge Date and Time: 1/31/20253:17 PM  Admitting Physician: MAGGIE Team  Discharging Physician: Susy Antonio DO.  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  He was seen by Neurosurgery on 12/03/2024 however he did not complete cardiac evaluation for clearance and did not go to scheduled MRI appointments.  They recommended fusion at least at C3-4 and C4-5.      He was seen at [facility] 01/05/2025: "He initially told Dr. Bella he did not want surgery. He changed his mind as his UTI was treated and cleared and a f/u urine culture from 1/11 has been negative for growth. His son does not want Dr. Bella to do the surgery and attempts were made to see if Dr. Neumann or Dr. Bowen would see the patient but both declined this. He was recommended to discharge and report to a different hospital for a reevaluation because he and his family have declined Dr. Bella's offer to do surgery. The surgery is felt to be needed and he has been seen by Dr. Beebe cardiology who rates his cardiac risk as moderate at 5-7% of a cardiac event." At the time of MD evaluation later  patient was seeking neurosurgical evaluation and intervention.  Neurosurgery was informed.  " Received ceftriaxone to cover any UTI. Seen by Nephrology for JOHN, renal function improved eventually.Status post-C2-6 PCDF and C2/3- C4/5 decompression 1/24.  Requiring Gray for urinary retention.  Appears to be constipated.     Neurosurgery signed off.  Discontinue staples on day 14.  Outpatient follow-up with Izzy DUNNE.  Keep Gray for now, tamsulosin.  Plan for a voiding trial in the next couple of days.  Discontinue IV fluids  Reports pain is still uncontrolled.  Takes Percocet 10 at home every 4 hours prescribed by his pain management specialist.  I see that he is only prescribed 90 tablets per month.  Rehab accepted. Labs and vitals stable on day of discharge.     Pt was seen and examined on the day of discharge  Vitals:  VITAL SIGNS: 24 HRS MIN & MAX LAST   Temp  Min: 97.6 °F (36.4 °C)  Max: 98.5 °F (36.9 °C) 98 °F (36.7 °C)   BP  Min: 101/51  Max: 155/68 132/73   Pulse  Min: 58  Max: 92  67   Resp  Min: 17  Max: 20 18   SpO2  Min: 94 %  Max: 97 % 95 %       Physical Exam:  GENERAL: In no acute distress, afebrile  HEENT:  CHEST: Clear to auscultation bilaterally  HEART: S1, S2, no appreciable murmur  ABDOMEN: Soft, nontender, BS +  MSK: Warm, no lower extremity edema, no clubbing or cyanosis  NEUROLOGIC: Alert and oriented x4, moving all extremities with good strength   INTEGUMENTARY:  PSYCHIATRY:    Procedures Performed: No admission procedures for hospital encounter.     Significant Diagnostic Studies: See Full reports for all details    Recent Labs   Lab 01/25/25  0545 01/26/25  0534 01/27/25  0423 01/28/25  0458   WBC 10.01  --  8.75  --    RBC 3.47*  --  3.16*  --    HGB 10.6* 10.1* 9.8* 9.2*   HCT 31.9* 29.5* 29.3* 27.3*   MCV 91.9  --  92.7  --    MCH 30.5  --  31.0  --    MCHC 33.2  --  33.4  --    RDW 11.7  --  11.6  --      --  329  --    MPV 10.1  --  10.4  --        Recent Labs   Lab 01/25/25  0546 01/27/25  0423   * 137   K 4.3 4.4    100   CO2 27 27   BUN 29.7* 21.6    CREATININE 1.31* 1.16   CALCIUM 9.8 9.4   ALBUMIN 3.6  --    ALKPHOS 104  --    ALT 14  --    AST 17  --    BILITOT 0.4  --         Microbiology Results (last 7 days)       ** No results found for the last 168 hours. **             X-Ray Cervical Spine 2 or 3 Views  Narrative: EXAMINATION:  XR CERVICAL SPINE 2 OR 3 VIEWS    CLINICAL HISTORY:  s/p C2-6 PCDF;    COMPARISON:  CT cervical spine dated 01/14/2025.    FINDINGS:  There are postoperative changes with posterior spinal fusion hardware at C2 through C6.  The hardware appears intact.  Cervical alignment has improved.  The remaining vertebral body heights are preserved.  There is moderate disc height loss at C3-C4, mild at C4-C5, with multilevel marginal osteophytes.  The soft tissues are unremarkable.  Impression: Postoperative changes without acute abnormality.    Electronically signed by: Katelyn Blanco  Date:    01/25/2025  Time:    10:39         Medication List        ASK your doctor about these medications      * albuterol sulfate 2.5 mg/0.5 mL Nebu     * albuterol 2.5 mg /3 mL (0.083 %) nebulizer solution  Commonly known as: PROVENTIL     * albuterol 90 mcg/actuation inhaler  Commonly known as: PROVENTIL/VENTOLIN HFA     amLODIPine 5 MG tablet  Commonly known as: NORVASC  Take 1 tablet (5 mg total) by mouth 2 (two) times daily.     BASAGLAR KWIKPEN U-100 INSULIN 100 unit/mL (3 mL) Inpn pen  Generic drug: insulin glargine U-100 (Lantus)     carvediloL 6.25 MG tablet  Commonly known as: COREG  Take 1 tablet (6.25 mg total) by mouth 2 (two) times daily.     clopidogreL 75 mg tablet  Commonly known as: PLAVIX     cyclobenzaprine 10 MG tablet  Commonly known as: FLEXERIL  Take 1 tablet (10 mg total) by mouth 3 (three) times daily as needed for Muscle spasms.     LINZESS 145 mcg Cap capsule  Generic drug: linaCLOtide     losartan 25 MG tablet  Commonly known as: COZAAR  Take 1 tablet (25 mg total) by mouth once daily.     magnesium oxide 400 mg (241.3 mg  magnesium) tablet  Commonly known as: MAG-OX  Take 1 tablet (400 mg total) by mouth 2 (two) times daily.     methocarbamoL 750 MG Tab  Commonly known as: ROBAXIN  Take 1 tablet (750 mg total) by mouth 4 (four) times daily. for 10 days     oxyCODONE-acetaminophen  mg per tablet  Commonly known as: PERCOCET     pantoprazole 40 MG tablet  Commonly known as: PROTONIX  Take 1 tablet (40 mg total) by mouth once daily.     pregabalin 75 MG capsule  Commonly known as: LYRICA  Take 1 capsule (75 mg total) by mouth 2 (two) times daily.     ranolazine 500 MG Tb12  Commonly known as: RANEXA     rosuvastatin 40 MG Tab  Commonly known as: CRESTOR     tamsulosin 0.4 mg Cap  Commonly known as: FLOMAX  Take 1 capsule (0.4 mg total) by mouth once daily.     TRELEGY ELLIPTA 200-62.5-25 mcg inhaler  Generic drug: fluticasone-umeclidin-vilanter           * This list has 3 medication(s) that are the same as other medications prescribed for you. Read the directions carefully, and ask your doctor or other care provider to review them with you.                   Explained in detail to the patient about the discharge plan, medications, and follow-up visits. Pt understands and agrees with the treatment plan  Discharge Disposition: Rehab Facility   Discharged Condition: stable  Diet-   Dietary Orders (From admission, onward)       Start     Ordered    01/31/25 1153  Diet Adult Regular Standard Tray  (Diet/Nutrition - [facility])  Diet effective now        Question:  Tray type:  Answer:  Standard Tray    01/31/25 1152                   Medications Per DC med rec  Activities as tolerated   Follow-up Information       No, Primary Doctor Follow up.               Darrell Watson MD Follow up in 2 week(s).    Specialty: Neurosurgery  Why: Neurosurgery- Hospital Follow-up Appointment.  Contact information:  [address] [place] [address] Dr Meghan GOLDBERG [address] [phone]                           For further questions contact hospitalist  office    Discharge time 33 minutes    For worsening symptoms, chest pain, shortness of breath, increased abdominal pain, high grade fever, stroke or stroke like symptoms, immediately go to the nearest Emergency Room or call 911 as soon as possible.      Susy Antonio DO  Department of Hospital Medicine  [facility]  01/31/2025

## 2025-02-01 LAB
ALBUMIN SERPL-MCNC: 3 G/DL (ref 3.4–4.8)
ALBUMIN/GLOB SERPL: 0.9 RATIO (ref 1.1–2)
ALP SERPL-CCNC: 102 UNIT/L (ref 40–150)
ALT SERPL-CCNC: 11 UNIT/L (ref 0–55)
ANION GAP SERPL CALC-SCNC: 7 MEQ/L
AST SERPL-CCNC: 12 UNIT/L (ref 5–34)
BASOPHILS # BLD AUTO: 0.05 X10(3)/MCL
BASOPHILS NFR BLD AUTO: 0.6 %
BILIRUB SERPL-MCNC: 0.3 MG/DL
BUN SERPL-MCNC: 23 MG/DL (ref 8.4–25.7)
CALCIUM SERPL-MCNC: 9.1 MG/DL (ref 8.8–10)
CHLORIDE SERPL-SCNC: 100 MMOL/L (ref 98–107)
CO2 SERPL-SCNC: 27 MMOL/L (ref 23–31)
CREAT SERPL-MCNC: 1.11 MG/DL (ref 0.72–1.25)
CREAT/UREA NIT SERPL: 21
EOSINOPHIL # BLD AUTO: 0.29 X10(3)/MCL (ref 0–0.9)
EOSINOPHIL NFR BLD AUTO: 3.6 %
ERYTHROCYTE [DISTWIDTH] IN BLOOD BY AUTOMATED COUNT: 11.8 % (ref 11.5–17)
FERRITIN SERPL-MCNC: 139.38 NG/ML (ref 21.81–274.66)
GFR SERPLBLD CREATININE-BSD FMLA CKD-EPI: >60 ML/MIN/1.73/M2
GLOBULIN SER-MCNC: 3.2 GM/DL (ref 2.4–3.5)
GLUCOSE SERPL-MCNC: 103 MG/DL (ref 82–115)
HCT VFR BLD AUTO: 27.1 % (ref 42–52)
HGB BLD-MCNC: 9 G/DL (ref 14–18)
IMM GRANULOCYTES # BLD AUTO: 0.04 X10(3)/MCL (ref 0–0.04)
IMM GRANULOCYTES NFR BLD AUTO: 0.5 %
IRON SATN MFR SERPL: 15 % (ref 20–50)
IRON SERPL-MCNC: 30 UG/DL (ref 65–175)
LYMPHOCYTES # BLD AUTO: 1.87 X10(3)/MCL (ref 0.6–4.6)
LYMPHOCYTES NFR BLD AUTO: 23.2 %
MAGNESIUM SERPL-MCNC: 1.3 MG/DL (ref 1.6–2.6)
MCH RBC QN AUTO: 30.6 PG (ref 27–31)
MCHC RBC AUTO-ENTMCNC: 33.2 G/DL (ref 33–36)
MCV RBC AUTO: 92.2 FL (ref 80–94)
MONOCYTES # BLD AUTO: 0.71 X10(3)/MCL (ref 0.1–1.3)
MONOCYTES NFR BLD AUTO: 8.8 %
NEUTROPHILS # BLD AUTO: 5.1 X10(3)/MCL (ref 2.1–9.2)
NEUTROPHILS NFR BLD AUTO: 63.3 %
NRBC BLD AUTO-RTO: 0 %
PHOSPHATE SERPL-MCNC: 2.8 MG/DL (ref 2.3–4.7)
PLATELET # BLD AUTO: 328 X10(3)/MCL (ref 130–400)
PMV BLD AUTO: 9.3 FL (ref 7.4–10.4)
POTASSIUM SERPL-SCNC: 4.5 MMOL/L (ref 3.5–5.1)
PREALB SERPL-MCNC: 16.4 MG/DL (ref 16–42)
PROT SERPL-MCNC: 6.2 GM/DL (ref 5.8–7.6)
RBC # BLD AUTO: 2.94 X10(6)/MCL (ref 4.7–6.1)
SODIUM SERPL-SCNC: 134 MMOL/L (ref 136–145)
TIBC SERPL-MCNC: 169 UG/DL (ref 60–240)
TIBC SERPL-MCNC: 199 UG/DL (ref 250–450)
TRANSFERRIN SERPL-MCNC: 179 MG/DL (ref 163–344)
WBC # BLD AUTO: 8.06 X10(3)/MCL (ref 4.5–11.5)

## 2025-02-01 PROCEDURE — 25000003 PHARM REV CODE 250: Performed by: NURSE PRACTITIONER

## 2025-02-01 PROCEDURE — 99900031 HC PATIENT EDUCATION (STAT)

## 2025-02-01 PROCEDURE — 82728 ASSAY OF FERRITIN: CPT | Performed by: NURSE PRACTITIONER

## 2025-02-01 PROCEDURE — 11800000 HC REHAB PRIVATE ROOM

## 2025-02-01 PROCEDURE — 63600175 PHARM REV CODE 636 W HCPCS: Performed by: INTERNAL MEDICINE

## 2025-02-01 PROCEDURE — 25000003 PHARM REV CODE 250: Performed by: STUDENT IN AN ORGANIZED HEALTH CARE EDUCATION/TRAINING PROGRAM

## 2025-02-01 PROCEDURE — 84134 ASSAY OF PREALBUMIN: CPT | Performed by: NURSE PRACTITIONER

## 2025-02-01 PROCEDURE — 36415 COLL VENOUS BLD VENIPUNCTURE: CPT | Performed by: NURSE PRACTITIONER

## 2025-02-01 PROCEDURE — 94761 N-INVAS EAR/PLS OXIMETRY MLT: CPT

## 2025-02-01 PROCEDURE — 51798 US URINE CAPACITY MEASURE: CPT

## 2025-02-01 PROCEDURE — 83735 ASSAY OF MAGNESIUM: CPT | Performed by: NURSE PRACTITIONER

## 2025-02-01 PROCEDURE — 97166 OT EVAL MOD COMPLEX 45 MIN: CPT

## 2025-02-01 PROCEDURE — 97535 SELF CARE MNGMENT TRAINING: CPT

## 2025-02-01 PROCEDURE — 97110 THERAPEUTIC EXERCISES: CPT

## 2025-02-01 PROCEDURE — 84100 ASSAY OF PHOSPHORUS: CPT | Performed by: NURSE PRACTITIONER

## 2025-02-01 PROCEDURE — 83540 ASSAY OF IRON: CPT | Performed by: NURSE PRACTITIONER

## 2025-02-01 PROCEDURE — 80053 COMPREHEN METABOLIC PANEL: CPT | Performed by: NURSE PRACTITIONER

## 2025-02-01 PROCEDURE — 85025 COMPLETE CBC W/AUTO DIFF WBC: CPT | Performed by: NURSE PRACTITIONER

## 2025-02-01 PROCEDURE — 94799 UNLISTED PULMONARY SVC/PX: CPT

## 2025-02-01 PROCEDURE — 97530 THERAPEUTIC ACTIVITIES: CPT

## 2025-02-01 PROCEDURE — 97162 PT EVAL MOD COMPLEX 30 MIN: CPT

## 2025-02-01 PROCEDURE — 25000242 PHARM REV CODE 250 ALT 637 W/ HCPCS: Performed by: NURSE PRACTITIONER

## 2025-02-01 PROCEDURE — 97116 GAIT TRAINING THERAPY: CPT

## 2025-02-01 PROCEDURE — 63600175 PHARM REV CODE 636 W HCPCS: Performed by: NURSE PRACTITIONER

## 2025-02-01 RX ORDER — MAGNESIUM SULFATE HEPTAHYDRATE 40 MG/ML
2 INJECTION, SOLUTION INTRAVENOUS ONCE
Status: COMPLETED | OUTPATIENT
Start: 2025-02-01 | End: 2025-02-01

## 2025-02-01 RX ORDER — MAGNESIUM SULFATE HEPTAHYDRATE 40 MG/ML
2 INJECTION, SOLUTION INTRAVENOUS ONCE
Status: DISCONTINUED | OUTPATIENT
Start: 2025-02-01 | End: 2025-02-01

## 2025-02-01 RX ORDER — LANOLIN ALCOHOL/MO/W.PET/CERES
400 CREAM (GRAM) TOPICAL DAILY
Status: DISCONTINUED | OUTPATIENT
Start: 2025-02-01 | End: 2025-02-05

## 2025-02-01 RX ORDER — OXYCODONE HYDROCHLORIDE 5 MG/1
10 TABLET ORAL EVERY 4 HOURS PRN
Status: DISCONTINUED | OUTPATIENT
Start: 2025-02-01 | End: 2025-02-03

## 2025-02-01 RX ADMIN — DULOXETINE HYDROCHLORIDE 30 MG: 30 CAPSULE, DELAYED RELEASE ORAL at 07:02

## 2025-02-01 RX ADMIN — METHOCARBAMOL 750 MG: 750 TABLET ORAL at 11:02

## 2025-02-01 RX ADMIN — Medication 400 MG: at 11:02

## 2025-02-01 RX ADMIN — LIDOCAINE 5% 1 PATCH: 700 PATCH TOPICAL at 04:02

## 2025-02-01 RX ADMIN — ACETAMINOPHEN 650 MG: 325 TABLET ORAL at 11:02

## 2025-02-01 RX ADMIN — CARVEDILOL 6.25 MG: 6.25 TABLET, FILM COATED ORAL at 07:02

## 2025-02-01 RX ADMIN — OXYCODONE 10 MG: 5 TABLET ORAL at 05:02

## 2025-02-01 RX ADMIN — FLUTICASONE FUROATE 1 PUFF: 200 POWDER RESPIRATORY (INHALATION) at 07:02

## 2025-02-01 RX ADMIN — PREGABALIN 75 MG: 75 CAPSULE ORAL at 05:02

## 2025-02-01 RX ADMIN — ATORVASTATIN CALCIUM 40 MG: 40 TABLET, FILM COATED ORAL at 07:02

## 2025-02-01 RX ADMIN — OXYCODONE 10 MG: 5 TABLET ORAL at 01:02

## 2025-02-01 RX ADMIN — MUPIROCIN: 20 OINTMENT TOPICAL at 08:02

## 2025-02-01 RX ADMIN — ASPIRIN 81 MG CHEWABLE TABLET 81 MG: 81 TABLET CHEWABLE at 07:02

## 2025-02-01 RX ADMIN — CARVEDILOL 6.25 MG: 6.25 TABLET, FILM COATED ORAL at 08:02

## 2025-02-01 RX ADMIN — TAMSULOSIN HYDROCHLORIDE 0.4 MG: 0.4 CAPSULE ORAL at 08:02

## 2025-02-01 RX ADMIN — ACETAMINOPHEN 650 MG: 325 TABLET ORAL at 05:02

## 2025-02-01 RX ADMIN — LINACLOTIDE 145 MCG: 145 CAPSULE, GELATIN COATED ORAL at 07:02

## 2025-02-01 RX ADMIN — DOCUSATE SODIUM 100 MG: 100 CAPSULE, LIQUID FILLED ORAL at 07:02

## 2025-02-01 RX ADMIN — OXYCODONE 10 MG: 5 TABLET ORAL at 09:02

## 2025-02-01 RX ADMIN — METHOCARBAMOL 750 MG: 750 TABLET ORAL at 04:02

## 2025-02-01 RX ADMIN — UMECLIDINIUM BROMIDE AND VILANTEROL TRIFENATATE 1 PUFF: 62.5; 25 POWDER RESPIRATORY (INHALATION) at 07:02

## 2025-02-01 RX ADMIN — FINASTERIDE 5 MG: 5 TABLET, FILM COATED ORAL at 07:02

## 2025-02-01 RX ADMIN — PREGABALIN 75 MG: 75 CAPSULE ORAL at 08:02

## 2025-02-01 RX ADMIN — Medication 400 MG: at 07:02

## 2025-02-01 RX ADMIN — LOSARTAN POTASSIUM 25 MG: 25 TABLET, FILM COATED ORAL at 07:02

## 2025-02-01 RX ADMIN — PREGABALIN 75 MG: 75 CAPSULE ORAL at 01:02

## 2025-02-01 RX ADMIN — Medication 400 MG: at 08:02

## 2025-02-01 RX ADMIN — ENOXAPARIN SODIUM 40 MG: 40 INJECTION SUBCUTANEOUS at 04:02

## 2025-02-01 RX ADMIN — MAGNESIUM SULFATE HEPTAHYDRATE 2 G: 40 INJECTION, SOLUTION INTRAVENOUS at 11:02

## 2025-02-01 RX ADMIN — RANOLAZINE 500 MG: 500 TABLET, EXTENDED RELEASE ORAL at 07:02

## 2025-02-01 RX ADMIN — CLOPIDOGREL BISULFATE 75 MG: 75 TABLET, FILM COATED ORAL at 08:02

## 2025-02-01 RX ADMIN — AMLODIPINE BESYLATE 5 MG: 5 TABLET ORAL at 07:02

## 2025-02-01 RX ADMIN — AMLODIPINE BESYLATE 5 MG: 5 TABLET ORAL at 08:02

## 2025-02-01 RX ADMIN — METHOCARBAMOL 750 MG: 750 TABLET ORAL at 05:02

## 2025-02-01 RX ADMIN — RANOLAZINE 500 MG: 500 TABLET, EXTENDED RELEASE ORAL at 08:02

## 2025-02-01 RX ADMIN — DOCUSATE SODIUM 100 MG: 100 CAPSULE, LIQUID FILLED ORAL at 08:02

## 2025-02-01 RX ADMIN — PANTOPRAZOLE SODIUM 40 MG: 40 TABLET, DELAYED RELEASE ORAL at 07:02

## 2025-02-01 NOTE — PT/OT/SLP EVAL
Physical Therapy Rehab Evaluation    Patient Name:  Tyler Mai   MRN:  [id number]    Recommendations:     Discharge Recommendations:  High Intensity Therapy   Discharge Equipment Recommendations:     Barriers to discharge: Inaccessible home and Decreased caregiver support    Assessment:     Tyler Mai is a 69 y.o. male admitted with a medical diagnosis of Status post cervical spinal fusion.  He presents with the following impairments/functional limitations:  weakness, impaired endurance, impaired functional mobility, gait instability, impaired balance, decreased upper extremity function, decreased lower extremity function, pain, decreased ROM, impaired coordination, impaired fine motor, orthopedic precautions .    Rehab Diagnosis:  Cervical myelopathy s/p C2-6 PCDF and C2/3-C4/5 decompression on 01/24/2025     Recent Surgery: * No surgery found *      General Precautions: Standard, fall     Orthopedic Precautions: spinal precautions     Braces: Aspen collar    Rehab Prognosis: Good; patient would benefit from acute skilled PT services to address these deficits and reach maximum level of function.      History:     Past Medical History:   Diagnosis Date    Anxiety disorder, unspecified     Arthritis     CAD (coronary artery disease)     Cervical radiculopathy     CHF (congestive heart failure)     Chronic pain syndrome     COPD (chronic obstructive pulmonary disease)     Diabetes mellitus     Gastro-esophageal reflux disease without esophagitis     High cholesterol     Hypertension     Lumbar radiculopathy     Myelomalacia of cervical cord     Other specified diseases of spinal cord     Pacemaker     PVD (peripheral vascular disease)     S/P triple vessel bypass     Tobacco use        Past Surgical History:   Procedure Laterality Date    ANTERIOR CERVICAL DISCECTOMY W/ FUSION  1995    C5-6 and C6-7.  Dr. Wall    ANTERIOR CERVICAL DISCECTOMY W/ FUSION  1996    Redo C5-6, C6-7.  Dr. Wall    CHOLECYSTECTOMY       CORONARY ARTERY BYPASS GRAFT      FUSION OF POSTERIOR COLUMN OF CERVICAL SPINE USING COMPUTER AIDED NAVIGATION N/A 1/24/2025    Procedure: FUSION, SPINE, POSTERIOR SPINAL COLUMN, CERVICAL, USING COMPUTER-ASSISTED NAVIGATION;  Surgeon: Darrell Watson MD;  Location: [facility];  Service: Neurosurgery;  Laterality: N/A;  C2-C6 PCF; C2/3-C4/5 decompression, possible additional levels  o-arm  NTI  TIVA setup  [employer] //  XX    INSERTION OF PACEMAKER      X2    TRIPPLE VESSEL BYPASS         Subjective     Chief Complaint: neck pain  Patient/Family Comments/goals: to get stronger    Patients cultural, spiritual, Congregational conflicts given the current situation: no       Living Environment  People in Home: alone (Lives above sister and brother in law)  Living Arrangements: apartment  Home Accessibility: stairs to enter home  Number of Stairs, Main Entrance: other (see comments) (15)  Stair Railings, Main Entrance: railings safe and in good condition  Equipment Currently Used at Home: rollator, walker, rolling, shower chair    Prior Level of Function  Ambulation Skills: needs device  Stairs: independent  Transfer Skills: independent  ADL Skills: independent  Cognitive Communication: independent    Equipment used at home: rollator, walker, rolling, shower chair.  DME owned (not currently used): none.      Upon discharge, patient will have assistance from family.    Objective:     Communicated with pt prior to session.  Patient found supine with cervical collar, peripheral IV, herrera catheter  upon PT entry to room.    Vitals   Vitals at Rest  BP     HR     O2 Sat     Pain Pain Rating 1: 10/10  Location 1: neck  Pain Addressed 1: Pre-medicate for activity, Distraction  Pain Rating Post-Intervention 1: 9/10     Vitals With Activity  BP     HR     O2 Sat     Pain Pain Rating 1: 10/10  Location 1: neck  Pain Addressed 1: Pre-medicate for activity, Distraction  Pain Rating Post-Intervention 1: 9/10     Respiratory Status:  "Room air    Exams  RLE ROM:          -       WFL  RLE Strength:          -       WFL, except 3/5 or less  LLE ROM: -       WFL  LLE Strength:          -       WFL, except globally 3+/5    Functional Mobility  Bed Mobility:     Rolling Left:  Independent  Rolling Right: Independent  Supine to Sit: Supervision or touching assistance   Sit to Supine: Supervision or touching assistance   Transfers:     Sit to Stand: Supervision or touching assistance  with rolling walker  Bed to Chair: Supervision or touching assistance  with rolling walker  using  Step Transfer  Car Transfer: Partial/moderate assistance  with  rolling walker  using  Step Transfer  Gait: Pt ambulates 150ft  with RW with Partial/moderate assistance .   Impairments contributing to gait deviations include impaired balance, impaired coordination, impaired motor control, pain, impaired postural control, and decreased strength.  4 stairs with bilateral handrails with Supervision or touching assistance   6" curb with rolling walker with Supervision or touching assistance   Ambulate 15 feet on uneven surfaces/ramps with rolling walker with Partial/moderate assistance    object from ground in standing position without reacher with rolling walker with Supervision or touching assistance     GGs   Admit Current   Status Goal   Functional Area: Care Score: Care Score: Care Score:   Roll Left and Right         Sit to Lying 4 4 Independent   Lying to Sitting on Side of Bed 4 4 Independent   Sit to Stand 4 4 Independent   Chair/Bed-to-Chair Transfer 4 4 Independent   Car Transfer 4 4 Independent   Walk 10 Feet 4 4 Independent   Walk 50 Feet with Two Turns 4 4     Walk 150 Feet 3 3 Independent   Walk 10 Feet Uneven Surface - - Independent   1 Step (Curb) 4 4 Independent   4 Steps 4 4 Independent   12 Steps 88 88 Set-up/clean-up   Picking Up Object 4 4 Independent   Wheel 50 Feet with Two Turns 9 9     Wheel 150 Feet 9 9       Therapeutic Activities and " Exercises:  Performed 2x5 sit to stands, laqs seated marches and hs curls x15 ea. Performed well with bed mobility and transfers needing cuing for proper sequencing and body mechanics. Good performance with gait demonstrated scissor gait patter and occasional buckling of RLE.     Activity Tolerance: Good    Patient left up in chair with all lines intact.    Education Provided: roles and goals of PT/PTA, transfer training, bed mob, gait training, stair training, safety awareness, body mechanics, assistive device, strengthening exercises, and spinal precautions    Expected compliance: High compliance    GOALS:   Multidisciplinary Problems       Physical Therapy Goals          Problem: Physical Therapy    Goal Priority Disciplines Outcome Interventions   Physical Therapy Goal     PT, PT/OT Progressing    Description: Bed Mobility:  Sit to supine transfer independently.   Supine to sit transfer independently.     Transfers:  Sit to stand transfer independently using RW.   Car transfer independently using RW.    an object from the ground in standing position independently using RW.     Mobility:  Ambulate 500 feet independently using RW.  Ambulate 15 feet on uneven surfaces/ramps with setup/clean-up assist using RW.   Pt ascended/descended a 6 inch curb independently using RW.   Ascend/descend 15 stairs independently using bilateral handrails.                         Plan:     During this hospitalization, patient to be seen 5 x/week to address the identified rehab impairments via gait training, therapeutic activities, therapeutic exercises and progress toward the following goals:    Plan of Care Expires:  02/07/25  PT Next Visit Date: 02/03/25  Plan of Care reviewed with: patient      Additional Infomation:           Time Tracking:     Therapy Time   PT Received On: 02/01/25  PT Start Time: 0800  PT Stop Time: 0900  PT Total Time (min): 60 min  PT Individual: 60  Missed Time:    Time Missed due to:      Billable  Minutes: Evaluation 15, Gait Training 15, and Therapeutic Activity 30    02/01/2025

## 2025-02-01 NOTE — PLAN OF CARE
Problem: Occupational Therapy  Goal: Occupational Therapy Goal  Description: ADLs:  Pt to perform grooming tasks with independence standing at sink with RW by d/c.   Pt to perform feeding tasks with independence using adaptive utensils as needed by d/c.    Pt to perform UB dressing with independence by d/c.    Pt to perform LB dressing with touch assist by d/c.    Pt to perform putting on/off footwear task with independence and AE as needed by d/c.   Pt to perform toileting with touch assist by d/c.    Pt to perform bathing with independence by d/c.     Functional Transfers:  Pt to perform toilet transfers with independence by d/c.    Pt to perform a tub transfer with independence by d/c.     IADLs:  Pt to perform simple meal prep standing at kitchen counter with touch assist by d/c.      Balance, Strengthening, Endurance, Balance:  Pt to consistently demonstrate adherence to spinal precautions during all ADL's as instructed by OT.  Pt to demonstrate good dynamic standing balance as required to perform ADL's from standing level.    Outcome: Progressing

## 2025-02-01 NOTE — DISCHARGE SUMMARY
"[facility] Medicine Discharge Summary    Admit Date: 1/14/2025  Discharge Date and Time: 1/31/20259:08 PM  Admitting Physician: MAGGIE Team  Discharging Physician: Susy Antonio DO.  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  He was seen by Neurosurgery on 12/03/2024 however he did not complete cardiac evaluation for clearance and did not go to scheduled MRI appointments.  They recommended fusion at least at C3-4 and C4-5.      He was seen at [facility] 01/05/2025: "He initially told Dr. Bella he did not want surgery. He changed his mind as his UTI was treated and cleared and a f/u urine culture from 1/11 has been negative for growth. His son does not want Dr. Bella to do the surgery and attempts were made to see if Dr. Neumann or Dr. Bowen would see the patient but both declined this. He was recommended to discharge and report to a different hospital for a reevaluation because he and his family have declined Dr. Bella's offer to do surgery. The surgery is felt to be needed and he has been seen by Dr. Beebe cardiology who rates his cardiac risk as moderate at 5-7% of a cardiac event." At the time of MD evaluation later  patient was seeking neurosurgical evaluation and intervention.  Neurosurgery was informed.  Received " ceftriaxone to cover any UTI. Seen by Nephrology for JOHN, renal function improved eventually.Status post-C2-6 PCDF and C2/3- C4/5 decompression 1/24.  Requiring Gray for urinary retention.  Appears to be constipated.     Neurosurgery signed off.  Discontinue staples on day 14.  Outpatient follow-up with Izzy DUNNE.  Keep Gray for now, tamsulosin.  Plan for a voiding trial in the next couple of days.  Discontinue IV fluids  Reports pain is still uncontrolled.  Takes Percocet 10 at home every 4 hours prescribed by his pain management specialist.  I see that he is only prescribed 90 tablets per month.  Rehab accepted. Labs and vitals stable on day of discharge.      Pt was seen and examined on the day of discharge  Vitals:  VITAL SIGNS: 24 HRS MIN & MAX LAST   Temp  Min: 97.6 °F (36.4 °C)  Max: 98.5 °F (36.9 °C) 98 °F (36.7 °C)   BP  Min: 101/51  Max: 155/68 132/73   Pulse  Min: 58  Max: 92  67   Resp  Min: 17  Max: 20 18   SpO2  Min: 94 %  Max: 97 % 95 %         Physical Exam:  GENERAL: In no acute distress, afebrile  HEENT:  CHEST: Clear to auscultation bilaterally  HEART: S1, S2, no appreciable murmur  ABDOMEN: Soft, nontender, BS +  MSK: Warm, no lower extremity edema, no clubbing or cyanosis  NEUROLOGIC: Alert and oriented x4, moving all extremities with good strength   INTEGUMENTARY:  PSYCHIATRY:     Procedures Performed: No admission procedures for hospital encounter.      Significant Diagnostic Studies: See Full reports for all details            Recent Labs   Lab 01/25/25  0545 01/26/25  0534 01/27/25  0423 01/28/25  0458   WBC 10.01  --  8.75  --    RBC 3.47*  --  3.16*  --    HGB 10.6* 10.1* 9.8* 9.2*   HCT 31.9* 29.5* 29.3* 27.3*   MCV 91.9  --  92.7  --    MCH 30.5  --  31.0  --    MCHC 33.2  --  33.4  --    RDW 11.7  --  11.6  --      --  329  --    MPV 10.1  --  10.4  --               Recent Labs   Lab 01/25/25  0546 01/27/25  0423   * 137   K 4.3 4.4    100   CO2 27 27   BUN 29.7* 21.6    CREATININE 1.31* 1.16   CALCIUM 9.8 9.4   ALBUMIN 3.6  --    ALKPHOS 104  --    ALT 14  --    AST 17  --    BILITOT 0.4  --          Microbiology Results (last 7 days)         ** No results found for the last 168 hours. **                X-Ray Cervical Spine 2 or 3 Views  Narrative: EXAMINATION:  XR CERVICAL SPINE 2 OR 3 VIEWS     CLINICAL HISTORY:  s/p C2-6 PCDF;     COMPARISON:  CT cervical spine dated 01/14/2025.     FINDINGS:  There are postoperative changes with posterior spinal fusion hardware at C2 through C6.  The hardware appears intact.  Cervical alignment has improved.  The remaining vertebral body heights are preserved.  There is moderate disc height loss at C3-C4, mild at C4-C5, with multilevel marginal osteophytes.  The soft tissues are unremarkable.  Impression: Postoperative changes without acute abnormality.     Electronically signed by:Katelyn Blanco  Date:                                                  01/25/2025  Time:                                                  10:39            Medication List          ASK your doctor about these medications       * albuterol sulfate 2.5 mg/0.5 mL Nebu      * albuterol 2.5 mg /3 mL (0.083 %) nebulizer solution  Commonly known as: PROVENTIL      * albuterol 90 mcg/actuation inhaler  Commonly known as: PROVENTIL/VENTOLIN HFA      amLODIPine 5 MG tablet  Commonly known as: NORVASC  Take 1 tablet (5 mg total) by mouth 2 (two) times daily.      BASAGLAR KWIKPEN U-100 INSULIN 100 unit/mL (3 mL) Inpn pen  Generic drug: insulin glargine U-100 (Lantus)      carvediloL 6.25 MG tablet  Commonly known as: COREG  Take 1 tablet (6.25 mg total) by mouth 2 (two) times daily.      clopidogreL 75 mg tablet  Commonly known as: PLAVIX      cyclobenzaprine 10 MG tablet  Commonly known as: FLEXERIL  Take 1 tablet (10 mg total) by mouth 3 (three) times daily as needed for Muscle spasms.      LINZESS 145 mcg Cap capsule  Generic drug: linaCLOtide      losartan 25 MG  tablet  Commonly known as: COZAAR  Take 1 tablet (25 mg total) by mouth once daily.      magnesium oxide 400 mg (241.3 mg magnesium) tablet  Commonly known as: MAG-OX  Take 1 tablet (400 mg total) by mouth 2 (two) times daily.      methocarbamoL 750 MG Tab  Commonly known as: ROBAXIN  Take 1 tablet (750 mg total) by mouth 4 (four) times daily. for 10 days      oxyCODONE-acetaminophen  mg per tablet  Commonly known as: PERCOCET      pantoprazole 40 MG tablet  Commonly known as: PROTONIX  Take 1 tablet (40 mg total) by mouth once daily.      pregabalin 75 MG capsule  Commonly known as: LYRICA  Take 1 capsule (75 mg total) by mouth 2 (two) times daily.      ranolazine 500 MG Tb12  Commonly known as: RANEXA      rosuvastatin 40 MG Tab  Commonly known as: CRESTOR      tamsulosin 0.4 mg Cap  Commonly known as: FLOMAX  Take 1 capsule (0.4 mg total) by mouth once daily.      TRELEGY ELLIPTA 200-62.5-25 mcg inhaler  Generic drug: fluticasone-umeclidin-vilanter              * This list has 3 medication(s) that are the same as other medications prescribed for you. Read the directions carefully, and ask your doctor or other care provider to review them with you.                       Explained in detail to the patient about the discharge plan, medications, and follow-up visits. Pt understands and agrees with the treatment plan  Discharge Disposition: Rehab Facility   Discharged Condition: stable  Diet-   Dietary Orders (From admission, onward)          Start     Ordered     01/31/25 1153   Diet Adult Regular Standard Tray  (Diet/Nutrition - [facility])  Diet effective now        Question:  Tray type:  Answer:  Standard Tray    01/31/25 1152                       Medications Per DC med rec  Activities as tolerated    Follow-up Information         No, Primary Doctor Follow up.                   Darrell Watson MD Follow up in 2 week(s).    Specialty: Neurosurgery  Why: Neurosurgery- Hospital Follow-up  Appointment.  Contact information:  [address] [place] [address] [address]  [place] [address]  [phone]                                   For further questions contact hospitalist office    Discharge time 33 minutes     For worsening symptoms, chest pain, shortness of breath, increased abdominal pain, high grade fever, stroke or stroke like symptoms, immediately go to the nearest Emergency Room or call 911 as soon as possible.        Susy Antonio DO  Department of Hospital Medicine  [facility]  01/31/2025           For further questions contact hospitalist office    Discharge time 33 minutes    For worsening symptoms, chest pain, shortness of breath, increased abdominal pain, high grade fever, stroke or stroke like symptoms, immediately go to the nearest Emergency Room or call 911 as soon as possible.      Susy España M.D, on 1/31/2025. at 9:08 PM.

## 2025-02-01 NOTE — PT/OT/SLP EVAL
Occupational Therapy Inpatient Rehab Evaluation    Name: Tyler Mai  MRN: [id number]    Recommendations:     Discharge Recommendations:  Low Intensity Therapy   Discharge Equipment Recommendations: bath bench, grab bar   Barriers to discharge: Decreased caregiver support    Assessment:  Tyler Mai is a 69 y.o. male admitted with a medical diagnosis of Status post cervical spinal fusion.  He presents with the following impairments/functional limitations:  weakness, impaired endurance, impaired self care skills, impaired functional mobility, gait instability, impaired balance, decreased upper extremity function, decreased lower extremity function, decreased coordination, decreased safety awareness, pain, decreased ROM, orthopedic precautions.    General Precautions: Standard, fall     Orthopedic Precautions:spinal precautions     Braces: Aspen collar    Rehab Prognosis: Good; patient would benefit from acute skilled OT services to address these deficits and reach maximum level of function.      History:     Past Medical History:   Diagnosis Date    Anxiety disorder, unspecified     Arthritis     CAD (coronary artery disease)     Cervical radiculopathy     CHF (congestive heart failure)     Chronic pain syndrome     COPD (chronic obstructive pulmonary disease)     Diabetes mellitus     Gastro-esophageal reflux disease without esophagitis     High cholesterol     Hypertension     Lumbar radiculopathy     Myelomalacia of cervical cord     Other specified diseases of spinal cord     Pacemaker     PVD (peripheral vascular disease)     S/P triple vessel bypass     Tobacco use      Past Surgical History:   Procedure Laterality Date    ANTERIOR CERVICAL DISCECTOMY W/ FUSION  1995    C5-6 and C6-7.  Dr. Wall    ANTERIOR CERVICAL DISCECTOMY W/ FUSION  1996    Redo C5-6, C6-7.  Dr. Wall    CHOLECYSTECTOMY      CORONARY ARTERY BYPASS GRAFT      FUSION OF POSTERIOR COLUMN OF CERVICAL SPINE USING COMPUTER AIDED NAVIGATION  "N/A 1/24/2025    Procedure: FUSION, SPINE, POSTERIOR SPINAL COLUMN, CERVICAL, USING COMPUTER-ASSISTED NAVIGATION;  Surgeon: Darrell Watson MD;  Location: [facility];  Service: Neurosurgery;  Laterality: N/A;  C2-C6 PCF; C2/3-C4/5 decompression, possible additional levels  o-arm  NTI  TIVA setup  [employer] //  XX    INSERTION OF PACEMAKER      X2    TRIPPLE VESSEL BYPASS       Subjective     Orientation: Oriented x4    Chief Complaint: Pain in neck and catheter site    Patient/Family Comments/goals: "I have got to get this catheter out. It is just extra pain that they are causing."    Respiratory Status: Room air    Patients cultural, spiritual, Evangelical conflicts given the current situation: no     Living Environment   Living Environment  People in Home: alone (Lives above sister and brother in law)  Living Arrangements: apartment  Home Accessibility: stairs to enter home  Number of Stairs, Main Entrance: other (see comments) (15)  Stair Railings, Main Entrance: railings safe and in good condition  Equipment Currently Used at Home: rollator, walker, rolling, shower chair  Shower Setup: Tub/Shower combo with shower chair     Prior Level of Function-3 months ago  BADL: Independent    IADL: Independent    Upon discharge, patient will have assistance from nobody.    Objective:     Patient found up in chair with cervical collar, herrera catheter, peripheral IV  upon OT entry to room.    Mobility   Patient completed:  Sit to Supine with minimum assistance  Sit to Stand Transfer with contact guard assistance with rolling walker  Stand to Sit Transfer with contact guard assistance with rolling walker  Toilet Transfer Step Transfer technique with contact guard assistance with  rolling walker    Functional Mobility   Pt performed short FM in room with CGA using RW to perform ADLs     ADLs     Current Status   Eating 5 Able to utilize adapted utensils to bring food to mouth. Unable to open packages due to decreased " sensation and control of bilateral hands    Oral Hygiene 5 set up   Shower, Bathe Self 2 Pt opted for sponge bath due to severe pain. Pt required assistance washing under arms, and all of LE.    Upper Body Dressing 2 Required assistance to doff shirt and pull clean shirt over head     Lower Body Dressing 2 Assistance required to thread feet into pant holes. Pt able to pull over hips with some difficulty. Unable to button shorts   Toileting Hygiene 2 Required assistance to wipe bottom    Toilet Transfer 4 CGA using RW    Putting On, Taking Off Footwear 1      Limiting Factors for ADLs: motor, endurance, limited ROM, balance, weakness, coordination, and pain    Exams     ROM:          -       WFL in sx precaution range     Hand Dominance: Right    ROM Hand  Left Hand: WFL  Right Hand: WFL    Strength  Overall Strength: WFL     Strength:   WFL    Pinch Strength:   WFL    Sensation  Impaired bilateral hands     Coordination:   Impaired fine motor     Visual/Perceptual  Intact    Cognition:   WFL    Balance    Sitting  Sitting Surface: EOB  Static: No UE Support, Good  Dynamic: No UE extremity support, Good (-)    Standing  Static: No UE Support, Good (-)  Dynamic: Bilateral UE extremity support, Fair (+)    Patient left supine with all lines intact and call button in reach.    Education provided: Roles and goals of OT, ADLs, transfer training, bed mobility, modified goals, sequencing, safety precautions, and fall prevention    Multidisciplinary Problems       Occupational Therapy Goals          Problem: Occupational Therapy    Goal Priority Disciplines Outcome Interventions   Occupational Therapy Goal     OT, PT/OT Progressing    Description: ADLs:  Pt to perform grooming tasks with independence standing at sink with RW by d/c.   Pt to perform feeding tasks with independence using adaptive utensils as needed by d/c.    Pt to perform UB dressing with independence by d/c.    Pt to perform LB dressing with touch assist  by d/c.    Pt to perform putting on/off footwear task with independence and AE as needed by d/c.   Pt to perform toileting with touch assist by d/c.    Pt to perform bathing with independence by d/c.     Functional Transfers:  Pt to perform toilet transfers with independence by d/c.    Pt to perform a tub transfer with independence by d/c.     IADLs:  Pt to perform simple meal prep standing at kitchen counter with touch assist by d/c.      Balance, Strengthening, Endurance, Balance:  Pt to consistently demonstrate adherence to spinal precautions during all ADL's as instructed by OT.  Pt to demonstrate good dynamic standing balance as required to perform ADL's from standing level.                       Plan     During this hospitalization, patient to be seen daily (5-6x week) to address the identified rehab impairments via self-care/home management, therapeutic activities, therapeutic exercises and progress toward the following goals:    Plan of Care Expires:  02/06/25    Time Tracking     OT Received On: 02/01/25  Time In  (1000, 1400)     Time Out  (1100, 1420)  Total Time    Therapy Time: OT Individual: 80  Missed Time:    Missed Time Reason:      Billable Minutes: Evaluation 30 and Self Care/Home Management 50    02/01/2025

## 2025-02-01 NOTE — PT/OT/SLP PROGRESS
Physical Therapy Inpatient Rehab Treatment    Patient Name:  Tyler Mai   MRN:  [id number]    Recommendations:     Discharge Recommendations:  High Intensity Therapy   Discharge Equipment Recommendations:     Barriers to discharge: Inaccessible home and severity of deficits    Assessment:     Tyler Mai is a 69 y.o. male admitted with a medical diagnosis of Status post cervical spinal fusion.  He presents with the following impairments/functional limitations:  weakness, impaired endurance, impaired functional mobility, gait instability, impaired balance, decreased upper extremity function, decreased lower extremity function, pain, decreased ROM, impaired coordination, impaired fine motor, orthopedic precautions .    Rehab Diagnosis:      Recent Surgery: * No surgery found *      General Precautions: Standard, fall     Orthopedic Precautions:spinal precautions     Braces: Aspen collar    Rehab Prognosis: Good; patient would benefit from acute skilled PT services to address these deficits and reach maximum level of function.      History:     Past Medical History:   Diagnosis Date    Anxiety disorder, unspecified     Arthritis     CAD (coronary artery disease)     Cervical radiculopathy     CHF (congestive heart failure)     Chronic pain syndrome     COPD (chronic obstructive pulmonary disease)     Diabetes mellitus     Gastro-esophageal reflux disease without esophagitis     High cholesterol     Hypertension     Lumbar radiculopathy     Myelomalacia of cervical cord     Other specified diseases of spinal cord     Pacemaker     PVD (peripheral vascular disease)     S/P triple vessel bypass     Tobacco use        Past Surgical History:   Procedure Laterality Date    ANTERIOR CERVICAL DISCECTOMY W/ FUSION  1995    C5-6 and C6-7.  Dr. Wall    ANTERIOR CERVICAL DISCECTOMY W/ FUSION  1996    Redo C5-6, C6-7.  Dr. Wall    CHOLECYSTECTOMY      CORONARY ARTERY BYPASS GRAFT      FUSION OF POSTERIOR COLUMN OF CERVICAL  "SPINE USING COMPUTER AIDED NAVIGATION N/A 1/24/2025    Procedure: FUSION, SPINE, POSTERIOR SPINAL COLUMN, CERVICAL, USING COMPUTER-ASSISTED NAVIGATION;  Surgeon: Darrell Watson MD;  Location: [facility];  Service: Neurosurgery;  Laterality: N/A;  C2-C6 PCF; C2/3-C4/5 decompression, possible additional levels  o-arm  NTI  TIVA setup  [employer] //  XX    INSERTION OF PACEMAKER      X2    TRIPPLE VESSEL BYPASS         Subjective     Chief Complaint: neck pain and "the herrera needs to come out because it hurts."    Respiratory Status: Room air    Patients cultural, spiritual, Muslim conflicts given the current situation: no      Objective:     Communicated with pt prior to session.  Patient found up in chair with cervical collar, herrera catheter, peripheral IV  upon PT entry to room.    Pt is Oriented x3 and Alert, Cooperative, and Motivated.    Vitals   Vitals at Rest  BP    HR    O2 Sat    Pain      Vitals With Activity  BP    HR    O2 Sat    Pain        Functional Mobility:   Transfers:     Sit to Stand: Supervision or touching assistance  with rolling walker  Gait: Pt ambulates 75' with RW with Supervision or touching assistance .      Current   Status  Discharge   Goal   Functional Area: Care Score:    Roll Left and Right       Sit to Lying 4 Independent   Lying to Sitting on Side of Bed 4 Independent   Sit to Stand 4 Independent   Chair/Bed-to-Chair Transfer 4 Independent   Car Transfer 4 Independent   Walk 10 Feet 4 Independent   Walk 50 Feet with Two Turns 4     Walk 150 Feet 3 Independent   Walk 10 Feet Uneven Surface - Independent   1 Step (Curb) 4 Independent   4 Steps 4 Independent   12 Steps 88 Set-up/clean-up   Picking Up Object 4 Independent   Wheel 50 Feet with Two Turns 9     Wheel 150 Feet 9         Therapeutic Activities and Exercises:  Sit to stands x5 throughout session. CGA with all reps. VC for body mechanics. Performed. Laqs with 2# ankle weights 2x20 ea leg. Performed 2x10 seated marches, " seated calf raises both with ankle weights. Performed seated clams and ball squeeze RTB x 20 ea. Seated HS curls 2x10. Attempted to walk again but pt reported too much pain in abdomen and neck stating he couldn't do any more therapy.     Activity Tolerance: Good    Patient left up in chair with all lines intact and call button in reach.    Education provided: gait training, body mechanics, assistive device, strengthening exercises, and spinal precautions    Expected compliance: High compliance    GOALS:   Multidisciplinary Problems       Physical Therapy Goals          Problem: Physical Therapy    Goal Priority Disciplines Outcome Interventions   Physical Therapy Goal     PT, PT/OT Progressing    Description: Bed Mobility:  Sit to supine transfer independently.   Supine to sit transfer independently.     Transfers:  Sit to stand transfer independently using RW.   Car transfer independently using RW.    an object from the ground in standing position independently using RW.     Mobility:  Ambulate 500 feet independently using RW.  Ambulate 15 feet on uneven surfaces/ramps with setup/clean-up assist using RW.   Pt ascended/descended a 6 inch curb independently using RW.   Ascend/descend 15 stairs independently using bilateral handrails.                         Plan:     During this hospitalization, patient to be seen 5 x/week to address the identified rehab impairments via gait training, therapeutic activities, therapeutic exercises and progress toward the following goals:    Plan of Care Expires:  02/07/25  PT Next Visit Date: 02/03/25  Plan of Care reviewed with: patient    Additional Information:         Time Tracking:     Therapy Time  PT Received On: 02/01/25  PT Start Time: 1305  PT Stop Time: 1345  PT Total Time (min): 40 min   PT Individual: 40  Missed Time: 20 Minutes  Time Missed due to: Pain, Patient unwilling to participate    Billable Minutes: Gait Training 10 and Therapeutic Exercise  30    02/01/2025

## 2025-02-01 NOTE — PROGRESS NOTES
[facility] ([facility])  Rehab Progress Note    Patient Name: Tyler Mai  MRN: [id number]  Age: 69 y.o. Sex: male  : 1955  Hospital Length of Stay: 1 days  Date of Service: 2025   Chief Complaint: Cervical myelopathy s/p C2-6 PCDF and C2/3-C4/5 decompression on 2025     Subjective:     Basic Information  Admit Information: 69-year-old white male presented to [facility] ED on 2025 complaining of bilateral arm weakness, bilateral leg weakness, and worsening neck pain.  Reports right leg weaker than the left.  Recently admitted to [facility] for cervical myelopathy with plans for surgery, but patient had severe UTI requiring antibiotic therapy.  MRI on  significant for severe C3-4 level central canal stenosis with increased signal hyperintensity within the cervical cord from mid to lower aspect of C3 through mid aspect of C4 with central cord myelomalacia.  PMH significant for cervical myelopathy with history of C5-C6 fusion, CAD, HFrEF, ICMO, COPD, DM type 2, HLD, HTN, and PVD.  Workup significant for cervical myelopathy.  Patient inpatient family initially declined the neurosurgeon on call surgery.  Then surgery postpone due to inclement weather.  Tolerated posterior C3-C5 total laminectomies for decompression, placement of bilateral pars screws at C2 and bilateral lateral mass screws from C3 to C6 using [facility] Invictus OCT System, and placement of local bone and AlphaGraft DBM for arthrodesis from C2 to C6 on  without perioperative complications.  Postoperatively neurosurgery documented increase mobility to bilateral upper extremities with a 5/5 hand grasp.  Recommended aspirin hard collar at all times, and Alpha collar for showers..  Recommended removal staples on .  Gray continued due to urinary retention.  Tolerated transfer to [facility] inpatient rehab unit on  without incident.   Today's Information: No acute events overnight.  Resting in bed.   Reports good sleep and appetite.  Last BM 1/28.  Vital signs at goal with no recorded fevers.  Good glycemic control.  H&H stable with slight trend downward.  Sodium 134.  Magnesium 1.3.  Lab work otherwise unremarkable.  No new imaging today.    Review of patient's allergies indicates:   Allergen Reactions    Hydrochlorothiazide      Other Reaction(s): Pancreatitis        Current Facility-Administered Medications:     acetaminophen tablet 650 mg, 650 mg, Oral, Q6H PRN, Ashley, Tony A, FNP    ALPRAZolam tablet 0.25 mg, 0.25 mg, Oral, TID PRN, Ashley, Tony A, FNP    amLODIPine tablet 5 mg, 5 mg, Oral, BID, Ashley, Tony A, FNP, 5 mg at 01/31/25 2030    aspirin chewable tablet 81 mg, 81 mg, Oral, Daily, Ashley, Tony A, FNP, 81 mg at 01/31/25 1310    atorvastatin tablet 40 mg, 40 mg, Oral, Daily, Ashley, Tony A, FNP, 40 mg at 01/31/25 1312    benzonatate capsule 100 mg, 100 mg, Oral, TID PRN, Ashley, Tony A, FNP    carvediloL tablet 6.25 mg, 6.25 mg, Oral, BID, Ashley, Tony A, FNP, 6.25 mg at 01/31/25 2030    clopidogreL tablet 75 mg, 75 mg, Oral, Daily, Ashley, Tony A, FNP, 75 mg at 01/31/25 1312    cyclobenzaprine tablet 10 mg, 10 mg, Oral, TID PRN, Ashley, Tony A, FNP    docusate sodium capsule 100 mg, 100 mg, Oral, BID, Ashley, Tony A, FNP, 100 mg at 01/31/25 2030    DULoxetine DR capsule 30 mg, 30 mg, Oral, Daily, Shannan Puri A, FNP    enoxaparin injection 40 mg, 40 mg, Subcutaneous, Q24H (prophylaxis, 1700), Ashley, Tony A, FNP, 40 mg at 01/31/25 1701    finasteride tablet 5 mg, 5 mg, Oral, Daily, Ashley, Tony A, FNP, 5 mg at 01/31/25 1312    umeclidinium-vilanteroL 62.5-25 mcg/actuation DsDv 1 puff, 1 puff, Inhalation, Daily, 1 puff at 01/31/25 1414 **AND** fluticasone furoate 200 mcg/actuation inhaler 1 puff, 1 puff, Inhalation, Daily, Tony Ramirez, FNP, 1 puff at 01/31/25 1415    hydrALAZINE injection 10 mg, 10 mg, Intravenous,  Q4H PRN, Ashley, Tony A, FNP    hydrOXYzine pamoate capsule 50 mg, 50 mg, Oral, Nightly PRN, Ashley, Tony A, FNP    labetalol 20 mg/4 mL (5 mg/mL) IV syring, 10 mg, Intravenous, Q4H PRN, Ashley, Tony A, FNP    LIDOcaine 5 % patch 1 patch, 1 patch, Transdermal, Q24H, Shannan Puri FNP, 1 patch at 01/31/25 1701    linaCLOtide capsule 145 mcg, 145 mcg, Oral, QAM, Ashley, Tony A, FNP    losartan tablet 25 mg, 25 mg, Oral, Daily, Ashley, Tony A, FNP    magnesium oxide tablet 400 mg, 400 mg, Oral, BID, Ashley, Tony A, FNP, 400 mg at 01/31/25 2031    melatonin tablet 6 mg, 6 mg, Oral, Nightly PRN, Shannan Puri, RONEYP    methocarbamoL tablet 750 mg, 750 mg, Oral, QID, Ashley, Tony A, FNP, 750 mg at 02/01/25 0523    metoprolol injection 10 mg, 10 mg, Intravenous, Q2H PRN, Ashley, Tony A, FNP    mupirocin 2 % ointment, , Nasal, BID, Darrell Watson MD    nitroGLYCERIN SL tablet 0.4 mg, 0.4 mg, Sublingual, Q5 Min PRN, Ashley, Tony A, FNP    ondansetron disintegrating tablet 4 mg, 4 mg, Oral, Q6H PRN, Ashley, Tony A, FNP    ondansetron disintegrating tablet 8 mg, 8 mg, Oral, Q8H PRN, Ashley, Tony A, FNP    ondansetron injection 4 mg, 4 mg, Intravenous, Q8H PRN, Ashley, Tony A, FNP    oxyCODONE immediate release tablet 10 mg, 10 mg, Oral, Q4H PRN, Ashley, Tony A, FNP, 10 mg at 02/01/25 0523    pantoprazole EC tablet 40 mg, 40 mg, Oral, Daily, Ashley, Tony A, FNP    polyethylene glycol packet 17 g, 17 g, Oral, BID PRN, Tony Ramirez FNP    pregabalin capsule 75 mg, 75 mg, Oral, TID, Shannan Puri FNP, 75 mg at 02/01/25 0523    ranolazine 12 hr tablet 500 mg, 500 mg, Oral, BID, Tony Ramirez FNP, 500 mg at 01/31/25 2030    tamsulosin 24 hr capsule 0.4 mg, 0.4 mg, Oral, QHS, Tony Ramirez FNP     Review of Systems   Complete 12-point review of symptoms negative except for what's mentioned in HPI  "    Objective:     BP (!) 164/68   Pulse 69   Temp 98 °F (36.7 °C) (Oral)   Resp 18   Ht 5' 6" (1.676 m)   Wt 62.2 kg (137 lb 2 oz)   SpO2 98%   BMI 22.13 kg/m²      Physical Exam  Constitutional:       Appearance: Normal appearance.   HENT:      Head: Normocephalic.      Mouth/Throat:      Mouth: Mucous membranes are moist.   Eyes:      Pupils: Pupils are equal, round, and reactive to light.   Neck:      Comments: Posterior neck incision clean and intact.  Hard collar intact  Cardiovascular:      Rate and Rhythm: Normal rate and regular rhythm.      Heart sounds: Normal heart sounds.   Pulmonary:      Effort: Pulmonary effort is normal.      Breath sounds: Normal breath sounds.   Abdominal:      General: Bowel sounds are normal.      Palpations: Abdomen is soft.   Genitourinary:     Comments:  Indwelling catheter with yellow urine/clear  Musculoskeletal:      Cervical back: Neck supple.      Comments: Diffuse muscle atrophy   Skin:     General: Skin is warm and dry.   Neurological:      General: No focal deficit present.      Mental Status: He is alert and oriented to person, place, and time.      Motor: Weakness present.   Psychiatric:         Mood and Affect: Mood normal.         Behavior: Behavior normal.         Thought Content: Thought content normal.         Judgment: Judgment normal.         Lines/Drains/Airways       Drain  Duration                  Urethral Catheter 01/26/25 1330 Latex 16 Fr. 5 days              Peripheral Intravenous Line  Duration                  Peripheral IV - Single Lumen 01/14/25 1853 20 G Anterior;Right Forearm 17 days         Peripheral IV - Single Lumen 01/24/25 1400 18 G Anterior;Left Wrist 7 days                    Labs  Admission on 01/31/2025   Component Date Value Ref Range Status    POCT Glucose 01/31/2025 169 (H)  70 - 110 mg/dL Final    Sodium 02/01/2025 134 (L)  136 - 145 mmol/L Final    Potassium 02/01/2025 4.5  3.5 - 5.1 mmol/L Final    Chloride 02/01/2025 100  " 98 - 107 mmol/L Final    CO2 02/01/2025 27  23 - 31 mmol/L Final    Glucose 02/01/2025 103  82 - 115 mg/dL Final    Blood Urea Nitrogen 02/01/2025 23.0  8.4 - 25.7 mg/dL Final    Creatinine 02/01/2025 1.11  0.72 - 1.25 mg/dL Final    Calcium 02/01/2025 9.1  8.8 - 10.0 mg/dL Final    Protein Total 02/01/2025 6.2  5.8 - 7.6 gm/dL Final    Albumin 02/01/2025 3.0 (L)  3.4 - 4.8 g/dL Final    Globulin 02/01/2025 3.2  2.4 - 3.5 gm/dL Final    Albumin/Globulin Ratio 02/01/2025 0.9 (L)  1.1 - 2.0 ratio Final    Bilirubin Total 02/01/2025 0.3  <=1.5 mg/dL Final    ALP 02/01/2025 102  40 - 150 unit/L Final    ALT 02/01/2025 11  0 - 55 unit/L Final    AST 02/01/2025 12  5 - 34 unit/L Final    eGFR 02/01/2025 >60  mL/min/1.73/m2 Final    Estimated GFR calculated using the CKD-EPI creatinine (2021) equation.    Anion Gap 02/01/2025 7.0  mEq/L Final    BUN/Creatinine Ratio 02/01/2025 21   Final    Magnesium Level 02/01/2025 1.30 (L)  1.60 - 2.60 mg/dL Final    Phosphorus Level 02/01/2025 2.8  2.3 - 4.7 mg/dL Final    Prealbumin 02/01/2025 16.4  16.0 - 42.0 mg/dL Final    WBC 02/01/2025 8.06  4.50 - 11.50 x10(3)/mcL Final    RBC 02/01/2025 2.94 (L)  4.70 - 6.10 x10(6)/mcL Final    Hgb 02/01/2025 9.0 (L)  14.0 - 18.0 g/dL Final    Hct 02/01/2025 27.1 (L)  42.0 - 52.0 % Final    MCV 02/01/2025 92.2  80.0 - 94.0 fL Final    MCH 02/01/2025 30.6  27.0 - 31.0 pg Final    MCHC 02/01/2025 33.2  33.0 - 36.0 g/dL Final    RDW 02/01/2025 11.8  11.5 - 17.0 % Final    Platelet 02/01/2025 328  130 - 400 x10(3)/mcL Final    MPV 02/01/2025 9.3  7.4 - 10.4 fL Final    Neut % 02/01/2025 63.3  % Final    Lymph % 02/01/2025 23.2  % Final    Mono % 02/01/2025 8.8  % Final    Eos % 02/01/2025 3.6  % Final    Basophil % 02/01/2025 0.6  % Final    Imm Grans % 02/01/2025 0.5  % Final    Neut # 02/01/2025 5.10  2.1 - 9.2 x10(3)/mcL Final    Lymph # 02/01/2025 1.87  0.6 - 4.6 x10(3)/mcL Final    Mono # 02/01/2025 0.71  0.1 - 1.3 x10(3)/mcL Final    Eos #  02/01/2025 0.29  0 - 0.9 x10(3)/mcL Final    Baso # 02/01/2025 0.05  <=0.2 x10(3)/mcL Final    Imm Gran # 02/01/2025 0.04  0.00 - 0.04 x10(3)/mcL Final    NRBC% 02/01/2025 0.0  % Final       Radiology  Cervical XR two-view on 01/25/2025, IMPRESSION: There are postoperative changes with posterior spinal fusion hardware at C2 through C6. The hardware appears intact. Cervical alignment has improved. The remaining vertebral body heights are preserved. There is moderate disc height loss at C3-C4, mild at C4-C5, with multilevel marginal osteophytes. The soft tissues are unremarkable.     Assessment/Plan:     69 y.o. WM admitted on 1/31/2025    Cervical myelopathy   - s/p C2-6 PCDF and C2/3-C4/5 decompression on 01/24/2025  - remove staples on 02/07  - continue aspirin hard collar at all times, okay for [place] collar for showers only  - continue                Robaxin 750 mg q.i.d.                 Flexeril 10 mg t.i.d. p.r.n.                Percocet 10 mg q.4 hours p.r.n. for severe pain                Lyrica 75 mg b.i.d. (initiated 1/31)  - defer to physiatry for rehab and pain management  - PT/OT/RT/ST following  - to follow up with Neurosurgery outpatient     Coronary artery disease  - s/p  CABG x 3  - denies recent chest pain or discomfort  - continue                ASA 81 mg daily (resumed 1/31)                Plavix 75 mg daily (resume 1/31)                Coreg 6.25 mg b.i.d.                 Lipitor 40 mg daily                Losartan 25 mg daily   - ECG and Nitro PRN  - continue cardiac diet  - to follow-up with cardiology outpatient     HFrEF/ICMO  - stable  - continue                Coreg 6.25 mg b.i.d.                 Losartan 25 mg daily                 Ranexa 500 mg b.i.d.   - obtain daily standing weights  - to follow up with cardiology outpatient     Nicotine dependence  - smoking cessation counseling given on admission  - 1 PPD with approximately 55 pack years     HTN  - BP at goal  - continue                 Coreg 6.25 mg b.i.d.                 Losartan 25 mg daily                 Norvasc 5 mg daily                 Ranexa 500 mg b.i.d.                 Flomax 0.4 mg at bedtime                Hydralazine 10 mg every 2 hours as needed for BP > 160/90                Labetalol 10 mg every 2 hours as needed for BP > 160/90  - low sodium diet             COPD   - stable  - not on home 02  - continue                Trelegy INH daily                   DM type II  - HgA1c 5.6 on 01/23/2024  - continue                Lantus 10 units every evening (resumed 1/31)                ISS   - CBGs AC/HS     HLD  - FLP outpatient  - resume Crestor upon discharge  - continue                Lipitor 40 mg daily      PVD  - stable  - continue                ASA 81 mg daily (resumed 1/31)                Plavix 75 mg daily (resume 1/31)                Coreg 6.25 mg b.i.d.                 Lipitor 40 mg daily  - to follow up vascular surgery outpatient                  Chronic constipation  - last BM 1/29, reports is normal for him to have a bowel movement every 8 days  - continue                Linzess 145 mcg before breakfast                 Colace 100 mg b.i.d.     Hypomagnesemia  - stable  - continue                Magnesium oxide 400 mg b.i.d.      BPH/urinary retention  - current  - continue                Proscar 5 mg daily (initiated 1/31)                Flomax 0.4 mg at bedtime   - Plan removal of indwelling catheter on 02/03     GERD  - Avoid spicy foods, and nothing to eat or drink within x2 hours of bedtime or laying flat (water is ok)   - Avoid NSAIDs (Advil, ibuprofen, naproxen...) and chavira-2 inhibitors (Mobic, Celebrex)    - continue                Protonix 40 mg daily      Normocytic anemia  - asymptomatic  - H/H stable   - no evidence of active bleeds  - will closely monitor and transfuse if needed     Hypomagnesemia  - magnesium 1.3 on 02/01  - initiate    Mag-Ox 400 mg daily (initiated 2/1)    Mag sulfate 2 g x 1 IVPB on  02/01     VTE Prophylaxis: Lovenox 40 mg daily   COVID-19 testing:  Unknown  COVID-19 vaccination status:  Vaccinated (Moderna):  Bivalent booster on 04/03/2023     POA:  No  Living will:  No  Contacts:  Tyler Mai Jr (son) [phone]     CODE STATUS:  Full code  Internal Medicine (attending): Tyler De Anda MD  Physiatry (consulting):  Philip Fang MD     OUTPATIENT PROVIDERS  PCP: None  Neurosurgery: Darrell Zavala MD     DISPOSITION:  Sleep hygiene, bowel maintenance, and appetite at goal.  Remains with indwelling catheter.  Vital signs at goal with no recorded fevers.  Good glycemic control.  H&H trending down slightly.  Replace magnesium.  No new imaging today.  Continue aggressive mobilization as tolerated.  Plan to remove indwelling catheter on 02/03.  Reports not having good pain relief with scheduled Tylenol and p.r.n. oxycodone.  Requesting changing medications back to Percocet as needed.  Percocet 10 mg q.4 hours p.r.n. initiated.  Monitor closely.  Notify of acute changes.     Total time spent on this encounter including chart review and direct 1-on-1 patient interaction: 54 minutes   Over 50% of this time was spent in counseling and coordination of care

## 2025-02-02 LAB
POCT GLUCOSE: 159 MG/DL (ref 70–110)
POCT GLUCOSE: 171 MG/DL (ref 70–110)
POCT GLUCOSE: 180 MG/DL (ref 70–110)

## 2025-02-02 PROCEDURE — 94799 UNLISTED PULMONARY SVC/PX: CPT | Mod: XB

## 2025-02-02 PROCEDURE — 99900031 HC PATIENT EDUCATION (STAT)

## 2025-02-02 PROCEDURE — 25000003 PHARM REV CODE 250: Performed by: STUDENT IN AN ORGANIZED HEALTH CARE EDUCATION/TRAINING PROGRAM

## 2025-02-02 PROCEDURE — 25000003 PHARM REV CODE 250: Performed by: NURSE PRACTITIONER

## 2025-02-02 PROCEDURE — 63600175 PHARM REV CODE 636 W HCPCS: Performed by: NURSE PRACTITIONER

## 2025-02-02 PROCEDURE — 94761 N-INVAS EAR/PLS OXIMETRY MLT: CPT

## 2025-02-02 PROCEDURE — 51798 US URINE CAPACITY MEASURE: CPT

## 2025-02-02 PROCEDURE — 11800000 HC REHAB PRIVATE ROOM

## 2025-02-02 RX ORDER — IBUPROFEN 200 MG
16 TABLET ORAL
Status: DISCONTINUED | OUTPATIENT
Start: 2025-02-02 | End: 2025-02-19 | Stop reason: HOSPADM

## 2025-02-02 RX ORDER — IBUPROFEN 200 MG
24 TABLET ORAL
Status: DISCONTINUED | OUTPATIENT
Start: 2025-02-02 | End: 2025-02-19 | Stop reason: HOSPADM

## 2025-02-02 RX ORDER — METHOCARBAMOL 500 MG/1
1000 TABLET, FILM COATED ORAL 4 TIMES DAILY
Status: DISCONTINUED | OUTPATIENT
Start: 2025-02-02 | End: 2025-02-03

## 2025-02-02 RX ORDER — INSULIN ASPART 100 [IU]/ML
0-10 INJECTION, SOLUTION INTRAVENOUS; SUBCUTANEOUS
Status: DISCONTINUED | OUTPATIENT
Start: 2025-02-02 | End: 2025-02-19 | Stop reason: HOSPADM

## 2025-02-02 RX ORDER — GLUCAGON 1 MG
1 KIT INJECTION
Status: DISCONTINUED | OUTPATIENT
Start: 2025-02-02 | End: 2025-02-19 | Stop reason: HOSPADM

## 2025-02-02 RX ADMIN — OXYCODONE 10 MG: 5 TABLET ORAL at 12:02

## 2025-02-02 RX ADMIN — ENOXAPARIN SODIUM 40 MG: 40 INJECTION SUBCUTANEOUS at 05:02

## 2025-02-02 RX ADMIN — OXYCODONE 10 MG: 5 TABLET ORAL at 04:02

## 2025-02-02 RX ADMIN — Medication 400 MG: at 08:02

## 2025-02-02 RX ADMIN — ACETAMINOPHEN 650 MG: 325 TABLET ORAL at 08:02

## 2025-02-02 RX ADMIN — ACETAMINOPHEN 650 MG: 325 TABLET ORAL at 02:02

## 2025-02-02 RX ADMIN — INSULIN ASPART 2 UNITS: 100 INJECTION, SOLUTION INTRAVENOUS; SUBCUTANEOUS at 11:02

## 2025-02-02 RX ADMIN — OXYCODONE 10 MG: 5 TABLET ORAL at 08:02

## 2025-02-02 RX ADMIN — CARVEDILOL 6.25 MG: 6.25 TABLET, FILM COATED ORAL at 08:02

## 2025-02-02 RX ADMIN — FLUTICASONE FUROATE 1 PUFF: 200 POWDER RESPIRATORY (INHALATION) at 08:02

## 2025-02-02 RX ADMIN — DOCUSATE SODIUM 100 MG: 100 CAPSULE, LIQUID FILLED ORAL at 08:02

## 2025-02-02 RX ADMIN — LIDOCAINE 5% 1 PATCH: 700 PATCH TOPICAL at 04:02

## 2025-02-02 RX ADMIN — AMLODIPINE BESYLATE 5 MG: 5 TABLET ORAL at 08:02

## 2025-02-02 RX ADMIN — METHOCARBAMOL 750 MG: 750 TABLET ORAL at 05:02

## 2025-02-02 RX ADMIN — HYDROXYZINE PAMOATE 50 MG: 25 CAPSULE ORAL at 08:02

## 2025-02-02 RX ADMIN — CLOPIDOGREL BISULFATE 75 MG: 75 TABLET, FILM COATED ORAL at 08:02

## 2025-02-02 RX ADMIN — RANOLAZINE 500 MG: 500 TABLET, EXTENDED RELEASE ORAL at 08:02

## 2025-02-02 RX ADMIN — Medication 6 MG: at 08:02

## 2025-02-02 RX ADMIN — LOSARTAN POTASSIUM 25 MG: 25 TABLET, FILM COATED ORAL at 08:02

## 2025-02-02 RX ADMIN — PREGABALIN 75 MG: 75 CAPSULE ORAL at 08:02

## 2025-02-02 RX ADMIN — LINACLOTIDE 145 MCG: 145 CAPSULE, GELATIN COATED ORAL at 05:02

## 2025-02-02 RX ADMIN — PANTOPRAZOLE SODIUM 40 MG: 40 TABLET, DELAYED RELEASE ORAL at 08:02

## 2025-02-02 RX ADMIN — ATORVASTATIN CALCIUM 40 MG: 40 TABLET, FILM COATED ORAL at 08:02

## 2025-02-02 RX ADMIN — PREGABALIN 75 MG: 75 CAPSULE ORAL at 02:02

## 2025-02-02 RX ADMIN — METHOCARBAMOL 750 MG: 750 TABLET ORAL at 11:02

## 2025-02-02 RX ADMIN — METHOCARBAMOL 750 MG: 750 TABLET ORAL at 04:02

## 2025-02-02 RX ADMIN — TAMSULOSIN HYDROCHLORIDE 0.4 MG: 0.4 CAPSULE ORAL at 08:02

## 2025-02-02 RX ADMIN — MUPIROCIN: 20 OINTMENT TOPICAL at 08:02

## 2025-02-02 RX ADMIN — UMECLIDINIUM BROMIDE AND VILANTEROL TRIFENATATE 1 PUFF: 62.5; 25 POWDER RESPIRATORY (INHALATION) at 08:02

## 2025-02-02 RX ADMIN — ACETAMINOPHEN 650 MG: 325 TABLET ORAL at 12:02

## 2025-02-02 RX ADMIN — DULOXETINE HYDROCHLORIDE 30 MG: 30 CAPSULE, DELAYED RELEASE ORAL at 08:02

## 2025-02-02 RX ADMIN — FINASTERIDE 5 MG: 5 TABLET, FILM COATED ORAL at 08:02

## 2025-02-02 RX ADMIN — PREGABALIN 75 MG: 75 CAPSULE ORAL at 05:02

## 2025-02-02 RX ADMIN — ASPIRIN 81 MG CHEWABLE TABLET 81 MG: 81 TABLET CHEWABLE at 08:02

## 2025-02-02 NOTE — PROGRESS NOTES
[facility] ([facility])  Rehab Progress Note    Patient Name: Tyler Mai  MRN: [id number]  Age: 69 y.o. Sex: male  : 1955  Hospital Length of Stay: 2 days  Date of Service: 2025   Chief Complaint: Cervical myelopathy s/p C2-6 PCDF and C2/3-C4/5 decompression on 2025     Subjective:     Basic Information  Admit Information: 69-year-old white male presented to [facility] ED on 2025 complaining of bilateral arm weakness, bilateral leg weakness, and worsening neck pain.  Reports right leg weaker than the left.  Recently admitted to [facility] for cervical myelopathy with plans for surgery, but patient had severe UTI requiring antibiotic therapy.  MRI on  significant for severe C3-4 level central canal stenosis with increased signal hyperintensity within the cervical cord from mid to lower aspect of C3 through mid aspect of C4 with central cord myelomalacia.  PMH significant for cervical myelopathy with history of C5-C6 fusion, CAD, HFrEF, ICMO, COPD, DM type 2, HLD, HTN, and PVD.  Workup significant for cervical myelopathy.  Patient inpatient family initially declined the neurosurgeon on call surgery.  Then surgery postpone due to inclement weather.  Tolerated posterior C3-C5 total laminectomies for decompression, placement of bilateral pars screws at C2 and bilateral lateral mass screws from C3 to C6 using [facility] Invictus OCT System, and placement of local bone and AlphaGraft DBM for arthrodesis from C2 to C6 on  without perioperative complications.  Postoperatively neurosurgery documented increase mobility to bilateral upper extremities with a 5/5 hand grasp.  Recommended aspirin hard collar at all times, and Barton collar for showers..  Recommended removal staples on .  Gray continued due to urinary retention.  Tolerated transfer to [facility] inpatient rehab unit on  without incident.   Today's Information: No acute events overnight.  Reports fair sleep.   Appetite I'm,proved.  Last BM 1/28.  Vital signs at goal with no recorded fevers.  Good glycemic control.  No new labs or imaging today.     Review of patient's allergies indicates:   Allergen Reactions    Hydrochlorothiazide      Other Reaction(s): Pancreatitis        Current Facility-Administered Medications:     acetaminophen tablet 650 mg, 650 mg, Oral, Q6H PRN, Ashley, Tony A, FNP, 650 mg at 02/02/25 0022    ALPRAZolam tablet 0.25 mg, 0.25 mg, Oral, TID PRN, Ashley, Tony A, FNP    amLODIPine tablet 5 mg, 5 mg, Oral, BID, Ashley, Tony A, FNP, 5 mg at 02/01/25 2036    aspirin chewable tablet 81 mg, 81 mg, Oral, Daily, Ashley, Tony A, FNP, 81 mg at 02/01/25 0743    atorvastatin tablet 40 mg, 40 mg, Oral, Daily, Ashley, Tony A, FNP, 40 mg at 02/01/25 0743    benzonatate capsule 100 mg, 100 mg, Oral, TID PRN, Ashley, Tony A, FNP    carvediloL tablet 6.25 mg, 6.25 mg, Oral, BID, Ashley, Tony A, FNP, 6.25 mg at 02/01/25 2036    clopidogreL tablet 75 mg, 75 mg, Oral, Daily, Ashley, Tony A, FNP, 75 mg at 02/01/25 0800    cyclobenzaprine tablet 10 mg, 10 mg, Oral, TID PRN, Ashley, Tony A, FNP    dextrose 50% injection 12.5 g, 12.5 g, Intravenous, PRN, Ashley, Tony A, FNP    dextrose 50% injection 25 g, 25 g, Intravenous, PRN, Ashley, Tony A, FNP    docusate sodium capsule 100 mg, 100 mg, Oral, BID, Ashley, Tony A, FNP, 100 mg at 02/01/25 2036    DULoxetine DR capsule 30 mg, 30 mg, Oral, Daily, [place], Shannan A, FNP, 30 mg at 02/01/25 0742    enoxaparin injection 40 mg, 40 mg, Subcutaneous, Q24H (prophylaxis, 1700), Ashley, Tony A, FNP, 40 mg at 02/01/25 1656    finasteride tablet 5 mg, 5 mg, Oral, Daily, Tony Ramirez, FNP, 5 mg at 02/01/25 0742    umeclidinium-vilanteroL 62.5-25 mcg/actuation DsDv 1 puff, 1 puff, Inhalation, Daily, 1 puff at 02/01/25 0752 **AND** fluticasone furoate 200 mcg/actuation inhaler 1 puff, 1 puff,  Inhalation, Daily, Ashley, Tony A, FNP, 1 puff at 02/01/25 0752    glucagon (human recombinant) injection 1 mg, 1 mg, Intramuscular, PRN, Ashley, Tony A, FNP    glucose chewable tablet 16 g, 16 g, Oral, PRN, Ashley, Tony A, FNP    glucose chewable tablet 24 g, 24 g, Oral, PRN, Ashley, Tony A, FNP    hydrALAZINE injection 10 mg, 10 mg, Intravenous, Q4H PRN, Ashley, Tony A, FNP    hydrOXYzine pamoate capsule 50 mg, 50 mg, Oral, Nightly PRN, Ashley, Tony A, FNP    insulin aspart U-100 injection 0-10 Units, 0-10 Units, Subcutaneous, QID (AC + HS) PRN, Ashley, Tony A, FNP    labetalol 20 mg/4 mL (5 mg/mL) IV syring, 10 mg, Intravenous, Q4H PRN, Ashley, Tony A, FNP    LIDOcaine 5 % patch 1 patch, 1 patch, Transdermal, Q24H, Shannan Puri FNP, 1 patch at 02/01/25 1656    linaCLOtide capsule 145 mcg, 145 mcg, Oral, QAM, Ashley, Tony A, FNP, 145 mcg at 02/02/25 0531    losartan tablet 25 mg, 25 mg, Oral, Daily, Ashley, Tony A, FNP, 25 mg at 02/01/25 0744    magnesium oxide tablet 400 mg, 400 mg, Oral, BID, Ashley, Tnoy A, FNP, 400 mg at 02/01/25 2036    magnesium oxide tablet 400 mg, 400 mg, Oral, Daily, Ashley, Tony A, FNP, 400 mg at 02/01/25 1104    melatonin tablet 6 mg, 6 mg, Oral, Nightly PRN, Shannan Puri A, FNP    methocarbamoL tablet 750 mg, 750 mg, Oral, QID, Ashley, Tony A, FNP, 750 mg at 02/02/25 0530    metoprolol injection 10 mg, 10 mg, Intravenous, Q2H PRN, Ashley, Tony A, FNP    mupirocin 2 % ointment, , Nasal, BID, Darrell Watson MD, Given at 02/01/25 2000    nitroGLYCERIN SL tablet 0.4 mg, 0.4 mg, Sublingual, Q5 Min PRN, Tony Ramirez A, FNP    ondansetron disintegrating tablet 4 mg, 4 mg, Oral, Q6H PRN, Ashley Tony A, FNP    ondansetron disintegrating tablet 8 mg, 8 mg, Oral, Q8H PRN, Ashley Tony A, FNP    ondansetron injection 4 mg, 4 mg, Intravenous, Q8H PRN, Tony Ramirez A, FNP     "oxyCODONE immediate release tablet 10 mg, 10 mg, Oral, Q4H PRN, Ashley, Tony A, FNP, 10 mg at 02/02/25 0414    pantoprazole EC tablet 40 mg, 40 mg, Oral, Daily, Ashley, Tony A, FNP, 40 mg at 02/01/25 0742    polyethylene glycol packet 17 g, 17 g, Oral, BID PRN, Ashley, Tony A, FNP    pregabalin capsule 75 mg, 75 mg, Oral, TID, [place], Shannan A, FNP, 75 mg at 02/02/25 0531    ranolazine 12 hr tablet 500 mg, 500 mg, Oral, BID, Ashley, Tony A, FNP, 500 mg at 02/01/25 2036    tamsulosin 24 hr capsule 0.4 mg, 0.4 mg, Oral, QHS, Ashley, Tony A, FNP, 0.4 mg at 02/01/25 2036     Review of Systems   Complete 12-point review of symptoms negative except for what's mentioned in HPI     Objective:     BP (!) 149/70   Pulse 74   Temp 97.8 °F (36.6 °C) (Oral)   Resp 20   Ht 5' 6" (1.676 m)   Wt 62.2 kg (137 lb 2 oz)   SpO2 98%   BMI 22.13 kg/m²      Physical Exam  Constitutional:       Appearance: Normal appearance.   HENT:      Head: Normocephalic.      Mouth/Throat:      Mouth: Mucous membranes are moist.   Eyes:      Pupils: Pupils are equal, round, and reactive to light.   Neck:      Comments: Posterior neck incision clean and intact.  Hard collar intact  Cardiovascular:      Rate and Rhythm: Normal rate and regular rhythm.      Heart sounds: Normal heart sounds.   Pulmonary:      Effort: Pulmonary effort is normal.      Breath sounds: Normal breath sounds.   Abdominal:      General: Bowel sounds are normal.      Palpations: Abdomen is soft.   Musculoskeletal:      Cervical back: Neck supple.      Comments: Diffuse muscle atrophy   Skin:     General: Skin is warm and dry.   Neurological:      General: No focal deficit present.      Mental Status: He is alert and oriented to person, place, and time.      Motor: Weakness present.   Psychiatric:         Mood and Affect: Mood normal.         Behavior: Behavior normal.         Thought Content: Thought content normal.         Judgment: Judgment " normal.       Lines/Drains/Airways       Peripheral Intravenous Line  Duration                  Peripheral IV - Single Lumen 01/14/25 1853 20 G Anterior;Right Forearm 18 days         Peripheral IV - Single Lumen 01/24/25 1400 18 G Anterior;Left Wrist 8 days                  Labs  Admission on 01/31/2025   Component Date Value Ref Range Status    POCT Glucose 01/31/2025 169 (H)  70 - 110 mg/dL Final    Sodium 02/01/2025 134 (L)  136 - 145 mmol/L Final    Potassium 02/01/2025 4.5  3.5 - 5.1 mmol/L Final    Chloride 02/01/2025 100  98 - 107 mmol/L Final    CO2 02/01/2025 27  23 - 31 mmol/L Final    Glucose 02/01/2025 103  82 - 115 mg/dL Final    Blood Urea Nitrogen 02/01/2025 23.0  8.4 - 25.7 mg/dL Final    Creatinine 02/01/2025 1.11  0.72 - 1.25 mg/dL Final    Calcium 02/01/2025 9.1  8.8 - 10.0 mg/dL Final    Protein Total 02/01/2025 6.2  5.8 - 7.6 gm/dL Final    Albumin 02/01/2025 3.0 (L)  3.4 - 4.8 g/dL Final    Globulin 02/01/2025 3.2  2.4 - 3.5 gm/dL Final    Albumin/Globulin Ratio 02/01/2025 0.9 (L)  1.1 - 2.0 ratio Final    Bilirubin Total 02/01/2025 0.3  <=1.5 mg/dL Final    ALP 02/01/2025 102  40 - 150 unit/L Final    ALT 02/01/2025 11  0 - 55 unit/L Final    AST 02/01/2025 12  5 - 34 unit/L Final    eGFR 02/01/2025 >60  mL/min/1.73/m2 Final    Estimated GFR calculated using the CKD-EPI creatinine (2021) equation.    Anion Gap 02/01/2025 7.0  mEq/L Final    BUN/Creatinine Ratio 02/01/2025 21   Final    Magnesium Level 02/01/2025 1.30 (L)  1.60 - 2.60 mg/dL Final    Phosphorus Level 02/01/2025 2.8  2.3 - 4.7 mg/dL Final    Prealbumin 02/01/2025 16.4  16.0 - 42.0 mg/dL Final    Ferritin Level 02/01/2025 139.38  21.81 - 274.66 ng/mL Final    Iron Binding Capacity Unsaturated 02/01/2025 169  60 - 240 ug/dL Final    Iron Level 02/01/2025 30 (L)  65 - 175 ug/dL Final    Transferrin 02/01/2025 179  163 - 344 mg/dL Final    Iron Binding Capacity Total 02/01/2025 199 (L)  250 - 450 ug/dL Final    Iron Saturation  02/01/2025 15 (L)  20 - 50 % Final    WBC 02/01/2025 8.06  4.50 - 11.50 x10(3)/mcL Final    RBC 02/01/2025 2.94 (L)  4.70 - 6.10 x10(6)/mcL Final    Hgb 02/01/2025 9.0 (L)  14.0 - 18.0 g/dL Final    Hct 02/01/2025 27.1 (L)  42.0 - 52.0 % Final    MCV 02/01/2025 92.2  80.0 - 94.0 fL Final    MCH 02/01/2025 30.6  27.0 - 31.0 pg Final    MCHC 02/01/2025 33.2  33.0 - 36.0 g/dL Final    RDW 02/01/2025 11.8  11.5 - 17.0 % Final    Platelet 02/01/2025 328  130 - 400 x10(3)/mcL Final    MPV 02/01/2025 9.3  7.4 - 10.4 fL Final    Neut % 02/01/2025 63.3  % Final    Lymph % 02/01/2025 23.2  % Final    Mono % 02/01/2025 8.8  % Final    Eos % 02/01/2025 3.6  % Final    Basophil % 02/01/2025 0.6  % Final    Imm Grans % 02/01/2025 0.5  % Final    Neut # 02/01/2025 5.10  2.1 - 9.2 x10(3)/mcL Final    Lymph # 02/01/2025 1.87  0.6 - 4.6 x10(3)/mcL Final    Mono # 02/01/2025 0.71  0.1 - 1.3 x10(3)/mcL Final    Eos # 02/01/2025 0.29  0 - 0.9 x10(3)/mcL Final    Baso # 02/01/2025 0.05  <=0.2 x10(3)/mcL Final    Imm Gran # 02/01/2025 0.04  0.00 - 0.04 x10(3)/mcL Final    NRBC% 02/01/2025 0.0  % Final       Radiology  Cervical XR two-view on 01/25/2025, IMPRESSION: There are postoperative changes with posterior spinal fusion hardware at C2 through C6. The hardware appears intact. Cervical alignment has improved. The remaining vertebral body heights are preserved. There is moderate disc height loss at C3-C4, mild at C4-C5, with multilevel marginal osteophytes. The soft tissues are unremarkable.     Assessment/Plan:     69 y.o. WM admitted on 1/31/2025    Cervical myelopathy   - s/p C2-6 PCDF and C2/3-C4/5 decompression on 01/24/2025  - remove staples on 02/07  - continue aspirin hard collar at all times, okay for [place] collar for showers only  - continue                Robaxin 1000 mg q.i.d. (increased 2/2)                Flexeril 10 mg t.i.d. p.r.n.                Percocet 10 mg q.4 hours p.r.n. for severe pain                 Lyrica 75 mg b.i.d. (initiated 1/31)  - defer to physiatry for rehab and pain management  - PT/OT/RT/ST following  - to follow up with Neurosurgery outpatient     Coronary artery disease  - s/p  CABG x 3  - denies recent chest pain or discomfort  - continue                ASA 81 mg daily (resumed 1/31)                Plavix 75 mg daily (resume 1/31)                Coreg 6.25 mg b.i.d.                 Lipitor 40 mg daily                Losartan 25 mg daily   - ECG and Nitro PRN  - continue cardiac diet  - to follow-up with cardiology outpatient     HFrEF/ICMO  - stable  - continue                Coreg 6.25 mg b.i.d.                 Losartan 25 mg daily                 Ranexa 500 mg b.i.d.   - obtain daily standing weights  - to follow up with cardiology outpatient     Nicotine dependence  - smoking cessation counseling given on admission  - 1 PPD with approximately 55 pack years     HTN  - BP at goal  - continue                Coreg 6.25 mg b.i.d.                 Losartan 25 mg daily                 Norvasc 5 mg daily                 Ranexa 500 mg b.i.d.                 Flomax 0.4 mg at bedtime                Hydralazine 10 mg every 2 hours as needed for BP > 160/90                Labetalol 10 mg every 2 hours as needed for BP > 160/90  - low sodium diet             COPD   - stable  - not on home 02  - continue                Trelegy INH daily                   DM type II  - HgA1c 5.6 on 01/23/2024  - continue                Lantus 10 units every evening (resumed 1/31)                ISS   - CBGs AC/HS     HLD  - FLP outpatient  - resume Crestor upon discharge  - continue                Lipitor 40 mg daily      PVD  - stable  - continue                ASA 81 mg daily (resumed 1/31)                Plavix 75 mg daily (resume 1/31)                Coreg 6.25 mg b.i.d.                 Lipitor 40 mg daily  - to follow up vascular surgery outpatient                  Chronic constipation  - last BM 1/28, reports is normal  for him to have a bowel movement every 8 days  - continue                Linzess 145 mcg before breakfast                 Colace 100 mg b.i.d.     Hypomagnesemia  - stable  - continue                Magnesium oxide 400 mg b.i.d.      BPH/urinary retention  - current  - continue                Proscar 5 mg daily (initiated 1/31)                Flomax 0.4 mg at bedtime   - Plan removal of indwelling catheter on 02/03     GERD  - Avoid spicy foods, and nothing to eat or drink within x2 hours of bedtime or laying flat (water is ok)   - Avoid NSAIDs (Advil, ibuprofen, naproxen...) and chavira-2 inhibitors (Mobic, Celebrex)    - continue                Protonix 40 mg daily      Normocytic anemia  - asymptomatic  - H/H stable   - no evidence of active bleeds  - will closely monitor and transfuse if needed     Hypomagnesemia  - magnesium 1.3 on 02/01  - s/p Mag sulfate 2 g x 1 IVPB on 02/01  - continue   Mag-Ox 400 mg daily (initiated 2/1)    Urinary retention  - indwelling catheter removed on 02/01  - good urine output reported on 02/02, with no residuals greater than 300     VTE Prophylaxis: Lovenox 40 mg daily   COVID-19 testing:  Unknown  COVID-19 vaccination status:  Vaccinated (Moderna):  Bivalent booster on 04/03/2023     POA:  No  Living will:  No  Contacts:  Tyler Mai Jr (son) [phone]     CODE STATUS:  Full code  Internal Medicine (attending): Tyler De Anda MD  Physiatry (consulting):  Philip Fang MD     OUTPATIENT PROVIDERS  PCP: None  Neurosurgery: Darrell Zavala MD     DISPOSITION:  Sleep hygiene fair.  Appetite improved.  Last BM 1/28.  BP moderately controlled.  Good glycemic control.  No new labs or imaging today.  Requesting increase in pain medication for sleep.  Indwelling catheter removed on 02/01.  Patient with some difficulty urinating, but not retaining greater than 300 mL.  Increase Robaxin 1000 mg q.i.d..  No increase in oxycodone at this time.  Defer to physiatry in the morning.   Monitor closely.  Notify of acute changes.  Repeat lab work in the morning.

## 2025-02-02 NOTE — PLAN OF CARE
Problem: Rehabilitation (IRF) Plan of Care  Goal: Plan of Care Review  Outcome: Progressing  Goal: Patient-Specific Goal (Individualized)  Outcome: Progressing  Goal: Absence of New-Onset Illness or Injury  Outcome: Progressing  Goal: Optimal Comfort and Wellbeing  Outcome: Progressing  Goal: Home and Community Transition Plan Established  Outcome: Progressing     Problem: Wound  Goal: Optimal Coping  Outcome: Progressing  Goal: Optimal Functional Ability  Outcome: Progressing  Goal: Absence of Infection Signs and Symptoms  Outcome: Progressing  Goal: Improved Oral Intake  Outcome: Progressing  Goal: Optimal Pain Control and Function  Outcome: Progressing  Goal: Skin Health and Integrity  Outcome: Progressing  Goal: Optimal Wound Healing  Outcome: Progressing     Problem: Infection  Goal: Absence of Infection Signs and Symptoms  Outcome: Progressing     Problem: Diabetes Comorbidity  Goal: Blood Glucose Level Within Targeted Range  Outcome: Progressing     Problem: Fall Injury Risk  Goal: Absence of Fall and Fall-Related Injury  Outcome: Progressing     Problem: Skin Injury Risk Increased  Goal: Skin Health and Integrity  Outcome: Progressing

## 2025-02-02 NOTE — PLAN OF CARE
Problem: Fall Injury Risk  Goal: Absence of Fall and Fall-Related Injury  Outcome: Progressing  Intervention: Promote Injury-Free Environment  Flowsheets (Taken 2/1/2025 2106)  Safety Promotion/Fall Prevention:   assistive device/personal item within reach   bed alarm set   Fall Risk signage in place   instructed to call staff for mobility   lighting adjusted   nonskid shoes/socks when out of bed   side rails raised x 3   supervised activity

## 2025-02-03 LAB
ALBUMIN SERPL-MCNC: 3.2 G/DL (ref 3.4–4.8)
ALBUMIN/GLOB SERPL: 1 RATIO (ref 1.1–2)
ALP SERPL-CCNC: 112 UNIT/L (ref 40–150)
ALT SERPL-CCNC: 12 UNIT/L (ref 0–55)
ANION GAP SERPL CALC-SCNC: 7 MEQ/L
AST SERPL-CCNC: 15 UNIT/L (ref 5–34)
BASOPHILS # BLD AUTO: 0.06 X10(3)/MCL
BASOPHILS NFR BLD AUTO: 0.7 %
BILIRUB SERPL-MCNC: 0.3 MG/DL
BUN SERPL-MCNC: 34.1 MG/DL (ref 8.4–25.7)
CALCIUM SERPL-MCNC: 9.5 MG/DL (ref 8.8–10)
CHLORIDE SERPL-SCNC: 99 MMOL/L (ref 98–107)
CO2 SERPL-SCNC: 28 MMOL/L (ref 23–31)
CREAT SERPL-MCNC: 1.34 MG/DL (ref 0.72–1.25)
CREAT/UREA NIT SERPL: 25
EOSINOPHIL # BLD AUTO: 0.34 X10(3)/MCL (ref 0–0.9)
EOSINOPHIL NFR BLD AUTO: 4 %
ERYTHROCYTE [DISTWIDTH] IN BLOOD BY AUTOMATED COUNT: 11.8 % (ref 11.5–17)
GFR SERPLBLD CREATININE-BSD FMLA CKD-EPI: 58 ML/MIN/1.73/M2
GLOBULIN SER-MCNC: 3.3 GM/DL (ref 2.4–3.5)
GLUCOSE SERPL-MCNC: 99 MG/DL (ref 82–115)
HCT VFR BLD AUTO: 27.9 % (ref 42–52)
HGB BLD-MCNC: 9.1 G/DL (ref 14–18)
IMM GRANULOCYTES # BLD AUTO: 0.04 X10(3)/MCL (ref 0–0.04)
IMM GRANULOCYTES NFR BLD AUTO: 0.5 %
LYMPHOCYTES # BLD AUTO: 2.04 X10(3)/MCL (ref 0.6–4.6)
LYMPHOCYTES NFR BLD AUTO: 24.1 %
MAGNESIUM SERPL-MCNC: 1.8 MG/DL (ref 1.6–2.6)
MCH RBC QN AUTO: 31.1 PG (ref 27–31)
MCHC RBC AUTO-ENTMCNC: 32.6 G/DL (ref 33–36)
MCV RBC AUTO: 95.2 FL (ref 80–94)
MONOCYTES # BLD AUTO: 0.64 X10(3)/MCL (ref 0.1–1.3)
MONOCYTES NFR BLD AUTO: 7.6 %
NEUTROPHILS # BLD AUTO: 5.33 X10(3)/MCL (ref 2.1–9.2)
NEUTROPHILS NFR BLD AUTO: 63.1 %
NRBC BLD AUTO-RTO: 0 %
PHOSPHATE SERPL-MCNC: 2.8 MG/DL (ref 2.3–4.7)
PLATELET # BLD AUTO: 359 X10(3)/MCL (ref 130–400)
PMV BLD AUTO: 9.5 FL (ref 7.4–10.4)
POCT GLUCOSE: 127 MG/DL (ref 70–110)
POCT GLUCOSE: 136 MG/DL (ref 70–110)
POCT GLUCOSE: 180 MG/DL (ref 70–110)
POCT GLUCOSE: 191 MG/DL (ref 70–110)
POTASSIUM SERPL-SCNC: 4.8 MMOL/L (ref 3.5–5.1)
PREALB SERPL-MCNC: 20.4 MG/DL (ref 16–42)
PROT SERPL-MCNC: 6.5 GM/DL (ref 5.8–7.6)
RBC # BLD AUTO: 2.93 X10(6)/MCL (ref 4.7–6.1)
SODIUM SERPL-SCNC: 134 MMOL/L (ref 136–145)
WBC # BLD AUTO: 8.45 X10(3)/MCL (ref 4.5–11.5)

## 2025-02-03 PROCEDURE — 94761 N-INVAS EAR/PLS OXIMETRY MLT: CPT

## 2025-02-03 PROCEDURE — 36415 COLL VENOUS BLD VENIPUNCTURE: CPT | Performed by: NURSE PRACTITIONER

## 2025-02-03 PROCEDURE — 63600175 PHARM REV CODE 636 W HCPCS: Performed by: NURSE PRACTITIONER

## 2025-02-03 PROCEDURE — 25000003 PHARM REV CODE 250: Performed by: STUDENT IN AN ORGANIZED HEALTH CARE EDUCATION/TRAINING PROGRAM

## 2025-02-03 PROCEDURE — 84100 ASSAY OF PHOSPHORUS: CPT | Performed by: NURSE PRACTITIONER

## 2025-02-03 PROCEDURE — 97535 SELF CARE MNGMENT TRAINING: CPT

## 2025-02-03 PROCEDURE — 99900031 HC PATIENT EDUCATION (STAT)

## 2025-02-03 PROCEDURE — 97530 THERAPEUTIC ACTIVITIES: CPT | Mod: CQ

## 2025-02-03 PROCEDURE — 97116 GAIT TRAINING THERAPY: CPT

## 2025-02-03 PROCEDURE — 11800000 HC REHAB PRIVATE ROOM

## 2025-02-03 PROCEDURE — 97110 THERAPEUTIC EXERCISES: CPT

## 2025-02-03 PROCEDURE — 25000003 PHARM REV CODE 250: Performed by: NURSE PRACTITIONER

## 2025-02-03 PROCEDURE — 80053 COMPREHEN METABOLIC PANEL: CPT | Performed by: NURSE PRACTITIONER

## 2025-02-03 PROCEDURE — 84134 ASSAY OF PREALBUMIN: CPT | Performed by: NURSE PRACTITIONER

## 2025-02-03 PROCEDURE — 83735 ASSAY OF MAGNESIUM: CPT | Performed by: NURSE PRACTITIONER

## 2025-02-03 PROCEDURE — 94799 UNLISTED PULMONARY SVC/PX: CPT | Mod: XB

## 2025-02-03 PROCEDURE — 85025 COMPLETE CBC W/AUTO DIFF WBC: CPT | Performed by: NURSE PRACTITIONER

## 2025-02-03 RX ORDER — TIZANIDINE 4 MG/1
4 TABLET ORAL EVERY 6 HOURS PRN
Status: DISCONTINUED | OUTPATIENT
Start: 2025-02-03 | End: 2025-02-05

## 2025-02-03 RX ORDER — OXYCODONE AND ACETAMINOPHEN 10; 325 MG/1; MG/1
1 TABLET ORAL EVERY 4 HOURS PRN
Status: DISCONTINUED | OUTPATIENT
Start: 2025-02-03 | End: 2025-02-15

## 2025-02-03 RX ORDER — PREGABALIN 50 MG/1
100 CAPSULE ORAL 3 TIMES DAILY
Status: DISCONTINUED | OUTPATIENT
Start: 2025-02-03 | End: 2025-02-19 | Stop reason: HOSPADM

## 2025-02-03 RX ORDER — OXYCODONE AND ACETAMINOPHEN 5; 325 MG/1; MG/1
1 TABLET ORAL EVERY 4 HOURS PRN
Status: DISCONTINUED | OUTPATIENT
Start: 2025-02-03 | End: 2025-02-15

## 2025-02-03 RX ORDER — TIZANIDINE 4 MG/1
4 TABLET ORAL
Status: DISCONTINUED | OUTPATIENT
Start: 2025-02-03 | End: 2025-02-05

## 2025-02-03 RX ADMIN — TIZANIDINE 4 MG: 4 TABLET ORAL at 09:02

## 2025-02-03 RX ADMIN — PREGABALIN 100 MG: 50 CAPSULE ORAL at 09:02

## 2025-02-03 RX ADMIN — METHOCARBAMOL 1000 MG: 500 TABLET ORAL at 12:02

## 2025-02-03 RX ADMIN — PREGABALIN 100 MG: 50 CAPSULE ORAL at 01:02

## 2025-02-03 RX ADMIN — LOSARTAN POTASSIUM 25 MG: 25 TABLET, FILM COATED ORAL at 08:02

## 2025-02-03 RX ADMIN — PREGABALIN 75 MG: 75 CAPSULE ORAL at 05:02

## 2025-02-03 RX ADMIN — Medication 400 MG: at 08:02

## 2025-02-03 RX ADMIN — CARVEDILOL 6.25 MG: 6.25 TABLET, FILM COATED ORAL at 09:02

## 2025-02-03 RX ADMIN — INSULIN ASPART 2 UNITS: 100 INJECTION, SOLUTION INTRAVENOUS; SUBCUTANEOUS at 05:02

## 2025-02-03 RX ADMIN — FINASTERIDE 5 MG: 5 TABLET, FILM COATED ORAL at 08:02

## 2025-02-03 RX ADMIN — UMECLIDINIUM BROMIDE AND VILANTEROL TRIFENATATE 1 PUFF: 62.5; 25 POWDER RESPIRATORY (INHALATION) at 12:02

## 2025-02-03 RX ADMIN — LIDOCAINE 5% 1 PATCH: 700 PATCH TOPICAL at 05:02

## 2025-02-03 RX ADMIN — OXYCODONE 10 MG: 5 TABLET ORAL at 01:02

## 2025-02-03 RX ADMIN — MUPIROCIN: 20 OINTMENT TOPICAL at 09:02

## 2025-02-03 RX ADMIN — LINACLOTIDE 145 MCG: 145 CAPSULE, GELATIN COATED ORAL at 05:02

## 2025-02-03 RX ADMIN — ENOXAPARIN SODIUM 40 MG: 40 INJECTION SUBCUTANEOUS at 05:02

## 2025-02-03 RX ADMIN — OXYCODONE HYDROCHLORIDE AND ACETAMINOPHEN 1 TABLET: 10; 325 TABLET ORAL at 01:02

## 2025-02-03 RX ADMIN — PANTOPRAZOLE SODIUM 40 MG: 40 TABLET, DELAYED RELEASE ORAL at 08:02

## 2025-02-03 RX ADMIN — DOCUSATE SODIUM 100 MG: 100 CAPSULE, LIQUID FILLED ORAL at 08:02

## 2025-02-03 RX ADMIN — MUPIROCIN: 20 OINTMENT TOPICAL at 08:02

## 2025-02-03 RX ADMIN — CARVEDILOL 6.25 MG: 6.25 TABLET, FILM COATED ORAL at 08:02

## 2025-02-03 RX ADMIN — OXYCODONE HYDROCHLORIDE AND ACETAMINOPHEN 1 TABLET: 10; 325 TABLET ORAL at 09:02

## 2025-02-03 RX ADMIN — METHOCARBAMOL 1000 MG: 500 TABLET ORAL at 05:02

## 2025-02-03 RX ADMIN — HYDROXYZINE PAMOATE 50 MG: 25 CAPSULE ORAL at 09:02

## 2025-02-03 RX ADMIN — ACETAMINOPHEN 650 MG: 325 TABLET ORAL at 05:02

## 2025-02-03 RX ADMIN — OXYCODONE HYDROCHLORIDE AND ACETAMINOPHEN 1 TABLET: 10; 325 TABLET ORAL at 05:02

## 2025-02-03 RX ADMIN — ATORVASTATIN CALCIUM 40 MG: 40 TABLET, FILM COATED ORAL at 08:02

## 2025-02-03 RX ADMIN — OXYCODONE 10 MG: 5 TABLET ORAL at 05:02

## 2025-02-03 RX ADMIN — Medication 400 MG: at 09:02

## 2025-02-03 RX ADMIN — AMLODIPINE BESYLATE 5 MG: 5 TABLET ORAL at 09:02

## 2025-02-03 RX ADMIN — RANOLAZINE 500 MG: 500 TABLET, EXTENDED RELEASE ORAL at 08:02

## 2025-02-03 RX ADMIN — TAMSULOSIN HYDROCHLORIDE 0.4 MG: 0.4 CAPSULE ORAL at 09:02

## 2025-02-03 RX ADMIN — RANOLAZINE 500 MG: 500 TABLET, EXTENDED RELEASE ORAL at 09:02

## 2025-02-03 RX ADMIN — DULOXETINE HYDROCHLORIDE 30 MG: 30 CAPSULE, DELAYED RELEASE ORAL at 08:02

## 2025-02-03 RX ADMIN — CLOPIDOGREL BISULFATE 75 MG: 75 TABLET, FILM COATED ORAL at 08:02

## 2025-02-03 RX ADMIN — OXYCODONE 10 MG: 5 TABLET ORAL at 09:02

## 2025-02-03 RX ADMIN — TIZANIDINE 4 MG: 4 TABLET ORAL at 12:02

## 2025-02-03 RX ADMIN — FLUTICASONE FUROATE 1 PUFF: 200 POWDER RESPIRATORY (INHALATION) at 12:02

## 2025-02-03 RX ADMIN — TIZANIDINE 4 MG: 4 TABLET ORAL at 05:02

## 2025-02-03 RX ADMIN — DOCUSATE SODIUM 100 MG: 100 CAPSULE, LIQUID FILLED ORAL at 09:02

## 2025-02-03 RX ADMIN — ASPIRIN 81 MG CHEWABLE TABLET 81 MG: 81 TABLET CHEWABLE at 08:02

## 2025-02-03 RX ADMIN — AMLODIPINE BESYLATE 5 MG: 5 TABLET ORAL at 08:02

## 2025-02-03 NOTE — PT/OT/SLP PROGRESS
Physical Therapy Inpatient Rehab Treatment    Patient Name:  Tyler Mai   MRN:  [id number]    Recommendations:     Discharge Recommendations:   (pending progress)   Discharge Equipment Recommendations:     Barriers to discharge: Increased level of assist, Inaccessible home, Decreased caregiver support, Impaired functional mobility , and Severity of Deficits     Assessment:     Tyler Mai is a 69 y.o. male admitted with a medical diagnosis of Status post cervical spinal fusion.  He presents with the following impairments/functional limitations:  weakness, impaired endurance, impaired functional mobility, gait instability, impaired balance, decreased upper extremity function, decreased lower extremity function, pain, decreased ROM, impaired coordination, impaired fine motor, orthopedic precautions     PT NOTE: pt self limiting at times 2/2 pain and requires redirection. Pt very pleasant throughout. Pt reports receiving pain meds and was feeling better, however, following ambulation pt reports pain increasing again. Educated pt on moving and staying motivated throughout time here.     Rehab Diagnosis: Cervical spondylosis and stenosis with myelopathy and cord compression s/p C2-6 PCDF and C2-3, C4-5 decompression 1/24/2025. Pt. Has a history of cervical myelopathy, CAD, CHF, COPD, DM, HLD, HTN, and PVD    General Precautions: Standard, fall     Orthopedic Precautions:spinal precautions     Braces: Aspen collar    Rehab Prognosis: Good; patient would benefit from acute skilled PT services to address these deficits and reach maximum level of function.      History:     Past Medical History:   Diagnosis Date    Anxiety disorder, unspecified     Arthritis     CAD (coronary artery disease)     Cervical radiculopathy     CHF (congestive heart failure)     Chronic pain syndrome     COPD (chronic obstructive pulmonary disease)     Diabetes mellitus     Gastro-esophageal reflux disease without esophagitis     High  cholesterol     Hypertension     Lumbar radiculopathy     Myelomalacia of cervical cord     Other specified diseases of spinal cord     Pacemaker     PVD (peripheral vascular disease)     S/P triple vessel bypass     Tobacco use        Past Surgical History:   Procedure Laterality Date    ANTERIOR CERVICAL DISCECTOMY W/ FUSION  1995    C5-6 and C6-7.  Dr. Wall    ANTERIOR CERVICAL DISCECTOMY W/ FUSION  1996    Redo C5-6, C6-7.  Dr. Wall    CHOLECYSTECTOMY      CORONARY ARTERY BYPASS GRAFT      FUSION OF POSTERIOR COLUMN OF CERVICAL SPINE USING COMPUTER AIDED NAVIGATION N/A 1/24/2025    Procedure: FUSION, SPINE, POSTERIOR SPINAL COLUMN, CERVICAL, USING COMPUTER-ASSISTED NAVIGATION;  Surgeon: Darrell Watson MD;  Location: [facility];  Service: Neurosurgery;  Laterality: N/A;  C2-C6 PCF; C2/3-C4/5 decompression, possible additional levels  o-arm  NTI  TIVA setup  [employer] //  XX    INSERTION OF PACEMAKER      X2    TRIPPLE VESSEL BYPASS         Subjective     Patient comments: My pain keeps coming back     Respiratory Status: Room air    Patients cultural, spiritual, Restorationism conflicts given the current situation: no    Objective:     Communicated with TR prior to session.  Patient found up in chair with cervical collar  upon PT entry to room.    Pt is Oriented x3 and Alert and Cooperative.    Vitals   Vitals at Rest  BP    HR    O2 Sat    Pain      Vitals With Activity  BP     HR     O2 Sat     Pain Pain Rating 1: 8/10  Location - Side 1: Left  Location - Orientation 1: upper  Location 1: back  Pain Addressed 1: Reposition, Distraction       Functional Mobility:     Toilet t/f: overall CGA with + void overall 600 ml      Current   Status  Discharge   Goal   Functional Area: Care Score:    Lying to Sitting on Side of Bed 4  CGA and increased time.  Independent   Sit to Stand 4  CGA with RW  Independent   Chair/Bed-to-Chair Transfer 4  CGA with RW  Independent   Car Transfer   Independent   Walk 10 Feet 4  Independent   Walk 50 Feet with Two Turns 4  57' and 75' with seated rest break with RW overall CGA. Noted slowed pace, decreased step length, and NBOS.         Therapeutic Activities and Exercises:  Patient educated on role of acute care PT and PT POC, safety while in hospital including calling nurse for mobility, and call light usage  Patient educated about importance of OOB mobility and remaining up in chair most of the day.      Activity Tolerance: Good    Patient left up in chair with all lines intact and call button in reach.    Education provided: roles and goals of PT/PTA, transfer training, bed mob, gait training, wheelchair management, and strengthening exercises    Expected compliance: High compliance    Plan:     During this hospitalization, patient to be seen 5 x/week to address the identified rehab impairments via gait training, therapeutic activities, therapeutic exercises and progress toward the following goals:    GOALS:   Multidisciplinary Problems       Physical Therapy Goals          Problem: Physical Therapy    Goal Priority Disciplines Outcome Interventions   Physical Therapy Goal     PT, PT/OT Progressing    Description: Bed Mobility:  Sit to supine transfer independently.   Supine to sit transfer independently.     Transfers:  Sit to stand transfer independently using RW.   Car transfer independently using RW.    an object from the ground in standing position independently using RW.     Mobility:  Ambulate 500 feet independently using RW.  Ambulate 15 feet on uneven surfaces/ramps with setup/clean-up assist using RW.   Pt ascended/descended a 6 inch curb independently using RW.   Ascend/descend 15 stairs independently using bilateral handrails.                         Plan of Care Expires:  02/07/25  PT Next Visit Date: 02/07/25  Plan of Care reviewed with: patient    Additional Information:         Time Tracking:     Therapy Time  PT Received On: 02/03/25  PT Start Time: 1100  PT Stop  Time: 1200  PT Total Time (min): 60 min   PT Individual: 60  Missed Time:    Time Missed due to:      Billable Minutes: Gait Training 30 and Therapeutic Activity 30    02/03/2025

## 2025-02-03 NOTE — PT/OT/SLP PROGRESS
Physical Therapy Inpatient Rehab Treatment    Patient Name:  Tyler Mai   MRN:  [id number]    Recommendations:     Discharge Recommendations:   (pending progress)   Discharge Equipment Recommendations:     Barriers to discharge: Inaccessible home and Impaired functional mobility     Assessment:     Tyler Mai is a 69 y.o. male admitted with a medical diagnosis of Status post cervical spinal fusion.  He presents with the following impairments/functional limitations:  weakness, impaired endurance, impaired functional mobility, gait instability, impaired balance, decreased upper extremity function, decreased lower extremity function, pain, decreased ROM, impaired coordination, impaired fine motor, orthopedic precautions .    PT NOTE: Pt was unwilling to do any walking due to increased pain with left shoulder when pushing up to stand but was willing to participate in other activities.    Rehab Diagnosis: Cervical spondylosis and stenosis with myelopathy and cord compression s/p C2-6 PCDF and C2-3, C4-5 decompression 1/24/2025. Pt. Has a history of cervical myelopathy, CAD, CHF, COPD, DM, HLD, HTN, and PVD    General Precautions: Standard, fall     Orthopedic Precautions:spinal precautions     Braces: Aspen collar    Rehab Prognosis: Good; patient would benefit from acute skilled PT services to address these deficits and reach maximum level of function.      History:     Past Medical History:   Diagnosis Date    Anxiety disorder, unspecified     Arthritis     CAD (coronary artery disease)     Cervical radiculopathy     CHF (congestive heart failure)     Chronic pain syndrome     COPD (chronic obstructive pulmonary disease)     Diabetes mellitus     Gastro-esophageal reflux disease without esophagitis     High cholesterol     Hypertension     Lumbar radiculopathy     Myelomalacia of cervical cord     Other specified diseases of spinal cord     Pacemaker     PVD (peripheral vascular disease)     S/P triple vessel  bypass     Tobacco use        Past Surgical History:   Procedure Laterality Date    ANTERIOR CERVICAL DISCECTOMY W/ FUSION  1995    C5-6 and C6-7.  Dr. Wall    ANTERIOR CERVICAL DISCECTOMY W/ FUSION  1996    Redo C5-6, C6-7.  Dr. Wall    CHOLECYSTECTOMY      CORONARY ARTERY BYPASS GRAFT      FUSION OF POSTERIOR COLUMN OF CERVICAL SPINE USING COMPUTER AIDED NAVIGATION N/A 1/24/2025    Procedure: FUSION, SPINE, POSTERIOR SPINAL COLUMN, CERVICAL, USING COMPUTER-ASSISTED NAVIGATION;  Surgeon: Darrell Watson MD;  Location: [facility];  Service: Neurosurgery;  Laterality: N/A;  C2-C6 PCF; C2/3-C4/5 decompression, possible additional levels  o-arm  NTI  TIVA setup  [employer] //  XX    INSERTION OF PACEMAKER      X2    TRIPPLE VESSEL BYPASS         Subjective     Patient comments: left anterior shoulder burning pain    Respiratory Status: Room air    Patients cultural, spiritual, Islam conflicts given the current situation: no    Objective:     Communicated with Amada RT and Lalita PT prior to session.  Patient found up in chair with cervical collar  upon PT entry to room.    Pt is Alert and Motivated.    Vitals     Pain Pain Rating 1: 0/10  Location - Side 1: Left  Location - Orientation 1: anterior  Location 1: shoulder  Pain Addressed 1: Reposition, Distraction, Nurse notified  Pain Rating Post-Intervention 1: 8/10       Functional Mobility:        Current   Status  Discharge   Goal   Functional Area: Care Score:    Sit to Stand 4  Cga with RW, 2 trials Independent   Chair/Bed-to-Chair Transfer 4  Cga with RW, 2 trials Independent       Therapeutic Activities and Exercises:  Patient educated on role of acute care PT and PT POC, safety while in hospital including calling nurse for mobility, and call light usage  Patient educated about importance of OOB mobility and remaining up in chair most of the day.  Patient educated about pursed lip breathing technique and cued for use with mobility.  Pt performed  recumbent bike 4 min fwd at 2.0 resistance for BLE endurance.    Therapeutic exercises were performed seated at edge of chair:    marches with 2# weights  hip adductions isometrics against a ball  hamstring curls with 2# weights and red theraband  long arc quads with 2# weights  calf raises/toe raises with 2# weights  All therex performed 2x10 reps each, BLE    Activity Tolerance: Excellent    Patient left sitting edge of bed with call button in reach.    Education provided: roles and goals of PT/PTA, transfer training, balance training, safety awareness, body mechanics, assistive device, strengthening exercises, fall prevention, and spinal precautions    Expected compliance: High compliance    Plan:     During this hospitalization, patient to be seen 5 x/week to address the identified rehab impairments via gait training, therapeutic activities, therapeutic exercises and progress toward the following goals:    GOALS:   Multidisciplinary Problems       Physical Therapy Goals          Problem: Physical Therapy    Goal Priority Disciplines Outcome Interventions   Physical Therapy Goal     PT, PT/OT Progressing    Description: Bed Mobility:  Sit to supine transfer independently.   Supine to sit transfer independently.     Transfers:  Sit to stand transfer independently using RW.   Car transfer independently using RW.    an object from the ground in standing position independently using RW.     Mobility:  Ambulate 500 feet independently using RW.  Ambulate 15 feet on uneven surfaces/ramps with setup/clean-up assist using RW.   Pt ascended/descended a 6 inch curb independently using RW.   Ascend/descend 15 stairs independently using bilateral handrails.                         Plan of Care Expires:  02/07/25  PT Next Visit Date: 02/07/25  Plan of Care reviewed with: patient    Additional Information:         Time Tracking:     Therapy Time  PT Received On: 02/03/25  PT Start Time: 0930  PT Stop Time: 1000  PT Total  Time (min): 30 min   PT Individual: 30  Missed Time:    Time Missed due to:      Billable Minutes: Therapeutic Activity 8 min and Therapeutic Exercise 22 min    02/03/2025

## 2025-02-03 NOTE — PROGRESS NOTES
Dos 2/3/25  Patient seen in PT gym today  Participation and progress toward goals noted  Continues working on strength, mobility, balance and increasing endurance  Progress and problems discussed with patient and therapists  Questions answered- he is concerned about pain control- under Pain Management since 1995- awaiting info from that MD office to review  Chart reviewed and discussed with Dr osborn and medicine team as well as Rylie Puri, my FNP  Continue present management  Subjective  HPI: 69 year old WM with a PMH of cervical myelopathy, CAD, CHF, COPD, DM, HLD, HTN, and PVD presented to the ED at [facility] on 1/14/25 for Neurosurgery. Patient presented in ED following discharge from [facility]. He reports associated symptoms of bilateral arm weakness, bilateral leg weakness, and worsening neck pain. Notes right leg is weaker than left. He also complains of bilateral hand, bilateral leg, and bilateral feet numbness. He stated that he has been unable to care for himself at home because of the weakness, and having to use a wheelchair to get around. Patient's daughter at bedside reports frequent falls at home. No urinary or fecal incontinence. Per chart review, the patient was admitted to [facility] for cervical myelopathy with the plan of surgery. Previous extensive admission at [facility] for UTI which cleared and resolved with antibiotics; initially, did not have surgery secondary to UTI. Now amendable for surgical intervention since UTI has resolved. Patient refused surgery with on-call neurosurgeon at [facility] and discharged at noon today. He was advised to come here for the surgery. MRI on 01/05 showed severe C3-4 level central canal stenosis with increased signal hyperintensity within the cervical cord from mid to lower aspect of C3 through mid aspect of C4 with central cord myelomalacia. No syrinx is detected. Prior C5-C7 intervertebral body fusion. Neurosurgery was consulted with recommendation for surgical intervention  needed. Cardiology consulted with low risk for MACE for high risk neurosurgical procedure. On 1/24, patient underwent C2-6 PCDF and C2-3- C4-5 decompression by Dr. Ramírez. 1 Hemovac drain placed to site. Hard cervical collar in place. On 1/25, wound care was consulted for right heel and left heel wound with recommendation to cleanse bilateral heels with normal saline, paint with betadine, apply small foam, and change every 2 days. Recommended bilateral heel boots. Herrera was removed. Labs showed low Na of 133, elevated BUN/Cr of 29.7/ 1.31, and low H&H of 10.6 & 31.9. BP ranging 125/58- 182/80. PT/OT efvals completed with deficits noted with recommendation for high intensity therapy needed. ON 1/26, Labs showed low H&H of 10.1 & 29.5. Patient having urinary retention requiring in and out caths. On 1/27, hemovac removed. Neurosurgery recommended to DC staples on POD 14, will need follow up in 2 weeks after DC from hospital with XR uprights of cervical spine with Izzy, NP, continued Percocet and Robaxin for pain management, and signed off of case. Herrera had to be reinserted for urinary retention, and started Flomax. BP ranging 123/61- 169/70. On 1/28, patient started on Relistar for constipation with 2 large Bms noted. Labs showed low H&H of 9.2 & 27.3. On 1/30, wound care reassessed with 2 wounds noted to bilateral anterior legs. Cleansed with soap and water, and applied aquaphor to sites. Patient is AAOx4.   Participating with therapy. Functional status includes setup assist needed for eating, minimal assist for bed mobility, contact guard assist for transfers with RW, walked 28ft with RW at minimal assist, minimal assist for toilet transfer, total assist for toileting due to herrera in place, stand by assist for grooming, max assist for upper body dressing, and max assist for lower body dressing. Patient was evaluated, accepted, and admitted to inpatient rehab to improve functional status. Transferred to [facility] on 1/31  without incident.     2/3: Seen with PT, seated in WC. Relayed that patient requesting increase to opioids. Complaints of mostly left trapezius tightness. Applied biofreeze and massaged knots noted. Tells me that, that was helpful on Friday. Transfers to mat and into supine to perform heel slides. Saying that he needs to have more opioids and resistant to conversation about muscle relaxers and nerve pain medications. Discussed his Pain Management at home, and he does acknowledge having a Doctor that follows him, but he has to drive all the way to [facility]. Tells me that he last saw his pain doctor in December. States that he lives in [place], but rathers using [facility] on [address] because of the Percocet they carry there are brand name and always available. Reached out to his stated Doctor to get their current pain plan. Adjusted medications. Reapplied Biofreeze prior to lunch with noted improvement. Participating in therapy with redirection to task at hand. VSSAF with noted SBP elevation at noon. IM following.            Review of Systems  Psychiatric: Denies mental health history. Pt. Smokes cigarettes.     Depression/Anxiety: notes some anxiety in regards to eating and getting enough nutrition to his mouth     DULoxetine DR capsule 30 mg qd, start 2/1  ALPRAZolam tablet 0.25 mg TID PRN Anxiety  Pain: L>R side of neck, numbness (hands, legs, feet), shooting pains  DULoxetine DR capsule 30 mg qd, start 2/1  DC acetaminophen tablet 650 mg TID  DC methocarbamoL tablet 750 mg QID, 1000mg  ADD tiZANidine tablet 4 mg AC & HS  pregabalin capsule 75 mg BID. Increase TID. Increase 100mg  LIDOcaine 5 % patch 1 patch neck q24hr, 1800  acetaminophen tablet 650 mg q6h PRN mild pain  DC oxyCODONE immediate release tablet 5 mg q4h PRN mod pain  DC oxyCODONE immediate release tablet 10 mg q4h PRN severe pain     ADD oxyCODONE-acetaminophen 5-325 mg 1 tablet q4h PRN mod pain  ADD oxyCODONE-acetaminophen  mg per tablet  1 tablet q4h PRN severe pain  DC cyclobenzaprine tablet 10 mg TID PRN muscle spasm  ADD tiZANidine tablet 4 mg q6h PRN muscle spasm  Bowels/Bladder: Last BM 1/28 x 2 (large) following Relistor/Gray catheter reinserted 2/2 urinary retention     docusate sodium capsule 100 mg BID  linaCLOtide capsule 145 mcg qAM  tamsulosin 24 hr capsule 0.4 mg qHS  Appetite: good. Hungry. Intake difficult functionally     Sleep: good with melatonin  melatonin tablet 6 mg qHS PRN Insomnia              Physical Exam  General: well-developed, well-nourished, in no acute distress  Neck: hard collar, supple  Respiratory: equal chest rise, no SOB, no audible wheeze  Cardiovascular: regular rate and rhythm, no edema  Gastrointestinal: soft, non-tender, non-distended   Musculoskeletal: BUE/BLE weakness  Integumentary: no rashes, bilateral heel wounds, bilateral anterior leg wounds, posterior cervical incision-staples-dressing-c/d/i  Neurologic: cranial nerves intact, BUE/BLE weakness, numbness (hands, legs, feet), balance deficits  *MD performed and documented physical examination           Labs:   Latest Reference Range & Units 02/03/25 06:31   WBC 4.50 - 11.50 x10(3)/mcL 8.45   RBC 4.70 - 6.10 x10(6)/mcL 2.93 (L)   Hemoglobin 14.0 - 18.0 g/dL 9.1 (L)   Hematocrit 42.0 - 52.0 % 27.9 (L)   MCV 80.0 - 94.0 fL 95.2 (H)   MCH 27.0 - 31.0 pg 31.1 (H)   MCHC 33.0 - 36.0 g/dL 32.6 (L)   RDW 11.5 - 17.0 % 11.8   Platelet Count 130 - 400 x10(3)/mcL 359   MPV 7.4 - 10.4 fL 9.5   Neut % % 63.1   LYMPH % % 24.1   Mono % % 7.6   Eos % % 4.0   Basophil % % 0.7   Immature Granulocytes % 0.5   Neut # 2.1 - 9.2 x10(3)/mcL 5.33   Lymph # 0.6 - 4.6 x10(3)/mcL 2.04   Mono # 0.1 - 1.3 x10(3)/mcL 0.64   Eos # 0 - 0.9 x10(3)/mcL 0.34   Baso # <=0.2 x10(3)/mcL 0.06   Immature Grans (Abs) 0.00 - 0.04 x10(3)/mcL 0.04   nRBC % 0.0   Sodium 136 - 145 mmol/L 134 (L)   Potassium 3.5 - 5.1 mmol/L 4.8   Chloride 98 - 107 mmol/L 99   CO2 23 - 31 mmol/L 28   Anion Gap  mEq/L 7.0   BUN 8.4 - 25.7 mg/dL 34.1 (H)   Creatinine 0.72 - 1.25 mg/dL 1.34 (H)   BUN/CREAT RATIO  25   eGFR mL/min/1.73/m2 58   Glucose 82 - 115 mg/dL 99   Calcium 8.8 - 10.0 mg/dL 9.5   Phosphorus Level 2.3 - 4.7 mg/dL 2.8   Magnesium  1.60 - 2.60 mg/dL 1.80   ALP 40 - 150 unit/L 112   PROTEIN TOTAL 5.8 - 7.6 gm/dL 6.5   Albumin 3.4 - 4.8 g/dL 3.2 (L)   Albumin/Globulin Ratio 1.1 - 2.0 ratio 1.0 (L)   Prealbumin 16.0 - 42.0 mg/dL 20.4   BILIRUBIN TOTAL <=1.5 mg/dL 0.3   AST 5 - 34 unit/L 15   ALT 0 - 55 unit/L 12   Globulin, Total 2.4 - 3.5 gm/dL 3.3   (L): Data is abnormally low  (H): Data is abnormally high   Latest Reference Range & Units 02/01/25 06:23   Iron 65 - 175 ug/dL 30 (L)   TIBC 250 - 450 ug/dL 199 (L)   UIBC 60 - 240 ug/dL 169   Transferrin 163 - 344 mg/dL 179   Ferritin 21.81 - 274.66 ng/mL 139.38   Iron Saturation 20 - 50 % 15 (L)   (L): Data is abnormally low                Assessment/Plan  Hospital   Anemia   Hyponatremia   Spinal cord compression   Status post cervical spinal fusion   Hypokalemia     Non-Hospital   HTN (hypertension)   Hyperlipidemia   Ischemic cardiomyopathy with implantable cardioverter-defibrillator (ICD)   Tobacco user   Type 2 diabetes mellitus   Peripheral arterial disease with history of revascularization   Chronic systolic CHF (congestive heart failure), NYHA class 3   Chronic low back pain   Cervical myelopathy   Carotid artery stenosis, asymptomatic, left   Arteriosclerosis of coronary artery   S/P triple vessel bypass   COPD (chronic obstructive pulmonary disease)   Gastro-esophageal reflux disease without esophagitis   Anxiety disorder, unspecified   Cervical radiculopathy   Lumbar radiculopathy   Myelomalacia of cervical cord       Wounds: bilateral heel wounds, bilateral anterior leg wounds, posterior cervical incision-staples-dressing-c/d/i  On 1/24, patient underwent C2-6 PCDF and C2-3- C4-5 decompression by Dr. Ramírez.  (Hemovac drain placed to  site)  Precautions: spinal  Bracing: Hard cervical collar, bilateral heel boots in bed  Swallowing: Regular Diet  Function: Tolerating therapy. Continue PT/OT  VTE Prophylaxis:   enoxaparin injection 40 mg q24hr  Code Status: FULL CODE   Discharge: Lives alone in [place] in a 2nd story apartment. Completed high school. He has no  history.  Is disabled. . He was living alone and completely independent. Children: (2). Date pending.               Shannan Puri NP, conducted additional independent physical examination and assisted with medical documentation.

## 2025-02-03 NOTE — PT/OT/SLP PROGRESS
Occupational Therapy Inpatient Rehab Treatment    Name: Tyler Mai  MRN: [id number]    Assessment:  Tyler Mai is a 69 y.o. male admitted with a medical diagnosis of Status post cervical spinal fusion.  He presents with the following impairments/functional limitations:  weakness, impaired endurance, impaired sensation, impaired self care skills, impaired functional mobility, decreased coordination, decreased upper extremity function, decreased safety awareness, pain, impaired coordination, impaired fine motor.    Rehab Diagnosis: Cervical spondylosis and stenosis with myelopathy and cord compression s/p C2-6 PCDF and C2-3, C4-5 decompression 1/24/2025. Pt. Has a history of cervical myelopathy, CAD, CHF, COPD, DM, HLD, HTN, and PVD     General Precautions: Standard, fall     Orthopedic Precautions:spinal precautions     Braces: Aspen collar    Rehab Prognosis: Good; patient would benefit from acute skilled OT services to address these deficits and reach maximum level of function.      History:     Past Medical History:   Diagnosis Date    Anxiety disorder, unspecified     Arthritis     CAD (coronary artery disease)     Cervical radiculopathy     CHF (congestive heart failure)     Chronic pain syndrome     COPD (chronic obstructive pulmonary disease)     Diabetes mellitus     Gastro-esophageal reflux disease without esophagitis     High cholesterol     Hypertension     Lumbar radiculopathy     Myelomalacia of cervical cord     Other specified diseases of spinal cord     Pacemaker     PVD (peripheral vascular disease)     S/P triple vessel bypass     Tobacco use        Past Surgical History:   Procedure Laterality Date    ANTERIOR CERVICAL DISCECTOMY W/ FUSION  1995    C5-6 and C6-7.  Dr. Wall    ANTERIOR CERVICAL DISCECTOMY W/ FUSION  1996    Redo C5-6, C6-7.  Dr. Wall    CHOLECYSTECTOMY      CORONARY ARTERY BYPASS GRAFT      FUSION OF POSTERIOR COLUMN OF CERVICAL SPINE USING COMPUTER AIDED NAVIGATION N/A  "1/24/2025    Procedure: FUSION, SPINE, POSTERIOR SPINAL COLUMN, CERVICAL, USING COMPUTER-ASSISTED NAVIGATION;  Surgeon: Darrell Watson MD;  Location: [facility] OR;  Service: Neurosurgery;  Laterality: N/A;  C2-C6 PCF; C2/3-C4/5 decompression, possible additional levels  o-arm  NTI  TIVA setup  [employer] //  XX    INSERTION OF PACEMAKER      X2    TRIPPLE VESSEL BYPASS         Subjective     Orientation: Oriented x4    Chief Complaint: pain    Patient/Family Comments/goals: "I'm in too much pain. My pain medication is due at 1:40 pm"    Vitals   Vitals at Rest  BP    HR    O2 Sat    Pain 10/10     Respiratory Status: Room air    Patients cultural, spiritual, Jewish conflicts given the current situation: no     Objective:     Patient found up in chair with cervical collar  upon OT entry to room. NOTE: Before OT entered room, pt was quiet and calm watching TV. As soon as OT entered room, pt immediately started moaning in pain.    Mobility   Patient completed:  Sit to Stand Transfer with contact guard assistance with rolling walker  Stand to Sit Transfer with contact guard assistance with rolling walker  Bed to Chair Transfer using Step Transfer technique with contact guard assistance with rolling walker  Sit to supine with SBA    Functional Mobility  Pt only performed in room mobility due to stated pain.    ADLs   Current Status   Eating 5 with the use of built-up handled utensils      Limiting Factors for ADLs: sensory, endurance, coordination, safety awareness, and pain     Therapeutic Exercise  Pt given pink sponge to strengthen  and pinch of BUE. Pt able to demonstrate exercises given.    Patient left supine with all lines intact, call button in reach, and nursing notified that pt requesting pain medication.     Education provided: Roles and goals of OT, ADLs, transfer training, bed mobility, sequencing, safety precautions, fall prevention, post-op precautions, and equipment " recommendations    Multidisciplinary Problems       Occupational Therapy Goals          Problem: Occupational Therapy    Goal Priority Disciplines Outcome Interventions   Occupational Therapy Goal     OT, PT/OT Progressing    Description: ADLs:  Pt to perform grooming tasks with independence standing at sink with RW by d/c.   Pt to perform feeding tasks with independence using adaptive utensils as needed by d/c.    Pt to perform UB dressing with independence by d/c.    Pt to perform LB dressing with touch assist by d/c.    Pt to perform putting on/off footwear task with independence and AE as needed by d/c.   Pt to perform toileting with touch assist by d/c.    Pt to perform bathing with independence by d/c.     Functional Transfers:  Pt to perform toilet transfers with independence by d/c.    Pt to perform a tub transfer with independence by d/c.     IADLs:  Pt to perform simple meal prep standing at kitchen counter with touch assist by d/c.      Balance, Strengthening, Endurance, Balance:  Pt to consistently demonstrate adherence to spinal precautions during all ADL's as instructed by OT.  Pt to demonstrate good dynamic standing balance as required to perform ADL's from standing level.                         Time Tracking     OT Received On: 02/03/25  Time In 1300     Time Out 1330  Total Time 30 min  Therapy Time: OT Individual: 30  Missed Time:    Missed Time Reason:      Billable Minutes: Self Care/Home Management 30 02/03/2025

## 2025-02-03 NOTE — PT/OT/SLP EVAL
Recreational Therapy Evaluation      Date of Treatment: 02/03/25  Start Time: 1330  Stop Time: 1400  Total Time: 30 min  Missed Time:    Assessment      Tyler Mai is a 69 y.o. male admitted with a medical diagnosis of Status post cervical spinal fusion.  He presents with the following impairments/functional limitations:  weakness, impaired endurance, impaired functional mobility, gait instability, impaired balance, decreased coordination, decreased upper extremity function, decreased lower extremity function, decreased safety awareness, decreased ROM, impaired coordination, impaired fine motor .    Rehab Diagnosis:     Recent Surgery:    General Precautions: Standard, fall     Orthopedic Precautions:spinal precautions     Braces: Aspen collar    Rehab Prognosis: Fair; patient would benefit from acute skilled Recreational Therapy services to address these deficits and reach maximum level of function.      Impairments: Balance deficits, Coordination deficits, Endurance deficits, Mobility deficits, Pain, Safety awareness deficits, and Strength deficits  Rehab Potential: Fair, due to: Reduced endurance during therapy   Treatment Recommendations: Continue with Skill TR Service to facilitate functional independence and address impairments/limitations   Treatment Diagnosis: Cervical spondylosis and stenosis, myelopathy and cord compression, s/p C2-6 PCDF and C-2-3, C4-5 ,decompression, CAD, CHF, COPD, DM, HLD, HTN, PVD  Orientation: Oriented x4  Affect/Behavior: Anxious and Distracted  Safety/Judgement: impaired   Basic Command Following: intact  Spiritual Cultural: no        History     Past Medical History:   Diagnosis Date    Anxiety disorder, unspecified     Arthritis     CAD (coronary artery disease)     Cervical radiculopathy     CHF (congestive heart failure)     Chronic pain syndrome     COPD (chronic obstructive pulmonary disease)     Diabetes mellitus     Gastro-esophageal reflux disease without esophagitis      High cholesterol     Hypertension     Lumbar radiculopathy     Myelomalacia of cervical cord     Other specified diseases of spinal cord     Pacemaker     PVD (peripheral vascular disease)     S/P triple vessel bypass     Tobacco use        Past Surgical History:   Procedure Laterality Date    ANTERIOR CERVICAL DISCECTOMY W/ FUSION  1995    C5-6 and C6-7.  Dr. Wall    ANTERIOR CERVICAL DISCECTOMY W/ FUSION  1996    Redo C5-6, C6-7.  Dr. Wall    CHOLECYSTECTOMY      CORONARY ARTERY BYPASS GRAFT      FUSION OF POSTERIOR COLUMN OF CERVICAL SPINE USING COMPUTER AIDED NAVIGATION N/A 1/24/2025    Procedure: FUSION, SPINE, POSTERIOR SPINAL COLUMN, CERVICAL, USING COMPUTER-ASSISTED NAVIGATION;  Surgeon: Darrell Watson MD;  Location: [facility];  Service: Neurosurgery;  Laterality: N/A;  C2-C6 PCF; C2/3-C4/5 decompression, possible additional levels  o-arm  NTI  TIVA setup  [employer] //  XX    INSERTION OF PACEMAKER      X2    TRIPPLE VESSEL BYPASS         Home Environment     Admit Date: 01/31/25  Living Situation  People in Home: alone  Lives in: apartment  Patients Responsibilities: Community mobility, , Financial management, Health and wellness, Laundry, Leisure/play/hobbies, Meal preparation, Shopping, Other (Comment) (Disaabled)  Number of Children: 2  Occupation:[employer]    Instrumental Activities of Daily Living     Previous Hand Dominance: Right Current Hand Dominance: Right     Other iADL Information: Rolando UE weakness       Cognitive Skills Building         Cognitive Observation Activity Assist Position Equipment Response            Comment:      Dynamic Activities      Activity Assist Position Equipment Response   Activity 1 Bean bag toos contact guard assistance Standing Rolling walker and Bean bags fair   Comment: Sit to stand was contact guard as was dynamic standing balance/reaching.  Standing tolerance was 4 minutes.  RUE coordination was setup a d LEELEE coordination was supervision . C/o L  "shoulder pain.  He was distracted and focused on his pain level  Bed to w/c and w/c to bed transfer was contact guard       Fine Motor Activities      Activity Assist Position Equipment Response           Comment:        Goals     Patient Goals  Patient Goal 1: "Get healthy, move around and do for myself."    Short Term Goals    Goal  Goal Status   Will increase sit to stand to supervision Initiated   Will improve dynamic standing balance/reaching to supervision Initiated                 Long Term Goals    Goal Goal Status   Will increase standing tolerance to 5 minutes Initiated   Will improve dynamic standing balance/reaching to setup Initiated                     Plan       Patient to be seen: Daily  Duration: 2 weeks  Treatments planned: Balance training, Coordination, Energy conservation training, Fine motor, Safety education  Treatment plan/goals established with Patient/Caregiver: Yes     "

## 2025-02-03 NOTE — PT/OT/SLP PROGRESS
Physical Therapy Inpatient Rehab Treatment    Patient Name:  Tyler Mai   MRN:  [id number]    Recommendations:     Discharge Recommendations:   (pending progress)   Discharge Equipment Recommendations:     Barriers to discharge: Inaccessible home and Impaired functional mobility     Assessment:     Tyler Mai is a 69 y.o. male admitted with a medical diagnosis of Status post cervical spinal fusion.  He presents with the following impairments/functional limitations:  weakness, impaired endurance, impaired functional mobility, gait instability, impaired balance, decreased upper extremity function, decreased lower extremity function, pain, decreased ROM, impaired coordination, impaired fine motor, orthopedic precautions.    Rehab Diagnosis: Cervical spondylosis and stenosis with myelopathy and cord compression s/p C2-6 PCDF and C2-3, C4-5 decompression 1/24/2025. Pt. Has a history of cervical myelopathy, CAD, CHF, COPD, DM, HLD, HTN, and PVD    General Precautions: Standard, fall     Orthopedic Precautions:spinal precautions     Braces: Aspen collar    Rehab Prognosis: Good; patient would benefit from acute skilled PT services to address these deficits and reach maximum level of function.      History:     Past Medical History:   Diagnosis Date    Anxiety disorder, unspecified     Arthritis     CAD (coronary artery disease)     Cervical radiculopathy     CHF (congestive heart failure)     Chronic pain syndrome     COPD (chronic obstructive pulmonary disease)     Diabetes mellitus     Gastro-esophageal reflux disease without esophagitis     High cholesterol     Hypertension     Lumbar radiculopathy     Myelomalacia of cervical cord     Other specified diseases of spinal cord     Pacemaker     PVD (peripheral vascular disease)     S/P triple vessel bypass     Tobacco use        Past Surgical History:   Procedure Laterality Date    ANTERIOR CERVICAL DISCECTOMY W/ FUSION  1995    C5-6 and C6-7.  Dr. Wall     ANTERIOR CERVICAL DISCECTOMY W/ FUSION  1996    Redo C5-6, C6-7.  Dr. Wall    CHOLECYSTECTOMY      CORONARY ARTERY BYPASS GRAFT      FUSION OF POSTERIOR COLUMN OF CERVICAL SPINE USING COMPUTER AIDED NAVIGATION N/A 1/24/2025    Procedure: FUSION, SPINE, POSTERIOR SPINAL COLUMN, CERVICAL, USING COMPUTER-ASSISTED NAVIGATION;  Surgeon: Darrell Watson MD;  Location: [facility];  Service: Neurosurgery;  Laterality: N/A;  C2-C6 PCF; C2/3-C4/5 decompression, possible additional levels  o-arm  NTI  TIVA setup  [employer] //  XX    INSERTION OF PACEMAKER      X2    TRIPPLE VESSEL BYPASS         Subjective     Patient comments: neck pain    Respiratory Status: Room air    Patients cultural, spiritual, Synagogue conflicts given the current situation: no    Objective:     Communicated with RN prior to session.  Patient found up in chair with cervical collar  upon PT entry to room.    Pt is Alert and Cooperative.    Pain Pain Rating 1: 10/10  Location - Side 1: Bilateral  Location 1: neck  Pain Addressed 1: Nurse notified, Distraction       Functional Mobility:      Current   Status  Discharge   Goal   Functional Area: Care Score:    Sit to Lying 4  SBA with HOB flat Independent   Lying to Sitting on Side of Bed 3  Min A for trunk assist, HOB flat Independent   Sit to Stand 4  CGA with RW; VC for upright posture Independent   Chair/Bed-to-Chair Transfer 4  CGA with RW using stand-step transfer; w/c <> mat. No LOB with transfer but scissoring noted during turns.  Independent       Therapeutic Activities and Exercises:  Therapeutic exercises were performed supine on mat (BLE, 3 x 10):    hip abduction  heel slides   straight leg raises   Bridges  Increased time to perform due to pt requiring frequent rest breaks 2/2 fatigue.     Patient educated on role of acute care PT and PT POC, safety while in hospital including calling nurse for mobility, and call light usage  Patient educated about importance of OOB mobility and remaining  up in chair most of the day.  Patient educated about pursed lip breathing technique and cued for use with mobility.      Activity Tolerance: Good    Patient left up in chair with all lines intact and PTA, Liset, present.    Education provided: roles and goals of PT/PTA, transfer training, bed mob, safety awareness, body mechanics, strengthening exercises, fall prevention, and spinal precautions    Expected compliance: High compliance    Plan:     During this hospitalization, patient to be seen 5 x/week to address the identified rehab impairments via gait training, therapeutic activities, therapeutic exercises and progress toward the following goals:    GOALS:   Multidisciplinary Problems       Physical Therapy Goals          Problem: Physical Therapy    Goal Priority Disciplines Outcome Interventions   Physical Therapy Goal     PT, PT/OT Progressing    Description: Bed Mobility:  Sit to supine transfer independently.   Supine to sit transfer independently.     Transfers:  Sit to stand transfer independently using RW.   Car transfer independently using RW.    an object from the ground in standing position independently using RW.     Mobility:  Ambulate 500 feet independently using RW.  Ambulate 15 feet on uneven surfaces/ramps with setup/clean-up assist using RW.   Pt ascended/descended a 6 inch curb independently using RW.   Ascend/descend 15 stairs independently using bilateral handrails.                         Plan of Care Expires:  02/07/25  PT Next Visit Date: 02/07/25  Plan of Care reviewed with: patient    Additional Information:         Time Tracking:     Therapy Time  PT Received On: 02/03/25  PT Start Time: 0830  PT Stop Time: 0930  PT Total Time (min): 60 min   PT Individual: 60  Missed Time:    Time Missed due to:      Billable Minutes: Therapeutic Activity 15 and Therapeutic Exercise 45    02/03/2025

## 2025-02-03 NOTE — PROGRESS NOTES
[facility] - Rehab Inpatient Services  Wound Care    Patient Name:  Tyler Mai   MRN:  [id number]  Date: 2/3/2025  Diagnosis: Status post cervical spinal fusion    History:     Past Medical History:   Diagnosis Date    Anxiety disorder, unspecified     Arthritis     CAD (coronary artery disease)     Cervical radiculopathy     CHF (congestive heart failure)     Chronic pain syndrome     COPD (chronic obstructive pulmonary disease)     Diabetes mellitus     Gastro-esophageal reflux disease without esophagitis     High cholesterol     Hypertension     Lumbar radiculopathy     Myelomalacia of cervical cord     Other specified diseases of spinal cord     Pacemaker     PVD (peripheral vascular disease)     S/P triple vessel bypass     Tobacco use        Social History     Socioeconomic History    Marital status: Single   Tobacco Use    Smoking status: Every Day     Current packs/day: 1.00     Types: Cigarettes     Social Drivers of Health     Financial Resource Strain: Patient Declined (1/18/2025)    Overall Financial Resource Strain (CARDIA)     Difficulty of Paying Living Expenses: Patient declined   Food Insecurity: Patient Declined (1/18/2025)    Hunger Vital Sign     Worried About Running Out of Food in the Last Year: Patient declined     Ran Out of Food in the Last Year: Patient declined   Transportation Needs: Unmet Transportation Needs (1/31/2025)    PRAPARE - Transportation     Lack of Transportation (Medical): Yes     Lack of Transportation (Non-Medical): Yes   Stress: Patient Declined (1/18/2025)    [employer] - Occupational Stress Questionnaire     Feeling of Stress : Patient declined   Housing Stability: Patient Declined (1/18/2025)    Housing Stability Vital Sign     Unable to Pay for Housing in the Last Year: Patient declined     Homeless in the Last Year: Patient declined       Precautions:     Allergies as of 01/30/2025 - Reviewed 01/24/2025   Allergen  Reaction Noted    Hydrochlorothiazide  12/03/2024       [facility] Assessment Details/Treatment      02/03/25 1315        Wound 01/14/25 2202 Other (comment) Right Heel   Date First Assessed/Time First Assessed: 01/14/25 2202   Primary Wound Type: (c) Other (comment)  Side: Right  Location: Heel   Dressing Appearance Dry;Intact   Drainage Amount None   Appearance Blistered   Tissue loss description Partial thickness   Wound Length (cm) 5 cm   Wound Width (cm) 5 cm   Wound Surface Area (cm^2) 25 cm^2   Care Applied:   Dressing Foam     RIGHT HEEL: stage 2 pressure injury. Cleanse with saline, pat dry, apply S[employer] prep pad shakila wound for adhesion and bordered foam. Q 3 days. HEEL BOOTS.   DOCUMENT IF PATIENT REFUSES HEEL BOOTS.02/03/2025

## 2025-02-03 NOTE — PROGRESS NOTES
[facility] ([facility])  Rehab Progress Note    Patient Name: Tyler Mai  MRN: [id number]  Age: 69 y.o. Sex: male  : 1955  Hospital Length of Stay: 3 days  Date of Service: 2/3/2025   Chief Complaint: Cervical myelopathy s/p C2-6 PCDF and C2/3-C4/5 decompression on 2025     Subjective:     Basic Information  Admit Information: 69-year-old white male presented to [facility] ED on 2025 complaining of bilateral arm weakness, bilateral leg weakness, and worsening neck pain.  Reports right leg weaker than the left.  Recently admitted to [facility] for cervical myelopathy with plans for surgery, but patient had severe UTI requiring antibiotic therapy.  MRI on  significant for severe C3-4 level central canal stenosis with increased signal hyperintensity within the cervical cord from mid to lower aspect of C3 through mid aspect of C4 with central cord myelomalacia.  PMH significant for cervical myelopathy with history of C5-C6 fusion, CAD, HFrEF, ICMO, COPD, DM type 2, HLD, HTN, and PVD.  Workup significant for cervical myelopathy.  Patient inpatient family initially declined the neurosurgeon on call surgery.  Then surgery postpone due to inclement weather.  Tolerated posterior C3-C5 total laminectomies for decompression, placement of bilateral pars screws at C2 and bilateral lateral mass screws from C3 to C6 using [facility] Invictus OCT System, and placement of local bone and AlphaGraft DBM for arthrodesis from C2 to C6 on  without perioperative complications.  Postoperatively neurosurgery documented increase mobility to bilateral upper extremities with a 5/5 hand grasp.  Recommended aspirin hard collar at all times, and [place] collar for showers..  Recommended removal staples on .  Gray continued due to urinary retention.  Tolerated transfer to [facility] inpatient rehab unit on  without incident.   Today's Information: No acute events overnight.  Sleep hygiene fair  due to pain.  Appetite at goal.  Last BM 1/28.  Vital signs at goal with no recorded fevers.  CBC unremarkable.  Creatinine 1.34-trending up.  Albumin 3.2/prealbumin 20.4-trending up.  No new imaging today.    Review of patient's allergies indicates:   Allergen Reactions    Hydrochlorothiazide      Other Reaction(s): Pancreatitis        Current Facility-Administered Medications:     acetaminophen tablet 650 mg, 650 mg, Oral, Q6H PRN, Ashley, Tony A, FNP, 650 mg at 02/03/25 0540    ALPRAZolam tablet 0.25 mg, 0.25 mg, Oral, TID PRN, Ashley, Tony A, FNP    amLODIPine tablet 5 mg, 5 mg, Oral, BID, Ashley, Tony A, FNP, 5 mg at 02/02/25 2056    aspirin chewable tablet 81 mg, 81 mg, Oral, Daily, Ashley, Tony A, FNP, 81 mg at 02/02/25 0817    atorvastatin tablet 40 mg, 40 mg, Oral, Daily, Ashley, Tony A, FNP, 40 mg at 02/02/25 0817    benzonatate capsule 100 mg, 100 mg, Oral, TID PRN, Ashley, Tony A, FNP    carvediloL tablet 6.25 mg, 6.25 mg, Oral, BID, Ashley, Tony A, FNP, 6.25 mg at 02/02/25 2055    clopidogreL tablet 75 mg, 75 mg, Oral, Daily, Ashley, Tony A, FNP, 75 mg at 02/02/25 0816    cyclobenzaprine tablet 10 mg, 10 mg, Oral, TID PRN, Ashley, Tony A, FNP    dextrose 50% injection 12.5 g, 12.5 g, Intravenous, PRN, Ashley, Tony A, FNP    dextrose 50% injection 25 g, 25 g, Intravenous, PRN, Ashley, Tony A, FNP    docusate sodium capsule 100 mg, 100 mg, Oral, BID, Ashley, Tony A, FNP, 100 mg at 02/02/25 2055    DULoxetine DR capsule 30 mg, 30 mg, Oral, Daily, Shannan Puri A, FNP, 30 mg at 02/02/25 0817    enoxaparin injection 40 mg, 40 mg, Subcutaneous, Q24H (prophylaxis, 1700), Tony Ramirez, FNP, 40 mg at 02/02/25 1700    finasteride tablet 5 mg, 5 mg, Oral, Daily, Tony Ramirez, FNP, 5 mg at 02/02/25 0818    umeclidinium-vilanteroL 62.5-25 mcg/actuation DsDv 1 puff, 1 puff, Inhalation, Daily, 1 puff at 02/02/25 0825 **AND**  fluticasone furoate 200 mcg/actuation inhaler 1 puff, 1 puff, Inhalation, Daily, Ashley, Tony A, FNP, 1 puff at 02/02/25 0826    glucagon (human recombinant) injection 1 mg, 1 mg, Intramuscular, PRN, Ashley, Tony A, FNP    glucose chewable tablet 16 g, 16 g, Oral, PRN, Ashley, Tony A, FNP    glucose chewable tablet 24 g, 24 g, Oral, PRN, Ashley, Tony A, FNP    hydrALAZINE injection 10 mg, 10 mg, Intravenous, Q4H PRN, Ashley, Tony A, FNP    hydrOXYzine pamoate capsule 50 mg, 50 mg, Oral, Nightly PRN, Ashley, Tony A, FNP, 50 mg at 02/02/25 2055    insulin aspart U-100 injection 0-10 Units, 0-10 Units, Subcutaneous, QID (AC + HS) PRN, Ashley, Tony A, FNP, 2 Units at 02/03/25 0547    labetalol 20 mg/4 mL (5 mg/mL) IV syring, 10 mg, Intravenous, Q4H PRN, Ashley, Tony A, FNP    LIDOcaine 5 % patch 1 patch, 1 patch, Transdermal, Q24H, Shannan Puri A, FNP, 1 patch at 02/02/25 1656    linaCLOtide capsule 145 mcg, 145 mcg, Oral, QAM, Ashley, Tony A, FNP, 145 mcg at 02/03/25 0540    losartan tablet 25 mg, 25 mg, Oral, Daily, Ashley, Tony A, FNP, 25 mg at 02/02/25 0817    magnesium oxide tablet 400 mg, 400 mg, Oral, BID, Ashley, Tony A, FNP, 400 mg at 02/02/25 2056    magnesium oxide tablet 400 mg, 400 mg, Oral, Daily, Ashley, Tony A, FNP, 400 mg at 02/02/25 0817    melatonin tablet 6 mg, 6 mg, Oral, Nightly PRN, Shannan Puri, FNP, 6 mg at 02/02/25 2056    methocarbamoL tablet 1,000 mg, 1,000 mg, Oral, QID, Ashley, Tony A, FNP, 1,000 mg at 02/03/25 0540    metoprolol injection 10 mg, 10 mg, Intravenous, Q2H PRN, Tony Ramirez, RONYEP    mupirocin 2 % ointment, , Nasal, BID, Darrell Watson MD, Given at 02/02/25 2000    nitroGLYCERIN SL tablet 0.4 mg, 0.4 mg, Sublingual, Q5 Min PRN, Tony Ramirez, FNP    ondansetron disintegrating tablet 4 mg, 4 mg, Oral, Q6H PRN, Tony Ramirez A, FNP    ondansetron disintegrating tablet 8  "mg, 8 mg, Oral, Q8H PRN, Ashley, Tony A, FNP    ondansetron injection 4 mg, 4 mg, Intravenous, Q8H PRN, Ashley, Tony A, FNP    oxyCODONE immediate release tablet 10 mg, 10 mg, Oral, Q4H PRN, Ashley, Tony A, FNP, 10 mg at 02/03/25 0540    pantoprazole EC tablet 40 mg, 40 mg, Oral, Daily, Ashley, Tony A, FNP, 40 mg at 02/02/25 0818    polyethylene glycol packet 17 g, 17 g, Oral, BID PRN, Ashley, Tony A, FNP    pregabalin capsule 75 mg, 75 mg, Oral, TID, Jaxson, Shannan A, FNP, 75 mg at 02/03/25 0540    ranolazine 12 hr tablet 500 mg, 500 mg, Oral, BID, Ashley, Tony A, FNP, 500 mg at 02/02/25 2056    tamsulosin 24 hr capsule 0.4 mg, 0.4 mg, Oral, QHS, Ashley, Tony A, FNP, 0.4 mg at 02/02/25 2056     Review of Systems   Complete 12-point review of symptoms negative except for what's mentioned in HPI     Objective:     BP (!) 157/62   Pulse 63   Temp 97.8 °F (36.6 °C) (Oral)   Resp 18   Ht 5' 6" (1.676 m)   Wt 62.2 kg (137 lb 2 oz)   SpO2 100%   BMI 22.13 kg/m²      Physical Exam  Constitutional:       Appearance: Normal appearance.   HENT:      Head: Normocephalic.      Mouth/Throat:      Mouth: Mucous membranes are moist.   Eyes:      Pupils: Pupils are equal, round, and reactive to light.   Neck:      Comments: Posterior neck incision clean and intact.  Hard collar intact  Cardiovascular:      Rate and Rhythm: Normal rate and regular rhythm.      Heart sounds: Normal heart sounds.   Pulmonary:      Effort: Pulmonary effort is normal.      Breath sounds: Normal breath sounds.   Abdominal:      General: Bowel sounds are normal.      Palpations: Abdomen is soft.   Musculoskeletal:      Cervical back: Neck supple.      Comments: Diffuse muscle atrophy   Skin:     General: Skin is warm and dry.   Neurological:      General: No focal deficit present.      Mental Status: He is alert and oriented to person, place, and time.      Motor: Weakness present.   Psychiatric:         " Mood and Affect: Mood normal.         Behavior: Behavior normal.         Thought Content: Thought content normal.         Judgment: Judgment normal.     *MD performed and documented physical examination       Lines/Drains/Airways       Peripheral Intravenous Line  Duration                  Peripheral IV - Single Lumen 01/14/25 1853 20 G Anterior;Right Forearm 19 days                  Labs  Recent Results (from the past 24 hours)   POCT glucose    Collection Time: 02/02/25 11:36 AM   Result Value Ref Range    POCT Glucose 180 (H) 70 - 110 mg/dL   POCT glucose    Collection Time: 02/02/25  4:53 PM   Result Value Ref Range    POCT Glucose 159 (H) 70 - 110 mg/dL   POCT glucose    Collection Time: 02/02/25  9:02 PM   Result Value Ref Range    POCT Glucose 171 (H) 70 - 110 mg/dL   POCT glucose    Collection Time: 02/03/25  5:44 AM   Result Value Ref Range    POCT Glucose 191 (H) 70 - 110 mg/dL   Comprehensive Metabolic Panel    Collection Time: 02/03/25  6:31 AM   Result Value Ref Range    Sodium 134 (L) 136 - 145 mmol/L    Potassium 4.8 3.5 - 5.1 mmol/L    Chloride 99 98 - 107 mmol/L    CO2 28 23 - 31 mmol/L    Glucose 99 82 - 115 mg/dL    Blood Urea Nitrogen 34.1 (H) 8.4 - 25.7 mg/dL    Creatinine 1.34 (H) 0.72 - 1.25 mg/dL    Calcium 9.5 8.8 - 10.0 mg/dL    Protein Total 6.5 5.8 - 7.6 gm/dL    Albumin 3.2 (L) 3.4 - 4.8 g/dL    Globulin 3.3 2.4 - 3.5 gm/dL    Albumin/Globulin Ratio 1.0 (L) 1.1 - 2.0 ratio    Bilirubin Total 0.3 <=1.5 mg/dL     40 - 150 unit/L    ALT 12 0 - 55 unit/L    AST 15 5 - 34 unit/L    eGFR 58 mL/min/1.73/m2    Anion Gap 7.0 mEq/L    BUN/Creatinine Ratio 25    Prealbumin    Collection Time: 02/03/25  6:31 AM   Result Value Ref Range    Prealbumin 20.4 16.0 - 42.0 mg/dL   Magnesium    Collection Time: 02/03/25  6:31 AM   Result Value Ref Range    Magnesium Level 1.80 1.60 - 2.60 mg/dL   Phosphorus    Collection Time: 02/03/25  6:31 AM   Result Value Ref Range    Phosphorus Level 2.8 2.3 -  4.7 mg/dL   CBC with Differential    Collection Time: 02/03/25  6:31 AM   Result Value Ref Range    WBC 8.45 4.50 - 11.50 x10(3)/mcL    RBC 2.93 (L) 4.70 - 6.10 x10(6)/mcL    Hgb 9.1 (L) 14.0 - 18.0 g/dL    Hct 27.9 (L) 42.0 - 52.0 %    MCV 95.2 (H) 80.0 - 94.0 fL    MCH 31.1 (H) 27.0 - 31.0 pg    MCHC 32.6 (L) 33.0 - 36.0 g/dL    RDW 11.8 11.5 - 17.0 %    Platelet 359 130 - 400 x10(3)/mcL    MPV 9.5 7.4 - 10.4 fL    Neut % 63.1 %    Lymph % 24.1 %    Mono % 7.6 %    Eos % 4.0 %    Basophil % 0.7 %    Imm Grans % 0.5 %    Neut # 5.33 2.1 - 9.2 x10(3)/mcL    Lymph # 2.04 0.6 - 4.6 x10(3)/mcL    Mono # 0.64 0.1 - 1.3 x10(3)/mcL    Eos # 0.34 0 - 0.9 x10(3)/mcL    Baso # 0.06 <=0.2 x10(3)/mcL    Imm Gran # 0.04 0.00 - 0.04 x10(3)/mcL    NRBC% 0.0 %     Radiology  Cervical XR two-view on 01/25/2025, IMPRESSION: There are postoperative changes with posterior spinal fusion hardware at C2 through C6. The hardware appears intact. Cervical alignment has improved. The remaining vertebral body heights are preserved. There is moderate disc height loss at C3-C4, mild at C4-C5, with multilevel marginal osteophytes. The soft tissues are unremarkable.     Assessment/Plan:     69 y.o. WM admitted on 1/31/2025    Cervical myelopathy   - s/p C2-6 PCDF and C2/3-C4/5 decompression on 01/24/2025  - remove staples on 02/07  - continue aspirin hard collar at all times, okay for [place] collar for showers only  - continue                Robaxin 1000 mg q.i.d. (increased 2/2)                Flexeril 10 mg t.i.d. p.r.n.                Percocet 10 mg q.4 hours p.r.n. for severe pain                Lyrica 75 mg b.i.d. (initiated 1/31)  - defer to physiatry for rehab and pain management  - PT/OT/RT/ST following  - to follow up with Neurosurgery outpatient     Coronary artery disease  - s/p  CABG x 3  - denies recent chest pain or discomfort  - continue                ASA 81 mg daily (resumed 1/31)                Plavix 75 mg daily (resume  1/31)                Coreg 6.25 mg b.i.d.                 Lipitor 40 mg daily                Losartan 25 mg daily   - ECG and Nitro PRN  - continue cardiac diet  - to follow-up with cardiology outpatient     HFrEF/ICMO  - stable  - continue                Coreg 6.25 mg b.i.d.                 Losartan 25 mg daily                 Ranexa 500 mg b.i.d.   - obtain daily standing weights  - to follow up with cardiology outpatient     Nicotine dependence  - smoking cessation counseling given on admission  - 1 PPD with approximately 55 pack years     HTN  - BP at goal  - continue                Coreg 6.25 mg b.i.d.                 Losartan 25 mg daily                 Norvasc 5 mg daily                 Ranexa 500 mg b.i.d.                 Flomax 0.4 mg at bedtime                Hydralazine 10 mg every 2 hours as needed for BP > 160/90                Labetalol 10 mg every 2 hours as needed for BP > 160/90  - low sodium diet             COPD   - stable  - not on home 02  - continue                Trelegy INH daily                   DM type II  - HgA1c 5.6 on 01/23/2024  - continue                Lantus 10 units every evening (resumed 1/31)                ISS   - CBGs AC/HS     HLD  - FLP outpatient  - resume Crestor upon discharge  - continue                Lipitor 40 mg daily      PVD  - stable  - continue                ASA 81 mg daily (resumed 1/31)                Plavix 75 mg daily (resume 1/31)                Coreg 6.25 mg b.i.d.                 Lipitor 40 mg daily  - to follow up vascular surgery outpatient                  Chronic constipation  - last BM 1/28, reports is normal for him to have a bowel movement every 8 days  - continue                Linzess 145 mcg before breakfast                 Colace 100 mg b.i.d.     Hypomagnesemia  - stable  - continue                Magnesium oxide 400 mg b.i.d.      BPH/urinary retention  - current  - continue                Proscar 5 mg daily (initiated 1/31)                 Flomax 0.4 mg at bedtime   - Plan removal of indwelling catheter on 02/03     GERD  - Avoid spicy foods, and nothing to eat or drink within x2 hours of bedtime or laying flat (water is ok)   - Avoid NSAIDs (Advil, ibuprofen, naproxen...) and chavira-2 inhibitors (Mobic, Celebrex)    - continue                Protonix 40 mg daily      Normocytic anemia  - asymptomatic  - H/H stable   - no evidence of active bleeds  - will closely monitor and transfuse if needed     Hypomagnesemia  - magnesium 1.3 on 02/01  - s/p Mag sulfate 2 g x 1 IVPB on 02/01  - continue   Mag-Ox 400 mg daily (initiated 2/1)    Urinary retention  - indwelling catheter removed on 02/01  - good urine output reported on 02/02, with no residuals greater than 300    Mild JOHN  - creatinine 1.34-trending up  - encourage oral hydration  - bladder scan to rule out retention  - repeat lab work in the morning     VTE Prophylaxis: Lovenox 40 mg daily   COVID-19 testing:  Unknown  COVID-19 vaccination status:  Vaccinated (Moderna):  Bivalent booster on 04/03/2023     POA:  No  Living will:  No  Contacts:  Tyler Mai Jr (son) [phone]     CODE STATUS:  Full code  Internal Medicine (attending): Tyler De Anda MD  Physiatry (consulting):  Philip Fang MD     OUTPATIENT PROVIDERS  PCP: None  Neurosurgery: Darrell Zavala MD     DISPOSITION:  Sleep hygiene fair due to pain.  Appetite is good.  Last BM 1/28.  Vital signs at goal with no recorded fevers.  CBC unremarkable.  Creatinine trending up slightly.  Albumin and prealbumin trending up.  No new imaging today.  Encouraged to increase oral intake.  Bladder scan to rule out retention as indwelling catheter just removed 2/1.  Monitor closely.  Notify of acute changes.  Repeat lab work in the morning.    Jarrell Ramirez NP conducted independent physical examination and assisted with medical documentation.    Total time spent on this encounter including chart review and direct MD + NP 1-on-1 patient  interaction: 53 minutes   Over 50% of this time was spent in counseling and coordination of care

## 2025-02-03 NOTE — PLAN OF CARE
Problem: Fall Injury Risk  Goal: Absence of Fall and Fall-Related Injury  Outcome: Progressing  Intervention: Promote Injury-Free Environment  Flowsheets (Taken 2/2/2025 2138)  Safety Promotion/Fall Prevention:   assistive device/personal item within reach   bed alarm set   Fall Risk signage in place   instructed to call staff for mobility   lighting adjusted   nonskid shoes/socks when out of bed   side rails raised x 3   supervised activity

## 2025-02-04 LAB
ANION GAP SERPL CALC-SCNC: 10 MEQ/L
BUN SERPL-MCNC: 37.3 MG/DL (ref 8.4–25.7)
CALCIUM SERPL-MCNC: 9.7 MG/DL (ref 8.8–10)
CHLORIDE SERPL-SCNC: 99 MMOL/L (ref 98–107)
CO2 SERPL-SCNC: 26 MMOL/L (ref 23–31)
CREAT SERPL-MCNC: 1.36 MG/DL (ref 0.72–1.25)
CREAT/UREA NIT SERPL: 27
GFR SERPLBLD CREATININE-BSD FMLA CKD-EPI: 57 ML/MIN/1.73/M2
GLUCOSE SERPL-MCNC: 99 MG/DL (ref 82–115)
POCT GLUCOSE: 109 MG/DL (ref 70–110)
POTASSIUM SERPL-SCNC: 4.8 MMOL/L (ref 3.5–5.1)
SODIUM SERPL-SCNC: 135 MMOL/L (ref 136–145)

## 2025-02-04 PROCEDURE — 80048 BASIC METABOLIC PNL TOTAL CA: CPT | Performed by: NURSE PRACTITIONER

## 2025-02-04 PROCEDURE — 11800000 HC REHAB PRIVATE ROOM

## 2025-02-04 PROCEDURE — 25000003 PHARM REV CODE 250: Performed by: NURSE PRACTITIONER

## 2025-02-04 PROCEDURE — 36415 COLL VENOUS BLD VENIPUNCTURE: CPT | Performed by: NURSE PRACTITIONER

## 2025-02-04 PROCEDURE — 51798 US URINE CAPACITY MEASURE: CPT

## 2025-02-04 PROCEDURE — 97530 THERAPEUTIC ACTIVITIES: CPT

## 2025-02-04 PROCEDURE — 97110 THERAPEUTIC EXERCISES: CPT

## 2025-02-04 PROCEDURE — 94799 UNLISTED PULMONARY SVC/PX: CPT | Mod: XB

## 2025-02-04 PROCEDURE — 97535 SELF CARE MNGMENT TRAINING: CPT

## 2025-02-04 PROCEDURE — 63600175 PHARM REV CODE 636 W HCPCS: Performed by: NURSE PRACTITIONER

## 2025-02-04 PROCEDURE — 94761 N-INVAS EAR/PLS OXIMETRY MLT: CPT

## 2025-02-04 PROCEDURE — 25000242 PHARM REV CODE 250 ALT 637 W/ HCPCS: Performed by: NURSE PRACTITIONER

## 2025-02-04 PROCEDURE — 99900031 HC PATIENT EDUCATION (STAT)

## 2025-02-04 RX ORDER — ALBUTEROL SULFATE 0.83 MG/ML
2.5 SOLUTION RESPIRATORY (INHALATION) EVERY 4 HOURS PRN
Status: DISCONTINUED | OUTPATIENT
Start: 2025-02-04 | End: 2025-02-19 | Stop reason: HOSPADM

## 2025-02-04 RX ADMIN — ENOXAPARIN SODIUM 40 MG: 40 INJECTION SUBCUTANEOUS at 05:02

## 2025-02-04 RX ADMIN — FINASTERIDE 5 MG: 5 TABLET, FILM COATED ORAL at 08:02

## 2025-02-04 RX ADMIN — HYDRALAZINE HYDROCHLORIDE 10 MG: 20 INJECTION INTRAMUSCULAR; INTRAVENOUS at 06:02

## 2025-02-04 RX ADMIN — OXYCODONE HYDROCHLORIDE AND ACETAMINOPHEN 1 TABLET: 10; 325 TABLET ORAL at 01:02

## 2025-02-04 RX ADMIN — LIDOCAINE 5% 1 PATCH: 700 PATCH TOPICAL at 05:02

## 2025-02-04 RX ADMIN — Medication 400 MG: at 09:02

## 2025-02-04 RX ADMIN — OXYCODONE HYDROCHLORIDE AND ACETAMINOPHEN 1 TABLET: 10; 325 TABLET ORAL at 09:02

## 2025-02-04 RX ADMIN — AMLODIPINE BESYLATE 5 MG: 5 TABLET ORAL at 09:02

## 2025-02-04 RX ADMIN — TIZANIDINE 4 MG: 4 TABLET ORAL at 09:02

## 2025-02-04 RX ADMIN — CLOPIDOGREL BISULFATE 75 MG: 75 TABLET, FILM COATED ORAL at 08:02

## 2025-02-04 RX ADMIN — DULOXETINE HYDROCHLORIDE 30 MG: 30 CAPSULE, DELAYED RELEASE ORAL at 08:02

## 2025-02-04 RX ADMIN — RANOLAZINE 500 MG: 500 TABLET, EXTENDED RELEASE ORAL at 09:02

## 2025-02-04 RX ADMIN — TIZANIDINE 4 MG: 4 TABLET ORAL at 05:02

## 2025-02-04 RX ADMIN — UMECLIDINIUM BROMIDE AND VILANTEROL TRIFENATATE 1 PUFF: 62.5; 25 POWDER RESPIRATORY (INHALATION) at 12:02

## 2025-02-04 RX ADMIN — OXYCODONE HYDROCHLORIDE AND ACETAMINOPHEN 1 TABLET: 10; 325 TABLET ORAL at 05:02

## 2025-02-04 RX ADMIN — DOCUSATE SODIUM 100 MG: 100 CAPSULE, LIQUID FILLED ORAL at 09:02

## 2025-02-04 RX ADMIN — PREGABALIN 100 MG: 50 CAPSULE ORAL at 09:02

## 2025-02-04 RX ADMIN — ASPIRIN 81 MG CHEWABLE TABLET 81 MG: 81 TABLET CHEWABLE at 08:02

## 2025-02-04 RX ADMIN — DOCUSATE SODIUM 100 MG: 100 CAPSULE, LIQUID FILLED ORAL at 08:02

## 2025-02-04 RX ADMIN — ALBUTEROL SULFATE 2.5 MG: 2.5 SOLUTION RESPIRATORY (INHALATION) at 11:02

## 2025-02-04 RX ADMIN — Medication 400 MG: at 08:02

## 2025-02-04 RX ADMIN — PREGABALIN 100 MG: 50 CAPSULE ORAL at 05:02

## 2025-02-04 RX ADMIN — CARVEDILOL 6.25 MG: 6.25 TABLET, FILM COATED ORAL at 09:02

## 2025-02-04 RX ADMIN — FLUTICASONE FUROATE 1 PUFF: 200 POWDER RESPIRATORY (INHALATION) at 12:02

## 2025-02-04 RX ADMIN — PREGABALIN 100 MG: 50 CAPSULE ORAL at 01:02

## 2025-02-04 RX ADMIN — PANTOPRAZOLE SODIUM 40 MG: 40 TABLET, DELAYED RELEASE ORAL at 08:02

## 2025-02-04 RX ADMIN — AMLODIPINE BESYLATE 5 MG: 5 TABLET ORAL at 08:02

## 2025-02-04 RX ADMIN — ATORVASTATIN CALCIUM 40 MG: 40 TABLET, FILM COATED ORAL at 08:02

## 2025-02-04 RX ADMIN — LINACLOTIDE 145 MCG: 145 CAPSULE, GELATIN COATED ORAL at 05:02

## 2025-02-04 NOTE — PT/OT/SLP PROGRESS
"Physical Therapy      Patient Name:  Tyler Mai   MRN:  [id number]    Patient not seen today secondary to Patient unwilling to participate, Patient fatigue.     Amount of therapy minutes missed:  90 Minutes.    PT attempted to see pt again at 1400 to 1530 to make up missed minutes. Upon arrival, pt refusing to participate. PT educated pt on importance of OOB movement and need to get better. Pt states that he is having shallow breathing ( took O2 sat at 97%) Pt also states that he is "not up to it." PT educated pt on importance, however, pt adamantly refused despite max encouragement. Pt did state that he will participate tomorrow, because he made a promise to his children.       2/4/2025  "

## 2025-02-04 NOTE — PROGRESS NOTES
[facility] - Rehab Inpatient Services  Wound Care    Patient Name:  Tyler Mai   MRN:  [id number]  Date: 2/4/2025  Diagnosis: Status post cervical spinal fusion    History:     Past Medical History:   Diagnosis Date    Anxiety disorder, unspecified     Arthritis     CAD (coronary artery disease)     Cervical radiculopathy     CHF (congestive heart failure)     Chronic pain syndrome     COPD (chronic obstructive pulmonary disease)     Diabetes mellitus     Gastro-esophageal reflux disease without esophagitis     High cholesterol     Hypertension     Lumbar radiculopathy     Myelomalacia of cervical cord     Other specified diseases of spinal cord     Pacemaker     PVD (peripheral vascular disease)     S/P triple vessel bypass     Tobacco use        Social History     Socioeconomic History    Marital status: Single   Tobacco Use    Smoking status: Every Day     Current packs/day: 1.00     Types: Cigarettes     Social Drivers of Health     Financial Resource Strain: Patient Declined (1/18/2025)    Overall Financial Resource Strain (CARDIA)     Difficulty of Paying Living Expenses: Patient declined   Food Insecurity: Patient Declined (1/18/2025)    Hunger Vital Sign     Worried About Running Out of Food in the Last Year: Patient declined     Ran Out of Food in the Last Year: Patient declined   Transportation Needs: Unmet Transportation Needs (1/31/2025)    PRAPARE - Transportation     Lack of Transportation (Medical): Yes     Lack of Transportation (Non-Medical): Yes   Stress: Patient Declined (1/18/2025)    [employer] - Occupational Stress Questionnaire     Feeling of Stress : Patient declined   Housing Stability: Patient Declined (1/18/2025)    Housing Stability Vital Sign     Unable to Pay for Housing in the Last Year: Patient declined     Homeless in the Last Year: Patient declined       Precautions:     Allergies as of 01/30/2025 - Reviewed 01/24/2025   Allergen  Reaction Noted    Hydrochlorothiazide  12/03/2024       [facility] Assessment Details/Treatment      02/04/25 1200        Peripheral IV - Single Lumen 01/14/25 1853 20 G Anterior;Right Forearm   Placement Date/Time: 01/14/25 1853   Present Prior to Hospital Arrival?: No  Inserted by: RN  Size (G): 20 G  Orientation: Anterior;Right  Location: Forearm   Site Assessment Clean;Dry;Intact;No redness;No swelling   Line Status Capped;Saline locked   Dressing Status Clean;Dry;Intact   Dressing Intervention Integrity maintained        Wound 01/14/25 2202 Pressure Injury Right Heel   Date First Assessed/Time First Assessed: 01/14/25 2202   Present on Original Admission: Yes  Primary Wound Type: (c) Pressure Injury  Side: Right  Location: Heel   Dressing Appearance Intact        Wound 01/14/25 2203 Other (comment) Left anterior Foot   Date First Assessed/Time First Assessed: 01/14/25 2203   Primary Wound Type: (c) Other (comment)  Side: Left  Orientation: anterior  Location: Foot   Dressing Appearance Open to air   Appearance Dry  (scabs)        Wound 01/25/25 Other (comment) Left Heel   Date First Assessed: 01/25/25   Primary Wound Type: Other (comment)  Side: Left  Location: Heel   Appearance Dry  (scab)   Wound Length (cm) 1.1 cm   Wound Width (cm) 1 cm   Wound Surface Area (cm^2) 1.1 cm^2   Dressing Foam     Patient refuses to wear heel boot. Will try pink heel protectors.  Monitor left heel for possible pressure.    Trimmed nails to bilateral hands and toes.  02/04/2025

## 2025-02-04 NOTE — PLAN OF CARE
Problem: Rehabilitation (IRF) Plan of Care  Goal: Plan of Care Review  Outcome: Progressing  Flowsheets (Taken 2/4/2025 0056)  Plan of Care Reviewed With: patient  Goal: Patient-Specific Goal (Individualized)  Outcome: Progressing  Goal: Absence of New-Onset Illness or Injury  Outcome: Progressing  Intervention: Prevent Fall and Fall Injury  Flowsheets (Taken 2/4/2025 0056)  Safety Promotion/Fall Prevention:   assistive device/personal item within reach   medications reviewed   muscle strengthening facilitated   nonskid shoes/socks when out of bed  Intervention: Prevent Skin Injury  Flowsheets (Taken 2/4/2025 0056)  Device Skin Pressure Protection:   absorbent pad utilized/changed   tubing/devices free from skin contact  Intervention: Prevent Infection  Flowsheets (Taken 2/4/2025 0056)  Infection Prevention:   equipment surfaces disinfected   hand hygiene promoted   visitors restricted/screened   single patient room provided   rest/sleep promoted   personal protective equipment utilized   environmental surveillance performed  Intervention: Prevent VTE (Venous Thromboembolism)  Flowsheets (Taken 2/4/2025 0056)  VTE Prevention/Management:   remove, assess skin, and reapply sequential compression device   dorsiflexion/plantar flexion performed  Goal: Optimal Comfort and Wellbeing  Outcome: Progressing  Intervention: Provide Person-Centered Care  Flowsheets (Taken 2/4/2025 0056)  Trust Relationship/Rapport:   care explained   questions encouraged   choices provided   reassurance provided   emotional support provided   thoughts/feelings acknowledged   empathic listening provided   questions answered  Intervention: Monitor Pain and Promote Comfort  Flowsheets (Taken 2/4/2025 0056)  Pain Management Interventions:   care clustered   pain management plan reviewed with patient/caregiver   quiet environment facilitated   relaxation techniques promoted  Goal: Home and Community Transition Plan Established  Outcome:  Progressing     Problem: Wound  Goal: Optimal Coping  Outcome: Progressing

## 2025-02-04 NOTE — PT/OT/SLP PROGRESS
Recreational Therapy Treatment    Date of Treatment: 02/04/25  Start Time: 1300  Stop Time: 1330  Total Time: 30 min  Missed Time:     General Precautions: fall  Ortho Precautions: spinal precautions  Braces: Aspen collar    Vitals   Vitals at Rest  BP    HR    O2 Sat    Pain      Vitals With Activity  BP    HR    O2 Sat    Pain        Treatment     Cognitive Skills Building   Cognitive Observation Activity Assist Position Equipment Response            Comment:          Dynamic Activities   Activity Assist Position Equipment Response   Activity 1 Washer toss supervision and contact guard assistance Sitting Metal washers fair   Comment: Pt declined to stand during this RT treatment.  He said he was too weak and was having shallow breathing.  He was willing to participate if he could sit.  Dynamic sitting balance/reaching was supervision.  Sitting tolerance was 3 minutes without leaning against back of w/c.  RUE coordination was setup and LUE coordination was supervision.  Memory and problem solving skills were supervision.  Attention to task was less than 1 minute. He was distracted with weakness.   W/c to bed transfer was supervision.  Once in bed he asked to bed moved higher in the bed, then asked for coffee.  He was left with bed tray, call bell and cell phone close at hand.        Fine Motor Activities   Activity Assist Position Equipment Response           Comment:          Additional Info: Pt needed max encouragement to get out of bed and attend this treatment.    Recliner to w/c transfer was contact guard    Goals     Short Term Goals    Goal  Goal Status   Will increase sit to stand to supervision Progressing   Will improve dynamic standing balance/reaching to supervision Progressing                 Long Term Goals    Goal Goal Status   Will increase standing tolerance to 5 minutes Progressing   Will improve dynamic standing balance/reaching to setup Progressing

## 2025-02-04 NOTE — PROGRESS NOTES
[facility] ([facility])  Rehab Progress Note    Patient Name: Tyler Mai  MRN: [id number]  Age: 69 y.o. Sex: male  : 1955  Hospital Length of Stay: 4 days  Date of Service: 2025   Chief Complaint: Cervical myelopathy s/p C2-6 PCDF and C2/3-C4/5 decompression on 2025     Subjective:     Basic Information  Admit Information: 69-year-old white male presented to [facility] ED on 2025 complaining of bilateral arm weakness, bilateral leg weakness, and worsening neck pain.  Reports right leg weaker than the left.  Recently admitted to [facility] for cervical myelopathy with plans for surgery, but patient had severe UTI requiring antibiotic therapy.  MRI on  significant for severe C3-4 level central canal stenosis with increased signal hyperintensity within the cervical cord from mid to lower aspect of C3 through mid aspect of C4 with central cord myelomalacia.  PMH significant for cervical myelopathy with history of C5-C6 fusion, CAD, HFrEF, ICMO, COPD, DM type 2, HLD, HTN, and PVD.  Workup significant for cervical myelopathy.  Patient inpatient family initially declined the neurosurgeon on call surgery.  Then surgery postpone due to inclement weather.  Tolerated posterior C3-C5 total laminectomies for decompression, placement of bilateral pars screws at C2 and bilateral lateral mass screws from C3 to C6 using [facility] Invictus OCT System, and placement of local bone and AlphaGraft DBM for arthrodesis from C2 to C6 on  without perioperative complications.  Postoperatively neurosurgery documented increase mobility to bilateral upper extremities with a 5/5 hand grasp.  Recommended aspirin hard collar at all times, and Morgan collar for showers..  Recommended removal staples on .  Gray continued due to urinary retention.  Tolerated transfer to [facility] inpatient rehab unit on  without incident.   Today's Information: No acute events overnight.  Resting in bed.   Reports good sleep and appetite.  Last BM 1/28.  Staff reported orthostatics this morning.  Currently on Flomax.  Recent bladder scan with no retention.  Good glycemic control.  Renal indices stable.  No new imaging today.    Review of patient's allergies indicates:   Allergen Reactions    Hydrochlorothiazide      Other Reaction(s): Pancreatitis        Current Facility-Administered Medications:     acetaminophen tablet 650 mg, 650 mg, Oral, Q6H PRN, Ashley, Tony A, FNP, 650 mg at 02/03/25 0540    ALPRAZolam tablet 0.25 mg, 0.25 mg, Oral, TID PRN, Ashley, Tony A, FNP    amLODIPine tablet 5 mg, 5 mg, Oral, BID, Ashley, Tony A, FNP, 5 mg at 02/04/25 0815    aspirin chewable tablet 81 mg, 81 mg, Oral, Daily, Ashley, Tony A, FNP, 81 mg at 02/04/25 0815    atorvastatin tablet 40 mg, 40 mg, Oral, Daily, Ashley, Tony A, FNP, 40 mg at 02/04/25 0815    benzonatate capsule 100 mg, 100 mg, Oral, TID PRN, Ashley, Tony A, FNP    carvediloL tablet 6.25 mg, 6.25 mg, Oral, BID, Ashley, Tony A, FNP, 6.25 mg at 02/03/25 2101    clopidogreL tablet 75 mg, 75 mg, Oral, Daily, Ashley, Tony A, FNP, 75 mg at 02/04/25 0815    dextrose 50% injection 12.5 g, 12.5 g, Intravenous, PRN, Ashley, Tony A, FNP    dextrose 50% injection 25 g, 25 g, Intravenous, PRN, Ashley, Tony A, FNP    docusate sodium capsule 100 mg, 100 mg, Oral, BID, Ashley, Tony A, FNP, 100 mg at 02/04/25 0816    DULoxetine DR capsule 30 mg, 30 mg, Oral, Daily, [place] River[place] A, FNP, 30 mg at 02/04/25 0816    enoxaparin injection 40 mg, 40 mg, Subcutaneous, Q24H (prophylaxis, 1700), Ashley, Tony A, FNP, 40 mg at 02/03/25 1745    finasteride tablet 5 mg, 5 mg, Oral, Daily, Tony Ramirez, FNP, 5 mg at 02/04/25 0816    umeclidinium-vilanteroL 62.5-25 mcg/actuation DsDv 1 puff, 1 puff, Inhalation, Daily, 1 puff at 02/03/25 1201 **AND** fluticasone furoate 200 mcg/actuation inhaler 1 puff, 1 puff,  Inhalation, Daily, Ashlye, Tony A, FNP, 1 puff at 02/03/25 1201    glucagon (human recombinant) injection 1 mg, 1 mg, Intramuscular, PRN, Ashley, Tony A, FNP    glucose chewable tablet 16 g, 16 g, Oral, PRN, Ashley, Tony A, FNP    glucose chewable tablet 24 g, 24 g, Oral, PRN, Ashley, Tony A, FNP    hydrALAZINE injection 10 mg, 10 mg, Intravenous, Q4H PRN, Ashley, Tony A, FNP, 10 mg at 02/04/25 0629    hydrOXYzine pamoate capsule 50 mg, 50 mg, Oral, Nightly PRN, Ashley, Tony A, FNP, 50 mg at 02/03/25 2138    insulin aspart U-100 injection 0-10 Units, 0-10 Units, Subcutaneous, QID (AC + HS) PRN, Ashley, Tony A, FNP, 2 Units at 02/03/25 0547    labetalol 20 mg/4 mL (5 mg/mL) IV syring, 10 mg, Intravenous, Q4H PRN, Ashley, Tony A, FNP    LIDOcaine 5 % patch 1 patch, 1 patch, Transdermal, Q24H, Shannan Puri A, FNP, 1 patch at 02/03/25 1745    linaCLOtide capsule 145 mcg, 145 mcg, Oral, QAM, Ashley, Tony A, FNP, 145 mcg at 02/04/25 0552    losartan tablet 25 mg, 25 mg, Oral, Daily, Ashley, Tony A, FNP, 25 mg at 02/03/25 0822    magnesium oxide tablet 400 mg, 400 mg, Oral, BID, Ashley, Tony A, FNP, 400 mg at 02/04/25 0816    magnesium oxide tablet 400 mg, 400 mg, Oral, Daily, Ashley, Tony A, FNP, 400 mg at 02/04/25 0815    melatonin tablet 6 mg, 6 mg, Oral, Nightly PRN, JaxsonRadhaShannan A, FNP, 6 mg at 02/02/25 2056    metoprolol injection 10 mg, 10 mg, Intravenous, Q2H PRN, Ashley, Tony A, FNP    mupirocin 2 % ointment, , Nasal, BID, Darrell Watson MD, Given at 02/03/25 2103    nitroGLYCERIN SL tablet 0.4 mg, 0.4 mg, Sublingual, Q5 Min PRN, Tony Ramirez FNP    ondansetron disintegrating tablet 4 mg, 4 mg, Oral, Q6H PRN, Tony Ramirez FNP    ondansetron disintegrating tablet 8 mg, 8 mg, Oral, Q8H PRN, Tony Ramirez FNP    ondansetron injection 4 mg, 4 mg, Intravenous, Q8H PRN, Tony Ramirez FNP     "oxyCODONE-acetaminophen  mg per tablet 1 tablet, 1 tablet, Oral, Q4H PRN, [place], Shannan A, FNP, 1 tablet at 02/04/25 0552    oxyCODONE-acetaminophen 5-325 mg per tablet 1 tablet, 1 tablet, Oral, Q4H PRN, Jaxson, Shannan A, FNP    pantoprazole EC tablet 40 mg, 40 mg, Oral, Daily, Ashley, Tony A, FNP, 40 mg at 02/04/25 0815    polyethylene glycol packet 17 g, 17 g, Oral, BID PRN, Ashley, Tony A, FNP    pregabalin capsule 100 mg, 100 mg, Oral, TID, [place], Shannan A, FNP, 100 mg at 02/04/25 0552    ranolazine 12 hr tablet 500 mg, 500 mg, Oral, BID, Ashley, Tony A, FNP, 500 mg at 02/03/25 2101    tamsulosin 24 hr capsule 0.4 mg, 0.4 mg, Oral, QHS, Ashley, Tony A, FNP, 0.4 mg at 02/03/25 2101    tiZANidine tablet 4 mg, 4 mg, Oral, QID (AC & HS), Jaxson, Shannan A, FNP, 4 mg at 02/04/25 0552    tiZANidine tablet 4 mg, 4 mg, Oral, Q6H PRN, [place], Shannan A, FNP     Review of Systems   Complete 12-point review of symptoms negative except for what's mentioned in HPI     Objective:     /61 (Patient Position: Lying)   Pulse 60   Temp 97.9 °F (36.6 °C) (Oral)   Resp 18   Ht 5' 6" (1.676 m)   Wt 62.2 kg (137 lb 2 oz)   SpO2 98%   BMI 22.13 kg/m²      Physical Exam  Constitutional:       Appearance: Normal appearance.   HENT:      Head: Normocephalic.      Mouth/Throat:      Mouth: Mucous membranes are moist.   Eyes:      Pupils: Pupils are equal, round, and reactive to light.   Neck:      Comments: Posterior neck incision clean and intact.  Hard collar intact  Cardiovascular:      Rate and Rhythm: Normal rate and regular rhythm.      Heart sounds: Normal heart sounds.   Pulmonary:      Effort: Pulmonary effort is normal.      Breath sounds: Normal breath sounds.   Abdominal:      General: Bowel sounds are normal.      Palpations: Abdomen is soft.   Musculoskeletal:      Cervical back: Neck supple.      Comments: Diffuse muscle atrophy   Skin:     General: Skin is warm and dry. "   Neurological:      General: No focal deficit present.      Mental Status: He is alert and oriented to person, place, and time.      Motor: Weakness present.   Psychiatric:         Mood and Affect: Mood normal.         Behavior: Behavior normal.         Thought Content: Thought content normal.         Judgment: Judgment normal.     *MD performed and documented physical examination       Lines/Drains/Airways       Peripheral Intravenous Line  Duration                  Peripheral IV - Single Lumen 01/14/25 1853 20 G Anterior;Right Forearm 20 days                  Labs  Recent Results (from the past 24 hours)   POCT glucose    Collection Time: 02/03/25 12:00 PM   Result Value Ref Range    POCT Glucose 136 (H) 70 - 110 mg/dL   POCT glucose    Collection Time: 02/03/25  4:33 PM   Result Value Ref Range    POCT Glucose 127 (H) 70 - 110 mg/dL   POCT glucose    Collection Time: 02/03/25  8:59 PM   Result Value Ref Range    POCT Glucose 180 (H) 70 - 110 mg/dL   POCT glucose    Collection Time: 02/04/25  4:20 AM   Result Value Ref Range    POCT Glucose 109 70 - 110 mg/dL   Basic Metabolic Panel    Collection Time: 02/04/25  5:41 AM   Result Value Ref Range    Sodium 135 (L) 136 - 145 mmol/L    Potassium 4.8 3.5 - 5.1 mmol/L    Chloride 99 98 - 107 mmol/L    CO2 26 23 - 31 mmol/L    Glucose 99 82 - 115 mg/dL    Blood Urea Nitrogen 37.3 (H) 8.4 - 25.7 mg/dL    Creatinine 1.36 (H) 0.72 - 1.25 mg/dL    BUN/Creatinine Ratio 27     Calcium 9.7 8.8 - 10.0 mg/dL    Anion Gap 10.0 mEq/L    eGFR 57 mL/min/1.73/m2     Radiology  Cervical XR two-view on 01/25/2025, IMPRESSION: There are postoperative changes with posterior spinal fusion hardware at C2 through C6. The hardware appears intact. Cervical alignment has improved. The remaining vertebral body heights are preserved. There is moderate disc height loss at C3-C4, mild at C4-C5, with multilevel marginal osteophytes. The soft tissues are unremarkable.     Assessment/Plan:     69  y.o. WM admitted on 1/31/2025    Cervical myelopathy   - s/p C2-6 PCDF and C2/3-C4/5 decompression on 01/24/2025  - remove staples on 02/07  - continue aspirin hard collar at all times, okay for [place] collar for showers only  - continue                Robaxin 1000 mg q.i.d. (increased 2/2)                Flexeril 10 mg t.i.d. p.r.n.                Percocet 10 mg q.4 hours p.r.n. for severe pain                Lyrica 75 mg b.i.d. (initiated 1/31)  - defer to physiatry for rehab and pain management  - PT/OT/RT/ST following  - to follow up with Neurosurgery outpatient     Coronary artery disease  - s/p  CABG x 3  - denies recent chest pain or discomfort  - continue                ASA 81 mg daily (resumed 1/31)                Plavix 75 mg daily (resume 1/31)                Coreg 6.25 mg b.i.d.                 Lipitor 40 mg daily                Losartan 25 mg daily   - ECG and Nitro PRN  - continue cardiac diet  - to follow-up with cardiology outpatient     HFrEF/ICMO  - stable  - continue                Coreg 6.25 mg b.i.d.                 Losartan 25 mg daily                 Ranexa 500 mg b.i.d.   - obtain daily standing weights  - to follow up with cardiology outpatient     Nicotine dependence  - smoking cessation counseling given on admission  - 1 PPD with approximately 55 pack years     HTN  - orthostatics reported on 02/04  - discontinued Flomax 0.4 mg at bedtime on 02/04 due to orthostatic hypotension  - continue                Coreg 6.25 mg b.i.d.                 Losartan 25 mg daily                 Norvasc 5 mg daily                 Ranexa 500 mg b.i.d.                 Hydralazine 10 mg every 2 hours as needed for BP > 160/90                Labetalol 10 mg every 2 hours as needed for BP > 160/90  - low sodium diet             COPD   - stable  - not on home 02  - continue                Trelegy INH daily                   DM type II  - HgA1c 5.6 on 01/23/2024  - continue                Lantus 10 units  every evening (resumed 1/31)                ISS   - CBGs AC/HS     HLD  - FLP outpatient  - resume Crestor upon discharge  - continue                Lipitor 40 mg daily      PVD  - stable  - continue                ASA 81 mg daily (resumed 1/31)                Plavix 75 mg daily (resume 1/31)                Coreg 6.25 mg b.i.d.                 Lipitor 40 mg daily  - to follow up vascular surgery outpatient                  Chronic constipation  - last BM 1/28, reports is normal for him to have a bowel movement every 8 days  - continue                Linzess 145 mcg before breakfast                 Colace 100 mg b.i.d.     Hypomagnesemia  - stable  - continue                Magnesium oxide 400 mg b.i.d.      BPH/urinary retention  - stable  - discontinued Flomax 0.4 mg at bedtime on 02/04 due to orthostatic hypotension  - continue                Proscar 5 mg daily (initiated 1/31)  Indwelling catheter discontinued on 02/01-good urine output with no retention reported     GERD  - Avoid spicy foods, and nothing to eat or drink within x2 hours of bedtime or laying flat (water is ok)   - Avoid NSAIDs (Advil, ibuprofen, naproxen...) and chavira-2 inhibitors (Mobic, Celebrex)    - continue                Protonix 40 mg daily      Normocytic anemia  - asymptomatic  - H/H stable   - no evidence of active bleeds  - will closely monitor and transfuse if needed     Hypomagnesemia  - magnesium 1.3 on 02/01  - s/p Mag sulfate 2 g x 1 IVPB on 02/01  - continue   Mag-Ox 400 mg daily (initiated 2/1)    Mild JOHN  - renal indices stable  - encourage oral hydration  - bladder scan to rule out retention     VTE Prophylaxis: Lovenox 40 mg daily   COVID-19 testing:  Unknown  COVID-19 vaccination status:  Vaccinated (Moderna):  Bivalent booster on 04/03/2023     POA:  No  Living will:  No  Contacts:  Tyler Mai Jr (son) [phone]     CODE STATUS:  Full code  Internal Medicine (attending): Tyler De Anda MD  Physiatry (consulting):   Philip Fang MD     OUTPATIENT PROVIDERS  PCP: None  Neurosurgery: Darrell Zavala MD     DISPOSITION:  Sleep hygiene, bowel maintenance, and appetite at goal.  Last BM 1/28.  Normally has bowel movements every 8 days.  Orthostatics reported.  Good glycemic control.  Vital signs otherwise at goal.  Lab work stable.  No new imaging today.  Flomax discontinued due to orthostatic hypotension.  Continue aggressive mobilization as tolerated.  Monitor closely.  Notify of acute changes.    Jarrell Ramirez NP conducted independent physical examination and assisted with medical documentation.    Total time spent on this encounter including chart review and direct MD + NP 1-on-1 patient interaction: 51 minutes   Over 50% of this time was spent in counseling and coordination of care

## 2025-02-04 NOTE — PT/OT/SLP PROGRESS
Occupational Therapy Inpatient Rehab Treatment    Name: Tyler Mai  MRN: [id number]    Assessment:  Tyler Mai is a 69 y.o. male admitted with a medical diagnosis of Status post cervical spinal fusion.  He presents with the following impairments/functional limitations:  weakness, impaired endurance, impaired sensation, impaired self care skills, impaired functional mobility, gait instability, impaired balance, impaired cognition, decreased coordination, decreased upper extremity function, decreased lower extremity function, decreased safety awareness, pain, impaired coordination, impaired fine motor, impaired skin.    General Precautions: Standard, fall     Orthopedic Precautions:spinal precautions     Braces: Aspen collar    Rehab Prognosis: Fair and Poor; patient would benefit from acute skilled OT services to address these deficits and reach maximum level of function.      History:     Past Medical History:   Diagnosis Date    Anxiety disorder, unspecified     Arthritis     CAD (coronary artery disease)     Cervical radiculopathy     CHF (congestive heart failure)     Chronic pain syndrome     COPD (chronic obstructive pulmonary disease)     Diabetes mellitus     Gastro-esophageal reflux disease without esophagitis     High cholesterol     Hypertension     Lumbar radiculopathy     Myelomalacia of cervical cord     Other specified diseases of spinal cord     Pacemaker     PVD (peripheral vascular disease)     S/P triple vessel bypass     Tobacco use        Past Surgical History:   Procedure Laterality Date    ANTERIOR CERVICAL DISCECTOMY W/ FUSION  1995    C5-6 and C6-7.  Dr. Wall    ANTERIOR CERVICAL DISCECTOMY W/ FUSION  1996    Redo C5-6, C6-7.  Dr. Wall    CHOLECYSTECTOMY      CORONARY ARTERY BYPASS GRAFT      FUSION OF POSTERIOR COLUMN OF CERVICAL SPINE USING COMPUTER AIDED NAVIGATION N/A 1/24/2025    Procedure: FUSION, SPINE, POSTERIOR SPINAL COLUMN, CERVICAL, USING COMPUTER-ASSISTED NAVIGATION;   "Surgeon: Darrell Watson MD;  Location: [facility] OR;  Service: Neurosurgery;  Laterality: N/A;  C2-C6 PCF; C2/3-C4/5 decompression, possible additional levels  o-arm  NTI  TIVA setup  [employer] //  XX    INSERTION OF PACEMAKER      X2    TRIPPLE VESSEL BYPASS         Subjective     Orientation: Oriented x4    Chief Complaint: being tired    Patient/Family Comments/goals: "I am so tired, I don't feel right."    Vitals   Vitals   /62   HR 61   O2 Sat    Pain      Respiratory Status: Room air    Patients cultural, spiritual, Voodoo conflicts given the current situation: no     Objective:     Patient found supine with cervical collar  upon OT entry to room.    Pt needed max encouragement for almost 10 mins to participate with therapy today.    Mobility   Patient completed:  Supine to Sit with stand by assistance and contact guard assistance  Sit to Supine with stand by assistance and contact guard assistance  Sit to Stand Transfer with contact guard assistance with rolling walker  Stand to Sit Transfer with contact guard assistance with rolling walker  Toilet Transfer Step Transfer technique with contact guard assistance with  rolling walker  Tub Transfer Stand Pivot technique with contact guard assistance with rolling walker    Functional Mobility  In room mobility for ADLs with CGA with RW and verbal cues for foot placement.    ADLs   Current Status   Shower, Bathe Self 2 max assist - able to wash chest and part of arms   Upper Body Dressing 2 able to thread hands in sleeve but assist for overhead and pull down in back   Lower Body Dressing 2 Max assist, pt did not want to use AE due to irritability   Toileting Hygiene 3 Min assist to clean thoroughly   Toilet Transfer 4 incidental touching with verbal cues   Putting On, Taking Off Footwear 1 pt refused to try due to fatigue     Limiting Factors for ADLs: motor, sensory, psychsocial, endurance, limited ROM, balance, weakness, perceptual, coordination, " cognition, safety awareness, and pain     Patient left in supine with all lines intact, call button in reach, and bed alarm on.     Education provided: Roles and goals of OT, ADLs, transfer training, bed mobility, modified goals, sequencing, safety precautions, fall prevention, and equipment recommendations    Multidisciplinary Problems       Occupational Therapy Goals          Problem: Occupational Therapy    Goal Priority Disciplines Outcome Interventions   Occupational Therapy Goal     OT, PT/OT Progressing    Description: ADLs:  Pt to perform grooming tasks with independence standing at sink with RW by d/c.   Pt to perform feeding tasks with independence using adaptive utensils as needed by d/c.    Pt to perform UB dressing with independence by d/c.    Pt to perform LB dressing with touch assist by d/c.    Pt to perform putting on/off footwear task with independence and AE as needed by d/c.   Pt to perform toileting with touch assist by d/c.    Pt to perform bathing with independence by d/c.     Functional Transfers:  Pt to perform toilet transfers with independence by d/c.    Pt to perform a tub transfer with independence by d/c.     IADLs:  Pt to perform simple meal prep standing at kitchen counter with touch assist by d/c.      Balance, Strengthening, Endurance, Balance:  Pt to consistently demonstrate adherence to spinal precautions during all ADL's as instructed by OT.  Pt to demonstrate good dynamic standing balance as required to perform ADL's from standing level.                         Time Tracking     OT Received On: 02/04/25  Time In 0830     Time Out 0930  Total Time 60 min  Therapy Time: OT Individual: 60  Missed Time:    Missed Time Reason:      Billable Minutes: Self Care/Home Management 60    02/04/2025

## 2025-02-04 NOTE — PROGRESS NOTES
Dos 2/4/25  Patient seen and evaluated in OT today  Continues to participate and make progress toward goals  Working on ADL's and IADL's  Discussed with patient and OT  Reviewed chart and discussed with nursing, Dr De Anda's primary medicine team, as well as Rylie Puri, my NP  Agree with present POC  Subjective  HPI: 69 year old WM with a PMH of cervical myelopathy, CAD, CHF, COPD, DM, HLD, HTN, and PVD presented to the ED at [facility] on 1/14/25 for Neurosurgery. Patient presented in ED following discharge from [facility]. He reports associated symptoms of bilateral arm weakness, bilateral leg weakness, and worsening neck pain. Notes right leg is weaker than left. He also complains of bilateral hand, bilateral leg, and bilateral feet numbness. He stated that he has been unable to care for himself at home because of the weakness, and having to use a wheelchair to get around. Patient's daughter at bedside reports frequent falls at home. No urinary or fecal incontinence. Per chart review, the patient was admitted to [facility] for cervical myelopathy with the plan of surgery. Previous extensive admission at [facility] for UTI which cleared and resolved with antibiotics; initially, did not have surgery secondary to UTI. Now amendable for surgical intervention since UTI has resolved. Patient refused surgery with on-call neurosurgeon at [facility] and discharged at noon today. He was advised to come here for the surgery. MRI on 01/05 showed severe C3-4 level central canal stenosis with increased signal hyperintensity within the cervical cord from mid to lower aspect of C3 through mid aspect of C4 with central cord myelomalacia. No syrinx is detected. Prior C5-C7 intervertebral body fusion. Neurosurgery was consulted with recommendation for surgical intervention needed. Cardiology consulted with low risk for MACE for high risk neurosurgical procedure. On 1/24, patient underwent C2-6 PCDF and C2-3- C4-5 decompression by Dr. Ramírez. 1 [facility]  drain placed to site. Hard cervical collar in place. On 1/25, wound care was consulted for right heel and left heel wound with recommendation to cleanse bilateral heels with normal saline, paint with betadine, apply small foam, and change every 2 days. Recommended bilateral heel boots. Herrera was removed. Labs showed low Na of 133, elevated BUN/Cr of 29.7/ 1.31, and low H&H of 10.6 & 31.9. BP ranging 125/58- 182/80. PT/OT efvals completed with deficits noted with recommendation for high intensity therapy needed. ON 1/26, Labs showed low H&H of 10.1 & 29.5. Patient having urinary retention requiring in and out caths. On 1/27, hemovac removed. Neurosurgery recommended to DC staples on POD 14, will need follow up in 2 weeks after DC from hospital with XR uprights of cervical spine with Izzy, NP, continued Percocet and Robaxin for pain management, and signed off of case. Herrera had to be reinserted for urinary retention, and started Flomax. BP ranging 123/61- 169/70. On 1/28, patient started on Relistar for constipation with 2 large Bms noted. Labs showed low H&H of 9.2 & 27.3. On 1/30, wound care reassessed with 2 wounds noted to bilateral anterior legs. Cleansed with soap and water, and applied aquaphor to sites. Patient is AAOx4.   Participating with therapy. Functional status includes setup assist needed for eating, minimal assist for bed mobility, contact guard assist for transfers with RW, walked 28ft with RW at minimal assist, minimal assist for toilet transfer, total assist for toileting due to herrera in place, stand by assist for grooming, max assist for upper body dressing, and max assist for lower body dressing. Patient was evaluated, accepted, and admitted to inpatient rehab to improve functional status. Transferred to [facility] on 1/31 without incident.     2/4: Seen in patient room, seated in bed with wound care nurse cutting toenails. Reports pain is better, but he continues feeling SOB. Tells me that his BP  dropped this morning after giving him IV medication, on top of scheduled HTN meds and pain meds. Noted /68 @615. Given Hydralazine IV PRN at 0630. Percocet 10/325mg, Zanaflex 4mg, Lyrica 100mg @0600. BP 78/64 @0715. /61 at 0730. He did reluctantly participate with OT in showering, but states that he still feels weak and SOB. SpO2 98%. Tells me that he uses Albuterol TID at home, and feels like that would make him feel better. Nebs ordered q6h PRN SOB. Inhaler not administered this morning. Relayed to Nursing that he needs his morning dose. Participating in therapy with encouragement. VSSAF with above mentioned. /68 at 1040. IM following.            Review of Systems  Psychiatric: Denies mental health history. Pt. Smokes cigarettes.     Depression/Anxiety: notes some anxiety in regards to eating and getting enough nutrition to his mouth     DULoxetine DR capsule 30 mg qd, start 2/1  ALPRAZolam tablet 0.25 mg TID PRN Anxiety  Pain: L>R side of neck, numbness (hands, legs, feet), shooting pains  DULoxetine DR capsule 30 mg qd, start 2/1  tiZANidine tablet 4 mg AC & HS  pregabalin capsule 75 mg BID. Increase TID. Increase 100mg  LIDOcaine 5 % patch 1 patch neck q24hr, 1800  acetaminophen tablet 650 mg q6h PRN mild pain   oxyCODONE-acetaminophen 5-325 mg 1 tablet q4h PRN mod pain  oxyCODONE-acetaminophen  mg per tablet 1 tablet q4h PRN severe pain  tiZANidine tablet 4 mg q6h PRN muscle spasm  Bowels/Bladder: Last BM 1/28 x 2 (large) following Relistor. Needs intervention  Gray catheter reinserted 2/2 urinary retention     docusate sodium capsule 100 mg BID  linaCLOtide capsule 145 mcg qAM  tamsulosin 24 hr capsule 0.4 mg qHS  Appetite: good. Hungry. Intake difficult functionally with hand paresthesias. Assist   Sleep: good with melatonin  melatonin tablet 6 mg qHS PRN Insomnia              Physical Exam  General: well-developed, well-nourished, in no acute distress  Neck: hard collar,  supple  Respiratory: equal chest rise, no SOB, no audible wheeze  Cardiovascular: regular rate and rhythm, no edema  Gastrointestinal: soft, non-tender, non-distended   Musculoskeletal: BUE/BLE weakness  Integumentary: no rashes, bilateral heel wounds, bilateral anterior leg wounds, posterior cervical incision-staples-dressing-c/d/i  Neurologic: cranial nerves intact, BUE/BLE weakness, numbness (hands, legs, feet), balance deficits  *MD performed and documented physical examination           Labs:   Latest Reference Range & Units 02/04/25 05:41   Sodium 136 - 145 mmol/L 135 (L)   Potassium 3.5 - 5.1 mmol/L 4.8   Chloride 98 - 107 mmol/L 99   CO2 23 - 31 mmol/L 26   Anion Gap mEq/L 10.0   BUN 8.4 - 25.7 mg/dL 37.3 (H)   Creatinine 0.72 - 1.25 mg/dL 1.36 (H)   BUN/CREAT RATIO  27   eGFR mL/min/1.73/m2 57   Glucose 82 - 115 mg/dL 99   Calcium 8.8 - 10.0 mg/dL 9.7   (L): Data is abnormally low  (H): Data is abnormally high                Assessment/Plan  Hospital   Anemia   Hyponatremia   Spinal cord compression   Status post cervical spinal fusion   Hypokalemia     Non-Hospital   HTN (hypertension)   Hyperlipidemia   Ischemic cardiomyopathy with implantable cardioverter-defibrillator (ICD)   Tobacco user   Type 2 diabetes mellitus   Peripheral arterial disease with history of revascularization   Chronic systolic CHF (congestive heart failure), NYHA class 3   Chronic low back pain   Cervical myelopathy   Carotid artery stenosis, asymptomatic, left   Arteriosclerosis of coronary artery   S/P triple vessel bypass   COPD (chronic obstructive pulmonary disease)   Gastro-esophageal reflux disease without esophagitis   Anxiety disorder, unspecified   Cervical radiculopathy   Lumbar radiculopathy   Myelomalacia of cervical cord       Wounds: bilateral heel wounds, bilateral anterior leg wounds, posterior cervical incision-staples-dressing-c/d/i  On 1/24, patient underwent C2-6 PCDF and C2-3- C4-5 decompression by Dr. Ramírez.   (Hemovac drain placed to site)  Precautions: spinal  Bracing: Hard cervical collar, bilateral heel boots in bed  Swallowing: Regular Diet  Function: Tolerating therapy. Continue PT/OT  VTE Prophylaxis:   enoxaparin injection 40 mg q24hr  Code Status: FULL CODE   Discharge: Lives alone in [place] in a 2nd story apartment. Completed high school. He has no  history.  Is disabled. . He was living alone and completely independent. Children: (2). Date pending.               Shannan Puri NP, conducted additional independent physical examination and assisted with medical documentation.

## 2025-02-04 NOTE — PT/OT/SLP PROGRESS
Physical Therapy Inpatient Rehab Treatment    Patient Name:  Tyler Mai   MRN:  [id number]    Recommendations:     Discharge Recommendations:   (pending progress)   Discharge Equipment Recommendations:     Barriers to discharge: Increased level of assist, Inaccessible home, Decreased caregiver support, Impaired functional mobility , and Severity of Deficits     Assessment:     Tyler Mai is a 69 y.o. male admitted with a medical diagnosis of Status post cervical spinal fusion.  He presents with the following impairments/functional limitations:  weakness, impaired endurance, impaired functional mobility, gait instability, impaired balance, decreased upper extremity function, decreased lower extremity function, pain, decreased ROM, impaired coordination, impaired fine motor, orthopedic precautions     PT NOTE: pt scheduled from [id number]. Upon arrival, pt reports weakness, fatigue, and excessive pain. Pt expresses frustration and is agitated with pain meds being late. With max coaxing to participate, pt agreed to sit EOB and perform one stand. BP monitored throughout (see vitals). Pt refused to continue with therapy at this time despite max encouragement from PT.     Rehab Diagnosis: Cervical spondylosis and stenosis with myelopathy and cord compression s/p C2-6 PCDF and C2-3, C4-5 decompression 1/24/2025. Pt. Has a history of cervical myelopathy, CAD, CHF, COPD, DM, HLD, HTN, and PVD    General Precautions: Standard, fall     Orthopedic Precautions:spinal precautions     Braces: Aspen collar    Rehab Prognosis: Fair; patient would benefit from acute skilled PT services to address these deficits and reach maximum level of function.      History:     Past Medical History:   Diagnosis Date    Anxiety disorder, unspecified     Arthritis     CAD (coronary artery disease)     Cervical radiculopathy     CHF (congestive heart failure)     Chronic pain syndrome     COPD (chronic obstructive pulmonary disease)      "Diabetes mellitus     Gastro-esophageal reflux disease without esophagitis     High cholesterol     Hypertension     Lumbar radiculopathy     Myelomalacia of cervical cord     Other specified diseases of spinal cord     Pacemaker     PVD (peripheral vascular disease)     S/P triple vessel bypass     Tobacco use        Past Surgical History:   Procedure Laterality Date    ANTERIOR CERVICAL DISCECTOMY W/ FUSION  1995    C5-6 and C6-7.  Dr. Wall    ANTERIOR CERVICAL DISCECTOMY W/ FUSION  1996    Redo C5-6, C6-7.  Dr. Wall    CHOLECYSTECTOMY      CORONARY ARTERY BYPASS GRAFT      FUSION OF POSTERIOR COLUMN OF CERVICAL SPINE USING COMPUTER AIDED NAVIGATION N/A 1/24/2025    Procedure: FUSION, SPINE, POSTERIOR SPINAL COLUMN, CERVICAL, USING COMPUTER-ASSISTED NAVIGATION;  Surgeon: Darrell Watson MD;  Location: [facility];  Service: Neurosurgery;  Laterality: N/A;  C2-C6 PCF; C2/3-C4/5 decompression, possible additional levels  o-arm  NTI  TIVA setup  [employer] //  XX    INSERTION OF PACEMAKER      X2    TRIPPLE VESSEL BYPASS         Subjective     Patient comments: "They were late on my meds" "I'm too weak to do anything"    Respiratory Status: Room air    Patients cultural, spiritual, Zoroastrian conflicts given the current situation: no    Objective:     Communicated with TR prior to session.  Patient found HOB elevated with cervical collar  upon PT entry to room.    Pt is Oriented x3 and Agitated.    Vitals   Vitals:    02/04/25 1030 02/04/25 1035 02/04/25 1040   BP: (!) 172/78 (!) 162/70 (!) 148/68   Patient Position: Semisupine  Sitting Standing   Pulse: 76 84 76   Resp:      Temp:      TempSrc:      SpO2:      Weight:      Height:             Functional Mobility:     Attempted to have pt participate with brushing hair sitting EOB.    Pt able to assist with RUE, however, unable to continue 2/2 frustration and fatigue       Current   Status  Discharge   Goal   Functional Area: Care Score:    Roll Left and Right 4   "   Sit to Lying 4 Independent   Lying to Sitting on Side of Bed 3  Mod A to trunk  Independent   Sit to Stand 4 Independent       Therapeutic Activities and Exercises:  Patient educated on role of acute care PT and PT POC and safety while in hospital including calling nurse for mobility  Patient educated about importance of OOB mobility and remaining up in chair most of the day.      Activity Tolerance: Poor    Patient left HOB elevated with all lines intact, call button in reach, chair alarm on, and heel boots donned .    Education provided: roles and goals of PT/PTA, transfer training, bed mob, safety awareness, and strengthening exercises    Expected compliance: Low compliance    Plan:     During this hospitalization, patient to be seen 5 x/week to address the identified rehab impairments via gait training, therapeutic activities, therapeutic exercises and progress toward the following goals:    GOALS:   Multidisciplinary Problems       Physical Therapy Goals          Problem: Physical Therapy    Goal Priority Disciplines Outcome Interventions   Physical Therapy Goal     PT, PT/OT Progressing    Description: Bed Mobility:  Sit to supine transfer independently.   Supine to sit transfer independently.     Transfers:  Sit to stand transfer independently using RW.   Car transfer independently using RW.    an object from the ground in standing position independently using RW.     Mobility:  Ambulate 500 feet independently using RW.  Ambulate 15 feet on uneven surfaces/ramps with setup/clean-up assist using RW.   Pt ascended/descended a 6 inch curb independently using RW.   Ascend/descend 15 stairs independently using bilateral handrails.                         Plan of Care Expires:  02/07/25  PT Next Visit Date: 02/07/25  Plan of Care reviewed with: patient    Additional Information:         Time Tracking:     Therapy Time  PT Received On: 02/04/25  PT Start Time: 1030  PT Stop Time: 1200  PT Total Time (min):  90 min   PT Individual: 30  Missed Time: 60 Minutes  Time Missed due to: Pain, Patient unwilling to participate, Patient ill (Comment)    Billable Minutes: Therapeutic Activity 30 02/04/2025

## 2025-02-05 LAB
GLUCOSE SERPL-MCNC: 98 MG/DL (ref 70–110)
POCT GLUCOSE: 129 MG/DL (ref 70–110)
POCT GLUCOSE: 157 MG/DL (ref 70–110)
POCT GLUCOSE: 165 MG/DL (ref 70–110)
POCT GLUCOSE: 98 MG/DL (ref 70–110)

## 2025-02-05 PROCEDURE — 94761 N-INVAS EAR/PLS OXIMETRY MLT: CPT

## 2025-02-05 PROCEDURE — 97530 THERAPEUTIC ACTIVITIES: CPT

## 2025-02-05 PROCEDURE — 63600175 PHARM REV CODE 636 W HCPCS: Performed by: NURSE PRACTITIONER

## 2025-02-05 PROCEDURE — 99900035 HC TECH TIME PER 15 MIN (STAT)

## 2025-02-05 PROCEDURE — 99900031 HC PATIENT EDUCATION (STAT)

## 2025-02-05 PROCEDURE — 97110 THERAPEUTIC EXERCISES: CPT

## 2025-02-05 PROCEDURE — 25000003 PHARM REV CODE 250: Performed by: NURSE PRACTITIONER

## 2025-02-05 PROCEDURE — 97116 GAIT TRAINING THERAPY: CPT

## 2025-02-05 PROCEDURE — 94799 UNLISTED PULMONARY SVC/PX: CPT | Mod: XB

## 2025-02-05 PROCEDURE — 11800000 HC REHAB PRIVATE ROOM

## 2025-02-05 PROCEDURE — 97535 SELF CARE MNGMENT TRAINING: CPT

## 2025-02-05 RX ORDER — METHOCARBAMOL 750 MG/1
750 TABLET, FILM COATED ORAL
Status: DISCONTINUED | OUTPATIENT
Start: 2025-02-05 | End: 2025-02-19 | Stop reason: HOSPADM

## 2025-02-05 RX ORDER — BISACODYL 10 MG/1
10 SUPPOSITORY RECTAL ONCE
Status: COMPLETED | OUTPATIENT
Start: 2025-02-05 | End: 2025-02-07

## 2025-02-05 RX ORDER — CYCLOBENZAPRINE HCL 5 MG
5 TABLET ORAL 3 TIMES DAILY PRN
Status: DISCONTINUED | OUTPATIENT
Start: 2025-02-05 | End: 2025-02-19 | Stop reason: HOSPADM

## 2025-02-05 RX ADMIN — OXYCODONE HYDROCHLORIDE AND ACETAMINOPHEN 1 TABLET: 10; 325 TABLET ORAL at 10:02

## 2025-02-05 RX ADMIN — METHOCARBAMOL 750 MG: 750 TABLET ORAL at 04:02

## 2025-02-05 RX ADMIN — TIZANIDINE 4 MG: 4 TABLET ORAL at 06:02

## 2025-02-05 RX ADMIN — RANOLAZINE 500 MG: 500 TABLET, EXTENDED RELEASE ORAL at 09:02

## 2025-02-05 RX ADMIN — DULOXETINE HYDROCHLORIDE 30 MG: 30 CAPSULE, DELAYED RELEASE ORAL at 08:02

## 2025-02-05 RX ADMIN — METHOCARBAMOL 750 MG: 750 TABLET ORAL at 09:02

## 2025-02-05 RX ADMIN — LINACLOTIDE 145 MCG: 145 CAPSULE, GELATIN COATED ORAL at 08:02

## 2025-02-05 RX ADMIN — OXYCODONE HYDROCHLORIDE AND ACETAMINOPHEN 1 TABLET: 10; 325 TABLET ORAL at 02:02

## 2025-02-05 RX ADMIN — INSULIN ASPART 2 UNITS: 100 INJECTION, SOLUTION INTRAVENOUS; SUBCUTANEOUS at 05:02

## 2025-02-05 RX ADMIN — ENOXAPARIN SODIUM 40 MG: 40 INJECTION SUBCUTANEOUS at 04:02

## 2025-02-05 RX ADMIN — AMLODIPINE BESYLATE 5 MG: 5 TABLET ORAL at 08:02

## 2025-02-05 RX ADMIN — RANOLAZINE 500 MG: 500 TABLET, EXTENDED RELEASE ORAL at 08:02

## 2025-02-05 RX ADMIN — OXYCODONE HYDROCHLORIDE AND ACETAMINOPHEN 1 TABLET: 10; 325 TABLET ORAL at 07:02

## 2025-02-05 RX ADMIN — METHOCARBAMOL 750 MG: 750 TABLET ORAL at 12:02

## 2025-02-05 RX ADMIN — PREGABALIN 100 MG: 50 CAPSULE ORAL at 06:02

## 2025-02-05 RX ADMIN — LOSARTAN POTASSIUM 25 MG: 25 TABLET, FILM COATED ORAL at 08:02

## 2025-02-05 RX ADMIN — ASPIRIN 81 MG CHEWABLE TABLET 81 MG: 81 TABLET CHEWABLE at 08:02

## 2025-02-05 RX ADMIN — MUPIROCIN: 20 OINTMENT TOPICAL at 09:02

## 2025-02-05 RX ADMIN — OXYCODONE HYDROCHLORIDE AND ACETAMINOPHEN 1 TABLET: 5; 325 TABLET ORAL at 06:02

## 2025-02-05 RX ADMIN — CARVEDILOL 6.25 MG: 6.25 TABLET, FILM COATED ORAL at 08:02

## 2025-02-05 RX ADMIN — Medication 400 MG: at 09:02

## 2025-02-05 RX ADMIN — PREGABALIN 100 MG: 50 CAPSULE ORAL at 02:02

## 2025-02-05 RX ADMIN — AMLODIPINE BESYLATE 5 MG: 5 TABLET ORAL at 09:02

## 2025-02-05 RX ADMIN — PREGABALIN 100 MG: 50 CAPSULE ORAL at 09:02

## 2025-02-05 RX ADMIN — OXYCODONE HYDROCHLORIDE AND ACETAMINOPHEN 1 TABLET: 5; 325 TABLET ORAL at 01:02

## 2025-02-05 RX ADMIN — DOCUSATE SODIUM 100 MG: 100 CAPSULE, LIQUID FILLED ORAL at 09:02

## 2025-02-05 RX ADMIN — ATORVASTATIN CALCIUM 40 MG: 40 TABLET, FILM COATED ORAL at 08:02

## 2025-02-05 RX ADMIN — UMECLIDINIUM BROMIDE AND VILANTEROL TRIFENATATE 1 PUFF: 62.5; 25 POWDER RESPIRATORY (INHALATION) at 08:02

## 2025-02-05 RX ADMIN — FINASTERIDE 5 MG: 5 TABLET, FILM COATED ORAL at 08:02

## 2025-02-05 RX ADMIN — FLUTICASONE FUROATE 1 PUFF: 200 POWDER RESPIRATORY (INHALATION) at 08:02

## 2025-02-05 RX ADMIN — CLOPIDOGREL BISULFATE 75 MG: 75 TABLET, FILM COATED ORAL at 08:02

## 2025-02-05 RX ADMIN — CARVEDILOL 6.25 MG: 6.25 TABLET, FILM COATED ORAL at 09:02

## 2025-02-05 RX ADMIN — DOCUSATE SODIUM 100 MG: 100 CAPSULE, LIQUID FILLED ORAL at 08:02

## 2025-02-05 RX ADMIN — LIDOCAINE 5% 1 PATCH: 700 PATCH TOPICAL at 04:02

## 2025-02-05 RX ADMIN — Medication 400 MG: at 08:02

## 2025-02-05 RX ADMIN — PANTOPRAZOLE SODIUM 40 MG: 40 TABLET, DELAYED RELEASE ORAL at 08:02

## 2025-02-05 NOTE — PT/OT/SLP PROGRESS
Occupational Therapy Inpatient Rehab Treatment    Name: Tyler Mai  MRN: [id number]    Assessment:  Tyler Mai is a 69 y.o. male admitted with a medical diagnosis of Status post cervical spinal fusion.  He presents with the following impairments/functional limitations:  weakness, impaired endurance, impaired sensation, impaired self care skills, impaired functional mobility, impaired balance, decreased coordination, decreased upper extremity function, decreased safety awareness, pain, orthopedic precautions.    General Precautions: Standard, fall     Orthopedic Precautions:spinal precautions     Braces: Aspen collar    Rehab Prognosis: Fair; patient would benefit from acute skilled OT services to address these deficits and reach maximum level of function.      History:     Past Medical History:   Diagnosis Date    Anxiety disorder, unspecified     Arthritis     CAD (coronary artery disease)     Cervical radiculopathy     CHF (congestive heart failure)     Chronic pain syndrome     COPD (chronic obstructive pulmonary disease)     Diabetes mellitus     Gastro-esophageal reflux disease without esophagitis     High cholesterol     Hypertension     Lumbar radiculopathy     Myelomalacia of cervical cord     Other specified diseases of spinal cord     Pacemaker     PVD (peripheral vascular disease)     S/P triple vessel bypass     Tobacco use        Past Surgical History:   Procedure Laterality Date    ANTERIOR CERVICAL DISCECTOMY W/ FUSION  1995    C5-6 and C6-7.  Dr. Wall    ANTERIOR CERVICAL DISCECTOMY W/ FUSION  1996    Redo C5-6, C6-7.  Dr. Wall    CHOLECYSTECTOMY      CORONARY ARTERY BYPASS GRAFT      FUSION OF POSTERIOR COLUMN OF CERVICAL SPINE USING COMPUTER AIDED NAVIGATION N/A 1/24/2025    Procedure: FUSION, SPINE, POSTERIOR SPINAL COLUMN, CERVICAL, USING COMPUTER-ASSISTED NAVIGATION;  Surgeon: Darrell Watson MD;  Location: [facility];  Service: Neurosurgery;  Laterality: N/A;  C2-C6 PCF; C2/3-C4/5  "decompression, possible additional levels  o-arm  NTI  TIVA setup  [employer] //  XX    INSERTION OF PACEMAKER      X2    TRIPPLE VESSEL BYPASS         Subjective     Orientation: Oriented x4 Pt was pleasant and cooperative today.    Chief Complaint: pain     Patient/Family Comments/goals: "They finally switched my pain medication to what I take at home and it is really helping."    Vitals     Vitals With Activity  BP    HR    O2 Sat    Pain 8/10     Respiratory Status: Room air    Patients cultural, spiritual, Rastafarian conflicts given the current situation: no     Objective:     Patient found up in chair with cervical collar  upon OT entry to room.    Mobility   Patient completed:  Sit to Stand Transfer with contact guard assistance with rolling walker  Stand to Sit Transfer with contact guard assistance with rolling walker  Toilet Transfer Step Transfer technique with contact guard assistance with  rolling walker    Functional Mobility  In room mobility for toileting with RW with CGA    ADLs   Current Status   Eating     Oral Hygiene 5   Toileting Hygiene 3   Toilet Transfer 4     Limiting Factors for ADLs: motor, sensory, psychsocial, endurance, limited ROM, balance, weakness, coordination, safety awareness, and pain     Therapeutic Activities  Pt performed hand exercises with pink sponge to increase coordination and strength for ADL activities.    Patient left up in chair with all lines intact and call button in reach.     Education provided: Roles and goals of OT, ADLs, transfer training, sequencing, safety precautions, fall prevention, and post-op precautions    Multidisciplinary Problems       Occupational Therapy Goals          Problem: Occupational Therapy    Goal Priority Disciplines Outcome Interventions   Occupational Therapy Goal     OT, PT/OT Progressing    Description: ADLs:  Pt to perform grooming tasks with independence standing at sink with RW by d/c.   Pt to perform feeding tasks with independence " using adaptive utensils as needed by d/c.    Pt to perform UB dressing with independence by d/c.    Pt to perform LB dressing with touch assist by d/c.    Pt to perform putting on/off footwear task with independence and AE as needed by d/c.   Pt to perform toileting with touch assist by d/c.    Pt to perform bathing with independence by d/c.     Functional Transfers:  Pt to perform toilet transfers with independence by d/c.    Pt to perform a tub transfer with independence by d/c.     IADLs:  Pt to perform simple meal prep standing at kitchen counter with touch assist by d/c.      Balance, Strengthening, Endurance, Balance:  Pt to consistently demonstrate adherence to spinal precautions during all ADL's as instructed by OT.  Pt to demonstrate good dynamic standing balance as required to perform ADL's from standing level.                         Time Tracking     OT Received On: 02/05/25  Time In 0930     Time Out 1030  Total Time 60 min  Therapy Time: OT Individual: 60  Missed Time:    Missed Time Reason:      Billable Minutes: Self Care/Home Management 45 and Therapeutic Activity 15    02/05/2025

## 2025-02-05 NOTE — PT/OT/SLP PROGRESS
Physical Therapy Inpatient Rehab Treatment    Patient Name:  Tyler Mai   MRN:  [id number]    Recommendations:     Discharge Recommendations:   (pending progress)   Discharge Equipment Recommendations:     Barriers to discharge: Increased level of assist, Inaccessible home, Decreased caregiver support, Ongoing medical treatment, Impaired functional mobility , and Severity of Deficits     Assessment:     Tyler Mai is a 69 y.o. male admitted with a medical diagnosis of Status post cervical spinal fusion.  He presents with the following impairments/functional limitations:  weakness, impaired endurance, impaired functional mobility, gait instability, impaired balance, decreased upper extremity function, decreased lower extremity function, pain, decreased ROM, impaired coordination, impaired fine motor, orthopedic precautions .    Rehab Diagnosis: Cervical spondylosis and stenosis with myelopathy and cord compression s/p C2-6 PCDF and C2-3, C4-5 decompression 1/24/2025. Pt. Has a history of cervical myelopathy, CAD, CHF, COPD, DM, HLD, HTN, and PVD    General Precautions: Standard, fall     Orthopedic Precautions:spinal precautions     Braces: Aspen collar    Rehab Prognosis: Fair; patient would benefit from acute skilled PT services to address these deficits and reach maximum level of function.      History:     Past Medical History:   Diagnosis Date    Anxiety disorder, unspecified     Arthritis     CAD (coronary artery disease)     Cervical radiculopathy     CHF (congestive heart failure)     Chronic pain syndrome     COPD (chronic obstructive pulmonary disease)     Diabetes mellitus     Gastro-esophageal reflux disease without esophagitis     High cholesterol     Hypertension     Lumbar radiculopathy     Myelomalacia of cervical cord     Other specified diseases of spinal cord     Pacemaker     PVD (peripheral vascular disease)     S/P triple vessel bypass     Tobacco use        Past Surgical History:  "  Procedure Laterality Date    ANTERIOR CERVICAL DISCECTOMY W/ FUSION  1995    C5-6 and C6-7.  Dr. Wall    ANTERIOR CERVICAL DISCECTOMY W/ FUSION  1996    Redo C5-6, C6-7.  Dr. Wall    CHOLECYSTECTOMY      CORONARY ARTERY BYPASS GRAFT      FUSION OF POSTERIOR COLUMN OF CERVICAL SPINE USING COMPUTER AIDED NAVIGATION N/A 1/24/2025    Procedure: FUSION, SPINE, POSTERIOR SPINAL COLUMN, CERVICAL, USING COMPUTER-ASSISTED NAVIGATION;  Surgeon: Darrell Watson MD;  Location: [facility];  Service: Neurosurgery;  Laterality: N/A;  C2-C6 PCF; C2/3-C4/5 decompression, possible additional levels  o-arm  NTI  TIVA setup  [employer] //  XX    INSERTION OF PACEMAKER      X2    TRIPPLE VESSEL BYPASS         Subjective     Patient comments: "I'm incapacitated because of the muscle relaxer I had"     Respiratory Status: Room air    Patients cultural, spiritual, Confucianist conflicts given the current situation: no    Objective:     Communicated with nursing Bettie  prior to session.  Patient found sitting edge of bed with cervical collar  upon PT entry to room.    Pt is Oriented x3 and  fixed , Alert, Easily distractible, and Agitated.    Vitals   Vitals at Rest  BP 95/57   HR 76   O2 Sat    Pain      Vitals With Activity  BP (!) 155/70 (Bettie informed)   HR 78   O2 Sat     Pain Pain Rating 1: 8/10 ("not serious pain tolerable ")  Location - Side 1: Bilateral  Location 1: neck  Pain Addressed 1: Pre-medicate for activity, Nurse notified, Reposition, Distraction       Functional Mobility:   Transfers:     Toilet Transfer: stanley  with  no AD and hand-held assist  using  Stand Pivot and to attempt bm negative bm increased assist with pulling clothing up over hips      Current   Status  Discharge   Goal   Functional Area: Care Score:    Sit to Stand 3  Min a  Independent   Chair/Bed-to-Chair Transfer 4  Cga increased time  Independent       Therapeutic Activities and Exercises:  Static sitting balance unsupported on bsc for attempt " bm supervision also sitting EOB unsupported to finish eating reaching multiple directions   Dynamic sitting balance unsupported sitting in wc away from back of wc with grooming activity supervision with increased time   Education on cessation of smoking pt not agreeable to not smoking educated on importance pt aware   Static standing with hand held of therapist for nursing to assess buttocks cga increased time   Activity Tolerance: Good and Fair    Patient left up in chair with  OT Maureen  present and pt jimmie tx pt required much motivation for participation . Nursing stated pt BP low okayed tx at tolerated with iv bolus going . Pt refused ambulation stated muscle relaxer made him to weak to walk . Bp monitored 155/70 hr 78 end of tx  .    Education provided: transfer training, balance training, safety awareness, assistive device, fall prevention, spinal precautions, and lifestyle change     Expected compliance: Moderate compliance    Plan:     During this hospitalization, patient to be seen 5 x/week to address the identified rehab impairments via gait training, therapeutic activities, therapeutic exercises and progress toward the following goals:    GOALS:   Multidisciplinary Problems       Physical Therapy Goals          Problem: Physical Therapy    Goal Priority Disciplines Outcome Interventions   Physical Therapy Goal     PT, PT/OT Progressing    Description: Bed Mobility:  Sit to supine transfer independently.   Supine to sit transfer independently.     Transfers:  Sit to stand transfer independently using RW.   Car transfer independently using RW.    an object from the ground in standing position independently using RW.     Mobility:  Ambulate 500 feet independently using RW.  Ambulate 15 feet on uneven surfaces/ramps with setup/clean-up assist using RW.   Pt ascended/descended a 6 inch curb independently using RW.   Ascend/descend 15 stairs independently using bilateral handrails.                          Plan of Care Expires:  02/07/25  PT Next Visit Date: 02/07/25  Plan of Care reviewed with: patient    Additional Information:         Time Tracking:     Therapy Time  PT Received On: 02/05/25  PT Start Time: 0830  PT Stop Time: 0930  PT Total Time (min): 60 min   PT Individual: 60  Missed Time:    Time Missed due to:      Billable Minutes: Therapeutic Activity 60min    02/05/2025

## 2025-02-05 NOTE — PROGRESS NOTES
[facility] ([facility])  Rehab Progress Note    Patient Name: Tyler Mai  MRN: [id number]  Age: 69 y.o. Sex: male  : 1955  Hospital Length of Stay: 5 days  Date of Service: 2025   Chief Complaint: Cervical myelopathy s/p C2-6 PCDF and C2/3-C4/5 decompression on 2025     Subjective:     Basic Information  Admit Information: 69-year-old white male presented to [facility] ED on 2025 complaining of bilateral arm weakness, bilateral leg weakness, and worsening neck pain.  Reports right leg weaker than the left.  Recently admitted to [facility] for cervical myelopathy with plans for surgery, but patient had severe UTI requiring antibiotic therapy.  MRI on  significant for severe C3-4 level central canal stenosis with increased signal hyperintensity within the cervical cord from mid to lower aspect of C3 through mid aspect of C4 with central cord myelomalacia.  PMH significant for cervical myelopathy with history of C5-C6 fusion, CAD, HFrEF, ICMO, COPD, DM type 2, HLD, HTN, and PVD.  Workup significant for cervical myelopathy.  Patient inpatient family initially declined the neurosurgeon on call surgery.  Then surgery postpone due to inclement weather.  Tolerated posterior C3-C5 total laminectomies for decompression, placement of bilateral pars screws at C2 and bilateral lateral mass screws from C3 to C6 using [facility] Invictus OCT System, and placement of local bone and AlphaGraft DBM for arthrodesis from C2 to C6 on  without perioperative complications.  Postoperatively neurosurgery documented increase mobility to bilateral upper extremities with a 5/5 hand grasp.  Recommended aspirin hard collar at all times, and [place] collar for showers..  Recommended removal staples on .  Gray continued due to urinary retention.  Tolerated transfer to [facility] inpatient rehab unit on  without incident.   Today's Information: No acute events overnight.  Sitting up in bed.   Reports good sleep.  Last BM 1/28.  Refused suppository this morning.  Appetite is good.  His normal bowel maintenance is every 8 days due to chronic opioid use.  Staff reported low BP this morning after receiving Zanaflex.  Good glycemic control.  No new labs or imaging today.    Review of patient's allergies indicates:   Allergen Reactions    Hydrochlorothiazide      Other Reaction(s): Pancreatitis        Current Facility-Administered Medications:     acetaminophen tablet 650 mg, 650 mg, Oral, Q6H PRN, Ashley, Tony A, FNP, 650 mg at 02/03/25 0540    albuterol nebulizer solution 2.5 mg, 2.5 mg, Nebulization, Q4H PRN, [place], Shannan A, FNP, 2.5 mg at 02/04/25 1158    ALPRAZolam tablet 0.25 mg, 0.25 mg, Oral, TID PRN, Ashley, Tony A, FNP    amLODIPine tablet 5 mg, 5 mg, Oral, BID, Ashley, Tony A, FNP, 5 mg at 02/04/25 2123    aspirin chewable tablet 81 mg, 81 mg, Oral, Daily, Ashley, Tony A, FNP, 81 mg at 02/04/25 0815    atorvastatin tablet 40 mg, 40 mg, Oral, Daily, Ashley, Tony A, FNP, 40 mg at 02/04/25 0815    benzonatate capsule 100 mg, 100 mg, Oral, TID PRN, Ashley, Tony A, FNP    bisacodyL suppository 10 mg, 10 mg, Rectal, Once, Ashley, Tony A, FNP    carvediloL tablet 6.25 mg, 6.25 mg, Oral, BID, Ashley, Tony A, FNP, 6.25 mg at 02/04/25 2123    clopidogreL tablet 75 mg, 75 mg, Oral, Daily, Ashley, Tony A, FNP, 75 mg at 02/04/25 0815    dextrose 50% injection 12.5 g, 12.5 g, Intravenous, PRN, Ashley, Tony A, FNP    dextrose 50% injection 25 g, 25 g, Intravenous, PRN, Ashley, Tony A, FNP    docusate sodium capsule 100 mg, 100 mg, Oral, BID, Ashley, Tony A, FNP, 100 mg at 02/04/25 2123    DULoxetine DR capsule 30 mg, 30 mg, Oral, Daily, Shannan Puri FNP, 30 mg at 02/04/25 0816    enoxaparin injection 40 mg, 40 mg, Subcutaneous, Q24H (prophylaxis, 1700), Tony Ramirez FNP, 40 mg at 02/04/25 1736    finasteride tablet 5 mg, 5  mg, Oral, Daily, Ashley, Tony A, FNP, 5 mg at 02/04/25 0816    umeclidinium-vilanteroL 62.5-25 mcg/actuation DsDv 1 puff, 1 puff, Inhalation, Daily, 1 puff at 02/04/25 1207 **AND** fluticasone furoate 200 mcg/actuation inhaler 1 puff, 1 puff, Inhalation, Daily, Ashley, Tony A, FNP, 1 puff at 02/04/25 1207    glucagon (human recombinant) injection 1 mg, 1 mg, Intramuscular, PRN, Ashley, Tony A, FNP    glucose chewable tablet 16 g, 16 g, Oral, PRN, Ashley, Tony A, FNP    glucose chewable tablet 24 g, 24 g, Oral, PRN, Ashley, Tony A, FNP    hydrALAZINE injection 10 mg, 10 mg, Intravenous, Q4H PRN, Ashley, Tony A, FNP, 10 mg at 02/04/25 0629    hydrOXYzine pamoate capsule 50 mg, 50 mg, Oral, Nightly PRN, Ashley, Tony A, FNP, 50 mg at 02/03/25 2138    insulin aspart U-100 injection 0-10 Units, 0-10 Units, Subcutaneous, QID (AC + HS) PRN, Ashley, Tony A, FNP, 2 Units at 02/03/25 0547    labetalol 20 mg/4 mL (5 mg/mL) IV syring, 10 mg, Intravenous, Q4H PRN, Ashley, Tony A, FNP    LIDOcaine 5 % patch 1 patch, 1 patch, Transdermal, Q24H, Shannan Puri A, FNP, 1 patch at 02/04/25 1736    linaCLOtide capsule 145 mcg, 145 mcg, Oral, QAM, Ashley, Tony A, FNP, 145 mcg at 02/04/25 0552    losartan tablet 25 mg, 25 mg, Oral, Daily, Ashley, Tony A, FNP, 25 mg at 02/03/25 0822    magnesium oxide tablet 400 mg, 400 mg, Oral, BID, Ashley, Tony A, FNP, 400 mg at 02/04/25 2123    magnesium oxide tablet 400 mg, 400 mg, Oral, Daily, Tony Ramirez FNP, 400 mg at 02/04/25 0815    melatonin tablet 6 mg, 6 mg, Oral, Nightly PRN, Shannan Puri FNP, 6 mg at 02/02/25 2056    metoprolol injection 10 mg, 10 mg, Intravenous, Q2H PRN, Tony Ramirez FNP    mupirocin 2 % ointment, , Nasal, BID, Darrell Watson MD, Given at 02/03/25 2103    nitroGLYCERIN SL tablet 0.4 mg, 0.4 mg, Sublingual, Q5 Min PRN, Tony Ramirez FNP    ondansetron  "disintegrating tablet 4 mg, 4 mg, Oral, Q6H PRN, Ashley, Tony A, FNP    ondansetron disintegrating tablet 8 mg, 8 mg, Oral, Q8H PRN, Ashley, Tony A, FNP    ondansetron injection 4 mg, 4 mg, Intravenous, Q8H PRN, Ashley, Tony A, FNP    oxyCODONE-acetaminophen  mg per tablet 1 tablet, 1 tablet, Oral, Q4H PRN, [place], Shannan A, FNP, 1 tablet at 02/04/25 2135    oxyCODONE-acetaminophen 5-325 mg per tablet 1 tablet, 1 tablet, Oral, Q4H PRN, [place], Shannan A, FNP, 1 tablet at 02/05/25 0606    pantoprazole EC tablet 40 mg, 40 mg, Oral, Daily, Ashley, Tony A, FNP, 40 mg at 02/04/25 0815    polyethylene glycol packet 17 g, 17 g, Oral, BID PRN, Ashley, Tony A, FNP    pregabalin capsule 100 mg, 100 mg, Oral, TID, [place], Shnanan A, FNP, 100 mg at 02/05/25 0606    ranolazine 12 hr tablet 500 mg, 500 mg, Oral, BID, Ashley, Tony A, FNP, 500 mg at 02/04/25 2123    tiZANidine tablet 4 mg, 4 mg, Oral, QID (AC & HS), Jaxson, Shannan A, FNP, 4 mg at 02/05/25 0605    tiZANidine tablet 4 mg, 4 mg, Oral, Q6H PRN, [place], Shannan A, FNP     Review of Systems   Complete 12-point review of symptoms negative except for what's mentioned in HPI     Objective:     BP (!) 162/73   Pulse 63   Temp 98.3 °F (36.8 °C) (Oral)   Resp 18   Ht 5' 6" (1.676 m)   Wt 62.2 kg (137 lb 2 oz)   SpO2 97%   BMI 22.13 kg/m²      Physical Exam  Constitutional:       Appearance: Normal appearance.   HENT:      Head: Normocephalic.      Mouth/Throat:      Mouth: Mucous membranes are moist.   Eyes:      Pupils: Pupils are equal, round, and reactive to light.   Neck:      Comments: Posterior neck incision clean and intact.  Hard collar intact  Cardiovascular:      Rate and Rhythm: Normal rate and regular rhythm.      Heart sounds: Normal heart sounds.   Pulmonary:      Effort: Pulmonary effort is normal.      Breath sounds: Normal breath sounds.   Abdominal:      General: Bowel sounds are normal.      Palpations: " Abdomen is soft.   Musculoskeletal:      Cervical back: Neck supple.      Comments: Diffuse muscle atrophy   Skin:     General: Skin is warm and dry.   Neurological:      General: No focal deficit present.      Mental Status: He is alert and oriented to person, place, and time.      Motor: Weakness present.   Psychiatric:         Mood and Affect: Mood normal.         Behavior: Behavior normal.         Thought Content: Thought content normal.         Judgment: Judgment normal.     *MD performed and documented physical examination       Lines/Drains/Airways       Peripheral Intravenous Line  Duration                  Peripheral IV - Single Lumen 01/14/25 1853 20 G Anterior;Right Forearm 21 days                  Labs  Recent Results (from the past 24 hours)   POCT glucose    Collection Time: 02/05/25  6:16 AM   Result Value Ref Range    POCT Glucose 98 70 - 110 mg/dL   POCT Glucose, Hand-Held Device    Collection Time: 02/05/25  7:13 AM   Result Value Ref Range    POC Glucose 98 70 - 110 MG/DL   POCT glucose    Collection Time: 02/05/25 11:50 AM   Result Value Ref Range    POCT Glucose 129 (H) 70 - 110 mg/dL     Radiology  Cervical XR two-view on 01/25/2025, IMPRESSION: There are postoperative changes with posterior spinal fusion hardware at C2 through C6. The hardware appears intact. Cervical alignment has improved. The remaining vertebral body heights are preserved. There is moderate disc height loss at C3-C4, mild at C4-C5, with multilevel marginal osteophytes. The soft tissues are unremarkable.     Assessment/Plan:     69 y.o. WM admitted on 1/31/2025    Cervical myelopathy   - s/p C2-6 PCDF and C2/3-C4/5 decompression on 01/24/2025  - remove staples on 02/07  - continue aspirin hard collar at all times, okay for [place] collar for showers only  - Zanaflex discontinued on 02/05 due to orthostatic hypotension  - continue                Robaxin 750 mg t.i.d. (decreased 2/5)                Flexeril 10 mg t.i.d.  p.r.n.                Percocet 10 mg q.4 hours p.r.n. for severe pain                Lyrica 100 mg t.i.d. (increased 2/3)  - defer to physiatry for rehab and pain management  - PT/OT/RT/ST following  - to follow up with Neurosurgery outpatient     Coronary artery disease  - s/p  CABG x 3  - denies recent chest pain or discomfort  - continue                ASA 81 mg daily (resumed 1/31)                Plavix 75 mg daily (resume 1/31)                Coreg 6.25 mg b.i.d.                 Lipitor 40 mg daily                Losartan 25 mg daily   - ECG and Nitro PRN  - continue cardiac diet  - to follow-up with cardiology outpatient     HFrEF/ICMO  - stable  - continue                Coreg 6.25 mg b.i.d.                 Losartan 25 mg daily                 Ranexa 500 mg b.i.d.   - obtain daily standing weights  - to follow up with cardiology outpatient     Nicotine dependence  - smoking cessation counseling given on admission  - 1 PPD with approximately 55 pack years     HTN  - orthostatics reported on 02/05  - discontinued Flomax 0.4 mg at bedtime on 02/04 due to orthostatic hypotension  - discontinued Zanaflex on 02/05 due to orthostatic hypotension  - received NS 1 L bolus on 02/05 due to orthostatic hypotension  - continue                Coreg 6.25 mg b.i.d.                 Losartan 25 mg daily                 Norvasc 5 mg daily                 Ranexa 500 mg b.i.d.                 Hydralazine 10 mg every 2 hours as needed for BP > 160/90                Labetalol 10 mg every 2 hours as needed for BP > 160/90  - low sodium diet             COPD   - stable  - not on home 02  - continue                Trelegy INH daily                   DM type II  - HgA1c 5.6 on 01/23/2024  - continue                Lantus 10 units every evening (resumed 1/31)                ISS   - CBGs AC/HS     HLD  - FLP outpatient  - resume Crestor upon discharge  - continue                Lipitor 40 mg daily      PVD  - stable  - continue                 ASA 81 mg daily (resumed 1/31)                Plavix 75 mg daily (resume 1/31)                Coreg 6.25 mg b.i.d.                 Lipitor 40 mg daily  - to follow up vascular surgery outpatient                  Chronic constipation  - last BM 1/28, reports is normal for him to have a bowel movement every 8 days  - continue                Linzess 145 mcg before breakfast                 Colace 100 mg b.i.d.     Hypomagnesemia  - stable  - continue                Magnesium oxide 400 mg b.i.d.      BPH/urinary retention  - stable  - discontinued Flomax 0.4 mg at bedtime on 02/04 due to orthostatic hypotension  - continue                Proscar 5 mg daily (initiated 1/31)  Indwelling catheter discontinued on 02/01-good urine output with no retention reported     GERD  - Avoid spicy foods, and nothing to eat or drink within x2 hours of bedtime or laying flat (water is ok)   - Avoid NSAIDs (Advil, ibuprofen, naproxen...) and chavira-2 inhibitors (Mobic, Celebrex)    - continue                Protonix 40 mg daily      Normocytic anemia  - asymptomatic  - H/H stable   - no evidence of active bleeds  - will closely monitor and transfuse if needed     Hypomagnesemia  - magnesium 1.8 on 02/03  - s/p Mag sulfate 2 g x 1 IVPB on 02/01  - continue   Mag-Ox 400 mg BID (initiated 2/1)    Mild JOHN  - renal indices stable  - encourage oral hydration  - bladder scan to rule out retention     VTE Prophylaxis: Lovenox 40 mg daily   COVID-19 testing:  Unknown  COVID-19 vaccination status:  Vaccinated (Moderna):  Bivalent booster on 04/03/2023     POA:  No  Living will:  No  Contacts:  Tyler Mai Jr (son) [phone]     CODE STATUS:  Full code  Internal Medicine (attending): Tyler De Anda MD  Physiatry (consulting):  Philip Fang MD     OUTPATIENT PROVIDERS  PCP: None  Neurosurgery: Darrell Zavala MD     DISPOSITION:  Sleep hygiene, bowel maintenance, and appetite at goal.  Last BM 1/28.  Normally has bowel movements  every 8 days.  Orthostatics reported.  Good glycemic control.  Vital signs otherwise at goal.  No new labs or imaging today.  Zanaflex discontinued.  Tolerated 1 L NS bolus.  Refused suppository.  Continue aggressive mobilization as tolerated.  Monitor closely.  Notify of acute changes.  Staffing completed today.    Staffing 2/5/2025: Continent of bowel and bladder.  Assisted to the floor overnight after going to the bathroom. RT: Overall min to CGA when he participates.  Needs max encouragement. Limited by standing tolerance and generalized weakness. Appetite is good at 80%. PT: Limited by pain and poor participation.  CGA to min assist with bed mobility, ambulating 30-70 feet at CGA with walker. Limited strength and participation.  OT: Progressing, but dependent for foot wear.  Dependent with LBD.  CGA with toilet transfers. Min assist with toilet hygiene.  Set up with oral care and eating. Limited by decreased strength.  Needs max encouragement. Poor family support.  Estranged from family.  Projected discharge pending.     Jarrell Ramirez NP conducted independent physical examination and assisted with medical documentation.    Total time spent on this encounter including chart review and direct MD + NP 1-on-1 patient interaction: 56 minutes   Over 50% of this time was spent in counseling and coordination of care

## 2025-02-05 NOTE — PT/OT/SLP PROGRESS
Physical Therapy Inpatient Rehab Treatment    Patient Name:  Tyler Mai   MRN:  [id number]    Recommendations:     Discharge Recommendations:   (pending progress)   Discharge Equipment Recommendations:     Barriers to discharge: Impaired functional mobility  and Severity of Deficits     Assessment:     Tyler Mai is a 69 y.o. male admitted with a medical diagnosis of Status post cervical spinal fusion.  He presents with the following impairments/functional limitations:  weakness, impaired endurance, impaired functional mobility, gait instability, impaired balance, decreased upper extremity function, decreased lower extremity function, pain, decreased ROM, impaired coordination, impaired fine motor, orthopedic precautions       PT NOTE: pt more agreeable to participate. Pt was frustrated 2/2 pain medications being late. Pt was fixated throughout treatment and frustrated at times cursing. Pt required constant redirection from frustration dn encouragement to perform mobility. Pt agreed he needs to get better and PT educated pt on importance. Pt was able to increased gait distance and had no reports of dizziness throughout gait. Pt was left in recliner at end of session, 2/2 pt using recliner at home and not bed. Pt states he had 7 steps with B rails, then has a resting platform, then another 7 steps with L sided rail going up. Pt reports at times he was bumping steps 2/2 fatigue and weakness.     Rehab Diagnosis: Cervical spondylosis and stenosis with myelopathy and cord compression s/p C2-6 PCDF and C2-3, C4-5 decompression 1/24/2025. Pt. Has a history of cervical myelopathy, CAD, CHF, COPD, DM, HLD, HTN, and PVD    General Precautions: Standard, fall     Orthopedic Precautions:spinal precautions     Braces: Aspen collar    Rehab Prognosis: Fair; patient would benefit from acute skilled PT services to address these deficits and reach maximum level of function.      History:     Past Medical History:  "  Diagnosis Date    Anxiety disorder, unspecified     Arthritis     CAD (coronary artery disease)     Cervical radiculopathy     CHF (congestive heart failure)     Chronic pain syndrome     COPD (chronic obstructive pulmonary disease)     Diabetes mellitus     Gastro-esophageal reflux disease without esophagitis     High cholesterol     Hypertension     Lumbar radiculopathy     Myelomalacia of cervical cord     Other specified diseases of spinal cord     Pacemaker     PVD (peripheral vascular disease)     S/P triple vessel bypass     Tobacco use        Past Surgical History:   Procedure Laterality Date    ANTERIOR CERVICAL DISCECTOMY W/ FUSION  1995    C5-6 and C6-7.  Dr. Wall    ANTERIOR CERVICAL DISCECTOMY W/ FUSION  1996    Redo C5-6, C6-7.  Dr. Wall    CHOLECYSTECTOMY      CORONARY ARTERY BYPASS GRAFT      FUSION OF POSTERIOR COLUMN OF CERVICAL SPINE USING COMPUTER AIDED NAVIGATION N/A 1/24/2025    Procedure: FUSION, SPINE, POSTERIOR SPINAL COLUMN, CERVICAL, USING COMPUTER-ASSISTED NAVIGATION;  Surgeon: Darrell Watson MD;  Location: [facility];  Service: Neurosurgery;  Laterality: N/A;  C2-C6 PCF; C2/3-C4/5 decompression, possible additional levels  o-arm  NTI  TIVA setup  [employer] //  XX    INSERTION OF PACEMAKER      X2    TRIPPLE VESSEL BYPASS         Subjective     Patient comments: "they always pushing back my pain meds."    Respiratory Status: Room air    Patients cultural, spiritual, Mosque conflicts given the current situation: no    Objective:     Communicated with TR prior to session.  Patient found HOB elevated with cervical collar  upon PT entry to room.    Pt is Oriented x3 and  frustrated/agitated, Alert, and Easily distractible.    Vitals   Vitals:    02/05/25 1400 02/05/25 1500   BP: 132/64 (!) 143/68   Pulse: 73 73   Resp:     Temp:     TempSrc:     SpO2:     Weight:     Height:            Functional Mobility:        Current   Status  Discharge   Goal   Functional Area: Care Score:  " "  Lying to Sitting on Side of Bed 4 Independent   Sit to Stand 4  CGA with Vc for tech with RW  Independent   Chair/Bed-to-Chair Transfer 4  Bed to w/c; w/c to recliner with RW overall CGA. Noted NBOS and slowed pace Independent   Car Transfer   Independent   Walk 10 Feet 4 Independent   Walk 50 Feet with Two Turns 4  92' and 102' with seated rest breaks. CGA overall with RW. Noted NBOS and slowed pace. Noted decreased foot clearance and decreased step length on RLE.  Pt states "I'm surprised I'm doing so well" PT again educated pt on benefits and pt agreed.      Walk 150 Feet 88 Independent       Therapeutic Activities and Exercises:  Patient educated on role of acute care PT and PT POC, safety while in hospital including calling nurse for mobility, and call light usage  Patient educated about importance of OOB mobility and remaining up in chair most of the day.    Therapeutic exercises were performed seated at edge of chair:    marches with 2 weights  long arc quads with 2 weights  All therex performed 3x10 reps each  Increased time and rest breaks throughout. Pt requires redirection to task.         Activity Tolerance: Good    Patient left up in chair with all lines intact, call button in reach, chair alarm on, and Rn present and Jesus mat donned .    Education provided: roles and goals of PT/PTA, transfer training, bed mob, gait training, balance training, safety awareness, body mechanics, assistive device, and strengthening exercises    Expected compliance: Moderate compliance    Plan:     During this hospitalization, patient to be seen 5 x/week to address the identified rehab impairments via gait training, therapeutic activities, therapeutic exercises and progress toward the following goals:    GOALS:   Multidisciplinary Problems       Physical Therapy Goals          Problem: Physical Therapy    Goal Priority Disciplines Outcome Interventions   Physical Therapy Goal     PT, PT/OT Progressing    Description: Bed " Mobility:  Sit to supine transfer independently.   Supine to sit transfer independently.     Transfers:  Sit to stand transfer independently using RW.   Car transfer independently using RW.    an object from the ground in standing position independently using RW.     Mobility:  Ambulate 500 feet independently using RW.  Ambulate 15 feet on uneven surfaces/ramps with setup/clean-up assist using RW.   Pt ascended/descended a 6 inch curb independently using RW.   Ascend/descend 15 stairs independently using bilateral handrails.                         Plan of Care Expires:  02/07/25  PT Next Visit Date: 02/07/25  Plan of Care reviewed with: patient    Additional Information:         Time Tracking:     Therapy Time  PT Received On: 02/05/25  PT Start Time: 1400  PT Stop Time: 1500  PT Total Time (min): 60 min   PT Individual: 60  Missed Time:    Time Missed due to:      Billable Minutes: Gait Training 30, Therapeutic Activity 15, and Therapeutic Exercise 15    02/05/2025

## 2025-02-05 NOTE — PLAN OF CARE
Problem: Wound  Goal: Absence of Infection Signs and Symptoms  Outcome: Progressing  Goal: Optimal Pain Control and Function  Outcome: Progressing  Goal: Skin Health and Integrity  Outcome: Progressing     Problem: Wound  Goal: Optimal Pain Control and Function  Outcome: Progressing     Problem: Wound  Goal: Skin Health and Integrity  Outcome: Progressing

## 2025-02-05 NOTE — PLAN OF CARE
"Left message for son, Jr. Tyler, to call me (for updates from team conference and for further inquiry into family/friend resources who may or may not be available to patient upon his return home).  Reportedly, pt lives in the upstairs apartment of his sister's home, but he and she do not speak much or get along with one another.      1304:  Spoke with son Jr. Tyler, and updated him.  He is not surprised by pt's participation issues, saying, "He's a stubborn ass . . . This is not new.  He's been that way all his life.  He's the baby of the family . . . his sister won't let him back to her house if he can't do for himself . . . He manipulates people."  Pt's other siblings all have health issues.  Pt's daughter does not claim pt as her father.  Tyler lives in [place].  Tyler says pt and his sister get along, but they are both "deaf/mute" so do not communicate very well.          Tyler says pt's number 1 concern is staying out of a nursing home and has accused son Tyler of trying to take his money (by placing him in the NH), despite pt having no assets that Tyler CAN take.  Aron has no understanding that the NH would actually take his SS check for his care, not Tyler.  Tyler's wife has resorted to "chewing his ass from one end to the other" for mistreating nursing staff there.  He said, "I don't mind going over there and can bring my wife to keep reaming his ass if I need to."        Tyler indicated that pt HAD been accepted at [facility] prior to surgery/rehab (while at [facility]), though pt has not been agreeable to that since "being in his right mind" ([facility]).    Pt is not allowed to go to Tyler's house due to some decisions he has made throughout his life.      Pt has "laid around in bed and slept in his recliner if he wasn't fishing" since 1993.  Pt abused oxycontin and was even in nursing home for possession of crack cocaine at some point (served 1.5 years of his 3 year sentence).      "

## 2025-02-05 NOTE — PROGRESS NOTES
02/01/25 0800   Roll Left and Right   Assistance Needed Independent   CARE Score - Roll Left and Right 6     Addendum to Navdeep Batres PT's note. Evaluation included roll left and right but was not documented. Permission given by Navdeep to update flowsheet to reflect scoring during evaluation.

## 2025-02-06 LAB
POCT GLUCOSE: 115 MG/DL (ref 70–110)
POCT GLUCOSE: 165 MG/DL (ref 70–110)

## 2025-02-06 PROCEDURE — 97110 THERAPEUTIC EXERCISES: CPT

## 2025-02-06 PROCEDURE — 25000003 PHARM REV CODE 250: Performed by: STUDENT IN AN ORGANIZED HEALTH CARE EDUCATION/TRAINING PROGRAM

## 2025-02-06 PROCEDURE — 94799 UNLISTED PULMONARY SVC/PX: CPT | Mod: XB

## 2025-02-06 PROCEDURE — 25000003 PHARM REV CODE 250: Performed by: NURSE PRACTITIONER

## 2025-02-06 PROCEDURE — 99900031 HC PATIENT EDUCATION (STAT)

## 2025-02-06 PROCEDURE — 63600175 PHARM REV CODE 636 W HCPCS: Performed by: NURSE PRACTITIONER

## 2025-02-06 PROCEDURE — 97530 THERAPEUTIC ACTIVITIES: CPT

## 2025-02-06 PROCEDURE — 94761 N-INVAS EAR/PLS OXIMETRY MLT: CPT

## 2025-02-06 PROCEDURE — 97168 OT RE-EVAL EST PLAN CARE: CPT

## 2025-02-06 PROCEDURE — 11800000 HC REHAB PRIVATE ROOM

## 2025-02-06 PROCEDURE — 97116 GAIT TRAINING THERAPY: CPT

## 2025-02-06 PROCEDURE — 94799 UNLISTED PULMONARY SVC/PX: CPT

## 2025-02-06 PROCEDURE — 97535 SELF CARE MNGMENT TRAINING: CPT

## 2025-02-06 RX ORDER — LOSARTAN POTASSIUM 25 MG/1
25 TABLET ORAL
Status: DISCONTINUED | OUTPATIENT
Start: 2025-02-07 | End: 2025-02-14

## 2025-02-06 RX ORDER — BALSAM PERU/CASTOR OIL
OINTMENT (GRAM) TOPICAL 2 TIMES DAILY
Status: DISCONTINUED | OUTPATIENT
Start: 2025-02-06 | End: 2025-02-10

## 2025-02-06 RX ADMIN — CLOPIDOGREL BISULFATE 75 MG: 75 TABLET, FILM COATED ORAL at 07:02

## 2025-02-06 RX ADMIN — PREGABALIN 100 MG: 50 CAPSULE ORAL at 02:02

## 2025-02-06 RX ADMIN — PANTOPRAZOLE SODIUM 40 MG: 40 TABLET, DELAYED RELEASE ORAL at 07:02

## 2025-02-06 RX ADMIN — DOCUSATE SODIUM 100 MG: 100 CAPSULE, LIQUID FILLED ORAL at 07:02

## 2025-02-06 RX ADMIN — LINACLOTIDE 145 MCG: 145 CAPSULE, GELATIN COATED ORAL at 06:02

## 2025-02-06 RX ADMIN — RANOLAZINE 500 MG: 500 TABLET, EXTENDED RELEASE ORAL at 07:02

## 2025-02-06 RX ADMIN — CARVEDILOL 6.25 MG: 6.25 TABLET, FILM COATED ORAL at 07:02

## 2025-02-06 RX ADMIN — CARVEDILOL 6.25 MG: 6.25 TABLET, FILM COATED ORAL at 08:02

## 2025-02-06 RX ADMIN — Medication 400 MG: at 08:02

## 2025-02-06 RX ADMIN — ENOXAPARIN SODIUM 40 MG: 40 INJECTION SUBCUTANEOUS at 05:02

## 2025-02-06 RX ADMIN — MUPIROCIN: 20 OINTMENT TOPICAL at 07:02

## 2025-02-06 RX ADMIN — AMLODIPINE BESYLATE 5 MG: 5 TABLET ORAL at 07:02

## 2025-02-06 RX ADMIN — METHOCARBAMOL 750 MG: 750 TABLET ORAL at 06:02

## 2025-02-06 RX ADMIN — ATORVASTATIN CALCIUM 40 MG: 40 TABLET, FILM COATED ORAL at 07:02

## 2025-02-06 RX ADMIN — AMLODIPINE BESYLATE 5 MG: 5 TABLET ORAL at 08:02

## 2025-02-06 RX ADMIN — OXYCODONE HYDROCHLORIDE AND ACETAMINOPHEN 1 TABLET: 10; 325 TABLET ORAL at 03:02

## 2025-02-06 RX ADMIN — OXYCODONE HYDROCHLORIDE AND ACETAMINOPHEN 1 TABLET: 10; 325 TABLET ORAL at 08:02

## 2025-02-06 RX ADMIN — INSULIN ASPART 1 UNITS: 100 INJECTION, SOLUTION INTRAVENOUS; SUBCUTANEOUS at 08:02

## 2025-02-06 RX ADMIN — LIDOCAINE 5% 1 PATCH: 700 PATCH TOPICAL at 05:02

## 2025-02-06 RX ADMIN — OXYCODONE HYDROCHLORIDE AND ACETAMINOPHEN 1 TABLET: 10; 325 TABLET ORAL at 12:02

## 2025-02-06 RX ADMIN — Medication 400 MG: at 07:02

## 2025-02-06 RX ADMIN — PREGABALIN 100 MG: 50 CAPSULE ORAL at 08:02

## 2025-02-06 RX ADMIN — FLUTICASONE FUROATE 1 PUFF: 200 POWDER RESPIRATORY (INHALATION) at 08:02

## 2025-02-06 RX ADMIN — OXYCODONE HYDROCHLORIDE AND ACETAMINOPHEN 1 TABLET: 10; 325 TABLET ORAL at 04:02

## 2025-02-06 RX ADMIN — METHOCARBAMOL 750 MG: 750 TABLET ORAL at 03:02

## 2025-02-06 RX ADMIN — FINASTERIDE 5 MG: 5 TABLET, FILM COATED ORAL at 07:02

## 2025-02-06 RX ADMIN — METHOCARBAMOL 750 MG: 750 TABLET ORAL at 08:02

## 2025-02-06 RX ADMIN — DULOXETINE HYDROCHLORIDE 30 MG: 30 CAPSULE, DELAYED RELEASE ORAL at 07:02

## 2025-02-06 RX ADMIN — PREGABALIN 100 MG: 50 CAPSULE ORAL at 06:02

## 2025-02-06 RX ADMIN — DOCUSATE SODIUM 100 MG: 100 CAPSULE, LIQUID FILLED ORAL at 08:02

## 2025-02-06 RX ADMIN — METHOCARBAMOL 750 MG: 750 TABLET ORAL at 11:02

## 2025-02-06 RX ADMIN — LOSARTAN POTASSIUM 25 MG: 25 TABLET, FILM COATED ORAL at 07:02

## 2025-02-06 RX ADMIN — ASPIRIN 81 MG CHEWABLE TABLET 81 MG: 81 TABLET CHEWABLE at 08:02

## 2025-02-06 RX ADMIN — Medication: at 08:02

## 2025-02-06 RX ADMIN — UMECLIDINIUM BROMIDE AND VILANTEROL TRIFENATATE 1 PUFF: 62.5; 25 POWDER RESPIRATORY (INHALATION) at 07:02

## 2025-02-06 RX ADMIN — RANOLAZINE 500 MG: 500 TABLET, EXTENDED RELEASE ORAL at 08:02

## 2025-02-06 NOTE — PT/OT/SLP RE-EVAL
Recreational Therapy Re-Evaluation      Date of Treatment: 02/06/25  Start Time: 1300  Stop Time: 1330  Total Time: 30 min  Missed Time    Assessment      Tyler Mai is a 69 y.o. male admitted with a medical diagnosis of Status post cervical spinal fusion.  He presents with the following impairments/functional limitations:  weakness, impaired endurance, impaired functional mobility, gait instability, impaired balance, decreased coordination, decreased upper extremity function, decreased lower extremity function, decreased safety awareness, decreased ROM, impaired coordination, impaired fine motor .    Rehab Diagnosis:     Recent Surgery:     General Precautions: Standard, fall     Orthopedic Precautions:spinal precautions     Braces: Aspen collar    Rehab Prognosis: Good; patient would benefit from acute skilled Recreational Therapy services to address these deficits and reach maximum level of function.      Impairments: Coordination deficits, Endurance deficits, Mobility deficits, Safety awareness deficits, and Strength deficits  Rehab Potential: Good  Treatment Recommendations: Continue with current plan of care   Treatment Diagnosis: Cervical spondylosis and stenosis, myelopathy and cord compression, s/p C2-6 PCDF and C-2-3, C4-5 ,decompression, CAD, CHF, COPD, DM, HLD, HTN, PVD  Orientation: Oriented x4  Affect/Behavior: Cooperative  Safety/Judgement: impaired   Basic Command Following: intact  Spiritual Cultural: no        History     Past Medical History:   Diagnosis Date    Anxiety disorder, unspecified     Arthritis     CAD (coronary artery disease)     Cervical radiculopathy     CHF (congestive heart failure)     Chronic pain syndrome     COPD (chronic obstructive pulmonary disease)     Diabetes mellitus     Gastro-esophageal reflux disease without esophagitis     High cholesterol     Hypertension     Lumbar radiculopathy     Myelomalacia of cervical cord     Other specified diseases of spinal cord      Pacemaker     PVD (peripheral vascular disease)     S/P triple vessel bypass     Tobacco use        Past Surgical History:   Procedure Laterality Date    ANTERIOR CERVICAL DISCECTOMY W/ FUSION  1995    C5-6 and C6-7.  Dr. Wall    ANTERIOR CERVICAL DISCECTOMY W/ FUSION  1996    Redo C5-6, C6-7.  Dr. Wall    CHOLECYSTECTOMY      CORONARY ARTERY BYPASS GRAFT      FUSION OF POSTERIOR COLUMN OF CERVICAL SPINE USING COMPUTER AIDED NAVIGATION N/A 1/24/2025    Procedure: FUSION, SPINE, POSTERIOR SPINAL COLUMN, CERVICAL, USING COMPUTER-ASSISTED NAVIGATION;  Surgeon: Darrell Watson MD;  Location: [facility];  Service: Neurosurgery;  Laterality: N/A;  C2-C6 PCF; C2/3-C4/5 decompression, possible additional levels  o-arm  NTI  TIVA setup  [employer] //  XX    INSERTION OF PACEMAKER      X2    TRIPPLE VESSEL BYPASS         Home Environment     Admit Date: 01/31/25  Living Situation  People in Home: alone  Lives in: apartment  Patients Responsibilities: Community mobility, , Financial management, Health and wellness, Laundry, Leisure/play/hobbies, Meal preparation, Shopping, Other (Comment) (Disaabled)  Number of Children: 2  Occupation:[employer]    Instrumental Activities of Daily Living     Previous Hand Dominance: Right Current Hand Dominance: Right     Other iADL Information:        Cognitive Skills Building         Cognitive Observation Activity Assist Position Equipment Response            Comment:      Dynamic Activities      Activity Assist Position Equipment Response   Activity 1 Golf supervision Standing Rolling walker and Golf balls, golf club good   Comment: Sit to stand was supervision as was dynamic standing balance/reaching.  Standing tolerance was 5 minutes. UE coordination/dexterity was supervision. Held golf club with both UE to swing.  Sequencing and problem solving skills were I.  More appropriate and cooperative this PM       Fine Motor Activities      Activity Assist Position Equipment Response  "          Comment:        Goals     Patient Goals  Patient Goal 1: "Get healthy, move around and do for myself."    Short Term Goals    Goal  Goal Status   Will increase sit to stand to supervision Met   Will improve dynamic standing balance/reaching to supervision Met                 Long Term Goals    Goal Goal Status   Will increase standing tolerance to 5 minutes Met   Will improve dynamic standing balance/reaching to setup Progressing                     Plan       Patient to be seen: Daily  Duration: 2 weeks  Treatments planned: Balance training, Coordination, Energy conservation training, Fine motor, Safety education  Treatment plan/goals established with Patient/Caregiver: Yes     "

## 2025-02-06 NOTE — PROGRESS NOTES
Dos 2/6/25  Seen and evaluated during RT today  Working on visual-spatial activities, following commands, dynamic and static balance and increasing endurance.  Progressing and tolerating  Case discussed with all therapeutic, nursing and medical team members  Agree with present POC  Subjective  HPI: 69 year old WM with a PMH of cervical myelopathy, CAD, CHF, COPD, DM, HLD, HTN, and PVD presented to the ED at [facility] on 1/14/25 for Neurosurgery. Patient presented in ED following discharge from [facility]. He reports associated symptoms of bilateral arm weakness, bilateral leg weakness, and worsening neck pain. Notes right leg is weaker than left. He also complains of bilateral hand, bilateral leg, and bilateral feet numbness. He stated that he has been unable to care for himself at home because of the weakness, and having to use a wheelchair to get around. Patient's daughter at bedside reports frequent falls at home. No urinary or fecal incontinence. Per chart review, the patient was admitted to [facility] for cervical myelopathy with the plan of surgery. Previous extensive admission at [facility] for UTI which cleared and resolved with antibiotics; initially, did not have surgery secondary to UTI. Now amendable for surgical intervention since UTI has resolved. Patient refused surgery with on-call neurosurgeon at [facility] and discharged at noon today. He was advised to come here for the surgery. MRI on 01/05 showed severe C3-4 level central canal stenosis with increased signal hyperintensity within the cervical cord from mid to lower aspect of C3 through mid aspect of C4 with central cord myelomalacia. No syrinx is detected. Prior C5-C7 intervertebral body fusion. Neurosurgery was consulted with recommendation for surgical intervention needed. Cardiology consulted with low risk for MACE for high risk neurosurgical procedure. On 1/24, patient underwent C2-6 PCDF and C2-3- C4-5 decompression by Dr. Ramírez. 1 Hemovac drain placed to  site. Hard cervical collar in place. On 1/25, wound care was consulted for right heel and left heel wound with recommendation to cleanse bilateral heels with normal saline, paint with betadine, apply small foam, and change every 2 days. Recommended bilateral heel boots. Herrera was removed. Labs showed low Na of 133, elevated BUN/Cr of 29.7/ 1.31, and low H&H of 10.6 & 31.9. BP ranging 125/58- 182/80. PT/OT efvals completed with deficits noted with recommendation for high intensity therapy needed. ON 1/26, Labs showed low H&H of 10.1 & 29.5. Patient having urinary retention requiring in and out caths. On 1/27, hemovac removed. Neurosurgery recommended to DC staples on POD 14, will need follow up in 2 weeks after DC from hospital with XR uprights of cervical spine with Izzy, NP, continued Percocet and Robaxin for pain management, and signed off of case. Herrera had to be reinserted for urinary retention, and started Flomax. BP ranging 123/61- 169/70. On 1/28, patient started on Relistar for constipation with 2 large Bms noted. Labs showed low H&H of 9.2 & 27.3. On 1/30, wound care reassessed with 2 wounds noted to bilateral anterior legs. Cleansed with soap and water, and applied aquaphor to sites. Patient is AAOx4.   Participating with therapy. Functional status includes setup assist needed for eating, minimal assist for bed mobility, contact guard assist for transfers with RW, walked 28ft with RW at minimal assist, minimal assist for toilet transfer, total assist for toileting due to herrera in place, stand by assist for grooming, max assist for upper body dressing, and max assist for lower body dressing. Patient was evaluated, accepted, and admitted to inpatient rehab to improve functional status. Transferred to [facility] on 1/31 without incident.     2/6: Seen with PT, Amb w/RW with smooth slow gait. No noted LOB. Reports feeling much better. Denies BP drops and says that he is back on his original medication. Tolerating  therapy without current complaint. VSSAF with noted /63 at 0500 and 147/71 at 0730am. IM following.            Review of Systems  Psychiatric: Denies mental health history. Pt. Smokes cigarettes.     Depression/Anxiety: notes some anxiety in regards to eating and getting enough nutrition to his mouth     DULoxetine DR capsule 30 mg qd, start 2/1  ALPRAZolam tablet 0.25 mg TID PRN Anxiety  Pain: L>R side of neck, numbness (hands, legs, feet), shooting pains  DULoxetine DR capsule 30 mg qd, start 2/1  methocarbamoL tablet 750 mg AC & HS  pregabalin capsule 100mg TID  LIDOcaine 5 % patch 1 patch neck q24hr, 1800  acetaminophen tablet 650 mg q6h PRN mild pain   oxyCODONE-acetaminophen 5-325 mg 1 tablet q4h PRN mod pain  oxyCODONE-acetaminophen  mg per tablet 1 tablet q4h PRN severe pain  cyclobenzaprine tablet 5 mg TID PRN spasm  Bowels/Bladder: Last BM 1/28 x 2 (large) following Relistor. Needs intervention. Refusing suppository  Gray catheter reinserted 2/2 urinary retention     docusate sodium capsule 100 mg BID  linaCLOtide capsule 145 mcg qAM  tamsulosin 24 hr capsule 0.4 mg qHS  Appetite: good. Hungry. Intake difficult functionally with hand paresthesias. Assist   Sleep: good with melatonin  melatonin tablet 6 mg qHS PRN Insomnia              Physical Exam  General: well-developed, well-nourished, in no acute distress  Neck: hard collar, supple  Respiratory: equal chest rise, no SOB, no audible wheeze  Cardiovascular: regular rate and rhythm, no edema  Gastrointestinal: soft, non-tender, non-distended   Musculoskeletal: BUE/BLE weakness  Integumentary: no rashes, bilateral heel wounds, bilateral anterior leg wounds, posterior cervical incision-staples-dressing-c/d/i  Neurologic: cranial nerves intact, BUE/BLE weakness, numbness (hands, legs, feet), balance deficits  *MD performed and documented physical examination                     Assessment/Plan  Hospital   Anemia   Hyponatremia   Spinal cord  compression   Status post cervical spinal fusion   Hypokalemia     Non-Hospital   HTN (hypertension)   Hyperlipidemia   Ischemic cardiomyopathy with implantable cardioverter-defibrillator (ICD)   Tobacco user   Type 2 diabetes mellitus   Peripheral arterial disease with history of revascularization   Chronic systolic CHF (congestive heart failure), NYHA class 3   Chronic low back pain   Cervical myelopathy   Carotid artery stenosis, asymptomatic, left   Arteriosclerosis of coronary artery   S/P triple vessel bypass   COPD (chronic obstructive pulmonary disease)   Gastro-esophageal reflux disease without esophagitis   Anxiety disorder, unspecified   Cervical radiculopathy   Lumbar radiculopathy   Myelomalacia of cervical cord       Wounds: bilateral heel wounds, bilateral anterior leg wounds, posterior cervical incision-staples-dressing-c/d/i  On 1/24, patient underwent C2-6 PCDF and C2-3- C4-5 decompression by Dr. Ramírez.  (Hemovac drain placed to site)  Precautions: spinal  Bracing: Hard cervical collar, bilateral heel boots in bed  Swallowing: Regular Diet  Function: Tolerating therapy. Continue PT/OT  VTE Prophylaxis:   enoxaparin injection 40 mg q24hr  Code Status: FULL CODE   Discharge: Lives alone in [place] in a 2nd story apartment. Completed high school. He has no  history.  Is disabled. . He was living alone and completely independent. Children: (2). Date pending.               Shannan Puri NP, conducted additional independent physical examination and assisted with medical documentation.

## 2025-02-06 NOTE — PT/OT/SLP RE-EVAL
Occupational Therapy Inpatient Rehab Re-Evaluation    Name: Tyler Mai  MRN: [id number]    Recommendations:     Discharge Recommendations:   (pending progress)   Discharge Equipment Recommendations: bath bench, grab bar   Barriers to discharge: Inaccessible home and Decreased caregiver support, severity of deficits, poor pain tolerance    Assessment:  Tyler Mai is a 69 y.o. male admitted with a medical diagnosis of Status post cervical spinal fusion.  He presents with the following impairments/functional limitations:  weakness, impaired endurance, impaired sensation, impaired self care skills, impaired functional mobility, gait instability, impaired balance, decreased coordination, decreased upper extremity function, decreased safety awareness, impaired coordination, impaired fine motor.    General Precautions: Standard, fall     Orthopedic Precautions:spinal precautions     Braces: Aspen collar    Rehab Prognosis: Fair; patient would benefit from acute skilled OT services to address these deficits and reach maximum level of function.      History:     Past Medical History:   Diagnosis Date    Anxiety disorder, unspecified     Arthritis     CAD (coronary artery disease)     Cervical radiculopathy     CHF (congestive heart failure)     Chronic pain syndrome     COPD (chronic obstructive pulmonary disease)     Diabetes mellitus     Gastro-esophageal reflux disease without esophagitis     High cholesterol     Hypertension     Lumbar radiculopathy     Myelomalacia of cervical cord     Other specified diseases of spinal cord     Pacemaker     PVD (peripheral vascular disease)     S/P triple vessel bypass     Tobacco use        Past Surgical History:   Procedure Laterality Date    ANTERIOR CERVICAL DISCECTOMY W/ FUSION  1995    C5-6 and C6-7.  Dr. Wall    ANTERIOR CERVICAL DISCECTOMY W/ FUSION  1996    Redo C5-6, C6-7.  Dr. Wall    CHOLECYSTECTOMY      CORONARY ARTERY BYPASS GRAFT      FUSION OF POSTERIOR  "COLUMN OF CERVICAL SPINE USING COMPUTER AIDED NAVIGATION N/A 1/24/2025    Procedure: FUSION, SPINE, POSTERIOR SPINAL COLUMN, CERVICAL, USING COMPUTER-ASSISTED NAVIGATION;  Surgeon: Darrell Watson MD;  Location: [facility];  Service: Neurosurgery;  Laterality: N/A;  C2-C6 PCF; C2/3-C4/5 decompression, possible additional levels  o-arm  NTI  TIVA setup  [employer] //  XX    INSERTION OF PACEMAKER      X2    TRIPPLE VESSEL BYPASS         Subjective     Orientation: Oriented x4    Chief Complaint: scrotum irritation    Patient/Family Comments/goals: "I have been bleeding but nobody seems to care." Reported to wound care nurseGema. Please see wound care note.    Respiratory Status: Room air    Patients cultural, spiritual, Scientologist conflicts given the current situation: no     Living Environment   Living Environment  People in Home: alone  Living Arrangements: apartment  Home Accessibility: stairs to enter home  Number of Stairs, Main Entrance: other (see comments) (15)  Stair Railings, Main Entrance: railings safe and in good condition  Equipment Currently Used at Home: rollator, walker, rolling, shower chair  Shower Setup: Tub/Shower combo with shower chair    Prior Level of Function  BADL: Independent    IADL: Independent    Equipment used at home: shower chair.       Upon discharge, patient will have assistance from unknown.    Objective:     Patient found up in chair with cervical collar  upon OT entry to room.    Mobility   Patient completed:  Sit to Stand Transfer with contact guard assistance with rolling walker  Stand to Sit Transfer with contact guard assistance with rolling walker  Toilet Transfer Step Transfer technique with contact guard assistance with  rolling walker  Tub Transfer Stand Pivot technique with contact guard assistance with rolling walker    Functional Mobility   In room mobility with RW with CGA for ADLs.    ADLs     Current Status   Eating 5   Oral Hygiene 5   Shower, Bathe Self 2 "   Upper Body Dressing 3   Lower Body Dressing 2   Toileting Hygiene 3   Toilet Transfer 4   Putting On, Taking Off Footwear 1     Limiting Factors for ADLs: motor, sensory, endurance, limited ROM, balance, weakness, perceptual, coordination, safety awareness, and pain    Exams     ROM:          -       WFL in surgery precaution range    Hand Dominance: Right    ROM Hand  Left Hand: WFL with gross grasp  Right Hand: WFL with gross grasp    Strength  Overall Strength:          -       WFL     Strength:   Fair bilaterally    Pinch Strength:   Fair bilaterally    Sensation  Impaired in bilateral hands    Coordination:   Impaired with fine motor    Cognition:   WFL    Balance    Sitting  Sitting Surface: TTB  Static: No UE Support, Good  Dynamic: No UE extremity support, Good (-)    Standing  Static: No UE Support, Good (-)  Dynamic: Bilateral UE extremity support, Fair (+)    Patient left supine with all lines intact, call button in reach, and RN wound care, Gema present.    Education provided: Roles and goals of OT, ADLs, transfer training, bed mobility, assistive device, modified goals, sequencing, safety precautions, fall prevention, and post-op precautions    Multidisciplinary Problems       Occupational Therapy Goals          Problem: Occupational Therapy    Goal Priority Disciplines Outcome Interventions   Occupational Therapy Goal     OT, PT/OT Progressing    Description: ADLs:  Pt to perform grooming tasks with independence standing at sink with RW by d/c.   Pt to perform feeding tasks with independence using adaptive utensils as needed by d/c.    Pt to perform UB dressing with independence by d/c.    Pt to perform LB dressing with touch assist by d/c.    Pt to perform putting on/off footwear task with independence and AE as needed by d/c.   Pt to perform toileting with touch assist by d/c.    Pt to perform bathing with independence by d/c.     Functional Transfers:  Pt to perform toilet transfers with  independence by d/c.    Pt to perform a tub transfer with independence by d/c.     IADLs:  Pt to perform simple meal prep standing at kitchen counter with touch assist by d/c.      Balance, Strengthening, Endurance, Balance:  Pt to consistently demonstrate adherence to spinal precautions during all ADL's as instructed by OT.  Pt to demonstrate good dynamic standing balance as required to perform ADL's from standing level.                         Plan     During this hospitalization, patient to be seen 5 x/week (5-7 days per week) to address the identified rehab impairments via self-care/home management, therapeutic activities, therapeutic exercises, neuromuscular re-education, sensory integration and progress toward the following goals:    Plan of Care Expires:  02/12/25    Time Tracking     OT Received On: 02/06/25  Time In 0930     Time Out 1030  Total Time 60 min  Therapy Time: OT Individual: 60  Missed Time:    Missed Time Reason:      Billable Minutes: Re-eval 15 and Self Care/Home Management 45    02/06/2025

## 2025-02-06 NOTE — PT/OT/SLP PROGRESS
Physical Therapy Inpatient Rehab Treatment    Patient Name:  Tyler Mai   MRN:  [id number]    Recommendations:     Discharge Recommendations:   (pending progress)   Discharge Equipment Recommendations:     Barriers to discharge: Increased level of assist, Inaccessible home, Decreased caregiver support, Impaired functional mobility , and Severity of Deficits     Assessment:     Tyler Mai is a 69 y.o. male admitted with a medical diagnosis of Status post cervical spinal fusion.  He presents with the following impairments/functional limitations:  weakness, impaired endurance, impaired functional mobility, gait instability, impaired balance, decreased upper extremity function, decreased lower extremity function, pain, decreased ROM, impaired coordination, impaired fine motor, orthopedic precautions     PT NOTE: pt appeared more willing to participate, however, continues to dwell on pain and weakness. PT educated pt on purpose of PT services and encourage pt to continue to get stronger and provide as much effort as possible. Pt states he would use rollator in home and have RW down stairs. At this mo, pt states he will not have someone to assist him with going up steps. Pt states that his son will be able to bring RW up stairs the day of d/c, but not be able to assist with bringing it up and down following. At this time, pt is unsafe to perform steps with RW and requires mod to min A for stability with ambulating steps. Pt demos limited insight to deficits, but is self limiting with pain and frustration when pain meds are late. Pt frustrated and needed redirection to task when pain meds were 15 mins late this am.     Rehab Diagnosis: Cervical spondylosis and stenosis with myelopathy and cord compression s/p C2-6 PCDF and C2-3, C4-5 decompression 1/24/2025. Pt. Has a history of cervical myelopathy, CAD, CHF, COPD, DM, HLD, HTN, and PVD    General Precautions: Standard, fall     Orthopedic Precautions:spinal  "precautions     Braces: Aspen collar    Rehab Prognosis: Fair; patient would benefit from acute skilled PT services to address these deficits and reach maximum level of function.      History:     Past Medical History:   Diagnosis Date    Anxiety disorder, unspecified     Arthritis     CAD (coronary artery disease)     Cervical radiculopathy     CHF (congestive heart failure)     Chronic pain syndrome     COPD (chronic obstructive pulmonary disease)     Diabetes mellitus     Gastro-esophageal reflux disease without esophagitis     High cholesterol     Hypertension     Lumbar radiculopathy     Myelomalacia of cervical cord     Other specified diseases of spinal cord     Pacemaker     PVD (peripheral vascular disease)     S/P triple vessel bypass     Tobacco use        Past Surgical History:   Procedure Laterality Date    ANTERIOR CERVICAL DISCECTOMY W/ FUSION  1995    C5-6 and C6-7.  Dr. Wall    ANTERIOR CERVICAL DISCECTOMY W/ FUSION  1996    Redo C5-6, C6-7.  Dr. Wall    CHOLECYSTECTOMY      CORONARY ARTERY BYPASS GRAFT      FUSION OF POSTERIOR COLUMN OF CERVICAL SPINE USING COMPUTER AIDED NAVIGATION N/A 1/24/2025    Procedure: FUSION, SPINE, POSTERIOR SPINAL COLUMN, CERVICAL, USING COMPUTER-ASSISTED NAVIGATION;  Surgeon: Darrell Watson MD;  Location: [facility];  Service: Neurosurgery;  Laterality: N/A;  C2-C6 PCF; C2/3-C4/5 decompression, possible additional levels  o-arm  NTI  TIVA setup  [employer] //  XX    INSERTION OF PACEMAKER      X2    TRIPPLE VESSEL BYPASS         Subjective     Patient comments: "I get my pain meds at 8"    Respiratory Status: Room air    Patients cultural, spiritual, Jainism conflicts given the current situation: no    Objective:     Communicated with RN prior to session.  Patient found up in chair with cervical collar  upon PT entry to room.    Pt is Oriented x3 and Alert.    Vitals   Vitals at Rest  BP    HR    O2 Sat    Pain      Vitals With Activity  BP (!) 147/71   HR 76 "   O2 Sat     Pain Pain Rating 1: 8/10  Location - Side 1: Left  Location - Orientation 1: upper  Location 1: shoulder  Pain Addressed 1: Reposition, Distraction, Nurse notified       Functional Mobility:        Current   Status  Discharge   Goal   Functional Area: Care Score:    Roll Left and Right   Independent   Sit to Lying   Independent   Lying to Sitting on Side of Bed   Independent   Sit to Stand 4 Independent   Chair/Bed-to-Chair Transfer 4 Independent   Car Transfer   Independent   Walk 10 Feet 4 Independent   Walk 50 Feet with Two Turns 4  65' (limited distance 2/2 pt reporting L ankle weakness) then 100' with RW overall CGA for stability. VC for increased hip flexion on RLE for foot clearance. Noted NBOS and slowed pace. VC to correct.      Walk 150 Feet   Independent   Walk 10 Feet Uneven Surface   Independent   1 Step (Curb) 4 Independent   4 Steps 3  4 steps with BUE noted posterior lean at times. Min to Mod A to correct. Pt ascends with LLE and descends with LLE. PT educated pt on proper tech, however, pt states this was working for him at home.  Independent   12 Steps   Set-up/clean-up   Picking Up Object   Independent   Wheel 50 Feet with Two Turns       Wheel 150 Feet           Therapeutic Activities and Exercises:  Patient educated on role of acute care PT and PT POC, safety while in hospital including calling nurse for mobility, and call light usage  Patient educated about importance of OOB mobility and remaining up in chair most of the day.        Activity Tolerance: Good    Patient left up in chair with all lines intact and call button in reach.    Education provided: roles and goals of PT/PTA, transfer training, gait training, stair training, balance training, safety awareness, fall prevention, and spinal precautions    Expected compliance: Moderate compliance    Plan:     During this hospitalization, patient to be seen 5 x/week to address the identified rehab impairments via gait training,  therapeutic activities, therapeutic exercises and progress toward the following goals:    GOALS:   Multidisciplinary Problems       Physical Therapy Goals          Problem: Physical Therapy    Goal Priority Disciplines Outcome Interventions   Physical Therapy Goal     PT, PT/OT Progressing    Description: Bed Mobility:  Sit to supine transfer independently.   Supine to sit transfer independently.     Transfers:  Sit to stand transfer independently using RW.   Car transfer independently using RW.    an object from the ground in standing position independently using RW.     Mobility:  Ambulate 500 feet independently using RW.  Ambulate 15 feet on uneven surfaces/ramps with setup/clean-up assist using RW.   Pt ascended/descended a 6 inch curb independently using RW.   Ascend/descend 15 stairs independently using bilateral handrails.                         Plan of Care Expires:  02/07/25  PT Next Visit Date: 02/07/25  Plan of Care reviewed with: patient    Additional Information:         Time Tracking:     Therapy Time  PT Received On: 02/06/25  PT Start Time: 0730  PT Stop Time: 0830  PT Total Time (min): 60 min   PT Individual: 60  Missed Time:    Time Missed due to:      Billable Minutes: Gait Training 30 and Therapeutic Activity 30    02/06/2025

## 2025-02-06 NOTE — PROGRESS NOTES
[facility] - Rehab Inpatient Services  Wound Care    Patient Name:  Tyler Mai   MRN:  [id number]  Date: 2/6/2025  Diagnosis: Status post cervical spinal fusion    History:     Past Medical History:   Diagnosis Date    Anxiety disorder, unspecified     Arthritis     CAD (coronary artery disease)     Cervical radiculopathy     CHF (congestive heart failure)     Chronic pain syndrome     COPD (chronic obstructive pulmonary disease)     Diabetes mellitus     Gastro-esophageal reflux disease without esophagitis     High cholesterol     Hypertension     Lumbar radiculopathy     Myelomalacia of cervical cord     Other specified diseases of spinal cord     Pacemaker     PVD (peripheral vascular disease)     S/P triple vessel bypass     Tobacco use        Social History     Socioeconomic History    Marital status: Single   Tobacco Use    Smoking status: Every Day     Current packs/day: 1.00     Types: Cigarettes     Social Drivers of Health     Financial Resource Strain: Patient Declined (1/18/2025)    Overall Financial Resource Strain (CARDIA)     Difficulty of Paying Living Expenses: Patient declined   Food Insecurity: Patient Declined (1/18/2025)    Hunger Vital Sign     Worried About Running Out of Food in the Last Year: Patient declined     Ran Out of Food in the Last Year: Patient declined   Transportation Needs: Unmet Transportation Needs (1/31/2025)    PRAPARE - Transportation     Lack of Transportation (Medical): Yes     Lack of Transportation (Non-Medical): Yes   Stress: Patient Declined (1/18/2025)    [place] [employer] - Occupational Stress Questionnaire     Feeling of Stress : Patient declined   Housing Stability: Patient Declined (1/18/2025)    Housing Stability Vital Sign     Unable to Pay for Housing in the Last Year: Patient declined     Homeless in the Last Year: Patient declined       Precautions:     Allergies as of 01/30/2025 - Reviewed 01/24/2025   Allergen  Reaction Noted    Hydrochlorothiazide  12/03/2024       [facility] Assessment Details/Treatment      02/06/25 1141        Wound 01/14/25 2202 Pressure Injury Right Heel   Date First Assessed/Time First Assessed: 01/14/25 2202   Present on Original Admission: Yes  Primary Wound Type: (c) Pressure Injury  Side: Right  Location: Heel   Pressure Injury Stage 2   Dressing Appearance Intact   Drainage Amount None   Appearance Blistered   Red (%), Wound Tissue Color 100 %   Wound Length (cm) 5 cm   Wound Width (cm) 5 cm   Wound Surface Area (cm^2) 25 cm^2   Care Applied:   Dressing Foam   Dressing Change Due 02/09/25        Wound 01/25/25 Other (comment) Left Heel   Date First Assessed: 01/25/25   Primary Wound Type: Other (comment)  Side: Left  Location: Heel   Dressing Appearance Intact   Appearance Black;Dry  (dry scab)   Wound Length (cm) 0.5 cm   Wound Width (cm) 0.5 cm   Wound Surface Area (cm^2) 0.25 cm^2   Care Applied:   Dressing Foam   Dressing Change Due 02/09/25   Safety   Safety Precautions emergency equipment at bedside   Safety Management   Safety Promotion/Fall Prevention assistive device/personal item within reach   Patient Rounds bed in low position   Safety Bands on Patient Fall Risk Band   Daily Care   Activity Management Up in chair - L3     Right heel: No improvement;photo of large stage 2 blister shown to patient. Reiterated need for heel boots.  Brought heel boots to room and attached to bed for compliance.  Has been refusing boots due to immobility problems at night.02/06/2025

## 2025-02-06 NOTE — PROGRESS NOTES
[facility] ([facility])  Rehab Progress Note    Patient Name: Tyler Mai  MRN: [id number]  Age: 69 y.o. Sex: male  : 1955  Hospital Length of Stay: 6 days  Date of Service: 2025   Chief Complaint: Cervical myelopathy s/p C2-6 PCDF and C2/3-C4/5 decompression on 2025     Subjective:     Basic Information  Admit Information: 69-year-old white male presented to [facility] ED on 2025 complaining of bilateral arm weakness, bilateral leg weakness, and worsening neck pain.  Reports right leg weaker than the left.  Recently admitted to [facility] for cervical myelopathy with plans for surgery, but patient had severe UTI requiring antibiotic therapy.  MRI on  significant for severe C3-4 level central canal stenosis with increased signal hyperintensity within the cervical cord from mid to lower aspect of C3 through mid aspect of C4 with central cord myelomalacia.  PMH significant for cervical myelopathy with history of C5-C6 fusion, CAD, HFrEF, ICMO, COPD, DM type 2, HLD, HTN, and PVD.  Workup significant for cervical myelopathy.  Patient inpatient family initially declined the neurosurgeon on call surgery.  Then surgery postpone due to inclement weather.  Tolerated posterior C3-C5 total laminectomies for decompression, placement of bilateral pars screws at C2 and bilateral lateral mass screws from C3 to C6 using [facility] Invictus OCT System, and placement of local bone and AlphaGraft DBM for arthrodesis from C2 to C6 on  without perioperative complications.  Postoperatively neurosurgery documented increase mobility to bilateral upper extremities with a 5/5 hand grasp.  Recommended aspirin hard collar at all times, and [place] collar for showers..  Recommended removal staples on .  Gray continued due to urinary retention.  Tolerated transfer to [facility] inpatient rehab unit on  without incident.   Today's Information: No acute events overnight.  Lying in bed.   Reports good sleep.  Appetite is good.  Last BM 1/28.  Refused to take suppository on several occasions leading yesterday.  BP moderately controlled, usually takes when lying down.  Overall within limits.  Reported orthostatic BP over the last 2 days.  Good glycemic control.  No new labs or imaging today.    Review of patient's allergies indicates:   Allergen Reactions    Hydrochlorothiazide      Other Reaction(s): Pancreatitis        Current Facility-Administered Medications:     acetaminophen tablet 650 mg, 650 mg, Oral, Q6H PRN, Ashley, Tony A, FNP, 650 mg at 02/03/25 0540    albuterol nebulizer solution 2.5 mg, 2.5 mg, Nebulization, Q4H PRN, [place], Shannan A, FNP, 2.5 mg at 02/04/25 1158    ALPRAZolam tablet 0.25 mg, 0.25 mg, Oral, TID PRN, Ashley, Tony A, FNP    amLODIPine tablet 5 mg, 5 mg, Oral, BID, Ashley, Tony A, FNP, 5 mg at 02/05/25 2127    aspirin chewable tablet 81 mg, 81 mg, Oral, Daily, Ashley, Tony A, FNP, 81 mg at 02/05/25 0820    atorvastatin tablet 40 mg, 40 mg, Oral, Daily, Ashley, Tony A, FNP, 40 mg at 02/05/25 0819    benzonatate capsule 100 mg, 100 mg, Oral, TID PRN, Ashley, Tony A, FNP    bisacodyL suppository 10 mg, 10 mg, Rectal, Once, Ashley, Tony A, FNP    carvediloL tablet 6.25 mg, 6.25 mg, Oral, BID, Ashley, Tony A, FNP, 6.25 mg at 02/05/25 2127    clopidogreL tablet 75 mg, 75 mg, Oral, Daily, Ashley, Tony A, FNP, 75 mg at 02/05/25 0820    cyclobenzaprine tablet 5 mg, 5 mg, Oral, TID PRN, [place], Shannan A, FNP    dextrose 50% injection 12.5 g, 12.5 g, Intravenous, PRN, Ashley, Tony A, FNP    dextrose 50% injection 25 g, 25 g, Intravenous, PRN, Ashley, Tony A, FNP    docusate sodium capsule 100 mg, 100 mg, Oral, BID, Tony Ramirez FNP, 100 mg at 02/05/25 2127    DULoxetine DR capsule 30 mg, 30 mg, Oral, Daily, Shannan Puri FNP, 30 mg at 02/05/25 0820    enoxaparin injection 40 mg, 40 mg, Subcutaneous, Q24H  (prophylaxis, 1700), Ashley, Tony A, FNP, 40 mg at 02/05/25 1650    finasteride tablet 5 mg, 5 mg, Oral, Daily, Ashley, Tony A, FNP, 5 mg at 02/05/25 0822    umeclidinium-vilanteroL 62.5-25 mcg/actuation DsDv 1 puff, 1 puff, Inhalation, Daily, 1 puff at 02/05/25 0823 **AND** fluticasone furoate 200 mcg/actuation inhaler 1 puff, 1 puff, Inhalation, Daily, Ashley, Tony A, FNP, 1 puff at 02/05/25 0821    glucagon (human recombinant) injection 1 mg, 1 mg, Intramuscular, PRN, Ashley, Tony A, FNP    glucose chewable tablet 16 g, 16 g, Oral, PRN, Ashley, Tony A, FNP    glucose chewable tablet 24 g, 24 g, Oral, PRN, Ashley, Tony A, FNP    hydrALAZINE injection 10 mg, 10 mg, Intravenous, Q4H PRN, Ashley, Tony A, FNP, 10 mg at 02/04/25 0629    hydrOXYzine pamoate capsule 50 mg, 50 mg, Oral, Nightly PRN, Ashley, Tony A, FNP, 50 mg at 02/03/25 2138    insulin aspart U-100 injection 0-10 Units, 0-10 Units, Subcutaneous, QID (AC + HS) PRN, Ashley, Tony A, FNP, 2 Units at 02/05/25 1712    labetalol 20 mg/4 mL (5 mg/mL) IV syring, 10 mg, Intravenous, Q4H PRN, Ashley, Tony A, FNP    LIDOcaine 5 % patch 1 patch, 1 patch, Transdermal, Q24H, Shannan Puri A, FNP, 1 patch at 02/05/25 1650    linaCLOtide capsule 145 mcg, 145 mcg, Oral, QAM, Ashley, Tony A, FNP, 145 mcg at 02/05/25 0820    losartan tablet 25 mg, 25 mg, Oral, Daily, Ashley, Tony A, FNP, 25 mg at 02/05/25 0819    magnesium oxide tablet 400 mg, 400 mg, Oral, BID, Tony Ramirez A, FNP, 400 mg at 02/05/25 2127    melatonin tablet 6 mg, 6 mg, Oral, Nightly PRN, Shannan Puri A, FNP, 6 mg at 02/02/25 2056    methocarbamoL tablet 750 mg, 750 mg, Oral, QID (AC & HS), Shannan Puri, FNP, 750 mg at 02/05/25 2127    metoprolol injection 10 mg, 10 mg, Intravenous, Q2H PRN, Ashley Tony A, FNP    mupirocin 2 % ointment, , Nasal, BID, Darrell Watson MD, Given at 02/05/25 2128     "nitroGLYCERIN SL tablet 0.4 mg, 0.4 mg, Sublingual, Q5 Min PRN, Ashley, Tony A, FNP    ondansetron disintegrating tablet 4 mg, 4 mg, Oral, Q6H PRN, Ashley, Tony A, FNP    ondansetron disintegrating tablet 8 mg, 8 mg, Oral, Q8H PRN, Ashley, Tony A, FNP    ondansetron injection 4 mg, 4 mg, Intravenous, Q8H PRN, Ashley, Tony A, FNP    oxyCODONE-acetaminophen  mg per tablet 1 tablet, 1 tablet, Oral, Q4H PRN, Trenary, Shannan A, FNP, 1 tablet at 02/06/25 0401    oxyCODONE-acetaminophen 5-325 mg per tablet 1 tablet, 1 tablet, Oral, Q4H PRN, Trenary, Shannan A, FNP, 1 tablet at 02/05/25 0606    pantoprazole EC tablet 40 mg, 40 mg, Oral, Daily, Ashley, Tony A, FNP, 40 mg at 02/05/25 0823    polyethylene glycol packet 17 g, 17 g, Oral, BID PRN, Ashley, Tony A, FNP    pregabalin capsule 100 mg, 100 mg, Oral, TID, Jaxson, Shannan A, FNP, 100 mg at 02/05/25 2127    ranolazine 12 hr tablet 500 mg, 500 mg, Oral, BID, Ashley, Tony A, FNP, 500 mg at 02/05/25 2127     Review of Systems   Complete 12-point review of symptoms negative except for what's mentioned in HPI     Objective:     BP (!) 169/65   Pulse 75   Temp 98 °F (36.7 °C) (Oral)   Resp 18   Ht 5' 6" (1.676 m)   Wt 62.2 kg (137 lb 2 oz)   SpO2 96%   BMI 22.13 kg/m²      Physical Exam  Constitutional:       Appearance: Normal appearance.   HENT:      Head: Normocephalic.      Mouth/Throat:      Mouth: Mucous membranes are moist.   Eyes:      Pupils: Pupils are equal, round, and reactive to light.   Neck:      Comments: Posterior neck incision clean and intact.  Hard collar intact  Cardiovascular:      Rate and Rhythm: Normal rate and regular rhythm.      Heart sounds: Normal heart sounds.   Pulmonary:      Effort: Pulmonary effort is normal.      Breath sounds: Normal breath sounds.   Abdominal:      General: Bowel sounds are normal.      Palpations: Abdomen is soft.   Musculoskeletal:      Cervical back: Neck supple.     "  Comments: Diffuse muscle atrophy   Skin:     General: Skin is warm and dry.   Neurological:      General: No focal deficit present.      Mental Status: He is alert and oriented to person, place, and time.      Motor: Weakness present.   Psychiatric:         Mood and Affect: Mood normal.         Behavior: Behavior normal.         Thought Content: Thought content normal.         Judgment: Judgment normal.     *MD performed and documented physical examination       Lines/Drains/Airways       Peripheral Intravenous Line  Duration                  Peripheral IV - Single Lumen 01/14/25 1853 20 G Anterior;Right Forearm 22 days                  Labs  Recent Results (from the past 24 hours)   POCT glucose    Collection Time: 02/05/25  6:16 AM   Result Value Ref Range    POCT Glucose 98 70 - 110 mg/dL   POCT Glucose, Hand-Held Device    Collection Time: 02/05/25  7:13 AM   Result Value Ref Range    POC Glucose 98 70 - 110 MG/DL   POCT glucose    Collection Time: 02/05/25 11:50 AM   Result Value Ref Range    POCT Glucose 129 (H) 70 - 110 mg/dL   POCT glucose    Collection Time: 02/05/25  4:54 PM   Result Value Ref Range    POCT Glucose 157 (H) 70 - 110 mg/dL   POCT glucose    Collection Time: 02/05/25  9:32 PM   Result Value Ref Range    POCT Glucose 165 (H) 70 - 110 mg/dL     Radiology  Cervical XR two-view on 01/25/2025, IMPRESSION: There are postoperative changes with posterior spinal fusion hardware at C2 through C6. The hardware appears intact. Cervical alignment has improved. The remaining vertebral body heights are preserved. There is moderate disc height loss at C3-C4, mild at C4-C5, with multilevel marginal osteophytes. The soft tissues are unremarkable.     Assessment/Plan:     69 y.o. WM admitted on 1/31/2025    Cervical myelopathy   - s/p C2-6 PCDF and C2/3-C4/5 decompression on 01/24/2025  - remove staples on 02/07  - continue aspirin hard collar at all times, okay for Eva collar for showers only  -  Zanaflex discontinued on 02/05 due to orthostatic hypotension  - continue                Robaxin 750 mg t.i.d. (decreased 2/5)                Flexeril 10 mg t.i.d. p.r.n.                Percocet 10 mg q.4 hours p.r.n. for severe pain                Lyrica 100 mg t.i.d. (increased 2/3)  - defer to physiatry for rehab and pain management  - PT/OT/RT/ST following  - to follow up with Neurosurgery outpatient     Coronary artery disease  - s/p  CABG x 3  - denies recent chest pain or discomfort  - continue                ASA 81 mg daily (resumed 1/31)                Plavix 75 mg daily (resume 1/31)                Coreg 6.25 mg b.i.d.                 Lipitor 40 mg daily                Losartan 25 mg daily   - ECG and Nitro PRN  - continue cardiac diet  - to follow-up with cardiology outpatient     HFrEF/ICMO  - stable  - continue                Coreg 6.25 mg b.i.d.                 Losartan 25 mg daily                 Ranexa 500 mg b.i.d.   - obtain daily standing weights  - to follow up with cardiology outpatient     Nicotine dependence  - smoking cessation counseling given on admission  - 1 PPD with approximately 55 pack years     HTN  - orthostatics reported on 02/05  - discontinued Flomax 0.4 mg at bedtime on 02/04 due to orthostatic hypotension  - discontinued Zanaflex on 02/05 due to orthostatic hypotension  - received NS 1 L bolus on 02/05 due to orthostatic hypotension  - continue                Coreg 6.25 mg b.i.d.                 Losartan 25 mg daily                 Norvasc 5 mg daily                 Ranexa 500 mg b.i.d.                 Hydralazine 10 mg every 2 hours as needed for BP > 160/90                Labetalol 10 mg every 2 hours as needed for BP > 160/90  - low sodium diet             COPD   - stable  - not on home 02  - continue                Trelegy INH daily                   DM type II  - HgA1c 5.6 on 01/23/2024  - continue                Lantus 10 units every evening (resumed 1/31)                 ISS   - CBGs AC/HS     HLD  - FLP outpatient  - resume Crestor upon discharge  - continue                Lipitor 40 mg daily      PVD  - stable  - continue                ASA 81 mg daily (resumed 1/31)                Plavix 75 mg daily (resume 1/31)                Coreg 6.25 mg b.i.d.                 Lipitor 40 mg daily  - to follow up vascular surgery outpatient                  Chronic constipation  - last BM 1/28, reports is normal for him to have a bowel movement every 8 days  - continue                Linzess 145 mcg before breakfast                 Colace 100 mg b.i.d.     Hypomagnesemia  - stable  - continue                Magnesium oxide 400 mg b.i.d.      BPH/urinary retention  - stable  - discontinued Flomax 0.4 mg at bedtime on 02/04 due to orthostatic hypotension  - continue                Proscar 5 mg daily (initiated 1/31)  Indwelling catheter discontinued on 02/01-good urine output with no retention reported     GERD  - Avoid spicy foods, and nothing to eat or drink within x2 hours of bedtime or laying flat (water is ok)   - Avoid NSAIDs (Advil, ibuprofen, naproxen...) and chavira-2 inhibitors (Mobic, Celebrex)    - continue                Protonix 40 mg daily      Normocytic anemia  - asymptomatic  - H/H stable   - no evidence of active bleeds  - will closely monitor and transfuse if needed     Hypomagnesemia  - magnesium 1.8 on 02/03  - s/p Mag sulfate 2 g x 1 IVPB on 02/01  - continue   Mag-Ox 400 mg BID (initiated 2/1)    Mild JOHN  - renal indices stable  - encourage oral hydration  - bladder scan to rule out retention     VTE Prophylaxis: Lovenox 40 mg daily   COVID-19 testing:  Unknown  COVID-19 vaccination status:  Vaccinated (Moderna):  Bivalent booster on 04/03/2023     POA:  No  Living will:  No  Contacts:  Tyler Mai Jr (son) [phone]     CODE STATUS:  Full code  Internal Medicine (attending): Tyler De Anda MD  Physiatry (consulting):  Philip Fang MD     OUTPATIENT PROVIDERS  PCP:  None  Neurosurgery: Darrell Zavala MD     DISPOSITION:  Sleep hygiene and appetite at goal.  No BM since 01/28.  Patient has refused suppositories.  BP moderately controlled.  No new labs or imaging today.  Fair participation in therapy.  He needs max encouragement.  Good glycemic control.  Continue aggressive mobilization as tolerated.  Initiate suppository if patient willing to take.  He has not had a bowel movement in 10 days.  Continue aggressive mobilization as tolerated.  Monitor closely.  Notify of acute changes.    Staffing 2/5/2025: Continent of bowel and bladder.  Assisted to the floor overnight after going to the bathroom. RT: Overall min to CGA when he participates.  Needs max encouragement. Limited by standing tolerance and generalized weakness. Appetite is good at 80%. PT: Limited by pain and poor participation.  CGA to min assist with bed mobility, ambulating 30-70 feet at CGA with walker. Limited strength and participation.  OT: Progressing, but dependent for foot wear.  Dependent with LBD.  CGA with toilet transfers. Min assist with toilet hygiene.  Set up with oral care and eating. Limited by decreased strength.  Needs max encouragement. Poor family support.  Estranged from family.  Projected discharge pending.     Jarrell Ramirez NP conducted independent physical examination and assisted with medical documentation.

## 2025-02-06 NOTE — PT/OT/SLP PROGRESS
Physical Therapy Inpatient Rehab Treatment    Patient Name:  Tyler Mai   MRN:  [id number]    Recommendations:     Discharge Recommendations:   (pending progress)   Discharge Equipment Recommendations:     Barriers to discharge: Ongoing medical treatment and Impaired functional mobility     Assessment:     Tyler Mai is a 69 y.o. male admitted with a medical diagnosis of Status post cervical spinal fusion.  He presents with the following impairments/functional limitations:  weakness, impaired endurance, impaired functional mobility, gait instability, impaired balance, decreased upper extremity function, decreased lower extremity function, pain, decreased ROM, impaired coordination, impaired fine motor, orthopedic precautions.    Rehab Diagnosis: Cervical spondylosis and stenosis with myelopathy and cord compression s/p C2-6 PCDF and C2-3, C4-5 decompression 1/24/2025. Pt. Has a history of cervical myelopathy, CAD, CHF, COPD, DM, HLD, HTN, and PVD    General Precautions: Standard, fall     Orthopedic Precautions:spinal precautions     Braces: Asp[place] collar    Rehab Prognosis: Good; patient would benefit from acute skilled PT services to address these deficits and reach maximum level of function.      History:     Past Medical History:   Diagnosis Date    Anxiety disorder, unspecified     Arthritis     CAD (coronary artery disease)     Cervical radiculopathy     CHF (congestive heart failure)     Chronic pain syndrome     COPD (chronic obstructive pulmonary disease)     Diabetes mellitus     Gastro-esophageal reflux disease without esophagitis     High cholesterol     Hypertension     Lumbar radiculopathy     Myelomalacia of cervical cord     Other specified diseases of spinal cord     Pacemaker     PVD (peripheral vascular disease)     S/P triple vessel bypass     Tobacco use        Past Surgical History:   Procedure Laterality Date    ANTERIOR CERVICAL DISCECTOMY W/ FUSION  1995    C5-6 and C6-7.  Dr. Wall  "   ANTERIOR CERVICAL DISCECTOMY W/ FUSION  1996    Redo C5-6, C6-7.  Dr. Wall    CHOLECYSTECTOMY      CORONARY ARTERY BYPASS GRAFT      FUSION OF POSTERIOR COLUMN OF CERVICAL SPINE USING COMPUTER AIDED NAVIGATION N/A 1/24/2025    Procedure: FUSION, SPINE, POSTERIOR SPINAL COLUMN, CERVICAL, USING COMPUTER-ASSISTED NAVIGATION;  Surgeon: Darrell Watson MD;  Location: [facility];  Service: Neurosurgery;  Laterality: N/A;  C2-C6 PCF; C2/3-C4/5 decompression, possible additional levels  o-arm  NTI  TIVA setup  [employer] //  XX    INSERTION OF PACEMAKER      X2    TRIPPLE VESSEL BYPASS         Subjective     Patient comments: "My pain meds are due at 12"    Respiratory Status: Room air    Patients cultural, spiritual, Orthodoxy conflicts given the current situation: no    Objective:     Communicated with PT, Stephanie, prior to session.  Patient found supine with cervical collar  upon PT entry to room.    Pt is Alert, Cooperative, and Easily distractible.    Pain Pain Rating 1: 0/10       Functional Mobility:      Current   Status  Discharge   Goal   Functional Area: Care Score:    Lying to Sitting on Side of Bed 3  Min A for trunk assist Independent   Sit to Stand 4  SBA with RW Independent   Chair/Bed-to-Chair Transfer 4  SBA with RW, using stand-step transfer Independent   Car Transfer 4  SBA with RW, using stand-step transfer. VC for safety awareness Independent   Walk 10 Feet 4 Independent   Walk 50 Feet with Two Turns 4  The pt ambulated 112' + 102' with RW and CGA; seated rest break between bouts. Pt demo'd narrow CICI and slowed pace. VC for widening CICI and RW proximity.      Picking Up Object 4  X2 trials. Overall CGA with RW. First trial performed with no reacher with pt having difficulty picking up item 2/2 decreased fine motor control due to B hand numbness. Second trial performed with reacher and no issues noted.  Independent       Therapeutic Activities and Exercises:  Therapeutic exercises were performed " seated at edge of chair (2x10, BLE): hip abduction with red theraband    Patient educated on role of acute care PT and PT POC, safety while in hospital including calling nurse for mobility, and call light usage  Patient educated about importance of OOB mobility and remaining up in chair most of the day.       Activity Tolerance: Good    Patient left up in chair with all lines intact and call button in reach.    Education provided: roles and goals of PT/PTA, transfer training, bed mob, gait training, safety awareness, body mechanics, strengthening exercises, fall prevention, and spinal precautions    Expected compliance: Moderate compliance    Plan:     During this hospitalization, patient to be seen 5 x/week to address the identified rehab impairments via gait training, therapeutic activities, therapeutic exercises and progress toward the following goals:    GOALS:   Multidisciplinary Problems       Physical Therapy Goals          Problem: Physical Therapy    Goal Priority Disciplines Outcome Interventions   Physical Therapy Goal     PT, PT/OT Progressing    Description: Bed Mobility:  Sit to supine transfer independently.   Supine to sit transfer independently.     Transfers:  Sit to stand transfer independently using RW.   Car transfer independently using RW.    an object from the ground in standing position independently using RW.     Mobility:  Ambulate 500 feet independently using RW.  Ambulate 15 feet on uneven surfaces/ramps with setup/clean-up assist using RW.   Pt ascended/descended a 6 inch curb independently using RW.   Ascend/descend 15 stairs independently using bilateral handrails.                         Plan of Care Expires:  02/07/25  PT Next Visit Date: 02/07/25  Plan of Care reviewed with: patient    Additional Information:         Time Tracking:     Therapy Time  PT Received On: 02/06/25  PT Start Time: 1030  PT Stop Time: 1130  PT Total Time (min): 60 min   PT Individual: 60  Missed Time:     Time Missed due to:      Billable Minutes: Gait Training 15, Therapeutic Activity 35, and Therapeutic Exercise 10    02/06/2025

## 2025-02-06 NOTE — NURSING
"Pt refusing enema this morning stating "I want to wait until after my therapy this afternoon." Educated pt on importance of enema d/t LBM 1/28. Pt reports "I usually go every 6-8 days so I'm not worried."  "

## 2025-02-06 NOTE — PROGRESS NOTES
Inpatient Nutrition Assessment    Admit Date: 1/31/2025   Total duration of encounter: 6 days   Patient Age: 69 y.o.    Nutrition Recommendation/Prescription     Regular Diet. Monitor glucose for need to add Diabetic restriction.  Bowel regimen per MD.  Continue Boost Glucose Control TID (provides 190 kcal and 16 g protein per serving).  Continue Jarrell BID for wound support (provides 90 kcal and 2.5 g protein per serving).  Monitor wt, labs, and po intake.    Communication of Recommendations: reviewed with patient    Nutrition Assessment     Malnutrition Assessment/Nutrition-Focused Physical Exam       Malnutrition Level: other (see comments) (Unable to assess) (01/31/25 1518)     Weight Loss (Malnutrition): other (see comments) (Unable to assess) (01/31/25 1518)              Muscle Mass (Malnutrition): mild depletion (01/31/25 1518)  [place] (Muscle Loss): mild depletion                       Fluid Accumulation (Malnutrition): other (see comments) (Not present) (01/31/25 1518)     Hand  Strength, Right (Malnutrition): Unable to assess (01/31/25 1518)  A minimum of two characteristics is recommended for diagnosis of either severe or non-severe malnutrition.    Chart Review    Reason Seen: follow-up    Malnutrition Screening Tool Results   Have you recently lost weight without trying?: Yes: 2-13 lbs  Have you been eating poorly because of a decreased appetite?: No   MST Score: 1   Diagnosis:   Status post cervical spinal fusion 1/31/2025      Anemia 1/14/2025      Spinal cord compression 1/14/2025     Relevant Medical History: CKD 4, hypertension, hyperlipidemia, diabetes, COPD, CHF, coronary artery disease status post CABG, peripheral artery disease, AA, and tobacco dependence     Scheduled Medications:  amLODIPine, 5 mg, BID  aspirin, 81 mg, Daily  atorvastatin, 40 mg, Daily  bisacodyL, 10 mg, Once  carvediloL, 6.25 mg, BID  clopidogreL, 75 mg, Daily  docusate sodium, 100 mg, BID  DULoxetine, 30 mg,  Daily  enoxparin, 40 mg, Q24H (prophylaxis, 1700)  finasteride, 5 mg, Daily  umeclidinium-vilanteroL, 1 puff, Daily   And  fluticasone furoate, 1 puff, Daily  LIDOcaine, 1 patch, Q24H  linaCLOtide, 145 mcg, QAM  [START ON 2/7/2025] losartan, 25 mg, Before breakfast  magnesium oxide, 400 mg, BID  methocarbamoL, 750 mg, QID (AC & HS)  mupirocin, , BID  pantoprazole, 40 mg, Daily  pregabalin, 100 mg, TID  ranolazine, 500 mg, BID    Continuous Infusions:   PRN Medications:  acetaminophen, 650 mg, Q6H PRN  albuterol sulfate, 2.5 mg, Q4H PRN  ALPRAZolam, 0.25 mg, TID PRN  benzonatate, 100 mg, TID PRN  cyclobenzaprine, 5 mg, TID PRN  dextrose 50%, 12.5 g, PRN  dextrose 50%, 25 g, PRN  glucagon (human recombinant), 1 mg, PRN  glucose, 16 g, PRN  glucose, 24 g, PRN  hydrALAZINE, 10 mg, Q4H PRN  hydrOXYzine pamoate, 50 mg, Nightly PRN  insulin aspart U-100, 0-10 Units, QID (AC + HS) PRN  labetalol, 10 mg, Q4H PRN  melatonin, 6 mg, Nightly PRN  metoprolol, 10 mg, Q2H PRN  nitroGLYCERIN, 0.4 mg, Q5 Min PRN  ondansetron, 4 mg, Q6H PRN  ondansetron, 8 mg, Q8H PRN  ondansetron, 4 mg, Q8H PRN  oxyCODONE-acetaminophen, 1 tablet, Q4H PRN  oxyCODONE-acetaminophen, 1 tablet, Q4H PRN  polyethylene glycol, 17 g, BID PRN    Calorie Containing IV Medications: no significant kcals from medications at this time    Recent Labs   Lab 02/01/25  0623 02/03/25  0631 02/04/25  0541   * 134* 135*   K 4.5 4.8 4.8   CALCIUM 9.1 9.5 9.7   PHOS 2.8 2.8  --    MG 1.30* 1.80  --     99 99   CO2 27 28 26   BUN 23.0 34.1* 37.3*   CREATININE 1.11 1.34* 1.36*   EGFRNORACEVR >60 58 57   GLUCOSE 103 99 99   BILITOT 0.3 0.3  --    ALKPHOS 102 112  --    ALT 11 12  --    AST 12 15  --    ALBUMIN 3.0* 3.2*  --    PREALB 16.4 20.4  --    WBC 8.06 8.45  --    HGB 9.0* 9.1*  --    HCT 27.1* 27.9*  --      Nutrition Orders:  Diet Adult Regular Standard Tray  Dietary nutrition supplements BID; Jarrell - Fruit Punch,Dietary nutrition supplements TID; Boost  "Glucose Control - Chocolate    Appetite/Oral Intake: good/% of meals  Factors Affecting Nutritional Intake:  functionality  Social Needs Impacting Access to Food: none identified  Food/Restorationist/Cultural Preferences:  likes fruit (pineapple, cantaloupe)  Food Allergies: no known food allergies  Last Bowel Movement: 01/28/25  Wound(s):     Wound 01/14/25 2202 Pressure Injury Right Heel-Tissue loss description: Partial thickness     Comments    1/31/25: Pt reports good appetite and intake. Pt reports constipation, says he will sometimes go 8 days w/o a BM. Pt usually takes dulcolax and Linzess-will have his son bring it for him. Pt open to chocolate ONS. Pt says he thinks he may have lost some weight over the last 6 months to a year 2/2 decreased po intake. Per EMR, weight seems it remained stable over last few years. Most recent stated wt is most likely incorrect. Will need to monitor and trend wts. Pt denies issues c/s.     2/6/25: Pt reports good po intake. States he is drinking Boost TID and will start drinking Jarrell. Pt reports constipation; last BM 9 days ago. Stated this was normal for him, but if he doesn't go by tmrw he will do the suppository.     Anthropometrics    Height: 5' 6" (167.6 cm), Height Method: Stated  Last Weight: 62.2 kg (137 lb 2 oz) (01/31/25 1321),   wt most likely incorrect  BMI (Calculated): 22.1   BMI Classification: normal (BMI 18.5-24.9)         Ideal Body Weight (IBW), Male: 142 lb     % Ideal Body Weight, Male (lb): 96.57 %                          Usual Weight Provided By: patient    Wt Readings from Last 5 Encounters:   01/31/25 62.2 kg (137 lb 2 oz)   01/18/25 78 kg (172 lb)   12/03/24 78.5 kg (173 lb)   08/29/22 78.9 kg (173 lb 15.1 oz)   03/16/16 82.3 kg (181 lb 7 oz)     Weight Change(s) Since Admission: stated wt per EMR. Pt reported he weighs about 170#  Wt Readings from Last 1 Encounters:   01/31/25 1321 62.2 kg (137 lb 2 oz)   Admit Weight: 62.2 kg (137 lb 2 oz) " (01/31/25 1321),      Estimated Needs    Weight Used For Calorie Calculations: 78 kg (171 lb 15.3 oz)  Energy Calorie Requirements (kcal): 6697-2595 kcal (1.1-1.2 SFxMSJ)  Energy Need Method: [place]-St Jeor  Weight Used For Protein Calculations: 78 kg (171 lb 15.3 oz)  Protein Requirements: 70-78 g (0.9-10 g/kg)  Fluid Requirements (mL): 1950 ml/d (25 mL/kg)  CHO Requirement: 218-266 g/d (45-55% of EER)     Enteral Nutrition     Patient not receiving enteral nutrition at this time.    Parenteral Nutrition     Patient not receiving parenteral nutrition support at this time.    Evaluation of Received Nutrient Intake    Calories: meeting estimated needs  Protein: meeting estimated needs    Patient Education     Not applicable.    Nutrition Diagnosis     PES: No nutrition diagnosis at this time     PES:            Nutrition Interventions     Intervention(s): general/healthful diet, commercial beverage, multivitamin/mineral supplement therapy, and collaboration with other providers  Intervention(s): Oral nutritional supplement    Goal: Maintain weight throughout hospitalization. (goal progressing)  Goal: Consume % of oral supplements by follow-up. (goal met)    Nutrition Goals & Monitoring     Dietitian will monitor: food and beverage intake, weight, electrolyte/renal panel, glucose/endocrine profile, and gastrointestinal profile  Discharge planning: resume home regimen  Nutrition Risk/Follow-Up: low (follow-up in 5-7 days)   Please consult if re-assessment needed sooner.

## 2025-02-06 NOTE — PLAN OF CARE
Problem: Rehabilitation ([facility]) Plan of Care  Goal: Plan of Care Review  Outcome: Progressing  Goal: Patient-Specific Goal (Individualized)  Outcome: Progressing  Goal: Absence of New-Onset Illness or Injury  Outcome: Progressing  Goal: Optimal Comfort and Wellbeing  Outcome: Progressing  Goal: Home and Community Transition Plan Established  Outcome: Progressing     Problem: Wound  Goal: Optimal Functional Ability  Outcome: Progressing  Goal: Absence of Infection Signs and Symptoms  Outcome: Progressing  Goal: Improved Oral Intake  Outcome: Progressing  Goal: Optimal Pain Control and Function  Outcome: Progressing     Problem: Infection  Goal: Absence of Infection Signs and Symptoms  Outcome: Progressing     Problem: Diabetes Comorbidity  Goal: Blood Glucose Level Within Targeted Range  Outcome: Progressing     Problem: Fall Injury Risk  Goal: Absence of Fall and Fall-Related Injury  Outcome: Progressing     Problem: Skin Injury Risk Increased  Goal: Skin Health and Integrity  Outcome: Progressing

## 2025-02-07 LAB
POCT GLUCOSE: 111 MG/DL (ref 70–110)
POCT GLUCOSE: 135 MG/DL (ref 70–110)
POCT GLUCOSE: 147 MG/DL (ref 70–110)

## 2025-02-07 PROCEDURE — 94640 AIRWAY INHALATION TREATMENT: CPT

## 2025-02-07 PROCEDURE — 63600175 PHARM REV CODE 636 W HCPCS: Performed by: NURSE PRACTITIONER

## 2025-02-07 PROCEDURE — 25000242 PHARM REV CODE 250 ALT 637 W/ HCPCS: Performed by: NURSE PRACTITIONER

## 2025-02-07 PROCEDURE — 97164 PT RE-EVAL EST PLAN CARE: CPT

## 2025-02-07 PROCEDURE — 94760 N-INVAS EAR/PLS OXIMETRY 1: CPT

## 2025-02-07 PROCEDURE — 94799 UNLISTED PULMONARY SVC/PX: CPT

## 2025-02-07 PROCEDURE — 11800000 HC REHAB PRIVATE ROOM

## 2025-02-07 PROCEDURE — 97110 THERAPEUTIC EXERCISES: CPT

## 2025-02-07 PROCEDURE — 97535 SELF CARE MNGMENT TRAINING: CPT

## 2025-02-07 PROCEDURE — 99900031 HC PATIENT EDUCATION (STAT)

## 2025-02-07 PROCEDURE — 97530 THERAPEUTIC ACTIVITIES: CPT

## 2025-02-07 PROCEDURE — 25000003 PHARM REV CODE 250: Performed by: NURSE PRACTITIONER

## 2025-02-07 PROCEDURE — 94761 N-INVAS EAR/PLS OXIMETRY MLT: CPT

## 2025-02-07 RX ORDER — ACETYLCYSTEINE 200 MG/ML
4 SOLUTION ORAL; RESPIRATORY (INHALATION) 3 TIMES DAILY
Status: DISPENSED | OUTPATIENT
Start: 2025-02-07 | End: 2025-02-08

## 2025-02-07 RX ORDER — GUAIFENESIN 600 MG/1
600 TABLET, EXTENDED RELEASE ORAL 2 TIMES DAILY
Status: COMPLETED | OUTPATIENT
Start: 2025-02-07 | End: 2025-02-13

## 2025-02-07 RX ADMIN — PREGABALIN 100 MG: 50 CAPSULE ORAL at 08:02

## 2025-02-07 RX ADMIN — AMLODIPINE BESYLATE 5 MG: 5 TABLET ORAL at 08:02

## 2025-02-07 RX ADMIN — AMLODIPINE BESYLATE 5 MG: 5 TABLET ORAL at 07:02

## 2025-02-07 RX ADMIN — CLOPIDOGREL BISULFATE 75 MG: 75 TABLET, FILM COATED ORAL at 07:02

## 2025-02-07 RX ADMIN — PREGABALIN 100 MG: 50 CAPSULE ORAL at 01:02

## 2025-02-07 RX ADMIN — DOCUSATE SODIUM 100 MG: 100 CAPSULE, LIQUID FILLED ORAL at 08:02

## 2025-02-07 RX ADMIN — RANOLAZINE 500 MG: 500 TABLET, EXTENDED RELEASE ORAL at 08:02

## 2025-02-07 RX ADMIN — OXYCODONE HYDROCHLORIDE AND ACETAMINOPHEN 1 TABLET: 10; 325 TABLET ORAL at 08:02

## 2025-02-07 RX ADMIN — OXYCODONE HYDROCHLORIDE AND ACETAMINOPHEN 1 TABLET: 10; 325 TABLET ORAL at 04:02

## 2025-02-07 RX ADMIN — Medication: at 08:02

## 2025-02-07 RX ADMIN — Medication 400 MG: at 08:02

## 2025-02-07 RX ADMIN — OXYCODONE HYDROCHLORIDE AND ACETAMINOPHEN 1 TABLET: 10; 325 TABLET ORAL at 11:02

## 2025-02-07 RX ADMIN — OXYCODONE HYDROCHLORIDE AND ACETAMINOPHEN 1 TABLET: 10; 325 TABLET ORAL at 12:02

## 2025-02-07 RX ADMIN — BISACODYL 10 MG: 10 SUPPOSITORY RECTAL at 05:02

## 2025-02-07 RX ADMIN — METHOCARBAMOL 750 MG: 750 TABLET ORAL at 08:02

## 2025-02-07 RX ADMIN — ACETAMINOPHEN 650 MG: 325 TABLET ORAL at 08:02

## 2025-02-07 RX ADMIN — ATORVASTATIN CALCIUM 40 MG: 40 TABLET, FILM COATED ORAL at 07:02

## 2025-02-07 RX ADMIN — METHOCARBAMOL 750 MG: 750 TABLET ORAL at 04:02

## 2025-02-07 RX ADMIN — LOSARTAN POTASSIUM 25 MG: 25 TABLET, FILM COATED ORAL at 05:02

## 2025-02-07 RX ADMIN — ASPIRIN 81 MG CHEWABLE TABLET 81 MG: 81 TABLET CHEWABLE at 07:02

## 2025-02-07 RX ADMIN — ALBUTEROL SULFATE 2.5 MG: 2.5 SOLUTION RESPIRATORY (INHALATION) at 02:02

## 2025-02-07 RX ADMIN — DULOXETINE HYDROCHLORIDE 30 MG: 30 CAPSULE, DELAYED RELEASE ORAL at 07:02

## 2025-02-07 RX ADMIN — LINACLOTIDE 145 MCG: 145 CAPSULE, GELATIN COATED ORAL at 05:02

## 2025-02-07 RX ADMIN — CARVEDILOL 6.25 MG: 6.25 TABLET, FILM COATED ORAL at 07:02

## 2025-02-07 RX ADMIN — RANOLAZINE 500 MG: 500 TABLET, EXTENDED RELEASE ORAL at 07:02

## 2025-02-07 RX ADMIN — ACETYLCYSTEINE 4 ML: 200 INHALANT RESPIRATORY (INHALATION) at 02:02

## 2025-02-07 RX ADMIN — PANTOPRAZOLE SODIUM 40 MG: 40 TABLET, DELAYED RELEASE ORAL at 07:02

## 2025-02-07 RX ADMIN — GUAIFENESIN 600 MG: 600 TABLET, EXTENDED RELEASE ORAL at 08:02

## 2025-02-07 RX ADMIN — Medication: at 07:02

## 2025-02-07 RX ADMIN — LIDOCAINE 5% 1 PATCH: 700 PATCH TOPICAL at 05:02

## 2025-02-07 RX ADMIN — FINASTERIDE 5 MG: 5 TABLET, FILM COATED ORAL at 07:02

## 2025-02-07 RX ADMIN — METHOCARBAMOL 750 MG: 750 TABLET ORAL at 05:02

## 2025-02-07 RX ADMIN — Medication 400 MG: at 07:02

## 2025-02-07 RX ADMIN — OXYCODONE HYDROCHLORIDE AND ACETAMINOPHEN 1 TABLET: 10; 325 TABLET ORAL at 07:02

## 2025-02-07 RX ADMIN — UMECLIDINIUM BROMIDE AND VILANTEROL TRIFENATATE 1 PUFF: 62.5; 25 POWDER RESPIRATORY (INHALATION) at 08:02

## 2025-02-07 RX ADMIN — GUAIFENESIN 600 MG: 600 TABLET, EXTENDED RELEASE ORAL at 11:02

## 2025-02-07 RX ADMIN — PREGABALIN 100 MG: 50 CAPSULE ORAL at 05:02

## 2025-02-07 RX ADMIN — ENOXAPARIN SODIUM 40 MG: 40 INJECTION SUBCUTANEOUS at 04:02

## 2025-02-07 RX ADMIN — CARVEDILOL 6.25 MG: 6.25 TABLET, FILM COATED ORAL at 08:02

## 2025-02-07 RX ADMIN — METHOCARBAMOL 750 MG: 750 TABLET ORAL at 11:02

## 2025-02-07 NOTE — PT/OT/SLP EVAL
Physical Therapy Rehab Re-Evaluation    Patient Name:  Tyler Mai   MRN:  [id number]    Recommendations:     Discharge Recommendations:   (pending progress)   Discharge Equipment Recommendations:     Barriers to discharge: Impaired functional mobility  and Severity of Deficits     Assessment:     Tyler Mai is a 69 y.o. male admitted with a medical diagnosis of Status post cervical spinal fusion.  He presents with the following impairments/functional limitations:  weakness, impaired endurance, impaired functional mobility, gait instability, impaired balance, decreased coordination, decreased upper extremity function, decreased lower extremity function, decreased safety awareness, decreased ROM, impaired coordination, impaired fine motor     PT NOTE: pt still requiring encouragement, however, when reminded of goal being on rehab he was able to participate. Upon arrival, pt supine in bed and requesting to stay in room to do therapy 2/2 have suppository and large BM prior. PT educated pt on need to put as much effort as possible . PT is recommending pt get chair at half way point on steps for rest, getting RW for up stairs and down stairs. Pt will be unable to use RW on steps.     Rehab Diagnosis: Cervical spondylosis and stenosis with myelopathy and cord compression s/p C2-6 PCDF and C2-3, C4-5 decompression 1/24/2025. Pt. Has a history of cervical myelopathy, CAD, CHF, COPD, DM, HLD, HTN, and PVD    General Precautions: Standard, fall     Orthopedic Precautions: spinal precautions     Braces: Aspen collar    Rehab Prognosis: Fair; patient would benefit from acute skilled PT services to address these deficits and reach maximum level of function.      History:     Past Medical History:   Diagnosis Date    Anxiety disorder, unspecified     Arthritis     CAD (coronary artery disease)     Cervical radiculopathy     CHF (congestive heart failure)     Chronic pain syndrome     COPD (chronic obstructive pulmonary  "disease)     Diabetes mellitus     Gastro-esophageal reflux disease without esophagitis     High cholesterol     Hypertension     Lumbar radiculopathy     Myelomalacia of cervical cord     Other specified diseases of spinal cord     Pacemaker     PVD (peripheral vascular disease)     S/P triple vessel bypass     Tobacco use        Past Surgical History:   Procedure Laterality Date    ANTERIOR CERVICAL DISCECTOMY W/ FUSION  1995    C5-6 and C6-7.  Dr. Wall    ANTERIOR CERVICAL DISCECTOMY W/ FUSION  1996    Redo C5-6, C6-7.  Dr. Wall    CHOLECYSTECTOMY      CORONARY ARTERY BYPASS GRAFT      FUSION OF POSTERIOR COLUMN OF CERVICAL SPINE USING COMPUTER AIDED NAVIGATION N/A 1/24/2025    Procedure: FUSION, SPINE, POSTERIOR SPINAL COLUMN, CERVICAL, USING COMPUTER-ASSISTED NAVIGATION;  Surgeon: Darrell Watson MD;  Location[facility];  Service: Neurosurgery;  Laterality: N/A;  C2-C6 PCF; C2/3-C4/5 decompression, possible additional levels  o-arm  NTI  TIVA setup  [employer] //  XX    INSERTION OF PACEMAKER      X2    TRIPPLE VESSEL BYPASS         Subjective     Patient Goal: To get better     Patient Comments: Can we stay in rrom today."     Patients cultural, spiritual, Restorationism conflicts given the current situation: no       Living Environment  People in Home: alone (Lives above sister and brother in law)  Living Arrangements: apartment  Home Accessibility: stairs to enter home  Number of Stairs, Main Entrance: other (see comments) (15)  Stair Railings, Main Entrance: railings safe and in good condition  Equipment Currently Used at Home: rollator, walker, rolling, shower chair    Prior Level of Function  Ambulation Skills: needs device  Stairs: independent  Transfer Skills: independent  ADL Skills: independent  Cognitive Communication: independent    Equipment used at home: rollator, walker, rolling, shower chair.  DME owned (not currently used): none.        Objective:     Communicated with TR prior to session.  " Patient found HOB elevated with cervical collar  upon PT entry to room.      Respiratory Status: Room air    Exams  Cognitive Exam:  Patient is oriented to Person, Place, Time, and Situation  Sensation:          -       WNL, except B feet       Right LE  Left LE    Hip flexion: 4- Hip Flexion: 4+   Hip Abduction: 4- Hip Abduction: 4+   Hip Adduction: 4- Hip Adduction: 4+   Knee Flexion: 4 Knee Flexion: 4+   Knee Extension: 4 Knee Extension: 4+   Ankle Dorsiflexion: 3 Ankle Dorsiflexion: 3   Ankle Plantarflexion: 3 Ankle Plantarflexion: 3        Functional Mobility    Bed mobility in bed    Min A for rolling, Mod A for supine to sit, Mod A for sit to supine   GGs   Admit Current   Status Goal   Functional Area: Care Score: Care Score: Care Score:   Roll Left and Right 6 6  Performed in recliner. Pt will be using recliner at home  Independent   Sit to Lying 4 6 Independent   Lying to Sitting on Side of Bed 4 6 Independent   Sit to Stand 4 4 Independent   Chair/Bed-to-Chair Transfer 4 4  CGA with RW  Independent   Car Transfer 4 4  Performed in elvated height. Spoke with CM on getting height. PT educated pt on proper tech  Independent   Walk 10 Feet 4 4 Independent   Walk 50 Feet with Two Turns 4 4  130' overall CGA noted slowed pace and decreased step length. However, improved R foot clearance      Walk 150 Feet 3 88 Independent   1 Step (Curb) 4 4 Independent   4 Steps 4 4  CGA for 4 steps with BUE support, min A for 4 more steps. 8 in total.  Independent   12 Steps 88 88 Set-up/clean-up     Therapeutic Activities and Exercises:  Patient educated on role of acute care PT and PT POC, safety while in hospital including calling nurse for mobility, and call light usage  Patient educated about importance of OOB mobility and remaining up in chair most of the day.    Therapeutic exercises were performed seated at edge of chair:    marches with 2 weights  long arc quads with 2 weights  All therex performed 3x10 reps each          Activity Tolerance: Good    Patient left up in chair with all lines intact and call button in reach.    Education Provided: roles and goals of PT/PTA, transfer training, bed mob, gait training, stair training, safety awareness, body mechanics, assistive device, strengthening exercises, and fall prevention    Expected compliance: High compliance      Plan:     During this hospitalization, patient to be seen 5 x/week to address the identified rehab impairments via gait training, therapeutic activities, therapeutic exercises and progress toward the following goals:    GOALS:   Multidisciplinary Problems       Physical Therapy Goals          Problem: Physical Therapy    Goal Priority Disciplines Outcome Interventions   Physical Therapy Goal     PT, PT/OT Progressing    Description: Bed Mobility:  Sit to supine transfer independently.   Supine to sit transfer independently.     Transfers:  Sit to stand transfer independently using RW.   Car transfer independently using RW.    an object from the ground in standing position independently using RW.     Mobility:  Ambulate 500 feet independently using RW.  Ambulate 15 feet on uneven surfaces/ramps with setup/clean-up assist using RW.   Pt ascended/descended a 6 inch curb independently using RW.   Ascend/descend 15 stairs independently using bilateral handrails.                         Plan of Care Expires:  02/13/25  PT Next Visit Date: 02/13/25  Plan of Care reviewed with: patient      PT Care conference held with PTA. Short term goals updated appropriately. Plan of care updates discussed and reviewed with PTA Tory Medel    Additional Infomation:           Time Tracking:     Therapy Time   PT Received On: 02/07/25  PT Start Time: 1000  PT Stop Time: 1130  PT Total Time (min): 90 min  PT Individual: 90  Missed Time:    Time Missed due to:      Billable Minutes: Re-eval 30, Therapeutic Activity 30, and Therapeutic Exercise 30    02/07/2025

## 2025-02-07 NOTE — PT/OT/SLP PROGRESS
Occupational Therapy Inpatient Rehab Treatment    Name: Tyler Mai  MRN: [id number]    Assessment:  Tyler Mai is a 69 y.o. male admitted with a medical diagnosis of Status post cervical spinal fusion.  He presents with the following impairments/functional limitations:  weakness, impaired endurance, impaired sensation, impaired self care skills, impaired functional mobility, gait instability, impaired balance, decreased coordination, decreased upper extremity function, decreased safety awareness, pain, impaired coordination, impaired fine motor.    General Precautions: Standard, fall     Orthopedic Precautions:spinal precautions     Braces: Aspen collar    Rehab Prognosis: Good; patient would benefit from acute skilled OT services to address these deficits and reach maximum level of function.      History:     Past Medical History:   Diagnosis Date    Anxiety disorder, unspecified     Arthritis     CAD (coronary artery disease)     Cervical radiculopathy     CHF (congestive heart failure)     Chronic pain syndrome     COPD (chronic obstructive pulmonary disease)     Diabetes mellitus     Gastro-esophageal reflux disease without esophagitis     High cholesterol     Hypertension     Lumbar radiculopathy     Myelomalacia of cervical cord     Other specified diseases of spinal cord     Pacemaker     PVD (peripheral vascular disease)     S/P triple vessel bypass     Tobacco use        Past Surgical History:   Procedure Laterality Date    ANTERIOR CERVICAL DISCECTOMY W/ FUSION  1995    C5-6 and C6-7.  Dr. Wall    ANTERIOR CERVICAL DISCECTOMY W/ FUSION  1996    Redo C5-6, C6-7.  Dr. Wall    CHOLECYSTECTOMY      CORONARY ARTERY BYPASS GRAFT      FUSION OF POSTERIOR COLUMN OF CERVICAL SPINE USING COMPUTER AIDED NAVIGATION N/A 1/24/2025    Procedure: FUSION, SPINE, POSTERIOR SPINAL COLUMN, CERVICAL, USING COMPUTER-ASSISTED NAVIGATION;  Surgeon: Darrell Watson MD;  Location: [facility];  Service: Neurosurgery;   "Laterality: N/A;  C2-C6 PCF; C2/3-C4/5 decompression, possible additional levels  o-arm  NTI  TIVA setup  [employer] //  XX    INSERTION OF PACEMAKER      X2    TRIPPLE VESSEL BYPASS         Subjective     Orientation: Oriented x4    Chief Complaint: pain toward end of morning session and afternoon session    Patient/Family Comments/goals: "I may have to hire someone to help me at home."  "I could only stand them until about 3AM and then they had to come off." Referring to how pt tolerated heel boots during night.    Vitals   Vitals at Rest  BP    HR    O2 Sat    Pain 5/10     Respiratory Status: Room air    Patients cultural, spiritual, Sabianism conflicts given the current situation: no     Objective:     Patient found on BSC in AM and up in chair in PM with cervical collar  upon OT entry to room.    Mobility   Patient completed:  Supine to Sit with independence  Sit to Supine with independence  Sit to Stand Transfer with supervision with rolling walker  Bed to Chair Transfer using Step Transfer technique with contact guard assistance with rolling walker  Toilet Transfer Step Transfer technique with supervision with  rolling walker    Functional Mobility  In room mobility for ADLs.    ADLs   Current Status   Eating 5 Pt was able to utilize fork without built up handles and now would prefer that.   Oral Hygiene 5   Toileting Hygiene 3 Assistance with wiping - +BM   Putting on/taking off footwear 4 PM: Pt used dressing stick to doff socks and sock aide to don socks only (pt has pressure wound on R heel and unable to wear shoes at this time.) Pt did struggle with getting sock on on sock aide at first due to decreased sensation in B hands but pt adjusted technique and was able to complete. Pt will need more practice with sock aide. Pt did c/o increased pain in neck while trying to see over the collar to get sock on. Pt requested if NP could check with surgeon about wearing a soft collar. Relayed message to NP, Rylie. "   Toilet Transfer 4 with RW     Limiting Factors for ADLs: motor, sensory, endurance, limited ROM, balance, weakness, coordination, safety awareness, and pain     Additional Treatments: AM: OT provided pt with PRAFO boot for R foot since pt was unable to tolerate heel boots. Nursing notified and reminder sheet in room adjusted. PM: OT checked with pt on if PRAFO boot was tolerable during late morning time in bed and he stated that it was very comfortable and will wear during the night to protect heel. So at this time pt will wear PRAFO on R foot and heel boot on L foot.    Patient left AM: on BSC over toilet and PM in W/C with all lines intact, call button in reach, and nursing notified.     Education provided: Roles and goals of OT, ADLs, transfer training, assistive device, sequencing, safety precautions, fall prevention, and equipment recommendations    Multidisciplinary Problems       Occupational Therapy Goals          Problem: Occupational Therapy    Goal Priority Disciplines Outcome Interventions   Occupational Therapy Goal     OT, PT/OT Progressing    Description: ADLs:  Pt to perform grooming tasks with independence standing at sink with RW by d/c.   Pt to perform feeding tasks with independence using adaptive utensils as needed by d/c.    Pt to perform UB dressing with independence by d/c.    Pt to perform LB dressing with touch assist by d/c.    Pt to perform putting on/off footwear task with independence and AE as needed by d/c.   Pt to perform toileting with touch assist by d/c.    Pt to perform bathing with independence by d/c.     Functional Transfers:  Pt to perform toilet transfers with independence by d/c.    Pt to perform a tub transfer with independence by d/c.     IADLs:  Pt to perform simple meal prep standing at kitchen counter with touch assist by d/c.      Balance, Strengthening, Endurance, Balance:  Pt to consistently demonstrate adherence to spinal precautions during all ADL's as  instructed by OT.  Pt to demonstrate good dynamic standing balance as required to perform ADL's from standing level.                         Time Tracking     OT Received On: 02/07/25  Time In  (0730, 1300)     Time Out  (0830, 1400)  Total Time    Therapy Time: OT Individual: 120  Missed Time:    Missed Time Reason:      Billable Minutes: Self Care/Home Management 120    02/07/2025

## 2025-02-07 NOTE — PROGRESS NOTES
Subjective  HPI: 69 year old WM with a PMH of cervical myelopathy, CAD, CHF, COPD, DM, HLD, HTN, and PVD presented to the ED at [facility] on 1/14/25 for Neurosurgery. Patient presented in ED following discharge from [facility]. He reports associated symptoms of bilateral arm weakness, bilateral leg weakness, and worsening neck pain. Notes right leg is weaker than left. He also complains of bilateral hand, bilateral leg, and bilateral feet numbness. He stated that he has been unable to care for himself at home because of the weakness, and having to use a wheelchair to get around. Patient's daughter at bedside reports frequent falls at home. No urinary or fecal incontinence. Per chart review, the patient was admitted to [facility] for cervical myelopathy with the plan of surgery. Previous extensive admission at [facility] for UTI which cleared and resolved with antibiotics; initially, did not have surgery secondary to UTI. Now amendable for surgical intervention since UTI has resolved. Patient refused surgery with on-call neurosurgeon at [facility] and discharged at noon today. He was advised to come here for the surgery. MRI on 01/05 showed severe C3-4 level central canal stenosis with increased signal hyperintensity within the cervical cord from mid to lower aspect of C3 through mid aspect of C4 with central cord myelomalacia. No syrinx is detected. Prior C5-C7 intervertebral body fusion. Neurosurgery was consulted with recommendation for surgical intervention needed. Cardiology consulted with low risk for MACE for high risk neurosurgical procedure. On 1/24, patient underwent C2-6 PCDF and C2-3- C4-5 decompression by Dr. Ramírez. 1 Hemovac drain placed to site. Hard cervical collar in place. On 1/25, wound care was consulted for right heel and left heel wound with recommendation to cleanse bilateral heels with normal saline, paint with betadine, apply small foam, and change every 2 days. Recommended bilateral heel boots. Marina was  removed. Labs showed low Na of 133, elevated BUN/Cr of 29.7/ 1.31, and low H&H of 10.6 & 31.9. BP ranging 125/58- 182/80. PT/OT efvals completed with deficits noted with recommendation for high intensity therapy needed. ON 1/26, Labs showed low H&H of 10.1 & 29.5. Patient having urinary retention requiring in and out caths. On 1/27, hemovac removed. Neurosurgery recommended to DC staples on POD 14, will need follow up in 2 weeks after DC from hospital with XR uprights of cervical spine with Izzy, NP, continued Percocet and Robaxin for pain management, and signed off of case. Herrera had to be reinserted for urinary retention, and started Flomax. BP ranging 123/61- 169/70. On 1/28, patient started on Relistar for constipation with 2 large Bms noted. Labs showed low H&H of 9.2 & 27.3. On 1/30, wound care reassessed with 2 wounds noted to bilateral anterior legs. Cleansed with soap and water, and applied aquaphor to sites. Patient is AAOx4.   Participating with therapy. Functional status includes setup assist needed for eating, minimal assist for bed mobility, contact guard assist for transfers with RW, walked 28ft with RW at minimal assist, minimal assist for toilet transfer, total assist for toileting due to herrera in place, stand by assist for grooming, max assist for upper body dressing, and max assist for lower body dressing. Patient was evaluated, accepted, and admitted to inpatient rehab to improve functional status. Transferred to [facility] on 1/31 without incident.     2/8: Seen with CNA, seated on edge of bed with complaints of calling for the restroom and no one coming back to assist him. Tells me he is urinating fairly often. Discussed bladder training program with assistance offered q2h. Agreeable. Requesting to have his soft cervical collar placed for breakfast. States that he has not received one meal the way he has ordered it as of yet. Received preferences and added comments to diet order. Communicated this  to kitchen staff. Tells me that he slept and pain is overall controlled with current medication. VSSAF with noted SBP elevation last night and prior to morning medications. Adjust medication timing. Following.            Review of Systems  Psychiatric: Denies mental health history. Pt. Smokes cigarettes.     Depression/Anxiety: notes some anxiety in regards to eating and getting enough nutrition to his mouth     DULoxetine DR capsule 30 mg qd, start 2/1  ALPRAZolam tablet 0.25 mg TID PRN Anxiety  Pain: L>R side of neck, numbness (hands, legs, feet), shooting pains  DULoxetine DR capsule 30 mg qd, start 2/1  methocarbamoL tablet 750 mg AC & HS  pregabalin capsule 100mg TID  LIDOcaine 5 % patch 1 patch neck q24hr, 1800  acetaminophen tablet 650 mg q6h PRN mild pain   oxyCODONE-acetaminophen 5-325 mg 1 tablet q4h PRN mod pain  oxyCODONE-acetaminophen  mg per tablet 1 tablet q4h PRN severe pain  cyclobenzaprine tablet 5 mg TID PRN spasm  Bowels/Bladder: Last BM 2/7 following suppository (finally agreeable and said it worked within 15 minutes); 1/28 x 2 (large) following Relistor. Previously refusing suppository  Gray catheter reinserted 2/2 urinary retention-discontinued     docusate sodium capsule 100 mg BID  linaCLOtide capsule 145 mcg qAM  tamsulosin 24 hr capsule 0.4 mg qHS  Appetite: good. Hungry. Intake difficult functionally with hand paresthesias. Assist. Improving with built up utensils   Sleep: good with melatonin  melatonin tablet 6 mg qHS PRN Insomnia              Physical Exam  General: well-developed, well-nourished, in no acute distress  Neck: hard collar, supple  Respiratory: equal chest rise, no SOB, no audible wheeze  Cardiovascular: regular rate and rhythm, no edema  Gastrointestinal: soft, non-tender, non-distended   Musculoskeletal: BUE/BLE weakness  Integumentary: no rashes, bilateral heel wounds, bilateral anterior leg wounds, posterior cervical incision-staples-dressing-c/d/i  Neurologic:  cranial nerves intact, BUE/BLE weakness, numbness (hands, legs, feet), balance deficits                      Assessment/Plan  Hospital   Anemia   Hyponatremia   Spinal cord compression   Status post cervical spinal fusion   Hypokalemia     Non-Hospital   HTN (hypertension)   Hyperlipidemia   Ischemic cardiomyopathy with implantable cardioverter-defibrillator (ICD)   Tobacco user   Type 2 diabetes mellitus   Peripheral arterial disease with history of revascularization   Chronic systolic CHF (congestive heart failure), NYHA class 3   Chronic low back pain   Cervical myelopathy   Carotid artery stenosis, asymptomatic, left   Arteriosclerosis of coronary artery   S/P triple vessel bypass   COPD (chronic obstructive pulmonary disease)   Gastro-esophageal reflux disease without esophagitis   Anxiety disorder, unspecified   Cervical radiculopathy   Lumbar radiculopathy   Myelomalacia of cervical cord       Wounds: bilateral heel wounds, bilateral anterior leg wounds, posterior cervical incision-staples-dressing-c/d/i  On 1/24, patient underwent C2-6 PCDF and C2-3- C4-5 decompression by Dr. Ramírez.  (Hemovac drain placed to site)  Precautions: spinal  Bracing: Hard cervical collar, bilateral heel boots in bed  Swallowing: Regular Diet  Function: Tolerating therapy. Continue PT/OT  VTE Prophylaxis:   enoxaparin injection 40 mg q24hr  Code Status: FULL CODE   Discharge: Lives alone in [place] in a 2nd story apartment. Completed high school. He has no  history.  Is disabled. . He was living alone and completely independent. Children: (2). Date pending.

## 2025-02-07 NOTE — PROGRESS NOTES
[facility] ([facility])  Rehab Progress Note    Patient Name: Tyler Mai  MRN: [id number]  Age: 69 y.o. Sex: male  : 1955  Hospital Length of Stay: 7 days  Date of Service: 2025   Chief Complaint: Cervical myelopathy s/p C2-6 PCDF and C2/3-C4/5 decompression on 2025     Subjective:     Basic Information  Admit Information: 69-year-old white male presented to [facility] ED on 2025 complaining of bilateral arm weakness, bilateral leg weakness, and worsening neck pain.  Reports right leg weaker than the left.  Recently admitted to [facility] for cervical myelopathy with plans for surgery, but patient had severe UTI requiring antibiotic therapy.  MRI on  significant for severe C3-4 level central canal stenosis with increased signal hyperintensity within the cervical cord from mid to lower aspect of C3 through mid aspect of C4 with central cord myelomalacia.  PMH significant for cervical myelopathy with history of C5-C6 fusion, CAD, HFrEF, ICMO, COPD, DM type 2, HLD, HTN, and PVD.  Workup significant for cervical myelopathy.  Patient inpatient family initially declined the neurosurgeon on call surgery.  Then surgery postpone due to inclement weather.  Tolerated posterior C3-C5 total laminectomies for decompression, placement of bilateral pars screws at C2 and bilateral lateral mass screws from C3 to C6 using [facility]ictus OCT System, and placement of local bone and AlphaGraft DBM for arthrodesis from C2 to C6 on  without perioperative complications.  Postoperatively neurosurgery documented increase mobility to bilateral upper extremities with a 5/5 hand grasp.  Recommended aspirin hard collar at all times, and Latta collar for showers..  Recommended removal staples on .  Gray continued due to urinary retention.  Tolerated transfer to [facility] inpatient rehab unit on  without incident.   Today's Information: No acute events overnight.  Lying in bed.  He  had a bowel movement-very large this morning.  Before this it had been 12 days.  Reported urinary frequency overnight, likely due to constipation.  Sleep hygiene fair overnight.  Appetite is good.  BP moderately controlled, likely due to blood pressures while lying in bed.  He does complain of congestion.  No new labs or imaging today.    Review of patient's allergies indicates:   Allergen Reactions    Hydrochlorothiazide      Other Reaction(s): Pancreatitis        Current Facility-Administered Medications:     acetaminophen tablet 650 mg, 650 mg, Oral, Q6H PRN, Ashley, Tony A, FNP, 650 mg at 02/03/25 0540    albuterol nebulizer solution 2.5 mg, 2.5 mg, Nebulization, Q4H PRN, Jaxson, Shannan A, FNP, 2.5 mg at 02/04/25 1158    ALPRAZolam tablet 0.25 mg, 0.25 mg, Oral, TID PRN, Ashley, Tony A, FNP    amLODIPine tablet 5 mg, 5 mg, Oral, BID, Ashley, Tony A, FNP, 5 mg at 02/07/25 0759    aspirin chewable tablet 81 mg, 81 mg, Oral, Daily, Ashley, Tony A, FNP, 81 mg at 02/07/25 0759    atorvastatin tablet 40 mg, 40 mg, Oral, Daily, Ashley, Tony A, FNP, 40 mg at 02/07/25 0759    balsam peru-castor oiL Oint, , Topical (Top), BID, Black, Shannan A, FNP, Given at 02/07/25 0759    benzonatate capsule 100 mg, 100 mg, Oral, TID PRN, Ashley, Tony A, FNP    carvediloL tablet 6.25 mg, 6.25 mg, Oral, BID, Ashley, Tony A, FNP, 6.25 mg at 02/07/25 0759    clopidogreL tablet 75 mg, 75 mg, Oral, Daily, Ashley, Tony A, FNP, 75 mg at 02/07/25 0759    cyclobenzaprine tablet 5 mg, 5 mg, Oral, TID PRN, Black, Shannan A, FNP    dextrose 50% injection 12.5 g, 12.5 g, Intravenous, PRN, Ashley, Tony A, FNP    dextrose 50% injection 25 g, 25 g, Intravenous, PRN, Tony Ramirez FNP    docusate sodium capsule 100 mg, 100 mg, Oral, BID, Tony Ramirez FNP, 100 mg at 02/07/25 0800    DULoxetine DR capsule 30 mg, 30 mg, Oral, Daily, Shannan Puri FNP, 30 mg at 02/07/25 0759     enoxaparin injection 40 mg, 40 mg, Subcutaneous, Q24H (prophylaxis, 1700), Ashley, Tony A, FNP, 40 mg at 02/06/25 1715    finasteride tablet 5 mg, 5 mg, Oral, Daily, Ashley, Tony A, FNP, 5 mg at 02/07/25 0759    umeclidinium-vilanteroL 62.5-25 mcg/actuation DsDv 1 puff, 1 puff, Inhalation, Daily, 1 puff at 02/07/25 0800 **AND** fluticasone furoate 200 mcg/actuation inhaler 1 puff, 1 puff, Inhalation, Daily, Ashley, Tony A, FNP, 1 puff at 02/06/25 0800    glucagon (human recombinant) injection 1 mg, 1 mg, Intramuscular, PRN, Ashley, Tony A, FNP    glucose chewable tablet 16 g, 16 g, Oral, PRN, Ashley, Tony A, FNP    glucose chewable tablet 24 g, 24 g, Oral, PRN, Ashley, Tony A, FNP    hydrALAZINE injection 10 mg, 10 mg, Intravenous, Q4H PRN, Ashley, Tony A, FNP, 10 mg at 02/04/25 0629    hydrOXYzine pamoate capsule 50 mg, 50 mg, Oral, Nightly PRN, Ashley, Tony A, FNP, 50 mg at 02/03/25 2138    insulin aspart U-100 injection 0-10 Units, 0-10 Units, Subcutaneous, QID (AC + HS) PRN, Ashley, Tony A, FNP, 1 Units at 02/06/25 2021    labetalol 20 mg/4 mL (5 mg/mL) IV syring, 10 mg, Intravenous, Q4H PRN, Ashley, Tony A, FNP    LIDOcaine 5 % patch 1 patch, 1 patch, Transdermal, Q24H, Shannan Puri, FNP, 1 patch at 02/06/25 1715    linaCLOtide capsule 145 mcg, 145 mcg, Oral, QAM, Ashley, Tony A, FNP, 145 mcg at 02/07/25 0530    losartan tablet 25 mg, 25 mg, Oral, Before breakfast, Hitchita, Shannan A, FNP, 25 mg at 02/07/25 0530    magnesium oxide tablet 400 mg, 400 mg, Oral, BID, Jarrell Ramirezothy A, FNP, 400 mg at 02/07/25 0759    melatonin tablet 6 mg, 6 mg, Oral, Nightly PRN, Jaxson, Shannan A, FNP, 6 mg at 02/02/25 2056    methocarbamoL tablet 750 mg, 750 mg, Oral, QID (AC & HS), Jaxson Shannan A, FNP, 750 mg at 02/07/25 0530    metoprolol injection 10 mg, 10 mg, Intravenous, Q2H PRN, Ashley Tony A, FNP    nitroGLYCERIN SL tablet 0.4 mg, 0.4 mg,  "Sublingual, Q5 Min PRN, Ashley, Tony A, FNP    ondansetron disintegrating tablet 4 mg, 4 mg, Oral, Q6H PRN, Ashley, Tony A, FNP    ondansetron disintegrating tablet 8 mg, 8 mg, Oral, Q8H PRN, Ashley, Tony A, FNP    ondansetron injection 4 mg, 4 mg, Intravenous, Q8H PRN, Ashley, Tony A, FNP    oxyCODONE-acetaminophen  mg per tablet 1 tablet, 1 tablet, Oral, Q4H PRN, Beulah, Shannan A, FNP, 1 tablet at 02/07/25 0758    oxyCODONE-acetaminophen 5-325 mg per tablet 1 tablet, 1 tablet, Oral, Q4H PRN, Jaxson, Shannan A, FNP, 1 tablet at 02/05/25 0606    pantoprazole EC tablet 40 mg, 40 mg, Oral, Daily, Ashley, Tony A, FNP, 40 mg at 02/07/25 0759    polyethylene glycol packet 17 g, 17 g, Oral, BID PRN, Ashley, Tony A, FNP    pregabalin capsule 100 mg, 100 mg, Oral, TID, Jaxson, Shannan A, FNP, 100 mg at 02/07/25 0530    ranolazine 12 hr tablet 500 mg, 500 mg, Oral, BID, Ashley, Tony A, FNP, 500 mg at 02/07/25 0759     Review of Systems   Complete 12-point review of symptoms negative except for what's mentioned in HPI     Objective:     BP (!) 174/65   Pulse 77   Temp 97.8 °F (36.6 °C) (Oral)   Resp 20   Ht 5' 6" (1.676 m)   Wt 62.2 kg (137 lb 2 oz)   SpO2 96%   BMI 22.13 kg/m²      Physical Exam  Constitutional:       Appearance: Normal appearance.   HENT:      Head: Normocephalic.      Mouth/Throat:      Mouth: Mucous membranes are moist.   Eyes:      Pupils: Pupils are equal, round, and reactive to light.   Neck:      Comments: Posterior neck incision clean and intact.  Hard collar intact  Cardiovascular:      Rate and Rhythm: Normal rate and regular rhythm.      Heart sounds: Normal heart sounds.   Pulmonary:      Effort: Pulmonary effort is normal.      Breath sounds: Normal breath sounds.   Abdominal:      General: Bowel sounds are normal.      Palpations: Abdomen is soft.   Musculoskeletal:      Cervical back: Neck supple.      Comments: Diffuse muscle atrophy "   Skin:     General: Skin is warm and dry.   Neurological:      General: No focal deficit present.      Mental Status: He is alert and oriented to person, place, and time.      Motor: Weakness present.   Psychiatric:         Mood and Affect: Mood normal.         Behavior: Behavior normal.         Thought Content: Thought content normal.         Judgment: Judgment normal.     *MD performed and documented physical examination       Lines/Drains/Airways       Peripheral Intravenous Line  Duration                  Peripheral IV - Single Lumen 01/14/25 1853 20 G Anterior;Right Forearm 23 days                  Labs  Recent Results (from the past 24 hours)   POCT glucose    Collection Time: 02/06/25 11:38 AM   Result Value Ref Range    POCT Glucose 115 (H) 70 - 110 mg/dL   POCT glucose    Collection Time: 02/06/25  8:15 PM   Result Value Ref Range    POCT Glucose 165 (H) 70 - 110 mg/dL   POCT glucose    Collection Time: 02/07/25  5:33 AM   Result Value Ref Range    POCT Glucose 111 (H) 70 - 110 mg/dL     Radiology  Cervical XR two-view on 01/25/2025, IMPRESSION: There are postoperative changes with posterior spinal fusion hardware at C2 through C6. The hardware appears intact. Cervical alignment has improved. The remaining vertebral body heights are preserved. There is moderate disc height loss at C3-C4, mild at C4-C5, with multilevel marginal osteophytes. The soft tissues are unremarkable.     Assessment/Plan:     69 y.o. WM admitted on 1/31/2025    Cervical myelopathy   - s/p C2-6 PCDF and C2/3-C4/5 decompression on 01/24/2025  - remove staples on 02/07  - continue aspirin hard collar at all times, okay for [place] collar for showers only  - Zanaflex discontinued on 02/05 due to orthostatic hypotension  - continue                Robaxin 750 mg q.i.d. (decreased 2/5)                Flexeril 10 mg t.i.d. p.r.n.                Percocet 10 mg q.4 hours p.r.n. for severe pain                Lyrica 100 mg t.i.d.  (increased 2/3)  - defer to physiatry for rehab and pain management  - PT/OT/RT/ST following  - to follow up with Neurosurgery outpatient     Coronary artery disease  - s/p  CABG x 3  - denies recent chest pain or discomfort  - continue                ASA 81 mg daily (resumed 1/31)                Plavix 75 mg daily (resume 1/31)                Coreg 6.25 mg b.i.d.                 Lipitor 40 mg daily                Losartan 25 mg daily   - ECG and Nitro PRN  - continue cardiac diet  - to follow-up with cardiology outpatient     HFrEF/ICMO  - stable  - continue                Coreg 6.25 mg b.i.d.                 Losartan 25 mg daily                 Ranexa 500 mg b.i.d.   - obtain daily standing weights  - to follow up with cardiology outpatient     Nicotine dependence  - smoking cessation counseling given on admission  - 1 PPD with approximately 55 pack years     HTN  - orthostatics reported on 02/05  - discontinued Flomax 0.4 mg at bedtime on 02/04 due to orthostatic hypotension  - discontinued Zanaflex on 02/05 due to orthostatic hypotension  - received NS 1 L bolus on 02/05 due to orthostatic hypotension  - continue                Coreg 6.25 mg b.i.d.                 Losartan 25 mg daily                 Norvasc 5 mg daily                 Ranexa 500 mg b.i.d.                 Hydralazine 10 mg every 2 hours as needed for BP > 160/90                Labetalol 10 mg every 2 hours as needed for BP > 160/90  - low sodium diet             COPD   - stable  - not on home 02  - continue                Trelegy INH daily                   DM type II  - HgA1c 5.6 on 01/23/2024  - continue                Lantus 10 units every evening (resumed 1/31)                ISS   - CBGs AC/HS     HLD  - FLP outpatient  - resume Crestor upon discharge  - continue                Lipitor 40 mg daily      PVD  - stable  - continue                ASA 81 mg daily (resumed 1/31)                Plavix 75 mg daily (resume 1/31)                Coreg  6.25 mg b.i.d.                 Lipitor 40 mg daily  - to follow up vascular surgery outpatient                  Chronic constipation  - last BM on 02/07 (prior it was 1/28).  Reports normal BMs every 8 days.  - continue                Linzess 145 mcg before breakfast                 Colace 100 mg b.i.d.     Hypomagnesemia  - stable  - continue                Magnesium oxide 400 mg b.i.d.      BPH/urinary retention  - stable  - discontinued Flomax 0.4 mg at bedtime on 02/04 due to orthostatic hypotension  - continue                Proscar 5 mg daily (initiated 1/31)  Indwelling catheter discontinued on 02/01-good urine output with no retention reported     GERD  - Avoid spicy foods, and nothing to eat or drink within x2 hours of bedtime or laying flat (water is ok)   - Avoid NSAIDs (Advil, ibuprofen, naproxen...) and chavira-2 inhibitors (Mobic, Celebrex)    - continue                Protonix 40 mg daily      Normocytic anemia  - asymptomatic  - H/H stable   - no evidence of active bleeds  - will closely monitor and transfuse if needed     Hypomagnesemia  - magnesium 1.8 on 02/03  - s/p Mag sulfate 2 g x 1 IVPB on 02/01  - continue   Mag-Ox 400 mg BID (initiated 2/1)    Mild JOHN  - renal indices stable  - encourage oral hydration  - bladder scan to rule out retention    Congestion  - current  - initiate   Mucinex 600 mg q.12 hours x7 days (initiated 2/7)   Mucomyst nebs t.i.d. x3 doses (initiated 2/7     VTE Prophylaxis: Lovenox 40 mg daily   COVID-19 testing:  Unknown  COVID-19 vaccination status:  Vaccinated (Moderna):  Bivalent booster on 04/03/2023     POA:  No  Living will:  No  Contacts:  Tyler Mai Jr (son) [phone]     CODE STATUS:  Full code  Internal Medicine (attending): Tyler De Anda MD  Physiatry (consulting):  Philip Fang MD     OUTPATIENT PROVIDERS  PCP: None  Neurosurgery: Darrell Zavala MD     DISPOSITION:  Sleep hygiene fair.  Appetite at goal.  He did have a BM this morning-very  large after 12 days.  This is close to his normal.  BP moderately controlled, but last BM reported at 5:00 a.m. (likely while in bed).  Good glycemic control.  No new labs or imaging today.  Complains of congestion.  Initiate Mucomyst x3 doses and Mucinex b.i.d. x7 days.  Continue aggressive mobilization as tolerated.  Monitor closely.  Notify of acute changes.    Staffing 2/5/2025: Continent of bowel and bladder.  Assisted to the floor overnight after going to the bathroom. RT: Overall min to CGA when he participates.  Needs max encouragement. Limited by standing tolerance and generalized weakness. Appetite is good at 80%. PT: Limited by pain and poor participation.  CGA to min assist with bed mobility, ambulating 30-70 feet at CGA with walker. Limited strength and participation.  OT: Progressing, but dependent for foot wear.  Dependent with LBD.  CGA with toilet transfers. Min assist with toilet hygiene.  Set up with oral care and eating. Limited by decreased strength.  Needs max encouragement. Poor family support.  Estranged from family.  Projected discharge pending.     Jarrell Ramirez NP conducted independent physical examination and assisted with medical documentation.    Total time spent on this encounter including chart review and direct MD + NP 1-on-1 patient interaction: 53 minutes   Over 50% of this time was spent in counseling and coordination of care

## 2025-02-08 LAB
POCT GLUCOSE: 119 MG/DL (ref 70–110)
POCT GLUCOSE: 173 MG/DL (ref 70–110)
POCT GLUCOSE: 179 MG/DL (ref 70–110)
POCT GLUCOSE: 200 MG/DL (ref 70–110)

## 2025-02-08 PROCEDURE — 94761 N-INVAS EAR/PLS OXIMETRY MLT: CPT

## 2025-02-08 PROCEDURE — 94640 AIRWAY INHALATION TREATMENT: CPT

## 2025-02-08 PROCEDURE — 25000003 PHARM REV CODE 250: Performed by: NURSE PRACTITIONER

## 2025-02-08 PROCEDURE — 94799 UNLISTED PULMONARY SVC/PX: CPT

## 2025-02-08 PROCEDURE — 11800000 HC REHAB PRIVATE ROOM

## 2025-02-08 PROCEDURE — 99900031 HC PATIENT EDUCATION (STAT)

## 2025-02-08 PROCEDURE — 25000242 PHARM REV CODE 250 ALT 637 W/ HCPCS: Performed by: NURSE PRACTITIONER

## 2025-02-08 PROCEDURE — 63600175 PHARM REV CODE 636 W HCPCS: Performed by: NURSE PRACTITIONER

## 2025-02-08 RX ORDER — CARVEDILOL 6.25 MG/1
6.25 TABLET ORAL
Status: DISCONTINUED | OUTPATIENT
Start: 2025-02-08 | End: 2025-02-11

## 2025-02-08 RX ORDER — AMLODIPINE BESYLATE 5 MG/1
5 TABLET ORAL
Status: DISCONTINUED | OUTPATIENT
Start: 2025-02-08 | End: 2025-02-12

## 2025-02-08 RX ADMIN — GUAIFENESIN 600 MG: 600 TABLET, EXTENDED RELEASE ORAL at 08:02

## 2025-02-08 RX ADMIN — OXYCODONE HYDROCHLORIDE AND ACETAMINOPHEN 1 TABLET: 10; 325 TABLET ORAL at 08:02

## 2025-02-08 RX ADMIN — PREGABALIN 100 MG: 50 CAPSULE ORAL at 05:02

## 2025-02-08 RX ADMIN — METHOCARBAMOL 750 MG: 750 TABLET ORAL at 05:02

## 2025-02-08 RX ADMIN — DULOXETINE HYDROCHLORIDE 30 MG: 30 CAPSULE, DELAYED RELEASE ORAL at 08:02

## 2025-02-08 RX ADMIN — DOCUSATE SODIUM 100 MG: 100 CAPSULE, LIQUID FILLED ORAL at 08:02

## 2025-02-08 RX ADMIN — INSULIN ASPART 2 UNITS: 100 INJECTION, SOLUTION INTRAVENOUS; SUBCUTANEOUS at 08:02

## 2025-02-08 RX ADMIN — PANTOPRAZOLE SODIUM 40 MG: 40 TABLET, DELAYED RELEASE ORAL at 08:02

## 2025-02-08 RX ADMIN — OXYCODONE HYDROCHLORIDE AND ACETAMINOPHEN 1 TABLET: 10; 325 TABLET ORAL at 03:02

## 2025-02-08 RX ADMIN — ENOXAPARIN SODIUM 40 MG: 40 INJECTION SUBCUTANEOUS at 04:02

## 2025-02-08 RX ADMIN — FINASTERIDE 5 MG: 5 TABLET, FILM COATED ORAL at 08:02

## 2025-02-08 RX ADMIN — PREGABALIN 100 MG: 50 CAPSULE ORAL at 01:02

## 2025-02-08 RX ADMIN — OXYCODONE HYDROCHLORIDE AND ACETAMINOPHEN 1 TABLET: 10; 325 TABLET ORAL at 12:02

## 2025-02-08 RX ADMIN — Medication 400 MG: at 08:02

## 2025-02-08 RX ADMIN — OXYCODONE HYDROCHLORIDE AND ACETAMINOPHEN 1 TABLET: 10; 325 TABLET ORAL at 04:02

## 2025-02-08 RX ADMIN — LOSARTAN POTASSIUM 25 MG: 25 TABLET, FILM COATED ORAL at 05:02

## 2025-02-08 RX ADMIN — ASPIRIN 81 MG CHEWABLE TABLET 81 MG: 81 TABLET CHEWABLE at 08:02

## 2025-02-08 RX ADMIN — INSULIN ASPART 2 UNITS: 100 INJECTION, SOLUTION INTRAVENOUS; SUBCUTANEOUS at 11:02

## 2025-02-08 RX ADMIN — ALBUTEROL SULFATE 2.5 MG: 2.5 SOLUTION RESPIRATORY (INHALATION) at 04:02

## 2025-02-08 RX ADMIN — ALBUTEROL SULFATE 2.5 MG: 2.5 SOLUTION RESPIRATORY (INHALATION) at 08:02

## 2025-02-08 RX ADMIN — LINACLOTIDE 145 MCG: 145 CAPSULE, GELATIN COATED ORAL at 05:02

## 2025-02-08 RX ADMIN — ATORVASTATIN CALCIUM 40 MG: 40 TABLET, FILM COATED ORAL at 08:02

## 2025-02-08 RX ADMIN — CARVEDILOL 6.25 MG: 6.25 TABLET, FILM COATED ORAL at 04:02

## 2025-02-08 RX ADMIN — PREGABALIN 100 MG: 50 CAPSULE ORAL at 11:02

## 2025-02-08 RX ADMIN — CLOPIDOGREL BISULFATE 75 MG: 75 TABLET, FILM COATED ORAL at 08:02

## 2025-02-08 RX ADMIN — METHOCARBAMOL 750 MG: 750 TABLET ORAL at 04:02

## 2025-02-08 RX ADMIN — AMLODIPINE BESYLATE 5 MG: 5 TABLET ORAL at 08:02

## 2025-02-08 RX ADMIN — Medication: at 08:02

## 2025-02-08 RX ADMIN — INSULIN ASPART 2 UNITS: 100 INJECTION, SOLUTION INTRAVENOUS; SUBCUTANEOUS at 04:02

## 2025-02-08 RX ADMIN — RANOLAZINE 500 MG: 500 TABLET, EXTENDED RELEASE ORAL at 08:02

## 2025-02-08 RX ADMIN — OXYCODONE HYDROCHLORIDE AND ACETAMINOPHEN 1 TABLET: 10; 325 TABLET ORAL at 11:02

## 2025-02-08 RX ADMIN — AMLODIPINE BESYLATE 5 MG: 5 TABLET ORAL at 04:02

## 2025-02-08 RX ADMIN — CARVEDILOL 6.25 MG: 6.25 TABLET, FILM COATED ORAL at 08:02

## 2025-02-08 RX ADMIN — METHOCARBAMOL 750 MG: 750 TABLET ORAL at 11:02

## 2025-02-08 RX ADMIN — METHOCARBAMOL 750 MG: 750 TABLET ORAL at 08:02

## 2025-02-08 RX ADMIN — LIDOCAINE 5% 1 PATCH: 700 PATCH TOPICAL at 04:02

## 2025-02-08 NOTE — PLAN OF CARE
Problem: Rehabilitation (IR[facility]) Plan of Care  Goal: Plan of Care Review  Outcome: Progressing  Goal: Patient-Specific Goal (Individualized)  Outcome: Progressing  Goal: Absence of New-Onset Illness or Injury  Outcome: Progressing  Goal: Optimal Comfort and Wellbeing  Outcome: Progressing  Goal: Home and Community Transition Plan Established  Outcome: Progressing     Problem: Wound  Goal: Optimal Functional Ability  Outcome: Progressing  Goal: Absence of Infection Signs and Symptoms  Outcome: Progressing     Problem: Infection  Goal: Absence of Infection Signs and Symptoms  Outcome: Progressing     Problem: Diabetes Comorbidity  Goal: Blood Glucose Level Within Targeted Range  Outcome: Progressing     Problem: Fall Injury Risk  Goal: Absence of Fall and Fall-Related Injury  Outcome: Progressing     Problem: Skin Injury Risk Increased  Goal: Skin Health and Integrity  Outcome: Progressing

## 2025-02-08 NOTE — NURSING
PCDF incision staples removed per MD order. 20 staples removed. Incision approximated. No redness or drainage. Island dressing applied post staple removal. Patient tolerated well.

## 2025-02-09 LAB
POCT GLUCOSE: 127 MG/DL (ref 70–110)
POCT GLUCOSE: 159 MG/DL (ref 70–110)
POCT GLUCOSE: 174 MG/DL (ref 70–110)

## 2025-02-09 PROCEDURE — 25000242 PHARM REV CODE 250 ALT 637 W/ HCPCS: Performed by: NURSE PRACTITIONER

## 2025-02-09 PROCEDURE — 99900031 HC PATIENT EDUCATION (STAT)

## 2025-02-09 PROCEDURE — 11800000 HC REHAB PRIVATE ROOM

## 2025-02-09 PROCEDURE — 94640 AIRWAY INHALATION TREATMENT: CPT

## 2025-02-09 PROCEDURE — 63600175 PHARM REV CODE 636 W HCPCS: Performed by: NURSE PRACTITIONER

## 2025-02-09 PROCEDURE — 94799 UNLISTED PULMONARY SVC/PX: CPT | Mod: XB

## 2025-02-09 PROCEDURE — 94761 N-INVAS EAR/PLS OXIMETRY MLT: CPT

## 2025-02-09 PROCEDURE — 97530 THERAPEUTIC ACTIVITIES: CPT

## 2025-02-09 PROCEDURE — 25000003 PHARM REV CODE 250: Performed by: NURSE PRACTITIONER

## 2025-02-09 RX ADMIN — PREGABALIN 100 MG: 50 CAPSULE ORAL at 01:02

## 2025-02-09 RX ADMIN — FLUTICASONE FUROATE 1 PUFF: 200 POWDER RESPIRATORY (INHALATION) at 07:02

## 2025-02-09 RX ADMIN — CARVEDILOL 6.25 MG: 6.25 TABLET, FILM COATED ORAL at 05:02

## 2025-02-09 RX ADMIN — GUAIFENESIN 600 MG: 600 TABLET, EXTENDED RELEASE ORAL at 08:02

## 2025-02-09 RX ADMIN — PANTOPRAZOLE SODIUM 40 MG: 40 TABLET, DELAYED RELEASE ORAL at 07:02

## 2025-02-09 RX ADMIN — METHOCARBAMOL 750 MG: 750 TABLET ORAL at 05:02

## 2025-02-09 RX ADMIN — LOSARTAN POTASSIUM 25 MG: 25 TABLET, FILM COATED ORAL at 05:02

## 2025-02-09 RX ADMIN — UMECLIDINIUM BROMIDE AND VILANTEROL TRIFENATATE 1 PUFF: 62.5; 25 POWDER RESPIRATORY (INHALATION) at 07:02

## 2025-02-09 RX ADMIN — ASPIRIN 81 MG CHEWABLE TABLET 81 MG: 81 TABLET CHEWABLE at 07:02

## 2025-02-09 RX ADMIN — AMLODIPINE BESYLATE 5 MG: 5 TABLET ORAL at 04:02

## 2025-02-09 RX ADMIN — LINACLOTIDE 145 MCG: 145 CAPSULE, GELATIN COATED ORAL at 05:02

## 2025-02-09 RX ADMIN — METHOCARBAMOL 750 MG: 750 TABLET ORAL at 11:02

## 2025-02-09 RX ADMIN — OXYCODONE HYDROCHLORIDE AND ACETAMINOPHEN 1 TABLET: 10; 325 TABLET ORAL at 08:02

## 2025-02-09 RX ADMIN — ACETAMINOPHEN 650 MG: 325 TABLET ORAL at 03:02

## 2025-02-09 RX ADMIN — OXYCODONE HYDROCHLORIDE AND ACETAMINOPHEN 1 TABLET: 10; 325 TABLET ORAL at 11:02

## 2025-02-09 RX ADMIN — OXYCODONE HYDROCHLORIDE AND ACETAMINOPHEN 1 TABLET: 10; 325 TABLET ORAL at 07:02

## 2025-02-09 RX ADMIN — Medication: at 07:02

## 2025-02-09 RX ADMIN — BENZONATATE 100 MG: 100 CAPSULE ORAL at 07:02

## 2025-02-09 RX ADMIN — LIDOCAINE 5% 1 PATCH: 700 PATCH TOPICAL at 04:02

## 2025-02-09 RX ADMIN — DOCUSATE SODIUM 100 MG: 100 CAPSULE, LIQUID FILLED ORAL at 08:02

## 2025-02-09 RX ADMIN — CLOPIDOGREL BISULFATE 75 MG: 75 TABLET, FILM COATED ORAL at 07:02

## 2025-02-09 RX ADMIN — ALBUTEROL SULFATE 2.5 MG: 2.5 SOLUTION RESPIRATORY (INHALATION) at 09:02

## 2025-02-09 RX ADMIN — AMLODIPINE BESYLATE 5 MG: 5 TABLET ORAL at 05:02

## 2025-02-09 RX ADMIN — ALBUTEROL SULFATE 2.5 MG: 2.5 SOLUTION RESPIRATORY (INHALATION) at 07:02

## 2025-02-09 RX ADMIN — RANOLAZINE 500 MG: 500 TABLET, EXTENDED RELEASE ORAL at 08:02

## 2025-02-09 RX ADMIN — METHOCARBAMOL 750 MG: 750 TABLET ORAL at 04:02

## 2025-02-09 RX ADMIN — Medication: at 08:02

## 2025-02-09 RX ADMIN — RANOLAZINE 500 MG: 500 TABLET, EXTENDED RELEASE ORAL at 07:02

## 2025-02-09 RX ADMIN — OXYCODONE HYDROCHLORIDE AND ACETAMINOPHEN 1 TABLET: 10; 325 TABLET ORAL at 04:02

## 2025-02-09 RX ADMIN — OXYCODONE HYDROCHLORIDE AND ACETAMINOPHEN 1 TABLET: 10; 325 TABLET ORAL at 03:02

## 2025-02-09 RX ADMIN — Medication 400 MG: at 08:02

## 2025-02-09 RX ADMIN — POLYETHYLENE GLYCOL 3350 17 G: 17 POWDER, FOR SOLUTION ORAL at 07:02

## 2025-02-09 RX ADMIN — DOCUSATE SODIUM 100 MG: 100 CAPSULE, LIQUID FILLED ORAL at 07:02

## 2025-02-09 RX ADMIN — ENOXAPARIN SODIUM 40 MG: 40 INJECTION SUBCUTANEOUS at 04:02

## 2025-02-09 RX ADMIN — ATORVASTATIN CALCIUM 40 MG: 40 TABLET, FILM COATED ORAL at 07:02

## 2025-02-09 RX ADMIN — DULOXETINE HYDROCHLORIDE 30 MG: 30 CAPSULE, DELAYED RELEASE ORAL at 07:02

## 2025-02-09 RX ADMIN — CARVEDILOL 6.25 MG: 6.25 TABLET, FILM COATED ORAL at 04:02

## 2025-02-09 RX ADMIN — INSULIN ASPART 2 UNITS: 100 INJECTION, SOLUTION INTRAVENOUS; SUBCUTANEOUS at 04:02

## 2025-02-09 RX ADMIN — PREGABALIN 100 MG: 50 CAPSULE ORAL at 05:02

## 2025-02-09 RX ADMIN — PREGABALIN 100 MG: 50 CAPSULE ORAL at 08:02

## 2025-02-09 RX ADMIN — METHOCARBAMOL 750 MG: 750 TABLET ORAL at 08:02

## 2025-02-09 RX ADMIN — FINASTERIDE 5 MG: 5 TABLET, FILM COATED ORAL at 07:02

## 2025-02-09 RX ADMIN — ALPRAZOLAM 0.25 MG: 0.25 TABLET ORAL at 07:02

## 2025-02-09 RX ADMIN — GUAIFENESIN 600 MG: 600 TABLET, EXTENDED RELEASE ORAL at 07:02

## 2025-02-09 RX ADMIN — Medication 400 MG: at 07:02

## 2025-02-09 NOTE — PLAN OF CARE
Problem: Rehabilitation (IRF) Plan of Care  Goal: Plan of Care Review  2/9/2025 0420 by Miracle Mehta LPN  Outcome: Progressing  2/8/2025 2318 by Miracle Mehta LPN  Outcome: Progressing  Goal: Patient-Specific Goal (Individualized)  2/9/2025 0420 by Miracle Mehta LPN  Outcome: Progressing  2/8/2025 2318 by Miracle Mehta LPN  Outcome: Progressing  Goal: Absence of New-Onset Illness or Injury  2/9/2025 0420 by Miracle Mehta LPN  Outcome: Progressing  2/8/2025 2318 by Miracle Mehta LPN  Outcome: Progressing  Goal: Optimal Comfort and Wellbeing  2/9/2025 0420 by Miracle Mehta LPN  Outcome: Progressing  2/8/2025 2318 by Miracle Mehta LPN  Outcome: Progressing  Goal: Home and Community Transition Plan Established  2/9/2025 0420 by Miracle Mehta LPN  Outcome: Progressing  2/8/2025 2318 by Miracle Mehta LPN  Outcome: Progressing

## 2025-02-09 NOTE — PT/OT/SLP PROGRESS
Occupational Therapy Inpatient Rehab Treatment    Name: Tyler Mai  MRN: [id number]    Assessment:  Tyler Mai is a 69 y.o. male admitted with a medical diagnosis of Status post cervical spinal fusion.  He presents with the following impairments/functional limitations:  impaired endurance, weakness, impaired sensation, impaired self care skills, impaired functional mobility, gait instability, impaired balance, decreased coordination, decreased lower extremity function, decreased upper extremity function, pain, decreased safety awareness, decreased ROM, impaired coordination, impaired fine motor, orthopedic precautions.    General Precautions: Standard, fall     Orthopedic Precautions:spinal precautions     Braces: Aspen collar    Rehab Prognosis: Good; patient would benefit from acute skilled OT services to address these deficits and reach maximum level of function.      History:     Past Medical History:   Diagnosis Date    Anxiety disorder, unspecified     Arthritis     CAD (coronary artery disease)     Cervical radiculopathy     CHF (congestive heart failure)     Chronic pain syndrome     COPD (chronic obstructive pulmonary disease)     Diabetes mellitus     Gastro-esophageal reflux disease without esophagitis     High cholesterol     Hypertension     Lumbar radiculopathy     Myelomalacia of cervical cord     Other specified diseases of spinal cord     Pacemaker     PVD (peripheral vascular disease)     S/P triple vessel bypass     Tobacco use        Past Surgical History:   Procedure Laterality Date    ANTERIOR CERVICAL DISCECTOMY W/ FUSION  1995    C5-6 and C6-7.  Dr. Wall    ANTERIOR CERVICAL DISCECTOMY W/ FUSION  1996    Redo C5-6, C6-7.  Dr. Wall    CHOLECYSTECTOMY      CORONARY ARTERY BYPASS GRAFT      FUSION OF POSTERIOR COLUMN OF CERVICAL SPINE USING COMPUTER AIDED NAVIGATION N/A 1/24/2025    Procedure: FUSION, SPINE, POSTERIOR SPINAL COLUMN, CERVICAL, USING COMPUTER-ASSISTED NAVIGATION;   "Surgeon: Darrell Watson MD;  Location: [facility] OR;  Service: Neurosurgery;  Laterality: N/A;  C2-C6 PCF; C2/3-C4/5 decompression, possible additional levels  o-arm  NTI  TIVA setup  [employer] //  XX    INSERTION OF PACEMAKER      X2    TRIPPLE VESSEL BYPASS         Subjective     Orientation: Oriented x4    Chief Complaint: Pain in neck and shoulders     Patient/Family Comments/goals: "I wish I was going to my son's to watch the super bowl."    Respiratory Status: Room air    Patients cultural, spiritual, Adventist conflicts given the current situation: no       Objective:     Patient found supine with cervical collar  upon OT entry to room.    Mobility   Patient completed:  Supine to Sit with stand by assistance  Sit to Supine with stand by assistance  Sit to Stand Transfer with contact guard assistance with rolling walker  Stand to Sit Transfer with contact guard assistance with rolling walker  Bed to Chair Transfer using Step Transfer technique with contact guard assistance with rolling walker    Functional Mobility  Pt performed short FM in room with CGA using RW     Limiting Factors for ADLs: motor, endurance, limited ROM, balance, weakness, coordination, safety awareness, and pain     Therapeutic Activities  Pt completed resistive clip activity to address fine motor control,  strength, and overall coordination of bilateral UE. Pt only able to squeeze red and yellow clips due to decreased  strength and sensation in bilateral hands. Pt demo'd more difficulty manipulating clips with L hand vs R hand.   Pt completed stereognosis activity to address decreased sensation in hands. Pt instructed to close eyes when provided an object to feel in hands. Pt unable to distinguish cotton ball, clothes pin, coin, paper clip, or bolt. Both hands tested, poor stereognosis in both hands however pt was able to feel "something" in R hand when given clothes pin and bolt.   Pt completed 2x30 reps pink foam squeezes to " address  strength in bilateral hands. Tolerated well.     Pt educated on towel scrubbing to address sensation issues in bilateral hands. Pt verbalized understanding and stated he will do that on his own time.     Patient left HOB elevated with all lines intact and call button in reach.     Education provided: Roles and goals of OT, transfer training, bed mobility, modified goals, sequencing, safety precautions, and fall prevention    Multidisciplinary Problems       Occupational Therapy Goals          Problem: Occupational Therapy    Goal Priority Disciplines Outcome Interventions   Occupational Therapy Goal     OT, PT/OT Progressing    Description: ADLs:  Pt to perform grooming tasks with independence standing at sink with RW by d/c.   Pt to perform feeding tasks with independence using adaptive utensils as needed by d/c.    Pt to perform UB dressing with independence by d/c.    Pt to perform LB dressing with touch assist by d/c.    Pt to perform putting on/off footwear task with independence and AE as needed by d/c.   Pt to perform toileting with touch assist by d/c.    Pt to perform bathing with independence by d/c.     Functional Transfers:  Pt to perform toilet transfers with independence by d/c.    Pt to perform a tub transfer with independence by d/c.     IADLs:  Pt to perform simple meal prep standing at kitchen counter with touch assist by d/c.      Balance, Strengthening, Endurance, Balance:  Pt to consistently demonstrate adherence to spinal precautions during all ADL's as instructed by OT.  Pt to demonstrate good dynamic standing balance as required to perform ADL's from standing level.                       Time Tracking     OT Received On: 02/09/25  Time In 1030     Time Out 1130  Total Time 60 min  Therapy Time: OT Individual: 60  Missed Time:    Missed Time Reason:      Billable Minutes: Therapeutic Activity 60    02/09/2025

## 2025-02-10 LAB
ALBUMIN SERPL-MCNC: 3.3 G/DL (ref 3.4–4.8)
ALBUMIN/GLOB SERPL: 0.9 RATIO (ref 1.1–2)
ALP SERPL-CCNC: 161 UNIT/L (ref 40–150)
ALT SERPL-CCNC: 21 UNIT/L (ref 0–55)
ANION GAP SERPL CALC-SCNC: 9 MEQ/L
AST SERPL-CCNC: 22 UNIT/L (ref 5–34)
BASOPHILS # BLD AUTO: 0.05 X10(3)/MCL
BASOPHILS NFR BLD AUTO: 0.5 %
BILIRUB SERPL-MCNC: 0.3 MG/DL
BUN SERPL-MCNC: 28.3 MG/DL (ref 8.4–25.7)
CALCIUM SERPL-MCNC: 9.8 MG/DL (ref 8.8–10)
CHLORIDE SERPL-SCNC: 103 MMOL/L (ref 98–107)
CO2 SERPL-SCNC: 26 MMOL/L (ref 23–31)
CREAT SERPL-MCNC: 1.03 MG/DL (ref 0.72–1.25)
CREAT/UREA NIT SERPL: 27
EOSINOPHIL # BLD AUTO: 0.38 X10(3)/MCL (ref 0–0.9)
EOSINOPHIL NFR BLD AUTO: 3.8 %
ERYTHROCYTE [DISTWIDTH] IN BLOOD BY AUTOMATED COUNT: 11.9 % (ref 11.5–17)
GFR SERPLBLD CREATININE-BSD FMLA CKD-EPI: >60 ML/MIN/1.73/M2
GLOBULIN SER-MCNC: 3.7 GM/DL (ref 2.4–3.5)
GLUCOSE SERPL-MCNC: 107 MG/DL (ref 82–115)
HCT VFR BLD AUTO: 27.7 % (ref 42–52)
HGB BLD-MCNC: 9 G/DL (ref 14–18)
IMM GRANULOCYTES # BLD AUTO: 0.03 X10(3)/MCL (ref 0–0.04)
IMM GRANULOCYTES NFR BLD AUTO: 0.3 %
LYMPHOCYTES # BLD AUTO: 1.83 X10(3)/MCL (ref 0.6–4.6)
LYMPHOCYTES NFR BLD AUTO: 18.3 %
MAGNESIUM SERPL-MCNC: 1.5 MG/DL (ref 1.6–2.6)
MCH RBC QN AUTO: 30.6 PG (ref 27–31)
MCHC RBC AUTO-ENTMCNC: 32.5 G/DL (ref 33–36)
MCV RBC AUTO: 94.2 FL (ref 80–94)
MONOCYTES # BLD AUTO: 0.61 X10(3)/MCL (ref 0.1–1.3)
MONOCYTES NFR BLD AUTO: 6.1 %
NEUTROPHILS # BLD AUTO: 7.12 X10(3)/MCL (ref 2.1–9.2)
NEUTROPHILS NFR BLD AUTO: 71 %
NRBC BLD AUTO-RTO: 0 %
PHOSPHATE SERPL-MCNC: 3.2 MG/DL (ref 2.3–4.7)
PLATELET # BLD AUTO: 304 X10(3)/MCL (ref 130–400)
PMV BLD AUTO: 9.7 FL (ref 7.4–10.4)
POCT GLUCOSE: 110 MG/DL (ref 70–110)
POCT GLUCOSE: 197 MG/DL (ref 70–110)
POTASSIUM SERPL-SCNC: 4.8 MMOL/L (ref 3.5–5.1)
PREALB SERPL-MCNC: 20.9 MG/DL (ref 16–42)
PROT SERPL-MCNC: 7 GM/DL (ref 5.8–7.6)
RBC # BLD AUTO: 2.94 X10(6)/MCL (ref 4.7–6.1)
SODIUM SERPL-SCNC: 138 MMOL/L (ref 136–145)
WBC # BLD AUTO: 10.02 X10(3)/MCL (ref 4.5–11.5)

## 2025-02-10 PROCEDURE — 84100 ASSAY OF PHOSPHORUS: CPT | Performed by: NURSE PRACTITIONER

## 2025-02-10 PROCEDURE — 97110 THERAPEUTIC EXERCISES: CPT

## 2025-02-10 PROCEDURE — 83735 ASSAY OF MAGNESIUM: CPT | Performed by: NURSE PRACTITIONER

## 2025-02-10 PROCEDURE — 84134 ASSAY OF PREALBUMIN: CPT | Performed by: NURSE PRACTITIONER

## 2025-02-10 PROCEDURE — 97535 SELF CARE MNGMENT TRAINING: CPT

## 2025-02-10 PROCEDURE — 94640 AIRWAY INHALATION TREATMENT: CPT

## 2025-02-10 PROCEDURE — 25000003 PHARM REV CODE 250: Performed by: NURSE PRACTITIONER

## 2025-02-10 PROCEDURE — 97530 THERAPEUTIC ACTIVITIES: CPT | Mod: CQ

## 2025-02-10 PROCEDURE — 99900035 HC TECH TIME PER 15 MIN (STAT)

## 2025-02-10 PROCEDURE — 94799 UNLISTED PULMONARY SVC/PX: CPT | Mod: XB

## 2025-02-10 PROCEDURE — 36415 COLL VENOUS BLD VENIPUNCTURE: CPT | Performed by: NURSE PRACTITIONER

## 2025-02-10 PROCEDURE — 25000242 PHARM REV CODE 250 ALT 637 W/ HCPCS: Performed by: NURSE PRACTITIONER

## 2025-02-10 PROCEDURE — 99900031 HC PATIENT EDUCATION (STAT)

## 2025-02-10 PROCEDURE — 11800000 HC REHAB PRIVATE ROOM

## 2025-02-10 PROCEDURE — 80053 COMPREHEN METABOLIC PANEL: CPT | Performed by: NURSE PRACTITIONER

## 2025-02-10 PROCEDURE — 97116 GAIT TRAINING THERAPY: CPT | Mod: CQ

## 2025-02-10 PROCEDURE — 94761 N-INVAS EAR/PLS OXIMETRY MLT: CPT

## 2025-02-10 PROCEDURE — 63600175 PHARM REV CODE 636 W HCPCS: Performed by: NURSE PRACTITIONER

## 2025-02-10 PROCEDURE — 85025 COMPLETE CBC W/AUTO DIFF WBC: CPT | Performed by: NURSE PRACTITIONER

## 2025-02-10 RX ORDER — FUROSEMIDE 10 MG/ML
40 INJECTION INTRAMUSCULAR; INTRAVENOUS ONCE
Status: COMPLETED | OUTPATIENT
Start: 2025-02-10 | End: 2025-02-10

## 2025-02-10 RX ORDER — LANOLIN ALCOHOL/MO/W.PET/CERES
400 CREAM (GRAM) TOPICAL 3 TIMES DAILY
Status: DISCONTINUED | OUTPATIENT
Start: 2025-02-10 | End: 2025-02-19 | Stop reason: HOSPADM

## 2025-02-10 RX ORDER — MICONAZOLE NITRATE 2 G/100G
POWDER TOPICAL 2 TIMES DAILY
Status: DISCONTINUED | OUTPATIENT
Start: 2025-02-10 | End: 2025-02-19 | Stop reason: HOSPADM

## 2025-02-10 RX ADMIN — OXYCODONE HYDROCHLORIDE AND ACETAMINOPHEN 1 TABLET: 10; 325 TABLET ORAL at 02:02

## 2025-02-10 RX ADMIN — Medication 400 MG: at 09:02

## 2025-02-10 RX ADMIN — FLUTICASONE FUROATE 1 PUFF: 200 POWDER RESPIRATORY (INHALATION) at 07:02

## 2025-02-10 RX ADMIN — PREGABALIN 100 MG: 50 CAPSULE ORAL at 02:02

## 2025-02-10 RX ADMIN — CLOPIDOGREL BISULFATE 75 MG: 75 TABLET, FILM COATED ORAL at 07:02

## 2025-02-10 RX ADMIN — ALBUTEROL SULFATE 2.5 MG: 2.5 SOLUTION RESPIRATORY (INHALATION) at 06:02

## 2025-02-10 RX ADMIN — GUAIFENESIN 600 MG: 600 TABLET, EXTENDED RELEASE ORAL at 09:02

## 2025-02-10 RX ADMIN — LINACLOTIDE 145 MCG: 145 CAPSULE, GELATIN COATED ORAL at 06:02

## 2025-02-10 RX ADMIN — METHOCARBAMOL 750 MG: 750 TABLET ORAL at 09:02

## 2025-02-10 RX ADMIN — ATORVASTATIN CALCIUM 40 MG: 40 TABLET, FILM COATED ORAL at 07:02

## 2025-02-10 RX ADMIN — FUROSEMIDE 40 MG: 10 INJECTION, SOLUTION INTRAMUSCULAR; INTRAVENOUS at 10:02

## 2025-02-10 RX ADMIN — PREGABALIN 100 MG: 50 CAPSULE ORAL at 09:02

## 2025-02-10 RX ADMIN — RANOLAZINE 500 MG: 500 TABLET, EXTENDED RELEASE ORAL at 07:02

## 2025-02-10 RX ADMIN — RANOLAZINE 500 MG: 500 TABLET, EXTENDED RELEASE ORAL at 09:02

## 2025-02-10 RX ADMIN — OXYCODONE HYDROCHLORIDE AND ACETAMINOPHEN 1 TABLET: 10; 325 TABLET ORAL at 06:02

## 2025-02-10 RX ADMIN — GUAIFENESIN 600 MG: 600 TABLET, EXTENDED RELEASE ORAL at 07:02

## 2025-02-10 RX ADMIN — PREGABALIN 100 MG: 50 CAPSULE ORAL at 06:02

## 2025-02-10 RX ADMIN — PANTOPRAZOLE SODIUM 40 MG: 40 TABLET, DELAYED RELEASE ORAL at 07:02

## 2025-02-10 RX ADMIN — INSULIN ASPART 1 UNITS: 100 INJECTION, SOLUTION INTRAVENOUS; SUBCUTANEOUS at 09:02

## 2025-02-10 RX ADMIN — DOCUSATE SODIUM 100 MG: 100 CAPSULE, LIQUID FILLED ORAL at 09:02

## 2025-02-10 RX ADMIN — INSULIN ASPART 2 UNITS: 100 INJECTION, SOLUTION INTRAVENOUS; SUBCUTANEOUS at 05:02

## 2025-02-10 RX ADMIN — AMLODIPINE BESYLATE 5 MG: 5 TABLET ORAL at 05:02

## 2025-02-10 RX ADMIN — ALPRAZOLAM 0.25 MG: 0.25 TABLET ORAL at 07:02

## 2025-02-10 RX ADMIN — UMECLIDINIUM BROMIDE AND VILANTEROL TRIFENATATE 1 PUFF: 62.5; 25 POWDER RESPIRATORY (INHALATION) at 07:02

## 2025-02-10 RX ADMIN — OXYCODONE HYDROCHLORIDE AND ACETAMINOPHEN 1 TABLET: 10; 325 TABLET ORAL at 11:02

## 2025-02-10 RX ADMIN — FINASTERIDE 5 MG: 5 TABLET, FILM COATED ORAL at 07:02

## 2025-02-10 RX ADMIN — ALPRAZOLAM 0.25 MG: 0.25 TABLET ORAL at 11:02

## 2025-02-10 RX ADMIN — MICONAZOLE NITRATE 2 % TOPICAL POWDER: at 02:02

## 2025-02-10 RX ADMIN — Medication: at 07:02

## 2025-02-10 RX ADMIN — BENZONATATE 100 MG: 100 CAPSULE ORAL at 07:02

## 2025-02-10 RX ADMIN — DULOXETINE HYDROCHLORIDE 30 MG: 30 CAPSULE, DELAYED RELEASE ORAL at 07:02

## 2025-02-10 RX ADMIN — ASPIRIN 81 MG CHEWABLE TABLET 81 MG: 81 TABLET CHEWABLE at 07:02

## 2025-02-10 RX ADMIN — LOSARTAN POTASSIUM 25 MG: 25 TABLET, FILM COATED ORAL at 06:02

## 2025-02-10 RX ADMIN — ALBUTEROL SULFATE 2.5 MG: 2.5 SOLUTION RESPIRATORY (INHALATION) at 07:02

## 2025-02-10 RX ADMIN — ENOXAPARIN SODIUM 40 MG: 40 INJECTION SUBCUTANEOUS at 05:02

## 2025-02-10 RX ADMIN — CARVEDILOL 6.25 MG: 6.25 TABLET, FILM COATED ORAL at 05:02

## 2025-02-10 RX ADMIN — METHOCARBAMOL 750 MG: 750 TABLET ORAL at 05:02

## 2025-02-10 RX ADMIN — Medication 400 MG: at 02:02

## 2025-02-10 RX ADMIN — METHOCARBAMOL 750 MG: 750 TABLET ORAL at 10:02

## 2025-02-10 RX ADMIN — OXYCODONE HYDROCHLORIDE AND ACETAMINOPHEN 1 TABLET: 10; 325 TABLET ORAL at 10:02

## 2025-02-10 RX ADMIN — Medication 400 MG: at 07:02

## 2025-02-10 RX ADMIN — AMLODIPINE BESYLATE 5 MG: 5 TABLET ORAL at 06:02

## 2025-02-10 RX ADMIN — LIDOCAINE 5% 1 PATCH: 700 PATCH TOPICAL at 05:02

## 2025-02-10 RX ADMIN — CARVEDILOL 6.25 MG: 6.25 TABLET, FILM COATED ORAL at 06:02

## 2025-02-10 RX ADMIN — METHOCARBAMOL 750 MG: 750 TABLET ORAL at 06:02

## 2025-02-10 RX ADMIN — DOCUSATE SODIUM 100 MG: 100 CAPSULE, LIQUID FILLED ORAL at 07:02

## 2025-02-10 NOTE — PROGRESS NOTES
[facility] - Rehab Inpatient Services  Wound Care    Patient Name:  Tyler Mai   MRN:  [id number]  Date: 2/10/2025  Diagnosis: Status post cervical spinal fusion    History:     Past Medical History:   Diagnosis Date    Anxiety disorder, unspecified     Arthritis     CAD (coronary artery disease)     Cervical radiculopathy     CHF (congestive heart failure)     Chronic pain syndrome     COPD (chronic obstructive pulmonary disease)     Diabetes mellitus     Gastro-esophageal reflux disease without esophagitis     High cholesterol     Hypertension     Lumbar radiculopathy     Myelomalacia of cervical cord     Other specified diseases of spinal cord     Pacemaker     PVD (peripheral vascular disease)     S/P triple vessel bypass     Tobacco use        Social History     Socioeconomic History    Marital status: Single   Tobacco Use    Smoking status: Every Day     Current packs/day: 1.00     Types: Cigarettes     Social Drivers of Health     Financial Resource Strain: Patient Declined (1/18/2025)    Overall Financial Resource Strain (CARDIA)     Difficulty of Paying Living Expenses: Patient declined   Food Insecurity: Patient Declined (1/18/2025)    Hunger Vital Sign     Worried About Running Out of Food in the Last Year: Patient declined     Ran Out of Food in the Last Year: Patient declined   Transportation Needs: Unmet Transportation Needs (1/31/2025)    PRAPARE - Transportation     Lack of Transportation (Medical): Yes     Lack of Transportation (Non-Medical): Yes   Stress: Patient Declined (1/18/2025)    [employer] [place] [facility] [employer] [facility] - Occupational Stress Questionnaire     Feeling of Stress : Patient declined   Housing Stability: Patient Declined (1/18/2025)    Housing Stability Vital Sign     Unable to Pay for Housing in the Last Year: Patient declined     Homeless in the Last Year: Patient declined       Precautions:     Allergies as of 01/30/2025 - Reviewed 01/24/2025   Allergen  Reaction Noted    Hydrochlorothiazide  12/03/2024       [facility] Assessment Details/Treatment      02/10/25 1016   Pain/Comfort/Sleep   POSS ([place] Opioid-Induced Sed Scale) 1 - Awake and alert   Pain Reassessment   Pain Rating Prior to Med Admin 9        Wound 01/14/25 2202 Pressure Injury Right Heel   Date First Assessed/Time First Assessed: 01/14/25 2202   Present on Original Admission: Yes  Primary Wound Type: (c) Pressure Injury  Side: Right  Location: Heel   Pressure Injury Stage 2   Dressing Appearance Dry;Intact;Clean   Drainage Amount None   Appearance Intact;Blistered   Wound Length (cm) 5 cm   Wound Width (cm) 5 cm   Wound Surface Area (cm^2) 25 cm^2   Dressing Reinforced;Foam   Off Loading   (PRAFO)   Dressing Change Due 02/12/25        Wound 01/25/25 Other (comment) Left Heel   Date First Assessed: 01/25/25   Primary Wound Type: Other (comment)  Side: Left  Location: Heel   Appearance   (discoloration of heel/ normal for patient.)   Dressing Foam   Off Loading Other (see comments)  (PRAFO)     Right heel: remains fluid filled. No improvement.  Left heel: no change. Discoloration is normal for patient.  Bilateral PRAFO boots in use while in bed. Patient is comfortable with PRAFO.  Float heels while in recliner with PRAFO  or rolled towels.  02/10/2025

## 2025-02-10 NOTE — PT/OT/SLP PROGRESS
Physical Therapy Inpatient Rehab Treatment    Patient Name:  Tyler Mai   MRN:  [id number]    Recommendations:     Discharge Recommendations:   (pending progress)   Discharge Equipment Recommendations:     Barriers to discharge: Impaired functional mobility  and Severity of Deficits     Assessment:     Tyler Mai is a 69 y.o. male admitted with a medical diagnosis of Status post cervical spinal fusion.  He presents with the following impairments/functional limitations:  weakness, impaired endurance, impaired functional mobility, gait instability, impaired balance, decreased upper extremity function, decreased lower extremity function, pain, decreased ROM, impaired coordination, impaired fine motor, orthopedic precautions     Pt NOTE: pt tolerated treatment well today and appears in good spirits. Provided pt with HEP.     Rehab Diagnosis: Cervical spondylosis and stenosis with myelopathy and cord compression s/p C2-6 PCDF and C2-3, C4-5 decompression 1/24/2025. Pt. Has a history of cervical myelopathy, CAD, CHF, COPD, DM, HLD, HTN, and PVD    General Precautions: Standard, fall     Orthopedic Precautions:spinal precautions     Braces: Aspen collar    Rehab Prognosis: Good; patient would benefit from acute skilled PT services to address these deficits and reach maximum level of function.      History:     Past Medical History:   Diagnosis Date    Anxiety disorder, unspecified     Arthritis     CAD (coronary artery disease)     Cervical radiculopathy     CHF (congestive heart failure)     Chronic pain syndrome     COPD (chronic obstructive pulmonary disease)     Diabetes mellitus     Gastro-esophageal reflux disease without esophagitis     High cholesterol     Hypertension     Lumbar radiculopathy     Myelomalacia of cervical cord     Other specified diseases of spinal cord     Pacemaker     PVD (peripheral vascular disease)     S/P triple vessel bypass     Tobacco use        Past Surgical History:   Procedure  Laterality Date    ANTERIOR CERVICAL DISCECTOMY W/ FUSION  1995    C5-6 and C6-7.  Dr. Wall    ANTERIOR CERVICAL DISCECTOMY W/ FUSION  1996    Redo C5-6, C6-7.  Dr. Wall    CHOLECYSTECTOMY      CORONARY ARTERY BYPASS GRAFT      FUSION OF POSTERIOR COLUMN OF CERVICAL SPINE USING COMPUTER AIDED NAVIGATION N/A 1/24/2025    Procedure: FUSION, SPINE, POSTERIOR SPINAL COLUMN, CERVICAL, USING COMPUTER-ASSISTED NAVIGATION;  Surgeon: Darrell Watson MD;  [facility];  Service: Neurosurgery;  Laterality: N/A;  C2-C6 PCF; C2/3-C4/5 decompression, possible additional levels  o-arm  NTI  TIVA setup  [employer] //  XX    INSERTION OF PACEMAKER      X2    TRIPPLE VESSEL BYPASS         Subjective     Patient comments: I slept well     Respiratory Status: Room air    Patients cultural, spiritual, Mosque conflicts given the current situation: no    Objective:     Communicated with TR prior to session.  Patient found up in chair with cervical collar  upon PT entry to room.    Pt is Oriented x3 and Alert and Cooperative.    Vitals   Vitals at Rest  BP    HR    O2 Sat    Pain      Vitals With Activity  BP     HR     O2 Sat     Pain Pain Rating 1: 9/10  Location - Side 1: Left  Location - Orientation 1: upper  Location 1: shoulder  Pain Addressed 1: Reposition, Distraction       Functional Mobility:   Seated brushing teeth at sink   Overall set up assist      Current   Status  Discharge   Goal   Functional Area: Care Score:    Roll Left and Right 6 Independent   Sit to Lying 6 Independent   Lying to Sitting on Side of Bed 6  Performed in recliner pt will use at home  Independent   Sit to Stand 4  With RW Independent   Chair/Bed-to-Chair Transfer 4  With RW overall stand step t/f Independent   Car Transfer 4  With RW Independent   Walk 10 Feet 4 Independent   Walk 50 Feet with Two Turns 4  ~128' overall SBA to CGA. Noted improved CICI, occasional NBOS noted. RLE limited foot clearance with increased distance and fatigue       Walk 150 Feet 88 Independent   Walk 10 Feet Uneven Surface 4  On mat with RW overall CGA for stability  Independent       Therapeutic Activities and Exercises:  Patient educated on role of acute care PT and PT POC, safety while in hospital including calling nurse for mobility, and call light usage  Patient educated about importance of OOB mobility and remaining up in chair most of the day.    Therapeutic exercises were performed seated at edge of chair:    marches with 2 weights  hip abduction with red theraband  hip adductions isometrics against a ball  hamstring curls with red theraband  long arc quads with 2 weights  calf raises/toe raises with 2 weights  All therex performed 3x10 reps each BLE       Activity Tolerance: Good    Patient left up in chair with all lines intact and call button in reach.    Education provided: roles and goals of PT/PTA, transfer training, bed mob, gait training, balance training, safety awareness, body mechanics, assistive device, strengthening exercises, and fall prevention    Expected compliance: High compliance    Plan:     During this hospitalization, patient to be seen 5 x/week to address the identified rehab impairments via gait training, therapeutic activities, therapeutic exercises and progress toward the following goals:    GOALS:   Multidisciplinary Problems       Physical Therapy Goals          Problem: Physical Therapy    Goal Priority Disciplines Outcome Interventions   Physical Therapy Goal     PT, PT/OT Progressing    Description: Bed Mobility:  Sit to supine transfer independently.   Supine to sit transfer independently.     Transfers:  Sit to stand transfer independently using RW.   Car transfer independently using RW.    an object from the ground in standing position independently using RW.     Mobility:  Ambulate 500 feet independently using RW.  Ambulate 15 feet on uneven surfaces/ramps with setup/clean-up assist using RW.   Pt ascended/descended a 6 inch  curb independently using RW.   Ascend/descend 15 stairs independently using bilateral handrails.                         Plan of Care Expires:  02/13/25  PT Next Visit Date: 02/13/25  Plan of Care reviewed with: patient    Additional Information:         Time Tracking:     Therapy Time  PT Received On: 02/10/25  PT Start Time: 0830  PT Stop Time: 1000  PT Total Time (min): 90 min   PT Individual: 90  Missed Time:    Time Missed due to:      Billable Minutes: Gait Training 15, Therapeutic Activity 45, and Therapeutic Exercise 30    02/10/2025

## 2025-02-10 NOTE — PROGRESS NOTES
[facility] ([facility])  Rehab Progress Note    Patient Name: Tyler Mai  MRN: [id number]  Age: 69 y.o. Sex: male  : 1955  Hospital Length of Stay: 10 days  Date of Service: 2/10/2025   Chief Complaint: Cervical myelopathy s/p C2-6 PCDF and C2/3-C4/5 decompression on 2025     Subjective:     Basic Information  Admit Information: 69-year-old white male presented to [facility] ED on 2025 complaining of bilateral arm weakness, bilateral leg weakness, and worsening neck pain.  Reports right leg weaker than the left.  Recently admitted to [facility] for cervical myelopathy with plans for surgery, but patient had severe UTI requiring antibiotic therapy.  MRI on  significant for severe C3-4 level central canal stenosis with increased signal hyperintensity within the cervical cord from mid to lower aspect of C3 through mid aspect of C4 with central cord myelomalacia.  PMH significant for cervical myelopathy with history of C5-C6 fusion, CAD, HFrEF, ICMO, COPD, DM type 2, HLD, HTN, and PVD.  Workup significant for cervical myelopathy.  Patient inpatient family initially declined the neurosurgeon on call surgery.  Then surgery postpone due to inclement weather.  Tolerated posterior C3-C5 total laminectomies for decompression, placement of bilateral pars screws at C2 and bilateral lateral mass screws from C3 to C6 using [facility] Invictus OCT System, and placement of local bone and AlphaGraft DBM for arthrodesis from C2 to C6 on  without perioperative complications.  Postoperatively neurosurgery documented increase mobility to bilateral upper extremities with a 5/5 hand grasp.  Recommended aspirin hard collar at all times, and [place] collar for showers..  Recommended removal staples on .  Gray continued due to urinary retention.  Tolerated transfer to [facility] inpatient rehab unit on  without incident.   Today's Information: No acute events overnight.  Sitting up on  therapy mat.  Reports improved sleep and appetite.  Last BM 2/9.  Reports increased bilateral lower extremity edema.  He was taking Lasix orally daily at home.  BP moderately elevated.  Coreg increased 6.25 mg b.i.d. over the weekend on 02/08.  CBC and CMP unremarkable.  Magnesium 1.5.  No new imaging today.    Review of patient's allergies indicates:   Allergen Reactions    Hydrochlorothiazide      Other Reaction(s): Pancreatitis        Current Facility-Administered Medications:     acetaminophen tablet 650 mg, 650 mg, Oral, Q6H PRN, Ashley, Tony A, FNP, 650 mg at 02/09/25 0316    albuterol nebulizer solution 2.5 mg, 2.5 mg, Nebulization, Q4H PRN, Jaxson, Shannan A, FNP, 2.5 mg at 02/09/25 2117    ALPRAZolam tablet 0.25 mg, 0.25 mg, Oral, TID PRN, Ashley, Tony A, FNP, 0.25 mg at 02/09/25 0756    amLODIPine tablet 5 mg, 5 mg, Oral, BID AC, Jaxson, Shannan A, FNP, 5 mg at 02/10/25 0600    aspirin chewable tablet 81 mg, 81 mg, Oral, Daily, Ashley, Tony A, FNP, 81 mg at 02/09/25 0755    atorvastatin tablet 40 mg, 40 mg, Oral, Daily, Ashley, Tony A, FNP, 40 mg at 02/09/25 0754    balsam peru-castor oiL Oint, , Topical (Top), BID, Jaxson Shannan A, FNP, Given at 02/09/25 2049    benzonatate capsule 100 mg, 100 mg, Oral, TID PRN, Ashley, Tony A, FNP, 100 mg at 02/09/25 0754    carvediloL tablet 6.25 mg, 6.25 mg, Oral, BID AC, [place], Shannan A, FNP, 6.25 mg at 02/10/25 0600    clopidogreL tablet 75 mg, 75 mg, Oral, Daily, Ashley, Tony A, FNP, 75 mg at 02/09/25 0755    cyclobenzaprine tablet 5 mg, 5 mg, Oral, TID PRN, Shannan Puri FNP    dextrose 50% injection 12.5 g, 12.5 g, Intravenous, PRN, Tony Ramirez FNP    dextrose 50% injection 25 g, 25 g, Intravenous, PRN, Tony Ramirez FNP    docusate sodium capsule 100 mg, 100 mg, Oral, BID, Tony Ramirez FNP, 100 mg at 02/09/25 2048    DULoxetine DR capsule 30 mg, 30 mg, Oral, Daily, Shannan Puri FNP, 30  mg at 02/09/25 0755    enoxaparin injection 40 mg, 40 mg, Subcutaneous, Q24H (prophylaxis, 1700), Ashley, Tony A, FNP, 40 mg at 02/09/25 1640    finasteride tablet 5 mg, 5 mg, Oral, Daily, Ashley, Tony A, FNP, 5 mg at 02/09/25 0755    umeclidinium-vilanteroL 62.5-25 mcg/actuation DsDv 1 puff, 1 puff, Inhalation, Daily, 1 puff at 02/09/25 0756 **AND** fluticasone furoate 200 mcg/actuation inhaler 1 puff, 1 puff, Inhalation, Daily, Ashley, Tony A, FNP, 1 puff at 02/09/25 0755    glucagon (human recombinant) injection 1 mg, 1 mg, Intramuscular, PRN, Ashley, Tony A, FNP    glucose chewable tablet 16 g, 16 g, Oral, PRN, Ashley, Tony A, FNP    glucose chewable tablet 24 g, 24 g, Oral, PRN, Ashley, Tony A, FNP    guaiFENesin 12 hr tablet 600 mg, 600 mg, Oral, BID, Ashley, Tony A, FNP, 600 mg at 02/09/25 2048    hydrALAZINE injection 10 mg, 10 mg, Intravenous, Q4H PRN, Ashley, Tony A, FNP, 10 mg at 02/04/25 0629    hydrOXYzine pamoate capsule 50 mg, 50 mg, Oral, Nightly PRN, Ashley, Tony A, FNP, 50 mg at 02/03/25 2138    insulin aspart U-100 injection 0-10 Units, 0-10 Units, Subcutaneous, QID (AC + HS) PRN, Ashley, Tony A, FNP, 2 Units at 02/09/25 1646    labetalol 20 mg/4 mL (5 mg/mL) IV syring, 10 mg, Intravenous, Q4H PRN, Ashley, Tony A, FNP    LIDOcaine 5 % patch 1 patch, 1 patch, Transdermal, Q24H, Shannan Puri A, FNP, 1 patch at 02/09/25 1640    linaCLOtide capsule 145 mcg, 145 mcg, Oral, QAM, Tony Ramirez, FNP, 145 mcg at 02/10/25 0600    losartan tablet 25 mg, 25 mg, Oral, Before breakfast, Shannan Puri, FNP, 25 mg at 02/10/25 0600    magnesium oxide tablet 400 mg, 400 mg, Oral, BID, Tony Ramirez, FNP, 400 mg at 02/09/25 2048    melatonin tablet 6 mg, 6 mg, Oral, Nightly PRN, Shannan Puri, FNP, 6 mg at 02/02/25 2056    methocarbamoL tablet 750 mg, 750 mg, Oral, QID (AC & HS), Shannan Puri A, FNP, 750 mg at 02/10/25  "0600    metoprolol injection 10 mg, 10 mg, Intravenous, Q2H PRN, Ashley, Tony A, FNP    nitroGLYCERIN SL tablet 0.4 mg, 0.4 mg, Sublingual, Q5 Min PRN, Ashley, Tony A, FNP    ondansetron disintegrating tablet 4 mg, 4 mg, Oral, Q6H PRN, Ashley, Tony A, FNP    ondansetron disintegrating tablet 8 mg, 8 mg, Oral, Q8H PRN, Ashley, Tony A, FNP    ondansetron injection 4 mg, 4 mg, Intravenous, Q8H PRN, Ashley, Tony A, FNP    oxyCODONE-acetaminophen  mg per tablet 1 tablet, 1 tablet, Oral, Q4H PRN, Jaxson, Shannan A, FNP, 1 tablet at 02/10/25 0600    oxyCODONE-acetaminophen 5-325 mg per tablet 1 tablet, 1 tablet, Oral, Q4H PRN, Jaxson, Shannan A, FNP, 1 tablet at 02/05/25 0606    pantoprazole EC tablet 40 mg, 40 mg, Oral, Daily, Ashley, Tony A, FNP, 40 mg at 02/09/25 0755    polyethylene glycol packet 17 g, 17 g, Oral, BID PRN, Ashley, Tony A, FNP, 17 g at 02/09/25 0756    pregabalin capsule 100 mg, 100 mg, Oral, TID, [place], Shannan A, FNP, 100 mg at 02/10/25 0600    ranolazine 12 hr tablet 500 mg, 500 mg, Oral, BID, Ashley, Tony A, FNP, 500 mg at 02/09/25 2048     Review of Systems   Complete 12-point review of symptoms negative except for what's mentioned in HPI     Objective:     BP (!) 168/72   Pulse 79   Temp 98.4 °F (36.9 °C) (Oral)   Resp 20   Ht 5' 6" (1.676 m)   Wt 62.2 kg (137 lb 2 oz)   SpO2 96%   BMI 22.13 kg/m²      Physical Exam  Constitutional:       Appearance: Normal appearance.   HENT:      Head: Normocephalic.      Mouth/Throat:      Mouth: Mucous membranes are moist.   Eyes:      Pupils: Pupils are equal, round, and reactive to light.   Neck:      Comments: Posterior neck incision clean and intact.  Hard collar intact  Cardiovascular:      Rate and Rhythm: Normal rate and regular rhythm.      Heart sounds: Normal heart sounds.   Pulmonary:      Effort: Pulmonary effort is normal.      Breath sounds: Normal breath sounds.   Abdominal:      " General: Bowel sounds are normal.      Palpations: Abdomen is soft.   Musculoskeletal:      Cervical back: Neck supple.      Right lower le+ Pitting Edema present.      Left lower le+ Pitting Edema present.      Comments: Diffuse muscle atrophy   Skin:     General: Skin is warm and dry.   Neurological:      General: No focal deficit present.      Mental Status: He is alert and oriented to person, place, and time.      Motor: Weakness present.   Psychiatric:         Mood and Affect: Mood normal.         Behavior: Behavior normal.         Thought Content: Thought content normal.         Judgment: Judgment normal.     *MD performed and documented physical examination       Lines/Drains/Airways       Peripheral Intravenous Line  Duration                  Peripheral IV - Single Lumen 25 1853 20 G Anterior;Right Forearm 26 days                  Labs  Recent Results (from the past 24 hours)   POCT glucose    Collection Time: 25  6:32 AM   Result Value Ref Range    POCT Glucose 127 (H) 70 - 110 mg/dL   POCT glucose    Collection Time: 25  4:44 PM   Result Value Ref Range    POCT Glucose 174 (H) 70 - 110 mg/dL   POCT glucose    Collection Time: 25  8:52 PM   Result Value Ref Range    POCT Glucose 159 (H) 70 - 110 mg/dL   Comprehensive Metabolic Panel    Collection Time: 02/10/25  5:39 AM   Result Value Ref Range    Sodium 138 136 - 145 mmol/L    Potassium 4.8 3.5 - 5.1 mmol/L    Chloride 103 98 - 107 mmol/L    CO2 26 23 - 31 mmol/L    Glucose 107 82 - 115 mg/dL    Blood Urea Nitrogen 28.3 (H) 8.4 - 25.7 mg/dL    Creatinine 1.03 0.72 - 1.25 mg/dL    Calcium 9.8 8.8 - 10.0 mg/dL    Protein Total 7.0 5.8 - 7.6 gm/dL    Albumin 3.3 (L) 3.4 - 4.8 g/dL    Globulin 3.7 (H) 2.4 - 3.5 gm/dL    Albumin/Globulin Ratio 0.9 (L) 1.1 - 2.0 ratio    Bilirubin Total 0.3 <=1.5 mg/dL     (H) 40 - 150 unit/L    ALT 21 0 - 55 unit/L    AST 22 5 - 34 unit/L    eGFR >60 mL/min/1.73/m2    Anion Gap 9.0 mEq/L     BUN/Creatinine Ratio 27    Prealbumin    Collection Time: 02/10/25  5:39 AM   Result Value Ref Range    Prealbumin 20.9 16.0 - 42.0 mg/dL   Magnesium    Collection Time: 02/10/25  5:39 AM   Result Value Ref Range    Magnesium Level 1.50 (L) 1.60 - 2.60 mg/dL   Phosphorus    Collection Time: 02/10/25  5:39 AM   Result Value Ref Range    Phosphorus Level 3.2 2.3 - 4.7 mg/dL   CBC with Differential    Collection Time: 02/10/25  5:39 AM   Result Value Ref Range    WBC 10.02 4.50 - 11.50 x10(3)/mcL    RBC 2.94 (L) 4.70 - 6.10 x10(6)/mcL    Hgb 9.0 (L) 14.0 - 18.0 g/dL    Hct 27.7 (L) 42.0 - 52.0 %    MCV 94.2 (H) 80.0 - 94.0 fL    MCH 30.6 27.0 - 31.0 pg    MCHC 32.5 (L) 33.0 - 36.0 g/dL    RDW 11.9 11.5 - 17.0 %    Platelet 304 130 - 400 x10(3)/mcL    MPV 9.7 7.4 - 10.4 fL    Neut % 71.0 %    Lymph % 18.3 %    Mono % 6.1 %    Eos % 3.8 %    Basophil % 0.5 %    Imm Grans % 0.3 %    Neut # 7.12 2.1 - 9.2 x10(3)/mcL    Lymph # 1.83 0.6 - 4.6 x10(3)/mcL    Mono # 0.61 0.1 - 1.3 x10(3)/mcL    Eos # 0.38 0 - 0.9 x10(3)/mcL    Baso # 0.05 <=0.2 x10(3)/mcL    Imm Gran # 0.03 0.00 - 0.04 x10(3)/mcL    NRBC% 0.0 %   POCT glucose    Collection Time: 02/10/25  5:54 AM   Result Value Ref Range    POCT Glucose 110 70 - 110 mg/dL     Radiology  Cervical XR two-view on 01/25/2025, IMPRESSION: There are postoperative changes with posterior spinal fusion hardware at C2 through C6. The hardware appears intact. Cervical alignment has improved. The remaining vertebral body heights are preserved. There is moderate disc height loss at C3-C4, mild at C4-C5, with multilevel marginal osteophytes. The soft tissues are unremarkable.     Assessment/Plan:     69 y.o. WM admitted on 1/31/2025    Cervical myelopathy   - s/p C2-6 PCDF and C2/3-C4/5 decompression on 01/24/2025  - remove staples on 02/07  - continue aspirin hard collar at all times, okay for [place] collar for showers only  - Zanaflex discontinued on 02/05 due to orthostatic  hypotension  - continue                Robaxin 750 mg q.i.d. (decreased 2/5)                Flexeril 10 mg t.i.d. p.r.n.                Percocet 10 mg q.4 hours p.r.n. for severe pain                Lyrica 100 mg t.i.d. (increased 2/3)  - defer to physiatry for rehab and pain management  - PT/OT/RT/ST following  - to follow up with Neurosurgery outpatient     Coronary artery disease  - s/p  CABG x 3  - denies recent chest pain or discomfort  - continue                ASA 81 mg daily (resumed 1/31)                Plavix 75 mg daily (resume 1/31)                Coreg 6.25 mg b.i.d. (increased 2/8)                Lipitor 40 mg daily                Losartan 25 mg daily   - ECG and Nitro PRN  - continue cardiac diet  - to follow-up with cardiology outpatient     HFrEF/ICMO  - stable  - initiate   Lasix 40 mg IVP x1 on 2/10  - continue                Coreg 6.25 mg b.i.d. (increased 2/8)                Losartan 25 mg daily                 Ranexa 500 mg b.i.d.   - obtain daily standing weights  - to follow up with cardiology outpatient     Nicotine dependence  - smoking cessation counseling given on admission  - 1 PPD with approximately 55 pack years     HTN  -BP moderately controlled  - discontinued Flomax 0.4 mg at bedtime on 02/04 due to orthostatic hypotension  - discontinued Zanaflex on 02/05 due to orthostatic hypotension  - received NS 1 L bolus on 02/05 due to orthostatic hypotension  - continue                Coreg 6.25 mg b.i.d. (increased 2/8)                Losartan 25 mg daily                 Norvasc 5 mg daily                 Ranexa 500 mg b.i.d.                 Hydralazine 10 mg every 2 hours as needed for BP > 160/90                Labetalol 10 mg every 2 hours as needed for BP > 160/90  - low sodium diet             COPD   - stable  - not on home 02  - continue                Trelegy INH daily                   DM type II  - HgA1c 5.6 on 01/23/2024  - continue                Lantus 10 units every evening  (resumed 1/31)                ISS   - CBGs AC/HS     HLD  - FLP outpatient  - resume Crestor upon discharge  - continue                Lipitor 40 mg daily      PVD  - stable  - continue                ASA 81 mg daily (resumed 1/31)                Plavix 75 mg daily (resume 1/31)                Coreg 6.25 mg b.i.d. (increased 2/8)                Lipitor 40 mg daily  - to follow up vascular surgery outpatient                  Chronic constipation  - last BM on 02/07 (prior it was 1/28).  Reports normal BMs every 8 days.  - continue                Linzess 145 mcg before breakfast                 Colace 100 mg b.i.d.     Hypomagnesemia  - stable  - continue                Magnesium oxide 400 mg b.i.d.      BPH/urinary retention  - stable  - discontinued Flomax 0.4 mg at bedtime on 02/04 due to orthostatic hypotension  - continue                Proscar 5 mg daily (initiated 1/31)  Indwelling catheter discontinued on 02/01-good urine output with no retention reported     GERD  - Avoid spicy foods, and nothing to eat or drink within x2 hours of bedtime or laying flat (water is ok)   - Avoid NSAIDs (Advil, ibuprofen, naproxen...) and chavira-2 inhibitors (Mobic, Celebrex)    - continue                Protonix 40 mg daily      Normocytic anemia  - asymptomatic  - H/H stable   - no evidence of active bleeds  - will closely monitor and transfuse if needed     Hypomagnesemia  - magnesium 1.5 on 2/10  - s/p Mag sulfate 2 g x 1 IVPB on 02/01  - continue   Mag-Ox 400 mg tID (increased 2/10)    Congestion  - improved  - continue   Mucinex 600 mg q.12 hours x7 days (initiated 2/7)   Mucomyst nebs t.i.d. x3 doses (initiated 2/7     VTE Prophylaxis: Lovenox 40 mg daily   COVID-19 testing:  Unknown  COVID-19 vaccination status:  Vaccinated (Moderna):  Bivalent booster on 04/03/2023     POA:  No  Living will:  No  Contacts:  Tyler Mai  (son) [phone]     CODE STATUS:  Full code  Internal Medicine (attending): Tyler De Anda,  MD  Physiatry (consulting):  Philip Fang MD     OUTPATIENT PROVIDERS  PCP: None  Neurosurgery: Darrell Zavala MD     DISPOSITION:  Sleep hygiene, bowel maintenance, and appetite at goal.  BP moderately elevated.  Vital signs otherwise at goal with no recorded fevers.  CBC and CMP unremarkable.  Magnesium 1.5.  No new imaging today.  He does have significant lower extremity edema.  Initiate Lasix 40 mg IVP x1 dose now.  If BP remains elevated plan to increase Coreg 12.5 mg b.i.d..  Continue aggressive mobilization as tolerated.  Monitor closely.  Notify of acute changes.    Staffing 2/5/2025: Continent of bowel and bladder.  Assisted to the floor overnight after going to the bathroom. RT: Overall min to CGA when he participates.  Needs max encouragement. Limited by standing tolerance and generalized weakness. Appetite is good at 80%. PT: Limited by pain and poor participation.  CGA to min assist with bed mobility, ambulating 30-70 feet at CGA with walker. Limited strength and participation.  OT: Progressing, but dependent for foot wear.  Dependent with LBD.  CGA with toilet transfers. Min assist with toilet hygiene.  Set up with oral care and eating. Limited by decreased strength.  Needs max encouragement. Poor family support.  Estranged from family.  Projected discharge pending.     Jarrell Ramirez NP conducted independent physical examination and assisted with medical documentation.    Total time spent on this encounter including chart review and direct MD + NP 1-on-1 patient interaction: 53 minutes   Over 50% of this time was spent in counseling and coordination of care

## 2025-02-10 NOTE — PROGRESS NOTES
Subjective  HPI: 69 year old WM with a PMH of cervical myelopathy, CAD, CHF, COPD, DM, HLD, HTN, and PVD presented to the ED at [facility] on 1/14/25 for Neurosurgery. Patient presented in ED following discharge from [facility]. He reports associated symptoms of bilateral arm weakness, bilateral leg weakness, and worsening neck pain. Notes right leg is weaker than left. He also complains of bilateral hand, bilateral leg, and bilateral feet numbness. He stated that he has been unable to care for himself at home because of the weakness, and having to use a wheelchair to get around. Patient's daughter at bedside reports frequent falls at home. No urinary or fecal incontinence. Per chart review, the patient was admitted to [facility] for cervical myelopathy with the plan of surgery. Previous extensive admission at [facility] for UTI which cleared and resolved with antibiotics; initially, did not have surgery secondary to UTI. Now amendable for surgical intervention since UTI has resolved. Patient refused surgery with on-call neurosurgeon at [facility] and discharged at noon today. He was advised to come here for the surgery. MRI on 01/05 showed severe C3-4 level central canal stenosis with increased signal hyperintensity within the cervical cord from mid to lower aspect of C3 through mid aspect of C4 with central cord myelomalacia. No syrinx is detected. Prior C5-C7 intervertebral body fusion. Neurosurgery was consulted with recommendation for surgical intervention needed. Cardiology consulted with low risk for MACE for high risk neurosurgical procedure. On 1/24, patient underwent C2-6 PCDF and C2-3- C4-5 decompression by Dr. Ramírez. 1 Hemovac drain placed to site. Hard cervical collar in place. On 1/25, wound care was consulted for right heel and left heel wound with recommendation to cleanse bilateral heels with normal saline, paint with betadine, apply small foam, and change every 2 days. Recommended bilateral heel boots. Marina was  removed. Labs showed low Na of 133, elevated BUN/Cr of 29.7/ 1.31, and low H&H of 10.6 & 31.9. BP ranging 125/58- 182/80. PT/OT efvals completed with deficits noted with recommendation for high intensity therapy needed. ON 1/26, Labs showed low H&H of 10.1 & 29.5. Patient having urinary retention requiring in and out caths. On 1/27, hemovac removed. Neurosurgery recommended to DC staples on POD 14, will need follow up in 2 weeks after DC from hospital with XR uprights of cervical spine with Izzy, NP, continued Percocet and Robaxin for pain management, and signed off of case. Herrera had to be reinserted for urinary retention, and started Flomax. BP ranging 123/61- 169/70. On 1/28, patient started on Relistar for constipation with 2 large Bms noted. Labs showed low H&H of 9.2 & 27.3. On 1/30, wound care reassessed with 2 wounds noted to bilateral anterior legs. Cleansed with soap and water, and applied aquaphor to sites. Patient is AAOx4.   Participating with therapy. Functional status includes setup assist needed for eating, minimal assist for bed mobility, contact guard assist for transfers with RW, walked 28ft with RW at minimal assist, minimal assist for toilet transfer, total assist for toileting due to herrera in place, stand by assist for grooming, max assist for upper body dressing, and max assist for lower body dressing. Patient was evaluated, accepted, and admitted to inpatient rehab to improve functional status. Transferred to [facility] on 1/31 without incident.     2/10: Seen with PT, seated in WC. Reports good sleep. States that he is still hurting in same spot of left trap. Biofreeze applied which was appreciative. VC for good posture in WC. States that it is helpful.  Notes swelling to LUE and BLE this morning. States that he is on lasix at home. Relayed to IM NP. Encouraged patient to cut back on soft drinks, and drink more water. Voices understanding. Participating in therapy. VSSAF with noted /72 at  0830am. IM following.            Review of Systems  Psychiatric: Denies mental health history. Pt. Smokes cigarettes.     Depression/Anxiety: notes some anxiety in regards to eating and getting enough nutrition to his mouth     DULoxetine DR capsule 30 mg qd, start 2/1  ALPRAZolam tablet 0.25 mg TID PRN Anxiety  Pain: L>R side of neck, numbness (hands, legs, feet), shooting pains  DULoxetine DR capsule 30 mg qd, start 2/1  methocarbamoL tablet 750 mg AC & HS  pregabalin capsule 100mg TID  LIDOcaine 5 % patch 1 patch neck q24hr, 1800  acetaminophen tablet 650 mg q6h PRN mild pain   oxyCODONE-acetaminophen 5-325 mg 1 tablet q4h PRN mod pain  oxyCODONE-acetaminophen  mg per tablet 1 tablet q4h PRN severe pain  cyclobenzaprine tablet 5 mg TID PRN spasm  Bowels/Bladder: Last BM 2/9 (formed moderate) 2/7 following suppository (finally agreeable and said it worked within 15 minutes); 1/28 x 2 (large) following Relistor. Previously refusing suppository  Gray catheter reinserted 2/2 urinary retention-discontinued. Urinating    docusate sodium capsule 100 mg BID  linaCLOtide capsule 145 mcg qAM  tamsulosin 24 hr capsule 0.4 mg qHS  Appetite: good. Hungry. Intake difficult functionally with hand paresthesias. Assist. Improving with built up utensils   Sleep: good with melatonin  melatonin tablet 6 mg qHS PRN Insomnia              Physical Exam  General: well-developed, well-nourished, in no acute distress  Neck: hard collar, supple  Respiratory: equal chest rise, no SOB, no audible wheeze  Cardiovascular: regular rate and rhythm, no edema  Gastrointestinal: soft, non-tender, non-distended   Musculoskeletal: BUE/BLE weakness  Integumentary: no rashes, bilateral heel wounds, bilateral anterior leg wounds, posterior cervical incision-staples-dressing-c/d/i  Neurologic: cranial nerves intact, BUE/BLE weakness, numbness (hands, legs, feet), balance deficits                      Labs:   Latest Reference Range & Units 02/10/25  05:39   WBC 4.50 - 11.50 x10(3)/mcL 10.02   RBC 4.70 - 6.10 x10(6)/mcL 2.94 (L)   Hemoglobin 14.0 - 18.0 g/dL 9.0 (L)   Hematocrit 42.0 - 52.0 % 27.7 (L)   MCV 80.0 - 94.0 fL 94.2 (H)   MCH 27.0 - 31.0 pg 30.6   MCHC 33.0 - 36.0 g/dL 32.5 (L)   RDW 11.5 - 17.0 % 11.9   Platelet Count 130 - 400 x10(3)/mcL 304   MPV 7.4 - 10.4 fL 9.7   Neut % % 71.0   LYMPH % % 18.3   Mono % % 6.1   Eos % % 3.8   Basophil % % 0.5   Immature Granulocytes % 0.3   Neut # 2.1 - 9.2 x10(3)/mcL 7.12   Lymph # 0.6 - 4.6 x10(3)/mcL 1.83   Mono # 0.1 - 1.3 x10(3)/mcL 0.61   Eos # 0 - 0.9 x10(3)/mcL 0.38   Baso # <=0.2 x10(3)/mcL 0.05   Immature Grans (Abs) 0.00 - 0.04 x10(3)/mcL 0.03   nRBC % 0.0   Sodium 136 - 145 mmol/L 138   Potassium 3.5 - 5.1 mmol/L 4.8   Chloride 98 - 107 mmol/L 103   CO2 23 - 31 mmol/L 26   Anion Gap mEq/L 9.0   BUN 8.4 - 25.7 mg/dL 28.3 (H)   Creatinine 0.72 - 1.25 mg/dL 1.03   BUN/CREAT RATIO  27   eGFR mL/min/1.73/m2 >60   Glucose 82 - 115 mg/dL 107   Calcium 8.8 - 10.0 mg/dL 9.8   Phosphorus Level 2.3 - 4.7 mg/dL 3.2   Magnesium  1.60 - 2.60 mg/dL 1.50 (L)   ALP 40 - 150 unit/L 161 (H)   PROTEIN TOTAL 5.8 - 7.6 gm/dL 7.0   Albumin 3.4 - 4.8 g/dL 3.3 (L)   Albumin/Globulin Ratio 1.1 - 2.0 ratio 0.9 (L)   Prealbumin 16.0 - 42.0 mg/dL 20.9   BILIRUBIN TOTAL <=1.5 mg/dL 0.3   AST 5 - 34 unit/L 22   ALT 0 - 55 unit/L 21   Globulin, Total 2.4 - 3.5 gm/dL 3.7 (H)   (L): Data is abnormally low  (H): Data is abnormally high              Assessment/Plan  Hospital   Anemia   Hyponatremia   Spinal cord compression   Status post cervical spinal fusion   Hypokalemia     Non-Hospital   HTN (hypertension)   Hyperlipidemia   Ischemic cardiomyopathy with implantable cardioverter-defibrillator (ICD)   Tobacco user   Type 2 diabetes mellitus   Peripheral arterial disease with history of revascularization   Chronic systolic CHF (congestive heart failure), NYHA class 3   Chronic low back pain   Cervical myelopathy   Carotid artery  stenosis, asymptomatic, left   Arteriosclerosis of coronary artery   S/P triple vessel bypass   COPD (chronic obstructive pulmonary disease)   Gastro-esophageal reflux disease without esophagitis   Anxiety disorder, unspecified   Cervical radiculopathy   Lumbar radiculopathy   Myelomalacia of cervical cord       Wounds: bilateral heel wounds, bilateral anterior leg wounds, posterior cervical incision-staples-dressing-c/d/i  On 1/24, patient underwent C2-6 PCDF and C2-3- C4-5 decompression by Dr. aRmírez.  (Hemovac drain placed to site)  Precautions: spinal  Bracing: Hard cervical collar, bilateral heel boots in bed  Swallowing: Regular Diet  Function: Tolerating therapy. Continue PT/OT  VTE Prophylaxis:   enoxaparin injection 40 mg q24hr  Code Status: FULL CODE   Discharge: Lives alone in [place] in a 2nd story apartment. Completed high school. He has no  history.  Is disabled. . He was living alone and completely independent. Children: (2). Date pending.

## 2025-02-10 NOTE — PT/OT/SLP PROGRESS
Physical Therapy Inpatient Rehab Treatment    Patient Name:  Tyler Mai   MRN:  [id number]    Recommendations:     Discharge Recommendations:   (pending progress)   Discharge Equipment Recommendations:     Barriers to discharge: Impaired functional mobility  and Severity of Deficits     Assessment:     Tyler Mai is a 69 y.o. male admitted with a medical diagnosis of Status post cervical spinal fusion.  He presents with the following impairments/functional limitations:  weakness, impaired endurance, impaired functional mobility, gait instability, impaired balance, decreased upper extremity function, decreased lower extremity function, pain, decreased ROM, impaired coordination, impaired fine motor, orthopedic precautions .    PT NOTE: Pt was given lasix prior to start of session and pt had urge to urinate during session; pt was given pain medication during session.    Rehab Diagnosis: Cervical spondylosis and stenosis with myelopathy and cord compression s/p C2-6 PCDF and C2-3, C4-5 decompression 1/24/2025. Pt. Has a history of cervical myelopathy, CAD, CHF, COPD, DM, HLD, HTN, and PVD    General Precautions: Standard, fall     Orthopedic Precautions:spinal precautions     Braces: Aspen collar    Rehab Prognosis: Good; patient would benefit from acute skilled PT services to address these deficits and reach maximum level of function.      History:     Past Medical History:   Diagnosis Date    Anxiety disorder, unspecified     Arthritis     CAD (coronary artery disease)     Cervical radiculopathy     CHF (congestive heart failure)     Chronic pain syndrome     COPD (chronic obstructive pulmonary disease)     Diabetes mellitus     Gastro-esophageal reflux disease without esophagitis     High cholesterol     Hypertension     Lumbar radiculopathy     Myelomalacia of cervical cord     Other specified diseases of spinal cord     Pacemaker     PVD (peripheral vascular disease)     S/P triple vessel bypass      Tobacco use        Past Surgical History:   Procedure Laterality Date    ANTERIOR CERVICAL DISCECTOMY W/ FUSION  1995    C5-6 and C6-7.  Dr. Wall    ANTERIOR CERVICAL DISCECTOMY W/ FUSION  1996    Redo C5-6, C6-7.  Dr. Wall    CHOLECYSTECTOMY      CORONARY ARTERY BYPASS GRAFT      FUSION OF POSTERIOR COLUMN OF CERVICAL SPINE USING COMPUTER AIDED NAVIGATION N/A 1/24/2025    Procedure: FUSION, SPINE, POSTERIOR SPINAL COLUMN, CERVICAL, USING COMPUTER-ASSISTED NAVIGATION;  Surgeon: Darrell Watson MD;  Location: [facility];  Service: Neurosurgery;  Laterality: N/A;  C2-C6 PCF; C2/3-C4/5 decompression, possible additional levels  o-arm  NTI  TIVA setup  [employer] //  XX    INSERTION OF PACEMAKER      X2    TRIPPLE VESSEL BYPASS         Subjective     Patient comments: left side of neck hurts    Respiratory Status: Room air    Patients cultural, spiritual, Denominational conflicts given the current situation: no    Objective:     Communicated with nsg prior to session.  Patient found  in bathroom toileting with nsg  with cervical collar  upon PT entry to room.    Pt is Alert, Cooperative, Motivated, and Easily distractible.    Vitals   11:00  BP (!) 150/68   HR 76   O2 Sat     Pain Pain Rating 1: 8/10  Location - Side 1: Left  Location - Orientation 1: lateral  Location 1: neck  Pain Addressed 1: Reposition, Distraction  Pain Rating Post-Intervention 1: 9/10       Functional Mobility:   Bed Mobility:     Sit to Supine: modified independent - use of bed rails against PT advice     Current   Status  Discharge   Goal   Functional Area: Care Score:    Sit to Stand 4  Cga with RW Independent   Chair/Bed-to-Chair Transfer 4  Cga with RW, step transfer Independent   Walk 10 Feet 4 Independent   Walk 50 Feet with Two Turns 4  Cga with RW, Pt ambulated 100', 76', and 80' with step-through, scissor gait pattern         Therapeutic Activities and Exercises:  Patient educated on role of acute care PT and PT POC, safety while in  hospital including calling nurse for mobility, and call light usage  Patient educated about importance of OOB mobility and remaining up in chair most of the day.  Patient educated about pursed lip breathing technique and cued for use with mobility.  Pt performed 3 STS from WC and 3 STS from normal chair height.  Pt performed toileting transfer, negative void; increased time required; cga for clothing management.  Pt performed static sitting between activities; seated rest breaks between trials.    Timed Up & Go Test (TUG)  Assistive Device and/or Bracing Used: Rolling Walker  TUG Time: 31.48 seconds   Test time of > or equal to 12 sec. are associated with high fall risk.    Initial TUG at 35.40 seconds    5 times Sit to Stand Test     Total Secs: 20.63      Stroke    >12 secs discriminates healthy adults from individuals with stroke     Parkinson's   > 16 secs increases fall risk     Community dwelling older adults  > 15 seconds predicts recurrent ramona   > or equal to 12 seconds identified the need for further assess falls    Balance and or vestibular disorder    > or equal to 13 secs = balance dysfunction      Activity Tolerance: Excellent    Patient left up in chair with call button in reach and MEG Ribeiro present.    Education provided: roles and goals of PT/PTA, transfer training, bed mob, gait training, balance training, safety awareness, body mechanics, assistive device, fall prevention, and spinal precautions    Expected compliance: High compliance    Plan:     During this hospitalization, patient to be seen 5 x/week to address the identified rehab impairments via gait training, therapeutic activities, therapeutic exercises and progress toward the following goals:    GOALS:   Multidisciplinary Problems       Physical Therapy Goals          Problem: Physical Therapy    Goal Priority Disciplines Outcome Interventions   Physical Therapy Goal     PT, PT/OT Progressing    Description: Bed Mobility:  Sit to  supine transfer independently.   Supine to sit transfer independently.     Transfers:  Sit to stand transfer independently using RW.   Car transfer independently using RW.    an object from the ground in standing position independently using RW.     Mobility:  Ambulate 500 feet independently using RW.  Ambulate 15 feet on uneven surfaces/ramps with setup/clean-up assist using RW.   Pt ascended/descended a 6 inch curb independently using RW.   Ascend/descend 15 stairs independently using bilateral handrails.                         Plan of Care Expires:  02/13/25  PT Next Visit Date: 02/13/25  Plan of Care reviewed with: patient    Additional Information:         Time Tracking:     Therapy Time  PT Received On: 02/10/25  PT Start Time: 1400  PT Stop Time: 1500  PT Total Time (min): 60 min   PT Individual: 60  Missed Time:    Time Missed due to:      Billable Minutes: Gait Training 15 and Therapeutic Activity 45    02/10/2025

## 2025-02-10 NOTE — PT/OT/SLP PROGRESS
Occupational Therapy Inpatient Rehab Treatment    Name: Tyler Mai  MRN: [id number]    Assessment:  Tyler Mai is a 69 y.o. male admitted with a medical diagnosis of Status post cervical spinal fusion.  He presents with the following impairments/functional limitations:  weakness, impaired endurance, impaired sensation, impaired self care skills, impaired functional mobility, gait instability, impaired balance, decreased coordination, decreased upper extremity function, decreased lower extremity function, decreased safety awareness, pain, decreased ROM, impaired coordination, impaired fine motor, orthopedic precautions.    General Precautions: Standard, fall     Orthopedic Precautions:spinal precautions     Braces: Aspen collar    Rehab Prognosis: Good; patient would benefit from acute skilled OT services to address these deficits and reach maximum level of function.      History:     Past Medical History:   Diagnosis Date    Anxiety disorder, unspecified     Arthritis     CAD (coronary artery disease)     Cervical radiculopathy     CHF (congestive heart failure)     Chronic pain syndrome     COPD (chronic obstructive pulmonary disease)     Diabetes mellitus     Gastro-esophageal reflux disease without esophagitis     High cholesterol     Hypertension     Lumbar radiculopathy     Myelomalacia of cervical cord     Other specified diseases of spinal cord     Pacemaker     PVD (peripheral vascular disease)     S/P triple vessel bypass     Tobacco use        Past Surgical History:   Procedure Laterality Date    ANTERIOR CERVICAL DISCECTOMY W/ FUSION  1995    C5-6 and C6-7.  Dr. Wall    ANTERIOR CERVICAL DISCECTOMY W/ FUSION  1996    Redo C5-6, C6-7.  Dr. Wall    CHOLECYSTECTOMY      CORONARY ARTERY BYPASS GRAFT      FUSION OF POSTERIOR COLUMN OF CERVICAL SPINE USING COMPUTER AIDED NAVIGATION N/A 1/24/2025    Procedure: FUSION, SPINE, POSTERIOR SPINAL COLUMN, CERVICAL, USING COMPUTER-ASSISTED NAVIGATION;   "Surgeon: Darrell Watson MD;  Location: [facility];  Service: Neurosurgery;  Laterality: N/A;  C2-C6 PCF; C2/3-C4/5 decompression, possible additional levels  o-arm  NTI  TIVA setup  [employer] //  XX    INSERTION OF PACEMAKER      X2    TRIPPLE VESSEL BYPASS         Subjective     Orientation: Oriented x4    Chief Complaint: getting breakfast late    Patient/Family Comments/goals: "I feel good this morning."    Vitals   Vitals at Rest  BP    HR    O2 Sat    Pain 0/10     Respiratory Status: Room air    Patients cultural, spiritual, Jewish conflicts given the current situation: no     Objective:     Patient found up in chair with cervical collar  upon OT entry to room.    Mobility   Patient completed:  Sit to Stand Transfer with supervision with rolling walker  Stand to Sit Transfer with supervision with rolling walker  Toilet Transfer Step Transfer technique with contact guard assistance with  rolling walker  Tub Transfer Step Transfer technique with contact guard assistance with rolling walker    Functional Mobility  In room mobility with RW with CGA for safety with verbal cues to spread feet apart with each step.    ADLs   Current Status   Eating 5   Oral Hygiene 5 seated in W/C   Shower, Bathe Self 3 anterior/posterior shakila area, lower legs   Upper Body Dressing 3 assist with collar and bringing over head   Lower Body Dressing 2 used dressing stick, assist with threading LE and bringing over hips. Pt able to bring up from knees to hips   Toileting Hygiene 3 assist with wiping and pulling up all the way over hips   Toilet Transfer 4 CGA with RW onto BSC over toilet   Putting On, Taking Off Footwear 4 Sock aid with verbal cues     Limiting Factors for ADLs: motor, sensory, endurance, limited ROM, balance, weakness, perceptual, coordination, and safety awareness     Patient left up in chair with all lines intact and PTStephanie present.     Education provided: Roles and goals of OT, ADLs, transfer training, " assistive device, sequencing, safety precautions, fall prevention, and post-op precautions    Multidisciplinary Problems       Occupational Therapy Goals          Problem: Occupational Therapy    Goal Priority Disciplines Outcome Interventions   Occupational Therapy Goal     OT, PT/OT Progressing    Description: ADLs:  Pt to perform grooming tasks with independence standing at sink with RW by d/c.   Pt to perform feeding tasks with independence using adaptive utensils as needed by d/c.    Pt to perform UB dressing with independence by d/c.    Pt to perform LB dressing with touch assist by d/c.    Pt to perform putting on/off footwear task with independence and AE as needed by d/c.   Pt to perform toileting with touch assist by d/c.    Pt to perform bathing with independence by d/c.     Functional Transfers:  Pt to perform toilet transfers with independence by d/c.    Pt to perform a tub transfer with independence by d/c.     IADLs:  Pt to perform simple meal prep standing at kitchen counter with touch assist by d/c.      Balance, Strengthening, Endurance, Balance:  Pt to consistently demonstrate adherence to spinal precautions during all ADL's as instructed by OT.  Pt to demonstrate good dynamic standing balance as required to perform ADL's from standing level.                         Time Tracking     OT Received On: 02/10/25  Time In 0730     Time Out 0830  Total Time 60 min  Therapy Time: OT Individual: 60  Missed Time:    Missed Time Reason:      Billable Minutes: Self Care/Home Management 60    02/10/2025

## 2025-02-10 NOTE — PT/OT/SLP RE-EVAL
Recreational Therapy Re-Evaluation      Date of Treatment: 02/10/25  Start Time: 1100  Stop Time: 1130  Total Time: 30 min  Missed Time:    Assessment      Tyler Mai is a 69 y.o. male admitted with a medical diagnosis of Status post cervical spinal fusion.  He presents with the following impairments/functional limitations:  weakness, impaired endurance, impaired functional mobility, gait instability, impaired balance, decreased coordination, decreased upper extremity function, decreased lower extremity function, decreased safety awareness, decreased ROM, impaired coordination, impaired fine motor .    Rehab Diagnosis:     Recent Surgery:    General Precautions: Standard, fall     Orthopedic Precautions:spinal precautions     Braces: Aspen collar    Rehab Prognosis: Good; patient would benefit from acute skilled Recreational Therapy services to address these deficits and reach maximum level of function.      Impairments: Balance deficits, Coordination deficits, Endurance deficits, Mobility deficits, Safety awareness deficits, and Strength deficits  Rehab Potential: Good  Treatment Recommendations: Continue with current plan of care   Treatment Diagnosis: Cervical spondylosis and stenosis, myelopathy and cord compression, s/p C2-6 PCDF and C-2-3, C4-5 ,decompression, CAD, CHF, COPD, DM, HLD, HTN, PVD  Orientation: Oriented x4  Affect/Behavior: Appropriate and Cooperative  Safety/Judgement: intact   Basic Command Following: intact  Spiritual Cultural: no        History     Past Medical History:   Diagnosis Date    Anxiety disorder, unspecified     Arthritis     CAD (coronary artery disease)     Cervical radiculopathy     CHF (congestive heart failure)     Chronic pain syndrome     COPD (chronic obstructive pulmonary disease)     Diabetes mellitus     Gastro-esophageal reflux disease without esophagitis     High cholesterol     Hypertension     Lumbar radiculopathy     Myelomalacia of cervical cord     Other  specified diseases of spinal cord     Pacemaker     PVD (peripheral vascular disease)     S/P triple vessel bypass     Tobacco use        Past Surgical History:   Procedure Laterality Date    ANTERIOR CERVICAL DISCECTOMY W/ FUSION  1995    C5-6 and C6-7.  Dr. Wall    ANTERIOR CERVICAL DISCECTOMY W/ FUSION  1996    Redo C5-6, C6-7.  Dr. Wall    CHOLECYSTECTOMY      CORONARY ARTERY BYPASS GRAFT      FUSION OF POSTERIOR COLUMN OF CERVICAL SPINE USING COMPUTER AIDED NAVIGATION N/A 1/24/2025    Procedure: FUSION, SPINE, POSTERIOR SPINAL COLUMN, CERVICAL, USING COMPUTER-ASSISTED NAVIGATION;  Surgeon: Darrell Watson MD;  Location: [facility];  Service: Neurosurgery;  Laterality: N/A;  C2-C6 PCF; C2/3-C4/5 decompression, possible additional levels  o-arm  NTI  TIVA setup  [employer] //  XX    INSERTION OF PACEMAKER      X2    TRIPPLE VESSEL BYPASS         Home Environment     Admit Date: 01/31/25  Living Situation  People in Home: alone  Lives in: apartment  Patients Responsibilities: Community mobility, , Financial management, Health and wellness, Laundry, Leisure/play/hobbies, Meal preparation, Shopping, Other (Comment) (Disaabled)  Number of Children: 2  Occupation:[employer]    Instrumental Activities of Daily Living     Previous Hand Dominance: Right Current Hand Dominance: Right     Other iADL Information:        Cognitive Skills Building         Cognitive Observation Activity Assist Position Equipment Response            Comment:      Dynamic Activities      Activity Assist Position Equipment Response   Activity 1 Bean bag toos modified independence and supervision Standing Rolling walker and Bean bags, 1lb wt weights good   Comment: Recliner to w/c transfer was supervision. Sit to stand was setup as was dynamic standing balance/reaching.  Standing tolerance increased to 10 minutes with 2 sitting rest breaks.  1 lb wrist weights were placed on both UE.  Pt said he felt like weights stabilized his wrists.  "UE coordination improved to setup.  Problem solving and sequencing skills were I. Active participation today       Fine Motor Activities      Activity Assist Position Equipment Response           Comment:        Goals     Patient Goals  Patient Goal 1: "Get healthy, move around and do for myself."    Short Term Goals    Goal  Goal Status   Will increase sit to stand to supervision Met   Will improve dynamic standing balance/reaching to supervision Met                 Long Term Goals    Goal Goal Status   Will increase standing tolerance to 5 minutes Met   Will improve dynamic standing balance/reaching to setup Progressing                     Plan       Patient to be seen: Daily  Duration: 2 weeks  Treatments planned: Balance training, Coordination, Energy conservation training, Fine motor, Safety education  Treatment plan/goals established with Patient/Caregiver: Yes     "

## 2025-02-11 LAB
POCT GLUCOSE: 107 MG/DL (ref 70–110)
POCT GLUCOSE: 133 MG/DL (ref 70–110)
POCT GLUCOSE: 170 MG/DL (ref 70–110)
POCT GLUCOSE: 179 MG/DL (ref 70–110)

## 2025-02-11 PROCEDURE — 94799 UNLISTED PULMONARY SVC/PX: CPT | Mod: XB

## 2025-02-11 PROCEDURE — 97116 GAIT TRAINING THERAPY: CPT

## 2025-02-11 PROCEDURE — 63600175 PHARM REV CODE 636 W HCPCS: Performed by: NURSE PRACTITIONER

## 2025-02-11 PROCEDURE — 97110 THERAPEUTIC EXERCISES: CPT

## 2025-02-11 PROCEDURE — 97535 SELF CARE MNGMENT TRAINING: CPT

## 2025-02-11 PROCEDURE — 94640 AIRWAY INHALATION TREATMENT: CPT

## 2025-02-11 PROCEDURE — 97530 THERAPEUTIC ACTIVITIES: CPT

## 2025-02-11 PROCEDURE — 99900031 HC PATIENT EDUCATION (STAT)

## 2025-02-11 PROCEDURE — 25000003 PHARM REV CODE 250: Performed by: NURSE PRACTITIONER

## 2025-02-11 PROCEDURE — 25000242 PHARM REV CODE 250 ALT 637 W/ HCPCS: Performed by: NURSE PRACTITIONER

## 2025-02-11 PROCEDURE — 94761 N-INVAS EAR/PLS OXIMETRY MLT: CPT

## 2025-02-11 PROCEDURE — 11800000 HC REHAB PRIVATE ROOM

## 2025-02-11 RX ORDER — CARVEDILOL 6.25 MG/1
6.25 TABLET ORAL ONCE
Status: COMPLETED | OUTPATIENT
Start: 2025-02-11 | End: 2025-02-11

## 2025-02-11 RX ORDER — FUROSEMIDE 40 MG/1
80 TABLET ORAL DAILY
Status: DISCONTINUED | OUTPATIENT
Start: 2025-02-11 | End: 2025-02-12

## 2025-02-11 RX ORDER — FUROSEMIDE 10 MG/ML
40 INJECTION INTRAMUSCULAR; INTRAVENOUS ONCE
Status: DISCONTINUED | OUTPATIENT
Start: 2025-02-11 | End: 2025-02-11

## 2025-02-11 RX ORDER — CARVEDILOL 6.25 MG/1
12.5 TABLET ORAL
Status: DISCONTINUED | OUTPATIENT
Start: 2025-02-11 | End: 2025-02-12

## 2025-02-11 RX ADMIN — PREGABALIN 100 MG: 50 CAPSULE ORAL at 05:02

## 2025-02-11 RX ADMIN — PREGABALIN 100 MG: 50 CAPSULE ORAL at 01:02

## 2025-02-11 RX ADMIN — ALBUTEROL SULFATE 2.5 MG: 2.5 SOLUTION RESPIRATORY (INHALATION) at 08:02

## 2025-02-11 RX ADMIN — CARVEDILOL 6.25 MG: 6.25 TABLET, FILM COATED ORAL at 08:02

## 2025-02-11 RX ADMIN — LIDOCAINE 5% 1 PATCH: 700 PATCH TOPICAL at 06:02

## 2025-02-11 RX ADMIN — OXYCODONE HYDROCHLORIDE AND ACETAMINOPHEN 1 TABLET: 10; 325 TABLET ORAL at 09:02

## 2025-02-11 RX ADMIN — CARVEDILOL 6.25 MG: 6.25 TABLET, FILM COATED ORAL at 05:02

## 2025-02-11 RX ADMIN — ENOXAPARIN SODIUM 40 MG: 40 INJECTION SUBCUTANEOUS at 04:02

## 2025-02-11 RX ADMIN — ALBUTEROL SULFATE 2.5 MG: 2.5 SOLUTION RESPIRATORY (INHALATION) at 10:02

## 2025-02-11 RX ADMIN — OXYCODONE HYDROCHLORIDE AND ACETAMINOPHEN 1 TABLET: 10; 325 TABLET ORAL at 05:02

## 2025-02-11 RX ADMIN — DOCUSATE SODIUM 100 MG: 100 CAPSULE, LIQUID FILLED ORAL at 08:02

## 2025-02-11 RX ADMIN — Medication 400 MG: at 05:02

## 2025-02-11 RX ADMIN — GUAIFENESIN 600 MG: 600 TABLET, EXTENDED RELEASE ORAL at 08:02

## 2025-02-11 RX ADMIN — CLOPIDOGREL BISULFATE 75 MG: 75 TABLET, FILM COATED ORAL at 08:02

## 2025-02-11 RX ADMIN — OXYCODONE HYDROCHLORIDE AND ACETAMINOPHEN 1 TABLET: 10; 325 TABLET ORAL at 01:02

## 2025-02-11 RX ADMIN — PREGABALIN 100 MG: 50 CAPSULE ORAL at 08:02

## 2025-02-11 RX ADMIN — METHOCARBAMOL 750 MG: 750 TABLET ORAL at 04:02

## 2025-02-11 RX ADMIN — AMLODIPINE BESYLATE 5 MG: 5 TABLET ORAL at 05:02

## 2025-02-11 RX ADMIN — FUROSEMIDE 80 MG: 40 TABLET ORAL at 01:02

## 2025-02-11 RX ADMIN — ATORVASTATIN CALCIUM 40 MG: 40 TABLET, FILM COATED ORAL at 08:02

## 2025-02-11 RX ADMIN — Medication 400 MG: at 01:02

## 2025-02-11 RX ADMIN — RANOLAZINE 500 MG: 500 TABLET, EXTENDED RELEASE ORAL at 08:02

## 2025-02-11 RX ADMIN — METHOCARBAMOL 750 MG: 750 TABLET ORAL at 05:02

## 2025-02-11 RX ADMIN — DULOXETINE HYDROCHLORIDE 30 MG: 30 CAPSULE, DELAYED RELEASE ORAL at 08:02

## 2025-02-11 RX ADMIN — CARVEDILOL 12.5 MG: 6.25 TABLET, FILM COATED ORAL at 04:02

## 2025-02-11 RX ADMIN — LOSARTAN POTASSIUM 25 MG: 25 TABLET, FILM COATED ORAL at 05:02

## 2025-02-11 RX ADMIN — METHOCARBAMOL 750 MG: 750 TABLET ORAL at 11:02

## 2025-02-11 RX ADMIN — PANTOPRAZOLE SODIUM 40 MG: 40 TABLET, DELAYED RELEASE ORAL at 08:02

## 2025-02-11 RX ADMIN — AMLODIPINE BESYLATE 5 MG: 5 TABLET ORAL at 04:02

## 2025-02-11 RX ADMIN — OXYCODONE HYDROCHLORIDE AND ACETAMINOPHEN 1 TABLET: 10; 325 TABLET ORAL at 08:02

## 2025-02-11 RX ADMIN — Medication 400 MG: at 08:02

## 2025-02-11 RX ADMIN — METHOCARBAMOL 750 MG: 750 TABLET ORAL at 08:02

## 2025-02-11 RX ADMIN — INSULIN ASPART 1 UNITS: 100 INJECTION, SOLUTION INTRAVENOUS; SUBCUTANEOUS at 08:02

## 2025-02-11 RX ADMIN — MICONAZOLE NITRATE 2 % TOPICAL POWDER: at 08:02

## 2025-02-11 RX ADMIN — FLUTICASONE FUROATE 1 PUFF: 200 POWDER RESPIRATORY (INHALATION) at 08:02

## 2025-02-11 RX ADMIN — UMECLIDINIUM BROMIDE AND VILANTEROL TRIFENATATE 1 PUFF: 62.5; 25 POWDER RESPIRATORY (INHALATION) at 08:02

## 2025-02-11 RX ADMIN — ASPIRIN 81 MG CHEWABLE TABLET 81 MG: 81 TABLET CHEWABLE at 08:02

## 2025-02-11 RX ADMIN — LINACLOTIDE 145 MCG: 145 CAPSULE, GELATIN COATED ORAL at 08:02

## 2025-02-11 RX ADMIN — FINASTERIDE 5 MG: 5 TABLET, FILM COATED ORAL at 08:02

## 2025-02-11 NOTE — PROGRESS NOTES
[facility] - Rehab Inpatient Services  Wound Care    Patient Name:  Tyler Mai   MRN:  [id number]  Date: 2/11/2025  Diagnosis: Status post cervical spinal fusion    History:     Past Medical History:   Diagnosis Date    Anxiety disorder, unspecified     Arthritis     CAD (coronary artery disease)     Cervical radiculopathy     CHF (congestive heart failure)     Chronic pain syndrome     COPD (chronic obstructive pulmonary disease)     Diabetes mellitus     Gastro-esophageal reflux disease without esophagitis     High cholesterol     Hypertension     Lumbar radiculopathy     Myelomalacia of cervical cord     Other specified diseases of spinal cord     Pacemaker     PVD (peripheral vascular disease)     S/P triple vessel bypass     Tobacco use        Social History     Socioeconomic History    Marital status: Single   Tobacco Use    Smoking status: Every Day     Current packs/day: 1.00     Types: Cigarettes     Social Drivers of Health     Financial Resource Strain: Patient Declined (1/18/2025)    Overall Financial Resource Strain (CARDIA)     Difficulty of Paying Living Expenses: Patient declined   Food Insecurity: Patient Declined (1/18/2025)    Hunger Vital Sign     Worried About Running Out of Food in the Last Year: Patient declined     Ran Out of Food in the Last Year: Patient declined   Transportation Needs: Unmet Transportation Needs (1/31/2025)    PRAPARE - Transportation     Lack of Transportation (Medical): Yes     Lack of Transportation (Non-Medical): Yes   Stress: Patient Declined (1/18/2025)    [employer] - Occupational Stress Questionnaire     Feeling of Stress : Patient declined   Housing Stability: Patient Declined (1/18/2025)    Housing Stability Vital Sign     Unable to Pay for Housing in the Last Year: Patient declined     Homeless in the Last Year: Patient declined       Precautions:     Allergies as of 01/30/2025 - Reviewed 01/24/2025   Allergen  Reaction Noted    Hydrochlorothiazide  12/03/2024       [facility] Assessment Details/Treatment      02/11/25 1142        Wound 01/14/25 2202 Pressure Injury Right Heel   Date First Assessed/Time First Assessed: 01/14/25 2202   Present on Original Admission: Yes  Primary Wound Type: (c) Pressure Injury  Side: Right  Location: Heel   Dressing Appearance Dry;Intact        Wound 01/14/25 2203 Other (comment) Left anterior Foot   Date First Assessed/Time First Assessed: 01/14/25 2203   Primary Wound Type: (c) Other (comment)  Side: Left  Orientation: anterior  Location: Foot   Appearance Epithelialization  (healed)        Wound 01/24/25 Incision midline Cervical spine vertical   Date First Assessed: 01/24/25   Present on Original Admission: No  Primary Wound Type: Incision  Orientation: midline  Location: Cervical spine  Incision Type: vertical  Closure Method: Sutures;Staples  Additional Comments: staples, xeroform, telfa, 4x4   Wound Edges Approximated        Wound 01/25/25 Other (comment) Left Heel   Date First Assessed: 01/25/25   Primary Wound Type: Other (comment)  Side: Left  Location: Heel   Dressing Appearance Dry;Intact;Clean        Wound 02/06/25 1337 Skin Tear Scrotum   Date First Assessed/Time First Assessed: 02/06/25 1337   Present on Original Admission: No  Primary Wound Type: Skin Tear  Location: Scrotum   Appearance Epithelialization  (healed)   Care   (Miconazole)   Safety   Safety Precautions emergency equipment at bedside   Safety Management   Safety Promotion/Fall Prevention assistive device/personal item within reach;chair alarm set;gait belt with ambulation;instructed to call staff for mobility;nonskid shoes/socks when out of bed   Patient Rounds bed in low position;bed wheels locked;call light in patient/parent reach;clutter free environment maintained;ID band on;placement of personal items at bedside;toileting offered;visualized patient   Safety Bands on Patient Fall Risk Band   Daily Care   Activity  Management Up in chair - L3     Left anterior foot abrasions-: healed.  Scrotum: skin tear healed. Denies bleeding this am. Miconazole powder in use for  previous c/o itching.  Bilateral heels: foam dressings c/d/I.  Will continue to follow.  02/11/2025

## 2025-02-11 NOTE — PT/OT/SLP PROGRESS
Recreational Therapy Treatment    Date of Treatment: 02/11/25  Start Time: 1100  Stop Time: 1130  Total Time: 30 min  Missed Time    General Precautions: fall  Ortho Precautions: spinal precautions  Braces: Aspen collar    Vitals   Vitals at Rest  BP    HR    O2 Sat    Pain      Vitals With Activity  BP    HR    O2 Sat    Pain        Treatment     Cognitive Skills Building   Cognitive Observation Activity Assist Position Equipment Response            Comment:          Dynamic Activities   Activity Assist Position Equipment Response   Activity 1 Bean bag toos modified independence Standing Rolling walker and Bean bags good   Comment: Sit to stand was setup as was dynamic standing balance/reaching.  Standing tolerance was 5 minutes with sitting rest breaks.  UE coordination/dexterity was supervision/setup.  Memory and sequencing skills were I.  He remains in good spirits and participated willingly       Fine Motor Activities                  Comment:          Additional Info: Recliner to w/c transfer improved to setup    Goals     Short Term Goals    Goal  Goal Status   Will increase sit to stand to supervision Met   Will improve dynamic standing balance/reaching to supervision Met                 Long Term Goals    Goal Goal Status   Will increase standing tolerance to 5 minutes Met   Will improve dynamic standing balance/reaching to setup Met

## 2025-02-11 NOTE — PT/OT/SLP PROGRESS
Physical Therapy Inpatient Rehab Treatment    Patient Name:  Tyler Mai   MRN:  [id number]    Recommendations:     Discharge Recommendations:   (pending progress)   Discharge Equipment Recommendations:     Barriers to discharge: Impaired functional mobility  and Severity of Deficits     Assessment:     Tyler Mai is a 69 y.o. male admitted with a medical diagnosis of Status post cervical spinal fusion.  He presents with the following impairments/functional limitations:  weakness, impaired endurance, impaired functional mobility, gait instability, impaired balance, decreased upper extremity function, decreased lower extremity function, pain, decreased ROM, impaired coordination, impaired fine motor, orthopedic precautions     PT NOTE: pt fatigue today. Pt dre to perform 8 steps with seated rest break then another 8 steps to simulate home environment. Pt will need chair placed at platform for rest break between steps. .    Rehab Diagnosis: Cervical spondylosis and stenosis with myelopathy and cord compression s/p C2-6 PCDF and C2-3, C4-5 decompression 1/24/2025. Pt. Has a history of cervical myelopathy, CAD, CHF, COPD, DM, HLD, HTN, and PVD    General Precautions: Standard, fall     Orthopedic Precautions:spinal precautions     Braces: Aspen collar    Rehab Prognosis: Good; patient would benefit from acute skilled PT services to address these deficits and reach maximum level of function.      History:     Past Medical History:   Diagnosis Date    Anxiety disorder, unspecified     Arthritis     CAD (coronary artery disease)     Cervical radiculopathy     CHF (congestive heart failure)     Chronic pain syndrome     COPD (chronic obstructive pulmonary disease)     Diabetes mellitus     Gastro-esophageal reflux disease without esophagitis     High cholesterol     Hypertension     Lumbar radiculopathy     Myelomalacia of cervical cord     Other specified diseases of spinal cord     Pacemaker     PVD (peripheral  "vascular disease)     S/P triple vessel bypass     Tobacco use        Past Surgical History:   Procedure Laterality Date    ANTERIOR CERVICAL DISCECTOMY W/ FUSION  1995    C5-6 and C6-7.  Dr. Wall    ANTERIOR CERVICAL DISCECTOMY W/ FUSION  1996    Redo C5-6, C6-7.  Dr. Wall    CHOLECYSTECTOMY      CORONARY ARTERY BYPASS GRAFT      FUSION OF POSTERIOR COLUMN OF CERVICAL SPINE USING COMPUTER AIDED NAVIGATION N/A 1/24/2025    Procedure: FUSION, SPINE, POSTERIOR SPINAL COLUMN, CERVICAL, USING COMPUTER-ASSISTED NAVIGATION;  Surgeon: Darrell Watson MD;  Location: [facility];  Service: Neurosurgery;  Laterality: N/A;  C2-C6 PCF; C2/3-C4/5 decompression, possible additional levels  o-arm  NTI  TIVA setup  [employer] //  XX    INSERTION OF PACEMAKER      X2    TRIPPLE VESSEL BYPASS         Subjective     Patient comments: "my pain med are at 9"     Respiratory Status: Room air    Patients cultural, spiritual, Anabaptist conflicts given the current situation: no    Objective:     Communicated with TR prior to session.  Patient found HOB elevated with cervical collar  upon PT entry to room.    Pt is Oriented x3 and Alert, Cooperative, and Motivated.    Vitals   Vitals at Rest  BP    HR    O2 Sat    Pain      Vitals With Activity  BP     HR     O2 Sat     Pain Pain Rating 1: 8/10  Location - Side 1: Bilateral  Location - Orientation 1: upper  Location 1: shoulder  Pain Addressed 1: Reposition, Distraction       Functional Mobility:     Gait speed   Total Time 0.50 m/s (performed x 4 trials 6.89, 5.28, 5.68, 6.03 secs)   [place]orms:  > 1 m/s   >1.1 m/s predictive of completing yard work   >1.3 m/s Climb flight of stairs   < 1 m/s (increased fall risk)  Benefit from fall prevention   >0.67 m/s to complete self care   >0.89 m/s for household activities   < 0.60 m/s   Predicts future risk of falls and hospitalization   Tend to require assistance with ADL and IADL  >0.49 m/s to cross street   < 0.40 m/s  Longer length of stay in " acute care   Likely to discharge to skilled nursing, inpatient rehab, or nursing home setting or with home health       Current   Status  Discharge   Goal   Functional Area: Care Score:    Roll Left and Right 6 Independent   Sit to Lying 6 Independent   Lying to Sitting on Side of Bed 6  All bed mobility performed in recliner. Pt will requires CGA and HOB elevated to perform in bed.  Independent   Sit to Stand 4 Independent   Chair/Bed-to-Chair Transfer 4  CGA with RW  Independent   Walk 10 Feet 4 Independent   Walk 50 Feet with Two Turns 4  105' 115' with rest break between bouts. Overall CGA with decreased step length and foot clearance on RLE. Noted slowed pace. Pt distance limited 2/2 fatigue      Walk 150 Feet 88 Independent   1 Step (Curb) 4 Independent   4 Steps 4  7 steps rest break then another 7 steps. B rails. Overall CGA ascend with LLE and descend with LLE per pt request is feels better.  Independent       Therapeutic Activities and Exercises:  Patient educated on role of acute care PT and PT POC, safety while in hospital including calling nurse for mobility, and call light usage  Patient educated about importance of OOB mobility and remaining up in chair most of the day.        Activity Tolerance: Good    Patient left up in chair with all lines intact, call button in reach, and chair alarm on.    Education provided: roles and goals of PT/PTA, transfer training, bed mob, gait training, stair training, balance training, safety awareness, body mechanics, strengthening exercises, and fall prevention    Expected compliance: High compliance    Plan:     During this hospitalization, patient to be seen 5 x/week to address the identified rehab impairments via gait training, therapeutic activities, therapeutic exercises and progress toward the following goals:    GOALS:   Multidisciplinary Problems       Physical Therapy Goals          Problem: Physical Therapy    Goal Priority Disciplines Outcome Interventions    Physical Therapy Goal     PT, PT/OT Progressing    Description: Bed Mobility:  Sit to supine transfer independently.   Supine to sit transfer independently.     Transfers:  Sit to stand transfer independently using RW.   Car transfer independently using RW.    an object from the ground in standing position independently using RW.     Mobility:  Ambulate 500 feet independently using RW.  Ambulate 15 feet on uneven surfaces/ramps with setup/clean-up assist using RW.   Pt ascended/descended a 6 inch curb independently using RW.   Ascend/descend 15 stairs independently using bilateral handrails.                         Plan of Care Expires:  02/13/25  PT Next Visit Date: 02/13/25  Plan of Care reviewed with: patient    Additional Information:         Time Tracking:     Therapy Time  PT Received On: 02/11/25  PT Start Time: 0830  PT Stop Time: 1000  PT Total Time (min): 90 min   PT Individual: 90  Missed Time:    Time Missed due to:      Billable Minutes: Gait Training 30 and Therapeutic Activity 60    02/11/2025

## 2025-02-11 NOTE — PROGRESS NOTES
[facility] ([facility])  Rehab Progress Note    Patient Name: Tyler Mai  MRN: [id number]  Age: 69 y.o. Sex: male  : 1955  Hospital Length of Stay: 11 days  Date of Service: 2025   Chief Complaint: Cervical myelopathy s/p C2-6 PCDF and C2/3-C4/5 decompression on 2025     Subjective:     Basic Information  Admit Information: 69-year-old white male presented to [facility] ED on 2025 complaining of bilateral arm weakness, bilateral leg weakness, and worsening neck pain.  Reports right leg weaker than the left.  Recently admitted to [facility] for cervical myelopathy with plans for surgery, but patient had severe UTI requiring antibiotic therapy.  MRI on  significant for severe C3-4 level central canal stenosis with increased signal hyperintensity within the cervical cord from mid to lower aspect of C3 through mid aspect of C4 with central cord myelomalacia.  PMH significant for cervical myelopathy with history of C5-C6 fusion, CAD, HFrEF, ICMO, COPD, DM type 2, HLD, HTN, and PVD.  Workup significant for cervical myelopathy.  Patient inpatient family initially declined the neurosurgeon on call surgery.  Then surgery postpone due to inclement weather.  Tolerated posterior C3-C5 total laminectomies for decompression, placement of bilateral pars screws at C2 and bilateral lateral mass screws from C3 to C6 using [facility] Invictus OCT System, and placement of local bone and AlphaGraft DBM for arthrodesis from C2 to C6 on  without perioperative complications.  Postoperatively neurosurgery documented increase mobility to bilateral upper extremities with a 5/5 hand grasp.  Recommended aspirin hard collar at all times, and [place] collar for showers..  Recommended removal staples on .  Gray continued due to urinary retention.  Tolerated transfer to [facility] inpatient rehab unit on  without incident.   Today's Information: No acute events overnight.  Sitting up in  bed.  Reports good sleep and appetite.  Last BM 2/9.  BP remains moderately elevated.  No new labs or imaging today.  Tolerated Lasix 40 mg IVP x1 on 02/10.      Review of patient's allergies indicates:   Allergen Reactions    Hydrochlorothiazide      Other Reaction(s): Pancreatitis        Current Facility-Administered Medications:     acetaminophen tablet 650 mg, 650 mg, Oral, Q6H PRN, Ashley, Tony A, FNP, 650 mg at 02/09/25 0316    albuterol nebulizer solution 2.5 mg, 2.5 mg, Nebulization, Q4H PRN, [place], Shannan A, FNP, 2.5 mg at 02/10/25 1855    ALPRAZolam tablet 0.25 mg, 0.25 mg, Oral, TID PRN, Ashley, Tony A, FNP, 0.25 mg at 02/10/25 2333    amLODIPine tablet 5 mg, 5 mg, Oral, BID AC, Jaxson, Shannan A, FNP, 5 mg at 02/11/25 0528    aspirin chewable tablet 81 mg, 81 mg, Oral, Daily, Ashley, Tony A, FNP, 81 mg at 02/10/25 0718    atorvastatin tablet 40 mg, 40 mg, Oral, Daily, Ashley, Tony A, FNP, 40 mg at 02/10/25 0718    benzonatate capsule 100 mg, 100 mg, Oral, TID PRN, Ashley, Tony A, FNP, 100 mg at 02/10/25 0718    carvediloL tablet 12.5 mg, 12.5 mg, Oral, BID AC, Aslhey, Tony A, FNP    carvediloL tablet 6.25 mg, 6.25 mg, Oral, Once, Ashley, Tony A, FNP    clopidogreL tablet 75 mg, 75 mg, Oral, Daily, Ashley, Tony A, FNP, 75 mg at 02/10/25 0718    cyclobenzaprine tablet 5 mg, 5 mg, Oral, TID PRN, Jaxson, Shannan A, FNP    dextrose 50% injection 12.5 g, 12.5 g, Intravenous, PRN, Ashley, Tony A, FNP    dextrose 50% injection 25 g, 25 g, Intravenous, PRN, Ashley, Tony A, FNP    docusate sodium capsule 100 mg, 100 mg, Oral, BID, Tony Ramirez FNP, 100 mg at 02/10/25 2100    DULoxetine DR capsule 30 mg, 30 mg, Oral, Daily, Shannan Puri FNP, 30 mg at 02/10/25 0718    enoxaparin injection 40 mg, 40 mg, Subcutaneous, Q24H (prophylaxis, 1700), Tony Ramirez FNP, 40 mg at 02/10/25 1704    finasteride tablet 5 mg, 5 mg, Oral, Daily,  Ashley, Tony A, FNP, 5 mg at 02/10/25 0718    umeclidinium-vilanteroL 62.5-25 mcg/actuation DsDv 1 puff, 1 puff, Inhalation, Daily, 1 puff at 02/10/25 0719 **AND** fluticasone furoate 200 mcg/actuation inhaler 1 puff, 1 puff, Inhalation, Daily, Ashley, Tony A, FNP, 1 puff at 02/10/25 0719    furosemide injection 40 mg, 40 mg, Intravenous, Once, Ashley, Tony A, FNP    glucagon (human recombinant) injection 1 mg, 1 mg, Intramuscular, PRN, Ashley, Tony A, FNP    glucose chewable tablet 16 g, 16 g, Oral, PRN, Ashley, Tony A, FNP    glucose chewable tablet 24 g, 24 g, Oral, PRN, Ashley, Tony A, FNP    guaiFENesin 12 hr tablet 600 mg, 600 mg, Oral, BID, Ashley, Tony A, FNP, 600 mg at 02/10/25 2100    hydrALAZINE injection 10 mg, 10 mg, Intravenous, Q4H PRN, Ashley, Tony A, FNP, 10 mg at 02/04/25 0629    hydrOXYzine pamoate capsule 50 mg, 50 mg, Oral, Nightly PRN, Ashley, Tony A, FNP, 50 mg at 02/03/25 2138    insulin aspart U-100 injection 0-10 Units, 0-10 Units, Subcutaneous, QID (AC + HS) PRN, Ashley, Tony A, FNP, 1 Units at 02/10/25 2106    labetalol 20 mg/4 mL (5 mg/mL) IV syring, 10 mg, Intravenous, Q4H PRN, Ashley, Tony A, FNP    LIDOcaine 5 % patch 1 patch, 1 patch, Transdermal, Q24H, Shannan Puri A, FNP, 1 patch at 02/10/25 1704    linaCLOtide capsule 145 mcg, 145 mcg, Oral, QAM, Ashley, Tony A, FNP, 145 mcg at 02/10/25 0600    losartan tablet 25 mg, 25 mg, Oral, Before breakfast, Bobby Puriyn A, FNP, 25 mg at 02/11/25 0528    magnesium oxide tablet 400 mg, 400 mg, Oral, TID, Tony Ramirez, FNP, 400 mg at 02/11/25 0528    melatonin tablet 6 mg, 6 mg, Oral, Nightly PRN, Bobby Puriyn A, FNP, 6 mg at 02/02/25 2056    methocarbamoL tablet 750 mg, 750 mg, Oral, QID (AC & HS), Jaxson Shannan A, FNP, 750 mg at 02/11/25 0528    metoprolol injection 10 mg, 10 mg, Intravenous, Q2H PRN, Ashley Tony A, FNP    miconazole NITRATE 2 %  "top powder, , Topical (Top), BID, Jaxson, Shannan A, FNP, Given at 02/10/25 1445    nitroGLYCERIN SL tablet 0.4 mg, 0.4 mg, Sublingual, Q5 Min PRN, Ashley, Tony A, FNP    ondansetron disintegrating tablet 4 mg, 4 mg, Oral, Q6H PRN, Ashley, Tony A, FNP    ondansetron disintegrating tablet 8 mg, 8 mg, Oral, Q8H PRN, Ashley, Tony A, FNP    ondansetron injection 4 mg, 4 mg, Intravenous, Q8H PRN, Ashley, Tony A, FNP    oxyCODONE-acetaminophen  mg per tablet 1 tablet, 1 tablet, Oral, Q4H PRN, Jaxson, Shannan A, FNP, 1 tablet at 02/11/25 0503    oxyCODONE-acetaminophen 5-325 mg per tablet 1 tablet, 1 tablet, Oral, Q4H PRN, [place], Shannan A, FNP, 1 tablet at 02/05/25 0606    pantoprazole EC tablet 40 mg, 40 mg, Oral, Daily, Ashley, Tony A, FNP, 40 mg at 02/10/25 0718    polyethylene glycol packet 17 g, 17 g, Oral, BID PRN, Ashley, Tony A, FNP, 17 g at 02/09/25 0756    pregabalin capsule 100 mg, 100 mg, Oral, TID, [place], Shannan A, FNP, 100 mg at 02/11/25 0528    ranolazine 12 hr tablet 500 mg, 500 mg, Oral, BID, Ashley, Tony A, FNP, 500 mg at 02/10/25 2100     Review of Systems   Complete 12-point review of symptoms negative except for what's mentioned in HPI     Objective:     BP (!) 157/77   Pulse 90   Temp 98.2 °F (36.8 °C) (Oral)   Resp 18   Ht 5' 6" (1.676 m)   Wt 62.2 kg (137 lb 2 oz)   SpO2 96%   BMI 22.13 kg/m²      Physical Exam  Constitutional:       Appearance: Normal appearance.   HENT:      Head: Normocephalic.      Mouth/Throat:      Mouth: Mucous membranes are moist.   Eyes:      Pupils: Pupils are equal, round, and reactive to light.   Neck:      Comments: Posterior neck incision clean and intact.  Hard collar intact  Cardiovascular:      Rate and Rhythm: Normal rate and regular rhythm.      Heart sounds: Normal heart sounds.   Pulmonary:      Effort: Pulmonary effort is normal.      Breath sounds: Normal breath sounds.   Abdominal:      General: Bowel " sounds are normal.      Palpations: Abdomen is soft.   Musculoskeletal:      Cervical back: Neck supple.      Right lower le+ Pitting Edema present.      Left lower le+ Pitting Edema present.      Comments: Diffuse muscle atrophy   Skin:     General: Skin is warm and dry.   Neurological:      General: No focal deficit present.      Mental Status: He is alert and oriented to person, place, and time.      Motor: Weakness present.   Psychiatric:         Mood and Affect: Mood normal.         Behavior: Behavior normal.         Thought Content: Thought content normal.         Judgment: Judgment normal.     *MD performed and documented physical examination       Lines/Drains/Airways       None                 Labs  Recent Results (from the past 24 hours)   POCT glucose    Collection Time: 02/10/25  5:09 PM   Result Value Ref Range    POCT Glucose 179 (H) 70 - 110 mg/dL   POCT glucose    Collection Time: 02/10/25  9:04 PM   Result Value Ref Range    POCT Glucose 197 (H) 70 - 110 mg/dL   POCT glucose    Collection Time: 25  5:32 AM   Result Value Ref Range    POCT Glucose 107 70 - 110 mg/dL     Radiology  Cervical XR two-view on 2025, IMPRESSION: There are postoperative changes with posterior spinal fusion hardware at C2 through C6. The hardware appears intact. Cervical alignment has improved. The remaining vertebral body heights are preserved. There is moderate disc height loss at C3-C4, mild at C4-C5, with multilevel marginal osteophytes. The soft tissues are unremarkable.     Assessment/Plan:     69 y.o. WM admitted on 2025    Cervical myelopathy   - s/p C2-6 PCDF and C2/3-C4/5 decompression on 2025  - remove staples on   - continue aspirin hard collar at all times, okay for [place] collar for showers only  - Zanaflex discontinued on  due to orthostatic hypotension  - continue                Robaxin 750 mg q.i.d. (decreased )                Flexeril 10 mg t.i.d. p.r.n.                 Percocet 10 mg q.4 hours p.r.n. for severe pain                Lyrica 100 mg t.i.d. (increased 2/3)  - defer to physiatry for rehab and pain management  - PT/OT/RT/ST following  - to follow up with Neurosurgery outpatient     Coronary artery disease  - s/p  CABG x 3  - denies recent chest pain or discomfort  - continue                ASA 81 mg daily (resumed 1/31)                Plavix 75 mg daily (resume 1/31)                Coreg 12.5 mg b.i.d. (increased 2/11)                Lipitor 40 mg daily                Losartan 25 mg daily   - ECG and Nitro PRN  - continue cardiac diet  - to follow-up with cardiology outpatient     HFrEF/ICMO  - stable  - s/p Lasix 40 mg IVP x1 on 2/10  - initiate   Lasix 80 mg oral daily (initiated 2/11)  - continue                Coreg 12.5 mg b.i.d. (increased 2/11)                Losartan 25 mg daily                 Ranexa 500 mg b.i.d.   - obtain daily standing weights  - to follow up with cardiology outpatient     Nicotine dependence  - smoking cessation counseling given on admission  - 1 PPD with approximately 55 pack years     HTN  -BP moderately controlled  - discontinued Flomax 0.4 mg at bedtime on 02/04 due to orthostatic hypotension  - discontinued Zanaflex on 02/05 due to orthostatic hypotension  - received NS 1 L bolus on 02/05 due to orthostatic hypotension  - continue                Coreg 12.5 mg b.i.d. (increased 2/11)                Losartan 25 mg daily                 Norvasc 5 mg daily                 Ranexa 500 mg b.i.d.                 Hydralazine 10 mg every 2 hours as needed for BP > 160/90                Labetalol 10 mg every 2 hours as needed for BP > 160/90  - low sodium diet             COPD   - stable  - not on home 02  - continue                Trelegy INH daily                   DM type II  - HgA1c 5.6 on 01/23/2024  - continue                Lantus 10 units every evening (resumed 1/31)                ISS   - CBGs AC/HS     HLD  - FLP outpatient  -  resume Crestor upon discharge  - continue                Lipitor 40 mg daily      PVD  - stable  - continue                ASA 81 mg daily (resumed 1/31)                Plavix 75 mg daily (resume 1/31)                Coreg 12.5 mg b.i.d. (increased 2/11)                Lipitor 40 mg daily  - to follow up vascular surgery outpatient                  Chronic constipation  - last BM on 02/07 (prior it was 1/28).  Reports normal BMs every 8 days.  - continue                Linzess 145 mcg before breakfast                 Colace 100 mg b.i.d.     Hypomagnesemia  - stable  - continue                Magnesium oxide 400 mg b.i.d.      BPH/urinary retention  - stable  - discontinued Flomax 0.4 mg at bedtime on 02/04 due to orthostatic hypotension  - continue                Proscar 5 mg daily (initiated 1/31)  Indwelling catheter discontinued on 02/01-good urine output with no retention reported     GERD  - Avoid spicy foods, and nothing to eat or drink within x2 hours of bedtime or laying flat (water is ok)   - Avoid NSAIDs (Advil, ibuprofen, naproxen...) and chavira-2 inhibitors (Mobic, Celebrex)    - continue                Protonix 40 mg daily      Normocytic anemia  - asymptomatic  - H/H stable   - no evidence of active bleeds  - will closely monitor and transfuse if needed     Hypomagnesemia  - magnesium 1.5 on 2/10  - s/p Mag sulfate 2 g x 1 IVPB on 02/01  - continue   Mag-Ox 400 mg tID (increased 2/10)    Congestion  - improved  - continue   Mucinex 600 mg q.12 hours x7 days (initiated 2/7)   Mucomyst nebs t.i.d. x3 doses (initiated 2/7     VTE Prophylaxis: Lovenox 40 mg daily   COVID-19 testing:  Unknown  COVID-19 vaccination status:  Vaccinated (Moderna):  Bivalent booster on 04/03/2023     POA:  No  Living will:  No  Contacts:  Tyler Mai Jr (son) [phone]     CODE STATUS:  Full code  Internal Medicine (attending): Tyler De Anda MD  Physiatry (consulting):  Philip Fang MD     OUTPATIENT PROVIDERS  PCP:  None  Neurosurgery: Darrell Zavala MD     DISPOSITION:  Sleep hygiene, bowel maintenance, and appetite at goal.  BP moderately elevated.  Vital signs otherwise at goal with no recorded fevers.  No new labs or imaging today.  Increase Coreg 12.5 mg b.i.d..  No IV.  Initiate Lasix 80 mg daily with 1st dose now and continue until improvement lower extremity edema.  Repeat lab work in the morning.  Monitor closely.  Notify of acute changes.    Staffing 2/5/2025: Continent of bowel and bladder.  Assisted to the floor overnight after going to the bathroom. RT: Overall min to CGA when he participates.  Needs max encouragement. Limited by standing tolerance and generalized weakness. Appetite is good at 80%. PT: Limited by pain and poor participation.  CGA to min assist with bed mobility, ambulating 30-70 feet at CGA with walker. Limited strength and participation.  OT: Progressing, but dependent for foot wear.  Dependent with LBD.  CGA with toilet transfers. Min assist with toilet hygiene.  Set up with oral care and eating. Limited by decreased strength.  Needs max encouragement. Poor family support.  Estranged from family.  Projected discharge pending.     Jarrell Ramirez NP conducted independent physical examination and assisted with medical documentation.    Total time spent on this encounter including chart review and direct MD + NP 1-on-1 patient interaction: 53 minutes   Over 50% of this time was spent in counseling and coordination of care

## 2025-02-11 NOTE — PT/OT/SLP PROGRESS
Occupational Therapy Inpatient Rehab Treatment    Name: Tyler Mai  MRN: [id number]    Assessment:  Tyler Mai is a 69 y.o. male admitted with a medical diagnosis of Status post cervical spinal fusion.  He presents with the following impairments/functional limitations:  weakness, impaired endurance, impaired sensation, impaired self care skills, impaired functional mobility, gait instability, impaired balance, decreased coordination, decreased upper extremity function, decreased lower extremity function, decreased safety awareness, pain, decreased ROM, impaired coordination, impaired fine motor and increased edema.    General Precautions: Standard, fall     Orthopedic Precautions:spinal precautions     Braces: Asp[place] collar    Rehab Prognosis: Good and Fair; patient would benefit from acute skilled OT services to address these deficits and reach maximum level of function.      History:     Past Medical History:   Diagnosis Date    Anxiety disorder, unspecified     Arthritis     CAD (coronary artery disease)     Cervical radiculopathy     CHF (congestive heart failure)     Chronic pain syndrome     COPD (chronic obstructive pulmonary disease)     Diabetes mellitus     Gastro-esophageal reflux disease without esophagitis     High cholesterol     Hypertension     Lumbar radiculopathy     Myelomalacia of cervical cord     Other specified diseases of spinal cord     Pacemaker     PVD (peripheral vascular disease)     S/P triple vessel bypass     Tobacco use        Past Surgical History:   Procedure Laterality Date    ANTERIOR CERVICAL DISCECTOMY W/ FUSION  1995    C5-6 and C6-7.  Dr. Wall    ANTERIOR CERVICAL DISCECTOMY W/ FUSION  1996    Redo C5-6, C6-7.  Dr. Wall    CHOLECYSTECTOMY      CORONARY ARTERY BYPASS GRAFT      FUSION OF POSTERIOR COLUMN OF CERVICAL SPINE USING COMPUTER AIDED NAVIGATION N/A 1/24/2025    Procedure: FUSION, SPINE, POSTERIOR SPINAL COLUMN, CERVICAL, USING COMPUTER-ASSISTED NAVIGATION;   "Surgeon: Darrell Watson MD;  Location: [facility] OR;  Service: Neurosurgery;  Laterality: N/A;  C2-C6 PCF; C2/3-C4/5 decompression, possible additional levels  o-arm  NTI  TIVA setup  [employer] //  XX    INSERTION OF PACEMAKER      X2    TRIPPLE VESSEL BYPASS         Subjective     Orientation: Oriented x4    Chief Complaint: pain    Patient/Family Comments/goals: "I just want this itching to stop"    Vitals   Vitals  BP    HR    O2 Sat    Pain 8/10     Respiratory Status: Room air    Patients cultural, spiritual, Mandaeism conflicts given the current situation: no     Objective:     Patient found up in chair with cervical collar  upon OT entry to room.    Mobility   Patient completed:  Sit to Stand Transfer with contact guard assistance with rolling walker  Stand to Sit Transfer with contact guard assistance with rolling walker  Tub Transfer Step Transfer technique with contact guard assistance with rolling walker onto TTB  Sit to supine with SBA    Functional Mobility  In room mobility with RW with CGA.    ADLs   Current Status   Eating 5   Oral Hygiene 5   Shower, Bathe Self 3   Upper Body Dressing 3   Lower Body Dressing 2   Putting On, Taking Off Footwear 2   **Noted increased edema in LUE and BLE - nursing notified.  Increased time needed due to pain.    Limiting Factors for ADLs: motor, sensory, endurance, limited ROM, balance, weakness, perceptual, coordination, safety awareness, and pain     Patient left supine with all lines intact, call button in reach, and with PRAFO boots on .     Education provided: Roles and goals of OT, ADLs, transfer training, bed mobility, assistive device, sequencing, safety precautions, fall prevention, and post-op precautions    Multidisciplinary Problems       Occupational Therapy Goals          Problem: Occupational Therapy    Goal Priority Disciplines Outcome Interventions   Occupational Therapy Goal     OT, PT/OT Progressing    Description: ADLs:  Pt to perform grooming " tasks with independence standing at sink with RW by d/c.   Pt to perform feeding tasks with independence using adaptive utensils as needed by d/c.    Pt to perform UB dressing with independence by d/c.    Pt to perform LB dressing with touch assist by d/c.    Pt to perform putting on/off footwear task with independence and AE as needed by d/c.   Pt to perform toileting with touch assist by d/c.    Pt to perform bathing with independence by d/c.     Functional Transfers:  Pt to perform toilet transfers with independence by d/c.    Pt to perform a tub transfer with independence by d/c.     IADLs:  Pt to perform simple meal prep standing at kitchen counter with touch assist by d/c.      Balance, Strengthening, Endurance, Balance:  Pt to consistently demonstrate adherence to spinal precautions during all ADL's as instructed by OT.  Pt to demonstrate good dynamic standing balance as required to perform ADL's from standing level.                         Time Tracking     OT Received On: 02/11/25  Time In 1300     Time Out 1500  Total Time 120 min  Therapy Time: OT Individual: 120  Missed Time:    Missed Time Reason:      Billable Minutes: Self Care/Home Management 120    02/11/2025

## 2025-02-12 LAB
ANION GAP SERPL CALC-SCNC: 10 MEQ/L
BUN SERPL-MCNC: 33 MG/DL (ref 8.4–25.7)
CALCIUM SERPL-MCNC: 9.5 MG/DL (ref 8.8–10)
CHLORIDE SERPL-SCNC: 99 MMOL/L (ref 98–107)
CO2 SERPL-SCNC: 28 MMOL/L (ref 23–31)
CREAT SERPL-MCNC: 1.31 MG/DL (ref 0.72–1.25)
CREAT/UREA NIT SERPL: 25
GFR SERPLBLD CREATININE-BSD FMLA CKD-EPI: 60 ML/MIN/1.73/M2
GLUCOSE SERPL-MCNC: 113 MG/DL (ref 82–115)
HCT VFR BLD AUTO: 26 % (ref 42–52)
HGB BLD-MCNC: 8.5 G/DL (ref 14–18)
MAGNESIUM SERPL-MCNC: 1.6 MG/DL (ref 1.6–2.6)
POCT GLUCOSE: 115 MG/DL (ref 70–110)
POCT GLUCOSE: 121 MG/DL (ref 70–110)
POCT GLUCOSE: 121 MG/DL (ref 70–110)
POCT GLUCOSE: 130 MG/DL (ref 70–110)
POCT GLUCOSE: 159 MG/DL (ref 70–110)
POTASSIUM SERPL-SCNC: 4.3 MMOL/L (ref 3.5–5.1)
SODIUM SERPL-SCNC: 137 MMOL/L (ref 136–145)

## 2025-02-12 PROCEDURE — 85018 HEMOGLOBIN: CPT | Performed by: NURSE PRACTITIONER

## 2025-02-12 PROCEDURE — 94799 UNLISTED PULMONARY SVC/PX: CPT

## 2025-02-12 PROCEDURE — 25000242 PHARM REV CODE 250 ALT 637 W/ HCPCS: Performed by: NURSE PRACTITIONER

## 2025-02-12 PROCEDURE — 11800000 HC REHAB PRIVATE ROOM

## 2025-02-12 PROCEDURE — 80048 BASIC METABOLIC PNL TOTAL CA: CPT | Performed by: NURSE PRACTITIONER

## 2025-02-12 PROCEDURE — 36415 COLL VENOUS BLD VENIPUNCTURE: CPT | Performed by: NURSE PRACTITIONER

## 2025-02-12 PROCEDURE — 94640 AIRWAY INHALATION TREATMENT: CPT

## 2025-02-12 PROCEDURE — 97116 GAIT TRAINING THERAPY: CPT

## 2025-02-12 PROCEDURE — 25000003 PHARM REV CODE 250: Performed by: NURSE PRACTITIONER

## 2025-02-12 PROCEDURE — 97535 SELF CARE MNGMENT TRAINING: CPT

## 2025-02-12 PROCEDURE — 97110 THERAPEUTIC EXERCISES: CPT

## 2025-02-12 PROCEDURE — 63600175 PHARM REV CODE 636 W HCPCS: Performed by: NURSE PRACTITIONER

## 2025-02-12 PROCEDURE — 94761 N-INVAS EAR/PLS OXIMETRY MLT: CPT

## 2025-02-12 PROCEDURE — 97530 THERAPEUTIC ACTIVITIES: CPT | Mod: CQ

## 2025-02-12 PROCEDURE — 97168 OT RE-EVAL EST PLAN CARE: CPT

## 2025-02-12 PROCEDURE — 99900031 HC PATIENT EDUCATION (STAT)

## 2025-02-12 PROCEDURE — 83735 ASSAY OF MAGNESIUM: CPT | Performed by: NURSE PRACTITIONER

## 2025-02-12 RX ORDER — CARVEDILOL 25 MG/1
25 TABLET ORAL
Status: DISCONTINUED | OUTPATIENT
Start: 2025-02-12 | End: 2025-02-19 | Stop reason: HOSPADM

## 2025-02-12 RX ORDER — FUROSEMIDE 20 MG/1
20 TABLET ORAL DAILY
Status: DISCONTINUED | OUTPATIENT
Start: 2025-02-13 | End: 2025-02-19 | Stop reason: HOSPADM

## 2025-02-12 RX ORDER — HYDROCORTISONE 25 MG/G
CREAM TOPICAL 2 TIMES DAILY PRN
Status: DISCONTINUED | OUTPATIENT
Start: 2025-02-12 | End: 2025-02-19 | Stop reason: HOSPADM

## 2025-02-12 RX ADMIN — OXYCODONE HYDROCHLORIDE AND ACETAMINOPHEN 1 TABLET: 10; 325 TABLET ORAL at 02:02

## 2025-02-12 RX ADMIN — PREGABALIN 100 MG: 50 CAPSULE ORAL at 08:02

## 2025-02-12 RX ADMIN — METHOCARBAMOL 750 MG: 750 TABLET ORAL at 04:02

## 2025-02-12 RX ADMIN — Medication 400 MG: at 02:02

## 2025-02-12 RX ADMIN — ALBUTEROL SULFATE 2.5 MG: 2.5 SOLUTION RESPIRATORY (INHALATION) at 08:02

## 2025-02-12 RX ADMIN — RANOLAZINE 500 MG: 500 TABLET, EXTENDED RELEASE ORAL at 08:02

## 2025-02-12 RX ADMIN — DOCUSATE SODIUM 100 MG: 100 CAPSULE, LIQUID FILLED ORAL at 08:02

## 2025-02-12 RX ADMIN — CARVEDILOL 25 MG: 25 TABLET, FILM COATED ORAL at 04:02

## 2025-02-12 RX ADMIN — DULOXETINE HYDROCHLORIDE 30 MG: 30 CAPSULE, DELAYED RELEASE ORAL at 08:02

## 2025-02-12 RX ADMIN — METHOCARBAMOL 750 MG: 750 TABLET ORAL at 11:02

## 2025-02-12 RX ADMIN — FLUTICASONE FUROATE 1 PUFF: 200 POWDER RESPIRATORY (INHALATION) at 08:02

## 2025-02-12 RX ADMIN — FUROSEMIDE 80 MG: 40 TABLET ORAL at 08:02

## 2025-02-12 RX ADMIN — MICONAZOLE NITRATE 2 % TOPICAL POWDER: at 08:02

## 2025-02-12 RX ADMIN — PREGABALIN 100 MG: 50 CAPSULE ORAL at 02:02

## 2025-02-12 RX ADMIN — METHOCARBAMOL 750 MG: 750 TABLET ORAL at 08:02

## 2025-02-12 RX ADMIN — PREGABALIN 100 MG: 50 CAPSULE ORAL at 05:02

## 2025-02-12 RX ADMIN — OXYCODONE HYDROCHLORIDE AND ACETAMINOPHEN 1 TABLET: 10; 325 TABLET ORAL at 10:02

## 2025-02-12 RX ADMIN — ENOXAPARIN SODIUM 40 MG: 40 INJECTION SUBCUTANEOUS at 04:02

## 2025-02-12 RX ADMIN — OXYCODONE HYDROCHLORIDE AND ACETAMINOPHEN 1 TABLET: 10; 325 TABLET ORAL at 06:02

## 2025-02-12 RX ADMIN — CARVEDILOL 12.5 MG: 6.25 TABLET, FILM COATED ORAL at 05:02

## 2025-02-12 RX ADMIN — LOSARTAN POTASSIUM 25 MG: 25 TABLET, FILM COATED ORAL at 05:02

## 2025-02-12 RX ADMIN — INSULIN ASPART 2 UNITS: 100 INJECTION, SOLUTION INTRAVENOUS; SUBCUTANEOUS at 04:02

## 2025-02-12 RX ADMIN — METHOCARBAMOL 750 MG: 750 TABLET ORAL at 05:02

## 2025-02-12 RX ADMIN — LIDOCAINE 5% 1 PATCH: 700 PATCH TOPICAL at 04:02

## 2025-02-12 RX ADMIN — CLOPIDOGREL BISULFATE 75 MG: 75 TABLET, FILM COATED ORAL at 08:02

## 2025-02-12 RX ADMIN — ATORVASTATIN CALCIUM 40 MG: 40 TABLET, FILM COATED ORAL at 08:02

## 2025-02-12 RX ADMIN — GUAIFENESIN 600 MG: 600 TABLET, EXTENDED RELEASE ORAL at 08:02

## 2025-02-12 RX ADMIN — LINACLOTIDE 145 MCG: 145 CAPSULE, GELATIN COATED ORAL at 05:02

## 2025-02-12 RX ADMIN — ALBUTEROL SULFATE 2.5 MG: 2.5 SOLUTION RESPIRATORY (INHALATION) at 07:02

## 2025-02-12 RX ADMIN — PANTOPRAZOLE SODIUM 40 MG: 40 TABLET, DELAYED RELEASE ORAL at 08:02

## 2025-02-12 RX ADMIN — FINASTERIDE 5 MG: 5 TABLET, FILM COATED ORAL at 08:02

## 2025-02-12 RX ADMIN — ASPIRIN 81 MG CHEWABLE TABLET 81 MG: 81 TABLET CHEWABLE at 08:02

## 2025-02-12 RX ADMIN — Medication 400 MG: at 08:02

## 2025-02-12 RX ADMIN — AMLODIPINE BESYLATE 5 MG: 5 TABLET ORAL at 05:02

## 2025-02-12 RX ADMIN — Medication 400 MG: at 05:02

## 2025-02-12 NOTE — PT/OT/SLP PROGRESS
Physical Therapy Inpatient Rehab Treatment    Patient Name:  Tyler Mai   MRN:  [id number]    Recommendations:     Discharge Recommendations:   (pending progress)   Discharge Equipment Recommendations:     Barriers to discharge: Inaccessible home, Decreased caregiver support, Impaired functional mobility , and Severity of Deficits     Assessment:     Tyler Mai is a 69 y.o. male admitted with a medical diagnosis of Status post cervical spinal fusion.  He presents with the following impairments/functional limitations:  weakness, impaired endurance, impaired functional mobility, gait instability, impaired balance, decreased upper extremity function, decreased lower extremity function, pain, decreased ROM, impaired coordination, impaired fine motor, orthopedic precautions       PT NOTE: PT noted swelling and warmth on L UE. Notified RN, NP, and MD of findings.     Rehab Diagnosis: Cervical spondylosis and stenosis with myelopathy and cord compression s/p C2-6 PCDF and C2-3, C4-5 decompression 1/24/2025. Pt. Has a history of cervical myelopathy, CAD, CHF, COPD, DM, HLD, HTN, and PVD    General Precautions: Standard, fall     Orthopedic Precautions:spinal precautions     Braces: Aspen collar    Rehab Prognosis: Good; patient would benefit from acute skilled PT services to address these deficits and reach maximum level of function.      History:     Past Medical History:   Diagnosis Date    Anxiety disorder, unspecified     Arthritis     CAD (coronary artery disease)     Cervical radiculopathy     CHF (congestive heart failure)     Chronic pain syndrome     COPD (chronic obstructive pulmonary disease)     Diabetes mellitus     Gastro-esophageal reflux disease without esophagitis     High cholesterol     Hypertension     Lumbar radiculopathy     Myelomalacia of cervical cord     Other specified diseases of spinal cord     Pacemaker     PVD (peripheral vascular disease)     S/P triple vessel bypass     Tobacco use   "      Past Surgical History:   Procedure Laterality Date    ANTERIOR CERVICAL DISCECTOMY W/ FUSION  1995    C5-6 and C6-7.  Dr. Wall    ANTERIOR CERVICAL DISCECTOMY W/ FUSION  1996    Redo C5-6, C6-7.  Dr. Wall    CHOLECYSTECTOMY      CORONARY ARTERY BYPASS GRAFT      FUSION OF POSTERIOR COLUMN OF CERVICAL SPINE USING COMPUTER AIDED NAVIGATION N/A 1/24/2025    Procedure: FUSION, SPINE, POSTERIOR SPINAL COLUMN, CERVICAL, USING COMPUTER-ASSISTED NAVIGATION;  Surgeon: Darrell Watson MD;  Location: [facility];  Service: Neurosurgery;  Laterality: N/A;  C2-C6 PCF; C2/3-C4/5 decompression, possible additional levels  o-arm  NTI  TIVA setup  [employer] //  XX    INSERTION OF PACEMAKER      X2    TRIPPLE VESSEL BYPASS         Subjective     Patient comments: "I had a bad night. My pain meds are due at 1025"    Respiratory Status: Room air    Patients cultural, spiritual, Mandaeism conflicts given the current situation: no    Objective:     Communicated with TR prior to session.  Patient found HOB elevated with cervical collar  upon PT entry to room.    Pt is Oriented x3 and Alert, Cooperative, and Motivated.    Vitals   Vitals at Rest  BP    HR    O2 Sat    Pain      Vitals With Activity  BP     HR     O2 Sat     Pain Pain Rating 1: 9/10  Location - Side 1: Bilateral  Location - Orientation 1: upper  Location 1: shoulder  Pain Addressed 1: Nurse notified, Distraction, Reposition       Functional Mobility:   Toilet t/f: overall CGA. Pt unable to void. Cga with donning and doffing of pants and brief.   Bed Mobility in bed   Overall CGA with HOB elevated  Pt will use recliner at home      Current   Status  Discharge   Goal   Functional Area: Care Score:    Sit to Stand 4 Independent   Chair/Bed-to-Chair Transfer 4  CGA with RW for stand step t/f.  Independent   Car Transfer   Independent   Walk 10 Feet 4 Independent   Walk 50 Feet with Two Turns 4     Walk 150 Feet 3  180' overall CGA to Min A with RW. ~100' pt had " episode of R knee buckling and required Min A to recover. Noted NBOS, decreased step length and foot clearance in RLE.  Independent       Therapeutic Activities and Exercises:  Patient educated on role of acute care PT and PT POC, safety while in hospital including calling nurse for mobility, and call light usage  Patient educated about importance of OOB mobility and remaining up in chair most of the day.    Therapeutic exercises were performed seated at edge of chair:    marches with 2 weights  hip abduction with red theraband  hip adductions isometrics against a ball  hamstring curls with red theraband  long arc quads with 2 weights  calf raises/toe raises with 2 weights  All therex performed 3x15 reps each BLE          Activity Tolerance: Good    Patient left up in chair with call button in reach and legs elevated and heels floated .    Education provided: roles and goals of PT/PTA, transfer training, gait training, balance training, safety awareness, body mechanics, assistive device, strengthening exercises, fall prevention, and spinal precautions    Expected compliance: High compliance    Plan:     During this hospitalization, patient to be seen 5 x/week to address the identified rehab impairments via gait training, therapeutic activities, therapeutic exercises and progress toward the following goals:    GOALS:   Multidisciplinary Problems       Physical Therapy Goals          Problem: Physical Therapy    Goal Priority Disciplines Outcome Interventions   Physical Therapy Goal     PT, PT/OT Progressing    Description: Bed Mobility:  Sit to supine transfer independently.   Supine to sit transfer independently.     Transfers:  Sit to stand transfer independently using RW.   Car transfer independently using RW.    an object from the ground in standing position independently using RW.     Mobility:  Ambulate 500 feet independently using RW.  Ambulate 15 feet on uneven surfaces/ramps with setup/clean-up assist  using RW.   Pt ascended/descended a 6 inch curb independently using RW.   Ascend/descend 15 stairs independently using bilateral handrails.                         Plan of Care Expires:  02/13/25  PT Next Visit Date: 02/13/25  Plan of Care reviewed with: patient    Additional Information:         Time Tracking:     Therapy Time  PT Received On: 02/12/25  PT Start Time: 0930  PT Stop Time: 1100  PT Total Time (min): 90 min   PT Individual: 90  Missed Time:    Time Missed due to:      Billable Minutes: Gait Training 15, Therapeutic Activity 30, and Therapeutic Exercise 45    02/12/2025

## 2025-02-12 NOTE — PROGRESS NOTES
[facility] ([facility])  Rehab Progress Note    Patient Name: Tyler Mai  MRN: [id number]  Age: 69 y.o. Sex: male  : 1955  Hospital Length of Stay: 12 days  Date of Service: 2025   Chief Complaint: Cervical myelopathy s/p C2-6 PCDF and C2/3-C4/5 decompression on 2025     Subjective:     Basic Information  Admit Information: 69-year-old white male presented to [facility] ED on 2025 complaining of bilateral arm weakness, bilateral leg weakness, and worsening neck pain.  Reports right leg weaker than the left.  Recently admitted to [facility] for cervical myelopathy with plans for surgery, but patient had severe UTI requiring antibiotic therapy.  MRI on  significant for severe C3-4 level central canal stenosis with increased signal hyperintensity within the cervical cord from mid to lower aspect of C3 through mid aspect of C4 with central cord myelomalacia.  PMH significant for cervical myelopathy with history of C5-C6 fusion, CAD, HFrEF, ICMO, COPD, DM type 2, HLD, HTN, and PVD.  Workup significant for cervical myelopathy.  Patient inpatient family initially declined the neurosurgeon on call surgery.  Then surgery postpone due to inclement weather.  Tolerated posterior C3-C5 total laminectomies for decompression, placement of bilateral pars screws at C2 and bilateral lateral mass screws from C3 to C6 using [facility] Invictus OCT System, and placement of local bone and AlphaGraft DBM for arthrodesis from C2 to C6 on  without perioperative complications.  Postoperatively neurosurgery documented increase mobility to bilateral upper extremities with a 5/5 hand grasp.  Recommended aspirin hard collar at all times, and [place] collar for showers..  Recommended removal staples on .  Gray continued due to urinary retention.  Tolerated transfer to [facility] inpatient rehab unit on  without incident.   Today's Information: No acute events overnight.  Sitting up in  chair.  Reports good sleep and appetite.  Last BM 2/9.  BP improved, but he is still drinking quite a few cokes during the day.  He reports he has transitioning to water and trying to drink only 1 Coke daily.  He does still have some bilateral lower extremity edema, but improved.  There is edema to left arm-unilateral.  Likely due to recent infiltration.  Renal indices trending up slightly s/p Lasix.  H&H trending down slightly.  No new imaging today.    Review of patient's allergies indicates:   Allergen Reactions    Hydrochlorothiazide      Other Reaction(s): Pancreatitis        Current Facility-Administered Medications:     acetaminophen tablet 650 mg, 650 mg, Oral, Q6H PRN, Ashley, Tony A, FNP, 650 mg at 02/09/25 0316    albuterol nebulizer solution 2.5 mg, 2.5 mg, Nebulization, Q4H PRN, Jaxson, Shannan A, FNP, 2.5 mg at 02/12/25 0705    ALPRAZolam tablet 0.25 mg, 0.25 mg, Oral, TID PRN, Ashley, Tony A, FNP, 0.25 mg at 02/10/25 2333    amLODIPine tablet 5 mg, 5 mg, Oral, BID AC, [place], Shannan A, FNP, 5 mg at 02/12/25 0519    aspirin chewable tablet 81 mg, 81 mg, Oral, Daily, Ashley, Tony A, FNP, 81 mg at 02/11/25 0817    atorvastatin tablet 40 mg, 40 mg, Oral, Daily, Ashley, Tony A, FNP, 40 mg at 02/11/25 0817    benzonatate capsule 100 mg, 100 mg, Oral, TID PRN, Ashley, Tony A, FNP, 100 mg at 02/10/25 0718    carvediloL tablet 12.5 mg, 12.5 mg, Oral, BID AC, Ashley, Tony A, FNP, 12.5 mg at 02/12/25 0519    clopidogreL tablet 75 mg, 75 mg, Oral, Daily, Ashley, Tony A, FNP, 75 mg at 02/11/25 0817    cyclobenzaprine tablet 5 mg, 5 mg, Oral, TID PRN, [place], Shannan A, FNP    dextrose 50% injection 12.5 g, 12.5 g, Intravenous, PRN, Tony Ramirez A, FNP    dextrose 50% injection 25 g, 25 g, Intravenous, PRN, Tony Ramirez, RONEYP    docusate sodium capsule 100 mg, 100 mg, Oral, BID, Tony Ramirez, MARIVEL, 100 mg at 02/11/25 2042    DULoxetine DR capsule 30 mg,  30 mg, Oral, Daily, Shannan Puri, FNP, 30 mg at 02/11/25 0817    enoxaparin injection 40 mg, 40 mg, Subcutaneous, Q24H (prophylaxis, 1700), Ashley, Tony A, FNP, 40 mg at 02/11/25 1644    finasteride tablet 5 mg, 5 mg, Oral, Daily, Ashley, Tony A, FNP, 5 mg at 02/11/25 0817    umeclidinium-vilanteroL 62.5-25 mcg/actuation DsDv 1 puff, 1 puff, Inhalation, Daily, 1 puff at 02/11/25 0818 **AND** fluticasone furoate 200 mcg/actuation inhaler 1 puff, 1 puff, Inhalation, Daily, Ashley, Tony A, FNP, 1 puff at 02/11/25 0800    furosemide tablet 80 mg, 80 mg, Oral, Daily, Ashley, Tony A, FNP, 80 mg at 02/11/25 1311    glucagon (human recombinant) injection 1 mg, 1 mg, Intramuscular, PRN, Ashley, Tony A, FNP    glucose chewable tablet 16 g, 16 g, Oral, PRN, Ashley, Tony A, FNP    glucose chewable tablet 24 g, 24 g, Oral, PRN, Ashley, Tony A, FNP    guaiFENesin 12 hr tablet 600 mg, 600 mg, Oral, BID, Ashley, Tony A, FNP, 600 mg at 02/11/25 2042    hydrALAZINE injection 10 mg, 10 mg, Intravenous, Q4H PRN, Ashley, Tony A, FNP, 10 mg at 02/04/25 0629    hydrOXYzine pamoate capsule 50 mg, 50 mg, Oral, Nightly PRN, Ashley, Tony A, FNP, 50 mg at 02/03/25 2138    insulin aspart U-100 injection 0-10 Units, 0-10 Units, Subcutaneous, QID (AC + HS) PRN, Ashley, Tony A, FNP, 1 Units at 02/11/25 2059    labetalol 20 mg/4 mL (5 mg/mL) IV syring, 10 mg, Intravenous, Q4H PRN, Ashley, Tony A, FNP    LIDOcaine 5 % patch 1 patch, 1 patch, Transdermal, Q24H, Shannan Puri FNP, 1 patch at 02/11/25 1800    linaCLOtide capsule 145 mcg, 145 mcg, Oral, QAM, Tony Ramirez, FNP, 145 mcg at 02/12/25 0519    losartan tablet 25 mg, 25 mg, Oral, Before breakfast, Shannan Puri FNP, 25 mg at 02/12/25 0519    magnesium oxide tablet 400 mg, 400 mg, Oral, TID, Tony Ramirez, FNP, 400 mg at 02/12/25 0519    melatonin tablet 6 mg, 6 mg, Oral, Nightly PRN, Jaxson,  "Shannan A, FNP, 6 mg at 02/02/25 2056    methocarbamoL tablet 750 mg, 750 mg, Oral, QID (AC & HS), Bobby Puriyn FLAQUITA, FNP, 750 mg at 02/12/25 0519    metoprolol injection 10 mg, 10 mg, Intravenous, Q2H PRN, Ashley, Tony A, FNP    miconazole NITRATE 2 % top powder, , Topical (Top), BID, Radha Purihryn A, FNP, Given at 02/11/25 2044    nitroGLYCERIN SL tablet 0.4 mg, 0.4 mg, Sublingual, Q5 Min PRN, Ashley, Tony A, FNP    ondansetron disintegrating tablet 4 mg, 4 mg, Oral, Q6H PRN, Ashley, Tony A, FNP    ondansetron disintegrating tablet 8 mg, 8 mg, Oral, Q8H PRN, Ashley, Tony A, FNP    ondansetron injection 4 mg, 4 mg, Intravenous, Q8H PRN, Ashley, Tony A, FNP    oxyCODONE-acetaminophen  mg per tablet 1 tablet, 1 tablet, Oral, Q4H PRN, Jaxson, Shannan A, FNP, 1 tablet at 02/12/25 0624    oxyCODONE-acetaminophen 5-325 mg per tablet 1 tablet, 1 tablet, Oral, Q4H PRN, Jaxson Shannan A, FNP, 1 tablet at 02/05/25 0606    pantoprazole EC tablet 40 mg, 40 mg, Oral, Daily, Ashley, Tony A, FNP, 40 mg at 02/11/25 0817    polyethylene glycol packet 17 g, 17 g, Oral, BID PRN, Ashley, Tony A, FNP, 17 g at 02/09/25 0756    pregabalin capsule 100 mg, 100 mg, Oral, TID, Radha Purihryn A, FNP, 100 mg at 02/12/25 0519    ranolazine 12 hr tablet 500 mg, 500 mg, Oral, BID, Ashley, Tony A, FNP, 500 mg at 02/11/25 2042     Review of Systems   Complete 12-point review of symptoms negative except for what's mentioned in HPI     Objective:     BP (!) 146/71   Pulse 68   Temp 98.3 °F (36.8 °C) (Oral)   Resp 14   Ht 5' 6" (1.676 m)   Wt 68.5 kg (151 lb 0.2 oz)   SpO2 97%   BMI 24.37 kg/m²      Physical Exam  Constitutional:       Appearance: Normal appearance.   HENT:      Head: Normocephalic.      Mouth/Throat:      Mouth: Mucous membranes are moist.   Eyes:      Pupils: Pupils are equal, round, and reactive to light.   Neck:      Comments: Posterior neck incision clean and " intact.  Hard collar intact  Cardiovascular:      Rate and Rhythm: Normal rate and regular rhythm.      Heart sounds: Normal heart sounds.   Pulmonary:      Effort: Pulmonary effort is normal.      Breath sounds: Normal breath sounds.   Abdominal:      General: Bowel sounds are normal.      Palpations: Abdomen is soft.   Musculoskeletal:      Cervical back: Neck supple.      Right lower le+ Pitting Edema present.      Left lower le+ Pitting Edema present.      Comments: Diffuse muscle atrophy.  Left forearm edema.   Skin:     General: Skin is warm and dry.   Neurological:      General: No focal deficit present.      Mental Status: He is alert and oriented to person, place, and time.      Motor: Weakness present.   Psychiatric:         Mood and Affect: Mood normal.         Behavior: Behavior normal.         Thought Content: Thought content normal.         Judgment: Judgment normal.     *MD performed and documented physical examination       Lines/Drains/Airways       None                 Labs  Recent Results (from the past 24 hours)   POCT glucose    Collection Time: 25 11:19 AM   Result Value Ref Range    POCT Glucose 133 (H) 70 - 110 mg/dL   POCT glucose    Collection Time: 25  4:28 PM   Result Value Ref Range    POCT Glucose 121 (H) 70 - 110 mg/dL   POCT glucose    Collection Time: 25  8:46 PM   Result Value Ref Range    POCT Glucose 170 (H) 70 - 110 mg/dL   POCT glucose    Collection Time: 25  5:18 AM   Result Value Ref Range    POCT Glucose 115 (H) 70 - 110 mg/dL   Basic Metabolic Panel    Collection Time: 25  5:52 AM   Result Value Ref Range    Sodium 137 136 - 145 mmol/L    Potassium 4.3 3.5 - 5.1 mmol/L    Chloride 99 98 - 107 mmol/L    CO2 28 23 - 31 mmol/L    Glucose 113 82 - 115 mg/dL    Blood Urea Nitrogen 33.0 (H) 8.4 - 25.7 mg/dL    Creatinine 1.31 (H) 0.72 - 1.25 mg/dL    BUN/Creatinine Ratio 25     Calcium 9.5 8.8 - 10.0 mg/dL    Anion Gap 10.0 mEq/L    eGFR 60  mL/min/1.73/m2   Hemoglobin and Hematocrit    Collection Time: 02/12/25  5:52 AM   Result Value Ref Range    Hgb 8.5 (L) 14.0 - 18.0 g/dL    Hct 26.0 (L) 42.0 - 52.0 %   Magnesium    Collection Time: 02/12/25  5:52 AM   Result Value Ref Range    Magnesium Level 1.60 1.60 - 2.60 mg/dL     Radiology  Cervical XR two-view on 01/25/2025, IMPRESSION: There are postoperative changes with posterior spinal fusion hardware at C2 through C6. The hardware appears intact. Cervical alignment has improved. The remaining vertebral body heights are preserved. There is moderate disc height loss at C3-C4, mild at C4-C5, with multilevel marginal osteophytes. The soft tissues are unremarkable.     Assessment/Plan:     69 y.o. WM admitted on 1/31/2025    Cervical myelopathy   - s/p C2-6 PCDF and C2/3-C4/5 decompression on 01/24/2025  - remove staples on 02/07  - continue aspirin hard collar at all times, okay for [place] collar for showers only  - Zanaflex discontinued on 02/05 due to orthostatic hypotension  - continue                Robaxin 750 mg q.i.d. (decreased 2/5)                Flexeril 10 mg t.i.d. p.r.n.                Percocet 10 mg q.4 hours p.r.n. for severe pain                Lyrica 100 mg t.i.d. (increased 2/3)  - defer to physiatry for rehab and pain management  - PT/OT/RT/ST following  - to follow up with Neurosurgery outpatient     Coronary artery disease  - s/p  CABG x 3  - denies recent chest pain or discomfort  - continue                ASA 81 mg daily (resumed 1/31)                Plavix 75 mg daily (resume 1/31)                Coreg 25 mg b.i.d. (increased 2/12)                Lipitor 40 mg daily                Losartan 25 mg daily   - ECG and Nitro PRN  - continue cardiac diet  - to follow-up with cardiology outpatient     HFrEF/ICMO  - stable  - s/p Lasix 40 mg IVP x1 on 2/10  - continue  Lasix 80 mg oral daily (initiated 2/11)                Coreg 25 mg b.i.d. (increased 2/12)                Losartan 25  mg daily                 Ranexa 500 mg b.i.d.   - obtain daily standing weights  - to follow up with cardiology outpatient     Nicotine dependence  - smoking cessation counseling given on admission  - 1 PPD with approximately 55 pack years     HTN  -BP moderately controlled  - discontinued Flomax 0.4 mg at bedtime on 02/04 due to orthostatic hypotension  - discontinued Zanaflex on 02/05 due to orthostatic hypotension  - received NS 1 L bolus on 02/05 due to orthostatic hypotension  - discontinue Norvasc 5 mg b.i.d. on 2/12 due to bilateral lower extremity edema  - continue                Coreg 25 mg b.i.d. (increased 2/12)                Losartan 25 mg daily                 Norvasc 5 mg daily                 Ranexa 500 mg b.i.d.                 Hydralazine 10 mg every 2 hours as needed for BP > 160/90                Labetalol 10 mg every 2 hours as needed for BP > 160/90  - low sodium diet             COPD   - stable  - not on home 02  - continue                Trelegy INH daily                   DM type II  - HgA1c 5.6 on 01/23/2024  - continue                Lantus 10 units every evening (resumed 1/31)                ISS   - CBGs AC/HS     HLD  - FLP outpatient  - resume Crestor upon discharge  - continue                Lipitor 40 mg daily      PVD  - stable  - continue                ASA 81 mg daily (resumed 1/31)                Plavix 75 mg daily (resume 1/31)                Coreg 12.5 mg b.i.d. (increased 2/11)                Lipitor 40 mg daily  - to follow up vascular surgery outpatient                  Chronic constipation  - last BM on 02/07 (prior it was 1/28).  Reports normal BMs every 8 days.  - continue                Linzess 145 mcg before breakfast                 Colace 100 mg b.i.d.     Hypomagnesemia  - stable  - continue                Magnesium oxide 400 mg b.i.d.      BPH/urinary retention  - stable  - discontinued Flomax 0.4 mg at bedtime on 02/04 due to orthostatic hypotension  - continue                 Proscar 5 mg daily (initiated 1/31)  Indwelling catheter discontinued on 02/01-good urine output with no retention reported     GERD  - Avoid spicy foods, and nothing to eat or drink within x2 hours of bedtime or laying flat (water is ok)   - Avoid NSAIDs (Advil, ibuprofen, naproxen...) and chavira-2 inhibitors (Mobic, Celebrex)    - continue                Protonix 40 mg daily      Normocytic anemia  - asymptomatic  - H/H stable   - no evidence of active bleeds  - will closely monitor and transfuse if needed     Hypomagnesemia  - magnesium 1.5 on 2/10  - s/p Mag sulfate 2 g x 1 IVPB on 02/01  - continue   Mag-Ox 400 mg tID (increased 2/10)    Congestion  - improved  - continue   Mucinex 600 mg q.12 hours x7 days (initiated 2/7)   Mucomyst nebs t.i.d. x3 doses (initiated 2/7)    Left upper extremity edema  - current  - obtain left upper extremity NIVA to rule out DVT     VTE Prophylaxis: Lovenox 40 mg daily   COVID-19 testing:  Unknown  COVID-19 vaccination status:  Vaccinated (Moderna):  Bivalent booster on 04/03/2023     POA:  No  Living will:  No  Contacts:  Tyler Mai Jr (son) [phone]     CODE STATUS:  Full code  Internal Medicine (attending): Tyler De Anda MD  Physiatry (consulting):  Philip Fang MD     OUTPATIENT PROVIDERS  PCP: None  Neurosurgery: Darrell Zavala MD     DISPOSITION:  Sleep hygiene, bowel maintenance, and appetite at goal.  BP moderately controlled.  Renal indices trending up.  H&H trending down slightly.  Lab work otherwise unremarkable.  No new imaging today.  Staff reports left forearm edema.  He also has bilateral lower extremity edema.  Worse since increase of Norvasc over the weekend.  He has also been drinking multiple cokes daily.  Obtain left upper extremity NIVA to rule out DVT.  Discontinue Norvasc due to bilateral lower extremity edema.  Increase Coreg 25 mg b.i.d. for improved BP control.  Decrease Lasix 20 mg daily to begin tomorrow.  Repeat lab work in  the morning.  Continue aggressive mobilization as tolerated.  Progressing well.  Monitor closely.  Notify of acute changes.  Staffing completed today.    Staffing 2/12/2025: Continent of bowel and bladder. Left arm redness, maybe infiltration. RT: Overall supervision to set up.  More cooperative this week.  Limited by dexterity. Appetite is good at 85%. PT: Good progress this week. Pain well controlled.  Tolerated 8 steps.  Overall CGA, but unsafe d/t weakness.  Needs a chair at platform to rest between steps.  Sleeps in a recliner at home.  Needs kasia mat. Ambulating 105-120 feet with walker.  Distance in inconsistent 2/2 fatigue. Limited insight into deficits.  OT: Max assist with UE and LE dressing.  Cannot wear shoes 2/2 heel wound. Wearing PRAFO boots. Mod assist with toileting.  Set up with oral care and eating. Unable to denny and doff C-collar. Projected discharge pending.      Jarrell Ramirez NP conducted independent physical examination and assisted with medical documentation.    Total time spent on this encounter including chart review and direct MD + NP 1-on-1 patient interaction: 53 minutes   Over 50% of this time was spent in counseling and coordination of care

## 2025-02-12 NOTE — PT/OT/SLP RE-EVAL
Occupational Therapy Inpatient Rehab Re-Evaluation    Name: Tyler Mai  MRN: [id number]    Recommendations:     Discharge Recommendations:   (pending progress)   Discharge Equipment Recommendations: bath bench, grab bar, wheelchair   Barriers to discharge: Inaccessible home, Decreased caregiver support, and severity of deficits, decreased ADL performance and functional mobility.    Assessment:  Tyler Mai is a 69 y.o. male admitted with a medical diagnosis of Status post cervical spinal fusion.  He presents with the following impairments/functional limitations:  weakness, impaired endurance, impaired sensation, impaired self care skills, impaired functional mobility, gait instability, impaired balance, decreased coordination, decreased upper extremity function, decreased lower extremity function, decreased safety awareness, pain, impaired coordination, impaired fine motor, orthopedic precautions.    General Precautions: Standard, fall     Orthopedic Precautions:spinal precautions     Braces: Aspen collar    Rehab Prognosis: Fair and Poor; patient would benefit from acute skilled OT services to address these deficits and reach maximum level of function.      History:     Past Medical History:   Diagnosis Date    Anxiety disorder, unspecified     Arthritis     CAD (coronary artery disease)     Cervical radiculopathy     CHF (congestive heart failure)     Chronic pain syndrome     COPD (chronic obstructive pulmonary disease)     Diabetes mellitus     Gastro-esophageal reflux disease without esophagitis     High cholesterol     Hypertension     Lumbar radiculopathy     Myelomalacia of cervical cord     Other specified diseases of spinal cord     Pacemaker     PVD (peripheral vascular disease)     S/P triple vessel bypass     Tobacco use        Past Surgical History:   Procedure Laterality Date    ANTERIOR CERVICAL DISCECTOMY W/ FUSION  1995    C5-6 and C6-7.  Dr. Wall    ANTERIOR CERVICAL DISCECTOMY W/ FUSION   "1996    Redo C5-6, C6-7.  Dr. Wall    CHOLECYSTECTOMY      CORONARY ARTERY BYPASS GRAFT      FUSION OF POSTERIOR COLUMN OF CERVICAL SPINE USING COMPUTER AIDED NAVIGATION N/A 1/24/2025    Procedure: FUSION, SPINE, POSTERIOR SPINAL COLUMN, CERVICAL, USING COMPUTER-ASSISTED NAVIGATION;  Surgeon: Darrell Watson MD;  Location: [facility];  Service: Neurosurgery;  Laterality: N/A;  C2-C6 PCF; C2/3-C4/5 decompression, possible additional levels  o-arm  NTI  TIVA setup  [employer] //  XX    INSERTION OF PACEMAKER      X2    TRIPPLE VESSEL BYPASS         Subjective     Orientation: Oriented x4    Chief Complaint: pain    Patient/Family Comments/goals: "I'm going to have to manage for myself."    Vitals  Vitals With Activity  BP (!) 112/57   HR 83   O2 Sat     Pain Pain Rating 1: 8/10  Location - Side 1: Bilateral  Location - Orientation 1: medial  Location 1: neck  Pain Addressed 1: Nurse notified, Reposition, Distraction, Cessation of Activity  Pain Rating Post-Intervention 1: 9/10     Respiratory Status: Room air    Patients cultural, spiritual, Zoroastrian conflicts given the current situation: no       Living Environment   Living Environment  People in Home: alone  Living Arrangements: apartment  Home Accessibility: stairs to enter home  Number of Stairs, Main Entrance: other (see comments) (15)  Stair Railings, Main Entrance: railings safe and in good condition  Equipment Currently Used at Home: rollator, walker, rolling, shower chair  Shower Setup: Tub/Shower combo with shower chair    Prior Level of Function  BADL: Independent    IADL: Independent    Upon discharge, patient will have assistance from none identified.    Objective:     Patient found sitting edge of bed with cervical collar  upon OT entry to room.    Mobility   Patient completed:  Sit to Stand Transfer with contact guard assistance with rolling walker  Stand to Sit Transfer with contact guard assistance with rolling walker  Toilet Transfer Step " Transfer technique with contact guard assistance with  rolling walker    Functional Mobility   In room mobility with RW with CGA.    ADLs     Current Status   Eating 5   Oral Hygiene 5 attempted to stand at sink to perform but pt did not tolerate but for 30 sec and requested to sit in W/C.   Shower, Bathe Self  Decline to perform due to increased pain.   Lower Body Dressing 3 with reacher for shorts only; pt already had clean brief on.   Toileting Hygiene 3    Toilet Transfer 4 CGA     Limiting Factors for ADLs: motor, sensory, endurance, limited ROM, balance, weakness, coordination, safety awareness, and pain    Exams     ROM:          -       WFL    Hand Dominance: Right    ROM Hand  Left Hand: WFL with gross grasp  Right Hand: WFL with gross grasp    Strength  Overall Strength:          -       WFL     Strength:   Fair strength bilaterally    Pinch Strength:   Fair strength bilaterally    Sensation  Impaired in bilateral hands    Coordination:   Impaired with fine motor    Balance    Sitting  Sitting Surface: EOB  Static: No UE Support, Good  Dynamic: No UE extremity support, Fair (+)    Standing  Static: No UE Support, Fair (+)  Dynamic: Bilateral UE extremity support, Fair    Patient left supine with all lines intact, call button in reach, and PRAFO boots bilaterally .    Education provided: Roles and goals of OT, ADLs, transfer training, bed mobility, modified goals, sequencing, safety precautions, and fall prevention    Multidisciplinary Problems       Occupational Therapy Goals          Problem: Occupational Therapy    Goal Priority Disciplines Outcome Interventions   Occupational Therapy Goal     OT, PT/OT Progressing    Description: ADLs:  Pt to perform grooming tasks with independence standing at sink with RW by d/c.   Pt to perform feeding tasks with independence using adaptive utensils as needed by d/c.    Pt to perform UB dressing with independence by d/c.    Pt to perform LB dressing with touch  assist by d/c.    Pt to perform putting on/off footwear task with independence and AE as needed by d/c.   Pt to perform toileting with touch assist by d/c.    Pt to perform bathing with independence by d/c.     Functional Transfers:  Pt to perform toilet transfers with independence by d/c.    Pt to perform a tub transfer with independence by d/c.     IADLs:  Pt to perform simple meal prep standing at kitchen counter with touch assist by d/c.      Balance, Strengthening, Endurance, Balance:  Pt to consistently demonstrate adherence to spinal precautions during all ADL's as instructed by OT.  Pt to demonstrate good dynamic standing balance as required to perform ADL's from standing level.                         Plan     During this hospitalization, patient to be seen 5 x/week (5-7 days per wk) to address the identified rehab impairments via self-care/home management, therapeutic activities, therapeutic exercises, sensory integration, wheelchair management/training and progress toward the following goals:    Plan of Care Expires:  02/18/25    Time Tracking     OT Received On: 02/12/25  Time In 0730     Time Out 0830  Total Time 60 min  Therapy Time: OT Individual: 60  Missed Time:    Missed Time Reason:      Billable Minutes: Re-eval 15 and Self Care/Home Management 45    02/12/2025

## 2025-02-12 NOTE — PROGRESS NOTES
Dos 2/12/25  I have personally evaluated the patient during therapy today. Have discussed progress and problems with patient. Have reviewed barriers to progress as well as response to therapies with therapists. I have reviewed medical issues and plan of care with IM team. I met with  to review d/c plans and barriers. I have discussed skin integrity and B/B status with nursing. I am in agreement with present IRF plan of care.  I have been involved in  formation of this note with Rylie Puri.  Bird Fang MD  Subjective  HPI: 69 year old WM with a PMH of cervical myelopathy, CAD, CHF, COPD, DM, HLD, HTN, and PVD presented to the ED at [facility] on 1/14/25 for Neurosurgery. Patient presented in ED following discharge from [facility]. He reports associated symptoms of bilateral arm weakness, bilateral leg weakness, and worsening neck pain. Notes right leg is weaker than left. He also complains of bilateral hand, bilateral leg, and bilateral feet numbness. He stated that he has been unable to care for himself at home because of the weakness, and having to use a wheelchair to get around. Patient's daughter at bedside reports frequent falls at home. No urinary or fecal incontinence. Per chart review, the patient was admitted to [facility] for cervical myelopathy with the plan of surgery. Previous extensive admission at [facility] for UTI which cleared and resolved with antibiotics; initially, did not have surgery secondary to UTI. Now amendable for surgical intervention since UTI has resolved. Patient refused surgery with on-call neurosurgeon at [facility] and discharged at noon today. He was advised to come here for the surgery. MRI on 01/05 showed severe C3-4 level central canal stenosis with increased signal hyperintensity within the cervical cord from mid to lower aspect of C3 through mid aspect of C4 with central cord myelomalacia. No syrinx is detected. Prior C5-C7 intervertebral body fusion. Neurosurgery was consulted  with recommendation for surgical intervention needed. Cardiology consulted with low risk for MACE for high risk neurosurgical procedure. On 1/24, patient underwent C2-6 PCDF and C2-3- C4-5 decompression by Dr. Ramírez. 1 Hemovac drain placed to site. Hard cervical collar in place. On 1/25, wound care was consulted for right heel and left heel wound with recommendation to cleanse bilateral heels with normal saline, paint with betadine, apply small foam, and change every 2 days. Recommended bilateral heel boots. Herrera was removed. Labs showed low Na of 133, elevated BUN/Cr of 29.7/ 1.31, and low H&H of 10.6 & 31.9. BP ranging 125/58- 182/80. PT/OT efvals completed with deficits noted with recommendation for high intensity therapy needed. ON 1/26, Labs showed low H&H of 10.1 & 29.5. Patient having urinary retention requiring in and out caths. On 1/27, hemovac removed. Neurosurgery recommended to DC staples on POD 14, will need follow up in 2 weeks after DC from hospital with XR uprights of cervical spine with Izzy, NP, continued Percocet and Robaxin for pain management, and signed off of case. Herrera had to be reinserted for urinary retention, and started Flomax. BP ranging 123/61- 169/70. On 1/28, patient started on Relistar for constipation with 2 large Bms noted. Labs showed low H&H of 9.2 & 27.3. On 1/30, wound care reassessed with 2 wounds noted to bilateral anterior legs. Cleansed with soap and water, and applied aquaphor to sites. Patient is AAOx4.   Participating with therapy. Functional status includes setup assist needed for eating, minimal assist for bed mobility, contact guard assist for transfers with RW, walked 28ft with RW at minimal assist, minimal assist for toilet transfer, total assist for toileting due to herrera in place, stand by assist for grooming, max assist for upper body dressing, and max assist for lower body dressing. Patient was evaluated, accepted, and admitted to inpatient rehab to improve  functional status. Transferred to [facility] on 1/31 without incident.     2/12: Seen with PT, seated in WC and talking with Physiatrist. Patient would like to change to a Pain Doctor in [place], because [facility] is difficult to get to. Attempting to call a recommended Physician, but they are not accepting new patients. They also do not prescribe medication and only use injections and procedures. After having cervical surgery, he requires medication management currently. Will follow-up with his current Pain Doctor after DC. VSSAF with noted /57 at 0730. No complaints. IM following.            Review of Systems  Psychiatric: Denies mental health history. Pt. Smokes cigarettes.     Depression/Anxiety: notes some anxiety in regards to eating and getting enough nutrition to his mouth     DULoxetine DR capsule 30 mg qd, start 2/1  ALPRAZolam tablet 0.25 mg TID PRN Anxiety  Pain: L>R side of neck, numbness (hands, legs, feet), shooting pains  DULoxetine DR capsule 30 mg qd, start 2/1  methocarbamoL tablet 750 mg AC & HS  pregabalin capsule 100mg TID  LIDOcaine 5 % patch 1 patch neck q24hr, 1800  acetaminophen tablet 650 mg q6h PRN mild pain   oxyCODONE-acetaminophen 5-325 mg 1 tablet q4h PRN mod pain  oxyCODONE-acetaminophen  mg per tablet 1 tablet q4h PRN severe pain  cyclobenzaprine tablet 5 mg TID PRN spasm  Bowels/Bladder: Last BM 2/9 (formed moderate) 2/7 following suppository (finally agreeable and said it worked within 15 minutes); 1/28 x 2 (large) following Relistor. Previously refusing suppository  Gray catheter reinserted 2/2 urinary retention-discontinued. Urinating    docusate sodium capsule 100 mg BID  linaCLOtide capsule 145 mcg qAM  tamsulosin 24 hr capsule 0.4 mg qHS  Appetite: good.  Improved intake with built up utensils   Sleep: good with melatonin  melatonin tablet 6 mg qHS PRN Insomnia              Physical Exam  General: well-developed, well-nourished, in no acute distress  Neck: hard  collar, supple  Respiratory: equal chest rise, no SOB, no audible wheeze  Cardiovascular: regular rate and rhythm, BLE & LUE edema  Gastrointestinal: soft, non-tender, non-distended   Musculoskeletal: BUE/BLE weakness  Integumentary: no rashes, bilateral heel wounds, bilateral anterior leg wounds, posterior cervical incision-staples-dressing-c/d/i  Neurologic: cranial nerves intact, BUE/BLE weakness, numbness (hands, legs, feet), balance deficits  *MD performed and documented physical examination           Labs:   Latest Reference Range & Units 02/12/25 05:52   Hemoglobin 14.0 - 18.0 g/dL 8.5 (L)   Hematocrit 42.0 - 52.0 % 26.0 (L)   Sodium 136 - 145 mmol/L 137   Potassium 3.5 - 5.1 mmol/L 4.3   Chloride 98 - 107 mmol/L 99   CO2 23 - 31 mmol/L 28   Anion Gap mEq/L 10.0   BUN 8.4 - 25.7 mg/dL 33.0 (H)   Creatinine 0.72 - 1.25 mg/dL 1.31 (H)   BUN/CREAT RATIO  25   eGFR mL/min/1.73/m2 60   Glucose 82 - 115 mg/dL 113   Calcium 8.8 - 10.0 mg/dL 9.5   Magnesium  1.60 - 2.60 mg/dL 1.60   (L): Data is abnormally low  (H): Data is abnormally high            Assessment/Plan  Hospital   Anemia   Hyponatremia   Spinal cord compression   Status post cervical spinal fusion   Hypokalemia     Non-Hospital   HTN (hypertension)   Hyperlipidemia   Ischemic cardiomyopathy with implantable cardioverter-defibrillator (ICD)   Tobacco user   Type 2 diabetes mellitus   Peripheral arterial disease with history of revascularization   Chronic systolic CHF (congestive heart failure), NYHA class 3   Chronic low back pain   Cervical myelopathy   Carotid artery stenosis, asymptomatic, left   Arteriosclerosis of coronary artery   S/P triple vessel bypass   COPD (chronic obstructive pulmonary disease)   Gastro-esophageal reflux disease without esophagitis   Anxiety disorder, unspecified   Cervical radiculopathy   Lumbar radiculopathy   Myelomalacia of cervical cord       Wounds: bilateral heel wounds, bilateral anterior leg wounds, posterior  cervical incision-staples-dressing-c/d/i  On 1/24, patient underwent C2-6 PCDF and C2-3- C4-5 decompression by Dr. Ramírez.  (Hemovac drain placed to site)  Precautions: spinal  Bracing: Hard cervical collar, bilateral heel boots in bed  Swallowing: Regular Diet  Function: Tolerating therapy. Continue PT/OT  VTE Prophylaxis:   enoxaparin injection 40 mg q24hr  Code Status: FULL CODE   Discharge: Lives alone in [place] in a 2nd story apartment. Completed high school. He has no  history.  Is disabled. . He was living alone and completely independent. Children: (2). Date 2/19 Wednesday.                   Shannan Puri NP, conducted additional independent physical examination and assisted with medical documentation.

## 2025-02-12 NOTE — PT/OT/SLP PROGRESS
Occupational Therapy Inpatient Rehab Treatment    Name: Tyler Mai  MRN: [id number]    Assessment:  Tyler Mai is a 69 y.o. male admitted with a medical diagnosis of Status post cervical spinal fusion.  He presents with the following impairments/functional limitations:  weakness, impaired endurance, impaired sensation, impaired self care skills, impaired functional mobility, gait instability, impaired balance, decreased coordination, decreased upper extremity function, decreased lower extremity function, decreased safety awareness, pain, impaired coordination, impaired fine motor, edema, impaired skin, orthopedic precautions.    General Precautions: Standard, fall     Orthopedic Precautions:spinal precautions     Braces: Aspen collar    Rehab Prognosis: Fair and Poor; patient would benefit from acute skilled OT services to address these deficits and reach maximum level of function.      History:     Past Medical History:   Diagnosis Date    Anxiety disorder, unspecified     Arthritis     CAD (coronary artery disease)     Cervical radiculopathy     CHF (congestive heart failure)     Chronic pain syndrome     COPD (chronic obstructive pulmonary disease)     Diabetes mellitus     Gastro-esophageal reflux disease without esophagitis     High cholesterol     Hypertension     Lumbar radiculopathy     Myelomalacia of cervical cord     Other specified diseases of spinal cord     Pacemaker     PVD (peripheral vascular disease)     S/P triple vessel bypass     Tobacco use        Past Surgical History:   Procedure Laterality Date    ANTERIOR CERVICAL DISCECTOMY W/ FUSION  1995    C5-6 and C6-7.  Dr. Wall    ANTERIOR CERVICAL DISCECTOMY W/ FUSION  1996    Redo C5-6, C6-7.  Dr. Wall    CHOLECYSTECTOMY      CORONARY ARTERY BYPASS GRAFT      FUSION OF POSTERIOR COLUMN OF CERVICAL SPINE USING COMPUTER AIDED NAVIGATION N/A 1/24/2025    Procedure: FUSION, SPINE, POSTERIOR SPINAL COLUMN, CERVICAL, USING COMPUTER-ASSISTED  "NAVIGATION;  Surgeon: Darrell Watson MD;  Location: [facility] OR;  Service: Neurosurgery;  Laterality: N/A;  C2-C6 PCF; C2/3-C4/5 decompression, possible additional levels  o-arm  NTI  TIVA setup  [employer] //  XX    INSERTION OF PACEMAKER      X2    TRIPPLE VESSEL BYPASS         Subjective     Orientation: Oriented x4    Chief Complaint: pain and needed to have a BM    Patient/Family Comments/goals: "I need to have a BM."    Vitals   Vitals With Activity  BP    HR    O2 Sat    Pain 10/10     Respiratory Status: Room air    Patients cultural, spiritual, Holiness conflicts given the current situation: no     Objective:     Patient found up in chair with cervical collar  upon OT entry to room.    Mobility   Patient completed:  Sit to Stand Transfer with contact guard assistance with rolling walker  Stand to Sit Transfer with stand by assistance with rolling walker  Toilet Transfer Step Transfer technique with contact guard assistance with  rolling walker    Functional Mobility  In room mobility with RW with CGA for safety.    ADLs   Current Status   Toileting Hygiene 3 assist for wiping - pt refused to assist with wiping due to increased pain. Able to pull up pants with encouragement   Toilet Transfer 4     Limiting Factors for ADLs: motor, sensory, psychsocial, endurance, balance, weakness, coordination, safety awareness, and pain     Patient left up in recliner chair with legs elevated with all lines intact and call button in reach.     Education provided: Roles and goals of OT, ADLs, transfer training, sequencing, safety precautions, and fall prevention    Multidisciplinary Problems       Occupational Therapy Goals          Problem: Occupational Therapy    Goal Priority Disciplines Outcome Interventions   Occupational Therapy Goal     OT, PT/OT Progressing    Description: ADLs:  Pt to perform grooming tasks with independence standing at sink with RW by d/c.   Pt to perform feeding tasks with independence using " adaptive utensils as needed by d/c.    Pt to perform UB dressing with independence by d/c.    Pt to perform LB dressing with touch assist by d/c.    Pt to perform putting on/off footwear task with independence and AE as needed by d/c.   Pt to perform toileting with touch assist by d/c.    Pt to perform bathing with independence by d/c.     Functional Transfers:  Pt to perform toilet transfers with independence by d/c.    Pt to perform a tub transfer with independence by d/c.     IADLs:  Pt to perform simple meal prep standing at kitchen counter with touch assist by d/c.      Balance, Strengthening, Endurance, Balance:  Pt to consistently demonstrate adherence to spinal precautions during all ADL's as instructed by OT.  Pt to demonstrate good dynamic standing balance as required to perform ADL's from standing level.                         Time Tracking     OT Received On: 02/12/25  Time In 1430     Time Out 1500  Total Time 30 min  Therapy Time: OT Individual: 30  Missed Time:    Missed Time Reason:      Billable Minutes: Self Care/Home Management 30 02/12/2025

## 2025-02-12 NOTE — PT/OT/SLP PROGRESS
Physical Therapy Inpatient Rehab Treatment    Patient Name:  Tyler Mai   MRN:  [id number]    Recommendations:     Discharge Recommendations:   (pending progress)   Discharge Equipment Recommendations:     Barriers to discharge: Decreased caregiver support, Impaired functional mobility , and Severity of Deficits     Assessment:     Tyler Mai is a 69 y.o. male admitted with a medical diagnosis of Status post cervical spinal fusion.  He presents with the following impairments/functional limitations:  weakness, impaired endurance, impaired functional mobility, gait instability, impaired balance, decreased upper extremity function, decreased lower extremity function, pain, decreased ROM, impaired coordination, impaired fine motor, orthopedic precautions .    PT NOTE: Pt performed toileting for entire session, stating he was blocked up and didn't want to get off the toilet until the nurse provided him with a suppository to assist with the blockage. PT returned at 15:00 to see if pt was willing to participate in therapy, but pt refused saying he did enough therapy.    Rehab Diagnosis: Cervical spondylosis and stenosis with myelopathy and cord compression s/p C2-6 PCDF and C2-3, C4-5 decompression 1/24/2025. Pt. Has a history of cervical myelopathy, CAD, CHF, COPD, DM, HLD, HTN, and PVD    General Precautions: Standard, fall     Orthopedic Precautions:spinal precautions     Braces: Aspen collar    Rehab Prognosis: Good; patient would benefit from acute skilled PT services to address these deficits and reach maximum level of function.      History:     Past Medical History:   Diagnosis Date    Anxiety disorder, unspecified     Arthritis     CAD (coronary artery disease)     Cervical radiculopathy     CHF (congestive heart failure)     Chronic pain syndrome     COPD (chronic obstructive pulmonary disease)     Diabetes mellitus     Gastro-esophageal reflux disease without esophagitis     High cholesterol      Hypertension     Lumbar radiculopathy     Myelomalacia of cervical cord     Other specified diseases of spinal cord     Pacemaker     PVD (peripheral vascular disease)     S/P triple vessel bypass     Tobacco use        Past Surgical History:   Procedure Laterality Date    ANTERIOR CERVICAL DISCECTOMY W/ FUSION  1995    C5-6 and C6-7.  Dr. Wall    ANTERIOR CERVICAL DISCECTOMY W/ FUSION  1996    Redo C5-6, C6-7.  Dr. Wall    CHOLECYSTECTOMY      CORONARY ARTERY BYPASS GRAFT      FUSION OF POSTERIOR COLUMN OF CERVICAL SPINE USING COMPUTER AIDED NAVIGATION N/A 1/24/2025    Procedure: FUSION, SPINE, POSTERIOR SPINAL COLUMN, CERVICAL, USING COMPUTER-ASSISTED NAVIGATION;  Surgeon: Darrell Watson MD;  Location: [facility];  Service: Neurosurgery;  Laterality: N/A;  C2-C6 PCF; C2/3-C4/5 decompression, possible additional levels  o-arm  NTI  TIVA setup  [employer] //  XX    INSERTION OF PACEMAKER      X2    TRIPPLE VESSEL BYPASS         Subjective     Patient comments: I'm blocked;    Respiratory Status: Room air    Patients cultural, spiritual, Christian conflicts given the current situation: no    Objective:     Communicated with RT Amada prior to session.  Patient found up in chair with cervical collar  upon PT entry to room.    Pt is  Alert.    Vitals     Pain Pain Rating 1: 8/10  Location - Side 1: Left  Location - Orientation 1: upper  Location 1: shoulder  Pain Addressed 1: Pre-medicate for activity  Pain Rating Post-Intervention 1: 8/10       Functional Mobility:        Current   Status  Discharge   Goal   Functional Area: Care Score:    Sit to Stand 4  Cga with RW Independent       Therapeutic Activities and Exercises:  Patient educated on role of acute care PT and PT POC, safety while in hospital including calling nurse for mobility, and call light usage  Patient educated about importance of OOB mobility and remaining up in chair most of the day.  Patient educated about pursed lip breathing technique and cued  for use with mobility.  Pt performed toileting transfer, step transfer - increased time due to constipation; pt advised to call for assistance when ready to get up - nursing notified of pt's request for suppository and nsg advised that pt was still toileting    Activity Tolerance: Good    Patient left  toileting  with  nsg notified.    Education provided: roles and goals of PT/PTA, transfer training, gait training, balance training, safety awareness, assistive device, fall prevention, and spinal precautions    Expected compliance: Moderate compliance    Plan:     During this hospitalization, patient to be seen 5 x/week to address the identified rehab impairments via gait training, therapeutic activities, therapeutic exercises and progress toward the following goals:    GOALS:   Multidisciplinary Problems       Physical Therapy Goals          Problem: Physical Therapy    Goal Priority Disciplines Outcome Interventions   Physical Therapy Goal     PT, PT/OT Progressing    Description: Bed Mobility:  Sit to supine transfer independently.   Supine to sit transfer independently.     Transfers:  Sit to stand transfer independently using RW.   Car transfer independently using RW.    an object from the ground in standing position independently using RW.     Mobility:  Ambulate 500 feet independently using RW.  Ambulate 15 feet on uneven surfaces/ramps with setup/clean-up assist using RW.   Pt ascended/descended a 6 inch curb independently using RW.   Ascend/descend 15 stairs independently using bilateral handrails.                         Plan of Care Expires:  02/13/25  PT Next Visit Date: 02/13/25  Plan of Care reviewed with: patient    Additional Information:         Time Tracking:     Therapy Time  PT Received On: 02/12/25  PT Start Time: 1340  PT Stop Time: 1400  PT Total Time (min): 20 min   PT Individual: 20  Missed Time: 10 Minutes  Time Missed due to: Patient unwilling to participate    Billable Minutes:  Therapeutic Activity 20 min    02/12/2025

## 2025-02-12 NOTE — PLAN OF CARE
Spoke with pt following his discussion with Dr. Fang after team held team conference and recommended SNF at this point (since pt's therapy needs exceed what he would receive at home, and patient has no at-home caregiver support).  Pt agrees with previous plan of [facility] if accepted.  FOC on chart.  Sent referral back to [facility] with current clinicals so they can reconsider admission and request SNF with pt's insurance.  Will await response.      Spoke with son Tyler and updated him and answered all questions as well.

## 2025-02-13 LAB
POCT GLUCOSE: 113 MG/DL (ref 70–110)
POCT GLUCOSE: 128 MG/DL (ref 70–110)
POCT GLUCOSE: 135 MG/DL (ref 70–110)

## 2025-02-13 PROCEDURE — 97116 GAIT TRAINING THERAPY: CPT | Mod: CQ

## 2025-02-13 PROCEDURE — 25000003 PHARM REV CODE 250: Performed by: NURSE PRACTITIONER

## 2025-02-13 PROCEDURE — 97164 PT RE-EVAL EST PLAN CARE: CPT

## 2025-02-13 PROCEDURE — 97110 THERAPEUTIC EXERCISES: CPT | Mod: CQ

## 2025-02-13 PROCEDURE — 94799 UNLISTED PULMONARY SVC/PX: CPT | Mod: XB

## 2025-02-13 PROCEDURE — 97110 THERAPEUTIC EXERCISES: CPT

## 2025-02-13 PROCEDURE — 97530 THERAPEUTIC ACTIVITIES: CPT

## 2025-02-13 PROCEDURE — 25000242 PHARM REV CODE 250 ALT 637 W/ HCPCS: Performed by: NURSE PRACTITIONER

## 2025-02-13 PROCEDURE — 11800000 HC REHAB PRIVATE ROOM

## 2025-02-13 PROCEDURE — 97140 MANUAL THERAPY 1/> REGIONS: CPT

## 2025-02-13 PROCEDURE — 99900031 HC PATIENT EDUCATION (STAT)

## 2025-02-13 PROCEDURE — 63600175 PHARM REV CODE 636 W HCPCS: Performed by: NURSE PRACTITIONER

## 2025-02-13 PROCEDURE — 94761 N-INVAS EAR/PLS OXIMETRY MLT: CPT

## 2025-02-13 PROCEDURE — 94640 AIRWAY INHALATION TREATMENT: CPT

## 2025-02-13 RX ORDER — BISACODYL 10 MG/1
10 SUPPOSITORY RECTAL DAILY PRN
Status: DISCONTINUED | OUTPATIENT
Start: 2025-02-13 | End: 2025-02-19 | Stop reason: HOSPADM

## 2025-02-13 RX ORDER — SYRING-NEEDL,DISP,INSUL,0.3 ML 29 G X1/2"
296 SYRINGE, EMPTY DISPOSABLE MISCELLANEOUS ONCE AS NEEDED
Status: DISCONTINUED | OUTPATIENT
Start: 2025-02-13 | End: 2025-02-19 | Stop reason: HOSPADM

## 2025-02-13 RX ORDER — BISACODYL 5 MG
5 TABLET, DELAYED RELEASE (ENTERIC COATED) ORAL DAILY
Status: DISCONTINUED | OUTPATIENT
Start: 2025-02-14 | End: 2025-02-19 | Stop reason: HOSPADM

## 2025-02-13 RX ORDER — BISACODYL 10 MG/1
10 SUPPOSITORY RECTAL ONCE
Status: COMPLETED | OUTPATIENT
Start: 2025-02-13 | End: 2025-02-13

## 2025-02-13 RX ADMIN — CARVEDILOL 25 MG: 25 TABLET, FILM COATED ORAL at 03:02

## 2025-02-13 RX ADMIN — LOSARTAN POTASSIUM 25 MG: 25 TABLET, FILM COATED ORAL at 05:02

## 2025-02-13 RX ADMIN — Medication 400 MG: at 03:02

## 2025-02-13 RX ADMIN — PANTOPRAZOLE SODIUM 40 MG: 40 TABLET, DELAYED RELEASE ORAL at 07:02

## 2025-02-13 RX ADMIN — OXYCODONE HYDROCHLORIDE AND ACETAMINOPHEN 1 TABLET: 10; 325 TABLET ORAL at 03:02

## 2025-02-13 RX ADMIN — METHOCARBAMOL 750 MG: 750 TABLET ORAL at 11:02

## 2025-02-13 RX ADMIN — Medication 400 MG: at 05:02

## 2025-02-13 RX ADMIN — OXYCODONE HYDROCHLORIDE AND ACETAMINOPHEN 1 TABLET: 10; 325 TABLET ORAL at 11:02

## 2025-02-13 RX ADMIN — FLUTICASONE FUROATE 1 PUFF: 200 POWDER RESPIRATORY (INHALATION) at 08:02

## 2025-02-13 RX ADMIN — BISACODYL 10 MG: 10 SUPPOSITORY RECTAL at 01:02

## 2025-02-13 RX ADMIN — FUROSEMIDE 20 MG: 20 TABLET ORAL at 07:02

## 2025-02-13 RX ADMIN — PREGABALIN 100 MG: 50 CAPSULE ORAL at 03:02

## 2025-02-13 RX ADMIN — ALBUTEROL SULFATE 2.5 MG: 2.5 SOLUTION RESPIRATORY (INHALATION) at 06:02

## 2025-02-13 RX ADMIN — ENOXAPARIN SODIUM 40 MG: 40 INJECTION SUBCUTANEOUS at 06:02

## 2025-02-13 RX ADMIN — GUAIFENESIN 600 MG: 600 TABLET, EXTENDED RELEASE ORAL at 07:02

## 2025-02-13 RX ADMIN — METHOCARBAMOL 750 MG: 750 TABLET ORAL at 03:02

## 2025-02-13 RX ADMIN — CLOPIDOGREL BISULFATE 75 MG: 75 TABLET, FILM COATED ORAL at 07:02

## 2025-02-13 RX ADMIN — OXYCODONE HYDROCHLORIDE AND ACETAMINOPHEN 1 TABLET: 10; 325 TABLET ORAL at 06:02

## 2025-02-13 RX ADMIN — MICONAZOLE NITRATE 2 % TOPICAL POWDER: at 07:02

## 2025-02-13 RX ADMIN — Medication 400 MG: at 09:02

## 2025-02-13 RX ADMIN — DOCUSATE SODIUM 100 MG: 100 CAPSULE, LIQUID FILLED ORAL at 07:02

## 2025-02-13 RX ADMIN — METHOCARBAMOL 750 MG: 750 TABLET ORAL at 07:02

## 2025-02-13 RX ADMIN — RANOLAZINE 500 MG: 500 TABLET, EXTENDED RELEASE ORAL at 07:02

## 2025-02-13 RX ADMIN — PREGABALIN 100 MG: 50 CAPSULE ORAL at 09:02

## 2025-02-13 RX ADMIN — LIDOCAINE 5% 1 PATCH: 700 PATCH TOPICAL at 06:02

## 2025-02-13 RX ADMIN — LINACLOTIDE 145 MCG: 145 CAPSULE, GELATIN COATED ORAL at 05:02

## 2025-02-13 RX ADMIN — FINASTERIDE 5 MG: 5 TABLET, FILM COATED ORAL at 07:02

## 2025-02-13 RX ADMIN — DULOXETINE HYDROCHLORIDE 30 MG: 30 CAPSULE, DELAYED RELEASE ORAL at 07:02

## 2025-02-13 RX ADMIN — HYDROXYZINE PAMOATE 50 MG: 25 CAPSULE ORAL at 09:02

## 2025-02-13 RX ADMIN — ALBUTEROL SULFATE 2.5 MG: 2.5 SOLUTION RESPIRATORY (INHALATION) at 07:02

## 2025-02-13 RX ADMIN — OXYCODONE HYDROCHLORIDE AND ACETAMINOPHEN 1 TABLET: 10; 325 TABLET ORAL at 07:02

## 2025-02-13 RX ADMIN — CARVEDILOL 25 MG: 25 TABLET, FILM COATED ORAL at 05:02

## 2025-02-13 RX ADMIN — METHOCARBAMOL 750 MG: 750 TABLET ORAL at 05:02

## 2025-02-13 RX ADMIN — PREGABALIN 100 MG: 50 CAPSULE ORAL at 05:02

## 2025-02-13 RX ADMIN — ATORVASTATIN CALCIUM 40 MG: 40 TABLET, FILM COATED ORAL at 07:02

## 2025-02-13 RX ADMIN — MICONAZOLE NITRATE 2 % TOPICAL POWDER: at 08:02

## 2025-02-13 RX ADMIN — ASPIRIN 81 MG CHEWABLE TABLET 81 MG: 81 TABLET CHEWABLE at 07:02

## 2025-02-13 NOTE — PT/OT/SLP PROGRESS
Occupational Therapy Inpatient Rehab Treatment    Name: Tyler Mai  MRN: [id number]    Assessment:  Tyler Mai is a 69 y.o. male admitted with a medical diagnosis of Status post cervical spinal fusion.  He presents with the following impairments/functional limitations:  weakness, impaired endurance, impaired sensation, impaired self care skills, impaired functional mobility, gait instability, impaired balance, decreased coordination, decreased upper extremity function, decreased safety awareness, pain, decreased ROM, impaired coordination, impaired fine motor, orthopedic precautions.    General Precautions: Standard, fall     Orthopedic Precautions:spinal precautions     Braces: Aspen collar    Rehab Prognosis: Fair; patient would benefit from acute skilled OT services to address these deficits and reach maximum level of function.      History:     Past Medical History:   Diagnosis Date    Anxiety disorder, unspecified     Arthritis     CAD (coronary artery disease)     Cervical radiculopathy     CHF (congestive heart failure)     Chronic pain syndrome     COPD (chronic obstructive pulmonary disease)     Diabetes mellitus     Gastro-esophageal reflux disease without esophagitis     High cholesterol     Hypertension     Lumbar radiculopathy     Myelomalacia of cervical cord     Other specified diseases of spinal cord     Pacemaker     PVD (peripheral vascular disease)     S/P triple vessel bypass     Tobacco use        Past Surgical History:   Procedure Laterality Date    ANTERIOR CERVICAL DISCECTOMY W/ FUSION  1995    C5-6 and C6-7.  Dr. Wall    ANTERIOR CERVICAL DISCECTOMY W/ FUSION  1996    Redo C5-6, C6-7.  Dr. Wall    CHOLECYSTECTOMY      CORONARY ARTERY BYPASS GRAFT      FUSION OF POSTERIOR COLUMN OF CERVICAL SPINE USING COMPUTER AIDED NAVIGATION N/A 1/24/2025    Procedure: FUSION, SPINE, POSTERIOR SPINAL COLUMN, CERVICAL, USING COMPUTER-ASSISTED NAVIGATION;  Surgeon: Darrell Watson MD;   "Location: [facility];  Service: Neurosurgery;  Laterality: N/A;  C2-C6 PCF; C2/3-C4/5 decompression, possible additional levels  o-arm  NTI  TIVA setup  [employer] //  XX    INSERTION OF PACEMAKER      X2    TRIPPLE VESSEL BYPASS         Subjective     Orientation: Oriented x4    Chief Complaint: nursing staff not coming soon enough when needing to urinate    Patient/Family Comments/goals: "I do need to work more with my hands."    Vitals   Vitals   BP    HR    O2 Sat    Pain 5/10     Respiratory Status: Room air    Patients cultural, spiritual, Amish conflicts given the current situation: no     Objective:     Patient found up in chair with cervical collar  upon OT entry to room.    Mobility   Patient completed:  Sit to Stand Transfer with contact guard assistance with rolling walker  Stand to Sit Transfer with contact guard assistance with rolling walker  Recliner chair to wheelchair Transfer using Step Transfer technique with contact guard assistance with rolling walker    Functional Mobility  Chair to chair transfer with RW     Limiting Factors for ADLs: motor, sensory, endurance, balance, weakness, coordination, safety awareness, and pain     Therapeutic Activities  To increase FMC and in-hand manipulation of B hands in order to perform oral care and shaving, pt tolerated   Stringing wooden beads of various shapes and sizes - able to string 1-15 beads without dropping but took several tries with picking up from flat surface. Pt stated that he could not feel beads in hands but if he looks at hands he can  successfully.  Took full deck of cards and flipped over one at a time and  into suit piles alternating hands. Most of time pt was able to flip but occasionally had to slide cards off table edge to be able to pincher grasp. Pt able to go through all 52 cards without dropping.  Participated in game called 'toss tac toe' which involved rolling a dice and picking up small disks ~ 3/4in in diameter " to place on game board. Pt did drop the dice in lap 3-4 times through the 4 rounds of play but successfully recovered from lap without assistance.    Therapeutic Exercise  At beginning of session, pt tolerated 10 mins of UB ergometer (5 mins forward and 5 mins backwards) with 1.5 resistance in unsupported sitting without losing  on handles. Pt did take a minute rest break between directions.    Additional Treatments: At end of session, pt complained of increased pain in L upper traps. OT applied BioFreeze and performed massage and trigger point release with a successful release.    Patient left up in chair with all lines intact, call button in reach, and legs elevated .     Education provided: Roles and goals of OT, transfer training, sequencing, safety precautions, and fall prevention    Multidisciplinary Problems       Occupational Therapy Goals          Problem: Occupational Therapy    Goal Priority Disciplines Outcome Interventions   Occupational Therapy Goal     OT, PT/OT Progressing    Description: ADLs:  Pt to perform grooming tasks with independence standing at sink with RW by d/c.   Pt to perform feeding tasks with independence using adaptive utensils as needed by d/c.    Pt to perform UB dressing with independence by d/c.    Pt to perform LB dressing with touch assist by d/c.    Pt to perform putting on/off footwear task with independence and AE as needed by d/c.   Pt to perform toileting with touch assist by d/c.    Pt to perform bathing with independence by d/c.     Functional Transfers:  Pt to perform toilet transfers with independence by d/c.    Pt to perform a tub transfer with independence by d/c.     IADLs:  Pt to perform simple meal prep standing at kitchen counter with touch assist by d/c.      Balance, Strengthening, Endurance, Balance:  Pt to consistently demonstrate adherence to spinal precautions during all ADL's as instructed by OT.  Pt to demonstrate good dynamic standing balance as  required to perform ADL's from standing level.                         Time Tracking     OT Received On: 02/13/25  Time In 0730     Time Out 0900  Total Time 90 min  Therapy Time: OT Individual: 90  Missed Time:    Missed Time Reason:      Billable Minutes: Therapeutic Activity 60, manual therapy 15 and Therapeutic Exercise 15    02/13/2025

## 2025-02-13 NOTE — PT/OT/SLP PROGRESS
Physical Therapy Inpatient Rehab Treatment    Patient Name:  Tyler Mai   MRN:  [id number]    Recommendations:     Discharge Recommendations:   (pending progress)   Discharge Equipment Recommendations:     Barriers to discharge: Increased level of assist, Decreased caregiver support, Impaired functional mobility , and Severity of Deficits     Assessment:     Tyler Mai is a 69 y.o. male admitted with a medical diagnosis of Status post cervical spinal fusion.  He presents with the following impairments/functional limitations:  weakness, impaired endurance, impaired functional mobility, gait instability, impaired balance, decreased upper extremity function, decreased lower extremity function, pain, decreased ROM, impaired coordination, impaired fine motor, orthopedic precautions .    PT NOTE: Pt was originally scheduled for physical therapy at 1:00 pm. Pt refused treatment, stating he wanted to have a bowel movement now, asking for PT to come back later - nsg/NP notified. Pt received pain medication during session starting at 2:45 pm.    Rehab Diagnosis: Cervical spondylosis and stenosis with myelopathy and cord compression s/p C2-6 PCDF and C2-3, C4-5 decompression 1/24/2025. Pt. Has a history of cervical myelopathy, CAD, CHF, COPD, DM, HLD, HTN, and PVD    General Precautions: Standard, fall     Orthopedic Precautions:spinal precautions     Braces: Aspen collar    Rehab Prognosis: Good; patient would benefit from acute skilled PT services to address these deficits and reach maximum level of function.      History:     Past Medical History:   Diagnosis Date    Anxiety disorder, unspecified     Arthritis     CAD (coronary artery disease)     Cervical radiculopathy     CHF (congestive heart failure)     Chronic pain syndrome     COPD (chronic obstructive pulmonary disease)     Diabetes mellitus     Gastro-esophageal reflux disease without esophagitis     High cholesterol     Hypertension     Lumbar  radiculopathy     Myelomalacia of cervical cord     Other specified diseases of spinal cord     Pacemaker     PVD (peripheral vascular disease)     S/P triple vessel bypass     Tobacco use        Past Surgical History:   Procedure Laterality Date    ANTERIOR CERVICAL DISCECTOMY W/ FUSION  1995    C5-6 and C6-7.  Dr. Wall    ANTERIOR CERVICAL DISCECTOMY W/ FUSION  1996    Redo C5-6, C6-7.  Dr. Wall    CHOLECYSTECTOMY      CORONARY ARTERY BYPASS GRAFT      FUSION OF POSTERIOR COLUMN OF CERVICAL SPINE USING COMPUTER AIDED NAVIGATION N/A 1/24/2025    Procedure: FUSION, SPINE, POSTERIOR SPINAL COLUMN, CERVICAL, USING COMPUTER-ASSISTED NAVIGATION;  Surgeon: Darrell Watson MD;  Location: [facility];  Service: Neurosurgery;  Laterality: N/A;  C2-C6 PCF; C2/3-C4/5 decompression, possible additional levels  o-arm  NTI  TIVA setup  [employer] //  XX    INSERTION OF PACEMAKER      X2    TRIPPLE VESSEL BYPASS         Subjective     Patient comments: neck pain going down to the knes    Respiratory Status: Room air    Patients cultural, spiritual, Lutheran conflicts given the current situation: no    Objective:     Communicated with RT Amada prior to session.  Patient found HOB elevated with cervical collar  upon PT entry to room.    Pt is nd Alert, Cooperative, and Motivated.    Vitals     Pain Pain Rating 1: 9/10  Location - Side 1: Bilateral  Location - Orientation 1: generalized  Location 1: neck  Pain Addressed 1: Reposition, Distraction, Nurse notified  Pain Rating Post-Intervention 1: 9/10   15:15 - 9/10 pain only in bilateral neck and shoulders    Functional Mobility:   Bed Mobility:     Supine to Sit: Modified Independent, use of bed rails - pt plans to sleep in recliner  Sit to Supine: Modified independent, use of bed rails - pt plans to sleep in recliner     Current   Status  Discharge   Goal   Functional Area: Care Score:    Sit to Stand 4  Cga with RW, 2 trials Independent   Chair/Bed-to-Chair Transfer 4  Cga  with RW Independent     Therapeutic Activities and Exercises:  Patient educated on role of acute care PT and PT POC, safety while in hospital including calling nurse for mobility, and call light usage  Patient educated about importance of OOB mobility and remaining up in chair most of the day.  Patient educated about pursed lip breathing technique and cued for use with mobility.  Pt ambulated 140' with RW, cga, with step-through gait, increased bilateral adduction/scissoring    Therapeutic exercises were performed seated at edge of chair:    marches with 2# weights, BLE  hip abduction with 2# on LLE, 0# on RLE  long arc quads with 2# on LLE, 0# on RLE weights  calf raises/toe raises with 2# on LLE, 0# on RLE weights  All therex performed 2x15 reps each    Activity Tolerance: Excellent    Patient left HOB elevated with call button in reach and PRAFO donned .    Education provided: roles and goals of PT/PTA, transfer training, bed mob, gait training, balance training, safety awareness, body mechanics, assistive device, strengthening exercises, fall prevention, and spinal precautions    Expected compliance: High compliance    Plan:     During this hospitalization, patient to be seen 5 x/week to address the identified rehab impairments via gait training, therapeutic activities, therapeutic exercises and progress toward the following goals:    GOALS:   Multidisciplinary Problems       Physical Therapy Goals          Problem: Physical Therapy    Goal Priority Disciplines Outcome Interventions   Physical Therapy Goal     PT, PT/OT Progressing    Description: Bed Mobility:  MET-Sit to supine transfer independently.   MET-Supine to sit transfer independently.     Transfers:  Sit to stand transfer independently using RW.   Car transfer independently using RW.    an object from the ground in standing position independently using RW.     Mobility:  Ambulate 500 feet independently using RW.  Ambulate 15 feet on uneven  surfaces/ramps with setup/clean-up assist using RW.   Pt ascended/descended a 6 inch curb independently using RW.   Ascend/descend 15 stairs independently using bilateral handrails.                         Plan of Care Expires:  02/19/25  PT Next Visit Date: 02/19/25  Plan of Care reviewed with: patient    Additional Information:         Time Tracking:     Therapy Time  PT Received On: 02/13/25  PT Start Time: 1445  PT Stop Time: 1515  PT Total Time (min): 30 min   PT Individual: 30  Missed Time:    Time Missed due to:      Billable Minutes: Gait Training 8 min and Therapeutic Exercise 22 min    02/13/2025

## 2025-02-13 NOTE — PROGRESS NOTES
Inpatient Nutrition Assessment    Admit Date: 1/31/2025   Total duration of encounter: 13 days   Patient Age: 69 y.o.    Nutrition Recommendation/Prescription     Regular Diet. Monitor glucose for need to add Diabetic restriction.  Bowel regimen per MD.  Boost Glucose Control TID (provides 190 kcal and 16 g protein per serving).  Jarrell BID for wound support (provides 90 kcal and 2.5 g protein per serving).  Monitor wt, labs, and po intake.    Communication of Recommendations: reviewed with patient    Nutrition Assessment     Malnutrition Assessment/Nutrition-Focused Physical Exam       Malnutrition Level: other (see comments) (Does not meet criteria) (02/13/25 0956)     Weight Loss (Malnutrition): other (see comments) (Does not meet criteria) (02/13/25 0956)              Muscle Mass (Malnutrition): mild depletion (02/13/25 0956)  [place] (Muscle Loss): mild depletion                       Fluid Accumulation (Malnutrition): other (see comments) (Not present) (02/13/25 0956)     Hand  Strength, Right (Malnutrition): Unable to assess (02/13/25 0956)  A minimum of two characteristics is recommended for diagnosis of either severe or non-severe malnutrition.    Chart Review    Reason Seen: follow-up    Malnutrition Screening Tool Results   Have you recently lost weight without trying?: Yes: 2-13 lbs  Have you been eating poorly because of a decreased appetite?: No   MST Score: 1   Diagnosis:   Status post cervical spinal fusion 1/31/2025      Anemia 1/14/2025      Spinal cord compression 1/14/2025     Relevant Medical History: CKD 4, hypertension, hyperlipidemia, diabetes, COPD, CHF, coronary artery disease status post CABG, peripheral artery disease, AA, and tobacco dependence     Scheduled Medications:  aspirin, 81 mg, Daily  atorvastatin, 40 mg, Daily  [START ON 2/14/2025] bisacodyL, 5 mg, Daily  carvediloL, 25 mg, BID AC  clopidogreL, 75 mg, Daily  DULoxetine, 30 mg, Daily  enoxparin, 40 mg, Q24H  (prophylaxis, 1700)  finasteride, 5 mg, Daily  umeclidinium-vilanteroL, 1 puff, Daily   And  fluticasone furoate, 1 puff, Daily  furosemide, 20 mg, Daily  guaiFENesin, 600 mg, BID  LIDOcaine, 1 patch, Q24H  linaCLOtide, 145 mcg, QAM  losartan, 25 mg, Before breakfast  magnesium oxide, 400 mg, TID  methocarbamoL, 750 mg, QID (AC & HS)  miconazole NITRATE 2 %, , BID  pantoprazole, 40 mg, Daily  pregabalin, 100 mg, TID  ranolazine, 500 mg, BID    Continuous Infusions:   PRN Medications:  acetaminophen, 650 mg, Q6H PRN  albuterol sulfate, 2.5 mg, Q4H PRN  ALPRAZolam, 0.25 mg, TID PRN  benzonatate, 100 mg, TID PRN  bisacodyL, 10 mg, Daily PRN  cyclobenzaprine, 5 mg, TID PRN  dextrose 50%, 12.5 g, PRN  dextrose 50%, 25 g, PRN  glucagon (human recombinant), 1 mg, PRN  glucose, 16 g, PRN  glucose, 24 g, PRN  hydrALAZINE, 10 mg, Q4H PRN  hydrocortisone, , BID PRN  hydrOXYzine pamoate, 50 mg, Nightly PRN  insulin aspart U-100, 0-10 Units, QID (AC + HS) PRN  labetalol, 10 mg, Q4H PRN  magnesium citrate, 296 mL, Once PRN  melatonin, 6 mg, Nightly PRN  metoprolol, 10 mg, Q2H PRN  nitroGLYCERIN, 0.4 mg, Q5 Min PRN  ondansetron, 4 mg, Q6H PRN  ondansetron, 8 mg, Q8H PRN  ondansetron, 4 mg, Q8H PRN  oxyCODONE-acetaminophen, 1 tablet, Q4H PRN  oxyCODONE-acetaminophen, 1 tablet, Q4H PRN    Calorie Containing IV Medications: no significant kcals from medications at this time    Recent Labs   Lab 02/10/25  0539 02/12/25  0552    137   K 4.8 4.3   CALCIUM 9.8 9.5   PHOS 3.2  --    MG 1.50* 1.60    99   CO2 26 28   BUN 28.3* 33.0*   CREATININE 1.03 1.31*   EGFRNORACEVR >60 60   GLUCOSE 107 113   BILITOT 0.3  --    ALKPHOS 161*  --    ALT 21  --    AST 22  --    ALBUMIN 3.3*  --    PREALB 20.9  --    WBC 10.02  --    HGB 9.0* 8.5*   HCT 27.7* 26.0*     Nutrition Orders:  Diet Adult Regular Standard Tray  Dietary nutrition supplements BID; Jarrell - Fruit Punch,Dietary nutrition supplements TID; Boost Glucose Control -  "Chocolate    Appetite/Oral Intake: good/% of meals  Factors Affecting Nutritional Intake:  functionality  Social Needs Impacting Access to Food: none identified  Food/Catholic/Cultural Preferences:  likes fruit (pineapple, cantaloupe)  Food Allergies: no known food allergies  Last Bowel Movement: 02/12/25  Wound(s):     Wound 01/14/25 2202 Pressure Injury Right Heel-Tissue loss description: Partial thickness     Comments    1/31/25: Pt reports good appetite and intake. Pt reports constipation, says he will sometimes go 8 days w/o a BM. Pt usually takes dulcolax and Linzess-will have his son bring it for him. Pt open to chocolate ONS. Pt says he thinks he may have lost some weight over the last 6 months to a year 2/2 decreased po intake. Per EMR, weight seems it remained stable over last few years. Most recent stated wt is most likely incorrect. Will need to monitor and trend wts. Pt denies issues c/s.     2/6/25: Pt reports good po intake. States he is drinking Boost TID and will start drinking Jarrell. Pt reports constipation; last BM 9 days ago. Stated this was normal for him, but if he doesn't go by tmrw he will do the suppository.     2/13/25: Pt continues w/ good appetite and intake. No gi symptoms reported; last BM yesterday.     Anthropometrics    Height: 5' 6" (167.6 cm), Height Method: Stated  Last Weight: 68.5 kg (151 lb 0.2 oz) (02/12/25 0500),     BMI (Calculated): 24.4   BMI Classification: normal (BMI 18.5-24.9)         Ideal Body Weight (IBW), Male: 142 lb     % Ideal Body Weight, Male (lb): 96.57 %                          Usual Weight Provided By: patient    Wt Readings from Last 5 Encounters:   02/12/25 68.5 kg (151 lb 0.2 oz)   01/18/25 78 kg (172 lb)   12/03/24 78.5 kg (173 lb)   08/29/22 78.9 kg (173 lb 15.1 oz)   03/16/16 82.3 kg (181 lb 7 oz)     Weight Change(s) Since Admission: 137# stated wt per EMR. Pt reported he weighs about 170#. 151# taken via bed scale. Will continue to monitor " and trend wts  Wt Readings from Last 1 Encounters:   02/12/25 0500 68.5 kg (151 lb 0.2 oz)   01/31/25 1321 62.2 kg (137 lb 2 oz)   Admit Weight: 62.2 kg (137 lb 2 oz) (01/31/25 1321),      Estimated Needs    Weight Used For Calorie Calculations: 78 kg (171 lb 15.3 oz)  Energy Calorie Requirements (kcal): 6654-9932 kcal (1.1-1.2 SFxMSJ)  Energy Need Method: [place]-St Jeor  Weight Used For Protein Calculations: 78 kg (171 lb 15.3 oz)  Protein Requirements: 70-78 g (0.9-10 g/kg)  Fluid Requirements (mL): 1950 ml/d (25 mL/kg)  CHO Requirement: 218-266 g/d (45-55% of EER)     Enteral Nutrition     Patient not receiving enteral nutrition at this time.    Parenteral Nutrition     Patient not receiving parenteral nutrition support at this time.    Evaluation of Received Nutrient Intake    Calories: meeting estimated needs  Protein: meeting estimated needs    Patient Education     Not applicable.    Nutrition Diagnosis     PES: No nutrition diagnosis at this time     Nutrition Interventions     Intervention(s): general/healthful diet, commercial beverage, multivitamin/mineral supplement therapy, and collaboration with other providers  Intervention(s): Oral nutritional supplement    Goal: Maintain weight throughout hospitalization. (goal progressing)  Goal: Consume % of oral supplements by follow-up. (goal met)    Nutrition Goals & Monitoring     Dietitian will monitor: food and beverage intake, weight, electrolyte/renal panel, glucose/endocrine profile, and gastrointestinal profile  Discharge planning: resume home regimen  Nutrition Risk/Follow-Up: low (follow-up in 5-7 days)   Please consult if re-assessment needed sooner.

## 2025-02-13 NOTE — PT/OT/SLP RE-EVAL
Recreational Therapy Re-Evaluation      Date of Treatment: 02/13/25  Start Time: 1100  Stop Time: 1130  Total Time: 30 min  Missed Time    Assessment      Tyler Mai is a 69 y.o. male admitted with a medical diagnosis of Status post cervical spinal fusion.  He presents with the following impairments/functional limitations:  weakness, impaired functional mobility, gait instability, decreased coordination, decreased upper extremity function, decreased lower extremity function, decreased safety awareness, decreased ROM, impaired coordination, impaired fine motor .    Rehab Diagnosis:     Recent Surgery:     General Precautions: Standard, fall     Orthopedic Precautions:spinal precautions     Braces: Aspen collar    Rehab Prognosis: Good; patient would benefit from acute skilled Recreational Therapy services to address these deficits and reach maximum level of function.      Impairments: Coordination deficits, Endurance deficits, Mobility deficits, Safety awareness deficits, Sensory deficits, and Strength deficits  Rehab Potential: Good  Treatment Recommendations: Continue with current plan of care   Treatment Diagnosis: Cervical spondylosis and stenosis, myelopathy and cord compression, s/p C2-6 PCDF and C-2-3, C4-5 ,decompression, CAD, CHF, COPD, DM, HLD, HTN, PVD  Orientation: Oriented x4  Affect/Behavior: Appropriate, Cooperative, and Distracted  Safety/Judgement: intact   Basic Command Following: intact  Spiritual Cultural: no        History     Past Medical History:   Diagnosis Date    Anxiety disorder, unspecified     Arthritis     CAD (coronary artery disease)     Cervical radiculopathy     CHF (congestive heart failure)     Chronic pain syndrome     COPD (chronic obstructive pulmonary disease)     Diabetes mellitus     Gastro-esophageal reflux disease without esophagitis     High cholesterol     Hypertension     Lumbar radiculopathy     Myelomalacia of cervical cord     Other specified diseases of spinal  cord     Pacemaker     PVD (peripheral vascular disease)     S/P triple vessel bypass     Tobacco use        Past Surgical History:   Procedure Laterality Date    ANTERIOR CERVICAL DISCECTOMY W/ FUSION  1995    C5-6 and C6-7.  Dr. Wall    ANTERIOR CERVICAL DISCECTOMY W/ FUSION  1996    Redo C5-6, C6-7.  Dr. Wall    CHOLECYSTECTOMY      CORONARY ARTERY BYPASS GRAFT      FUSION OF POSTERIOR COLUMN OF CERVICAL SPINE USING COMPUTER AIDED NAVIGATION N/A 1/24/2025    Procedure: FUSION, SPINE, POSTERIOR SPINAL COLUMN, CERVICAL, USING COMPUTER-ASSISTED NAVIGATION;  Surgeon: Darrell Watson MD;  Location: [facility];  Service: Neurosurgery;  Laterality: N/A;  C2-C6 PCF; C2/3-C4/5 decompression, possible additional levels  o-arm  NTI  TIVA setup  [employer] //  XX    INSERTION OF PACEMAKER      X2    TRIPPLE VESSEL BYPASS         Home Environment     Admit Date: 01/31/25  Living Situation  People in Home: alone  Lives in: apartment  Patients Responsibilities: Community mobility, , Financial management, Health and wellness, Laundry, Leisure/play/hobbies, Meal preparation, Shopping, Other (Comment) (Disaabled)  Number of Children: 2  Occupation:[employer]    Instrumental Activities of Daily Living     Previous Hand Dominance: Right Current Hand Dominance: Right     Other iADL Information:        Cognitive Skills Building         Cognitive Observation Activity Assist Position Equipment Response            Comment:      Dynamic Activities      Activity Assist Position Equipment Response   Activity 1 Horseshoes modified independence and supervision Standing Rolling walker and Rubber horseshoes , 1.5 lb wrist weights good   Comment: Recliner to w/c transfer was supervision.  Sit to stand was setup as was dynamic standing balance/reaching.  Standing tolerance was 5 minutes.  1.5 lb wrist weights were applied to both wrists.  UE coordination was supervision/setup. He said that wrist weights helped to give him support.   "Problem solving and sequencing skills were I.  Needed some redirecting to stay focused on task at hand       Fine Motor Activities      Activity Assist Position Equipment Response           Comment:        Goals     Patient Goals  Patient Goal 1: "Get healthy, move around and do for myself."    Short Term Goals    Goal  Goal Status   Will increase sit to stand to supervision Met   Will improve dynamic standing balance/reaching to supervision Met                 Long Term Goals    Goal Goal Status   Will increase standing tolerance to 5 minutes Met   Will improve dynamic standing balance/reaching to setup Met                     Plan       Patient to be seen: Daily  Duration: 1 week  Treatments planned: Coordination, Energy conservation training, Fine motor, Safety education  Treatment plan/goals established with Patient/Caregiver: Yes     "

## 2025-02-13 NOTE — PROGRESS NOTES
[facility] ([facility])  Rehab Progress Note    Patient Name: Tyler Mai  MRN: [id number]  Age: 69 y.o. Sex: male  : 1955  Hospital Length of Stay: 13 days  Date of Service: 2025   Chief Complaint: Cervical myelopathy s/p C2-6 PCDF and C2/3-C4/5 decompression on 2025     Subjective:     Basic Information  Admit Information: 69-year-old white male presented to [facility] ED on 2025 complaining of bilateral arm weakness, bilateral leg weakness, and worsening neck pain.  Reports right leg weaker than the left.  Recently admitted to [facility] for cervical myelopathy with plans for surgery, but patient had severe UTI requiring antibiotic therapy.  MRI on  significant for severe C3-4 level central canal stenosis with increased signal hyperintensity within the cervical cord from mid to lower aspect of C3 through mid aspect of C4 with central cord myelomalacia.  PMH significant for cervical myelopathy with history of C5-C6 fusion, CAD, HFrEF, ICMO, COPD, DM type 2, HLD, HTN, and PVD.  Workup significant for cervical myelopathy.  Patient inpatient family initially declined the neurosurgeon on call surgery.  Then surgery postpone due to inclement weather.  Tolerated posterior C3-C5 total laminectomies for decompression, placement of bilateral pars screws at C2 and bilateral lateral mass screws from C3 to C6 using [facility] Invictus OCT System, and placement of local bone and AlphaGraft DBM for arthrodesis from C2 to C6 on  without perioperative complications.  Postoperatively neurosurgery documented increase mobility to bilateral upper extremities with a 5/5 hand grasp.  Recommended aspirin hard collar at all times, and [place] collar for showers..  Recommended removal staples on .  Gray continued due to urinary retention.  Tolerated transfer to [facility] inpatient rehab unit on  without incident.   Today's Information: No acute events overnight.  Sitting up in  chair.  Reports good sleep and appetite.  BP improved.  Swelling improved.  Still complains of pruritus due to dry skin.  Good glycemic control.  Left upper extremity NIVA negative for DVT.    Review of patient's allergies indicates:   Allergen Reactions    Hydrochlorothiazide      Other Reaction(s): Pancreatitis        Current Facility-Administered Medications:     acetaminophen tablet 650 mg, 650 mg, Oral, Q6H PRN, Ashley, Tony A, FNP, 650 mg at 02/09/25 0316    albuterol nebulizer solution 2.5 mg, 2.5 mg, Nebulization, Q4H PRN, Jaxson, Shannan A, FNP, 2.5 mg at 02/13/25 0651    ALPRAZolam tablet 0.25 mg, 0.25 mg, Oral, TID PRN, Ashley, Tony A, FNP, 0.25 mg at 02/10/25 2333    aspirin chewable tablet 81 mg, 81 mg, Oral, Daily, Ashley, Tony A, FNP, 81 mg at 02/13/25 0718    atorvastatin tablet 40 mg, 40 mg, Oral, Daily, Ashley, Tony A, FNP, 40 mg at 02/13/25 0718    benzonatate capsule 100 mg, 100 mg, Oral, TID PRN, Ashley, Tony A, FNP, 100 mg at 02/10/25 0718    carvediloL tablet 25 mg, 25 mg, Oral, BID AC, Ashley, Tony A, FNP, 25 mg at 02/13/25 0553    clopidogreL tablet 75 mg, 75 mg, Oral, Daily, Ashley, Tony A, FNP, 75 mg at 02/13/25 0718    cyclobenzaprine tablet 5 mg, 5 mg, Oral, TID PRN, Jaxson, Shannan A, FNP    dextrose 50% injection 12.5 g, 12.5 g, Intravenous, PRN, Ashley, Tony A, FNP    dextrose 50% injection 25 g, 25 g, Intravenous, PRN, Ashley, Tony A, FNP    docusate sodium capsule 100 mg, 100 mg, Oral, BID, Ashley, Tony A, FNP, 100 mg at 02/13/25 0718    DULoxetine DR capsule 30 mg, 30 mg, Oral, Daily, Ubly, Shannan A, FNP, 30 mg at 02/13/25 0718    enoxaparin injection 40 mg, 40 mg, Subcutaneous, Q24H (prophylaxis, 1700), Tony Ramirez FNP, 40 mg at 02/12/25 1642    finasteride tablet 5 mg, 5 mg, Oral, Daily, Tony Ramirez FNP, 5 mg at 02/13/25 0718    umeclidinium-vilanteroL 62.5-25 mcg/actuation DsDv 1 puff, 1 puff,  Inhalation, Daily, 1 puff at 02/11/25 0818 **AND** fluticasone furoate 200 mcg/actuation inhaler 1 puff, 1 puff, Inhalation, Daily, Ashley, Tony A, FNP, 1 puff at 02/13/25 0800    furosemide tablet 20 mg, 20 mg, Oral, Daily, Ashley, Tony A, FNP, 20 mg at 02/13/25 0718    glucagon (human recombinant) injection 1 mg, 1 mg, Intramuscular, PRN, Ashley, Tony A, FNP    glucose chewable tablet 16 g, 16 g, Oral, PRN, Ashley, Tony A, FNP    glucose chewable tablet 24 g, 24 g, Oral, PRN, Ashley, Tony A, FNP    guaiFENesin 12 hr tablet 600 mg, 600 mg, Oral, BID, Ashley, Tony A, FNP, 600 mg at 02/13/25 0718    hydrALAZINE injection 10 mg, 10 mg, Intravenous, Q4H PRN, Ashley, Tony A, FNP, 10 mg at 02/04/25 0629    hydrocortisone 2.5 % rectal cream, , Rectal, BID PRN, Jaxson, Shannan A, FNP    hydrOXYzine pamoate capsule 50 mg, 50 mg, Oral, Nightly PRN, Ashley, Tony A, FNP, 50 mg at 02/03/25 2138    insulin aspart U-100 injection 0-10 Units, 0-10 Units, Subcutaneous, QID (AC + HS) PRN, Ashley, Tony A, FNP, 2 Units at 02/12/25 1657    labetalol 20 mg/4 mL (5 mg/mL) IV syring, 10 mg, Intravenous, Q4H PRN, Ashley, Tony A, FNP    LIDOcaine 5 % patch 1 patch, 1 patch, Transdermal, Q24H, Bobby Puriyn A, FNP, 1 patch at 02/12/25 1642    linaCLOtide capsule 145 mcg, 145 mcg, Oral, QAM, Ashley, Tony A, FNP, 145 mcg at 02/13/25 0553    losartan tablet 25 mg, 25 mg, Oral, Before breakfast, Bobby Puriyn A, FNP, 25 mg at 02/13/25 0553    magnesium oxide tablet 400 mg, 400 mg, Oral, TID, Tony Ramirez, FNP, 400 mg at 02/13/25 0553    melatonin tablet 6 mg, 6 mg, Oral, Nightly PRN, Shannan Puri A, FNP, 6 mg at 02/02/25 2056    methocarbamoL tablet 750 mg, 750 mg, Oral, QID (AC & HS), Shannan Puri, FNP, 750 mg at 02/13/25 0553    metoprolol injection 10 mg, 10 mg, Intravenous, Q2H PRN, Tony Ramirez A, FNP    miconazole NITRATE 2 % top powder, , Topical  "(Top), BID, Jaxson Shannan A, FNP, Given at 02/13/25 0800    nitroGLYCERIN SL tablet 0.4 mg, 0.4 mg, Sublingual, Q5 Min PRN, Ashley, Tony A, FNP    ondansetron disintegrating tablet 4 mg, 4 mg, Oral, Q6H PRN, Ashley, Tony A, FNP    ondansetron disintegrating tablet 8 mg, 8 mg, Oral, Q8H PRN, Ashley, Tony A, FNP    ondansetron injection 4 mg, 4 mg, Intravenous, Q8H PRN, Ashley, Tony A, FNP    oxyCODONE-acetaminophen  mg per tablet 1 tablet, 1 tablet, Oral, Q4H PRN, [place], Shannan A, FNP, 1 tablet at 02/13/25 0657    oxyCODONE-acetaminophen 5-325 mg per tablet 1 tablet, 1 tablet, Oral, Q4H PRN, [place], Shannan A, FNP, 1 tablet at 02/05/25 0606    pantoprazole EC tablet 40 mg, 40 mg, Oral, Daily, Ashley, Tony A, FNP, 40 mg at 02/13/25 0718    polyethylene glycol packet 17 g, 17 g, Oral, BID PRN, Ashley, Tony A, FNP, 17 g at 02/09/25 0756    pregabalin capsule 100 mg, 100 mg, Oral, TID, Jaxson, Shannan A, FNP, 100 mg at 02/13/25 0553    ranolazine 12 hr tablet 500 mg, 500 mg, Oral, BID, Ashley, Tony A, FNP, 500 mg at 02/13/25 0718     Review of Systems   Complete 12-point review of symptoms negative except for what's mentioned in HPI     Objective:     BP (!) 158/73   Pulse 70   Temp 97.8 °F (36.6 °C) (Oral)   Resp 18   Ht 5' 6" (1.676 m)   Wt 68.5 kg (151 lb 0.2 oz)   SpO2 96%   BMI 24.37 kg/m²      Physical Exam  Constitutional:       Appearance: Normal appearance.   HENT:      Head: Normocephalic.      Mouth/Throat:      Mouth: Mucous membranes are moist.   Eyes:      Pupils: Pupils are equal, round, and reactive to light.   Neck:      Comments: Posterior neck incision clean and intact.  Hard collar intact  Cardiovascular:      Rate and Rhythm: Normal rate and regular rhythm.      Heart sounds: Normal heart sounds.   Pulmonary:      Effort: Pulmonary effort is normal.      Breath sounds: Normal breath sounds.   Abdominal:      General: Bowel sounds are normal. "      Palpations: Abdomen is soft.   Musculoskeletal:      Cervical back: Neck supple.      Right lower le+ Pitting Edema present.      Left lower le+ Pitting Edema present.      Comments: Diffuse muscle atrophy.  Left forearm edema.   Skin:     General: Skin is warm and dry.   Neurological:      General: No focal deficit present.      Mental Status: He is alert and oriented to person, place, and time.      Motor: Weakness present.   Psychiatric:         Mood and Affect: Mood normal.         Behavior: Behavior normal.         Thought Content: Thought content normal.         Judgment: Judgment normal.     *MD performed and documented physical examination       Lines/Drains/Airways       None                 Labs  Recent Results (from the past 24 hours)   POCT glucose    Collection Time: 25 11:02 AM   Result Value Ref Range    POCT Glucose 121 (H) 70 - 110 mg/dL   POCT glucose    Collection Time: 25  4:46 PM   Result Value Ref Range    POCT Glucose 159 (H) 70 - 110 mg/dL   POCT glucose    Collection Time: 25  8:38 PM   Result Value Ref Range    POCT Glucose 130 (H) 70 - 110 mg/dL   Radiology   Left upper extremity NIVA on 2025, IMPRESSION:  Negative for DVT  Radiology  Cervical XR two-view on 2025, IMPRESSION: There are postoperative changes with posterior spinal fusion hardware at C2 through C6. The hardware appears intact. Cervical alignment has improved. The remaining vertebral body heights are preserved. There is moderate disc height loss at C3-C4, mild at C4-C5, with multilevel marginal osteophytes. The soft tissues are unremarkable.     Assessment/Plan:     69 y.o. WM admitted on 2025    Cervical myelopathy   - s/p C2-6 PCDF and C2/3-C4/5 decompression on 2025  - remove staples on   - continue aspirin hard collar at all times, okay for [place] collar for showers only  - Zanaflex discontinued on  due to orthostatic hypotension  - continue                 Robaxin 750 mg q.i.d. (decreased 2/5)                Flexeril 10 mg t.i.d. p.r.n.                Percocet 10 mg q.4 hours p.r.n. for severe pain                Lyrica 100 mg t.i.d. (increased 2/3)  - defer to physiatry for rehab and pain management  - PT/OT/RT/ST following  - to follow up with Neurosurgery outpatient     Coronary artery disease  - s/p  CABG x 3  - denies recent chest pain or discomfort  - continue                ASA 81 mg daily (resumed 1/31)                Plavix 75 mg daily (resume 1/31)                Coreg 25 mg b.i.d. (increased 2/12)                Lipitor 40 mg daily                Losartan 25 mg daily   - ECG and Nitro PRN  - continue cardiac diet  - to follow-up with cardiology outpatient     HFrEF/ICMO  - stable  - s/p Lasix 40 mg IVP x1 on 2/10  - continue   Lasix 20 mg oral daily (decreased 2/13)                Coreg 25 mg b.i.d. (increased 2/12)                Losartan 25 mg daily                 Ranexa 500 mg b.i.d.   - obtain daily standing weights  - to follow up with cardiology outpatient     Nicotine dependence  - smoking cessation counseling given on admission  - 1 PPD with approximately 55 pack years     HTN  -BP moderately controlled  - discontinued Flomax 0.4 mg at bedtime on 02/04 due to orthostatic hypotension  - discontinued Zanaflex on 02/05 due to orthostatic hypotension  - received NS 1 L bolus on 02/05 due to orthostatic hypotension  - discontinue Norvasc 5 mg b.i.d. on 2/12 due to bilateral lower extremity edema  - continue                Coreg 25 mg b.i.d. (increased 2/12)                Losartan 25 mg daily                 Ranexa 500 mg b.i.d.                 Hydralazine 10 mg every 2 hours as needed for BP > 160/90                Labetalol 10 mg every 2 hours as needed for BP > 160/90  - low sodium diet             COPD   - stable  - not on home 02  - continue                Trelegy INH daily                   DM type II  - HgA1c 5.6 on 01/23/2024  - continue                 Lantus 10 units every evening (resumed 1/31)                ISS   - CBGs AC/HS     HLD  - FLP outpatient  - resume Crestor upon discharge  - continue                Lipitor 40 mg daily      PVD  - stable  - continue                ASA 81 mg daily (resumed 1/31)                Plavix 75 mg daily (resume 1/31)                Coreg 12.5 mg b.i.d. (increased 2/11)                Lipitor 40 mg daily  - to follow up vascular surgery outpatient                  Chronic constipation  - last BM 2/12-hard  - initiate   Lactulose 20 g daily (initiated 2/13)  - continue                Linzess 145 mcg before breakfast                 Colace 100 mg b.i.d.     Hypomagnesemia  - stable  - continue                Magnesium oxide 400 mg b.i.d.      BPH/urinary retention  - stable  - discontinued Flomax 0.4 mg at bedtime on 02/04 due to orthostatic hypotension  - continue                Proscar 5 mg daily (initiated 1/31)  Indwelling catheter discontinued on 02/01-good urine output with no retention reported     GERD  - Avoid spicy foods, and nothing to eat or drink within x2 hours of bedtime or laying flat (water is ok)   - Avoid NSAIDs (Advil, ibuprofen, naproxen...) and chavira-2 inhibitors (Mobic, Celebrex)    - continue                Protonix 40 mg daily      Normocytic anemia  - asymptomatic  - H/H stable   - no evidence of active bleeds  - will closely monitor and transfuse if needed     Hypomagnesemia  - magnesium 1.5 on 2/10  - s/p Mag sulfate 2 g x 1 IVPB on 02/01  - continue   Mag-Ox 400 mg tID (increased 2/10)    Congestion  - improved  - continue   Mucinex 600 mg q.12 hours x7 days (initiated 2/7)   Mucomyst nebs t.i.d. x3 doses (initiated 2/7)    Left upper extremity edema  - current  - obtain left upper extremity NIVA to rule out DVT     VTE Prophylaxis: Lovenox 40 mg daily   COVID-19 testing:  Unknown  COVID-19 vaccination status:  Vaccinated (Moderna):  Bivalent booster on 04/03/2023     POA:  No  Living will:   No  Contacts:  Tyler Mai Jr (son) [phone]     CODE STATUS:  Full code  Internal Medicine (attending): Tyler De Anda MD  Physiatry (consulting):  Philip Fang MD     OUTPATIENT PROVIDERS  PCP: None  Neurosurgery: Darrell Zavala MD     DISPOSITION:  Sleep hygiene and appetite at goal.  He does complain of hard stools, but he has been going more regularly.  BP still remains moderately elevated.  Medications just adjusted yesterday.  Will continue to follow.  Good glycemic control.  No new labs today.  Left upper extremity NIVA negative for DVT.  Decreased swelling to left upper extremity and bilateral lower extremities.  Still complains of pruritus.  Improved with Aquaphor cream due to skin dryness.  Initiate lactulose 20 g daily to help manage stools.  Continue aggressive mobilization as tolerated.  Monitor closely.  Notify of acute changes.    Staffing 2/12/2025: Continent of bowel and bladder. Left arm redness, maybe infiltration. RT: Overall supervision to set up.  More cooperative this week.  Limited by dexterity. Appetite is good at 85%. PT: Good progress this week. Pain well controlled.  Tolerated 8 steps.  Overall CGA, but unsafe d/t weakness.  Needs a chair at platform to rest between steps.  Sleeps in a recliner at home.  Needs kasia mat. Ambulating 105-120 feet with walker.  Distance in inconsistent 2/2 fatigue. Limited insight into deficits.  OT: Max assist with UE and LE dressing.  Cannot wear shoes 2/2 heel wound. Wearing PRAFO boots. Mod assist with toileting.  Set up with oral care and eating. Unable to denny and doff C-collar. Projected discharge pending.      Jarrell Ramirez NP conducted independent physical examination and assisted with medical documentation.

## 2025-02-13 NOTE — PROGRESS NOTES
Subjective  HPI: 69 year old WM with a PMH of cervical myelopathy, CAD, CHF, COPD, DM, HLD, HTN, and PVD presented to the ED at [facility] on 1/14/25 for Neurosurgery. Patient presented in ED following discharge from [facility]. He reports associated symptoms of bilateral arm weakness, bilateral leg weakness, and worsening neck pain. Notes right leg is weaker than left. He also complains of bilateral hand, bilateral leg, and bilateral feet numbness. He stated that he has been unable to care for himself at home because of the weakness, and having to use a wheelchair to get around. Patient's daughter at bedside reports frequent falls at home. No urinary or fecal incontinence. Per chart review, the patient was admitted to [facility] for cervical myelopathy with the plan of surgery. Previous extensive admission at [facility] for UTI which cleared and resolved with antibiotics; initially, did not have surgery secondary to UTI. Now amendable for surgical intervention since UTI has resolved. Patient refused surgery with on-call neurosurgeon at [facility] and discharged at noon today. He was advised to come here for the surgery. MRI on 01/05 showed severe C3-4 level central canal stenosis with increased signal hyperintensity within the cervical cord from mid to lower aspect of C3 through mid aspect of C4 with central cord myelomalacia. No syrinx is detected. Prior C5-C7 intervertebral body fusion. Neurosurgery was consulted with recommendation for surgical intervention needed. Cardiology consulted with low risk for MACE for high risk neurosurgical procedure. On 1/24, patient underwent C2-6 PCDF and C2-3- C4-5 decompression by Dr. Ramírez. 1 Hemovac drain placed to site. Hard cervical collar in place. On 1/25, wound care was consulted for right heel and left heel wound with recommendation to cleanse bilateral heels with normal saline, paint with betadine, apply small foam, and change every 2 days. Recommended bilateral heel boots. Marina was  removed. Labs showed low Na of 133, elevated BUN/Cr of 29.7/ 1.31, and low H&H of 10.6 & 31.9. BP ranging 125/58- 182/80. PT/OT efvals completed with deficits noted with recommendation for high intensity therapy needed. ON 1/26, Labs showed low H&H of 10.1 & 29.5. Patient having urinary retention requiring in and out caths. On 1/27, hemovac removed. Neurosurgery recommended to DC staples on POD 14, will need follow up in 2 weeks after DC from hospital with XR uprights of cervical spine with Izzy, NP, continued Percocet and Robaxin for pain management, and signed off of case. Herrera had to be reinserted for urinary retention, and started Flomax. BP ranging 123/61- 169/70. On 1/28, patient started on Relistar for constipation with 2 large Bms noted. Labs showed low H&H of 9.2 & 27.3. On 1/30, wound care reassessed with 2 wounds noted to bilateral anterior legs. Cleansed with soap and water, and applied aquaphor to sites. Patient is AAOx4.   Participating with therapy. Functional status includes setup assist needed for eating, minimal assist for bed mobility, contact guard assist for transfers with RW, walked 28ft with RW at minimal assist, minimal assist for toilet transfer, total assist for toileting due to herrera in place, stand by assist for grooming, max assist for upper body dressing, and max assist for lower body dressing. Patient was evaluated, accepted, and admitted to inpatient rehab to improve functional status. Transferred to [facility] on 1/31 without incident.     2/13: Seen with OT, seated in WC and playing a magnetic Tic-Tac-Toe game. Fine motor use of hands much improved. Tolerating therapy without current complaint.  VSSAF with noted SBP elevation at 0530am. IM following.            Review of Systems  Psychiatric: Denies mental health history. Pt. Smokes cigarettes.     Depression/Anxiety: notes some anxiety in regards to eating and getting enough nutrition to his mouth     DULoxetine DR capsule 30 mg qd,  start 2/1  ALPRAZolam tablet 0.25 mg TID PRN Anxiety  Pain: L>R side of neck, numbness (hands, legs, feet), shooting pains  DULoxetine DR capsule 30 mg qd, start 2/1  methocarbamoL tablet 750 mg AC & HS  pregabalin capsule 100mg TID  LIDOcaine 5 % patch 1 patch neck q24hr, 1800  acetaminophen tablet 650 mg q6h PRN mild pain   oxyCODONE-acetaminophen 5-325 mg 1 tablet q4h PRN mod pain  oxyCODONE-acetaminophen  mg per tablet 1 tablet q4h PRN severe pain  cyclobenzaprine tablet 5 mg TID PRN spasm  Bowels/Bladder: Last BM 2/12 x 3 (hard) 2/9 (formed moderate) 2/7 following suppository (finally agreeable and said it worked within 15 minutes); 1/28 x 2 (large) following Relistor. Previously refusing suppository  Gray catheter reinserted 2/2 urinary retention-discontinued. Urinating    docusate sodium capsule 100 mg BID  linaCLOtide capsule 145 mcg qAM  tamsulosin 24 hr capsule 0.4 mg qHS  Appetite: good.  Improved intake with built up utensils   Sleep: good with melatonin  melatonin tablet 6 mg qHS PRN Insomnia              Physical Exam  General: well-developed, well-nourished, in no acute distress  Neck: hard collar, supple  Respiratory: equal chest rise, no SOB, no audible wheeze  Cardiovascular: regular rate and rhythm, BLE & LUE edema  Gastrointestinal: soft, non-tender, non-distended   Musculoskeletal: BUE/BLE weakness  Integumentary: no rashes, bilateral heel wounds, bilateral anterior leg wounds, posterior cervical incision-staples-dressing-c/d/i  Neurologic: cranial nerves intact, BUE/BLE weakness, numbness (hands, legs, feet), balance deficits                 Diagnostics:  US UPPER EXTREMITY VEINS LEFT   Impression:  No deep venous thrombosis identified in the left upper extremity.  Date:                                            02/12/2025  Time:                                           16:36          Assessment/Plan  Hospital   Anemia   Hyponatremia   Spinal cord compression   Status post cervical  spinal fusion   Hypokalemia     Non-Hospital   HTN (hypertension)   Hyperlipidemia   Ischemic cardiomyopathy with implantable cardioverter-defibrillator (ICD)   Tobacco user   Type 2 diabetes mellitus   Peripheral arterial disease with history of revascularization   Chronic systolic CHF (congestive heart failure), NYHA class 3   Chronic low back pain   Cervical myelopathy   Carotid artery stenosis, asymptomatic, left   Arteriosclerosis of coronary artery   S/P triple vessel bypass   COPD (chronic obstructive pulmonary disease)   Gastro-esophageal reflux disease without esophagitis   Anxiety disorder, unspecified   Cervical radiculopathy   Lumbar radiculopathy   Myelomalacia of cervical cord       Wounds: bilateral heel wounds, bilateral anterior leg wounds, posterior cervical incision-staples-dressing-c/d/i  On 1/24, patient underwent C2-6 PCDF and C2-3- C4-5 decompression by Dr. Ramírez.  (Hemovac drain placed to site)  Precautions: spinal  Bracing: Hard cervical collar, bilateral heel boots in bed  Swallowing: Regular Diet  Function: Tolerating therapy. Continue PT/OT  VTE Prophylaxis:   enoxaparin injection 40 mg q24hr  Code Status: FULL CODE   Discharge: Lives alone in [place] in a 2nd story apartment. Completed high school. He has no  history.  Is disabled. . He was living alone and completely independent. Children: (2). Date 2/19 Wednesday.                   Shannan Puri NP, conducted additional independent physical examination and assisted with medical documentation.

## 2025-02-13 NOTE — PROGRESS NOTES
Dos 2/17/25  Patient seen in PT gym today  Participation and progress toward goals noted  Continues working on strength, mobility, balance and increasing endurance  Progress and problems discussed with patient and therapists  Questions answered  Chart reviewed and discussed with Dr osborn and medicine team as well as Rylie Puri, my FNP  Continue present management  Subjective  HPI: 69 year old WM with a PMH of cervical myelopathy, CAD, CHF, COPD, DM, HLD, HTN, and PVD presented to the ED at [facility] on 1/14/25 for Neurosurgery. Patient presented in ED following discharge from [facility]. He reports associated symptoms of bilateral arm weakness, bilateral leg weakness, and worsening neck pain. Notes right leg is weaker than left. He also complains of bilateral hand, bilateral leg, and bilateral feet numbness. He stated that he has been unable to care for himself at home because of the weakness, and having to use a wheelchair to get around. Patient's daughter at bedside reports frequent falls at home. No urinary or fecal incontinence. Per chart review, the patient was admitted to [facility] for cervical myelopathy with the plan of surgery. Previous extensive admission at [facility] for UTI which cleared and resolved with antibiotics; initially, did not have surgery secondary to UTI. Now amendable for surgical intervention since UTI has resolved. Patient refused surgery with on-call neurosurgeon at [facility] and discharged at noon today. He was advised to come here for the surgery. MRI on 01/05 showed severe C3-4 level central canal stenosis with increased signal hyperintensity within the cervical cord from mid to lower aspect of C3 through mid aspect of C4 with central cord myelomalacia. No syrinx is detected. Prior C5-C7 intervertebral body fusion. Neurosurgery was consulted with recommendation for surgical intervention needed. Cardiology consulted with low risk for MACE for high risk neurosurgical procedure. On 1/24, patient  underwent C2-6 PCDF and C2-3- C4-5 decompression by Dr. Ramírez. 1 Hemovac drain placed to site. Hard cervical collar in place. On 1/25, wound care was consulted for right heel and left heel wound with recommendation to cleanse bilateral heels with normal saline, paint with betadine, apply small foam, and change every 2 days. Recommended bilateral heel boots. Herrera was removed. Labs showed low Na of 133, elevated BUN/Cr of 29.7/ 1.31, and low H&H of 10.6 & 31.9. BP ranging 125/58- 182/80. PT/OT efvals completed with deficits noted with recommendation for high intensity therapy needed. ON 1/26, Labs showed low H&H of 10.1 & 29.5. Patient having urinary retention requiring in and out caths. On 1/27, hemovac removed. Neurosurgery recommended to DC staples on POD 14, will need follow up in 2 weeks after DC from hospital with XR uprights of cervical spine with Izzy, NP, continued Percocet and Robaxin for pain management, and signed off of case. Herrera had to be reinserted for urinary retention, and started Flomax. BP ranging 123/61- 169/70. On 1/28, patient started on Relistar for constipation with 2 large Bms noted. Labs showed low H&H of 9.2 & 27.3. On 1/30, wound care reassessed with 2 wounds noted to bilateral anterior legs. Cleansed with soap and water, and applied aquaphor to sites. Patient is AAOx4.   Participating with therapy. Functional status includes setup assist needed for eating, minimal assist for bed mobility, contact guard assist for transfers with RW, walked 28ft with RW at minimal assist, minimal assist for toilet transfer, total assist for toileting due to herrera in place, stand by assist for grooming, max assist for upper body dressing, and max assist for lower body dressing. Patient was evaluated, accepted, and admitted to inpatient rehab to improve functional status. Transferred to [facility] on 1/31 without incident.     2/17: Seen with RT, seated in WC heading to his session. Good strong hand shake. Amb  w/RW with PT. Skin appears moisturized. States that his family brought his Gold [employer] lotion which is very helpful. Wounds bleeding with anticoagulation. Dressings in place. Progressing in therapy. OT notes diarrhea. Movantik initiated on 2/15. Chronic constipation with home regimen of dulcolax oral and Mag Citrate PRN, but typically has BM q8-12 days. VSSAF.            Review of Systems  Psychiatric: Denies mental health history. Pt. Smokes cigarettes.     Depression/Anxiety: notes some anxiety in regards to eating and getting enough nutrition to his mouth     DULoxetine DR capsule 30 mg qd, start 2/1  ALPRAZolam tablet 0.25 mg TID PRN Anxiety  Pain: L>R side of neck, numbness (hands, legs, feet), shooting pains  DULoxetine DR capsule 30 mg qd, start 2/1  methocarbamoL tablet 750 mg AC & HS  pregabalin capsule 100mg TID  LIDOcaine 5 % patch 1 patch neck q24hr, 1800  acetaminophen tablet 650 mg q6h PRN mild pain   oxyCODONE-acetaminophen 5-325 mg 1 tablet q4h PRN mod pain  oxyCODONE-acetaminophen  mg per tablet 1 tablet q4h PRN severe pain  cyclobenzaprine tablet 5 mg TID PRN spasm  Bowels/Bladder: Last BM 2/17 (loose)  naloxegoL (MOVANTIK) tablet 25 mg qd  Gray catheter reinserted 2/2 urinary retention-discontinued. Urinating    bisacodyL EC tablet 5 mg qd  linaCLOtide capsule 145 mcg qAM  tamsulosin 24 hr capsule 0.4 mg qHS  Appetite: good.  Improved intake with built up utensils   Sleep: good with melatonin  melatonin tablet 6 mg qHS PRN Insomnia              Physical Exam  General: well-developed, well-nourished, in no acute distress  Neck: hard collar, supple  Respiratory: equal chest rise, no SOB, no audible wheeze  Cardiovascular: regular rate and rhythm, BLE & LUE edema  Gastrointestinal: soft, non-tender, non-distended   Musculoskeletal: BUE/BLE weakness  Integumentary: no rashes, bilateral heel wounds, bilateral anterior leg wounds, posterior cervical incision-staples-dressing-c/d/i  Neurologic:  cranial nerves intact, BUE/BLE weakness, numbness (hands, legs, feet), balance deficits  *MD performed and documented physical examination           Labs:   Latest Reference Range & Units 02/17/25 05:46   WBC 4.50 - 11.50 x10(3)/mcL 8.67   RBC 4.70 - 6.10 x10(6)/mcL 2.74 (L)   Hemoglobin 14.0 - 18.0 g/dL 8.4 (L)   Hematocrit 42.0 - 52.0 % 26.3 (L)   MCV 80.0 - 94.0 fL 96.0 (H)   MCH 27.0 - 31.0 pg 30.7   MCHC 33.0 - 36.0 g/dL 31.9 (L)   RDW 11.5 - 17.0 % 12.2   Platelet Count 130 - 400 x10(3)/mcL 288   MPV 7.4 - 10.4 fL 10.5 (H)   Neut % % 71.1   LYMPH % % 18.9   Mono % % 7.8   Eos % % 1.4   Basophil % % 0.3   Immature Granulocytes % 0.5   Neut # 2.1 - 9.2 x10(3)/mcL 6.16   Lymph # 0.6 - 4.6 x10(3)/mcL 1.64   Mono # 0.1 - 1.3 x10(3)/mcL 0.68   Eos # 0 - 0.9 x10(3)/mcL 0.12   Baso # <=0.2 x10(3)/mcL 0.03   Immature Grans (Abs) 0.00 - 0.04 x10(3)/mcL 0.04   nRBC % 0.0   Sodium 136 - 145 mmol/L 140   Potassium 3.5 - 5.1 mmol/L 4.3   Chloride 98 - 107 mmol/L 104   CO2 23 - 31 mmol/L 27   Anion Gap mEq/L 9.0   BUN 8.4 - 25.7 mg/dL 42.4 (H)   Creatinine 0.72 - 1.25 mg/dL 1.24   BUN/CREAT RATIO  34   eGFR mL/min/1.73/m2 >60   Glucose 82 - 115 mg/dL 112   Calcium 8.8 - 10.0 mg/dL 9.9   Phosphorus Level 2.3 - 4.7 mg/dL 3.0   Magnesium  1.60 - 2.60 mg/dL 2.00   ALP 40 - 150 unit/L 157 (H)   PROTEIN TOTAL 5.8 - 7.6 gm/dL 7.0   Albumin 3.4 - 4.8 g/dL 3.5   Albumin/Globulin Ratio 1.1 - 2.0 ratio 1.0 (L)   Prealbumin 16.0 - 42.0 mg/dL 24.7   BILIRUBIN TOTAL <=1.5 mg/dL 0.3   AST 5 - 34 unit/L 18   ALT 0 - 55 unit/L 23   Globulin, Total 2.4 - 3.5 gm/dL 3.5   (L): Data is abnormally low  (H): Data is abnormally high        Assessment/Plan  Hospital   Anemia   Hyponatremia   Spinal cord compression   Status post cervical spinal fusion   Hypokalemia     Non-Hospital   HTN (hypertension)   Hyperlipidemia   Ischemic cardiomyopathy with implantable cardioverter-defibrillator (ICD)   Tobacco user   Type 2 diabetes mellitus   Peripheral  arterial disease with history of revascularization   Chronic systolic CHF (congestive heart failure), NYHA class 3   Chronic low back pain   Cervical myelopathy   Carotid artery stenosis, asymptomatic, left   Arteriosclerosis of coronary artery   S/P triple vessel bypass   COPD (chronic obstructive pulmonary disease)   Gastro-esophageal reflux disease without esophagitis   Anxiety disorder, unspecified   Cervical radiculopathy   Lumbar radiculopathy   Myelomalacia of cervical cord       Wounds: bilateral heel wounds, bilateral anterior leg wounds, posterior cervical incision-staples-dressing-c/d/i  On 1/24, patient underwent C2-6 PCDF and C2-3- C4-5 decompression by Dr. Ramírez.  (Hemovac drain placed to site)  Precautions: spinal  Bracing: Hard cervical collar, bilateral heel boots in bed  Swallowing: Regular Diet  Function: Tolerating therapy. Continue PT/OT  VTE Prophylaxis:   enoxaparin injection 40 mg q24hr  Code Status: FULL CODE   Discharge: Lives alone in [place] in a 2nd story apartment. Completed high school. He has no  history.  Is disabled. . He was living alone and completely independent. Children: (2). Date 2/19 Wednesday.                   Shannan Puri NP, conducted additional independent physical examination and assisted with medical documentation.

## 2025-02-13 NOTE — PT/OT/SLP EVAL
Physical Therapy Rehab Re-Evaluation    Patient Name:  Tyler Mai   MRN:  [id number]    Recommendations:     Discharge Recommendations:   (pending progress)   Discharge Equipment Recommendations:     Barriers to discharge: Increased level of assist, Decreased caregiver support, Impaired functional mobility , and Severity of Deficits     Assessment:     Tyler Mai is a 69 y.o. male admitted with a medical diagnosis of Status post cervical spinal fusion.  He presents with the following impairments/functional limitations:  weakness, impaired functional mobility, gait instability, decreased coordination, decreased upper extremity function, decreased lower extremity function, decreased safety awareness, decreased ROM, impaired coordination, impaired fine motor         PT NOTE: pt has progressed well. He reports wanting to stay here as long as possible and is open to transitioning  to SNF. PT reports having constipation. At end of treatment, pt requested to attempt BM. Pt required Cna present to assist with stool.    Rehab Diagnosis: Cervical spondylosis and stenosis with myelopathy and cord compression s/p C2-6 PCDF and C2-3, C4-5 decompression 1/24/2025. Pt. Has a history of cervical myelopathy, CAD, CHF, COPD, DM, HLD, HTN, and PVD    General Precautions: Standard, fall     Orthopedic Precautions: spinal precautions     Braces: Aspen collar    Rehab Prognosis: Good; patient would benefit from acute skilled PT services to address these deficits and reach maximum level of function.      History:     Past Medical History:   Diagnosis Date    Anxiety disorder, unspecified     Arthritis     CAD (coronary artery disease)     Cervical radiculopathy     CHF (congestive heart failure)     Chronic pain syndrome     COPD (chronic obstructive pulmonary disease)     Diabetes mellitus     Gastro-esophageal reflux disease without esophagitis     High cholesterol     Hypertension     Lumbar radiculopathy     Myelomalacia of  cervical cord     Other specified diseases of spinal cord     Pacemaker     PVD (peripheral vascular disease)     S/P triple vessel bypass     Tobacco use        Past Surgical History:   Procedure Laterality Date    ANTERIOR CERVICAL DISCECTOMY W/ FUSION  1995    C5-6 and C6-7.  Dr. Wall    ANTERIOR CERVICAL DISCECTOMY W/ FUSION  1996    Redo C5-6, C6-7.  Dr. Wall    CHOLECYSTECTOMY      CORONARY ARTERY BYPASS GRAFT      FUSION OF POSTERIOR COLUMN OF CERVICAL SPINE USING COMPUTER AIDED NAVIGATION N/A 1/24/2025    Procedure: FUSION, SPINE, POSTERIOR SPINAL COLUMN, CERVICAL, USING COMPUTER-ASSISTED NAVIGATION;  Surgeon: Darrell Watson MD;  Location: [facility];  Service: Neurosurgery;  Laterality: N/A;  C2-C6 PCF; C2/3-C4/5 decompression, possible additional levels  o-arm  NTI  TIVA setup  [employer] //  XX    INSERTION OF PACEMAKER      X2    TRIPPLE VESSEL BYPASS         Subjective     Patient Goal: To get better     Patient Comments: Im very constipated     Patients cultural, spiritual, Restorationist conflicts given the current situation: no       Living Environment  People in Home: alone (Lives above sister and brother in law)  Living Arrangements: apartment  Home Accessibility: stairs to enter home  Number of Stairs, Main Entrance: other (see comments) (15)  Stair Railings, Main Entrance: railings safe and in good condition  Equipment Currently Used at Home: rollator, walker, rolling, shower chair    Prior Level of Function  Ambulation Skills: needs device  Stairs: independent  Transfer Skills: independent  ADL Skills: independent  Cognitive Communication: independent      Objective:     Communicated with TR prior to session.  Patient found up in chair with cervical collar  upon PT entry to room.    Vitals   Vitals at Rest  BP (!) 172/78   HR 76   O2 Sat     Pain Pain Rating 1: 8/10  Location - Side 1: Left  Location - Orientation 1: upper  Location 1: back  Pain Addressed 1: Reposition, Distraction     Vitals  With Activity  BP     HR     O2 Sat     Pain Pain Rating 1: 8/10  Location - Side 1: Bilateral  Location - Orientation 1: upper  Location 1: shoulder  Pain Addressed 1: Reposition, Distraction     Respiratory Status: Room air    Exams  Cognitive Exam:  Patient is oriented to Person, Place, Time, and Situation  Sensation:          -       Deficits, Lower   Lower Extremity  Comment   Light Touch Impaired    Sharp/Dull Impaired    Proprioception Impaired        Right LE  Left LE    Hip flexion: 4 Hip Flexion: 4+   Hip Abduction: 4- Hip Abduction: 4+   Hip Adduction: 4- Hip Adduction: 4+   Knee Flexion: 4- Knee Flexion: 4+   Knee Extension: 4- Knee Extension: 4+   Ankle Dorsiflexion: 3+ Ankle Dorsiflexion: 4+   Ankle Plantarflexion: 3+ Ankle Plantarflexion: 4+        Functional Mobility    Toilet t/f: overall CGA with RW. CGA with donning and doffing pants and brief     GGs   Admit Current   Status Goal   Functional Area: Care Score: Care Score: Care Score:   Roll Left and Right 6 6 Independent   Sit to Lying 4 6 Independent   Lying to Sitting on Side of Bed 4 6  Performed in recliner. Pt will be sleeping in recliner at home Independent   Sit to Stand 4 4 Independent   Chair/Bed-to-Chair Transfer 4 4  Cga with RW stand step t.f  Independent   Walk 10 Feet 4 4 Independent   Walk 50 Feet with Two Turns 4 4     Walk 150 Feet 3 4  Overall CGA with RW. Noted decreased step length and clearance of RLE with increased distance. Vc to correct  Independent   Walk 10 Feet Uneven Surface 3 4  CGA with RW on blue mat. Slowed pace throughout  Independent   1 Step (Curb) 4 4 Independent   4 Steps 4 4  8 steps seated rest break then another 8 steps to simulate home. Overall CGA with VC for foot placement.  Independent   12 Steps 88 88 Set-up/clean-up   Picking Up Object 4 4  With RW and reacher. VC for position in RW.  Independent   Wheel 50 Feet with Two Turns 9 9     Wheel 150 Feet 9 9       Therapeutic Activities and  Exercises:  Patient educated on role of acute care PT and PT POC and safety while in hospital including calling nurse for mobility  Patient educated about importance of OOB mobility and remaining up in chair most of the day.        Activity Tolerance: Good    Patient left up in chair with all lines intact, call button in reach, and heels floated .    Education Provided: roles and goals of PT/PTA, transfer training, bed mob, gait training, stair training, balance training, safety awareness, body mechanics, assistive device, strengthening exercises, and fall prevention    Expected compliance: High compliance      Plan:     During this hospitalization, patient to be seen 5 x/week to address the identified rehab impairments via gait training, therapeutic activities, therapeutic exercises and progress toward the following goals:    GOALS:   Multidisciplinary Problems       Physical Therapy Goals          Problem: Physical Therapy    Goal Priority Disciplines Outcome Interventions   Physical Therapy Goal     PT, PT/OT Progressing    Description: Bed Mobility:  MET-Sit to supine transfer independently.   MET-Supine to sit transfer independently.     Transfers:  Sit to stand transfer independently using RW.   Car transfer independently using RW.    an object from the ground in standing position independently using RW.     Mobility:  Ambulate 500 feet independently using RW.  Ambulate 15 feet on uneven surfaces/ramps with setup/clean-up assist using RW.   Pt ascended/descended a 6 inch curb independently using RW.   Ascend/descend 15 stairs independently using bilateral handrails.                         Plan of Care Expires:  02/19/25  PT Next Visit Date: 02/19/25  Plan of Care reviewed with: patient      PT Care conference held with PTA. Short term goals updated appropriately. Plan of care updates discussed and reviewed with PTA Liset Holt    Additional Infomation:           Time Tracking:     Therapy Time   PT  Received On: 02/13/25  PT Start Time: 0930  PT Stop Time: 1030  PT Total Time (min): 60 min  PT Individual: 60  Missed Time:    Time Missed due to:      Billable Minutes: Re-eval 20 and Therapeutic Activity 40    02/13/2025

## 2025-02-13 NOTE — PLAN OF CARE
"Completed SMS and PASSR, called in LOCET to [facility], and faxed PASSR to [facility].  Will await Form 142 approval.    Of note is that patient is identified as "Nicola Mai" in the Medicaid system.    0931:  Sent SMS/PASSR to [facility] via [employer].    1024:  Form 142 received and forwarded to [facility] for review.    1214:  [facility] has accepted pt clinically and will submit request for prior auth to insurance tomorrow in anticipation of approval for discharge planned 2/19.    "

## 2025-02-14 LAB
POCT GLUCOSE: 116 MG/DL (ref 70–110)
POCT GLUCOSE: 147 MG/DL (ref 70–110)
POCT GLUCOSE: 149 MG/DL (ref 70–110)
POCT GLUCOSE: 182 MG/DL (ref 70–110)

## 2025-02-14 PROCEDURE — 97535 SELF CARE MNGMENT TRAINING: CPT

## 2025-02-14 PROCEDURE — 63600175 PHARM REV CODE 636 W HCPCS: Performed by: NURSE PRACTITIONER

## 2025-02-14 PROCEDURE — 94640 AIRWAY INHALATION TREATMENT: CPT

## 2025-02-14 PROCEDURE — 99900031 HC PATIENT EDUCATION (STAT)

## 2025-02-14 PROCEDURE — 11800000 HC REHAB PRIVATE ROOM

## 2025-02-14 PROCEDURE — 97110 THERAPEUTIC EXERCISES: CPT | Mod: CQ

## 2025-02-14 PROCEDURE — 97530 THERAPEUTIC ACTIVITIES: CPT

## 2025-02-14 PROCEDURE — 25000003 PHARM REV CODE 250: Performed by: NURSE PRACTITIONER

## 2025-02-14 PROCEDURE — 97530 THERAPEUTIC ACTIVITIES: CPT | Mod: CQ

## 2025-02-14 PROCEDURE — 97116 GAIT TRAINING THERAPY: CPT | Mod: CQ

## 2025-02-14 PROCEDURE — 94761 N-INVAS EAR/PLS OXIMETRY MLT: CPT

## 2025-02-14 PROCEDURE — 25000003 PHARM REV CODE 250: Performed by: INTERNAL MEDICINE

## 2025-02-14 PROCEDURE — 25000242 PHARM REV CODE 250 ALT 637 W/ HCPCS: Performed by: NURSE PRACTITIONER

## 2025-02-14 PROCEDURE — 94799 UNLISTED PULMONARY SVC/PX: CPT | Mod: XB

## 2025-02-14 RX ORDER — LOSARTAN POTASSIUM 50 MG/1
50 TABLET ORAL
Status: DISCONTINUED | OUTPATIENT
Start: 2025-02-15 | End: 2025-02-19 | Stop reason: HOSPADM

## 2025-02-14 RX ORDER — HYDROXYZINE PAMOATE 25 MG/1
25 CAPSULE ORAL EVERY 4 HOURS PRN
Status: DISCONTINUED | OUTPATIENT
Start: 2025-02-14 | End: 2025-02-19 | Stop reason: HOSPADM

## 2025-02-14 RX ORDER — AMLODIPINE BESYLATE 5 MG/1
5 TABLET ORAL DAILY
Status: DISCONTINUED | OUTPATIENT
Start: 2025-02-14 | End: 2025-02-16

## 2025-02-14 RX ADMIN — METHOCARBAMOL 750 MG: 750 TABLET ORAL at 03:02

## 2025-02-14 RX ADMIN — LINACLOTIDE 145 MCG: 145 CAPSULE, GELATIN COATED ORAL at 06:02

## 2025-02-14 RX ADMIN — AMLODIPINE BESYLATE 5 MG: 5 TABLET ORAL at 07:02

## 2025-02-14 RX ADMIN — HYDROXYZINE PAMOATE 25 MG: 25 CAPSULE ORAL at 11:02

## 2025-02-14 RX ADMIN — OXYCODONE HYDROCHLORIDE AND ACETAMINOPHEN 1 TABLET: 10; 325 TABLET ORAL at 03:02

## 2025-02-14 RX ADMIN — CARVEDILOL 25 MG: 25 TABLET, FILM COATED ORAL at 03:02

## 2025-02-14 RX ADMIN — ALBUTEROL SULFATE 2.5 MG: 2.5 SOLUTION RESPIRATORY (INHALATION) at 07:02

## 2025-02-14 RX ADMIN — PANTOPRAZOLE SODIUM 40 MG: 40 TABLET, DELAYED RELEASE ORAL at 07:02

## 2025-02-14 RX ADMIN — CARVEDILOL 25 MG: 25 TABLET, FILM COATED ORAL at 05:02

## 2025-02-14 RX ADMIN — DULOXETINE HYDROCHLORIDE 30 MG: 30 CAPSULE, DELAYED RELEASE ORAL at 07:02

## 2025-02-14 RX ADMIN — PREGABALIN 100 MG: 50 CAPSULE ORAL at 02:02

## 2025-02-14 RX ADMIN — ENOXAPARIN SODIUM 40 MG: 40 INJECTION SUBCUTANEOUS at 05:02

## 2025-02-14 RX ADMIN — MICONAZOLE NITRATE 2 % TOPICAL POWDER: at 08:02

## 2025-02-14 RX ADMIN — LIDOCAINE 5% 1 PATCH: 700 PATCH TOPICAL at 05:02

## 2025-02-14 RX ADMIN — Medication 400 MG: at 02:02

## 2025-02-14 RX ADMIN — CLOPIDOGREL BISULFATE 75 MG: 75 TABLET, FILM COATED ORAL at 07:02

## 2025-02-14 RX ADMIN — Medication: at 09:02

## 2025-02-14 RX ADMIN — FUROSEMIDE 20 MG: 20 TABLET ORAL at 07:02

## 2025-02-14 RX ADMIN — RANOLAZINE 500 MG: 500 TABLET, EXTENDED RELEASE ORAL at 07:02

## 2025-02-14 RX ADMIN — Medication 400 MG: at 05:02

## 2025-02-14 RX ADMIN — FINASTERIDE 5 MG: 5 TABLET, FILM COATED ORAL at 07:02

## 2025-02-14 RX ADMIN — LACTULOSE 20 G: 20 SOLUTION ORAL at 07:02

## 2025-02-14 RX ADMIN — LOSARTAN POTASSIUM 25 MG: 25 TABLET, FILM COATED ORAL at 05:02

## 2025-02-14 RX ADMIN — HYDROXYZINE PAMOATE 25 MG: 25 CAPSULE ORAL at 03:02

## 2025-02-14 RX ADMIN — METHOCARBAMOL 750 MG: 750 TABLET ORAL at 06:02

## 2025-02-14 RX ADMIN — RANOLAZINE 500 MG: 500 TABLET, EXTENDED RELEASE ORAL at 09:02

## 2025-02-14 RX ADMIN — METHOCARBAMOL 750 MG: 750 TABLET ORAL at 09:02

## 2025-02-14 RX ADMIN — OXYCODONE HYDROCHLORIDE AND ACETAMINOPHEN 1 TABLET: 10; 325 TABLET ORAL at 11:02

## 2025-02-14 RX ADMIN — BISACODYL 5 MG: 5 TABLET, COATED ORAL at 07:02

## 2025-02-14 RX ADMIN — OXYCODONE HYDROCHLORIDE AND ACETAMINOPHEN 1 TABLET: 10; 325 TABLET ORAL at 07:02

## 2025-02-14 RX ADMIN — ASPIRIN 81 MG CHEWABLE TABLET 81 MG: 81 TABLET CHEWABLE at 07:02

## 2025-02-14 RX ADMIN — ATORVASTATIN CALCIUM 40 MG: 40 TABLET, FILM COATED ORAL at 07:02

## 2025-02-14 RX ADMIN — HYDROXYZINE PAMOATE 25 MG: 25 CAPSULE ORAL at 08:02

## 2025-02-14 RX ADMIN — CAMPHOR, MENTHOL: .5; .5 LOTION TOPICAL at 05:02

## 2025-02-14 RX ADMIN — Medication 400 MG: at 09:02

## 2025-02-14 RX ADMIN — METHOCARBAMOL 750 MG: 750 TABLET ORAL at 11:02

## 2025-02-14 RX ADMIN — PREGABALIN 100 MG: 50 CAPSULE ORAL at 09:02

## 2025-02-14 RX ADMIN — BISACODYL 10 MG: 10 SUPPOSITORY RECTAL at 03:02

## 2025-02-14 RX ADMIN — PREGABALIN 100 MG: 50 CAPSULE ORAL at 05:02

## 2025-02-14 NOTE — PROGRESS NOTES
[facility] ([facility])  Rehab Progress Note    Patient Name: Tyler Mai  MRN: [id number]  Age: 69 y.o. Sex: male  : 1955  Hospital Length of Stay: 14 days  Date of Service: 2025   Chief Complaint: Cervical myelopathy s/p C2-6 PCDF and C2/3-C4/5 decompression on 2025     Subjective:     Basic Information  Admit Information: 69-year-old white male presented to [facility] ED on 2025 complaining of bilateral arm weakness, bilateral leg weakness, and worsening neck pain.  Reports right leg weaker than the left.  Recently admitted to [facility] for cervical myelopathy with plans for surgery, but patient had severe UTI requiring antibiotic therapy.  MRI on  significant for severe C3-4 level central canal stenosis with increased signal hyperintensity within the cervical cord from mid to lower aspect of C3 through mid aspect of C4 with central cord myelomalacia.  PMH significant for cervical myelopathy with history of C5-C6 fusion, CAD, HFrEF, ICMO, COPD, DM type 2, HLD, HTN, and PVD.  Workup significant for cervical myelopathy.  Patient inpatient family initially declined the neurosurgeon on call surgery.  Then surgery postpone due to inclement weather.  Tolerated posterior C3-C5 total laminectomies for decompression, placement of bilateral pars screws at C2 and bilateral lateral mass screws from C3 to C6 using [facility] Invictus OCT System, and placement of local bone and AlphaGraft DBM for arthrodesis from C2 to C6 on  without perioperative complications.  Postoperatively neurosurgery documented increase mobility to bilateral upper extremities with a 5/5 hand grasp.  Recommended aspirin hard collar at all times, and [place] collar for showers..  Recommended removal staples on .  Gray continued due to urinary retention.  Tolerated transfer to [facility] inpatient rehab unit on  without incident.   Today's Information: No acute events overnight.  Sitting up in  bed.  Reports poor sleep due to itching overnight.  Reportedly got very agitated.  Called this morning.  Reports good appetite.  Last BM 2/13.  BP moderately elevated likely due to aggravation for pruritus.  Good glycemic control.  No new labs or imaging today.    Review of patient's allergies indicates:   Allergen Reactions    Hydrochlorothiazide      Other Reaction(s): Pancreatitis        Current Facility-Administered Medications:     acetaminophen tablet 650 mg, 650 mg, Oral, Q6H PRN, Ashley, Tony A, FNP, 650 mg at 02/09/25 0316    albuterol nebulizer solution 2.5 mg, 2.5 mg, Nebulization, Q4H PRN, [place], Shannan A, FNP, 2.5 mg at 02/14/25 0704    ALPRAZolam tablet 0.25 mg, 0.25 mg, Oral, TID PRN, Ashley, Tony A, FNP, 0.25 mg at 02/10/25 2333    amLODIPine tablet 5 mg, 5 mg, Oral, Daily, Ashley, Tony A, FNP    aspirin chewable tablet 81 mg, 81 mg, Oral, Daily, Ashley, Tony A, FNP, 81 mg at 02/13/25 0718    atorvastatin tablet 40 mg, 40 mg, Oral, Daily, Ashley, Tony A, FNP, 40 mg at 02/13/25 0718    benzonatate capsule 100 mg, 100 mg, Oral, TID PRN, Ashley, Tony A, FNP, 100 mg at 02/10/25 0718    bisacodyL EC tablet 5 mg, 5 mg, Oral, Daily, Jaxson, Shannan A, FNP    bisacodyL suppository 10 mg, 10 mg, Rectal, Daily PRN, [place], Shannan A, FNP, 10 mg at 02/13/25 1314    carvediloL tablet 25 mg, 25 mg, Oral, BID AC, Ashley, Tony A, FNP, 25 mg at 02/14/25 0559    clopidogreL tablet 75 mg, 75 mg, Oral, Daily, Ashley, Tony A, FNP, 75 mg at 02/13/25 0718    cyclobenzaprine tablet 5 mg, 5 mg, Oral, TID PRN, [place] Shannan A, FNP    dextrose 50% injection 12.5 g, 12.5 g, Intravenous, PRN, Tony Ramirez A, FNP    dextrose 50% injection 25 g, 25 g, Intravenous, PRN, Tony Ramirez, MARIVEL    DULoxetine DR capsule 30 mg, 30 mg, Oral, Daily, Shannan Puri FNP, 30 mg at 02/13/25 0718    enoxaparin injection 40 mg, 40 mg, Subcutaneous, Q24H (prophylaxis, 1700),  Ashley, Tony A, FNP, 40 mg at 02/13/25 1801    finasteride tablet 5 mg, 5 mg, Oral, Daily, Ashley, Tony A, FNP, 5 mg at 02/13/25 0718    umeclidinium-vilanteroL 62.5-25 mcg/actuation DsDv 1 puff, 1 puff, Inhalation, Daily, 1 puff at 02/11/25 0818 **AND** fluticasone furoate 200 mcg/actuation inhaler 1 puff, 1 puff, Inhalation, Daily, Ashley, Tony A, FNP, 1 puff at 02/13/25 0800    furosemide tablet 20 mg, 20 mg, Oral, Daily, Ashley, Tony A, FNP, 20 mg at 02/13/25 0718    glucagon (human recombinant) injection 1 mg, 1 mg, Intramuscular, PRN, Ashley, Tony A, FNP    glucose chewable tablet 16 g, 16 g, Oral, PRN, Ashley, Tony A, FNP    glucose chewable tablet 24 g, 24 g, Oral, PRN, Ashley, Tony A, FNP    hydrALAZINE injection 10 mg, 10 mg, Intravenous, Q4H PRN, Ashley, Tony A, FNP, 10 mg at 02/04/25 0629    hydrocortisone 2.5 % rectal cream, , Rectal, BID PRN, Shannan Puri A, FNP    hydrOXYzine pamoate capsule 25 mg, 25 mg, Oral, Q4H PRN, Ashley, Tony A, FNP    hydrOXYzine pamoate capsule 50 mg, 50 mg, Oral, Nightly PRN, Ashley, Tony A, FNP, 50 mg at 02/13/25 2135    insulin aspart U-100 injection 0-10 Units, 0-10 Units, Subcutaneous, QID (AC + HS) PRN, Ashley, Tony A, FNP, 2 Units at 02/12/25 1657    labetalol 20 mg/4 mL (5 mg/mL) IV syring, 10 mg, Intravenous, Q4H PRN, Ashley, Tony A, FNP    lactulose 20 gram/30 mL solution Soln 20 g, 20 g, Oral, Daily, Ashley, Otny A, FNP    LIDOcaine 5 % patch 1 patch, 1 patch, Transdermal, Q24H, Shannan Puri FNP, 1 patch at 02/13/25 1801    linaCLOtide capsule 145 mcg, 145 mcg, Oral, QAM, Tony Ramirez FNP, 145 mcg at 02/14/25 0606    [START ON 2/15/2025] losartan tablet 50 mg, 50 mg, Oral, Before breakfast, Tony Ramirez FNP    magnesium citrate solution 296 mL, 296 mL, Oral, Once PRN, Shannan Puri FNP    magnesium oxide tablet 400 mg, 400 mg, Oral, TID, Tony Ramirez  "A, FNP, 400 mg at 02/14/25 0559    melatonin tablet 6 mg, 6 mg, Oral, Nightly PRN, Bobby Puriyn A, FNP, 6 mg at 02/02/25 2056    methocarbamoL tablet 750 mg, 750 mg, Oral, QID (AC & HS), Jaxson Shannan A, FNP, 750 mg at 02/14/25 0606    metoprolol injection 10 mg, 10 mg, Intravenous, Q2H PRN, Ashley Tony A, FNP    miconazole NITRATE 2 % top powder, , Topical (Top), BID, [place] Shannan A, FNP, Given at 02/13/25 1944    nitroGLYCERIN SL tablet 0.4 mg, 0.4 mg, Sublingual, Q5 Min PRN, Ashley, Tony A, FNP    ondansetron disintegrating tablet 4 mg, 4 mg, Oral, Q6H PRN, Ashley, Tony A, FNP    ondansetron disintegrating tablet 8 mg, 8 mg, Oral, Q8H PRN, Ashley, Tony A, FNP    ondansetron injection 4 mg, 4 mg, Intravenous, Q8H PRN, Ashley, Tony A, FNP    oxyCODONE-acetaminophen  mg per tablet 1 tablet, 1 tablet, Oral, Q4H PRN, Jaxson, Shannan A, FNP, 1 tablet at 02/14/25 0351    oxyCODONE-acetaminophen 5-325 mg per tablet 1 tablet, 1 tablet, Oral, Q4H PRN, Radha Purihryn A, FNP, 1 tablet at 02/05/25 0606    pantoprazole EC tablet 40 mg, 40 mg, Oral, Daily, Ashley, Tony A, FNP, 40 mg at 02/13/25 0718    pregabalin capsule 100 mg, 100 mg, Oral, TID, Radha Purihryn A, FNP, 100 mg at 02/14/25 0559    ranolazine 12 hr tablet 500 mg, 500 mg, Oral, BID, Ashley, Tony A, FNP, 500 mg at 02/13/25 1942     Review of Systems   Complete 12-point review of symptoms negative except for what's mentioned in HPI     Objective:     BP (!) 178/82   Pulse 90   Temp 98 °F (36.7 °C) (Oral)   Resp 18   Ht 5' 6" (1.676 m)   Wt (P) 68.9 kg (152 lb)   SpO2 97%   BMI (P) 24.53 kg/m²      Physical Exam  Constitutional:       Appearance: Normal appearance.   HENT:      Head: Normocephalic.      Mouth/Throat:      Mouth: Mucous membranes are moist.   Eyes:      Pupils: Pupils are equal, round, and reactive to light.   Neck:      Comments: Posterior neck incision clean and intact.  Hard collar " intact  Cardiovascular:      Rate and Rhythm: Normal rate and regular rhythm.      Heart sounds: Normal heart sounds.   Pulmonary:      Effort: Pulmonary effort is normal.      Breath sounds: Normal breath sounds.   Abdominal:      General: Bowel sounds are normal.      Palpations: Abdomen is soft.   Musculoskeletal:      Cervical back: Neck supple.      Right lower le+ Pitting Edema present.      Left lower le+ Pitting Edema present.      Comments: Diffuse muscle atrophy.  Left forearm edema.   Skin:     General: Skin is warm and dry.   Neurological:      General: No focal deficit present.      Mental Status: He is alert and oriented to person, place, and time.      Motor: Weakness present.   Psychiatric:         Mood and Affect: Mood normal.         Behavior: Behavior normal.         Thought Content: Thought content normal.         Judgment: Judgment normal.     *MD performed and documented physical examination       Lines/Drains/Airways       None                 Labs  Recent Results (from the past 24 hours)   POCT glucose    Collection Time: 25 11:51 AM   Result Value Ref Range    POCT Glucose 113 (H) 70 - 110 mg/dL   POCT glucose    Collection Time: 25  4:30 PM   Result Value Ref Range    POCT Glucose 135 (H) 70 - 110 mg/dL   Radiology   Left upper extremity NIVA on 2025, IMPRESSION:  Negative for DVT  Radiology  Cervical XR two-view on 2025, IMPRESSION: There are postoperative changes with posterior spinal fusion hardware at C2 through C6. The hardware appears intact. Cervical alignment has improved. The remaining vertebral body heights are preserved. There is moderate disc height loss at C3-C4, mild at C4-C5, with multilevel marginal osteophytes. The soft tissues are unremarkable.     Assessment/Plan:     69 y.o. WM admitted on 2025    Cervical myelopathy   - s/p C2-6 PCDF and C2/3-C4/5 decompression on 2025  - remove staples on   - continue aspirin hard collar  at all times, okay for [place] collar for showers only  - Zanaflex discontinued on 02/05 due to orthostatic hypotension  - continue                Robaxin 750 mg q.i.d. (decreased 2/5)                Flexeril 10 mg t.i.d. p.r.n.                Percocet 10 mg q.4 hours p.r.n. for severe pain                Lyrica 100 mg t.i.d. (increased 2/3)    Lidocaine patch daily    Cymbalta 30 mg daily (initiated 2/1)  - defer to physiatry for rehab and pain management  - PT/OT/RT/ST following  - to follow up with Neurosurgery outpatient     Coronary artery disease  - s/p  CABG x 3  - denies recent chest pain or discomfort  - continue                ASA 81 mg daily (resumed 1/31)                Plavix 75 mg daily (resume 1/31)                Coreg 25 mg b.i.d. (increased 2/12)                Lipitor 40 mg daily                Losartan 50 mg (increased 2/14)  - ECG and Nitro PRN  - continue cardiac diet  - to follow-up with cardiology outpatient     HFrEF/ICMO  - stable  - s/p Lasix 40 mg IVP x1 on 2/10  - continue   Lasix 20 mg oral daily (decreased 2/13)                Coreg 25 mg b.i.d. (increased 2/12)                Losartan 50 mg daily (increased 2/14)                Ranexa 500 mg b.i.d.   - obtain daily standing weights  - to follow up with cardiology outpatient     Nicotine dependence  - smoking cessation counseling given on admission  - 1 PPD with approximately 55 pack years     HTN  -BP moderately controlled  - discontinued Flomax 0.4 mg at bedtime on 02/04 due to orthostatic hypotension  - discontinued Zanaflex on 02/05 due to orthostatic hypotension  - received NS 1 L bolus on 02/05 due to orthostatic hypotension  - discontinue Norvasc 5 mg b.i.d. on 2/12 due to bilateral lower extremity edema  - initiate   Norvasc 5 mg daily (initiated 2/14)  - continue                Coreg 25 mg b.i.d. (increased 2/12)                Losartan 50 mg daily (increased 2/14)                Ranexa 500 mg b.i.d.                  Hydralazine 10 mg every 2 hours as needed for BP > 160/90                Labetalol 10 mg every 2 hours as needed for BP > 160/90  - low sodium diet             COPD   - stable  - not on home 02  - continue                Trelegy INH daily                   DM type II  - HgA1c 5.6 on 01/23/2024  - continue                Lantus 10 units every evening (resumed 1/31)                ISS   - CBGs AC/HS     HLD  - FLP outpatient  - resume Crestor upon discharge  - continue                Lipitor 40 mg daily      PVD  - stable  - continue                ASA 81 mg daily (resumed 1/31)                Plavix 75 mg daily (resume 1/31)                Coreg 12.5 mg b.i.d. (increased 2/11)                Lipitor 40 mg daily  - to follow up vascular surgery outpatient                  Chronic constipation  - last BM 2/13  - continue    Lactulose 20 g daily (initiated 2/13)                Linzess 145 mcg before breakfast                 Colace 100 mg b.i.d.    Major depressive disorder  - stable  - continue   Cymbalta 30 mg daily (initiated 2/1)     Hypomagnesemia  - stable  - continue                Magnesium oxide 400 mg b.i.d.      BPH/urinary retention  - stable  - discontinued Flomax 0.4 mg at bedtime on 02/04 due to orthostatic hypotension  - continue                Proscar 5 mg daily (initiated 1/31)  Indwelling catheter discontinued on 02/01-good urine output with no retention reported     GERD  - Avoid spicy foods, and nothing to eat or drink within x2 hours of bedtime or laying flat (water is ok)   - Avoid NSAIDs (Advil, ibuprofen, naproxen...) and chavira-2 inhibitors (Mobic, Celebrex)    - continue                Protonix 40 mg daily      Normocytic anemia  - asymptomatic  - H/H stable   - no evidence of active bleeds  - will closely monitor and transfuse if needed     Hypomagnesemia  - magnesium 1.5 on 2/10  - s/p Mag sulfate 2 g x 1 IVPB on 02/01  - continue   Mag-Ox 400 mg tID (increased 2/10)    Left upper extremity  edema  - resolved  - left upper extremity NIVA negative from 02/12/2025    Seasonal allergies  - stable  - continue   Flonase daily    Pruritus  - current  - continue Aquaphor daily as needed  - initiate   Vistaril 25 mg q.4 hours p.r.n. (initiated 2/14)     VTE Prophylaxis: Lovenox 40 mg daily   COVID-19 testing:  Unknown  COVID-19 vaccination status:  Vaccinated (Moderna):  Bivalent booster on 04/03/2023     POA:  No  Living will:  No  Contacts:  Tyler Mai Jr (son) [phone]     CODE STATUS:  Full code  Internal Medicine (attending): Tyler De Anda MD  Physiatry (consulting):  Philip Fang MD     OUTPATIENT PROVIDERS  PCP: None  Neurosurgery: Darrell Zavala MD     DISPOSITION:  Sleep hygiene poor due to itching overnight.  Appetite and bowel maintenance at goal.  BP moderately elevated.  Vital signs otherwise at goal with no recorded fevers.  Good glycemic control.  No new labs or imaging today.  Initiate Vistaril 25 mg q.4 hours p.r.n. for pruritus.  Increase losartan 50 mg daily in initiate Norvasc 5 mg daily for improved BP control.  Continue aggressive mobilization as tolerated.  Monitor closely.  Notify of acute changes.    Staffing 2/12/2025: Continent of bowel and bladder. Left arm redness, maybe infiltration. RT: Overall supervision to set up.  More cooperative this week.  Limited by dexterity. Appetite is good at 85%. PT: Good progress this week. Pain well controlled.  Tolerated 8 steps.  Overall CGA, but unsafe d/t weakness.  Needs a chair at platform to rest between steps.  Sleeps in a recliner at home.  Needs kasia mat. Ambulating 105-120 feet with walker.  Distance in inconsistent 2/2 fatigue. Limited insight into deficits.  OT: Max assist with UE and LE dressing.  Cannot wear shoes 2/2 heel wound. Wearing PRAFO boots. Mod assist with toileting.  Set up with oral care and eating. Unable to denny and doff C-collar. Projected discharge pending.      Jarrell Ramirez NP conducted independent  physical examination and assisted with medical documentation.    Total time spent on this encounter including chart review and direct MD + NP 1-on-1 patient interaction: 52 minutes   Over 50% of this time was spent in counseling and coordination of care

## 2025-02-14 NOTE — PT/OT/SLP PROGRESS
Physical Therapy Inpatient Rehab Treatment    Patient Name:  Tyler Mai   MRN:  [id number]    Recommendations:     Discharge Recommendations:   (pending progress)   Discharge Equipment Recommendations:     Barriers to discharge: Ongoing medical treatment, Impaired functional mobility , and Severity of Deficits     Assessment:     Tyler Mai is a 69 y.o. male admitted with a medical diagnosis of Status post cervical spinal fusion.  He presents with the following impairments/functional limitations:  weakness, impaired endurance, impaired functional mobility, gait instability, impaired balance, decreased upper extremity function, decreased lower extremity function, pain, decreased ROM, impaired coordination, impaired fine motor, orthopedic precautions.    Rehab Diagnosis: Cervical spondylosis and stenosis with myelopathy and cord compression s/p C2-6 PCDF and C2-3, C4-5 decompression 1/24/2025. Pt. Has a history of cervical myelopathy, CAD, CHF, COPD, DM, HLD, HTN, and PVD    General Precautions: Standard, fall     Orthopedic Precautions:spinal precautions     Braces: Aspen collar    Rehab Prognosis: Good; patient would benefit from acute skilled PT services to address these deficits and reach maximum level of function.      History:     Past Medical History:   Diagnosis Date    Anxiety disorder, unspecified     Arthritis     CAD (coronary artery disease)     Cervical radiculopathy     CHF (congestive heart failure)     Chronic pain syndrome     COPD (chronic obstructive pulmonary disease)     Diabetes mellitus     Gastro-esophageal reflux disease without esophagitis     High cholesterol     Hypertension     Lumbar radiculopathy     Myelomalacia of cervical cord     Other specified diseases of spinal cord     Pacemaker     PVD (peripheral vascular disease)     S/P triple vessel bypass     Tobacco use        Past Surgical History:   Procedure Laterality Date    ANTERIOR CERVICAL DISCECTOMY W/ FUSION  1995     "C5-6 and C6-7.  Dr. Wall    ANTERIOR CERVICAL DISCECTOMY W/ FUSION  1996    Redo C5-6, C6-7.  Dr. Wall    CHOLECYSTECTOMY      CORONARY ARTERY BYPASS GRAFT      FUSION OF POSTERIOR COLUMN OF CERVICAL SPINE USING COMPUTER AIDED NAVIGATION N/A 1/24/2025    Procedure: FUSION, SPINE, POSTERIOR SPINAL COLUMN, CERVICAL, USING COMPUTER-ASSISTED NAVIGATION;  Surgeon: Darrell Watson MD;  Location: [facility] OR;  Service: Neurosurgery;  Laterality: N/A;  C2-C6 PCF; C2/3-C4/5 decompression, possible additional levels  o-arm  NTI  TIVA setup  [employer] //  XX    INSERTION OF PACEMAKER      X2    TRIPPLE VESSEL BYPASS         Subjective     Patient comments: "I've been itching all night" c/o pain in neck and rt shoulder    Respiratory Status: Room air    Patients cultural, spiritual, Taoist conflicts given the current situation: no    Objective:     Communicated with nursing prior to session.  Patient found  up in chair with edema in left forearm and hand  with cervical collar  upon PT entry to room.    Pt is Oriented x3 and Alert, Cooperative, Motivated, and Agitated.    Vitals     Vitals With Activity  BP (!) 155/68 (nursing aware)   HR 76   O2 Sat     Pain Pain Rating 1: 9/10  Location - Orientation 1: generalized  Pain Addressed 1: Nurse notified, Reposition, Distraction (nursing Fanta aware)       Functional Mobility:   Transfers:     Toilet Transfer: Supervision or touching assistance  with  rolling walker and pt remained in restroom instructed to call when finished nursing notified  using  Step Transfer     Current   Status  Discharge   Goal   Functional Area: Care Score:    Sit to Stand 4  Use of rw SBA Independent   Walk 10 Feet 4 Independent   Walk 50 Feet with Two Turns 4     Walk 150 Feet 4  Use of rw CGA in am 100ft, 170ft with seated rest breaks in between in pm 100ft, 50ft 2 trials seated rest breaks in between with vc to correct scissoring gait ambulates with NBOS Independent       Therapeutic " Activities and Exercises:  Patient educated on spinal px  Patient performed 2 set(s) of 15 repetitions of the following standing exercises: marches, hip abduction, hip adduction, and heel raises with 1 set of 15 reps of mini squats with 1.5# wts with use of rw seated rest breaks  for bilateral LE. Patient required skilled PT for instruction of exercises and appropriate cues to perform exercises safely and appropriately.  Dynamic sitting balance unsupported with BUE/BLE reaching to pet therapy dog SBA to work on core and balance    Activity Tolerance: Good Pt jimmie tx well with increase in walking distance with vcs to correct scissoring gait. Pt received hand out and went over HEP for standing exercises  . Pt agitated in am tx about meds and timing of pain meds NSG/ NP aware pt required redirection to tx to calm     Patient left up in chair with call button in reach.    Education provided: transfer training, gait training, balance training, safety awareness, body mechanics, assistive device, strengthening exercises, fall prevention, and spinal precautions    Expected compliance: High compliance    Plan:     During this hospitalization, patient to be seen 5 x/week to address the identified rehab impairments via gait training, therapeutic activities, therapeutic exercises and progress toward the following goals:    GOALS:   Multidisciplinary Problems       Physical Therapy Goals          Problem: Physical Therapy    Goal Priority Disciplines Outcome Interventions   Physical Therapy Goal     PT, PT/OT Progressing    Description: Bed Mobility:  MET-Sit to supine transfer independently.   MET-Supine to sit transfer independently.     Transfers:  Sit to stand transfer independently using RW.   Car transfer independently using RW.    an object from the ground in standing position independently using RW.     Mobility:  Ambulate 500 feet independently using RW.  Ambulate 15 feet on uneven surfaces/ramps with  setup/clean-up assist using RW.   Pt ascended/descended a 6 inch curb independently using RW.   Ascend/descend 15 stairs independently using bilateral handrails.                         Plan of Care Expires:  02/19/25  PT Next Visit Date: 02/19/25  Plan of Care reviewed with: patient    Additional Information:         Time Tracking:     Therapy Time  PT Received On: 02/14/25  PT Start Time:  (1100, 1345)    PT stop mo (1200,1445)  Missed Time:    Time Missed due to:      Billable Minutes: Gait Training 45mins, Therapeutic Activity 45mins, and Therapeutic Exercise 30mins    02/14/2025

## 2025-02-14 NOTE — PT/OT/SLP PROGRESS
Occupational Therapy Inpatient Rehab Treatment    Name: Tyler Mai  MRN: [id number]    Assessment:  Tyler Mai is a 69 y.o. male admitted with a medical diagnosis of Status post cervical spinal fusion.  He presents with the following impairments/functional limitations:  weakness, impaired endurance, impaired sensation, impaired self care skills, impaired functional mobility, gait instability, impaired balance, impaired cognition, decreased coordination, decreased upper extremity function, decreased lower extremity function, decreased safety awareness, pain, impaired coordination, impaired fine motor, orthopedic precautions.    General Precautions: Standard, fall     Orthopedic Precautions:spinal precautions     Braces: Aspen collar    Rehab Prognosis: Poor; patient would benefit from acute skilled OT services to address these deficits and reach maximum level of function.      History:     Past Medical History:   Diagnosis Date    Anxiety disorder, unspecified     Arthritis     CAD (coronary artery disease)     Cervical radiculopathy     CHF (congestive heart failure)     Chronic pain syndrome     COPD (chronic obstructive pulmonary disease)     Diabetes mellitus     Gastro-esophageal reflux disease without esophagitis     High cholesterol     Hypertension     Lumbar radiculopathy     Myelomalacia of cervical cord     Other specified diseases of spinal cord     Pacemaker     PVD (peripheral vascular disease)     S/P triple vessel bypass     Tobacco use        Past Surgical History:   Procedure Laterality Date    ANTERIOR CERVICAL DISCECTOMY W/ FUSION  1995    C5-6 and C6-7.  Dr. Wall    ANTERIOR CERVICAL DISCECTOMY W/ FUSION  1996    Redo C5-6, C6-7.  Dr. Wall    CHOLECYSTECTOMY      CORONARY ARTERY BYPASS GRAFT      FUSION OF POSTERIOR COLUMN OF CERVICAL SPINE USING COMPUTER AIDED NAVIGATION N/A 1/24/2025    Procedure: FUSION, SPINE, POSTERIOR SPINAL COLUMN, CERVICAL, USING COMPUTER-ASSISTED NAVIGATION;   "Surgeon: Darrell Watson MD;  Location: [facility];  Service: Neurosurgery;  Laterality: N/A;  C2-C6 PCF; C2/3-C4/5 decompression, possible additional levels  o-arm  NTI  TIVA setup  [employer] //  XX    INSERTION OF PACEMAKER      X2    TRIPPLE VESSEL BYPASS         Subjective     Orientation: Oriented x4    Chief Complaint: about everything    Patient/Family Comments/goals: "I'm just itching so bad and they won't do anything about it. I didn't sleep all night."    Vitals   Vitals at Rest  BP    HR    O2 Sat    Pain 9/10     Respiratory Status: Room air    Patients cultural, spiritual, Jewish conflicts given the current situation: no     Objective:     Patient found up in chair with cervical collar  upon OT entry to room.    Mobility   Patient completed:  Sit to Stand Transfer with stand by assistance with rolling walker  Stand to Sit Transfer with stand by assistance with rolling walker  Tub Transfer Step Transfer technique with stand by assistance with rolling walker    Functional Mobility  In room mobility for ADLs with SBA with RW.    ADLs   Current Status   Eating 5   Shower, Bathe Self 2 Refused due to fatigue from itching all night.   Upper Body Dressing 2 Refused due to fatigue from itching all night.   Lower Body Dressing 2 Refused due to fatigue from itching all night.   Putting On, Taking Off Footwear 1 Refused due to fatigue from itching all night.     Limiting Factors for ADLs: motor, sensory, psychsocial, endurance, limited ROM, balance, weakness, perceptual, coordination, cognition, safety awareness, and pain     Patient left supine with all lines intact and call button in reach and PRAFO boots applied.     Education provided: Roles and goals of OT, ADLs, transfer training, assistive device, sequencing, safety precautions, and fall prevention    Multidisciplinary Problems       Occupational Therapy Goals          Problem: Occupational Therapy    Goal Priority Disciplines Outcome Interventions "   Occupational Therapy Goal     OT, PT/OT Progressing    Description: ADLs:  Pt to perform grooming tasks with independence standing at sink with RW by d/c.   Pt to perform feeding tasks with independence using adaptive utensils as needed by d/c.    Pt to perform UB dressing with independence by d/c.    Pt to perform LB dressing with touch assist by d/c.    Pt to perform putting on/off footwear task with independence and AE as needed by d/c.   Pt to perform toileting with touch assist by d/c.    Pt to perform bathing with independence by d/c.     Functional Transfers:  Pt to perform toilet transfers with independence by d/c.    Pt to perform a tub transfer with independence by d/c.     IADLs:  Pt to perform simple meal prep standing at kitchen counter with touch assist by d/c.      Balance, Strengthening, Endurance, Balance:  Pt to consistently demonstrate adherence to spinal precautions during all ADL's as instructed by OT.  Pt to demonstrate good dynamic standing balance as required to perform ADL's from standing level.                         Time Tracking     OT Received On: 02/14/25  Time In 0730     Time Out 0900  Total Time 90 min  Therapy Time: OT Individual: 70  Missed Time: 20 Minutes  Missed Time Reason: Increased agitation, Other (Comment)    Billable Minutes: Self Care/Home Management 70    02/14/2025

## 2025-02-14 NOTE — PLAN OF CARE
Problem: Rehabilitation ([facility]) Plan of Care  Goal: Plan of Care Review  2/14/2025 0453 by Miracle Mehta LPN  Outcome: Progressing  2/14/2025 0043 by Miracle Mehta LPN  Outcome: Progressing  Goal: Patient-Specific Goal (Individualized)  2/14/2025 0453 by Miracle Mehta LPN  Outcome: Progressing  2/14/2025 0043 by Miracle Mehta LPN  Outcome: Progressing  Goal: Absence of New-Onset Illness or Injury  2/14/2025 0453 by Miracle Mehta LPN  Outcome: Progressing  2/14/2025 0043 by Miracle Mehta LPN  Outcome: Progressing  Goal: Optimal Comfort and Wellbeing  2/14/2025 0453 by Miracle Mehta LPN  Outcome: Progressing  2/14/2025 0043 by Miracle Mehta LPN  Outcome: Progressing  Goal: Home and Community Transition Plan Established  2/14/2025 0453 by Miracle Mehta LPN  Outcome: Progressing  2/14/2025 0043 by Miracle Mehta LPN  Outcome: Progressing

## 2025-02-14 NOTE — PT/OT/SLP PROGRESS
Recreational Therapy Treatment    Date of Treatment: 02/14/25  Start Time: 1401  Stop Time: 1430  Total Time: 29 min  Missed Time:     General Precautions: fall  Ortho Precautions: spinal precautions  Braces: Aspen collar    Vitals   Vitals at Rest  BP    HR    O2 Sat    Pain      Vitals With Activity  BP    HR    O2 Sat    Pain        Treatment     Cognitive Skills Building   Cognitive Observation Activity Assist Position Equipment Response            Comment:          Dynamic Activities   Activity Assist Position Equipment Response   Activity 1 Animal assisted treatment modified independence Wheelchair level David, Therapy Dog good   Comment: Dynamic sitting balance/reaching was setup/I.  Sitting tolerance was 4 minutes.   UE coordination was supervision/setup.  He was attentive to David and asked many questions about him and shared information about dogs he's had in the past .  He said he enjoyed his visit with David       Fine Motor Activities   Activity Assist Position Equipment Response           Comment:          Additional Info: This was a co-treat with PTA    Goals     Short Term Goals    Goal  Goal Status   Will increase sit to stand to supervision Met   Will improve dynamic standing balance/reaching to supervision Met                 Long Term Goals    Goal Goal Status   Will increase standing tolerance to 5 minutes Progressing   Will improve dynamic standing balance/reaching to setup Progressing

## 2025-02-15 LAB
POCT GLUCOSE: 108 MG/DL (ref 70–110)
POCT GLUCOSE: 122 MG/DL (ref 70–110)
POCT GLUCOSE: 181 MG/DL (ref 70–110)
POCT GLUCOSE: 198 MG/DL (ref 70–110)

## 2025-02-15 PROCEDURE — 99900031 HC PATIENT EDUCATION (STAT)

## 2025-02-15 PROCEDURE — 94761 N-INVAS EAR/PLS OXIMETRY MLT: CPT

## 2025-02-15 PROCEDURE — 25000003 PHARM REV CODE 250: Performed by: INTERNAL MEDICINE

## 2025-02-15 PROCEDURE — 25000003 PHARM REV CODE 250: Performed by: NURSE PRACTITIONER

## 2025-02-15 PROCEDURE — 94799 UNLISTED PULMONARY SVC/PX: CPT | Mod: XB

## 2025-02-15 PROCEDURE — 63600175 PHARM REV CODE 636 W HCPCS: Performed by: INTERNAL MEDICINE

## 2025-02-15 PROCEDURE — 63600175 PHARM REV CODE 636 W HCPCS: Performed by: NURSE PRACTITIONER

## 2025-02-15 PROCEDURE — 25000242 PHARM REV CODE 250 ALT 637 W/ HCPCS: Performed by: NURSE PRACTITIONER

## 2025-02-15 PROCEDURE — 11800000 HC REHAB PRIVATE ROOM

## 2025-02-15 PROCEDURE — 94640 AIRWAY INHALATION TREATMENT: CPT

## 2025-02-15 RX ORDER — MONTELUKAST SODIUM 5 MG/1
10 TABLET, CHEWABLE ORAL DAILY
Status: DISCONTINUED | OUTPATIENT
Start: 2025-02-16 | End: 2025-02-15

## 2025-02-15 RX ORDER — PREDNISONE 20 MG/1
20 TABLET ORAL DAILY
Status: COMPLETED | OUTPATIENT
Start: 2025-02-15 | End: 2025-02-17

## 2025-02-15 RX ORDER — TRIAMCINOLONE ACETONIDE 1 MG/G
OINTMENT TOPICAL 2 TIMES DAILY
Status: DISCONTINUED | OUTPATIENT
Start: 2025-02-15 | End: 2025-02-19 | Stop reason: HOSPADM

## 2025-02-15 RX ORDER — MONTELUKAST SODIUM 5 MG/1
10 TABLET, CHEWABLE ORAL DAILY
Status: DISCONTINUED | OUTPATIENT
Start: 2025-02-15 | End: 2025-02-19 | Stop reason: HOSPADM

## 2025-02-15 RX ORDER — QUETIAPINE FUMARATE 25 MG/1
50 TABLET, FILM COATED ORAL ONCE
Status: COMPLETED | OUTPATIENT
Start: 2025-02-15 | End: 2025-02-15

## 2025-02-15 RX ORDER — OXYCODONE AND ACETAMINOPHEN 5; 325 MG/1; MG/1
1 TABLET ORAL EVERY 4 HOURS PRN
Refills: 0 | Status: DISCONTINUED | OUTPATIENT
Start: 2025-02-15 | End: 2025-02-18

## 2025-02-15 RX ORDER — QUETIAPINE FUMARATE 25 MG/1
50 TABLET, FILM COATED ORAL NIGHTLY
Status: DISCONTINUED | OUTPATIENT
Start: 2025-02-16 | End: 2025-02-16

## 2025-02-15 RX ADMIN — Medication 400 MG: at 01:02

## 2025-02-15 RX ADMIN — OXYCODONE HYDROCHLORIDE AND ACETAMINOPHEN 1 TABLET: 10; 325 TABLET ORAL at 12:02

## 2025-02-15 RX ADMIN — PREGABALIN 100 MG: 50 CAPSULE ORAL at 09:02

## 2025-02-15 RX ADMIN — METHOCARBAMOL 750 MG: 750 TABLET ORAL at 04:02

## 2025-02-15 RX ADMIN — HYDROCORTISONE: 25 CREAM TOPICAL at 09:02

## 2025-02-15 RX ADMIN — MICONAZOLE NITRATE 2 % TOPICAL POWDER: at 09:02

## 2025-02-15 RX ADMIN — PREGABALIN 100 MG: 50 CAPSULE ORAL at 06:02

## 2025-02-15 RX ADMIN — METHOCARBAMOL 750 MG: 750 TABLET ORAL at 09:02

## 2025-02-15 RX ADMIN — ALBUTEROL SULFATE 2.5 MG: 2.5 SOLUTION RESPIRATORY (INHALATION) at 07:02

## 2025-02-15 RX ADMIN — RANOLAZINE 500 MG: 500 TABLET, EXTENDED RELEASE ORAL at 09:02

## 2025-02-15 RX ADMIN — AMLODIPINE BESYLATE 5 MG: 5 TABLET ORAL at 08:02

## 2025-02-15 RX ADMIN — CLOPIDOGREL BISULFATE 75 MG: 75 TABLET, FILM COATED ORAL at 08:02

## 2025-02-15 RX ADMIN — INSULIN ASPART 2 UNITS: 100 INJECTION, SOLUTION INTRAVENOUS; SUBCUTANEOUS at 04:02

## 2025-02-15 RX ADMIN — LOSARTAN POTASSIUM 50 MG: 50 TABLET, FILM COATED ORAL at 06:02

## 2025-02-15 RX ADMIN — FLUTICASONE FUROATE 1 PUFF: 200 POWDER RESPIRATORY (INHALATION) at 08:02

## 2025-02-15 RX ADMIN — CARVEDILOL 25 MG: 25 TABLET, FILM COATED ORAL at 04:02

## 2025-02-15 RX ADMIN — TRIAMCINOLONE ACETONIDE: 1 OINTMENT TOPICAL at 09:02

## 2025-02-15 RX ADMIN — HYDROXYZINE PAMOATE 25 MG: 25 CAPSULE ORAL at 08:02

## 2025-02-15 RX ADMIN — OXYCODONE HYDROCHLORIDE AND ACETAMINOPHEN 1 TABLET: 10; 325 TABLET ORAL at 04:02

## 2025-02-15 RX ADMIN — FINASTERIDE 5 MG: 5 TABLET, FILM COATED ORAL at 08:02

## 2025-02-15 RX ADMIN — HYDROXYZINE PAMOATE 50 MG: 25 CAPSULE ORAL at 09:02

## 2025-02-15 RX ADMIN — RANOLAZINE 500 MG: 500 TABLET, EXTENDED RELEASE ORAL at 08:02

## 2025-02-15 RX ADMIN — MICONAZOLE NITRATE 2 % TOPICAL POWDER: at 08:02

## 2025-02-15 RX ADMIN — HYDROCORTISONE: 25 CREAM TOPICAL at 04:02

## 2025-02-15 RX ADMIN — MONTELUKAST SODIUM 10 MG: 5 TABLET, CHEWABLE ORAL at 09:02

## 2025-02-15 RX ADMIN — METHOCARBAMOL 750 MG: 750 TABLET ORAL at 06:02

## 2025-02-15 RX ADMIN — HYDROXYZINE PAMOATE 25 MG: 25 CAPSULE ORAL at 12:02

## 2025-02-15 RX ADMIN — ENOXAPARIN SODIUM 40 MG: 40 INJECTION SUBCUTANEOUS at 04:02

## 2025-02-15 RX ADMIN — QUETIAPINE FUMARATE 50 MG: 25 TABLET ORAL at 09:02

## 2025-02-15 RX ADMIN — OXYCODONE HYDROCHLORIDE AND ACETAMINOPHEN 1 TABLET: 5; 325 TABLET ORAL at 09:02

## 2025-02-15 RX ADMIN — OXYCODONE HYDROCHLORIDE AND ACETAMINOPHEN 1 TABLET: 10; 325 TABLET ORAL at 08:02

## 2025-02-15 RX ADMIN — LACTULOSE 30 G: 20 SOLUTION ORAL at 09:02

## 2025-02-15 RX ADMIN — INSULIN ASPART 2 UNITS: 100 INJECTION, SOLUTION INTRAVENOUS; SUBCUTANEOUS at 09:02

## 2025-02-15 RX ADMIN — PREDNISONE 20 MG: 20 TABLET ORAL at 09:02

## 2025-02-15 RX ADMIN — CAMPHOR, MENTHOL: .5; .5 LOTION TOPICAL at 06:02

## 2025-02-15 RX ADMIN — Medication 400 MG: at 06:02

## 2025-02-15 RX ADMIN — LINACLOTIDE 145 MCG: 145 CAPSULE, GELATIN COATED ORAL at 06:02

## 2025-02-15 RX ADMIN — DULOXETINE HYDROCHLORIDE 30 MG: 30 CAPSULE, DELAYED RELEASE ORAL at 08:02

## 2025-02-15 RX ADMIN — LIDOCAINE 5% 1 PATCH: 700 PATCH TOPICAL at 05:02

## 2025-02-15 RX ADMIN — Medication: at 06:02

## 2025-02-15 RX ADMIN — ASPIRIN 81 MG CHEWABLE TABLET 81 MG: 81 TABLET CHEWABLE at 08:02

## 2025-02-15 RX ADMIN — BISACODYL 5 MG: 5 TABLET, COATED ORAL at 08:02

## 2025-02-15 RX ADMIN — METHOCARBAMOL 750 MG: 750 TABLET ORAL at 12:02

## 2025-02-15 RX ADMIN — ATORVASTATIN CALCIUM 40 MG: 40 TABLET, FILM COATED ORAL at 08:02

## 2025-02-15 RX ADMIN — NALOXEGOL OXALATE 25 MG: 25 TABLET, FILM COATED ORAL at 09:02

## 2025-02-15 RX ADMIN — HYDROXYZINE PAMOATE 25 MG: 25 CAPSULE ORAL at 04:02

## 2025-02-15 RX ADMIN — Medication: at 01:02

## 2025-02-15 RX ADMIN — CARVEDILOL 25 MG: 25 TABLET, FILM COATED ORAL at 06:02

## 2025-02-15 RX ADMIN — FUROSEMIDE 20 MG: 20 TABLET ORAL at 08:02

## 2025-02-15 RX ADMIN — Medication 400 MG: at 09:02

## 2025-02-15 RX ADMIN — LACTULOSE 20 G: 20 SOLUTION ORAL at 08:02

## 2025-02-15 RX ADMIN — PANTOPRAZOLE SODIUM 40 MG: 40 TABLET, DELAYED RELEASE ORAL at 08:02

## 2025-02-15 RX ADMIN — BISACODYL 10 MG: 10 SUPPOSITORY RECTAL at 01:02

## 2025-02-15 RX ADMIN — PREGABALIN 100 MG: 50 CAPSULE ORAL at 01:02

## 2025-02-15 NOTE — PROGRESS NOTES
[facility] ([facility])  Rehab Progress Note    Patient Name: Tyler Mai  MRN: [id number]  Age: 69 y.o. Sex: male  : 1955  Hospital Length of Stay: 15 days  Date of Service: 2/15/2025   Chief Complaint: Cervical myelopathy s/p C2-6 PCDF and C2/3-C4/5 decompression on 2025     Subjective:     Basic Information  Admit Information: 69-year-old white male presented to [facility] ED on 2025 complaining of bilateral arm weakness, bilateral leg weakness, and worsening neck pain.  Reports right leg weaker than the left.  Recently admitted to [facility] for cervical myelopathy with plans for surgery, but patient had severe UTI requiring antibiotic therapy.  MRI on  significant for severe C3-4 level central canal stenosis with increased signal hyperintensity within the cervical cord from mid to lower aspect of C3 through mid aspect of C4 with central cord myelomalacia.  PMH significant for cervical myelopathy with history of C5-C6 fusion, CAD, HFrEF, ICMO, COPD, DM type 2, HLD, HTN, and PVD.  Workup significant for cervical myelopathy.  Patient inpatient family initially declined the neurosurgeon on call surgery.  Then surgery postpone due to inclement weather.  Tolerated posterior C3-C5 total laminectomies for decompression, placement of bilateral pars screws at C2 and bilateral lateral mass screws from C3 to C6 using [facility]us OCT System, and placement of local bone and AlphaGraft DBM for arthrodesis from C2 to C6 on  without perioperative complications.  Postoperatively neurosurgery documented increase mobility to bilateral upper extremities with a 5/5 hand grasp.  Recommended aspirin hard collar at all times, and [place] collar for showers..  Recommended removal staples on .  Gray continued due to urinary retention.  Tolerated transfer to [facility] inpatient rehab unit on  without incident.   Today's Information: No acute events overnight.  Sitting  comfortably in recliner at bedside.  Reporting hard stools.  Otherwise, no complaints.  Good sleep and appetite.  Adequate pain control.  No urinary issues reported.  Last reported BM on 2/14.  Blood pressure at goal.  Vitals otherwise stable with no recent recorded fevers.  Good glycemic control.  No new labs or imaging.    Review of patient's allergies indicates:   Allergen Reactions    Hydrochlorothiazide      Other Reaction(s): Pancreatitis        Current Facility-Administered Medications:     acetaminophen tablet 650 mg, 650 mg, Oral, Q6H PRN, Ashley, Tony A, FNP, 650 mg at 02/09/25 0316    albuterol nebulizer solution 2.5 mg, 2.5 mg, Nebulization, Q4H PRN, Jaxson, Shannan A, FNP, 2.5 mg at 02/15/25 0706    ALPRAZolam tablet 0.25 mg, 0.25 mg, Oral, TID PRN, Ashley, Tony A, FNP, 0.25 mg at 02/10/25 2333    amLODIPine tablet 5 mg, 5 mg, Oral, Daily, Ashley, Tony A, FNP, 5 mg at 02/15/25 0819    aspirin chewable tablet 81 mg, 81 mg, Oral, Daily, Ashley, Tony A, FNP, 81 mg at 02/15/25 0818    atorvastatin tablet 40 mg, 40 mg, Oral, Daily, Ashley, Tony A, FNP, 40 mg at 02/15/25 0818    benzonatate capsule 100 mg, 100 mg, Oral, TID PRN, Ashley, Tony A, FNP, 100 mg at 02/10/25 0718    bisacodyL EC tablet 5 mg, 5 mg, Oral, Daily, Jaxson, Shannan A, FNP, 5 mg at 02/15/25 0820    bisacodyL suppository 10 mg, 10 mg, Rectal, Daily PRN, Brockton, Shannan A, FNP, 10 mg at 02/14/25 1501    camphor-menthol 0.5-0.5% lotion, , Topical (Top), PRN, Jaxson, Shannan A, FNP, Given at 02/15/25 0631    carvediloL tablet 25 mg, 25 mg, Oral, BID AC, Ashley, Tony A, FNP, 25 mg at 02/15/25 0630    clopidogreL tablet 75 mg, 75 mg, Oral, Daily, Tony Ramirez, FNP, 75 mg at 02/15/25 0820    cyclobenzaprine tablet 5 mg, 5 mg, Oral, TID PRN, Shannan Puri, FNP    dextrose 50% injection 12.5 g, 12.5 g, Intravenous, PRN, Tony Ramirez, FNP    dextrose 50% injection 25 g, 25 g, Intravenous,  PRN, Ashley, Tony A, FNP    diphenhydrAMINE-zinc acetate 1-0.1% cream, , Topical (Top), TID PRN, Tyler De Anda MD, Given at 02/15/25 0631    DULoxetine DR capsule 30 mg, 30 mg, Oral, Daily, Shannan Puri FNP, 30 mg at 02/15/25 0818    enoxaparin injection 40 mg, 40 mg, Subcutaneous, Q24H (prophylaxis, 1700), Ashley, Tony A, FNP, 40 mg at 02/14/25 1723    finasteride tablet 5 mg, 5 mg, Oral, Daily, Ashley, Tony A, FNP, 5 mg at 02/15/25 0818    umeclidinium-vilanteroL 62.5-25 mcg/actuation DsDv 1 puff, 1 puff, Inhalation, Daily, 1 puff at 02/11/25 0818 **AND** fluticasone furoate 200 mcg/actuation inhaler 1 puff, 1 puff, Inhalation, Daily, Ashley, Tony A, FNP, 1 puff at 02/15/25 0800    furosemide tablet 20 mg, 20 mg, Oral, Daily, Ashley, Tony A, FNP, 20 mg at 02/15/25 0818    glucagon (human recombinant) injection 1 mg, 1 mg, Intramuscular, PRN, Ashley, Tony A, FNP    glucose chewable tablet 16 g, 16 g, Oral, PRN, Ashley, Tony A, FNP    glucose chewable tablet 24 g, 24 g, Oral, PRN, Ashley, Tony A, FNP    hydrALAZINE injection 10 mg, 10 mg, Intravenous, Q4H PRN, Ashley, Tony A, FNP, 10 mg at 02/04/25 0629    hydrocortisone 2.5 % rectal cream, , Rectal, BID PRN, Shannan Puri FNP    hydrOXYzine pamoate capsule 25 mg, 25 mg, Oral, Q4H PRN, Ashley, Tony A, FNP, 25 mg at 02/15/25 0818    hydrOXYzine pamoate capsule 50 mg, 50 mg, Oral, Nightly PRN, Ashley, Tony A, FNP, 25 mg at 02/15/25 0448    insulin aspart U-100 injection 0-10 Units, 0-10 Units, Subcutaneous, QID (AC + HS) PRN, Ashley, Tony A, FNP, 2 Units at 02/12/25 1657    labetalol 20 mg/4 mL (5 mg/mL) IV syring, 10 mg, Intravenous, Q4H PRN, Ashley, Tony A, FNP    lactulose 20 gram/30 mL solution Soln 20 g, 20 g, Oral, Daily, Ashley, Tony A, FNP, 20 g at 02/15/25 0820    LIDOcaine 5 % patch 1 patch, 1 patch, Transdermal, Q24H, Shannan Puri, FNP, 1 patch at 02/14/25  "1723    linaCLOtide capsule 145 mcg, 145 mcg, Oral, QAM, Ashley, Tony A, FNP, 145 mcg at 02/15/25 0638    losartan tablet 50 mg, 50 mg, Oral, Before breakfast, Ashley, Tony A, FNP, 50 mg at 02/15/25 0630    magnesium citrate solution 296 mL, 296 mL, Oral, Once PRN, Jaxson, Shannan A, FNP    magnesium oxide tablet 400 mg, 400 mg, Oral, TID, Ashley, Tony A, FNP, 400 mg at 02/15/25 0630    melatonin tablet 6 mg, 6 mg, Oral, Nightly PRN, Jaxson, Shannan A, FNP, 6 mg at 02/02/25 2056    methocarbamoL tablet 750 mg, 750 mg, Oral, QID (AC & HS), Jaxson, Shannan A, FNP, 750 mg at 02/15/25 0630    metoprolol injection 10 mg, 10 mg, Intravenous, Q2H PRN, Ashley, Tony A, FNP    miconazole NITRATE 2 % top powder, , Topical (Top), BID, Sullivan, Shannan A, FNP, Given at 02/15/25 0800    nitroGLYCERIN SL tablet 0.4 mg, 0.4 mg, Sublingual, Q5 Min PRN, Ashley, Tony A, FNP    ondansetron disintegrating tablet 4 mg, 4 mg, Oral, Q6H PRN, Ashley, Tony A, FNP    ondansetron disintegrating tablet 8 mg, 8 mg, Oral, Q8H PRN, Ashley, Tony A, FNP    ondansetron injection 4 mg, 4 mg, Intravenous, Q8H PRN, Ashley, Toyn A, FNP    oxyCODONE-acetaminophen  mg per tablet 1 tablet, 1 tablet, Oral, Q4H PRN, Sullivan, Shannan A, FNP, 1 tablet at 02/15/25 0842    oxyCODONE-acetaminophen 5-325 mg per tablet 1 tablet, 1 tablet, Oral, Q4H PRN, Sullivan, Shannan A, FNP, 1 tablet at 02/05/25 0606    pantoprazole EC tablet 40 mg, 40 mg, Oral, Daily, Ashley, Tony A, FNP, 40 mg at 02/15/25 0819    pregabalin capsule 100 mg, 100 mg, Oral, TID, Shannan Puri, FNP, 100 mg at 02/15/25 0631    ranolazine 12 hr tablet 500 mg, 500 mg, Oral, BID, Ashley Tony A, FNP, 500 mg at 02/15/25 0820     Review of Systems   Complete 12-point review of symptoms negative except for what's mentioned in HPI     Objective:     /73   Pulse 80   Temp 97.9 °F (36.6 °C) (Oral)   Resp 18   Ht 5' 6" (1.676 m)   Wt " 69.6 kg (153 lb 7 oz)   SpO2 95%   BMI 24.77 kg/m²      Physical Exam  Constitutional:       Appearance: Normal appearance.   HENT:      Head: Normocephalic.   Eyes:      Pupils: Pupils are equal, round, and reactive to light.   Neck:      Comments: Posterior neck incision clean and intact.  Hard collar intact  Cardiovascular:      Rate and Rhythm: Normal rate and regular rhythm.      Heart sounds: Normal heart sounds.   Pulmonary:      Effort: Pulmonary effort is normal. No respiratory distress.      Breath sounds: Normal breath sounds. No wheezing or rhonchi.   Abdominal:      General: Bowel sounds are normal.      Palpations: Abdomen is soft.      Tenderness: There is no abdominal tenderness. There is no guarding.   Musculoskeletal:      Cervical back: Neck supple.      Right lower le+ Pitting Edema present.      Left lower le+ Pitting Edema present.      Comments: Diffuse muscle atrophy.  Left forearm edema.   Skin:     General: Skin is warm and dry.   Neurological:      General: No focal deficit present.      Mental Status: He is alert and oriented to person, place, and time.      Motor: Weakness present.   Psychiatric:         Mood and Affect: Mood normal.         Behavior: Behavior normal.         Thought Content: Thought content normal.         Judgment: Judgment normal.     Labs  Recent Results (from the past 24 hours)   POCT glucose    Collection Time: 25 12:37 PM   Result Value Ref Range    POCT Glucose 147 (H) 70 - 110 mg/dL   POCT glucose    Collection Time: 25  5:17 PM   Result Value Ref Range    POCT Glucose 149 (H) 70 - 110 mg/dL   POCT glucose    Collection Time: 25  9:56 PM   Result Value Ref Range    POCT Glucose 182 (H) 70 - 110 mg/dL   POCT glucose    Collection Time: 02/15/25  6:34 AM   Result Value Ref Range    POCT Glucose 108 70 - 110 mg/dL     Radiology   Left upper extremity NIVA on 2025, IMPRESSION:  Negative for DVT  Radiology  Cervical XR two-view on  01/25/2025, IMPRESSION: There are postoperative changes with posterior spinal fusion hardware at C2 through C6. The hardware appears intact. Cervical alignment has improved. The remaining vertebral body heights are preserved. There is moderate disc height loss at C3-C4, mild at C4-C5, with multilevel marginal osteophytes. The soft tissues are unremarkable.     Assessment/Plan:     69 y.o. WM admitted on 1/31/2025    Cervical myelopathy   - s/p C2-6 PCDF and C2/3-C4/5 decompression on 01/24/2025  - remove staples on 02/07  - continue aspirin hard collar at all times, okay for Wilkes collar for showers only  - Zanaflex discontinued on 02/05 due to orthostatic hypotension  - discontinued Percocet  mg q.4 hours severe pain on 2/15  - continue                Robaxin 750 mg q.i.d. (decreased 2/5)                Flexeril 10 mg t.i.d. p.r.n.                Percocet 5-325 mg q.4 hours p.r.n. severe pain                Lyrica 100 mg t.i.d. (increased 2/3)    Lidocaine patch daily    Cymbalta 30 mg daily (initiated 2/1)  - defer to physiatry for rehab and pain management  - PT/OT/RT/ST following  - to follow up with Neurosurgery outpatient     Coronary artery disease  - s/p  CABG x 3  - denies recent chest pain or discomfort  - continue                ASA 81 mg daily (resumed 1/31)                Plavix 75 mg daily (resume 1/31)                Coreg 25 mg b.i.d. (increased 2/12)                Lipitor 40 mg daily                Losartan 50 mg (increased 2/14)  - ECG and Nitro PRN  - continue cardiac diet  - to follow-up with cardiology outpatient     HFrEF/ICMO  - stable  - s/p Lasix 40 mg IVP x1 on 2/10  - continue   Lasix 20 mg oral daily (decreased 2/13)                Coreg 25 mg b.i.d. (increased 2/12)                Losartan 50 mg daily (increased 2/14)                Ranexa 500 mg b.i.d.   - obtain daily standing weights  - to follow up with cardiology outpatient     Nicotine dependence  - smoking cessation  counseling given on admission  - 1 PPD with approximately 55 pack years     HTN  -BP improved and at goal  - discontinued Flomax 0.4 mg at bedtime on 02/04 due to orthostatic hypotension  - discontinued Zanaflex on 02/05 due to orthostatic hypotension  - s/p NS 1 L bolus on 02/05 due to orthostatic hypotension  - discontinued Norvasc 5 mg b.i.d. on 2/12 due to bilateral lower extremity edema  - continue   Norvasc 5 mg daily (initiated 2/14)  Coreg 25 mg b.i.d. (increased 2/12)  Losartan 50 mg daily (increased 2/14)  Ranexa 500 mg b.i.d.   Hydralazine 10 mg every 2 hours as needed for BP > 160/90  Labetalol 10 mg every 2 hours as needed for BP > 160/90  - low sodium diet             COPD   - stable  - not on home 02  - continue                Trelegy INH daily                   DM type II  - HgA1c 5.6 on 01/23/2024  - good glycemic control  - continue                Lantus 10 units every evening (resumed 1/31)                ISS   - CBGs AC/HS     HLD  - FLP outpatient  - resume Crestor upon discharge  - continue                Lipitor 40 mg daily      PVD  - stable  - continue                ASA 81 mg daily (resumed 1/31)                Plavix 75 mg daily (resume 1/31)                Coreg 12.5 mg b.i.d. (increased 2/11)                Lipitor 40 mg daily  - to follow up vascular surgery outpatient                  Chronic constipation  - last BM 2/14  - reporting hard stools  - initiate   Movantik 25 mg daily (initiated 2/15)  - continue    Lactulose 30 g twice daily (initiated 2/13; increased 2/15)                Linzess 145 mcg before breakfast                 Colace 100 mg b.i.d.  - encourage hydration    Major depressive disorder  - stable  - initiate   Seroquel 50 mg qhs (initiated 2/15)  - continue   Cymbalta 30 mg daily (initiated 2/1)     Hypomagnesemia  - stable  - continue                Magnesium oxide 400 mg b.i.d.      BPH/urinary retention  - stable  - discontinued Flomax 0.4 mg at bedtime on 02/04  due to orthostatic hypotension  - continue                Proscar 5 mg daily (initiated 1/31)  Indwelling catheter discontinued on 02/01-good urine output with no retention reported     GERD  - Avoid spicy foods, and nothing to eat or drink within x2 hours of bedtime or laying flat (water is ok)   - Avoid NSAIDs (Advil, ibuprofen, naproxen...) and chavira-2 inhibitors (Mobic, Celebrex)    - continue                Protonix 40 mg daily      Normocytic anemia  - asymptomatic  - H/H stable   - no evidence of active bleeds  - will closely monitor and transfuse if needed     Hypomagnesemia  - magnesium 1.5 on 2/10  - s/p Mag sulfate 2 g x 1 IVPB on 02/01  - continue   Mag-Ox 400 mg tID (increased 2/10)    Seasonal allergies  - stable  - continue   Flonase daily    Pruritus  - stable with ongoing symptoms  - continue Aquaphor daily as needed  - initiate   Prednisone 20 mg po qday 2/15-present (final dose on 2/18)   Singulair 10 mg qday (initiated 2/15)   Triamcinolone 0.1% to affected areas twice daily (initiated 2/15)  - continue   Vistaril 25 mg q.4 hours p.r.n. (initiated 2/14)     VTE Prophylaxis: Lovenox 40 mg daily   COVID-19 testing:  Unknown  COVID-19 vaccination status:  Vaccinated (Moderna):  Bivalent booster on 04/03/2023     POA:  No  Living will:  No  Contacts:  Tyler Mai Jr (son) [phone]     CODE STATUS:  Full code  Internal Medicine (attending): Tyler De Anda MD  Physiatry (consulting):  Philip Fang MD     OUTPATIENT PROVIDERS  PCP: None  Neurosurgery: Darrell Zavala MD     DISPOSITION:  Condition stable.  Reporting hard stools and ongoing generalized pruritus.  Adequate pain control.  Good appetite.  No urinary complaints.  Last reported BM on 2/14.  Blood pressure at goal.  Vitals otherwise stable with no recent recorded fevers.  Good glycemic control.  No new labs or imaging.    Initiate Movantik 25 mg daily, Singulair 10 mg daily, triamcinolone 0.1% to affected areas twice daily,  Prednisone 20 mg daily x3 days, and Seroquel 50 mg at bedtime.  Increase Lactulose to 30g twice daily.  Discontinue Percocet  mg q.4 hours p.r.n. severe pain.  Encourage frequent pressure offloading to minimize risk of pressure ulcer formation.  Out of bed WA as tolerated.  Strongly encourage nutrition and hydration.  Registered dietician following.  Encourage compliance with hourly incentive spirometer use WA.  Supplemental oxygen as needed to maintain saturations >92%.  Continue aggressive therapies and nutritional support.  Monitor closely and notify with any acute changes.      Staffing 2/12/2025: Continent of bowel and bladder. Left arm redness, maybe infiltration. RT: Overall supervision to set up.  More cooperative this week.  Limited by dexterity. Appetite is good at 85%. PT: Good progress this week. Pain well controlled.  Tolerated 8 steps.  Overall CGA, but unsafe d/t weakness.  Needs a chair at platform to rest between steps.  Sleeps in a recliner at home.  Needs kasia mat. Ambulating 105-120 feet with walker.  Distance in inconsistent 2/2 fatigue. Limited insight into deficits.  OT: Max assist with UE and LE dressing.  Cannot wear shoes 2/2 heel wound. Wearing PRAFO boots. Mod assist with toileting.  Set up with oral care and eating. Unable to denny and doff C-collar. Projected discharge pending.      Total time spent on this encounter including chart review and direct 1-on-1 patient interaction: 52 minutes   Over 50% of this time was spent in counseling and coordination of care

## 2025-02-16 LAB
POCT GLUCOSE: 164 MG/DL (ref 70–110)
POCT GLUCOSE: 188 MG/DL (ref 70–110)
POCT GLUCOSE: 193 MG/DL (ref 70–110)
POCT GLUCOSE: 220 MG/DL (ref 70–110)

## 2025-02-16 PROCEDURE — 25000003 PHARM REV CODE 250: Performed by: INTERNAL MEDICINE

## 2025-02-16 PROCEDURE — 25000003 PHARM REV CODE 250: Performed by: NURSE PRACTITIONER

## 2025-02-16 PROCEDURE — 94761 N-INVAS EAR/PLS OXIMETRY MLT: CPT

## 2025-02-16 PROCEDURE — 63600175 PHARM REV CODE 636 W HCPCS: Performed by: NURSE PRACTITIONER

## 2025-02-16 PROCEDURE — 11800000 HC REHAB PRIVATE ROOM

## 2025-02-16 PROCEDURE — 63600175 PHARM REV CODE 636 W HCPCS: Performed by: INTERNAL MEDICINE

## 2025-02-16 PROCEDURE — 99900031 HC PATIENT EDUCATION (STAT)

## 2025-02-16 PROCEDURE — 25000242 PHARM REV CODE 250 ALT 637 W/ HCPCS: Performed by: NURSE PRACTITIONER

## 2025-02-16 PROCEDURE — 94640 AIRWAY INHALATION TREATMENT: CPT

## 2025-02-16 PROCEDURE — 99900035 HC TECH TIME PER 15 MIN (STAT)

## 2025-02-16 PROCEDURE — 94799 UNLISTED PULMONARY SVC/PX: CPT

## 2025-02-16 RX ORDER — QUETIAPINE FUMARATE 25 MG/1
25 TABLET, FILM COATED ORAL NIGHTLY
Status: DISCONTINUED | OUTPATIENT
Start: 2025-02-16 | End: 2025-02-19 | Stop reason: HOSPADM

## 2025-02-16 RX ORDER — AMLODIPINE BESYLATE 5 MG/1
10 TABLET ORAL DAILY
Status: DISCONTINUED | OUTPATIENT
Start: 2025-02-17 | End: 2025-02-19 | Stop reason: HOSPADM

## 2025-02-16 RX ORDER — AMLODIPINE BESYLATE 5 MG/1
5 TABLET ORAL ONCE
Status: COMPLETED | OUTPATIENT
Start: 2025-02-16 | End: 2025-02-16

## 2025-02-16 RX ADMIN — DULOXETINE HYDROCHLORIDE 30 MG: 30 CAPSULE, DELAYED RELEASE ORAL at 09:02

## 2025-02-16 RX ADMIN — METHOCARBAMOL 750 MG: 750 TABLET ORAL at 05:02

## 2025-02-16 RX ADMIN — PREDNISONE 20 MG: 20 TABLET ORAL at 09:02

## 2025-02-16 RX ADMIN — LOSARTAN POTASSIUM 50 MG: 50 TABLET, FILM COATED ORAL at 05:02

## 2025-02-16 RX ADMIN — Medication 400 MG: at 12:02

## 2025-02-16 RX ADMIN — METHOCARBAMOL 750 MG: 750 TABLET ORAL at 08:02

## 2025-02-16 RX ADMIN — OXYCODONE HYDROCHLORIDE AND ACETAMINOPHEN 1 TABLET: 5; 325 TABLET ORAL at 05:02

## 2025-02-16 RX ADMIN — LIDOCAINE 5% 1 PATCH: 700 PATCH TOPICAL at 05:02

## 2025-02-16 RX ADMIN — ASPIRIN 81 MG CHEWABLE TABLET 81 MG: 81 TABLET CHEWABLE at 09:02

## 2025-02-16 RX ADMIN — FLUTICASONE FUROATE 1 PUFF: 200 POWDER RESPIRATORY (INHALATION) at 08:02

## 2025-02-16 RX ADMIN — PREGABALIN 100 MG: 50 CAPSULE ORAL at 05:02

## 2025-02-16 RX ADMIN — MONTELUKAST SODIUM 10 MG: 5 TABLET, CHEWABLE ORAL at 09:02

## 2025-02-16 RX ADMIN — MICONAZOLE NITRATE 2 % TOPICAL POWDER: at 08:02

## 2025-02-16 RX ADMIN — INSULIN ASPART 2 UNITS: 100 INJECTION, SOLUTION INTRAVENOUS; SUBCUTANEOUS at 11:02

## 2025-02-16 RX ADMIN — PREGABALIN 100 MG: 50 CAPSULE ORAL at 08:02

## 2025-02-16 RX ADMIN — METHOCARBAMOL 750 MG: 750 TABLET ORAL at 11:02

## 2025-02-16 RX ADMIN — UMECLIDINIUM BROMIDE AND VILANTEROL TRIFENATATE 1 PUFF: 62.5; 25 POWDER RESPIRATORY (INHALATION) at 08:02

## 2025-02-16 RX ADMIN — INSULIN ASPART 2 UNITS: 100 INJECTION, SOLUTION INTRAVENOUS; SUBCUTANEOUS at 09:02

## 2025-02-16 RX ADMIN — TRIAMCINOLONE ACETONIDE: 1 OINTMENT TOPICAL at 08:02

## 2025-02-16 RX ADMIN — PANTOPRAZOLE SODIUM 40 MG: 40 TABLET, DELAYED RELEASE ORAL at 09:02

## 2025-02-16 RX ADMIN — AMLODIPINE BESYLATE 5 MG: 5 TABLET ORAL at 09:02

## 2025-02-16 RX ADMIN — CLOPIDOGREL BISULFATE 75 MG: 75 TABLET, FILM COATED ORAL at 09:02

## 2025-02-16 RX ADMIN — CARVEDILOL 25 MG: 25 TABLET, FILM COATED ORAL at 05:02

## 2025-02-16 RX ADMIN — LACTULOSE 30 G: 20 SOLUTION ORAL at 08:02

## 2025-02-16 RX ADMIN — AMLODIPINE BESYLATE 5 MG: 5 TABLET ORAL at 12:02

## 2025-02-16 RX ADMIN — NALOXEGOL OXALATE 25 MG: 25 TABLET, FILM COATED ORAL at 09:02

## 2025-02-16 RX ADMIN — INSULIN ASPART 2 UNITS: 100 INJECTION, SOLUTION INTRAVENOUS; SUBCUTANEOUS at 05:02

## 2025-02-16 RX ADMIN — INSULIN ASPART 2 UNITS: 100 INJECTION, SOLUTION INTRAVENOUS; SUBCUTANEOUS at 04:02

## 2025-02-16 RX ADMIN — OXYCODONE HYDROCHLORIDE AND ACETAMINOPHEN 1 TABLET: 5; 325 TABLET ORAL at 01:02

## 2025-02-16 RX ADMIN — PREGABALIN 100 MG: 50 CAPSULE ORAL at 12:02

## 2025-02-16 RX ADMIN — RANOLAZINE 500 MG: 500 TABLET, EXTENDED RELEASE ORAL at 09:02

## 2025-02-16 RX ADMIN — QUETIAPINE FUMARATE 25 MG: 25 TABLET ORAL at 08:02

## 2025-02-16 RX ADMIN — RANOLAZINE 500 MG: 500 TABLET, EXTENDED RELEASE ORAL at 08:02

## 2025-02-16 RX ADMIN — ALBUTEROL SULFATE 2.5 MG: 2.5 SOLUTION RESPIRATORY (INHALATION) at 07:02

## 2025-02-16 RX ADMIN — OXYCODONE HYDROCHLORIDE AND ACETAMINOPHEN 1 TABLET: 5; 325 TABLET ORAL at 09:02

## 2025-02-16 RX ADMIN — BISACODYL 5 MG: 5 TABLET, COATED ORAL at 09:02

## 2025-02-16 RX ADMIN — FUROSEMIDE 20 MG: 20 TABLET ORAL at 09:02

## 2025-02-16 RX ADMIN — HYDROXYZINE PAMOATE 25 MG: 25 CAPSULE ORAL at 05:02

## 2025-02-16 RX ADMIN — MICONAZOLE NITRATE 2 % TOPICAL POWDER: at 09:02

## 2025-02-16 RX ADMIN — LINACLOTIDE 145 MCG: 145 CAPSULE, GELATIN COATED ORAL at 05:02

## 2025-02-16 RX ADMIN — Medication 400 MG: at 05:02

## 2025-02-16 RX ADMIN — FINASTERIDE 5 MG: 5 TABLET, FILM COATED ORAL at 09:02

## 2025-02-16 RX ADMIN — ATORVASTATIN CALCIUM 40 MG: 40 TABLET, FILM COATED ORAL at 09:02

## 2025-02-16 RX ADMIN — Medication 400 MG: at 08:02

## 2025-02-16 RX ADMIN — ENOXAPARIN SODIUM 40 MG: 40 INJECTION SUBCUTANEOUS at 05:02

## 2025-02-16 NOTE — NURSING
Pt refused to take increased dose of lactulose ordered per MD, stated it would be too much. Explained to pt if he is having constipation requiring digital disimpaction and straining, he needed additional bowel regimen medication, especially if he has hemorrhoids. Pt also complained about additional Movantik, explained to pt it was not a softener, that instead it helped when taking opioids. Pt took Movantik but still refused additional lactulose, only taking one dose instead of two. Pt unable to have bowel movement when up to bathroom both times during pm med pass.

## 2025-02-17 ENCOUNTER — TELEPHONE (OUTPATIENT)
Dept: NEUROSURGERY | Facility: CLINIC | Age: 70
End: 2025-02-17
Payer: MEDICARE

## 2025-02-17 LAB
ALBUMIN SERPL-MCNC: 3.5 G/DL (ref 3.4–4.8)
ALBUMIN/GLOB SERPL: 1 RATIO (ref 1.1–2)
ALP SERPL-CCNC: 157 UNIT/L (ref 40–150)
ALT SERPL-CCNC: 23 UNIT/L (ref 0–55)
ANION GAP SERPL CALC-SCNC: 9 MEQ/L
AST SERPL-CCNC: 18 UNIT/L (ref 5–34)
BASOPHILS # BLD AUTO: 0.03 X10(3)/MCL
BASOPHILS NFR BLD AUTO: 0.3 %
BILIRUB SERPL-MCNC: 0.3 MG/DL
BUN SERPL-MCNC: 42.4 MG/DL (ref 8.4–25.7)
CALCIUM SERPL-MCNC: 9.9 MG/DL (ref 8.8–10)
CHLORIDE SERPL-SCNC: 104 MMOL/L (ref 98–107)
CO2 SERPL-SCNC: 27 MMOL/L (ref 23–31)
CREAT SERPL-MCNC: 1.24 MG/DL (ref 0.72–1.25)
CREAT/UREA NIT SERPL: 34
EOSINOPHIL # BLD AUTO: 0.12 X10(3)/MCL (ref 0–0.9)
EOSINOPHIL NFR BLD AUTO: 1.4 %
ERYTHROCYTE [DISTWIDTH] IN BLOOD BY AUTOMATED COUNT: 12.2 % (ref 11.5–17)
GFR SERPLBLD CREATININE-BSD FMLA CKD-EPI: >60 ML/MIN/1.73/M2
GLOBULIN SER-MCNC: 3.5 GM/DL (ref 2.4–3.5)
GLUCOSE SERPL-MCNC: 112 MG/DL (ref 82–115)
HCT VFR BLD AUTO: 26.3 % (ref 42–52)
HGB BLD-MCNC: 8.4 G/DL (ref 14–18)
IMM GRANULOCYTES # BLD AUTO: 0.04 X10(3)/MCL (ref 0–0.04)
IMM GRANULOCYTES NFR BLD AUTO: 0.5 %
LYMPHOCYTES # BLD AUTO: 1.64 X10(3)/MCL (ref 0.6–4.6)
LYMPHOCYTES NFR BLD AUTO: 18.9 %
MAGNESIUM SERPL-MCNC: 2 MG/DL (ref 1.6–2.6)
MCH RBC QN AUTO: 30.7 PG (ref 27–31)
MCHC RBC AUTO-ENTMCNC: 31.9 G/DL (ref 33–36)
MCV RBC AUTO: 96 FL (ref 80–94)
MONOCYTES # BLD AUTO: 0.68 X10(3)/MCL (ref 0.1–1.3)
MONOCYTES NFR BLD AUTO: 7.8 %
NEUTROPHILS # BLD AUTO: 6.16 X10(3)/MCL (ref 2.1–9.2)
NEUTROPHILS NFR BLD AUTO: 71.1 %
NRBC BLD AUTO-RTO: 0 %
PHOSPHATE SERPL-MCNC: 3 MG/DL (ref 2.3–4.7)
PLATELET # BLD AUTO: 288 X10(3)/MCL (ref 130–400)
PMV BLD AUTO: 10.5 FL (ref 7.4–10.4)
POCT GLUCOSE: 114 MG/DL (ref 70–110)
POCT GLUCOSE: 186 MG/DL (ref 70–110)
POCT GLUCOSE: 232 MG/DL (ref 70–110)
POTASSIUM SERPL-SCNC: 4.3 MMOL/L (ref 3.5–5.1)
PREALB SERPL-MCNC: 24.7 MG/DL (ref 16–42)
PROT SERPL-MCNC: 7 GM/DL (ref 5.8–7.6)
RBC # BLD AUTO: 2.74 X10(6)/MCL (ref 4.7–6.1)
SODIUM SERPL-SCNC: 140 MMOL/L (ref 136–145)
WBC # BLD AUTO: 8.67 X10(3)/MCL (ref 4.5–11.5)

## 2025-02-17 PROCEDURE — 84100 ASSAY OF PHOSPHORUS: CPT | Performed by: NURSE PRACTITIONER

## 2025-02-17 PROCEDURE — 97535 SELF CARE MNGMENT TRAINING: CPT

## 2025-02-17 PROCEDURE — 63600175 PHARM REV CODE 636 W HCPCS: Performed by: INTERNAL MEDICINE

## 2025-02-17 PROCEDURE — 11800000 HC REHAB PRIVATE ROOM

## 2025-02-17 PROCEDURE — 80053 COMPREHEN METABOLIC PANEL: CPT | Performed by: NURSE PRACTITIONER

## 2025-02-17 PROCEDURE — 94640 AIRWAY INHALATION TREATMENT: CPT

## 2025-02-17 PROCEDURE — 97110 THERAPEUTIC EXERCISES: CPT

## 2025-02-17 PROCEDURE — 97530 THERAPEUTIC ACTIVITIES: CPT | Mod: CQ

## 2025-02-17 PROCEDURE — 97168 OT RE-EVAL EST PLAN CARE: CPT

## 2025-02-17 PROCEDURE — 83735 ASSAY OF MAGNESIUM: CPT | Performed by: NURSE PRACTITIONER

## 2025-02-17 PROCEDURE — 94799 UNLISTED PULMONARY SVC/PX: CPT

## 2025-02-17 PROCEDURE — 36415 COLL VENOUS BLD VENIPUNCTURE: CPT | Performed by: NURSE PRACTITIONER

## 2025-02-17 PROCEDURE — 25000003 PHARM REV CODE 250: Performed by: INTERNAL MEDICINE

## 2025-02-17 PROCEDURE — 94761 N-INVAS EAR/PLS OXIMETRY MLT: CPT

## 2025-02-17 PROCEDURE — 25000242 PHARM REV CODE 250 ALT 637 W/ HCPCS: Performed by: NURSE PRACTITIONER

## 2025-02-17 PROCEDURE — 25000003 PHARM REV CODE 250: Performed by: NURSE PRACTITIONER

## 2025-02-17 PROCEDURE — 85025 COMPLETE CBC W/AUTO DIFF WBC: CPT | Performed by: NURSE PRACTITIONER

## 2025-02-17 PROCEDURE — 25000003 PHARM REV CODE 250: Performed by: PHYSICAL MEDICINE & REHABILITATION

## 2025-02-17 PROCEDURE — 99900031 HC PATIENT EDUCATION (STAT)

## 2025-02-17 PROCEDURE — 63600175 PHARM REV CODE 636 W HCPCS: Performed by: NURSE PRACTITIONER

## 2025-02-17 PROCEDURE — 84134 ASSAY OF PREALBUMIN: CPT | Performed by: NURSE PRACTITIONER

## 2025-02-17 PROCEDURE — 97116 GAIT TRAINING THERAPY: CPT

## 2025-02-17 RX ORDER — OXYCODONE AND ACETAMINOPHEN 5; 325 MG/1; MG/1
1 TABLET ORAL ONCE
Refills: 0 | Status: COMPLETED | OUTPATIENT
Start: 2025-02-17 | End: 2025-02-17

## 2025-02-17 RX ORDER — SILVER NITRATE 38.21; 12.74 MG/1; MG/1
1 STICK TOPICAL DAILY
Status: DISCONTINUED | OUTPATIENT
Start: 2025-02-18 | End: 2025-02-19 | Stop reason: HOSPADM

## 2025-02-17 RX ORDER — OXYCODONE AND ACETAMINOPHEN 5; 325 MG/1; MG/1
2 TABLET ORAL EVERY 4 HOURS PRN
Refills: 0 | Status: DISPENSED | OUTPATIENT
Start: 2025-02-17 | End: 2025-02-18

## 2025-02-17 RX ADMIN — RANOLAZINE 500 MG: 500 TABLET, EXTENDED RELEASE ORAL at 07:02

## 2025-02-17 RX ADMIN — LINACLOTIDE 145 MCG: 145 CAPSULE, GELATIN COATED ORAL at 05:02

## 2025-02-17 RX ADMIN — TRIAMCINOLONE ACETONIDE: 1 OINTMENT TOPICAL at 07:02

## 2025-02-17 RX ADMIN — QUETIAPINE FUMARATE 25 MG: 25 TABLET ORAL at 07:02

## 2025-02-17 RX ADMIN — OXYCODONE HYDROCHLORIDE AND ACETAMINOPHEN 1 TABLET: 5; 325 TABLET ORAL at 03:02

## 2025-02-17 RX ADMIN — Medication 400 MG: at 05:02

## 2025-02-17 RX ADMIN — LOSARTAN POTASSIUM 50 MG: 50 TABLET, FILM COATED ORAL at 05:02

## 2025-02-17 RX ADMIN — ENOXAPARIN SODIUM 40 MG: 40 INJECTION SUBCUTANEOUS at 04:02

## 2025-02-17 RX ADMIN — OXYCODONE HYDROCHLORIDE AND ACETAMINOPHEN 1 TABLET: 5; 325 TABLET ORAL at 07:02

## 2025-02-17 RX ADMIN — INSULIN ASPART 4 UNITS: 100 INJECTION, SOLUTION INTRAVENOUS; SUBCUTANEOUS at 04:02

## 2025-02-17 RX ADMIN — TRIAMCINOLONE ACETONIDE: 1 OINTMENT TOPICAL at 08:02

## 2025-02-17 RX ADMIN — ALBUTEROL SULFATE 2.5 MG: 2.5 SOLUTION RESPIRATORY (INHALATION) at 07:02

## 2025-02-17 RX ADMIN — LACTULOSE 30 G: 20 SOLUTION ORAL at 07:02

## 2025-02-17 RX ADMIN — PREDNISONE 20 MG: 20 TABLET ORAL at 07:02

## 2025-02-17 RX ADMIN — CLOPIDOGREL BISULFATE 75 MG: 75 TABLET, FILM COATED ORAL at 07:02

## 2025-02-17 RX ADMIN — METHOCARBAMOL 750 MG: 750 TABLET ORAL at 07:02

## 2025-02-17 RX ADMIN — FLUTICASONE FUROATE 1 PUFF: 200 POWDER RESPIRATORY (INHALATION) at 07:02

## 2025-02-17 RX ADMIN — METHOCARBAMOL 750 MG: 750 TABLET ORAL at 11:02

## 2025-02-17 RX ADMIN — PREGABALIN 100 MG: 50 CAPSULE ORAL at 07:02

## 2025-02-17 RX ADMIN — PREGABALIN 100 MG: 50 CAPSULE ORAL at 05:02

## 2025-02-17 RX ADMIN — UMECLIDINIUM BROMIDE AND VILANTEROL TRIFENATATE 1 PUFF: 62.5; 25 POWDER RESPIRATORY (INHALATION) at 07:02

## 2025-02-17 RX ADMIN — MONTELUKAST SODIUM 10 MG: 5 TABLET, CHEWABLE ORAL at 07:02

## 2025-02-17 RX ADMIN — Medication 400 MG: at 02:02

## 2025-02-17 RX ADMIN — ATORVASTATIN CALCIUM 40 MG: 40 TABLET, FILM COATED ORAL at 07:02

## 2025-02-17 RX ADMIN — ASPIRIN 81 MG CHEWABLE TABLET 81 MG: 81 TABLET CHEWABLE at 07:02

## 2025-02-17 RX ADMIN — OXYCODONE HYDROCHLORIDE AND ACETAMINOPHEN 1 TABLET: 5; 325 TABLET ORAL at 11:02

## 2025-02-17 RX ADMIN — PREGABALIN 100 MG: 50 CAPSULE ORAL at 02:02

## 2025-02-17 RX ADMIN — OXYCODONE AND ACETAMINOPHEN 1 TABLET: 5; 325 TABLET ORAL at 09:02

## 2025-02-17 RX ADMIN — MICONAZOLE NITRATE 2 % TOPICAL POWDER: at 08:02

## 2025-02-17 RX ADMIN — AMLODIPINE BESYLATE 10 MG: 5 TABLET ORAL at 07:02

## 2025-02-17 RX ADMIN — BISACODYL 5 MG: 5 TABLET, COATED ORAL at 07:02

## 2025-02-17 RX ADMIN — METHOCARBAMOL 750 MG: 750 TABLET ORAL at 05:02

## 2025-02-17 RX ADMIN — CARVEDILOL 25 MG: 25 TABLET, FILM COATED ORAL at 04:02

## 2025-02-17 RX ADMIN — NALOXEGOL OXALATE 25 MG: 25 TABLET, FILM COATED ORAL at 07:02

## 2025-02-17 RX ADMIN — INSULIN ASPART 2 UNITS: 100 INJECTION, SOLUTION INTRAVENOUS; SUBCUTANEOUS at 11:02

## 2025-02-17 RX ADMIN — DULOXETINE HYDROCHLORIDE 30 MG: 30 CAPSULE, DELAYED RELEASE ORAL at 07:02

## 2025-02-17 RX ADMIN — MICONAZOLE NITRATE 2 % TOPICAL POWDER: at 07:02

## 2025-02-17 RX ADMIN — FINASTERIDE 5 MG: 5 TABLET, FILM COATED ORAL at 07:02

## 2025-02-17 RX ADMIN — PANTOPRAZOLE SODIUM 40 MG: 40 TABLET, DELAYED RELEASE ORAL at 07:02

## 2025-02-17 RX ADMIN — FUROSEMIDE 20 MG: 20 TABLET ORAL at 07:02

## 2025-02-17 RX ADMIN — LIDOCAINE 5% 1 PATCH: 700 PATCH TOPICAL at 04:02

## 2025-02-17 RX ADMIN — CARVEDILOL 25 MG: 25 TABLET, FILM COATED ORAL at 05:02

## 2025-02-17 RX ADMIN — Medication 400 MG: at 07:02

## 2025-02-17 RX ADMIN — METHOCARBAMOL 750 MG: 750 TABLET ORAL at 04:02

## 2025-02-17 NOTE — PROGRESS NOTES
[facility] ([facility])  Rehab Progress Note    Patient Name: Tyler Mai  MRN: [id number]  Age: 69 y.o. Sex: male  : 1955  Hospital Length of Stay: 17 days  Date of Service: 2025   Chief Complaint: Cervical myelopathy s/p C2-6 PCDF and C2/3-C4/5 decompression on 2025     Subjective:     Basic Information  Admit Information: 69-year-old white male presented to [facility] ED on 2025 complaining of bilateral arm weakness, bilateral leg weakness, and worsening neck pain.  Reports right leg weaker than the left.  Recently admitted to [facility] for cervical myelopathy with plans for surgery, but patient had severe UTI requiring antibiotic therapy.  MRI on  significant for severe C3-4 level central canal stenosis with increased signal hyperintensity within the cervical cord from mid to lower aspect of C3 through mid aspect of C4 with central cord myelomalacia.  PMH significant for cervical myelopathy with history of C5-C6 fusion, CAD, HFrEF, ICMO, COPD, DM type 2, HLD, HTN, and PVD.  Workup significant for cervical myelopathy.  Patient inpatient family initially declined the neurosurgeon on call surgery.  Then surgery postpone due to inclement weather.  Tolerated posterior C3-C5 total laminectomies for decompression, placement of bilateral pars screws at C2 and bilateral lateral mass screws from C3 to C6 using [facility] Invictus OCT System, and placement of local bone and AlphaGraft DBM for arthrodesis from C2 to C6 on  without perioperative complications.  Postoperatively neurosurgery documented increase mobility to bilateral upper extremities with a 5/5 hand grasp.  Recommended aspirin hard collar at all times, and [place] collar for showers..  Recommended removal staples on .  Gray continued due to urinary retention.  Tolerated transfer to [facility] inpatient rehab unit on  without incident.   Today's Information: No acute events overnight. Seen in gym after  "working with RT. Reports good sleep and good appetite. Last BM 2/17; states bowels are moving "too good now", having several in a day. Denies any urination issues. Reports good pain control. Vital stable with no recent recorded fevers.  2/17 a.m. labs: CBC: H&H 8.4/26.3 (from 8.5/26); CMP: BUN 42.4 (from 33). electrolytes WNL, albumin 3.5 (from 3.3); pre-albumin 24.7 (from 20.9). Magnesium 2.0. Labs otherwise unremarkable. Good glycemic control. No new imaging.    Review of patient's allergies indicates:   Allergen Reactions    Hydrochlorothiazide      Other Reaction(s): Pancreatitis        Current Facility-Administered Medications:     acetaminophen tablet 650 mg, 650 mg, Oral, Q6H PRN, Ashley, Tony A, FNP, 650 mg at 02/09/25 0316    albuterol nebulizer solution 2.5 mg, 2.5 mg, Nebulization, Q4H PRN, Shannan Puri A, FNP, 2.5 mg at 02/17/25 0701    ALPRAZolam tablet 0.25 mg, 0.25 mg, Oral, TID PRN, Ashley Tony A, FNP, 0.25 mg at 02/10/25 2333    amLODIPine tablet 10 mg, 10 mg, Oral, Daily, Tyler De Anda MD    aspirin chewable tablet 81 mg, 81 mg, Oral, Daily, Ashley Tony A, FNP, 81 mg at 02/16/25 0908    atorvastatin tablet 40 mg, 40 mg, Oral, Daily, Ashley, Tony A, FNP, 40 mg at 02/16/25 0908    benzonatate capsule 100 mg, 100 mg, Oral, TID PRN, Ashley Tony A, FNP, 100 mg at 02/10/25 0718    bisacodyL EC tablet 5 mg, 5 mg, Oral, Daily, Bobby Puriyn A, FNP, 5 mg at 02/16/25 0908    bisacodyL suppository 10 mg, 10 mg, Rectal, Daily PRN, Shannan Puri, FNP, 10 mg at 02/15/25 1307    camphor-menthol 0.5-0.5% lotion, , Topical (Top), PRN, Shannan Puri FNP, Given at 02/15/25 0631    carvediloL tablet 25 mg, 25 mg, Oral, BID AC, AshleyTony pritchett A, FNP, 25 mg at 02/17/25 0538    clopidogreL tablet 75 mg, 75 mg, Oral, Daily, AshleyTony peter, FNP, 75 mg at 02/16/25 0908    cyclobenzaprine tablet 5 mg, 5 mg, Oral, TID PRN, Shannan Puri FNP    dextrose 50% " injection 12.5 g, 12.5 g, Intravenous, PRN, Ashley, Tony A, FNP    dextrose 50% injection 25 g, 25 g, Intravenous, PRN, Ashley, Tony A, FNP    diphenhydrAMINE-zinc acetate 2-0.1% cream, , Topical (Top), TID PRN, Tyler De Anda MD    DULoxetine DR capsule 30 mg, 30 mg, Oral, Daily, Shannan Puri FNP, 30 mg at 02/16/25 0909    enoxaparin injection 40 mg, 40 mg, Subcutaneous, Q24H (prophylaxis, 1700), Ashley, Tony A, FNP, 40 mg at 02/16/25 1717    finasteride tablet 5 mg, 5 mg, Oral, Daily, Ashley, Tony A, FNP, 5 mg at 02/16/25 0908    umeclidinium-vilanteroL 62.5-25 mcg/actuation DsDv 1 puff, 1 puff, Inhalation, Daily, 1 puff at 02/16/25 0800 **AND** fluticasone furoate 200 mcg/actuation inhaler 1 puff, 1 puff, Inhalation, Daily, Ashley, Tony A, FNP, 1 puff at 02/16/25 0800    furosemide tablet 20 mg, 20 mg, Oral, Daily, Ashley, Tony A, FNP, 20 mg at 02/16/25 0909    glucagon (human recombinant) injection 1 mg, 1 mg, Intramuscular, PRN, Ashley, Tony A, FNP    glucose chewable tablet 16 g, 16 g, Oral, PRN, Ashley, Tony A, FNP    glucose chewable tablet 24 g, 24 g, Oral, PRN, Ashley, Tony A, FNP    hydrALAZINE injection 10 mg, 10 mg, Intravenous, Q4H PRN, Ashley, Tony A, FNP, 10 mg at 02/04/25 0629    hydrocortisone 2.5 % rectal cream, , Rectal, BID PRN, Shannan Puri FNP, Given at 02/15/25 2125    hydrOXYzine pamoate capsule 25 mg, 25 mg, Oral, Q4H PRN, Ashley, Tony A, FNP, 25 mg at 02/16/25 0515    hydrOXYzine pamoate capsule 50 mg, 50 mg, Oral, Nightly PRN, Ashley, Tony A, FNP, 50 mg at 02/15/25 2125    insulin aspart U-100 injection 0-10 Units, 0-10 Units, Subcutaneous, QID (AC + HS) PRN, Ashley, Tony A, FNP, 2 Units at 02/16/25 2103    labetalol 20 mg/4 mL (5 mg/mL) IV syring, 10 mg, Intravenous, Q4H PRN, Ashley, Tony A, FNP    lactulose 20 gram/30 mL solution Soln 30 g, 30 g, Oral, BID, Tyler De Anda MD, 30 g at 02/16/25  2057    LIDOcaine 5 % patch 1 patch, 1 patch, Transdermal, Q24H, Shannan Puri FNP, 1 patch at 02/16/25 1717    linaCLOtide capsule 145 mcg, 145 mcg, Oral, QAM, Tony Ramirez, FNP, 145 mcg at 02/17/25 0538    losartan tablet 50 mg, 50 mg, Oral, Before breakfast, Jarrell Ramirezothy A, FNP, 50 mg at 02/17/25 0538    magnesium citrate solution 296 mL, 296 mL, Oral, Once PRN, Shannan Puri FNP    magnesium oxide tablet 400 mg, 400 mg, Oral, TID, Jarrell Ramirezothy A, FNP, 400 mg at 02/17/25 0538    melatonin tablet 6 mg, 6 mg, Oral, Nightly PRN, Shannan Puri FNP, 6 mg at 02/02/25 2056    methocarbamoL tablet 750 mg, 750 mg, Oral, QID (AC & HS), Shannan Puri, FNP, 750 mg at 02/17/25 0538    metoprolol injection 10 mg, 10 mg, Intravenous, Q2H PRN, Tony Ramirez, FNP    miconazole NITRATE 2 % top powder, , Topical (Top), BID, Shannan Puri FNP, Given at 02/16/25 2102    montelukast chewable tablet 10 mg, 10 mg, Oral, Daily, Tyler De Anda MD, 10 mg at 02/16/25 0908    naloxegoL (MOVANTIK) tablet 25 mg, 25 mg, Oral, Daily, Tyler De Anda MD, 25 mg at 02/16/25 0908    nitroGLYCERIN SL tablet 0.4 mg, 0.4 mg, Sublingual, Q5 Min PRN, Tony Ramirez, FNP    ondansetron disintegrating tablet 4 mg, 4 mg, Oral, Q6H PRN, Tony Ramirez A, FNP    ondansetron disintegrating tablet 8 mg, 8 mg, Oral, Q8H PRN, Tony Ramirez A, FNP    ondansetron injection 4 mg, 4 mg, Intravenous, Q8H PRN, Tony Ramirez, FNP    oxyCODONE-acetaminophen 5-325 mg per tablet 1 tablet, 1 tablet, Oral, Q4H PRN, Tyler De Anda MD, 1 tablet at 02/17/25 0325    pantoprazole EC tablet 40 mg, 40 mg, Oral, Daily, Tony Ramirez FNP, 40 mg at 02/16/25 0908    predniSONE tablet 20 mg, 20 mg, Oral, Daily, Tyler De Anda MD, 20 mg at 02/16/25 0908    pregabalin capsule 100 mg, 100 mg, Oral, TID, Shannan Puri FNP, 100 mg at 02/17/25 0538    QUEtiapine tablet 25 mg, 25 mg, Oral, QHS, Tyler De Anda  "MD, 25 mg at 02/16/25 2057    ranolazine 12 hr tablet 500 mg, 500 mg, Oral, BID, AshleyTony pritchettMARIVEL, 500 mg at 02/16/25 2057    triamcinolone acetonide 0.1% ointment, , Topical (Top), BID, Tyler De Anda MD, Given at 02/16/25 2058     Review of Systems   Complete 12-point review of symptoms negative except for what's mentioned in HPI     Objective:     /61   Pulse 90   Temp 97.6 °F (36.4 °C) (Oral)   Resp 18   Ht 5' 6" (1.676 m)   Wt 73.4 kg (161 lb 13.1 oz)   SpO2 97%   BMI 26.12 kg/m²      Physical Exam  Constitutional:       Appearance: Normal appearance.   HENT:      Head: Normocephalic.   Eyes:      Pupils: Pupils are equal, round, and reactive to light.   Neck:      Comments: Posterior neck incision clean and intact.  Hard collar intact  Cardiovascular:      Rate and Rhythm: Normal rate and regular rhythm.      Heart sounds: Normal heart sounds.   Pulmonary:      Effort: Pulmonary effort is normal. No respiratory distress.      Breath sounds: Normal breath sounds. No wheezing or rhonchi.   Abdominal:      General: Bowel sounds are normal.      Palpations: Abdomen is soft.      Tenderness: There is no abdominal tenderness. There is no guarding.   Musculoskeletal:      Cervical back: Neck supple.      Comments: Diffuse muscle atrophy   Skin:     General: Skin is warm and dry.   Neurological:      General: No focal deficit present.      Mental Status: He is alert and oriented to person, place, and time.      Motor: Weakness present.   Psychiatric:         Mood and Affect: Mood normal.         Behavior: Behavior normal.         Thought Content: Thought content normal.         Judgment: Judgment normal.     *MD performed and documented physical examination     Labs  Recent Results (from the past 24 hours)   POCT glucose    Collection Time: 02/16/25 11:31 AM   Result Value Ref Range    POCT Glucose 164 (H) 70 - 110 mg/dL   POCT glucose    Collection Time: 02/16/25  4:38 PM   Result Value Ref " Range    POCT Glucose 188 (H) 70 - 110 mg/dL   POCT glucose    Collection Time: 02/16/25  8:56 PM   Result Value Ref Range    POCT Glucose 220 (H) 70 - 110 mg/dL   POCT glucose    Collection Time: 02/17/25  5:38 AM   Result Value Ref Range    POCT Glucose 114 (H) 70 - 110 mg/dL   Comprehensive Metabolic Panel    Collection Time: 02/17/25  5:46 AM   Result Value Ref Range    Sodium 140 136 - 145 mmol/L    Potassium 4.3 3.5 - 5.1 mmol/L    Chloride 104 98 - 107 mmol/L    CO2 27 23 - 31 mmol/L    Glucose 112 82 - 115 mg/dL    Blood Urea Nitrogen 42.4 (H) 8.4 - 25.7 mg/dL    Creatinine 1.24 0.72 - 1.25 mg/dL    Calcium 9.9 8.8 - 10.0 mg/dL    Protein Total 7.0 5.8 - 7.6 gm/dL    Albumin 3.5 3.4 - 4.8 g/dL    Globulin 3.5 2.4 - 3.5 gm/dL    Albumin/Globulin Ratio 1.0 (L) 1.1 - 2.0 ratio    Bilirubin Total 0.3 <=1.5 mg/dL     (H) 40 - 150 unit/L    ALT 23 0 - 55 unit/L    AST 18 5 - 34 unit/L    eGFR >60 mL/min/1.73/m2    Anion Gap 9.0 mEq/L    BUN/Creatinine Ratio 34    Prealbumin    Collection Time: 02/17/25  5:46 AM   Result Value Ref Range    Prealbumin 24.7 16.0 - 42.0 mg/dL   Magnesium    Collection Time: 02/17/25  5:46 AM   Result Value Ref Range    Magnesium Level 2.00 1.60 - 2.60 mg/dL   Phosphorus    Collection Time: 02/17/25  5:46 AM   Result Value Ref Range    Phosphorus Level 3.0 2.3 - 4.7 mg/dL   CBC with Differential    Collection Time: 02/17/25  5:46 AM   Result Value Ref Range    WBC 8.67 4.50 - 11.50 x10(3)/mcL    RBC 2.74 (L) 4.70 - 6.10 x10(6)/mcL    Hgb 8.4 (L) 14.0 - 18.0 g/dL    Hct 26.3 (L) 42.0 - 52.0 %    MCV 96.0 (H) 80.0 - 94.0 fL    MCH 30.7 27.0 - 31.0 pg    MCHC 31.9 (L) 33.0 - 36.0 g/dL    RDW 12.2 11.5 - 17.0 %    Platelet 288 130 - 400 x10(3)/mcL    MPV 10.5 (H) 7.4 - 10.4 fL    Neut % 71.1 %    Lymph % 18.9 %    Mono % 7.8 %    Eos % 1.4 %    Basophil % 0.3 %    Imm Grans % 0.5 %    Neut # 6.16 2.1 - 9.2 x10(3)/mcL    Lymph # 1.64 0.6 - 4.6 x10(3)/mcL    Mono # 0.68 0.1 - 1.3  x10(3)/mcL    Eos # 0.12 0 - 0.9 x10(3)/mcL    Baso # 0.03 <=0.2 x10(3)/mcL    Imm Gran # 0.04 0.00 - 0.04 x10(3)/mcL    NRBC% 0.0 %     Radiology   Left upper extremity NIVA on 02/12/2025, IMPRESSION:  Negative for DVT  Radiology  Cervical XR two-view on 01/25/2025, IMPRESSION: There are postoperative changes with posterior spinal fusion hardware at C2 through C6. The hardware appears intact. Cervical alignment has improved. The remaining vertebral body heights are preserved. There is moderate disc height loss at C3-C4, mild at C4-C5, with multilevel marginal osteophytes. The soft tissues are unremarkable.     Assessment/Plan:     69 y.o. WM admitted on 1/31/2025    Cervical myelopathy   - s/p C2-6 PCDF and C2/3-C4/5 decompression on 01/24/2025  - remove staples on 02/07  - continue aspirin hard collar at all times, okay for Glenvil collar for showers only  - Zanaflex discontinued on 02/05 due to orthostatic hypotension  - discontinued Percocet  mg q.4 hours severe pain on 2/15  - continue                Robaxin 750 mg q.i.d. (decreased 2/5)                Flexeril 10 mg t.i.d. p.r.n.                Percocet 5-325 mg q.4 hours p.r.n. severe pain                Lyrica 100 mg t.i.d. (increased 2/3)    Lidocaine patch daily    Cymbalta 30 mg daily (initiated 2/1)  - defer to physiatry for rehab and pain management  - PT/OT/RT/ST following  - to follow up with Neurosurgery outpatient     Coronary artery disease  - s/p  CABG x 3  - denies recent chest pain or discomfort  - continue                ASA 81 mg daily (resumed 1/31)                Plavix 75 mg daily (resume 1/31)                Coreg 25 mg b.i.d. (increased 2/12)                Lipitor 40 mg daily                Losartan 50 mg (increased 2/14)  - ECG and Nitro PRN  - continue cardiac diet  - to follow-up with cardiology outpatient     HFrEF/ICMO  - stable  - s/p Lasix 40 mg IVP x1 on 2/10  - continue   Lasix 20 mg oral daily (decreased 2/13)                 Coreg 25 mg b.i.d. (increased 2/12)                Losartan 50 mg daily (increased 2/14)                Ranexa 500 mg b.i.d.   - obtain daily standing weights  - to follow up with cardiology outpatient     Nicotine dependence  - smoking cessation counseling given on admission  - 1 PPD with approximately 55 pack years     HTN  -BP improved and at goal  - discontinued Flomax 0.4 mg at bedtime on 02/04 due to orthostatic hypotension  - discontinued Zanaflex on 02/05 due to orthostatic hypotension  - s/p NS 1 L bolus on 02/05 due to orthostatic hypotension  - discontinued Norvasc 5 mg b.i.d. on 2/12 due to bilateral lower extremity edema  - continue   Norvasc 5 mg daily (initiated 2/14)  Coreg 25 mg b.i.d. (increased 2/12)  Losartan 50 mg daily (increased 2/14)  Ranexa 500 mg b.i.d.   Hydralazine 10 mg every 2 hours as needed for BP > 160/90  Labetalol 10 mg every 2 hours as needed for BP > 160/90  - low sodium diet             COPD   - stable  - not on home 02  - continue                Trelegy INH daily                   DM type II  - HgA1c 5.6 on 01/23/2024  - good glycemic control  - continue                Lantus 10 units every evening (resumed 1/31)                ISS   - CBGs AC/HS     HLD  - FLP outpatient  - resume Crestor upon discharge  - continue                Lipitor 40 mg daily      PVD  - stable  - continue                ASA 81 mg daily (resumed 1/31)                Plavix 75 mg daily (resume 1/31)                Coreg 12.5 mg b.i.d. (increased 2/11)                Lipitor 40 mg daily  - to follow up vascular surgery outpatient                  Chronic constipation  - improved  - reporting hard stools  - continue  Movantik 25 mg daily (initiated 2/15)  Lactulose 30 g daily (initiated 2/13; increased 2/15; decreased 2/17)  Linzess 145 mcg before breakfast   Colace 100 mg b.i.d.  - encourage hydration    Major depressive disorder  - stable  - continue   Seroquel 25 mg at bedtime (initiated 2/15;  decreased from 50 mg on 2/16)   Cymbalta 30 mg daily (initiated 2/1)     Hypomagnesemia  - stable  - continue                Magnesium oxide 400 mg b.i.d.      BPH/urinary retention  - stable  - discontinued Flomax 0.4 mg at bedtime on 02/04 due to orthostatic hypotension  - continue                Proscar 5 mg daily (initiated 1/31)  Indwelling catheter discontinued on 02/01-good urine output with no retention reported     GERD  - Avoid spicy foods, and nothing to eat or drink within x2 hours of bedtime or laying flat (water is ok)   - Avoid NSAIDs (Advil, ibuprofen, naproxen...) and chavira-2 inhibitors (Mobic, Celebrex)    - continue                Protonix 40 mg daily      Normocytic anemia  - asymptomatic  - H/H stable   - no evidence of active bleeds  - will closely monitor and transfuse if needed     Hypomagnesemia  - magnesium 1.5 on 2/10  - s/p Mag sulfate 2 g x 1 IVPB on 02/01  - continue   Mag-Ox 400 mg tID (increased 2/10)    Seasonal allergies  - stable  - continue   Flonase daily    Pruritus  - stable with improvement  - s/p Prednisone 20 mg po qday 2/15-2/17   - continue   Singulair 10 mg qday (initiated 2/15)   Triamcinolone 0.1% to affected areas twice daily (initiated 2/15)   Vistaril 25 mg q.4 hours p.r.n. (initiated 2/14)   Aquaphor daily as needed     VTE Prophylaxis: Lovenox 40 mg daily   COVID-19 testing:  Unknown  COVID-19 vaccination status:  Vaccinated (Moderna):  Bivalent booster on 04/03/2023     POA:  No  Living will:  No  Contacts:  Tyler Mai Jr (son) [phone]     CODE STATUS:  Full code  Internal Medicine (attending): Tyler De Anda MD  Physiatry (consulting):  Philip Fang MD     OUTPATIENT PROVIDERS  PCP: None  Neurosurgery: Darrell Zavala MD     DISPOSITION:  Condition stable. Sleep, appetite, bowel/bladder hygiene at goal. Adequate pain control. Generalized pruritus improved.  Last BM 2/16. Vital stable with no recent recorded fevers.  2/17 a.m. labs: CBC: H&H  8.4/26.3 (from 8.5/26); CMP: BUN 42.4 (from 33). electrolytes WNL, albumin 3.5 (from 3.3); pre-albumin 24.7 (from 20.9). Magnesium 2.0. Labs otherwise unremarkable. Good glycemic control. No new imaging.    Completes prednisone course today. Bowels now moving several times a day. Will decrease lactulose from 30 mg bid to 30 mg daily; continue other regimen as is and continue to monitor. Hard C-collar to be worn at all times per surgery recs. Continue aggressive therapies. Monitor closely and notify with any acute changes.      Staffing 2/12/2025: Continent of bowel and bladder. Left arm redness, maybe infiltration. RT: Overall supervision to set up.  More cooperative this week.  Limited by dexterity. Appetite is good at 85%. PT: Good progress this week. Pain well controlled.  Tolerated 8 steps.  Overall CGA, but unsafe d/t weakness.  Needs a chair at platform to rest between steps.  Sleeps in a recliner at home.  Needs kasia mat. Ambulating 105-120 feet with walker.  Distance in inconsistent 2/2 fatigue. Limited insight into deficits.  OT: Max assist with UE and LE dressing.  Cannot wear shoes 2/2 heel wound. Wearing PRAFO boots. Mod assist with toileting.  Set up with oral care and eating. Unable to denny and doff C-collar. Projected discharge pending.      Claritza Miranda NP conducted independent physical examination and assisted with medical documentation.    Total time spent on this encounter including chart review and direct MD + NP 1-on-1 patient interaction: 53 minutes   Over 50% of this time was spent in counseling and coordination of care

## 2025-02-17 NOTE — PT/OT/SLP PROGRESS
Physical Therapy Inpatient Rehab Treatment    Patient Name:  Tyler Mai   MRN:  [id number]    Recommendations:     Discharge Recommendations:   (pending progress)   Discharge Equipment Recommendations:     Barriers to discharge: Increased level of assist, Inaccessible home, Ongoing medical treatment, Impaired functional mobility , and Severity of Deficits     Assessment:     Tyler Mai is a 69 y.o. male admitted with a medical diagnosis of Status post cervical spinal fusion.  He presents with the following impairments/functional limitations:  weakness, impaired endurance, impaired functional mobility, gait instability, impaired balance, decreased upper extremity function, decreased lower extremity function, pain, decreased ROM, impaired coordination, impaired fine motor, orthopedic precautions .    Rehab Diagnosis: Cervical spondylosis and stenosis with myelopathy and cord compression s/p C2-6 PCDF and C2-3, C4-5 decompression 1/24/2025. Pt. Has a history of cervical myelopathy, CAD, CHF, COPD, DM, HLD, HTN, and PVD    General Precautions: Standard, fall     Orthopedic Precautions:spinal precautions     Braces: Aspen collar    Rehab Prognosis: Good; patient would benefit from acute skilled PT services to address these deficits and reach maximum level of function.      History:     Past Medical History:   Diagnosis Date    Anxiety disorder, unspecified     Arthritis     CAD (coronary artery disease)     Cervical radiculopathy     CHF (congestive heart failure)     Chronic pain syndrome     COPD (chronic obstructive pulmonary disease)     Diabetes mellitus     Gastro-esophageal reflux disease without esophagitis     High cholesterol     Hypertension     Lumbar radiculopathy     Myelomalacia of cervical cord     Other specified diseases of spinal cord     Pacemaker     PVD (peripheral vascular disease)     S/P triple vessel bypass     Tobacco use        Past Surgical History:   Procedure Laterality Date     ANTERIOR CERVICAL DISCECTOMY W/ FUSION  1995    C5-6 and C6-7.  Dr. Wall    ANTERIOR CERVICAL DISCECTOMY W/ FUSION  1996    Redo C5-6, C6-7.  Dr. Wall    CHOLECYSTECTOMY      CORONARY ARTERY BYPASS GRAFT      FUSION OF POSTERIOR COLUMN OF CERVICAL SPINE USING COMPUTER AIDED NAVIGATION N/A 1/24/2025    Procedure: FUSION, SPINE, POSTERIOR SPINAL COLUMN, CERVICAL, USING COMPUTER-ASSISTED NAVIGATION;  Surgeon: Darrell Watson MD;  Location: [facility];  Service: Neurosurgery;  Laterality: N/A;  C2-C6 PCF; C2/3-C4/5 decompression, possible additional levels  o-arm  NTI  TIVA setup  [employer] //  XX    INSERTION OF PACEMAKER      X2    TRIPPLE VESSEL BYPASS         Subjective     Patient comments: Can I get some socks first?    Respiratory Status: Room air    Patients cultural, spiritual, Yarsani conflicts given the current situation: no    Objective:     Communicated with nurse prior to session.  Patient found sitting edge of bed with cervical collar  upon PT entry to room.        Functional Mobility:        Current   Status  Discharge   Goal   Functional Area: Care Score:    Sit to Stand 4  CGA with RW Independent   Walk 10 Feet 4  CGA with RW Independent   Walk 50 Feet with Two Turns 4  CGA with RW     Walk 150 Feet 4  CGA with RW for 200 ft w narrow CICI  Independent       Therapeutic Activities and Exercises:  Pt completed seated therex including quad sets, hip abd/add, glute squeezes, hip/knee flexion 3x10 AAROM    Activity Tolerance: Excellent    Patient left up in chair with all lines intact, call button in reach, and nurse notified.    Education provided: roles and goals of PT/PTA, transfer training, gait training, and strengthening exercises    Expected compliance: High compliance    Plan:     During this hospitalization, patient to be seen 5 x/week to address the identified rehab impairments via gait training, therapeutic activities, therapeutic exercises and progress toward the following goals:    GOALS:    Multidisciplinary Problems       Physical Therapy Goals          Problem: Physical Therapy    Goal Priority Disciplines Outcome Interventions   Physical Therapy Goal     PT, PT/OT Progressing    Description: Bed Mobility:  MET-Sit to supine transfer independently.   MET-Supine to sit transfer independently.     Transfers:  Sit to stand transfer independently using RW.   Car transfer independently using RW.    an object from the ground in standing position independently using RW.     Mobility:  Ambulate 500 feet independently using RW.  Ambulate 15 feet on uneven surfaces/ramps with setup/clean-up assist using RW.   Pt ascended/descended a 6 inch curb independently using RW.   Ascend/descend 15 stairs independently using bilateral handrails.                         Plan of Care Expires:  02/19/25  PT Next Visit Date: 02/19/25  Plan of Care reviewed with: patient    Additional Information:         Time Tracking:     Therapy Time  PT Received On: 02/17/25  PT Start Time: 1300  PT Stop Time: 1330  PT Total Time (min): 30 min      Missed Time:    Time Missed due to:      Billable Minutes: Gait Training 15 and Therapeutic Exercise 15    02/17/2025

## 2025-02-17 NOTE — PROGRESS NOTES
[facility] - Rehab Inpatient Services  Wound Care    Patient Name:  Tyler Mai   MRN:  [id number]  Date: 2/17/2025  Diagnosis: Status post cervical spinal fusion    History:     Past Medical History:   Diagnosis Date    Anxiety disorder, unspecified     Arthritis     CAD (coronary artery disease)     Cervical radiculopathy     CHF (congestive heart failure)     Chronic pain syndrome     COPD (chronic obstructive pulmonary disease)     Diabetes mellitus     Gastro-esophageal reflux disease without esophagitis     High cholesterol     Hypertension     Lumbar radiculopathy     Myelomalacia of cervical cord     Other specified diseases of spinal cord     Pacemaker     PVD (peripheral vascular disease)     S/P triple vessel bypass     Tobacco use        Social History[1]    Precautions:     Allergies as of 01/30/2025 - Reviewed 01/24/2025   Allergen Reaction Noted    Hydrochlorothiazide  12/03/2024       [facility] Assessment Details/Treatment   Right heel: improving - almost healed.  Cleanse with saline, pat dry, apply Sensicare prep pad shakila wound for adhesion and bordered foam.Turn Pt experienced unusual bleeding from small opening in finger and rt heel today.  Patient uses silver nitrate at home while shaving.    Right arm - lesions and itching. Gold Pop at patient request.     Left lower anterior leg: pinpoint red areas. Scratched areas noted due to  itching. Gold Pop at patient request. May cover open areas with Island dressing.    Sarna left at bedside for prn use.    Patient had similar problems to left arm and left lower leg during previous admission to [facility].    02/17/2025         [1]   Social History  Socioeconomic History    Marital status: Single   Tobacco Use    Smoking status: Every Day     Current packs/day: 1.00     Types: Cigarettes     Social Drivers of Health     Financial Resource Strain: Patient Declined (1/18/2025)    Overall Financial Resource Strain (CARDIA)     Difficulty of  Paying Living Expenses: Patient declined   Food Insecurity: Patient Declined (1/18/2025)    Hunger Vital Sign     Worried About Running Out of Food in the Last Year: Patient declined     Ran Out of Food in the Last Year: Patient declined   Transportation Needs: Unmet Transportation Needs (1/31/2025)    PRAPARE - Transportation     Lack of Transportation (Medical): Yes     Lack of Transportation (Non-Medical): Yes   Stress: Patient Declined (1/18/2025)    [employer] - Occupational Stress Questionnaire     Feeling of Stress : Patient declined   Housing Stability: Patient Declined (1/18/2025)    Housing Stability Vital Sign     Unable to Pay for Housing in the Last Year: Patient declined     Homeless in the Last Year: Patient declined

## 2025-02-17 NOTE — PT/OT/SLP PROGRESS
Physical Therapy Inpatient Rehab Treatment    Patient Name:  Tyler Mai   MRN:  [id number]    Recommendations:     Discharge Recommendations:   (pending progress)   Discharge Equipment Recommendations:     Barriers to discharge: Ongoing medical treatment, Impaired functional mobility , and Severity of Deficits     Assessment:     Tyler Mai is a 69 y.o. male admitted with a medical diagnosis of Status post cervical spinal fusion.  He presents with the following impairments/functional limitations:  weakness, impaired endurance, impaired functional mobility, gait instability, impaired balance, decreased upper extremity function, decreased lower extremity function, pain, decreased ROM, impaired coordination, impaired fine motor, orthopedic precautions .    Rehab Diagnosis: Cervical spondylosis and stenosis with myelopathy and cord compression s/p C2-6 PCDF and C2-3, C4-5 decompression 1/24/2025. Pt. Has a history of cervical myelopathy, CAD, CHF, COPD, DM, HLD, HTN, and PVD    General Precautions: Standard, fall     Orthopedic Precautions:spinal precautions     Braces: Aspen collar    Rehab Prognosis: Good; patient would benefit from acute skilled PT services to address these deficits and reach maximum level of function.      History:     Past Medical History:   Diagnosis Date    Anxiety disorder, unspecified     Arthritis     CAD (coronary artery disease)     Cervical radiculopathy     CHF (congestive heart failure)     Chronic pain syndrome     COPD (chronic obstructive pulmonary disease)     Diabetes mellitus     Gastro-esophageal reflux disease without esophagitis     High cholesterol     Hypertension     Lumbar radiculopathy     Myelomalacia of cervical cord     Other specified diseases of spinal cord     Pacemaker     PVD (peripheral vascular disease)     S/P triple vessel bypass     Tobacco use        Past Surgical History:   Procedure Laterality Date    ANTERIOR CERVICAL DISCECTOMY W/ FUSION  1995     C5-6 and C6-7.  Dr. Wall    ANTERIOR CERVICAL DISCECTOMY W/ FUSION  1996    Redo C5-6, C6-7.  Dr. Wall    CHOLECYSTECTOMY      CORONARY ARTERY BYPASS GRAFT      FUSION OF POSTERIOR COLUMN OF CERVICAL SPINE USING COMPUTER AIDED NAVIGATION N/A 1/24/2025    Procedure: FUSION, SPINE, POSTERIOR SPINAL COLUMN, CERVICAL, USING COMPUTER-ASSISTED NAVIGATION;  Surgeon: Darrell Watson MD;  Location[facility];  Service: Neurosurgery;  Laterality: N/A;  C2-C6 PCF; C2/3-C4/5 decompression, possible additional levels  o-arm  NTI  TIVA setup  [employer] //  XX    INSERTION OF PACEMAKER      X2    TRIPPLE VESSEL BYPASS         Subjective     Patient comments: neck and shoulder pain    Respiratory Status: Room air    Patients cultural, spiritual, Sikhism conflicts given the current situation: no    Objective:     Communicated with RT Amada, prior to session.  Patient found up in chair with cervical collar  upon PT entry to room.    Pt is Oriented x3 and Alert, Cooperative, and Motivated.    Vitals     Pain Pain Rating 1: 8/10  Location - Side 1: Bilateral  Location - Orientation 1: transverse  Location 1: neck  Pain Addressed 1: Reposition, Distraction, Pre-medicate for activity  Pain Rating Post-Intervention 1: 9/10       Functional Mobility:        Current   Status  Discharge   Goal   Functional Area: Care Score:    Sit to Stand 5  Set-up with RW, several trials Independent   Chair/Bed-to-Chair Transfer 5  Set-up with RW, step t/f Independent   1 Step (Curb)   Independent   4 Steps 4 Independent   12 Steps 4  Supervision with vc, reciprocal to ascend, step-to to descend Set-up/clean-up       Therapeutic Activities and Exercises:  Patient educated on role of acute care PT and PT POC, safety while in hospital including calling nurse for mobility, and call light usage  Patient educated about importance of OOB mobility and remaining up in chair most of the day.  Patient educated about pursed lip breathing technique and cued  for use with mobility.  Pt performed toilet transfer, cga, positive BM - increased time required.  Pt performed balancing activities ambulating around cones - 1 trial with cones set 4 ft apart, 2 trials with cones set 3 ft apart, seated rest breaks between trials.  Pt ambulated 120' and 30' with narrow CICI, step-through gait; second ambulation limited due to pt need to have bm.    Therapeutic exercises were performed seated at edge of chair:    Bicep curls with 1.5# weights on BUE  All therex performed 2x15 reps each     Activity Tolerance: Excellent; Pt was cooperative and willing to perform all activities.    Patient left  in bathroom  with  nsg present.    Education provided: roles and goals of PT/PTA, transfer training, gait training, stair training, balance training, safety awareness, body mechanics, assistive device, strengthening exercises, fall prevention, and spinal precautions    Expected compliance: High compliance    Plan:     During this hospitalization, patient to be seen 5 x/week to address the identified rehab impairments via gait training, therapeutic activities, therapeutic exercises and progress toward the following goals:    GOALS:   Multidisciplinary Problems       Physical Therapy Goals          Problem: Physical Therapy    Goal Priority Disciplines Outcome Interventions   Physical Therapy Goal     PT, PT/OT Progressing    Description: Bed Mobility:  MET-Sit to supine transfer independently.   MET-Supine to sit transfer independently.     Transfers:  Sit to stand transfer independently using RW.   Car transfer independently using RW.    an object from the ground in standing position independently using RW.     Mobility:  Ambulate 500 feet independently using RW.  Ambulate 15 feet on uneven surfaces/ramps with setup/clean-up assist using RW.   Pt ascended/descended a 6 inch curb independently using RW.   Ascend/descend 15 stairs independently using bilateral handrails.                          Plan of Care Expires:  02/19/25  PT Next Visit Date: 02/19/25  Plan of Care reviewed with: patient    Additional Information:         Time Tracking:     Therapy Time  PT Received On: 02/17/25  PT Start Time: 1330  PT Stop Time: 1430  PT Total Time (min): 60 min   PT Individual: 60  Missed Time:    Time Missed due to:      Billable Minutes: Gait Training 10 min and Therapeutic Activity 50 min    02/17/2025

## 2025-02-17 NOTE — PT/OT/SLP RE-EVAL
Occupational Therapy Inpatient Rehab Re-Evaluation    Name: Tyler Mai  MRN: [id number]    Recommendations:     Discharge Recommendations:  Moderate Intensity Therapy   Discharge Equipment Recommendations: none   Barriers to discharge: Inaccessible home and Decreased caregiver support, limited FMC and ADL performance and decreased functional mobility.    Assessment:  Tyler Mai is a 69 y.o. male admitted with a medical diagnosis of Status post cervical spinal fusion.  He presents with the following impairments/functional limitations:  weakness, impaired endurance, impaired sensation, impaired self care skills, impaired functional mobility, gait instability, impaired balance, decreased coordination, decreased upper extremity function, decreased safety awareness, decreased lower extremity function, impaired coordination, impaired fine motor, impaired skin, edema.    General Precautions: Standard, fall     Orthopedic Precautions:spinal precautions     Braces: Aspen collar    Rehab Prognosis: Good; patient would benefit from acute skilled OT services to address these deficits and reach maximum level of function.      History:     Past Medical History:   Diagnosis Date    Anxiety disorder, unspecified     Arthritis     CAD (coronary artery disease)     Cervical radiculopathy     CHF (congestive heart failure)     Chronic pain syndrome     COPD (chronic obstructive pulmonary disease)     Diabetes mellitus     Gastro-esophageal reflux disease without esophagitis     High cholesterol     Hypertension     Lumbar radiculopathy     Myelomalacia of cervical cord     Other specified diseases of spinal cord     Pacemaker     PVD (peripheral vascular disease)     S/P triple vessel bypass     Tobacco use        Past Surgical History:   Procedure Laterality Date    ANTERIOR CERVICAL DISCECTOMY W/ FUSION  1995    C5-6 and C6-7.  Dr. Wall    ANTERIOR CERVICAL DISCECTOMY W/ FUSION  1996    Redo C5-6, C6-7.  Dr. Wall     "CHOLECYSTECTOMY      CORONARY ARTERY BYPASS GRAFT      FUSION OF POSTERIOR COLUMN OF CERVICAL SPINE USING COMPUTER AIDED NAVIGATION N/A 1/24/2025    Procedure: FUSION, SPINE, POSTERIOR SPINAL COLUMN, CERVICAL, USING COMPUTER-ASSISTED NAVIGATION;  Surgeon: Darrell Watson MD;  Location: [facility];  Service: Neurosurgery;  Laterality: N/A;  C2-C6 PCF; C2/3-C4/5 decompression, possible additional levels  o-arm  NTI  TIVA setup  [employer] //  XX    INSERTION OF PACEMAKER      X2    TRIPPLE VESSEL BYPASS         Subjective     Orientation: Oriented x4    Chief Complaint: nursing not answering call light fast enough - pt soiled self due to diarrhea; pt agitated.    Patient/Family Comments/goals: "I just crapped in my pants."     Vitals  Vitals at Rest  BP    HR    O2 Sat     Pain Pain Rating 1: 10/10  Location - Side 1: Left  Location - Orientation 1: upper  Location 1: shoulder  Pain Addressed 1: Reposition, Distraction, Nurse notified  Pain Rating Post-Intervention 1: 9/10     Respiratory Status: Room air    Patients cultural, spiritual, Worship conflicts given the current situation: no     Living Environment   Living Environment  People in Home: alone  Living Arrangements: apartment  Home Accessibility: stairs to enter home  Number of Stairs, Main Entrance: other (see comments) (15)  Stair Railings, Main Entrance: railings safe and in good condition  Equipment Currently Used at Home: rollator, walker, rolling, shower chair  Shower Setup: Tub/Shower combo with shower chair    Prior Level of Function  BADL: Independent    IADL: Independent    Equipment used at home:shower chair.      Upon discharge, patient will have assistance from none identified.    Objective:     Patient found up in chair with cervical collar  upon OT entry to room.    Mobility   Patient completed:  Sit to Stand Transfer with contact guard assistance with rolling walker  Stand to Sit Transfer with contact guard assistance with rolling " walker  Toilet Transfer Step Transfer technique with contact guard assistance with  rolling walker  Tub Transfer Step Transfer technique with contact guard assistance with rolling walker and onto TTB  Sit to supine with SBA    Functional Mobility   In room mobility with CGA with RW for ADLs.    ADLs     Current Status   Eating 5   Oral Hygiene 5   Shower, Bathe Self 2   Upper Body Dressing 2   Lower Body Dressing 2   Toileting Hygiene 3   Toilet Transfer 4   Putting On, Taking Off Footwear 2     Limiting Factors for ADLs: motor, sensory, psychsocial, endurance, limited ROM, balance, weakness, perceptual, coordination, cognition, safety awareness, and pain    Exams     ROM:          -       WFL    Hand Dominance: Right    ROM Hand  Left Hand: WFL with gross grasp  Right Hand: WFL with gross grasp    Strength  Overall Strength:          -       WFL     Strength:   Fair bilaterally    Pinch Strength:   Fair bilaterally    Sensation  Impaired in BUE    Coordination:      -       Impaired  bilateral hand with manipulation of objects and  manipulation of objects     Tone  Left: WNL  Right: WNL    Visual/Perceptual  Intact    Balance    Sitting  Sitting Surface: TTB  Static: No UE Support, Good (+)  Dynamic: No UE extremity support, Fair    Standing  Static: No UE Support, Fair  Dynamic: One UE Extremity, Fair (-)    Patient left supine with all lines intact and call button in reach.    Education provided: Roles and goals of OT, ADLs, transfer training, bed mobility, assistive device, modified goals, sequencing, safety precautions, fall prevention, and post-op precautions    Multidisciplinary Problems       Occupational Therapy Goals          Problem: Occupational Therapy    Goal Priority Disciplines Outcome Interventions   Occupational Therapy Goal     OT, PT/OT Not Progressing    Description: ADLs: ongoing  Pt to perform grooming tasks with independence standing at sink with RW by d/c.   Pt to perform feeding tasks  with independence using adaptive utensils as needed by d/c.    Pt to perform UB dressing with independence by d/c.    Pt to perform LB dressing with touch assist by d/c.    Pt to perform putting on/off footwear task with independence and AE as needed by d/c.   Pt to perform toileting with touch assist by d/c.    Pt to perform bathing with independence by d/c.     Functional Transfers:  Pt to perform toilet transfers with independence by d/c.    Pt to perform a tub transfer with independence by d/c.     IADLs:  Pt to perform simple meal prep standing at kitchen counter with touch assist by d/c.      Balance, Strengthening, Endurance, Balance:  Pt to consistently demonstrate adherence to spinal precautions during all ADL's as instructed by OT.  Pt to demonstrate good dynamic standing balance as required to perform ADL's from standing level.                         Plan     During this hospitalization, patient to be seen 5 x/week (5-7 days per week) to address the identified rehab impairments via self-care/home management, therapeutic activities, therapeutic exercises, neuromuscular re-education, cognitive retraining, sensory integration and progress toward the following goals:    Plan of Care Expires:  02/21/25    Time Tracking     OT Received On: 02/17/25  Time In 1030     Time Out 1200  Total Time 90 min  Therapy Time: OT Individual: 90  Missed Time:    Missed Time Reason:      Billable Minutes: Re-eval 15 and Self Care/Home Management 75    02/17/2025

## 2025-02-17 NOTE — PT/OT/SLP PROGRESS
Recreational Therapy Treatment    Date of Treatment: 02/17/25  Start Time: 0930  Stop Time: 1000  Total Time: 30 min  Missed Time:      General Precautions: fall  Ortho Precautions: spinal precautions  Braces: Aspen collar    Vitals   Vitals at Rest  BP    HR    O2 Sat    Pain      Vitals With Activity  BP    HR    O2 Sat    Pain        Treatment     Cognitive Skills Building   Cognitive Observation Activity Assist Position Equipment Response            Comment:          Dynamic Activities   Activity Assist Position Equipment Response   Activity 1 Bean bag toos modified independence Standing Rolling walker and Bean bags good   Comment: Sit to stand was setup as was dynamic standing balance/reaching.  Standing tolerance was 5 minutes with sitting rest breaks.  UE coordination increased to setup.  Memory and problem solving skills were I.  More focused on task and less distracted       Fine Motor Activities   Activity Assist Position Equipment Response           Comment:          Additional Info: Recliner to w/c transfer was setup    Goals     Short Term Goals    Goal  Goal Status   Will increase sit to stand to supervision Met   Will improve dynamic standing balance/reaching to supervision Met                 Long Term Goals    Goal Goal Status   Will increase standing tolerance to 5 minutes Met   Will improve dynamic standing balance/reaching to setup Met

## 2025-02-17 NOTE — PLAN OF CARE
Problem: Occupational Therapy  Goal: Occupational Therapy Goal  Description: ADLs: ongoing  Pt to perform grooming tasks with independence standing at sink with RW by d/c.   Pt to perform feeding tasks with independence using adaptive utensils as needed by d/c.    Pt to perform UB dressing with independence by d/c.    Pt to perform LB dressing with touch assist by d/c.    Pt to perform putting on/off footwear task with independence and AE as needed by d/c.   Pt to perform toileting with touch assist by d/c.    Pt to perform bathing with independence by d/c.     Functional Transfers:  Pt to perform toilet transfers with independence by d/c.    Pt to perform a tub transfer with independence by d/c.     IADLs:  Pt to perform simple meal prep standing at kitchen counter with touch assist by d/c.      Balance, Strengthening, Endurance, Balance:  Pt to consistently demonstrate adherence to spinal precautions during all ADL's as instructed by OT.  Pt to demonstrate good dynamic standing balance as required to perform ADL's from standing level.    Outcome: Not Progressing

## 2025-02-18 LAB
POCT GLUCOSE: 150 MG/DL (ref 70–110)
POCT GLUCOSE: 166 MG/DL (ref 70–110)
POCT GLUCOSE: 236 MG/DL (ref 70–110)

## 2025-02-18 PROCEDURE — 25000003 PHARM REV CODE 250: Performed by: PHYSICAL MEDICINE & REHABILITATION

## 2025-02-18 PROCEDURE — 94761 N-INVAS EAR/PLS OXIMETRY MLT: CPT

## 2025-02-18 PROCEDURE — 25000003 PHARM REV CODE 250: Performed by: NURSE PRACTITIONER

## 2025-02-18 PROCEDURE — 99900031 HC PATIENT EDUCATION (STAT)

## 2025-02-18 PROCEDURE — 97110 THERAPEUTIC EXERCISES: CPT

## 2025-02-18 PROCEDURE — 97535 SELF CARE MNGMENT TRAINING: CPT

## 2025-02-18 PROCEDURE — 97530 THERAPEUTIC ACTIVITIES: CPT

## 2025-02-18 PROCEDURE — 11800000 HC REHAB PRIVATE ROOM

## 2025-02-18 PROCEDURE — 94799 UNLISTED PULMONARY SVC/PX: CPT | Mod: XB

## 2025-02-18 PROCEDURE — 25000242 PHARM REV CODE 250 ALT 637 W/ HCPCS: Performed by: NURSE PRACTITIONER

## 2025-02-18 PROCEDURE — 25000003 PHARM REV CODE 250: Performed by: INTERNAL MEDICINE

## 2025-02-18 PROCEDURE — 94640 AIRWAY INHALATION TREATMENT: CPT

## 2025-02-18 PROCEDURE — 63600175 PHARM REV CODE 636 W HCPCS: Performed by: NURSE PRACTITIONER

## 2025-02-18 RX ORDER — OXYCODONE AND ACETAMINOPHEN 5; 325 MG/1; MG/1
1 TABLET ORAL EVERY 4 HOURS PRN
Refills: 0 | Status: DISCONTINUED | OUTPATIENT
Start: 2025-02-18 | End: 2025-02-19 | Stop reason: HOSPADM

## 2025-02-18 RX ORDER — OXYCODONE AND ACETAMINOPHEN 10; 325 MG/1; MG/1
1 TABLET ORAL EVERY 4 HOURS PRN
Refills: 0 | Status: DISCONTINUED | OUTPATIENT
Start: 2025-02-18 | End: 2025-02-19 | Stop reason: HOSPADM

## 2025-02-18 RX ADMIN — LOSARTAN POTASSIUM 50 MG: 50 TABLET, FILM COATED ORAL at 05:02

## 2025-02-18 RX ADMIN — ASPIRIN 81 MG CHEWABLE TABLET 81 MG: 81 TABLET CHEWABLE at 08:02

## 2025-02-18 RX ADMIN — METHOCARBAMOL 750 MG: 750 TABLET ORAL at 04:02

## 2025-02-18 RX ADMIN — RANOLAZINE 500 MG: 500 TABLET, EXTENDED RELEASE ORAL at 08:02

## 2025-02-18 RX ADMIN — Medication 400 MG: at 02:02

## 2025-02-18 RX ADMIN — ALBUTEROL SULFATE 2.5 MG: 2.5 SOLUTION RESPIRATORY (INHALATION) at 07:02

## 2025-02-18 RX ADMIN — PANTOPRAZOLE SODIUM 40 MG: 40 TABLET, DELAYED RELEASE ORAL at 08:02

## 2025-02-18 RX ADMIN — INSULIN ASPART 3 UNITS: 100 INJECTION, SOLUTION INTRAVENOUS; SUBCUTANEOUS at 08:02

## 2025-02-18 RX ADMIN — AMLODIPINE BESYLATE 10 MG: 5 TABLET ORAL at 08:02

## 2025-02-18 RX ADMIN — CARVEDILOL 25 MG: 25 TABLET, FILM COATED ORAL at 05:02

## 2025-02-18 RX ADMIN — QUETIAPINE FUMARATE 25 MG: 25 TABLET ORAL at 08:02

## 2025-02-18 RX ADMIN — OXYCODONE HYDROCHLORIDE AND ACETAMINOPHEN 1 TABLET: 10; 325 TABLET ORAL at 04:02

## 2025-02-18 RX ADMIN — NALOXEGOL OXALATE 25 MG: 25 TABLET, FILM COATED ORAL at 08:02

## 2025-02-18 RX ADMIN — OXYCODONE HYDROCHLORIDE AND ACETAMINOPHEN 1 TABLET: 10; 325 TABLET ORAL at 12:02

## 2025-02-18 RX ADMIN — ALPRAZOLAM 0.25 MG: 0.25 TABLET ORAL at 05:02

## 2025-02-18 RX ADMIN — OXYCODONE HYDROCHLORIDE AND ACETAMINOPHEN 2 TABLET: 5; 325 TABLET ORAL at 04:02

## 2025-02-18 RX ADMIN — DULOXETINE HYDROCHLORIDE 30 MG: 30 CAPSULE, DELAYED RELEASE ORAL at 08:02

## 2025-02-18 RX ADMIN — PREGABALIN 100 MG: 50 CAPSULE ORAL at 02:02

## 2025-02-18 RX ADMIN — PREGABALIN 100 MG: 50 CAPSULE ORAL at 08:02

## 2025-02-18 RX ADMIN — Medication 400 MG: at 08:02

## 2025-02-18 RX ADMIN — OXYCODONE HYDROCHLORIDE AND ACETAMINOPHEN 2 TABLET: 5; 325 TABLET ORAL at 08:02

## 2025-02-18 RX ADMIN — MONTELUKAST SODIUM 10 MG: 5 TABLET, CHEWABLE ORAL at 08:02

## 2025-02-18 RX ADMIN — OXYCODONE HYDROCHLORIDE AND ACETAMINOPHEN 1 TABLET: 10; 325 TABLET ORAL at 08:02

## 2025-02-18 RX ADMIN — INSULIN ASPART 4 UNITS: 100 INJECTION, SOLUTION INTRAVENOUS; SUBCUTANEOUS at 04:02

## 2025-02-18 RX ADMIN — CLOPIDOGREL BISULFATE 75 MG: 75 TABLET, FILM COATED ORAL at 08:02

## 2025-02-18 RX ADMIN — LINACLOTIDE 145 MCG: 145 CAPSULE, GELATIN COATED ORAL at 05:02

## 2025-02-18 RX ADMIN — FUROSEMIDE 20 MG: 20 TABLET ORAL at 08:02

## 2025-02-18 RX ADMIN — FLUTICASONE FUROATE 1 PUFF: 200 POWDER RESPIRATORY (INHALATION) at 08:02

## 2025-02-18 RX ADMIN — METHOCARBAMOL 750 MG: 750 TABLET ORAL at 08:02

## 2025-02-18 RX ADMIN — ENOXAPARIN SODIUM 40 MG: 40 INJECTION SUBCUTANEOUS at 04:02

## 2025-02-18 RX ADMIN — FINASTERIDE 5 MG: 5 TABLET, FILM COATED ORAL at 08:02

## 2025-02-18 RX ADMIN — ATORVASTATIN CALCIUM 40 MG: 40 TABLET, FILM COATED ORAL at 08:02

## 2025-02-18 RX ADMIN — OXYCODONE HYDROCHLORIDE AND ACETAMINOPHEN 2 TABLET: 5; 325 TABLET ORAL at 12:02

## 2025-02-18 RX ADMIN — PREGABALIN 100 MG: 50 CAPSULE ORAL at 05:02

## 2025-02-18 RX ADMIN — METHOCARBAMOL 750 MG: 750 TABLET ORAL at 05:02

## 2025-02-18 RX ADMIN — CARVEDILOL 25 MG: 25 TABLET, FILM COATED ORAL at 04:02

## 2025-02-18 RX ADMIN — UMECLIDINIUM BROMIDE AND VILANTEROL TRIFENATATE 1 PUFF: 62.5; 25 POWDER RESPIRATORY (INHALATION) at 08:02

## 2025-02-18 RX ADMIN — INSULIN ASPART 2 UNITS: 100 INJECTION, SOLUTION INTRAVENOUS; SUBCUTANEOUS at 12:02

## 2025-02-18 RX ADMIN — METHOCARBAMOL 750 MG: 750 TABLET ORAL at 12:02

## 2025-02-18 NOTE — PT/OT/SLP PROGRESS
Physical Therapy Inpatient Rehab Treatment    Patient Name:  Tyler Mai   MRN:  [id number]    Recommendations:     Discharge Recommendations:   (pending progress)   Discharge Equipment Recommendations:     Barriers to discharge: Impaired functional mobility     Assessment:     Tyler Mai is a 69 y.o. male admitted with a medical diagnosis of Status post cervical spinal fusion.  He presents with the following impairments/functional limitations:  weakness, impaired endurance, impaired functional mobility, gait instability, impaired balance, decreased upper extremity function, decreased lower extremity function, pain, decreased ROM, impaired coordination, impaired fine motor, orthopedic precautions .    Rehab Diagnosis: Cervical spondylosis and stenosis with myelopathy and cord compression s/p C2-6 PCDF and C2-3, C4-5 decompression 1/24/2025. Pt. Has a history of cervical myelopathy, CAD, CHF, COPD, DM, HLD, HTN, and PVD    General Precautions: Standard, fall     Orthopedic Precautions:spinal precautions     Braces: Aspen collar    Rehab Prognosis: Good; patient would benefit from acute skilled PT services to address these deficits and reach maximum level of function.      History:     Past Medical History:   Diagnosis Date    Anxiety disorder, unspecified     Arthritis     CAD (coronary artery disease)     Cervical radiculopathy     CHF (congestive heart failure)     Chronic pain syndrome     COPD (chronic obstructive pulmonary disease)     Diabetes mellitus     Gastro-esophageal reflux disease without esophagitis     High cholesterol     Hypertension     Lumbar radiculopathy     Myelomalacia of cervical cord     Other specified diseases of spinal cord     Pacemaker     PVD (peripheral vascular disease)     S/P triple vessel bypass     Tobacco use        Past Surgical History:   Procedure Laterality Date    ANTERIOR CERVICAL DISCECTOMY W/ FUSION  1995    C5-6 and C6-7.  Dr. Wall    ANTERIOR CERVICAL  DISCECTOMY W/ FUSION  1996    Redo C5-6, C6-7.  Dr. Wall    CHOLECYSTECTOMY      CORONARY ARTERY BYPASS GRAFT      FUSION OF POSTERIOR COLUMN OF CERVICAL SPINE USING COMPUTER AIDED NAVIGATION N/A 1/24/2025    Procedure: FUSION, SPINE, POSTERIOR SPINAL COLUMN, CERVICAL, USING COMPUTER-ASSISTED NAVIGATION;  Surgeon: Darrell Watson MD;  Location: [facility];  Service: Neurosurgery;  Laterality: N/A;  C2-C6 PCF; C2/3-C4/5 decompression, possible additional levels  o-arm  NTI  TIVA setup  [employer] //  XX    INSERTION OF PACEMAKER      X2    TRIPPLE VESSEL BYPASS         Subjective     Patient comments: N/A    Respiratory Status: Room air    Patients cultural, spiritual, [employer] conflicts given the current situation: no    Objective:     Communicated with TR prior to session.  Patient found up in chair with cervical collar  upon PT entry to room.    Pt is Oriented x3 and Alert, Cooperative, and Motivated.    Vitals   Vitals at Rest  BP    HR    O2 Sat    Pain      Vitals With Activity  BP     HR     O2 Sat     Pain Pain Rating 1: 8/10       Functional Mobility:   Toilet t/f : overall Cga with RW + void       Gait speed   Total Time 0.56 m/s  [place]orms:  > 1 m/s   >1.1 m/s predictive of completing yard work   >1.3 m/s Climb flight of stairs   < 1 m/s (increased fall risk)  Benefit from fall prevention   >0.67 m/s to complete self care   >0.89 m/s for household activities   < 0.60 m/s   Predicts future risk of falls and hospitalization   Tend to require assistance with ADL and IADL  >0.49 m/s to cross street   < 0.40 m/s  Longer length of stay in acute care   Likely to discharge to skilled nursing, inpatient rehab, or nursing home setting or with home health       Current   Status  Discharge   Goal   Functional Area: Care Score:    Roll Left and Right 6 Independent   Sit to Lying 6 Independent   Lying to Sitting on Side of Bed 6  Performed in recliner. Pt will be using recliner at home  Independent   Sit to Stand 5  Independent   Chair/Bed-to-Chair Transfer 5  With Rw  Independent   Car Transfer 4  Vc for proper tech with Rw CGA  Independent   Walk 10 Feet 4 Independent   Walk 50 Feet with Two Turns 4     Walk 150 Feet 4  ~170' overall CGA with RW. Vc for increased R foot clearance a times  Independent   Walk 10 Feet Uneven Surface 4  On blue mat with Rw overall CGA  Independent   1 Step (Curb) 4 Independent   4 Steps 4 Independent   12 Steps 4  B rails with alternating feet with CGA  Set-up/clean-up   Picking Up Object 5  With reacher Independent   Wheel 50 Feet with Two Turns       Wheel 150 Feet           Therapeutic Activities and Exercises:  Patient educated on role of acute care PT and PT POC, safety while in hospital including calling nurse for mobility, and call light usage  Patient educated about importance of OOB mobility and remaining up in chair most of the day.    Therapeutic exercises were performed seated at edge of chair:    marches with 2 weights  hip abduction with red theraband  hip adductions isometrics against a ball  hamstring curls with red theraband  long arc quads with 2 weights  calf raises/toe raises with 2 weights  All therex performed 3x15 reps each BLE         Activity Tolerance: Excellent    Patient left up in chair with all lines intact and call button in reach.    Education provided: roles and goals of PT/PTA, transfer training, bed mob, gait training, stair training, balance training, safety awareness, body mechanics, assistive device, strengthening exercises, and fall prevention    Expected compliance: High compliance    Plan:     During this hospitalization, patient to be seen 5 x/week to address the identified rehab impairments via gait training, therapeutic activities, therapeutic exercises and progress toward the following goals:    GOALS:   Multidisciplinary Problems       Physical Therapy Goals          Problem: Physical Therapy    Goal Priority Disciplines Outcome Interventions    Physical Therapy Goal     PT, PT/OT Progressing    Description: Bed Mobility:  MET-Sit to supine transfer independently.   MET-Supine to sit transfer independently.     Transfers:  Sit to stand transfer independently using RW.   Car transfer independently using RW.    an object from the ground in standing position independently using RW.     Mobility:  Ambulate 500 feet independently using RW.  Ambulate 15 feet on uneven surfaces/ramps with setup/clean-up assist using RW.   Pt ascended/descended a 6 inch curb independently using RW.   Ascend/descend 15 stairs independently using bilateral handrails.                         Plan of Care Expires:  02/19/25  PT Next Visit Date: 02/19/25  Plan of Care reviewed with: patient    Additional Information:         Time Tracking:     Therapy Time  PT Received On: 02/18/25  PT Start Time:  (0830;1100)  PT Stop Time:  (0930;1130)   PT Individual: 90  Missed Time:    Time Missed due to:      Billable Minutes: Therapeutic Activity 60 and Therapeutic Exercise 30    02/18/2025

## 2025-02-18 NOTE — PLAN OF CARE
Problem: Rehabilitation ([facility]) Plan of Care  Goal: Plan of Care Review  Outcome: Progressing  Flowsheets (Taken 2/18/2025 1251)  Plan of Care Reviewed With: patient     Problem: Wound  Goal: Optimal Coping  Outcome: Progressing     Problem: Infection  Goal: Absence of Infection Signs and Symptoms  Outcome: Progressing     Problem: Diabetes Comorbidity  Goal: Blood Glucose Level Within Targeted Range  Outcome: Progressing  Intervention: Monitor and Manage Glycemia  Flowsheets (Taken 2/18/2025 1251)  Glycemic Management: blood glucose monitored     Problem: Fall Injury Risk  Goal: Absence of Fall and Fall-Related Injury  Outcome: Progressing     Problem: Skin Injury Risk Increased  Goal: Skin Health and Integrity  Outcome: Progressing  Intervention: Optimize Skin Protection  Flowsheets (Taken 2/18/2025 1251)  Head of Bed (HOB) Positioning: HOB at 30-45 degrees

## 2025-02-18 NOTE — NURSING
Patient asked for PRN pain medication (Percocet 10mg) thirty minutes before it was available. Explained to patient that it was not time for medication yet, and that he only had Perc 5 available. Patient became hysterical and proceeded to use inappropriate language with me. He claimed that all of the nurses have been lying to him about the dosage of this medication, and that he did not want any medications anymore if he couldn't have the Perc 10. I calmly explained that this was the first time I meet him and that I am only trying to help. I was able to get him to eventually agree to take his other medication, but he then stated he was refusing his scheduled CBG check. Notified Dr. Fang of the situation and asked if we could change the doses to 10mg Q4H for the night. He told me yes, and that he would be talking to the patient in the morning during rounds. Administered medication to patient. Manager was also on the unit and also went try to console the patient.Will continue to monitor.

## 2025-02-18 NOTE — PROGRESS NOTES
[facility] ([facility])  Rehab Progress Note    Patient Name: Tyler Mai  MRN: [id number]  Age: 69 y.o. Sex: male  : 1955  Hospital Length of Stay: 18 days  Date of Service: 2025   Chief Complaint: Cervical myelopathy s/p C2-6 PCDF and C2/3-C4/5 decompression on 2025     Subjective:     Basic Information  Admit Information: 69-year-old white male presented to [facility] ED on 2025 complaining of bilateral arm weakness, bilateral leg weakness, and worsening neck pain.  Reports right leg weaker than the left.  Recently admitted to [facility] for cervical myelopathy with plans for surgery, but patient had severe UTI requiring antibiotic therapy.  MRI on  significant for severe C3-4 level central canal stenosis with increased signal hyperintensity within the cervical cord from mid to lower aspect of C3 through mid aspect of C4 with central cord myelomalacia.  PMH significant for cervical myelopathy with history of C5-C6 fusion, CAD, HFrEF, ICMO, COPD, DM type 2, HLD, HTN, and PVD.  Workup significant for cervical myelopathy.  Patient inpatient family initially declined the neurosurgeon on call surgery.  Then surgery postpone due to inclement weather.  Tolerated posterior C3-C5 total laminectomies for decompression, placement of bilateral pars screws at C2 and bilateral lateral mass screws from C3 to C6 using [facility] Invictus OCT System, and placement of local bone and AlphaGraft DBM for arthrodesis from C2 to C6 on  without perioperative complications.  Postoperatively neurosurgery documented increase mobility to bilateral upper extremities with a 5/5 hand grasp.  Recommended aspirin hard collar at all times, and [place] collar for showers..  Recommended removal staples on .  Gray continued due to urinary retention.  Tolerated transfer to [facility] inpatient rehab unit on  without incident.   Today's Information: No acute events overnight.  Sitting up in  chair.  Reports poor sleep due to pain.  Physiatry adjusted pain medication and it is improved today.  Appetite is good.  Last BM 2/17.  BP moderately controlled.  Good glycemic control.  No new labs or imaging today.    Review of patient's allergies indicates:   Allergen Reactions    Hydrochlorothiazide      Other Reaction(s): Pancreatitis        Current Facility-Administered Medications:     acetaminophen tablet 650 mg, 650 mg, Oral, Q6H PRN, Ashley, Tony A, FNP, 650 mg at 02/09/25 0316    albuterol nebulizer solution 2.5 mg, 2.5 mg, Nebulization, Q4H PRN, Jaxson, Shannan A, FNP, 2.5 mg at 02/18/25 0703    ALPRAZolam tablet 0.25 mg, 0.25 mg, Oral, TID PRN, Ashley, Tony A, FNP, 0.25 mg at 02/18/25 0547    amLODIPine tablet 10 mg, 10 mg, Oral, Daily, Tyler De Anda MD, 10 mg at 02/18/25 0815    aspirin chewable tablet 81 mg, 81 mg, Oral, Daily, Ashley, Tony A, FNP, 81 mg at 02/18/25 0815    atorvastatin tablet 40 mg, 40 mg, Oral, Daily, Ashley, Tony A, FNP, 40 mg at 02/18/25 0815    benzonatate capsule 100 mg, 100 mg, Oral, TID PRN, Ashley, Tony A, FNP, 100 mg at 02/10/25 0718    bisacodyL EC tablet 5 mg, 5 mg, Oral, Daily, Jaxson, Shannan A, FNP, 5 mg at 02/17/25 0724    bisacodyL suppository 10 mg, 10 mg, Rectal, Daily PRN, Jaxson, Shannan A, FNP, 10 mg at 02/15/25 1307    camphor-menthol 0.5-0.5% lotion, , Topical (Top), PRN, [place], Shannan A, FNP, Given at 02/15/25 0631    carvediloL tablet 25 mg, 25 mg, Oral, BID AC, Ashley, Tony A, FNP, 25 mg at 02/18/25 0537    clopidogreL tablet 75 mg, 75 mg, Oral, Daily, Ashley, Tony A, FNP, 75 mg at 02/18/25 0815    cyclobenzaprine tablet 5 mg, 5 mg, Oral, TID PRN, Shannan Puri, FNP    dextrose 50% injection 12.5 g, 12.5 g, Intravenous, PRN, Ashley, Tony A, FNP    dextrose 50% injection 25 g, 25 g, Intravenous, PRN, Ashley, Tony A, FNP    diphenhydrAMINE-zinc acetate 2-0.1% cream, , Topical (Top), TID PRN, Adilson  Tyler SAMS MD    DULoxetine DR capsule 30 mg, 30 mg, Oral, Daily, Shannan Puri, FNP, 30 mg at 02/18/25 0815    enoxaparin injection 40 mg, 40 mg, Subcutaneous, Q24H (prophylaxis, 1700), Tony Ramirez A, FNP, 40 mg at 02/17/25 1634    finasteride tablet 5 mg, 5 mg, Oral, Daily, Ashley, Tony A, FNP, 5 mg at 02/18/25 0815    umeclidinium-vilanteroL 62.5-25 mcg/actuation DsDv 1 puff, 1 puff, Inhalation, Daily, 1 puff at 02/18/25 0812 **AND** fluticasone furoate 200 mcg/actuation inhaler 1 puff, 1 puff, Inhalation, Daily, Ashley, Tony A, FNP, 1 puff at 02/18/25 0812    furosemide tablet 20 mg, 20 mg, Oral, Daily, Ashley, Tony A, FNP, 20 mg at 02/18/25 0815    glucagon (human recombinant) injection 1 mg, 1 mg, Intramuscular, PRN, Ashley, Tony A, FNP    glucose chewable tablet 16 g, 16 g, Oral, PRN, Ashley, Tony A, FNP    glucose chewable tablet 24 g, 24 g, Oral, PRN, Ashley, Tony A, FNP    hydrALAZINE injection 10 mg, 10 mg, Intravenous, Q4H PRN, Ashley, Tony A, FNP, 10 mg at 02/04/25 0629    hydrocortisone 2.5 % rectal cream, , Rectal, BID PRN, Shannan Puri, FNP, Given at 02/15/25 2125    hydrOXYzine pamoate capsule 25 mg, 25 mg, Oral, Q4H PRN, Ashley, Tony A, FNP, 25 mg at 02/16/25 0515    hydrOXYzine pamoate capsule 50 mg, 50 mg, Oral, Nightly PRN, Ashley, Tony A, FNP, 50 mg at 02/15/25 2125    insulin aspart U-100 injection 0-10 Units, 0-10 Units, Subcutaneous, QID (AC + HS) PRN, Ashley, Tony A, FNP, 4 Units at 02/17/25 1641    labetalol 20 mg/4 mL (5 mg/mL) IV syring, 10 mg, Intravenous, Q4H PRN, Ashley, Tony A, FNP    LIDOcaine 5 % patch 1 patch, 1 patch, Transdermal, Q24H, Shannan Puri A, FNP, 1 patch at 02/17/25 1634    linaCLOtide capsule 145 mcg, 145 mcg, Oral, QAM, Ashley, Tony A, FNP, 145 mcg at 02/18/25 0547    losartan tablet 50 mg, 50 mg, Oral, Before breakfast, Ashley, Tony A, FNP, 50 mg at 02/18/25 0537     magnesium citrate solution 296 mL, 296 mL, Oral, Once PRN, Bobby Puriyn A, FNP    magnesium oxide tablet 400 mg, 400 mg, Oral, TID, Tony Ramirez A, FNP, 400 mg at 02/17/25 1959    melatonin tablet 6 mg, 6 mg, Oral, Nightly PRN, Bobby Puriyn A, FNP, 6 mg at 02/02/25 2056    methocarbamoL tablet 750 mg, 750 mg, Oral, QID (AC & HS), Radha Purihryn A, FNP, 750 mg at 02/18/25 0537    metoprolol injection 10 mg, 10 mg, Intravenous, Q2H PRN, Tony Ramirez A, FNP    miconazole NITRATE 2 % top powder, , Topical (Top), BID, Bobby Puriyn A, FNP, Given at 02/17/25 2000    montelukast chewable tablet 10 mg, 10 mg, Oral, Daily, Tyler De Anda MD, 10 mg at 02/18/25 0815    naloxegoL (MOVANTIK) tablet 25 mg, 25 mg, Oral, Daily, Tyler De Anda MD, 25 mg at 02/18/25 0814    nitroGLYCERIN SL tablet 0.4 mg, 0.4 mg, Sublingual, Q5 Min PRN, Tony Ramirez A, FNP    ondansetron disintegrating tablet 4 mg, 4 mg, Oral, Q6H PRN, Ashley Tony A, FNP    ondansetron disintegrating tablet 8 mg, 8 mg, Oral, Q8H PRN, Tony Ramirez A, FNP    ondansetron injection 4 mg, 4 mg, Intravenous, Q8H PRN, Tony Ramirez A, FNP    oxyCODONE-acetaminophen 5-325 mg per tablet 1 tablet, 1 tablet, Oral, Q4H PRN, Tyler De Anda MD, 1 tablet at 02/17/25 1959    pantoprazole EC tablet 40 mg, 40 mg, Oral, Daily, Tony Ramirez A, FNP, 40 mg at 02/18/25 0815    pregabalin capsule 100 mg, 100 mg, Oral, TID, Bobby Puriyn A, FNP, 100 mg at 02/18/25 0537    QUEtiapine tablet 25 mg, 25 mg, Oral, QHS, Tyler De Anda MD, 25 mg at 02/17/25 1959    ranolazine 12 hr tablet 500 mg, 500 mg, Oral, BID, Tony Ramirez FNP, 500 mg at 02/18/25 0815    silver nitrate applicators applicator 1 applicator, 1 applicator, Topical (Top), Daily, Shannan Puri FNP    triamcinolone acetonide 0.1% ointment, , Topical (Top), BID, Tyler De Anda MD, Given at 02/17/25 2000     Review of Systems   Complete 12-point review of symptoms  "negative except for what's mentioned in HPI     Objective:     BP (!) 156/72 (Patient Position: Sitting)   Pulse 88   Temp 97.7 °F (36.5 °C) (Oral)   Resp 18   Ht 5' 6" (1.676 m)   Wt 73.4 kg (161 lb 13.1 oz)   SpO2 96%   BMI 26.12 kg/m²      Physical Exam  Constitutional:       Appearance: Normal appearance.   HENT:      Head: Normocephalic.   Eyes:      Pupils: Pupils are equal, round, and reactive to light.   Neck:      Comments: Posterior neck incision clean and intact.  Hard collar intact  Cardiovascular:      Rate and Rhythm: Normal rate and regular rhythm.      Heart sounds: Normal heart sounds.   Pulmonary:      Effort: Pulmonary effort is normal. No respiratory distress.      Breath sounds: Normal breath sounds. No wheezing or rhonchi.   Abdominal:      General: Bowel sounds are normal.      Palpations: Abdomen is soft.      Tenderness: There is no abdominal tenderness. There is no guarding.   Musculoskeletal:      Cervical back: Neck supple.      Comments: Diffuse muscle atrophy   Skin:     General: Skin is warm and dry.   Neurological:      General: No focal deficit present.      Mental Status: He is alert and oriented to person, place, and time.      Motor: Weakness present.   Psychiatric:         Mood and Affect: Mood normal.         Behavior: Behavior normal.         Thought Content: Thought content normal.         Judgment: Judgment normal.     *MD performed and documented physical examination     Labs  Recent Results (from the past 24 hours)   POCT glucose    Collection Time: 02/17/25 11:34 AM   Result Value Ref Range    POCT Glucose 186 (H) 70 - 110 mg/dL   POCT glucose    Collection Time: 02/17/25  4:36 PM   Result Value Ref Range    POCT Glucose 232 (H) 70 - 110 mg/dL   POCT glucose    Collection Time: 02/18/25  5:41 AM   Result Value Ref Range    POCT Glucose 150 (H) 70 - 110 mg/dL     Radiology   Left upper extremity NIVA on 02/12/2025, IMPRESSION:  Negative for DVT  Radiology  Cervical " XR two-view on 01/25/2025, IMPRESSION: There are postoperative changes with posterior spinal fusion hardware at C2 through C6. The hardware appears intact. Cervical alignment has improved. The remaining vertebral body heights are preserved. There is moderate disc height loss at C3-C4, mild at C4-C5, with multilevel marginal osteophytes. The soft tissues are unremarkable.     Assessment/Plan:     69 y.o. WM admitted on 1/31/2025    Cervical myelopathy   - s/p C2-6 PCDF and C2/3-C4/5 decompression on 01/24/2025  - remove staples on 02/07  - continue aspirin hard collar at all times, okay for [place] collar for showers only  - Zanaflex discontinued on 02/05 due to orthostatic hypotension  - discontinued Percocet  mg q.4 hours severe pain on 2/15  - continue                Robaxin 750 mg q.i.d. (decreased 2/5)                Flexeril 10 mg t.i.d. p.r.n.                Percocet  mg q.4 hours p.r.n. severe pain                Lyrica 100 mg t.i.d. (increased 2/3)    Lidocaine patch daily    Cymbalta 30 mg daily (initiated 2/1)  - defer to physiatry for rehab and pain management  - PT/OT/RT/ST following  - to follow up with Neurosurgery outpatient     Coronary artery disease  - s/p  CABG x 3  - denies recent chest pain or discomfort  - continue                ASA 81 mg daily (resumed 1/31)                Plavix 75 mg daily (resume 1/31)                Coreg 25 mg b.i.d. (increased 2/12)                Lipitor 40 mg daily                Losartan 50 mg (increased 2/14)  - ECG and Nitro PRN  - continue cardiac diet  - to follow-up with cardiology outpatient     HFrEF/ICMO  - stable  - s/p Lasix 40 mg IVP x1 on 2/10  - continue   Lasix 20 mg oral daily (decreased 2/13)                Coreg 25 mg b.i.d. (increased 2/12)                Losartan 50 mg daily (increased 2/14)                Ranexa 500 mg b.i.d.   - obtain daily standing weights  - to follow up with cardiology outpatient     Nicotine dependence  -  smoking cessation counseling given on admission  - 1 PPD with approximately 55 pack years     HTN  -BP improved and at goal  - discontinued Flomax 0.4 mg at bedtime on 02/04 due to orthostatic hypotension  - discontinued Zanaflex on 02/05 due to orthostatic hypotension  - s/p NS 1 L bolus on 02/05 due to orthostatic hypotension  - discontinued Norvasc 5 mg b.i.d. on 2/12 due to bilateral lower extremity edema  - continue   Norvasc 5 mg daily (initiated 2/14)  Coreg 25 mg b.i.d. (increased 2/12)  Losartan 50 mg daily (increased 2/14)  Ranexa 500 mg b.i.d.   Hydralazine 10 mg every 2 hours as needed for BP > 160/90  Labetalol 10 mg every 2 hours as needed for BP > 160/90  - low sodium diet             COPD   - stable  - not on home 02  - continue                Trelegy INH daily                   DM type II  - HgA1c 5.6 on 01/23/2024  - good glycemic control  - continue                Lantus 10 units every evening (resumed 1/31)                ISS   - CBGs AC/HS     HLD  - FLP outpatient  - resume Crestor upon discharge  - continue                Lipitor 40 mg daily      PVD  - stable  - continue                ASA 81 mg daily (resumed 1/31)                Plavix 75 mg daily (resume 1/31)                Coreg 12.5 mg b.i.d. (increased 2/11)                Lipitor 40 mg daily  - to follow up vascular surgery outpatient                  Chronic constipation  - improved  - reporting hard stools  - continue  Movantik 25 mg daily (initiated 2/15)  Lactulose 30 g daily (initiated 2/13; increased 2/15; decreased 2/17)  Linzess 145 mcg before breakfast   Colace 100 mg b.i.d.  - encourage hydration    Major depressive disorder  - stable  - continue   Seroquel 25 mg at bedtime (initiated 2/15; decreased from 50 mg on 2/16)   Cymbalta 30 mg daily (initiated 2/1)     Hypomagnesemia  - stable  - continue                Magnesium oxide 400 mg b.i.d.      BPH/urinary retention  - stable  - discontinued Flomax 0.4 mg at bedtime on  02/04 due to orthostatic hypotension  - continue                Proscar 5 mg daily (initiated 1/31)  Indwelling catheter discontinued on 02/01-good urine output with no retention reported     GERD  - Avoid spicy foods, and nothing to eat or drink within x2 hours of bedtime or laying flat (water is ok)   - Avoid NSAIDs (Advil, ibuprofen, naproxen...) and chavira-2 inhibitors (Mobic, Celebrex)    - continue                Protonix 40 mg daily      Normocytic anemia  - asymptomatic  - H/H stable   - no evidence of active bleeds  - will closely monitor and transfuse if needed     Hypomagnesemia  - magnesium 1.5 on 2/10  - s/p Mag sulfate 2 g x 1 IVPB on 02/01  - continue   Mag-Ox 400 mg tID (increased 2/10)    Seasonal allergies  - stable  - continue   Flonase daily    Pruritus  - stable with improvement  - s/p Prednisone 20 mg po qday 2/15-2/17   - continue   Singulair 10 mg qday (initiated 2/15)   Triamcinolone 0.1% to affected areas twice daily (initiated 2/15)   Vistaril 25 mg q.4 hours p.r.n. (initiated 2/14)   Aquaphor daily as needed     VTE Prophylaxis: Lovenox 40 mg daily   COVID-19 testing:  Unknown  COVID-19 vaccination status:  Vaccinated (Moderna):  Bivalent booster on 04/03/2023     POA:  No  Living will:  No  Contacts:  Tyler Mai Jr (son) [phone]     CODE STATUS:  Full code  Internal Medicine (attending): Tyler De Anda MD  Physiatry (consulting):  Philip Fang MD     OUTPATIENT PROVIDERS  PCP: None  Neurosurgery: Darrell Zavala MD     DISPOSITION:  Sleep poor overnight due to pain.  Recent failed GDR Percocet.  He was taking 10 mg Percocet at home.  Appetite and bowel maintenance at goal.  Last BM 2/17.  BP moderately controlled.  Good glycemic control.  No new labs or imaging today.  Physiatry managing pain.  Plans to go up 10 mg Percocet as needed for severe pain.  NP discussed with physiatry.  NP discussed current status and plan of care with patient at bedside.  Continue aggressive  mobilization as tolerated.  Monitor closely.  Notify of acute changes.    Staffing 2/12/2025: Continent of bowel and bladder. Left arm redness, maybe infiltration. RT: Overall supervision to set up.  More cooperative this week.  Limited by dexterity. Appetite is good at 85%. PT: Good progress this week. Pain well controlled.  Tolerated 8 steps.  Overall CGA, but unsafe d/t weakness.  Needs a chair at platform to rest between steps.  Sleeps in a recliner at home.  Needs kasia mat. Ambulating 105-120 feet with walker.  Distance in inconsistent 2/2 fatigue. Limited insight into deficits.  OT: Max assist with UE and LE dressing.  Cannot wear shoes 2/2 heel wound. Wearing PRAFO boots. Mod assist with toileting.  Set up with oral care and eating. Unable to denny and doff C-collar. Projected discharge pending.      Jarrell Ramirez NP conducted independent physical examination and assisted with medical documentation.    Total time spent on this encounter including chart review and direct MD + NP 1-on-1 patient interaction: 53 minutes   Over 50% of this time was spent in counseling and coordination of care

## 2025-02-18 NOTE — PROGRESS NOTES
Dos 2/18/25  Patient seen and evaluated in room today.  Therapy and progress discussed with patient  Reviewed present barriers to progress  Reviewed chart  Discussed with primary medicine team, nursing staff as well as my NP, Rylie Puri  Agree with present POC  Subjective  HPI: 69 year old WM with a PMH of cervical myelopathy, CAD, CHF, COPD, DM, HLD, HTN, and PVD presented to the ED at [facility] on 1/14/25 for Neurosurgery. Patient presented in ED following discharge from [facility]. He reports associated symptoms of bilateral arm weakness, bilateral leg weakness, and worsening neck pain. Notes right leg is weaker than left. He also complains of bilateral hand, bilateral leg, and bilateral feet numbness. He stated that he has been unable to care for himself at home because of the weakness, and having to use a wheelchair to get around. Patient's daughter at bedside reports frequent falls at home. No urinary or fecal incontinence. Per chart review, the patient was admitted to [facility] for cervical myelopathy with the plan of surgery. Previous extensive admission at [facility] for UTI which cleared and resolved with antibiotics; initially, did not have surgery secondary to UTI. Now amendable for surgical intervention since UTI has resolved. Patient refused surgery with on-call neurosurgeon at [facility] and discharged at noon today. He was advised to come here for the surgery. MRI on 01/05 showed severe C3-4 level central canal stenosis with increased signal hyperintensity within the cervical cord from mid to lower aspect of C3 through mid aspect of C4 with central cord myelomalacia. No syrinx is detected. Prior C5-C7 intervertebral body fusion. Neurosurgery was consulted with recommendation for surgical intervention needed. Cardiology consulted with low risk for MACE for high risk neurosurgical procedure. On 1/24, patient underwent C2-6 PCDF and C2-3- C4-5 decompression by Dr. Ramírez. 1 Hemovac drain placed to site. Hard cervical  collar in place. On 1/25, wound care was consulted for right heel and left heel wound with recommendation to cleanse bilateral heels with normal saline, paint with betadine, apply small foam, and change every 2 days. Recommended bilateral heel boots. Herrera was removed. Labs showed low Na of 133, elevated BUN/Cr of 29.7/ 1.31, and low H&H of 10.6 & 31.9. BP ranging 125/58- 182/80. PT/OT efvals completed with deficits noted with recommendation for high intensity therapy needed. ON 1/26, Labs showed low H&H of 10.1 & 29.5. Patient having urinary retention requiring in and out caths. On 1/27, hemovac removed. Neurosurgery recommended to DC staples on POD 14, will need follow up in 2 weeks after DC from hospital with XR uprights of cervical spine with Izzy, NP, continued Percocet and Robaxin for pain management, and signed off of case. Herrera had to be reinserted for urinary retention, and started Flomax. BP ranging 123/61- 169/70. On 1/28, patient started on Relistar for constipation with 2 large Bms noted. Labs showed low H&H of 9.2 & 27.3. On 1/30, wound care reassessed with 2 wounds noted to bilateral anterior legs. Cleansed with soap and water, and applied aquaphor to sites. Patient is AAOx4.   Participating with therapy. Functional status includes setup assist needed for eating, minimal assist for bed mobility, contact guard assist for transfers with RW, walked 28ft with RW at minimal assist, minimal assist for toilet transfer, total assist for toileting due to herrera in place, stand by assist for grooming, max assist for upper body dressing, and max assist for lower body dressing. Patient was evaluated, accepted, and admitted to inpatient rehab to improve functional status. Transferred to [facility] on 1/31 without incident.     2/18: Seen with PT, seated in WC and heading to the car simulator to work on transfer. Amb w/RW over uneven surfaces. Staff reports patient becoming irate, and accusing staff of lying to him.  States that he was being told he was getting Percocet 10mg tabs, and was only receiving 5mg. 10mg Percocet noted to be discontinued over the weekend, as well as added Lactulose and Movantik. Complaints of loose and incontinent stools yesterday. Patient has explained home regimen of oral Dulcolax with Mag Citrate PRN constipation, as well as Percocet 10mg with Pain Doctor and contract. He has had spinal issues and pain medication since 1995. Reordered. Says he just lost his control when he thought everyone was lying to him. In good spirits and tolerating therapy without current complaint. VSSAF with noted SBP elevation prior to morning medications. IM following.            Review of Systems  Psychiatric: Denies mental health history. Pt. Smokes cigarettes.     Depression/Anxiety: notes some anxiety in regards to eating and getting enough nutrition to his mouth     DULoxetine DR capsule 30 mg qd, start 2/1  ALPRAZolam tablet 0.25 mg TID PRN Anxiety  Pain: L>R side of neck, numbness (hands, legs, feet), shooting pains  DULoxetine DR capsule 30 mg qd, start 2/1  methocarbamoL tablet 750 mg AC & HS  pregabalin capsule 100mg TID  LIDOcaine 5 % patch 1 patch neck q24hr, 1800  acetaminophen tablet 650 mg q6h PRN mild pain   oxyCODONE-acetaminophen 5-325 mg 1 tablet q4h PRN mod pain  oxyCODONE-acetaminophen  mg per tablet 1 tablet q4h PRN severe pain  cyclobenzaprine tablet 5 mg TID PRN spasm  Bowels/Bladder: Last BM 2/17 x 2 (loose)  naloxegoL (MOVANTIK) tablet 25 mg qd  Gray catheter reinserted 2/2 urinary retention-discontinued. Urinating    bisacodyL EC tablet 5 mg qd  linaCLOtide capsule 145 mcg qAM  tamsulosin 24 hr capsule 0.4 mg qHS  Appetite: good.  Improved intake with built up utensils   Sleep: good with melatonin  melatonin tablet 6 mg qHS PRN Insomnia              Physical Exam  General: well-developed, well-nourished, in no acute distress  Neck: hard collar, supple  Respiratory: equal chest rise, no SOB,  no audible wheeze  Cardiovascular: regular rate and rhythm, BLE & LUE edema  Gastrointestinal: soft, non-tender, non-distended   Musculoskeletal: BUE/BLE weakness  Integumentary: no rashes, bilateral heel wounds, bilateral anterior leg wounds, posterior cervical incision-staples-dressing-c/d/i  Neurologic: cranial nerves intact, BUE/BLE weakness, numbness (hands, legs, feet), balance deficits  *MD performed and documented physical examination                 Assessment/Plan  Hospital   Anemia   Hyponatremia   Spinal cord compression   Status post cervical spinal fusion   Hypokalemia     Non-Hospital   HTN (hypertension)   Hyperlipidemia   Ischemic cardiomyopathy with implantable cardioverter-defibrillator (ICD)   Tobacco user   Type 2 diabetes mellitus   Peripheral arterial disease with history of revascularization   Chronic systolic CHF (congestive heart failure), NYHA class 3   Chronic low back pain   Cervical myelopathy   Carotid artery stenosis, asymptomatic, left   Arteriosclerosis of coronary artery   S/P triple vessel bypass   COPD (chronic obstructive pulmonary disease)   Gastro-esophageal reflux disease without esophagitis   Anxiety disorder, unspecified   Cervical radiculopathy   Lumbar radiculopathy   Myelomalacia of cervical cord       Wounds: bilateral heel wounds, bilateral anterior leg wounds, posterior cervical incision-staples-dressing-c/d/i  On 1/24, patient underwent C2-6 PCDF and C2-3- C4-5 decompression by Dr. Ramírez.  (Hemovac drain placed to site)  Precautions: spinal  Bracing: Hard cervical collar, bilateral heel boots in bed  Swallowing: Regular Diet  Function: Tolerating therapy. Continue PT/OT  VTE Prophylaxis:   enoxaparin injection 40 mg q24hr  Code Status: FULL CODE   Discharge: Lives alone in [place] in a 2nd story apartment. Completed high school. He has no  history.  Is disabled. . He was living alone and completely independent. Children: (2). Date 2/19 Wednesday.                    Shannan Puri NP, conducted additional independent physical examination and assisted with medical documentation.

## 2025-02-18 NOTE — PLAN OF CARE
02/18/25 0743   Medicare Message   Important Message from Medicare regarding Discharge Appeal Rights Given to patient/caregiver   Date IMM was signed 02/18/25   Time IMM was signed 0743

## 2025-02-18 NOTE — PT/OT/SLP PROGRESS
Occupational Therapy Inpatient Rehab Treatment    Name: Tyler aMi  MRN: [id number]    Assessment:  Tyler Mai is a 69 y.o. male admitted with a medical diagnosis of Status post cervical spinal fusion.  He presents with the following impairments/functional limitations:  weakness, impaired endurance, impaired sensation, impaired self care skills, impaired functional mobility, gait instability, impaired balance, decreased coordination, decreased upper extremity function, decreased safety awareness, pain, decreased ROM, impaired coordination, impaired fine motor.    General Precautions: Standard, fall     Orthopedic Precautions:spinal precautions     Braces: Aspen collar    Rehab Prognosis: Good; patient would benefit from acute skilled OT services to address these deficits and reach maximum level of function.      History:     Past Medical History:   Diagnosis Date    Anxiety disorder, unspecified     Arthritis     CAD (coronary artery disease)     Cervical radiculopathy     CHF (congestive heart failure)     Chronic pain syndrome     COPD (chronic obstructive pulmonary disease)     Diabetes mellitus     Gastro-esophageal reflux disease without esophagitis     High cholesterol     Hypertension     Lumbar radiculopathy     Myelomalacia of cervical cord     Other specified diseases of spinal cord     Pacemaker     PVD (peripheral vascular disease)     S/P triple vessel bypass     Tobacco use        Past Surgical History:   Procedure Laterality Date    ANTERIOR CERVICAL DISCECTOMY W/ FUSION  1995    C5-6 and C6-7.  Dr. Wall    ANTERIOR CERVICAL DISCECTOMY W/ FUSION  1996    Redo C5-6, C6-7.  Dr. Wall    CHOLECYSTECTOMY      CORONARY ARTERY BYPASS GRAFT      FUSION OF POSTERIOR COLUMN OF CERVICAL SPINE USING COMPUTER AIDED NAVIGATION N/A 1/24/2025    Procedure: FUSION, SPINE, POSTERIOR SPINAL COLUMN, CERVICAL, USING COMPUTER-ASSISTED NAVIGATION;  Surgeon: Darrell Watson MD;  Location: [facility];  Service:  "Neurosurgery;  Laterality: N/A;  C2-C6 PCF; C2/3-C4/5 decompression, possible additional levels  o-arm  NTI  TIVA setup  [employer] //  XX    INSERTION OF PACEMAKER      X2    TRIPPLE VESSEL BYPASS         Subjective     Orientation: Oriented x4    Chief Complaint: none    Patient/Family Comments/goals: "I going to miss you."    Respiratory Status: Room air    Patients cultural, spiritual, [place]ian conflicts given the current situation: no     Objective:     Patient found up in chair with cervical collar  upon OT entry to room.    Mobility   Patient completed:  Rolling/Turning to Left with independence  Rolling/Turning to Right with independence  Sit to Supine with supervision  Sit to Stand Transfer with modified independence with rolling walker  Stand to Sit Transfer with supervision with rolling walker  Bed to Chair Transfer using Stand Pivot technique with contact guard assistance with rolling walker  Toilet Transfer Step Transfer technique with contact guard assistance with  rolling walker    Functional Mobility  In room mobility for ADLs with RW with CGA    ADLs   Current Status   Eating     Oral Hygiene 6 Standing at sink   Shower, Bathe Self     Upper Body Dressing     Lower Body Dressing 4 using reacher with verbal cues   Toileting Hygiene 3 min/SBA with wiping   Toilet Transfer 4 incidental touching   Putting On, Taking Off Footwear 2     Limiting Factors for ADLs: motor, sensory, psychsocial, endurance, limited ROM, balance, weakness, coordination, cognition, and safety awareness     Patient left supine with all lines intact and call button in reach.     Education provided: Roles and goals of OT, ADLs, transfer training, bed mobility, assistive device, sequencing, safety precautions, fall prevention, and home safety    Multidisciplinary Problems       Occupational Therapy Goals          Problem: Occupational Therapy    Goal Priority Disciplines Outcome Interventions   Occupational Therapy Goal     OT, " PT/OT Not Progressing    Description: ADLs: ongoing  Pt to perform grooming tasks with independence standing at sink with RW by d/c.   Pt to perform feeding tasks with independence using adaptive utensils as needed by d/c.    Pt to perform UB dressing with independence by d/c.    Pt to perform LB dressing with touch assist by d/c.    Pt to perform putting on/off footwear task with independence and AE as needed by d/c.   Pt to perform toileting with touch assist by d/c.    Pt to perform bathing with independence by d/c.     Functional Transfers:  Pt to perform toilet transfers with independence by d/c.    Pt to perform a tub transfer with independence by d/c.     IADLs:  Pt to perform simple meal prep standing at kitchen counter with touch assist by d/c.      Balance, Strengthening, Endurance, Balance:  Pt to consistently demonstrate adherence to spinal precautions during all ADL's as instructed by OT.  Pt to demonstrate good dynamic standing balance as required to perform ADL's from standing level.                         Time Tracking     OT Received On: 02/18/25  Time In 1300     Time Out 1430  Total Time 90 min  Therapy Time: OT Individual: 90  Missed Time:    Missed Time Reason:      Billable Minutes: Self Care/Home Management 90    02/18/2025

## 2025-02-18 NOTE — PT/OT/SLP DISCHARGE
Recreational Therapy Discharge      Date of Treatment: 02/18/25  Start Time: 1130  Stop Time: 1200  Total Time: 30 min  Missed Time:    Assessment      Tyler Mai is a 69 y.o. male admitted with a medical diagnosis of Status post cervical spinal fusion.  He presents with the following impairments/functional limitations:  weakness, impaired functional mobility, gait instability, decreased coordination, decreased upper extremity function, decreased lower extremity function, decreased safety awareness .    Rehab Diagnosis:     Recent Surgery:     General Precautions: Standard, fall     Orthopedic Precautions:spinal precautions     Braces: Aspen collar    Rehab Prognosis: Good; patient would benefit from acute skilled Recreational Therapy services to address these deficits and reach maximum level of function.      Impairments: Coordination deficits, Endurance deficits, Mobility deficits, Safety awareness deficits, and Strength deficits  Rehab Potential: Good  Treatment Recommendations: Complete discharge plan  Treatment Diagnosis: Cervical spondylosis and stenosis, myelopathy and cord compression, s/p C2-6 PCDF and C-2-3, C4-5 ,decompression, CAD, CHF, COPD, DM, HLD, HTN, PVD  Orientation: Oriented x4  Affect/Behavior: Cooperative, Anxious, and Distracted  Safety/Judgement: impaired   Basic Command Following: intact  Spiritual Cultural: no        History     Past Medical History:   Diagnosis Date    Anxiety disorder, unspecified     Arthritis     CAD (coronary artery disease)     Cervical radiculopathy     CHF (congestive heart failure)     Chronic pain syndrome     COPD (chronic obstructive pulmonary disease)     Diabetes mellitus     Gastro-esophageal reflux disease without esophagitis     High cholesterol     Hypertension     Lumbar radiculopathy     Myelomalacia of cervical cord     Other specified diseases of spinal cord     Pacemaker     PVD (peripheral vascular disease)     S/P triple vessel bypass      Tobacco use        Past Surgical History:   Procedure Laterality Date    ANTERIOR CERVICAL DISCECTOMY W/ FUSION  1995    C5-6 and C6-7.  Dr. Wall    ANTERIOR CERVICAL DISCECTOMY W/ FUSION  1996    Redo C5-6, C6-7.  Dr. Wall    CHOLECYSTECTOMY      CORONARY ARTERY BYPASS GRAFT      FUSION OF POSTERIOR COLUMN OF CERVICAL SPINE USING COMPUTER AIDED NAVIGATION N/A 1/24/2025    Procedure: FUSION, SPINE, POSTERIOR SPINAL COLUMN, CERVICAL, USING COMPUTER-ASSISTED NAVIGATION;  Surgeon: Darrell Watson MD;  Location: [facility];  Service: Neurosurgery;  Laterality: N/A;  C2-C6 PCF; C2/3-C4/5 decompression, possible additional levels  o-arm  NTI  TIVA setup  [employer] //  XX    INSERTION OF PACEMAKER      X2    TRIPPLE VESSEL BYPASS         Home Environment     Admit Date: 01/31/25  Living Situation  People in Home: alone  Lives in: apartment  Patients Responsibilities: Community mobility, , Financial management, Health and wellness, Laundry, Leisure/play/hobbies, Meal preparation, Shopping, Other (Comment) (Disaabled)  Number of Children: 2  Occupation:[employer]    Instrumental Activities of Daily Living     Previous Hand Dominance: Right Current Hand Dominance: Right     Other iADL Information:        Cognitive Skills Building         Cognitive Observation Activity Assist Position Equipment Response            Comment:      Dynamic Activities      Activity Assist Position Equipment Response   Activity 1 Washer toss modified independence Standing Rolling walker and Metal washers good   Comment: W/c to recliner transfer was setup.  Sit to stand was setup as was dynamic standing balance/reaching.  Standing tolerance was 7 minutes with sitting rest breaks.  UE coordination/dexterity was supervision.  Problem solving and sequencing skills were I.  Cooperative.  Said he was glad he was going to SNF.  Said he knew that he couldn't go home yet.        Fine Motor Activities      Activity Assist Position Equipment  "Response           Comment:        Goals     Patient Goals  Patient Goal 1: "Get healthy, move around and do for myself."    Short Term Goals    Goal  Goal Status   Will increase sit to stand to supervision Met   Will improve dynamic standing balance/reaching to supervision Met                 Long Term Goals    Goal Goal Status   Will increase standing tolerance to 5 minutes Met   Will improve dynamic standing balance/reaching to setup Met                     Plan       Patient to be seen: Daily  Duration: Other (Comment) (1 day)  Treatments planned: Coordination, Energy conservation training, Fine motor, Safety education  Treatment plan/goals established with Patient/Caregiver: Yes     "

## 2025-02-18 NOTE — PLAN OF CARE
Problem: Rehabilitation (IRF) Plan of Care  Goal: Plan of Care Review  Outcome: Progressing     Problem: Rehabilitation (IRF) Plan of Care  Goal: Patient-Specific Goal (Individualized)  Outcome: Progressing     Problem: Rehabilitation (IRF) Plan of Care  Goal: Absence of New-Onset Illness or Injury  Outcome: Progressing     Problem: Rehabilitation (IRF) Plan of Care  Goal: Optimal Comfort and Wellbeing  Outcome: Progressing     Problem: Rehabilitation (IRF) Plan of Care  Goal: Home and Community Transition Plan Established  Outcome: Progressing     Problem: Wound  Goal: Optimal Coping  Outcome: Progressing     Problem: Wound  Goal: Optimal Functional Ability  Outcome: Progressing     Problem: Wound  Goal: Absence of Infection Signs and Symptoms  Outcome: Progressing     Problem: Wound  Goal: Optimal Pain Control and Function  Outcome: Progressing     Problem: Wound  Goal: Skin Health and Integrity  Outcome: Progressing     Problem: Wound  Goal: Optimal Wound Healing  Outcome: Progressing     Problem: Infection  Goal: Absence of Infection Signs and Symptoms  Outcome: Progressing       Problem: Fall Injury Risk  Goal: Absence of Fall and Fall-Related Injury  Outcome: Progressing     Problem: Skin Injury Risk Increased  Goal: Skin Health and Integrity  Outcome: Progressing

## 2025-02-18 NOTE — PROGRESS NOTES
[facility] - Rehab Inpatient Services  Wound Care    Patient Name:  Tyler Mai   MRN:  [id number]  Date: 2/18/2025  Diagnosis: Status post cervical spinal fusion    History:     Past Medical History:   Diagnosis Date    Anxiety disorder, unspecified     Arthritis     CAD (coronary artery disease)     Cervical radiculopathy     CHF (congestive heart failure)     Chronic pain syndrome     COPD (chronic obstructive pulmonary disease)     Diabetes mellitus     Gastro-esophageal reflux disease without esophagitis     High cholesterol     Hypertension     Lumbar radiculopathy     Myelomalacia of cervical cord     Other specified diseases of spinal cord     Pacemaker     PVD (peripheral vascular disease)     S/P triple vessel bypass     Tobacco use        Social History[1]    Precautions:     Allergies as of 01/30/2025 - Reviewed 01/24/2025   Allergen Reaction Noted    Hydrochlorothiazide  12/03/2024       WO Assessment Details/Treatment      02/18/25 1426        Wound 01/14/25 2202 Pressure Injury Right Heel   Date First Assessed/Time First Assessed: 01/14/25 2202   Present on Original Admission: Yes  Primary Wound Type: (c) Pressure Injury  Side: Right  Location: Heel   Pressure Injury Stage 2   Drainage Amount None   Appearance Epithelialization  (healed)   Dressing Changed;Foam   Dressing Change Due 02/21/25     Right heel: healed. Continue bordered foam for c/o pain and for prevention. Discussed use of PRAFO boots at all times while in bed.  Skin lesions to arm have improved with Gold Pop. Continue Gold Pop.   02/18/2025         [1]   Social History  Socioeconomic History    Marital status: Single   Tobacco Use    Smoking status: Every Day     Current packs/day: 1.00     Types: Cigarettes     Social Drivers of Health     Financial Resource Strain: Patient Declined (1/18/2025)    Overall Financial Resource Strain (CARDIA)     Difficulty of Paying Living Expenses: Patient declined   Food  Insecurity: Patient Declined (1/18/2025)    Hunger Vital Sign     Worried About Running Out of Food in the Last Year: Patient declined     Ran Out of Food in the Last Year: Patient declined   Transportation Needs: No Transportation Needs (2/18/2025)    PRAPARE - Transportation     Lack of Transportation (Medical): No     Lack of Transportation (Non-Medical): No   Recent Concern: Transportation Needs - Unmet Transportation Needs (1/31/2025)    PRAPARE - Transportation     Lack of Transportation (Medical): Yes     Lack of Transportation (Non-Medical): Yes   Stress: Patient Declined (1/18/2025)    [employer] [place] [employer] - Occupational Stress Questionnaire     Feeling of Stress : Patient declined   Housing Stability: Patient Declined (1/18/2025)    Housing Stability Vital Sign     Unable to Pay for Housing in the Last Year: Patient declined     Homeless in the Last Year: Patient declined

## 2025-02-18 NOTE — PLAN OF CARE
02/18/25 0743   Depression Screen (Over the Past Two Weeks)   Have You Felt Down, Depressed or Hopeless? no   Have You Felt Little Interest or Pleasure in Doing Things? no     Discharge PHQ

## 2025-02-19 VITALS
DIASTOLIC BLOOD PRESSURE: 52 MMHG | WEIGHT: 161.63 LBS | SYSTOLIC BLOOD PRESSURE: 102 MMHG | HEIGHT: 66 IN | TEMPERATURE: 98 F | RESPIRATION RATE: 18 BRPM | BODY MASS INDEX: 25.97 KG/M2 | HEART RATE: 75 BPM | OXYGEN SATURATION: 97 %

## 2025-02-19 LAB
POCT GLUCOSE: 124 MG/DL (ref 70–110)
POCT GLUCOSE: 175 MG/DL (ref 70–110)
POCT GLUCOSE: 280 MG/DL (ref 70–110)

## 2025-02-19 PROCEDURE — 94761 N-INVAS EAR/PLS OXIMETRY MLT: CPT

## 2025-02-19 PROCEDURE — 25000242 PHARM REV CODE 250 ALT 637 W/ HCPCS: Performed by: NURSE PRACTITIONER

## 2025-02-19 PROCEDURE — 94640 AIRWAY INHALATION TREATMENT: CPT

## 2025-02-19 PROCEDURE — 97535 SELF CARE MNGMENT TRAINING: CPT

## 2025-02-19 PROCEDURE — 63600175 PHARM REV CODE 636 W HCPCS: Performed by: NURSE PRACTITIONER

## 2025-02-19 PROCEDURE — 99900031 HC PATIENT EDUCATION (STAT)

## 2025-02-19 PROCEDURE — 25000003 PHARM REV CODE 250: Performed by: NURSE PRACTITIONER

## 2025-02-19 PROCEDURE — 94799 UNLISTED PULMONARY SVC/PX: CPT

## 2025-02-19 PROCEDURE — 25000003 PHARM REV CODE 250: Performed by: INTERNAL MEDICINE

## 2025-02-19 RX ORDER — PREGABALIN 100 MG/1
100 CAPSULE ORAL 3 TIMES DAILY
Qty: 90 CAPSULE | Refills: 0 | Status: SHIPPED | OUTPATIENT
Start: 2025-02-19 | End: 2025-02-19

## 2025-02-19 RX ORDER — NAPROXEN SODIUM 220 MG/1
81 TABLET, FILM COATED ORAL DAILY
Start: 2025-02-19 | End: 2025-05-20

## 2025-02-19 RX ORDER — LOSARTAN POTASSIUM 50 MG/1
50 TABLET ORAL
Start: 2025-02-20 | End: 2025-05-21

## 2025-02-19 RX ORDER — MONTELUKAST SODIUM 5 MG/1
10 TABLET, CHEWABLE ORAL DAILY
Start: 2025-02-19 | End: 2025-03-21

## 2025-02-19 RX ORDER — QUETIAPINE FUMARATE 25 MG/1
25 TABLET, FILM COATED ORAL NIGHTLY
Start: 2025-02-19 | End: 2025-03-21

## 2025-02-19 RX ORDER — CLOPIDOGREL BISULFATE 75 MG/1
75 TABLET ORAL DAILY
Qty: 30 TABLET | Refills: 11 | Status: SHIPPED | OUTPATIENT
Start: 2025-02-19 | End: 2026-02-19

## 2025-02-19 RX ORDER — FINASTERIDE 5 MG/1
5 TABLET, FILM COATED ORAL DAILY
Start: 2025-02-19 | End: 2025-03-21

## 2025-02-19 RX ORDER — METHOCARBAMOL 750 MG/1
750 TABLET, FILM COATED ORAL
Start: 2025-02-19 | End: 2025-03-01

## 2025-02-19 RX ORDER — BISACODYL 5 MG
5 TABLET, DELAYED RELEASE (ENTERIC COATED) ORAL DAILY
Start: 2025-02-19 | End: 2025-03-21

## 2025-02-19 RX ORDER — DULOXETIN HYDROCHLORIDE 30 MG/1
60 CAPSULE, DELAYED RELEASE ORAL DAILY
Start: 2025-02-19 | End: 2026-02-19

## 2025-02-19 RX ORDER — CYCLOBENZAPRINE HCL 5 MG
5 TABLET ORAL 3 TIMES DAILY PRN
Start: 2025-02-19 | End: 2025-03-01

## 2025-02-19 RX ORDER — FUROSEMIDE 20 MG/1
20 TABLET ORAL DAILY
Start: 2025-02-19 | End: 2025-03-21

## 2025-02-19 RX ORDER — CARVEDILOL 25 MG/1
25 TABLET ORAL
Start: 2025-02-19 | End: 2025-05-20

## 2025-02-19 RX ORDER — AMLODIPINE BESYLATE 10 MG/1
10 TABLET ORAL DAILY
Start: 2025-02-19 | End: 2025-05-20

## 2025-02-19 RX ORDER — LANOLIN ALCOHOL/MO/W.PET/CERES
400 CREAM (GRAM) TOPICAL 3 TIMES DAILY
Start: 2025-02-19 | End: 2025-03-21

## 2025-02-19 RX ORDER — PREGABALIN 100 MG/1
100 CAPSULE ORAL 3 TIMES DAILY
Qty: 90 CAPSULE | Refills: 0 | Status: SHIPPED | OUTPATIENT
Start: 2025-02-19 | End: 2025-03-21

## 2025-02-19 RX ORDER — OXYCODONE AND ACETAMINOPHEN 10; 325 MG/1; MG/1
1 TABLET ORAL EVERY 6 HOURS PRN
Qty: 20 TABLET | Refills: 0 | Status: SHIPPED | OUTPATIENT
Start: 2025-02-19 | End: 2025-02-19

## 2025-02-19 RX ORDER — OXYCODONE AND ACETAMINOPHEN 10; 325 MG/1; MG/1
1 TABLET ORAL EVERY 6 HOURS PRN
Qty: 20 TABLET | Refills: 0 | Status: SHIPPED | OUTPATIENT
Start: 2025-02-19 | End: 2025-02-24

## 2025-02-19 RX ADMIN — DULOXETINE HYDROCHLORIDE 30 MG: 30 CAPSULE, DELAYED RELEASE ORAL at 09:02

## 2025-02-19 RX ADMIN — MICONAZOLE NITRATE 2 % TOPICAL POWDER: at 09:02

## 2025-02-19 RX ADMIN — METHOCARBAMOL 750 MG: 750 TABLET ORAL at 11:02

## 2025-02-19 RX ADMIN — NALOXEGOL OXALATE 25 MG: 25 TABLET, FILM COATED ORAL at 09:02

## 2025-02-19 RX ADMIN — UMECLIDINIUM BROMIDE AND VILANTEROL TRIFENATATE 1 PUFF: 62.5; 25 POWDER RESPIRATORY (INHALATION) at 09:02

## 2025-02-19 RX ADMIN — MONTELUKAST SODIUM 10 MG: 5 TABLET, CHEWABLE ORAL at 09:02

## 2025-02-19 RX ADMIN — OXYCODONE HYDROCHLORIDE AND ACETAMINOPHEN 1 TABLET: 10; 325 TABLET ORAL at 01:02

## 2025-02-19 RX ADMIN — OXYCODONE HYDROCHLORIDE AND ACETAMINOPHEN 1 TABLET: 10; 325 TABLET ORAL at 09:02

## 2025-02-19 RX ADMIN — FUROSEMIDE 20 MG: 20 TABLET ORAL at 08:02

## 2025-02-19 RX ADMIN — BISACODYL 5 MG: 5 TABLET, COATED ORAL at 09:02

## 2025-02-19 RX ADMIN — Medication 400 MG: at 05:02

## 2025-02-19 RX ADMIN — CARVEDILOL 25 MG: 25 TABLET, FILM COATED ORAL at 05:02

## 2025-02-19 RX ADMIN — OXYCODONE HYDROCHLORIDE AND ACETAMINOPHEN 1 TABLET: 10; 325 TABLET ORAL at 05:02

## 2025-02-19 RX ADMIN — ATORVASTATIN CALCIUM 40 MG: 40 TABLET, FILM COATED ORAL at 09:02

## 2025-02-19 RX ADMIN — RANOLAZINE 500 MG: 500 TABLET, EXTENDED RELEASE ORAL at 09:02

## 2025-02-19 RX ADMIN — AMLODIPINE BESYLATE 10 MG: 5 TABLET ORAL at 09:02

## 2025-02-19 RX ADMIN — LINACLOTIDE 145 MCG: 145 CAPSULE, GELATIN COATED ORAL at 09:02

## 2025-02-19 RX ADMIN — INSULIN ASPART 2 UNITS: 100 INJECTION, SOLUTION INTRAVENOUS; SUBCUTANEOUS at 05:02

## 2025-02-19 RX ADMIN — PANTOPRAZOLE SODIUM 40 MG: 40 TABLET, DELAYED RELEASE ORAL at 09:02

## 2025-02-19 RX ADMIN — ALBUTEROL SULFATE 2.5 MG: 2.5 SOLUTION RESPIRATORY (INHALATION) at 08:02

## 2025-02-19 RX ADMIN — LOSARTAN POTASSIUM 50 MG: 50 TABLET, FILM COATED ORAL at 05:02

## 2025-02-19 RX ADMIN — FINASTERIDE 5 MG: 5 TABLET, FILM COATED ORAL at 09:02

## 2025-02-19 RX ADMIN — CLOPIDOGREL BISULFATE 75 MG: 75 TABLET, FILM COATED ORAL at 09:02

## 2025-02-19 RX ADMIN — PREGABALIN 100 MG: 50 CAPSULE ORAL at 05:02

## 2025-02-19 RX ADMIN — FLUTICASONE FUROATE 1 PUFF: 200 POWDER RESPIRATORY (INHALATION) at 09:02

## 2025-02-19 RX ADMIN — ASPIRIN 81 MG CHEWABLE TABLET 81 MG: 81 TABLET CHEWABLE at 09:02

## 2025-02-19 NOTE — PLAN OF CARE
Problem: Rehabilitation (IRF) Plan of Care  Goal: Absence of New-Onset Illness or Injury  Outcome: Met  Goal: Optimal Comfort and Wellbeing  Outcome: Met  Goal: Home and Community Transition Plan Established  Outcome: Met     Problem: Wound  Goal: Absence of Infection Signs and Symptoms  Outcome: Met  Goal: Improved Oral Intake  Outcome: Met  Goal: Optimal Pain Control and Function  Outcome: Met  Goal: Skin Health and Integrity  Outcome: Met  Goal: Optimal Wound Healing  Outcome: Met     Problem: Infection  Goal: Absence of Infection Signs and Symptoms  Outcome: Met     Problem: Diabetes Comorbidity  Goal: Blood Glucose Level Within Targeted Range  Outcome: Progressing     Problem: Fall Injury Risk  Goal: Absence of Fall and Fall-Related Injury  Outcome: Met     Problem: Skin Injury Risk Increased  Goal: Skin Health and Integrity  Outcome: Met

## 2025-02-19 NOTE — PT/OT/SLP PROGRESS
Occupational Therapy Inpatient Rehab Treatment    Name: Tyler Mai  MRN: [id number]    Assessment:  Tyler Mai is a 69 y.o. male admitted with a medical diagnosis of Status post cervical spinal fusion.  He presents with the following impairments/functional limitations:  weakness, orthopedic precautions, impaired joint extensibility, edema, decreased ROM, decreased lower extremity function, decreased upper extremity function, impaired self care skills, impaired functional mobility, impaired endurance.    General Precautions: Standard, fall     Orthopedic Precautions:spinal precautions     Braces: Aspen collar    Rehab Prognosis: Good; patient would benefit from acute skilled OT services to address these deficits and reach maximum level of function.      History:     Past Medical History:   Diagnosis Date    Anxiety disorder, unspecified     Arthritis     CAD (coronary artery disease)     Cervical radiculopathy     CHF (congestive heart failure)     Chronic pain syndrome     COPD (chronic obstructive pulmonary disease)     Diabetes mellitus     Gastro-esophageal reflux disease without esophagitis     High cholesterol     Hypertension     Lumbar radiculopathy     Myelomalacia of cervical cord     Other specified diseases of spinal cord     Pacemaker     PVD (peripheral vascular disease)     S/P triple vessel bypass     Tobacco use        Past Surgical History:   Procedure Laterality Date    ANTERIOR CERVICAL DISCECTOMY W/ FUSION  1995    C5-6 and C6-7.  Dr. Wall    ANTERIOR CERVICAL DISCECTOMY W/ FUSION  1996    Redo C5-6, C6-7.  Dr. Wall    CHOLECYSTECTOMY      CORONARY ARTERY BYPASS GRAFT      FUSION OF POSTERIOR COLUMN OF CERVICAL SPINE USING COMPUTER AIDED NAVIGATION N/A 1/24/2025    Procedure: FUSION, SPINE, POSTERIOR SPINAL COLUMN, CERVICAL, USING COMPUTER-ASSISTED NAVIGATION;  Surgeon: Darrell Watson MD;  Location: [facility];  Service: Neurosurgery;  Laterality: N/A;  C2-C6 PCF; C2/3-C4/5  "decompression, possible additional levels  o-arm  NTI  TIVA setup  [employer] //  XX    INSERTION OF PACEMAKER      X2    TRIPPLE VESSEL BYPASS         Subjective     Orientation: Oriented x4    Chief Complaint: no complaints    Patient/Family Comments/goals: "Yeah, the floor is really slick right here."    Respiratory Status: Room air    Patients cultural, spiritual, Jewish conflicts given the current situation: no       Objective:     Patient found HOB elevated with cervical collar  upon OT entry to room.    Mobility   Patient completed:  Sit to Stand Transfer with stand by assistance with rolling walker  Stand to Sit Transfer with stand by assistance with rolling walker  Toilet Transfer Step Transfer technique with stand by assistance with  rolling walker and bedside commode    Functional Mobility  Short FM in-room with RW and SBA    ADLs   Current Status   Oral Hygiene 6 standing at sink with RW   Toileting Hygiene 4 SBA   Toilet Transfer 4 SBA with BSC over toilet     Limiting Factors for ADLs: motor, limited ROM, balance, weakness, coordination, and safety awareness     Patient left sitting edge of bed with call button in reach.     Education provided: Roles and goals of OT, ADLs, transfer training, bed mobility, body mechanics, assistive device, sequencing, safety precautions, fall prevention, and post-op precautions    Multidisciplinary Problems       Occupational Therapy Goals          Problem: Occupational Therapy    Goal Priority Disciplines Outcome Interventions   Occupational Therapy Goal     OT, PT/OT Not Progressing    Description: ADLs: ongoing  Pt to perform grooming tasks with independence standing at sink with RW by d/c.   Pt to perform feeding tasks with independence using adaptive utensils as needed by d/c.    Pt to perform UB dressing with independence by d/c.    Pt to perform LB dressing with touch assist by d/c.    Pt to perform putting on/off footwear task with independence and AE as " needed by d/c.   Pt to perform toileting with touch assist by d/c.    Pt to perform bathing with independence by d/c.     Functional Transfers:  Pt to perform toilet transfers with independence by d/c.    Pt to perform a tub transfer with independence by d/c.     IADLs:  Pt to perform simple meal prep standing at kitchen counter with touch assist by d/c.      Balance, Strengthening, Endurance, Balance:  Pt to consistently demonstrate adherence to spinal precautions during all ADL's as instructed by OT.  Pt to demonstrate good dynamic standing balance as required to perform ADL's from standing level.                         Time Tracking     OT Received On: 02/19/25  Time In 0900     Time Out 0930  Total Time 30 min  Therapy Time: OT Individual: 30  Missed Time:    Missed Time Reason:      Billable Minutes: Self Care/Home Management 30 02/19/2025

## 2025-02-19 NOTE — DISCHARGE INSTRUCTIONS
Wear aspen collar at all times but may use soft collar during meals only; use PRAFO to heels in bed

## 2025-02-19 NOTE — DISCHARGE SUMMARY
[facility] ([facility])  Rehab Discharge Summary    Patient Name: Tyler Mai  MRN: [id number]  Age: 69 y.o. Sex: male  : 1955  Hospital Length of Stay: 19 days   Date of Service: 2025      Discharge Information   Date of Admission: 2025  Date of Discharge: 2025  Admit Diagnosis: Cervical myelopathy         Coronary artery disease         HFrEF/ICMO          Nicotine dependence          HTN          COPD          DM type 2          HLD          PVD         Chronic constipation          Hypomagnesemia          BPH/urinary retention          GERD          Normocytic anemia  Discharge Diagnosis: Cervical myelopathy (stable)           Coronary artery disease (stable)           HFrEF/ICMO (stable)           Nicotine dependence (stable)           HTN (moderately controlled)           COPD (stable)           DM type 2 (stable)           HLD (stable)           PVD (stable)           Chronic constipation (stable)            Hypomagnesemia (stable)           BPH (stable)           GERD (stable)           Normocytic anemia (trending down)           Seasonal allergies (improved)           Pruritus (resolved)    COVID-19 testing:  Unknown  COVID-19 vaccination status:  Vaccinated (Moderna):  Bivalent booster on 2023     Internal Medicine (attending): Tyler De Anda MD  Physiatry (consulting):  Philip Fang MD     OUTPATIENT PROVIDERS  PCP: None  Neurosurgery: Darrell Zavala MD    Hospital Course   69-year-old white male presented to [facility] ED on 2025 complaining of bilateral arm weakness, bilateral leg weakness, and worsening neck pain.  Reports right leg weaker than the left.  Recently admitted to [facility] for cervical myelopathy with plans for surgery, but patient had severe UTI requiring antibiotic therapy.  MRI on  significant for severe C3-4 level central canal stenosis with increased signal hyperintensity within the cervical cord from mid to lower  aspect of C3 through mid aspect of C4 with central cord myelomalacia.  PMH significant for cervical myelopathy with history of C5-C6 fusion, CAD, HFrEF, ICMO, COPD, DM type 2, HLD, HTN, and PVD.  Workup significant for cervical myelopathy.  Patient inpatient family initially declined the neurosurgeon on call surgery.  Then surgery postpone due to inclement weather.  Tolerated posterior C3-C5 total laminectomies for decompression, placement of bilateral pars screws at C2 and bilateral lateral mass screws from C3 to C6 using PageStitch OCT System, and placement of local bone and AlphaGraft DBM for arthrodesis from C2 to C6 on 1/24 without perioperative complications.  Postoperatively neurosurgery documented increase mobility to bilateral upper extremities with a 5/5 hand grasp.  Recommended aspirin hard collar at all times, and Omar collar for showers..  Recommended removal staples on 02/07.  Gray continued due to urinary retention.  Tolerated transfer to [facility] inpatient rehab unit on 1/31 without incident.     During inpatient rehab course Plavix and aspirin resumed.  Proscar 5 mg daily initiated on 1/31.  Lyrica 75 mg b.i.d. initiated.  Indwelling catheter removed on 02/01.  Patient remained without urinary retention.  Robaxin increased 1000 mg q.i.d. for pain management.  Flomax discontinued on 02/04 due to orthostatic hypotension.  Zanaflex discontinued on 02/05 due to continued orthostatic hypotension.  Begin having bilateral lower extremity edema and left upper extremity edema.  Lasix 40 mg daily initiated.  Norvasc discontinued due to lower extremity edema.  Coreg increased 12.5 mg b.i.d..  Continued with Lasix.  Coreg increased 25 mg b.i.d. on 12/12.  Left upper extremity NIVA negative for DVT on 12/13.  Continued with elevated BP.  Losartan increased 50 mg daily on 02/14.  Norvasc daily resumed.  Lower extremity swelling continue improve.  Left upper extremity swelling resolved.  Norvasc increased  "10 mg daily on 02/16 due to elevated BP.  Seroquel decreased 25 mg at bedtime.  Percocet decreased due to GDR on 02/16..  Did not tolerate.  Physiatry increased back to 10 mg daily-home dose per pain management.  Remains continent of bowel and bladder. Stage 2 to right heel stable.  Incision look good. RT: Overall supervision to set up.  Limited by UE dexterity.  Eating well. PT: Good progress.  Independent with bed mobility, Set up with sit to stand and tranfers.  Ambulating 170 feet with RW at CGA.  Limited by strength. OT: Able to move metal utensil. Max assist with foot wear. UE dressing max assist 2/2 collar. Showering max assist. Min assist for toileting.  Supervision to CGA for toileting and LB dressing with adaptive equipment.  Set up with oral care and eating. Discharging today to SNF.     Chief Complaint: Cervical myelopathy s/p C2-6 PCDF and C2/3-C4/5 decompression on 01/24/2025     BP (!) 158/68   Pulse 75   Temp 98 °F (36.7 °C) (Oral)   Resp 18   Ht 5' 6" (1.676 m)   Wt 73.3 kg (161 lb 9.6 oz)   SpO2 97%   BMI 26.08 kg/m²      Physical Exam  Constitutional:       Appearance: Normal appearance.   HENT:      Head: Normocephalic.   Eyes:      Pupils: Pupils are equal, round, and reactive to light.   Neck:      Comments: Posterior neck incision clean and intact.  Hard collar intact  Cardiovascular:      Rate and Rhythm: Normal rate and regular rhythm.      Heart sounds: Normal heart sounds.   Pulmonary:      Effort: Pulmonary effort is normal. No respiratory distress.      Breath sounds: Normal breath sounds. No wheezing or rhonchi.   Abdominal:      General: Bowel sounds are normal.      Palpations: Abdomen is soft.      Tenderness: There is no abdominal tenderness. There is no guarding.   Musculoskeletal:      Cervical back: Neck supple.      Comments: Diffuse muscle atrophy   Skin:     General: Skin is warm and dry.   Neurological:      General: No focal deficit present.      Mental Status: He is " alert and oriented to person, place, and time.      Motor: Weakness present.   Psychiatric:         Mood and Affect: Mood normal.         Behavior: Behavior normal.         Thought Content: Thought content normal.         Judgment: Judgment normal.   *MD performed and documented physical examination        Labs  WBC 4.50 - 11.50 x10(3)/mcL 8.67   RBC 4.70 - 6.10 x10(6)/mcL 2.74 Low    Hgb 14.0 - 18.0 g/dL 8.4 Low    Hct 42.0 - 52.0 % 26.3 Low    MCV 80.0 - 94.0 fL 96.0 High    MCH 27.0 - 31.0 pg 30.7   MCHC 33.0 - 36.0 g/dL 31.9 Low    RDW 11.5 - 17.0 % 12.2   Platelet 130 - 400 x10(3)/mcL 288   MPV 7.4 - 10.4 fL 10.5 High    Neut % % 71.1   Lymph % % 18.9   Mono % % 7.8   Eos % % 1.4   Basophil % % 0.3   Imm Grans % % 0.5   Neut # 2.1 - 9.2 x10(3)/mcL 6.16   Lymph # 0.6 - 4.6 x10(3)/mcL 1.64   Mono # 0.1 - 1.3 x10(3)/mcL 0.68   Eos # 0 - 0.9 x10(3)/mcL 0.12   Baso # <=0.2 x10(3)/mcL 0.03   Imm Gran # 0.00 - 0.04 x10(3)/mcL 0.04   NRBC% % 0.0     Sodium 136 - 145 mmol/L 140   Comment: Every Monday   Potassium 3.5 - 5.1 mmol/L 4.3   Comment: Every Monday   Chloride 98 - 107 mmol/L 104   Comment: Every Monday   CO2 23 - 31 mmol/L 27   Comment: Every Monday   Glucose 82 - 115 mg/dL 112   Comment: Every Monday   Blood Urea Nitrogen 8.4 - 25.7 mg/dL 42.4 High    Comment: Every Monday   Creatinine 0.72 - 1.25 mg/dL 1.24   Comment: Every Monday   Calcium 8.8 - 10.0 mg/dL 9.9   Comment: Every Monday   Protein Total 5.8 - 7.6 gm/dL 7.0   Comment: Every Monday   Albumin 3.4 - 4.8 g/dL 3.5   Comment: Every Monday   Globulin 2.4 - 3.5 gm/dL 3.5   Albumin/Globulin Ratio 1.1 - 2.0 ratio 1.0 Low    Bilirubin Total <=1.5 mg/dL 0.3   Comment: Every Monday   ALP 40 - 150 unit/L 157 High    Comment: Every Monday   ALT 0 - 55 unit/L 23   Comment: Every Monday   AST 5 - 34 unit/L 18   Comment: Every Monday   eGFR mL/min/1.73/m2 >60   Comment: Estimated GFR calculated using the CKD-EPI creatinine (2021) equation.   Anion Gap mEq/L 9.0    BUN/Creatinine Ratio  34     Radiology   Left upper extremity NIVA on 02/12/2025, IMPRESSION:  Negative for DVT  Radiology  Cervical XR two-view on 01/25/2025, IMPRESSION: There are postoperative changes with posterior spinal fusion hardware at C2 through C6. The hardware appears intact. Cervical alignment has improved. The remaining vertebral body heights are preserved. There is moderate disc height loss at C3-C4, mild at C4-C5, with multilevel marginal osteophytes. The soft tissues are unremarkable.     Discharge Summary Plan   Discharge Status:  Improved    Location: Discharge to skilled nursing facility    Medications: See discharge medicine reconciliation    Activity:  As tolerated    Diet:  ADA/cardiac    Instructions:  Take all medications as prescribed.      Attend appointments as scheduled.      Return to ED if symptoms worsen, or if t > 100.4.    Education:  CAD.  CHF.  HTN.  dm type 2.    Follow-up:  To follow up with NP or MD within 24-72 hours of admission to skilled facility    Discussed plan of care, and patient communicated understanding. Agreed to comply with recommendations.    Jarrell Ramirez NP conducted independent examination and assisted with medical documentation.     Discharge Time: 43 minutes

## 2025-02-19 NOTE — PROGRESS NOTES
Dos 2/19/25  Patient seen and evaluated in OT today  Continues to participate and make progress toward goals  Working on ADL's and IADL's  Discussed with patient and OT  Reviewed chart and discussed with nursing, Dr De Anda's primary medicine team, as well as Rylie Puri, my NP  Agree with present POC  Subjective  HPI: 69 year old WM with a PMH of cervical myelopathy, CAD, CHF, COPD, DM, HLD, HTN, and PVD presented to the ED at [facility] on 1/14/25 for Neurosurgery. Patient presented in ED following discharge from [facility]. He reports associated symptoms of bilateral arm weakness, bilateral leg weakness, and worsening neck pain. Notes right leg is weaker than left. He also complains of bilateral hand, bilateral leg, and bilateral feet numbness. He stated that he has been unable to care for himself at home because of the weakness, and having to use a wheelchair to get around. Patient's daughter at bedside reports frequent falls at home. No urinary or fecal incontinence. Per chart review, the patient was admitted to [facility] for cervical myelopathy with the plan of surgery. Previous extensive admission at [facility] for UTI which cleared and resolved with antibiotics; initially, did not have surgery secondary to UTI. Now amendable for surgical intervention since UTI has resolved. Patient refused surgery with on-call neurosurgeon at [facility] and discharged at noon today. He was advised to come here for the surgery. MRI on 01/05 showed severe C3-4 level central canal stenosis with increased signal hyperintensity within the cervical cord from mid to lower aspect of C3 through mid aspect of C4 with central cord myelomalacia. No syrinx is detected. Prior C5-C7 intervertebral body fusion. Neurosurgery was consulted with recommendation for surgical intervention needed. Cardiology consulted with low risk for MACE for high risk neurosurgical procedure. On 1/24, patient underwent C2-6 PCDF and C2-3- C4-5 decompression by Dr. Ramírez. 1 [facility]  Follow up note drain placed to site. Hard cervical collar in place. On 1/25, wound care was consulted for right heel and left heel wound with recommendation to cleanse bilateral heels with normal saline, paint with betadine, apply small foam, and change every 2 days. Recommended bilateral heel boots. Herrera was removed. Labs showed low Na of 133, elevated BUN/Cr of 29.7/ 1.31, and low H&H of 10.6 & 31.9. BP ranging 125/58- 182/80. PT/OT efvals completed with deficits noted with recommendation for high intensity therapy needed. ON 1/26, Labs showed low H&H of 10.1 & 29.5. Patient having urinary retention requiring in and out caths. On 1/27, hemovac removed. Neurosurgery recommended to DC staples on POD 14, will need follow up in 2 weeks after DC from hospital with XR uprights of cervical spine with Izzy, NP, continued Percocet and Robaxin for pain management, and signed off of case. Herrera had to be reinserted for urinary retention, and started Flomax. BP ranging 123/61- 169/70. On 1/28, patient started on Relistar for constipation with 2 large Bms noted. Labs showed low H&H of 9.2 & 27.3. On 1/30, wound care reassessed with 2 wounds noted to bilateral anterior legs. Cleansed with soap and water, and applied aquaphor to sites. Patient is AAOx4.   Participating with therapy. Functional status includes setup assist needed for eating, minimal assist for bed mobility, contact guard assist for transfers with RW, walked 28ft with RW at minimal assist, minimal assist for toilet transfer, total assist for toileting due to herrera in place, stand by assist for grooming, max assist for upper body dressing, and max assist for lower body dressing. Patient was evaluated, accepted, and admitted to inpatient rehab to improve functional status. Transferred to [facility] on 1/31 without incident.     2/19: Seen with OT in patient room, lying in bed and sleeping. OT relays that they worked on dressing and sitting EOB for a while. She worked on his trigger  point in left trap, and he just fell asleep. Donning socks for him. Participating in most therapy. No complaints. VSSAF with noted SBP elevation at 0445. Drops with position changes and morning medications. IM following.          Review of Systems  Psychiatric: Denies mental health history. Pt. Smokes cigarettes.     Depression/Anxiety: notes some anxiety in regards to eating and getting enough nutrition to his mouth     DULoxetine DR capsule 30 mg qd, start 2/1  ALPRAZolam tablet 0.25 mg TID PRN Anxiety  Pain: L>R side of neck, numbness (hands, legs, feet), shooting pains  DULoxetine DR capsule 30 mg qd, start 2/1  methocarbamoL tablet 750 mg AC & HS  pregabalin capsule 100mg TID  LIDOcaine 5 % patch 1 patch neck q24hr, 1800  acetaminophen tablet 650 mg q6h PRN mild pain   oxyCODONE-acetaminophen 5-325 mg 1 tablet q4h PRN mod pain  oxyCODONE-acetaminophen  mg per tablet 1 tablet q4h PRN severe pain  cyclobenzaprine tablet 5 mg TID PRN spasm  Bowels/Bladder: Last BM 2/17 x 2 (loose)  naloxegoL (MOVANTIK) tablet 25 mg qd  Gray catheter reinserted 2/2 urinary retention-discontinued. Urinating    bisacodyL EC tablet 5 mg qd  linaCLOtide capsule 145 mcg qAM  tamsulosin 24 hr capsule 0.4 mg qHS  Appetite: good.  Improved intake with built up utensils   Sleep: good with melatonin  melatonin tablet 6 mg qHS PRN Insomnia              Physical Exam  General: well-developed, well-nourished, in no acute distress  Neck: hard collar, supple  Respiratory: equal chest rise, no SOB, no audible wheeze  Cardiovascular: regular rate and rhythm, BLE & LUE edema  Gastrointestinal: soft, non-tender, non-distended   Musculoskeletal: BUE/BLE weakness  Integumentary: no rashes, bilateral heel wounds, bilateral anterior leg wounds, posterior cervical incision-staples-dressing-c/d/i  Neurologic: cranial nerves intact, BUE/BLE weakness, numbness (hands, legs, feet), balance deficits  *MD performed and documented physical examination                  Assessment/Plan  Hospital   Anemia   Hyponatremia   Spinal cord compression   Status post cervical spinal fusion   Hypokalemia     Non-Hospital   HTN (hypertension)   Hyperlipidemia   Ischemic cardiomyopathy with implantable cardioverter-defibrillator (ICD)   Tobacco user   Type 2 diabetes mellitus   Peripheral arterial disease with history of revascularization   Chronic systolic CHF (congestive heart failure), NYHA class 3   Chronic low back pain   Cervical myelopathy   Carotid artery stenosis, asymptomatic, left   Arteriosclerosis of coronary artery   S/P triple vessel bypass   COPD (chronic obstructive pulmonary disease)   Gastro-esophageal reflux disease without esophagitis   Anxiety disorder, unspecified   Cervical radiculopathy   Lumbar radiculopathy   Myelomalacia of cervical cord       Wounds: bilateral heel wounds, bilateral anterior leg wounds, posterior cervical incision-staples-dressing-c/d/i  On 1/24, patient underwent C2-6 PCDF and C2-3- C4-5 decompression by Dr. Ramírez.  (Hemovac drain placed to site)  Precautions: spinal  Bracing: Hard cervical collar, bilateral heel boots in bed  Swallowing: Regular Diet  Function: Tolerating therapy. Continue PT/OT  VTE Prophylaxis:   enoxaparin injection 40 mg q24hr  Code Status: FULL CODE   Discharge: Lives alone in [place] in a 2nd story apartment. Completed high school. He has no  history.  Is disabled. . He was living alone and completely independent. Children: (2). Date 2/19 Wednesday to SNF for additional assistance and therapy.                   Shannan Puri NP, conducted additional independent physical examination and assisted with medical documentation.

## 2025-02-19 NOTE — PLAN OF CARE
[facility] received auth from insurance (x3 days initially) per Liana.  Coordinating transition to SNF there today.  Van will be scheduled for pickup once report is called later today.  Will also inform pt and son.

## 2025-02-19 NOTE — PLAN OF CARE
Problem: Rehabilitation (IRF) Plan of Care  Goal: Absence of New-Onset Illness or Injury  Outcome: Progressing     Problem: Wound  Goal: Absence of Infection Signs and Symptoms  Outcome: Progressing     Problem: Diabetes Comorbidity  Goal: Blood Glucose Level Within Targeted Range  Outcome: Progressing     Problem: Fall Injury Risk  Goal: Absence of Fall and Fall-Related Injury  Outcome: Progressing

## 2025-02-19 NOTE — PT/OT/SLP PROGRESS
Physical Therapy      Patient Name:  Tyler Mai   MRN:  [id number]    Patient not seen today secondary to Patient unwilling to participate.     Amount of therapy minutes missed:  90 Minutes.      Pt scheduled for extra therapy today prior to d/c. Pt requested to rest prior to D/C. All questions and concerns were addressed. Pt left with all needs in reach and verbalized understanding to call for assist.    2/19/2025

## 2025-02-19 NOTE — NURSING
I connected with Dr. Perez, reports though was for another SIJ injection not lumbar MBBs. She feels that there will be no problem moving forward with the L-MBB, will call us back soon to confirm details as she knows patient is on tomorrow's schedule. She will call main office line. Thanks.    Pt left via w/c w/staff to vehicle, condition stable

## 2025-02-19 NOTE — PT/OT/SLP DISCHARGE
Physical Therapy Discharge Summary    Name: Tyler Mai  MRN: [id number]   Principal Problem: Status post cervical spinal fusion          Assessment:     Pt with fair to good progress with therapy interventions. Pt main limitations were pain and endurance. Pt limited in d/c home 2/2 having limited family support and severity of deficits.     Objective:     GOALS:   Multidisciplinary Problems       Physical Therapy Goals          Problem: Physical Therapy    Goal Priority Disciplines Outcome Interventions   Physical Therapy Goal     PT, PT/OT Progressing    Description: Bed Mobility:  MET-Sit to supine transfer independently.   MET-Supine to sit transfer independently.     Transfers:  NOT METSit to stand transfer independently using RW.   NOT MET Car transfer independently using RW.   NOT MET  an object from the ground in standing position independently using RW.     Mobility:  NOT MET Ambulate 500 feet independently using RW.  NOT MET Ambulate 15 feet on uneven surfaces/ramps with setup/clean-up assist using RW.   NOT MET Pt ascended/descended a 6 inch curb independently using RW.   NOT MET Ascend/descend 15 stairs independently using bilateral handrails.                         Care Scores:   Admission Assessment Current   Status  Discharge   Goal   Functional Area: Care Score:  Care Score:    Roll Left and Right 6 6 Independent   Sit to Lying 4 6 Independent   Lying to Sitting on Side of Bed 4 6 Independent   Sit to Stand 4 5 Independent   Chair/Bed-to-Chair Transfer 4 5 Independent   Car Transfer 4 4 Independent   Walk 10 Feet 4 4 Independent   Walk 50 Feet with Two Turns 4 4     Walk 150 Feet 3 4 Independent   Walk 10 Feet Uneven Surface 3 4 Independent   1 Step (Curb) 4 4 Independent   4 Steps 4 4 Independent   12 Steps 88 4 Set-up/clean-up   Picking Up Object 4 5 Independent   Wheel 50 Feet with Two Turns 9 9     Wheel 150 Feet 9 9         Reasons for Discontinuation of Therapy Services  Transfer to  alternate level of care.      Plan:     Patient Discharged to: Skilled Nursing Facility.    2/19/2025

## 2025-02-19 NOTE — PT/OT/SLP PROGRESS
Occupational Therapy Inpatient Rehab Treatment    Name: Tyler Mai  MRN: [id number]    Assessment:  Tyler Mai is a 69 y.o. male admitted with a medical diagnosis of Status post cervical spinal fusion.  He presents with the following impairments/functional limitations:  weakness, impaired endurance, impaired sensation, impaired self care skills, impaired functional mobility, impaired balance, decreased coordination, decreased upper extremity function, decreased lower extremity function, decreased safety awareness, pain, decreased ROM, impaired coordination, impaired fine motor, orthopedic precautions.    General Precautions: Standard, fall     Orthopedic Precautions:spinal precautions     Braces: Aspen collar    Rehab Prognosis: Good; patient would benefit from acute skilled OT services to address these deficits and reach maximum level of function.      History:     Past Medical History:   Diagnosis Date    Anxiety disorder, unspecified     Arthritis     CAD (coronary artery disease)     Cervical radiculopathy     CHF (congestive heart failure)     Chronic pain syndrome     COPD (chronic obstructive pulmonary disease)     Diabetes mellitus     Gastro-esophageal reflux disease without esophagitis     High cholesterol     Hypertension     Lumbar radiculopathy     Myelomalacia of cervical cord     Other specified diseases of spinal cord     Pacemaker     PVD (peripheral vascular disease)     S/P triple vessel bypass     Tobacco use        Past Surgical History:   Procedure Laterality Date    ANTERIOR CERVICAL DISCECTOMY W/ FUSION  1995    C5-6 and C6-7.  Dr. Wall    ANTERIOR CERVICAL DISCECTOMY W/ FUSION  1996    Redo C5-6, C6-7.  Dr. Wall    CHOLECYSTECTOMY      CORONARY ARTERY BYPASS GRAFT      FUSION OF POSTERIOR COLUMN OF CERVICAL SPINE USING COMPUTER AIDED NAVIGATION N/A 1/24/2025    Procedure: FUSION, SPINE, POSTERIOR SPINAL COLUMN, CERVICAL, USING COMPUTER-ASSISTED NAVIGATION;  Surgeon: Lucas Jean Baptiste  "MD Darrell;  Location: [facility];  Service: Neurosurgery;  Laterality: N/A;  C2-C6 PCF; C2/3-C4/5 decompression, possible additional levels  o-arm  NTI  TIVA setup  [employer] //  XX    INSERTION OF PACEMAKER      X2    TRIPPLE VESSEL BYPASS         Subjective     Orientation: Oriented x4    Chief Complaint: tightness in L upper traps    Patient/Family Comments/goals: "That lotion has really helped with the itching."    Vitals   Vitals at Rest  /52   HR 60   O2 Sat    Pain      Respiratory Status: Room air    Patients cultural, spiritual, Adventism conflicts given the current situation: no     Objective:     Patient found sitting edge of bed with cervical collar  upon OT entry to room.    Mobility   Patient completed:  Sit to Supine with supervision  Sit to Stand Transfer with supervision with rolling walker  Stand to Sit Transfer with supervision with rolling walker    ADLs   Current Status   Eating 5   Upper Body Dressing 2   Lower Body Dressing 2   Putting On, Taking Off Footwear 2     Limiting Factors for ADLs: motor, sensory, psychsocial, endurance, limited ROM, balance, weakness, perceptual, coordination, safety awareness, and pain     Patient left supine with all lines intact and call button in reach.     Education provided: Roles and goals of OT, ADLs, bed mobility, assistive device, sequencing, safety precautions, and fall prevention    Multidisciplinary Problems       Occupational Therapy Goals          Problem: Occupational Therapy    Goal Priority Disciplines Outcome Interventions   Occupational Therapy Goal     OT, PT/OT Not Progressing    Description: ADLs: ongoing  Pt to perform grooming tasks with independence standing at sink with RW by d/c.   Pt to perform feeding tasks with independence using adaptive utensils as needed by d/c.    Pt to perform UB dressing with independence by d/c.    Pt to perform LB dressing with touch assist by d/c.    Pt to perform putting on/off footwear task with " independence and AE as needed by d/c.   Pt to perform toileting with touch assist by d/c.    Pt to perform bathing with independence by d/c.     Functional Transfers:  Pt to perform toilet transfers with independence by d/c.    Pt to perform a tub transfer with independence by d/c.     IADLs:  Pt to perform simple meal prep standing at kitchen counter with touch assist by d/c.      Balance, Strengthening, Endurance, Balance:  Pt to consistently demonstrate adherence to spinal precautions during all ADL's as instructed by OT.  Pt to demonstrate good dynamic standing balance as required to perform ADL's from standing level.                         Time Tracking     OT Received On: 02/19/25  Time In 0730     Time Out 0830  Total Time 60 min  Therapy Time: OT Individual: 60  Missed Time:    Missed Time Reason:      Billable Minutes: Self Care/Home Management 60    02/19/2025

## 2025-02-20 ENCOUNTER — HOSPITAL ENCOUNTER (EMERGENCY)
Facility: HOSPITAL | Age: 70
Discharge: NURSING, REHAB OR LONG-TERM CARE | End: 2025-02-20
Attending: EMERGENCY MEDICINE
Payer: MEDICARE

## 2025-02-20 VITALS
WEIGHT: 160 LBS | OXYGEN SATURATION: 96 % | BODY MASS INDEX: 25.71 KG/M2 | RESPIRATION RATE: 16 BRPM | TEMPERATURE: 98 F | HEART RATE: 74 BPM | HEIGHT: 66 IN | DIASTOLIC BLOOD PRESSURE: 67 MMHG | SYSTOLIC BLOOD PRESSURE: 141 MMHG

## 2025-02-20 DIAGNOSIS — Z98.1 S/P CERVICAL SPINAL FUSION: ICD-10-CM

## 2025-02-20 DIAGNOSIS — M47.12 CERVICAL SPONDYLOSIS WITH MYELOPATHY: Primary | ICD-10-CM

## 2025-02-20 DIAGNOSIS — S01.81XA FACIAL LACERATION, INITIAL ENCOUNTER: ICD-10-CM

## 2025-02-20 DIAGNOSIS — T14.90XA TRAUMA: Primary | ICD-10-CM

## 2025-02-20 LAB
BASOPHILS # BLD AUTO: 0.06 X10(3)/MCL
BASOPHILS NFR BLD AUTO: 0.5 %
EOSINOPHIL # BLD AUTO: 0.37 X10(3)/MCL (ref 0–0.9)
EOSINOPHIL NFR BLD AUTO: 3.2 %
ERYTHROCYTE [DISTWIDTH] IN BLOOD BY AUTOMATED COUNT: 12.3 % (ref 11.5–17)
GROUP & RH: NORMAL
HCT VFR BLD AUTO: 26 % (ref 42–52)
HGB BLD-MCNC: 8.5 G/DL (ref 14–18)
IMM GRANULOCYTES # BLD AUTO: 0.07 X10(3)/MCL (ref 0–0.04)
IMM GRANULOCYTES NFR BLD AUTO: 0.6 %
INDIRECT COOMBS: NORMAL
INR PPP: 1.1
LYMPHOCYTES # BLD AUTO: 1.46 X10(3)/MCL (ref 0.6–4.6)
LYMPHOCYTES NFR BLD AUTO: 12.7 %
MCH RBC QN AUTO: 30.9 PG (ref 27–31)
MCHC RBC AUTO-ENTMCNC: 32.7 G/DL (ref 33–36)
MCV RBC AUTO: 94.5 FL (ref 80–94)
MONOCYTES # BLD AUTO: 0.8 X10(3)/MCL (ref 0.1–1.3)
MONOCYTES NFR BLD AUTO: 7 %
NEUTROPHILS # BLD AUTO: 8.71 X10(3)/MCL (ref 2.1–9.2)
NEUTROPHILS NFR BLD AUTO: 76 %
NRBC BLD AUTO-RTO: 0 %
PLATELET # BLD AUTO: 299 X10(3)/MCL (ref 130–400)
PMV BLD AUTO: 10.4 FL (ref 7.4–10.4)
PROTHROMBIN TIME: 13.8 SECONDS (ref 12.5–14.5)
RBC # BLD AUTO: 2.75 X10(6)/MCL (ref 4.7–6.1)
SPECIMEN OUTDATE: NORMAL
WBC # BLD AUTO: 11.47 X10(3)/MCL (ref 4.5–11.5)

## 2025-02-20 PROCEDURE — 90471 IMMUNIZATION ADMIN: CPT | Performed by: EMERGENCY MEDICINE

## 2025-02-20 PROCEDURE — 86900 BLOOD TYPING SEROLOGIC ABO: CPT | Performed by: EMERGENCY MEDICINE

## 2025-02-20 PROCEDURE — 63600175 PHARM REV CODE 636 W HCPCS: Performed by: EMERGENCY MEDICINE

## 2025-02-20 PROCEDURE — 99284 EMERGENCY DEPT VISIT MOD MDM: CPT | Mod: 25

## 2025-02-20 PROCEDURE — 85610 PROTHROMBIN TIME: CPT | Performed by: EMERGENCY MEDICINE

## 2025-02-20 PROCEDURE — 12013 RPR F/E/E/N/L/M 2.6-5.0 CM: CPT

## 2025-02-20 PROCEDURE — 85025 COMPLETE CBC W/AUTO DIFF WBC: CPT | Performed by: EMERGENCY MEDICINE

## 2025-02-20 PROCEDURE — 90715 TDAP VACCINE 7 YRS/> IM: CPT | Performed by: EMERGENCY MEDICINE

## 2025-02-20 PROCEDURE — 25000003 PHARM REV CODE 250: Performed by: EMERGENCY MEDICINE

## 2025-02-20 PROCEDURE — G0390 TRAUMA RESPONS W/HOSP CRITI: HCPCS | Performed by: EMERGENCY MEDICINE

## 2025-02-20 RX ORDER — OXYCODONE AND ACETAMINOPHEN 10; 325 MG/1; MG/1
1 TABLET ORAL ONCE
Refills: 0 | Status: COMPLETED | OUTPATIENT
Start: 2025-02-20 | End: 2025-02-20

## 2025-02-20 RX ORDER — LIDOCAINE HYDROCHLORIDE AND EPINEPHRINE 10; 10 UG/ML; MG/ML
1 INJECTION, SOLUTION INFILTRATION; PERINEURAL ONCE
Status: COMPLETED | OUTPATIENT
Start: 2025-02-20 | End: 2025-02-20

## 2025-02-20 RX ORDER — MORPHINE SULFATE 4 MG/ML
4 INJECTION, SOLUTION INTRAMUSCULAR; INTRAVENOUS
Refills: 0 | Status: COMPLETED | OUTPATIENT
Start: 2025-02-20 | End: 2025-02-20

## 2025-02-20 RX ADMIN — OXYCODONE AND ACETAMINOPHEN 1 TABLET: 10; 325 TABLET ORAL at 05:02

## 2025-02-20 RX ADMIN — OXYCODONE AND ACETAMINOPHEN 1 TABLET: 10; 325 TABLET ORAL at 12:02

## 2025-02-20 RX ADMIN — CLOSTRIDIUM TETANI TOXOID ANTIGEN (FORMALDEHYDE INACTIVATED), CORYNEBACTERIUM DIPHTHERIAE TOXOID ANTIGEN (FORMALDEHYDE INACTIVATED), BORDETELLA PERTUSSIS TOXOID ANTIGEN (GLUTARALDEHYDE INACTIVATED), BORDETELLA PERTUSSIS FILAMENTOUS HEMAGGLUTININ ANTIGEN (FORMALDEHYDE INACTIVATED), BORDETELLA PERTUSSIS PERTACTIN ANTIGEN, AND BORDETELLA PERTUSSIS FIMBRIAE 2/3 ANTIGEN 0.5 ML: 5; 2; 2.5; 5; 3; 5 INJECTION, SUSPENSION INTRAMUSCULAR at 11:02

## 2025-02-20 RX ADMIN — LIDOCAINE HYDROCHLORIDE AND EPINEPHRINE 1 ML: 10; 10 INJECTION, SOLUTION INFILTRATION; PERINEURAL at 12:02

## 2025-02-20 RX ADMIN — MORPHINE SULFATE 4 MG: 4 INJECTION, SOLUTION INTRAMUSCULAR; INTRAVENOUS at 12:02

## 2025-02-20 NOTE — SUBJECTIVE & OBJECTIVE
Current Facility-Administered Medications on File Prior to Encounter   Medication    [DISCONTINUED] acetaminophen tablet 650 mg    [DISCONTINUED] albuterol nebulizer solution 2.5 mg    [DISCONTINUED] ALPRAZolam tablet 0.25 mg    [DISCONTINUED] amLODIPine tablet 10 mg    [DISCONTINUED] aspirin chewable tablet 81 mg    [DISCONTINUED] atorvastatin tablet 40 mg    [DISCONTINUED] benzonatate capsule 100 mg    [DISCONTINUED] bisacodyL EC tablet 5 mg    [DISCONTINUED] bisacodyL suppository 10 mg    [DISCONTINUED] camphor-menthol 0.5-0.5% lotion    [DISCONTINUED] carvediloL tablet 25 mg    [DISCONTINUED] clopidogreL tablet 75 mg    [DISCONTINUED] cyclobenzaprine tablet 5 mg    [DISCONTINUED] dextrose 50% injection 12.5 g    [DISCONTINUED] dextrose 50% injection 25 g    [DISCONTINUED] diphenhydrAMINE-zinc acetate 2-0.1% cream    [DISCONTINUED] DULoxetine DR capsule 30 mg    [DISCONTINUED] enoxaparin injection 40 mg    [DISCONTINUED] finasteride tablet 5 mg    [DISCONTINUED] fluticasone furoate 200 mcg/actuation inhaler 1 puff    [DISCONTINUED] furosemide tablet 20 mg    [DISCONTINUED] glucagon (human recombinant) injection 1 mg    [DISCONTINUED] glucose chewable tablet 16 g    [DISCONTINUED] glucose chewable tablet 24 g    [DISCONTINUED] hydrALAZINE injection 10 mg    [DISCONTINUED] hydrocortisone 2.5 % rectal cream    [DISCONTINUED] hydrOXYzine pamoate capsule 25 mg    [DISCONTINUED] hydrOXYzine pamoate capsule 50 mg    [DISCONTINUED] insulin aspart U-100 injection 0-10 Units    [DISCONTINUED] labetalol 20 mg/4 mL (5 mg/mL) IV syring    [DISCONTINUED] LIDOcaine 5 % patch 1 patch    [DISCONTINUED] linaCLOtide capsule 145 mcg    [DISCONTINUED] losartan tablet 50 mg    [DISCONTINUED] magnesium citrate solution 296 mL    [DISCONTINUED] magnesium oxide tablet 400 mg    [DISCONTINUED] melatonin tablet 6 mg    [DISCONTINUED] methocarbamoL tablet 750 mg    [DISCONTINUED] metoprolol injection 10 mg    [DISCONTINUED] miconazole  NITRATE 2 % top powder    [DISCONTINUED] montelukast chewable tablet 10 mg    [DISCONTINUED] naloxegoL (MOVANTIK) tablet 25 mg    [DISCONTINUED] nitroGLYCERIN SL tablet 0.4 mg    [DISCONTINUED] ondansetron disintegrating tablet 4 mg    [DISCONTINUED] ondansetron disintegrating tablet 8 mg    [DISCONTINUED] ondansetron injection 4 mg    [DISCONTINUED] oxyCODONE-acetaminophen  mg per tablet 1 tablet    [DISCONTINUED] oxyCODONE-acetaminophen 5-325 mg per tablet 1 tablet    [DISCONTINUED] pantoprazole EC tablet 40 mg    [DISCONTINUED] pregabalin capsule 100 mg    [DISCONTINUED] QUEtiapine tablet 25 mg    [DISCONTINUED] ranolazine 12 hr tablet 500 mg    [DISCONTINUED] silver nitrate applicators applicator 1 applicator    [DISCONTINUED] triamcinolone acetonide 0.1% ointment    [DISCONTINUED] umeclidinium-vilanteroL 62.5-25 mcg/actuation DsDv 1 puff     Current Outpatient Medications on File Prior to Encounter   Medication Sig    albuterol (PROVENTIL/VENTOLIN HFA) 90 mcg/actuation inhaler 1 puff every 4 (four) hours as needed.    amLODIPine (NORVASC) 10 MG tablet Take 1 tablet (10 mg total) by mouth once daily.    aspirin 81 MG Chew Take 1 tablet (81 mg total) by mouth once daily.    bisacodyL (DULCOLAX) 5 mg EC tablet Take 1 tablet (5 mg total) by mouth once daily.    carvediloL (COREG) 25 MG tablet Take 1 tablet (25 mg total) by mouth 2 (two) times daily before meals.    clopidogreL (PLAVIX) 75 mg tablet Take 1 tablet (75 mg total) by mouth once daily.    cyclobenzaprine (FLEXERIL) 5 MG tablet Take 1 tablet (5 mg total) by mouth 3 (three) times daily as needed for Muscle spasms.    DULoxetine (CYMBALTA) 30 MG capsule Take 2 capsules (60 mg total) by mouth once daily.    finasteride (PROSCAR) 5 mg tablet Take 1 tablet (5 mg total) by mouth once daily.    furosemide (LASIX) 20 MG tablet Take 1 tablet (20 mg total) by mouth once daily.    LINZESS 145 mcg Cap capsule Take 145 mcg by mouth every morning.    losartan  (COZAAR) 50 MG tablet Take 1 tablet (50 mg total) by mouth before breakfast.    magnesium oxide (MAG-OX) 400 mg (241.3 mg magnesium) tablet Take 1 tablet (400 mg total) by mouth 3 (three) times daily.    methocarbamoL (ROBAXIN) 750 MG Tab Take 1 tablet (750 mg total) by mouth 4 (four) times daily before meals and nightly. for 10 days    montelukast (SINGULAIR) 5 MG chewable tablet Take 2 tablets (10 mg total) by mouth once daily.    oxyCODONE-acetaminophen (PERCOCET)  mg per tablet Take 1 tablet by mouth every 6 (six) hours as needed for Pain.    pantoprazole (PROTONIX) 40 MG tablet Take 1 tablet (40 mg total) by mouth once daily.    pregabalin (LYRICA) 100 MG capsule Take 1 capsule (100 mg total) by mouth 3 (three) times daily.    QUEtiapine (SEROQUEL) 25 MG Tab Take 1 tablet (25 mg total) by mouth every evening.    ranolazine (RANEXA) 500 MG Tb12 Take 500 mg by mouth 2 (two) times daily.    rosuvastatin (CRESTOR) 40 MG Tab Take 40 mg by mouth every evening.    TRELEGY ELLIPTA 200-62.5-25 mcg inhaler Inhale 1 puff into the lungs.       Review of patient's allergies indicates:   Allergen Reactions    Hydrochlorothiazide      Other Reaction(s): Pancreatitis       Past Medical History:   Diagnosis Date    Anxiety disorder, unspecified     Arthritis     CAD (coronary artery disease)     Cervical radiculopathy     CHF (congestive heart failure)     Chronic pain syndrome     COPD (chronic obstructive pulmonary disease)     Diabetes mellitus     Gastro-esophageal reflux disease without esophagitis     High cholesterol     Hypertension     Lumbar radiculopathy     Myelomalacia of cervical cord     Other specified diseases of spinal cord     Pacemaker     PVD (peripheral vascular disease)     S/P triple vessel bypass     Tobacco use      Past Surgical History:   Procedure Laterality Date    ANTERIOR CERVICAL DISCECTOMY W/ FUSION  1995    C5-6 and C6-7.  Dr. Wall    ANTERIOR CERVICAL DISCECTOMY W/ FUSION  1996    Redo  C5-6, C6-7.  Dr. Wall    CHOLECYSTECTOMY      CORONARY ARTERY BYPASS GRAFT      FUSION OF POSTERIOR COLUMN OF CERVICAL SPINE USING COMPUTER AIDED NAVIGATION N/A 1/24/2025    Procedure: FUSION, SPINE, POSTERIOR SPINAL COLUMN, CERVICAL, USING COMPUTER-ASSISTED NAVIGATION;  Surgeon: Darrell Watson MD;  Location: [facility];  Service: Neurosurgery;  Laterality: N/A;  C2-C6 PCF; C2/3-C4/5 decompression, possible additional levels  o-arm  NTI  TIVA setup  [employer] //  XX    INSERTION OF PACEMAKER      X2    TRIPPLE VESSEL BYPASS       Family History       Problem Relation (Age of Onset)    Cancer Mother, Father    Lumbar disc disease Brother          Tobacco Use    Smoking status: Every Day     Current packs/day: 1.00     Types: Cigarettes    Smokeless tobacco: Not on file   Substance and Sexual Activity    Alcohol use: Not on file    Drug use: Not on file    Sexual activity: Not on file     Review of Systems   Constitutional:  Negative for chills and fever.   HENT:  Negative for ear pain and trouble swallowing.    Eyes:  Negative for pain and redness.   Respiratory:  Negative for cough and chest tightness.    Cardiovascular:  Negative for chest pain, palpitations and leg swelling.   Gastrointestinal:  Negative for abdominal distention, abdominal pain, nausea and vomiting.   Genitourinary:  Negative for difficulty urinating.   Musculoskeletal:  Negative for back pain and neck pain.   Skin:  Positive for wound. Negative for color change and pallor.   Neurological:  Negative for dizziness, syncope, speech difficulty, weakness, light-headedness, numbness and headaches.   Psychiatric/Behavioral:  Negative for agitation and suicidal ideas.    All other systems reviewed and are negative.    Objective:     Vital Signs (Most Recent):  Temp: 98.6 °F (37 °C) (02/20/25 1132)  Pulse: 79 (02/20/25 1132)  Resp: 14 (02/20/25 1132)  BP: (!) 156/69 (02/20/25 1132)  SpO2: 98 % (02/20/25 1132) Vital Signs (24h Range):  Temp:  [98.6 °F  (37 °C)-98.7 °F (37.1 °C)] 98.6 °F (37 °C)  Pulse:  [79-80] 79  Resp:  [14-18] 14  SpO2:  [93 %-98 %] 98 %  BP: (155-156)/(69-71) 156/69     Weight: 72.6 kg (160 lb)  Body mass index is 25.82 kg/m².     Physical Exam  Constitutional:       Appearance: Normal appearance.   HENT:      Head: Normocephalic and atraumatic.      Nose: Nose normal.   Eyes:      Pupils: Pupils are equal, round, and reactive to light.   Cardiovascular:      Rate and Rhythm: Normal rate.      Pulses: Normal pulses.      Comments: Normal peripheral pulses  Pulmonary:      Effort: Pulmonary effort is normal. No respiratory distress.   Chest:      Chest wall: No tenderness.   Abdominal:      General: Abdomen is flat. Bowel sounds are normal. There is no distension.      Palpations: Abdomen is soft.      Tenderness: There is no abdominal tenderness.   Musculoskeletal:         General: No swelling, tenderness, deformity or signs of injury.      Cervical back: Normal range of motion and neck supple. No tenderness.   Skin:     General: Skin is warm and dry.      Capillary Refill: Capillary refill takes less than 2 seconds.      Findings: No lesion.      Comments: As above   Neurological:      General: No focal deficit present.      Mental Status: He is alert and oriented to person, place, and time. Mental status is at baseline.   Psychiatric:         Mood and Affect: Mood normal.         Behavior: Behavior normal.         Thought Content: Thought content normal.         Judgment: Judgment normal.            I have reviewed all pertinent lab results within the past 24 hours.    Significant Diagnostics:  I have reviewed all pertinent imaging results/findings within the past 24 hours.

## 2025-02-20 NOTE — ASSESSMENT & PLAN NOTE
Repair per ER. Can see us in clinic if has non-absorbable suture. CT head/neck/face unremarkable. No indication for trauma admission, dispo per ER.

## 2025-02-20 NOTE — ED PROVIDER NOTES
Encounter Date: 2/20/2025    SCRIBE #1 NOTE: I, Regan Adkins, am scribing for, and in the presence of,  Kalin Beal III, MD. I have scribed the following portions of the note - Other sections scribed: HPI, ROS, and PE.       History   No chief complaint on file.    Tyler Mai is a 69 y.o. male patient with a PMHx of anxiety, CAD, CHF, COPD, DM, GERD, HLD , HTN, and PVD who presents to the Emergency Department as a level 2 trauma activation following a fall PTA on Plavix and Lovenox. Per EMS, patient is currently at a rehab center for recent C4-C5 spinal fusion done by Dr. Zavala (NeuroSurg). He was sitting in his wheelchair and dropped something, so he leaned forward to grab it and fell out of the chair hitting his face on the ground, denies LOC. He received 2 mg Morphine and 4 mg Zofran. Patient states his neck has not worsened pain wise from this fall, denies double vision.      The history is provided by the EMS personnel and the patient. No  was used.     Review of patient's allergies indicates:   Allergen Reactions    Hydrochlorothiazide      Other Reaction(s): Pancreatitis     Past Medical History:   Diagnosis Date    Anxiety disorder, unspecified     Arthritis     CAD (coronary artery disease)     Cervical radiculopathy     CHF (congestive heart failure)     Chronic pain syndrome     COPD (chronic obstructive pulmonary disease)     Diabetes mellitus     Gastro-esophageal reflux disease without esophagitis     High cholesterol     Hypertension     Lumbar radiculopathy     Myelomalacia of cervical cord     Other specified diseases of spinal cord     Pacemaker     PVD (peripheral vascular disease)     S/P triple vessel bypass     Tobacco use      Past Surgical History:   Procedure Laterality Date    ANTERIOR CERVICAL DISCECTOMY W/ FUSION  1995    C5-6 and C6-7.  Dr. Wall    ANTERIOR CERVICAL DISCECTOMY W/ FUSION  1996    Redo C5-6, C6-7.  Dr. Wall    CHOLECYSTECTOMY      CORONARY  ARTERY BYPASS GRAFT      FUSION OF POSTERIOR COLUMN OF CERVICAL SPINE USING COMPUTER AIDED NAVIGATION N/A 1/24/2025    Procedure: FUSION, SPINE, POSTERIOR SPINAL COLUMN, CERVICAL, USING COMPUTER-ASSISTED NAVIGATION;  Surgeon: Darrell Watson MD;  Location: [facility];  Service: Neurosurgery;  Laterality: N/A;  C2-C6 PCF; C2/3-C4/5 decompression, possible additional levels  o-arm  NTI  TIVA setup  [employer] //  XX    INSERTION OF PACEMAKER      X2    TRIPPLE VESSEL BYPASS       Family History   Problem Relation Name Age of Onset    Cancer Mother      Cancer Father      Lumbar disc disease Brother       Social History[1]  Review of Systems   Constitutional:  Negative for fatigue, fever and unexpected weight change.   HENT:  Negative for congestion and rhinorrhea.    Eyes:  Negative for pain and visual disturbance.   Respiratory:  Negative for chest tightness, shortness of breath and wheezing.    Cardiovascular:  Negative for chest pain.   Gastrointestinal:  Negative for abdominal pain, constipation, diarrhea, nausea and vomiting.   Genitourinary:  Negative for dysuria.   Musculoskeletal:  Positive for neck pain (chronic, not worsened). Negative for back pain.   Skin:  Positive for wound. Negative for rash.   Allergic/Immunologic: Negative for environmental allergies, food allergies and immunocompromised state.   Neurological:  Negative for dizziness and speech difficulty.   Hematological:  Does not bruise/bleed easily.   Psychiatric/Behavioral:  Negative for sleep disturbance and suicidal ideas.        Physical Exam     Initial Vitals   BP Pulse Resp Temp SpO2   02/20/25 1109 02/20/25 1109 02/20/25 1109 02/20/25 1109 02/20/25 1052   (!) 155/71 80 15 98.7 °F (37.1 °C) 95 %      MAP       --                Physical Exam    Nursing note and vitals reviewed.  Constitutional: No distress. Cervical collar in place.   HENT:   Head: Normocephalic and atraumatic.   Airway intact.   3 cm laceration to the right cheek.   Mild  right periorbital edema.    Eyes: EOM are normal. Pupils are equal, round, and reactive to light.   Neck: Trachea normal. Neck supple.   Normal range of motion.  Cardiovascular:  Normal rate and regular rhythm.           No murmur heard.  Pulses:       Radial pulses are 2+ on the right side and 2+ on the left side.        Dorsalis pedis pulses are 2+ on the right side and 2+ on the left side.   Pulmonary/Chest: Breath sounds normal. No respiratory distress.   Equal bilateral breath sounds.   Abdominal: Abdomen is soft. Bowel sounds are normal. He exhibits no distension. There is no abdominal tenderness.   Musculoskeletal:      Cervical back: Normal range of motion and neck supple.      Lumbar back: Normal.      Comments: No worsened C-spine tenderness, s/p C4-C5 fusion, healing post surgical scar to neck.   No T or L spinal tenderness, step offs, or deformities.      Neurological: He is alert and oriented to person, place, and time. He has normal strength. No cranial nerve deficit.   Skin: Skin is warm and dry. No rash noted.   Psychiatric: He has a normal mood and affect. Judgment normal.         ED Course   Lac Repair    Date/Time: 2/20/2025 12:07 PM    Performed by: Kalin Beal III, MD  Authorized by: Kalin Beal III, MD    Consent:     Consent obtained:  Verbal    Consent given by:  Patient    Risks, benefits, and alternatives were discussed: yes      Risks discussed:  Infection and pain  Universal protocol:     Procedure explained and questions answered to patient or proxy's satisfaction: yes      Relevant documents present and verified: yes      Test results available: yes      Imaging studies available: yes      Required blood products, implants, devices, and special equipment available: yes      Site/side marked: yes      Immediately prior to procedure, a time out was called: yes      Patient identity confirmed:  Verbally with patient and arm band  Anesthesia:     Anesthesia method:  Local  infiltration    Local anesthetic:  Lidocaine 1% WITH epi  Laceration details:     Location:  Face    Face location:  R cheek    Length (cm):  3    Depth (mm):  5  Pre-procedure details:     Preparation:  Patient was prepped and draped in usual sterile fashion and imaging obtained to evaluate for foreign bodies  Treatment:     Area cleansed with:  Saline    Amount of cleaning:  Standard    Irrigation solution:  Sterile saline    Irrigation method:  Syringe    Debridement:  None  Skin repair:     Repair method:  Sutures    Suture size:  4-0 and 5-0    Suture material:  Fast-absorbing gut and chromic gut    Suture technique:  Simple interrupted    Number of sutures:  10  Approximation:     Approximation:  Close  Repair type:     Repair type:  Simple  Post-procedure details:     Procedure completion:  Tolerated well, no immediate complications    Labs Reviewed   CBC WITH DIFFERENTIAL - Abnormal       Result Value    WBC 11.47      RBC 2.75 (*)     Hgb 8.5 (*)     Hct 26.0 (*)     MCV 94.5 (*)     MCH 30.9      MCHC 32.7 (*)     RDW 12.3      Platelet 299      MPV 10.4      Neut % 76.0      Lymph % 12.7      Mono % 7.0      Eos % 3.2      Basophil % 0.5      Imm Grans % 0.6      Neut # 8.71      Lymph # 1.46      Mono # 0.80      Eos # 0.37      Baso # 0.06      Imm Gran # 0.07 (*)     NRBC% 0.0     PROTIME-INR - Normal    PT 13.8      INR 1.1      Narrative:     Protimes are used to monitor anticoagulant agents such as warfarin. PT INR values are based on the current patient normal mean and the DOUG value for the specific instrument reagent used.  **Routine theraputic target values for the INR are 2.0-3.0**   CBC W/ AUTO DIFFERENTIAL    Narrative:     The following orders were created for panel order CBC Auto Differential.  Procedure                               Abnormality         Status                     ---------                               -----------         ------                     CBC with  Differential[[id number]]       Abnormal            Final result                 Please view results for these tests on the individual orders.   TYPE & SCREEN    Group & Rh O POS      Indirect Humera GEL NEG      Specimen Outdate 02/23/2025 23:59            Imaging Results              CT Cervical Spine Without Contrast (Final result)  Result time 02/20/25 11:36:15      Final result by Ruba Braden MD (02/20/25 11:36:15)                   Impression:      Postsurgical and degenerative changes seen in the cervical spine as outlined above      Electronically signed by: Levy Braden  Date:    02/20/2025  Time:    11:36               Narrative:    EXAMINATION:  CT CERVICAL SPINE WITHOUT CONTRAST    CLINICAL HISTORY:  trauma;    TECHNIQUE:  Low dose axial images, sagittal and coronal reformations were performed though the cervical spine.  Contrast was not administered. Automatic exposure control is utilized to reduce patient radiation exposure.    COMPARISON:  plain film dated 01/24/2025    FINDINGS:  The vertebral body heights are well maintained. There is some loss of the normal lordotic curve cervical spine most likely related to spasm. No fracture is seen. No dislocation is seen. The odontoid and lateral masses appear grossly unremarkable.  There are postsurgical changes seen consistent with posterior spinal fusion and laminectomy from the level of C2 through C6.  There are some degenerative changes seen in the cervical spine which appear to be chronic.                                       CT Maxillofacial Without Contrast (Final result)  Result time 02/20/25 11:46:15      Final result by Katelyn Blanco MD (02/20/25 11:46:15)                   Impression:      No acute fracture identified.      Electronically signed by: Katelyn Blanco  Date:    02/20/2025  Time:    11:46               Narrative:    EXAMINATION:  CT MAXILLOFACIAL WITHOUT CONTRAST    CLINICAL HISTORY:  Facial trauma,  blunt;    TECHNIQUE:  Volumetric CT acquisition of the facial bones without contrast. Axial, coronal and sagittal reconstructions.    Automatic exposure control was utilized to limit radiation dose.    DLP: 1825 mGy-cm    COMPARISON:  None    FINDINGS:  There is no acute fracture identified.  The paranasal sinuses are clear.  There is a right periorbital soft tissue contusion with subcutaneous emphysema.  There is no postseptal abnormality of the orbits.                                       CT Head Without Contrast (Final result)  Result time 02/20/25 11:39:02      Final result by Katelyn Blanco MD (02/20/25 11:39:02)                   Impression:      1. No acute intracranial abnormality.  2. Chronic microvascular ischemic changes.      Electronically signed by: Katelyn Blanco  Date:    02/20/2025  Time:    11:39               Narrative:    EXAMINATION:  CT HEAD WITHOUT CONTRAST    CLINICAL HISTORY:  Orbital trauma;    TECHNIQUE:  Axial scans were obtained from skull base to the vertex.    Coronal and sagittal reconstructions obtained from the axial data.    Automatic exposure control was utilized to limit radiation dose.    Contrast: None    Radiation Dose:    Total DLP: 1825 mGy*cm    COMPARISON:  None    FINDINGS:  There is no acute intracranial hemorrhage or edema. The gray-white matter differentiation is preserved.  Scattered hypodensities in the subcortical and periventricular white matter and left basal ganglia likely represent chronic microvascular ischemic changes.    There is no mass effect or midline shift.  There is diffuse parenchymal volume loss.  The basal cisterns are patent. There is no abnormal extra-axial fluid collection.    The calvarium and skull base are intact.  There is a right periorbital soft tissue hematoma.                                       Medications   Tdap vaccine injection 0.5 mL (0.5 mLs Intramuscular Given 2/20/25 1114)   LIDOcaine-EPINEPHrine 1%-1:100,000 injection 1  mL (1 mL Intradermal Given 2/20/25 1233)   morphine injection 4 mg (4 mg Intravenous Given 2/20/25 1203)   oxyCODONE-acetaminophen  mg per tablet 1 tablet (1 tablet Oral Given 2/20/25 1234)     Medical Decision Making  The differential diagnosis includes, but is not limited to, facial fracture, intracranial hemorrhage, and C-spine injury.     CT head without abnormality CT face and cervical spine normal extraocular movements are intact.  Laceration closed per note    Problems Addressed:  Facial laceration, initial encounter: complicated acute illness or injury that poses a threat to life or bodily functions    Amount and/or Complexity of Data Reviewed  Independent Historian: EMS     Details: Per EMS, patient is currently at a rehab center for recent C4-C5 spinal fusion done by Dr. Zavala (NeuroSurg). He was sitting in his wheelchair and dropped something, so he leaned forward to grab it and fell out of the chair hitting his face on the ground, denies LOC. He received 2 mg Morphine and 4 mg Zofran.   Labs: ordered.  Radiology: ordered and independent interpretation performed.    Risk  Prescription drug management.            Scribe Attestation:   Scribe #1: I performed the above scribed service and the documentation accurately describes the services I performed. I attest to the accuracy of the note.    Attending Attestation:           Physician Attestation for Scribe:  Physician Attestation Statement for Scribe #1: I, Kalin Beal III, MD, reviewed documentation, as scribed by Regan Adkins in my presence, and it is both accurate and complete.                                    Clinical Impression:  Final diagnoses:  [T14.90XA] Trauma (Primary)  [S01.81XA] Facial laceration, initial encounter          ED Disposition Condition    Discharge Stable          ED Prescriptions    None       Follow-up Information       Follow up With Specialties Details Why Contact Info    PCP  In 3 days                 Emigdio  Kalin PHIPPS MD  02/20/25 1245         [1]   Social History  Tobacco Use    Smoking status: Every Day     Current packs/day: 1.00     Types: Cigarettes        Kalin Beal III, MD  02/20/25 3748

## 2025-02-20 NOTE — HPI
Tyler Mai is a 69 y.o. male patient with a PMHx of anxiety, CAD, CHF, COPD, DM, GERD, HLD , HTN, and PVD who presents to the Emergency Department as a level 2 trauma activation following a fall PTA on Plavix and Lovenox. Per EMS, patient is currently at a rehab center ([place]) for recent C4-C5 spinal fusion done by Dr. Zavala (NeuroSurg). He was sitting in his wheelchair and dropped something, so he leaned forward to grab it and fell out of the chair hitting his face on the ground, denies LOC. He received 2 mg Morphine and 4 mg Zofran. Patient states his neck has not worsened pain wise from this fall, denies double vision. Has laceration to R infra-orbital region and contusion to eyebrow.

## 2025-02-20 NOTE — CONSULTS
[facility]  Emergency Dept  Trauma  Consult Note    Patient Name: Tyler Mai  MRN: [id number]  Code Status: Prior  Admission Date: 2/20/2025  Hospital Length of Stay: 0 days  Attending Physician: Kalin Beal III, MD  Primary Care Provider: Ze Lemons Doctor    Patient information was obtained from patient, EMS personnel, and ER records.     Consults  Subjective:     Principal Problem: Face lacerations    History of Present Illness: Tyler Mai is a 69 y.o. male patient with a PMHx of anxiety, CAD, CHF, COPD, DM, GERD, HLD , HTN, and PVD who presents to the Emergency Department as a level 2 trauma activation following a fall PTA on Plavix and Lovenox. Per EMS, patient is currently at a rehab center ([place]) for recent C4-C5 spinal fusion done by Dr. Zavala (NeuroSurg). He was sitting in his wheelchair and dropped something, so he leaned forward to grab it and fell out of the chair hitting his face on the ground, denies LOC. He received 2 mg Morphine and 4 mg Zofran. Patient states his neck has not worsened pain wise from this fall, denies double vision. Has laceration to R infra-orbital region and contusion to eyebrow.     Current Facility-Administered Medications on File Prior to Encounter   Medication    [DISCONTINUED] acetaminophen tablet 650 mg    [DISCONTINUED] albuterol nebulizer solution 2.5 mg    [DISCONTINUED] ALPRAZolam tablet 0.25 mg    [DISCONTINUED] amLODIPine tablet 10 mg    [DISCONTINUED] aspirin chewable tablet 81 mg    [DISCONTINUED] atorvastatin tablet 40 mg    [DISCONTINUED] benzonatate capsule 100 mg    [DISCONTINUED] bisacodyL EC tablet 5 mg    [DISCONTINUED] bisacodyL suppository 10 mg    [DISCONTINUED] camphor-menthol 0.5-0.5% lotion    [DISCONTINUED] carvediloL tablet 25 mg    [DISCONTINUED] clopidogreL tablet 75 mg    [DISCONTINUED] cyclobenzaprine tablet 5 mg    [DISCONTINUED] dextrose 50% injection 12.5 g    [DISCONTINUED] dextrose 50% injection 25 g     [DISCONTINUED] diphenhydrAMINE-zinc acetate 2-0.1% cream    [DISCONTINUED] DULoxetine DR capsule 30 mg    [DISCONTINUED] enoxaparin injection 40 mg    [DISCONTINUED] finasteride tablet 5 mg    [DISCONTINUED] fluticasone furoate 200 mcg/actuation inhaler 1 puff    [DISCONTINUED] furosemide tablet 20 mg    [DISCONTINUED] glucagon (human recombinant) injection 1 mg    [DISCONTINUED] glucose chewable tablet 16 g    [DISCONTINUED] glucose chewable tablet 24 g    [DISCONTINUED] hydrALAZINE injection 10 mg    [DISCONTINUED] hydrocortisone 2.5 % rectal cream    [DISCONTINUED] hydrOXYzine pamoate capsule 25 mg    [DISCONTINUED] hydrOXYzine pamoate capsule 50 mg    [DISCONTINUED] insulin aspart U-100 injection 0-10 Units    [DISCONTINUED] labetalol 20 mg/4 mL (5 mg/mL) IV syring    [DISCONTINUED] LIDOcaine 5 % patch 1 patch    [DISCONTINUED] linaCLOtide capsule 145 mcg    [DISCONTINUED] losartan tablet 50 mg    [DISCONTINUED] magnesium citrate solution 296 mL    [DISCONTINUED] magnesium oxide tablet 400 mg    [DISCONTINUED] melatonin tablet 6 mg    [DISCONTINUED] methocarbamoL tablet 750 mg    [DISCONTINUED] metoprolol injection 10 mg    [DISCONTINUED] miconazole NITRATE 2 % top powder    [DISCONTINUED] montelukast chewable tablet 10 mg    [DISCONTINUED] naloxegoL (MOVANTIK) tablet 25 mg    [DISCONTINUED] nitroGLYCERIN SL tablet 0.4 mg    [DISCONTINUED] ondansetron disintegrating tablet 4 mg    [DISCONTINUED] ondansetron disintegrating tablet 8 mg    [DISCONTINUED] ondansetron injection 4 mg    [DISCONTINUED] oxyCODONE-acetaminophen  mg per tablet 1 tablet    [DISCONTINUED] oxyCODONE-acetaminophen 5-325 mg per tablet 1 tablet    [DISCONTINUED] pantoprazole EC tablet 40 mg    [DISCONTINUED] pregabalin capsule 100 mg    [DISCONTINUED] QUEtiapine tablet 25 mg    [DISCONTINUED] ranolazine 12 hr tablet 500 mg    [DISCONTINUED] silver nitrate applicators applicator 1 applicator    [DISCONTINUED] triamcinolone acetonide 0.1%  ointment    [DISCONTINUED] umeclidinium-vilanteroL 62.5-25 mcg/actuation DsDv 1 puff     Current Outpatient Medications on File Prior to Encounter   Medication Sig    albuterol (PROVENTIL/VENTOLIN HFA) 90 mcg/actuation inhaler 1 puff every 4 (four) hours as needed.    amLODIPine (NORVASC) 10 MG tablet Take 1 tablet (10 mg total) by mouth once daily.    aspirin 81 MG Chew Take 1 tablet (81 mg total) by mouth once daily.    bisacodyL (DULCOLAX) 5 mg EC tablet Take 1 tablet (5 mg total) by mouth once daily.    carvediloL (COREG) 25 MG tablet Take 1 tablet (25 mg total) by mouth 2 (two) times daily before meals.    clopidogreL (PLAVIX) 75 mg tablet Take 1 tablet (75 mg total) by mouth once daily.    cyclobenzaprine (FLEXERIL) 5 MG tablet Take 1 tablet (5 mg total) by mouth 3 (three) times daily as needed for Muscle spasms.    DULoxetine (CYMBALTA) 30 MG capsule Take 2 capsules (60 mg total) by mouth once daily.    finasteride (PROSCAR) 5 mg tablet Take 1 tablet (5 mg total) by mouth once daily.    furosemide (LASIX) 20 MG tablet Take 1 tablet (20 mg total) by mouth once daily.    LINZESS 145 mcg Cap capsule Take 145 mcg by mouth every morning.    losartan (COZAAR) 50 MG tablet Take 1 tablet (50 mg total) by mouth before breakfast.    magnesium oxide (MAG-OX) 400 mg (241.3 mg magnesium) tablet Take 1 tablet (400 mg total) by mouth 3 (three) times daily.    methocarbamoL (ROBAXIN) 750 MG Tab Take 1 tablet (750 mg total) by mouth 4 (four) times daily before meals and nightly. for 10 days    montelukast (SINGULAIR) 5 MG chewable tablet Take 2 tablets (10 mg total) by mouth once daily.    oxyCODONE-acetaminophen (PERCOCET)  mg per tablet Take 1 tablet by mouth every 6 (six) hours as needed for Pain.    pantoprazole (PROTONIX) 40 MG tablet Take 1 tablet (40 mg total) by mouth once daily.    pregabalin (LYRICA) 100 MG capsule Take 1 capsule (100 mg total) by mouth 3 (three) times daily.    QUEtiapine (SEROQUEL) 25 MG Tab  Take 1 tablet (25 mg total) by mouth every evening.    ranolazine (RANEXA) 500 MG Tb12 Take 500 mg by mouth 2 (two) times daily.    rosuvastatin (CRESTOR) 40 MG Tab Take 40 mg by mouth every evening.    TRELEGY ELLIPTA 200-62.5-25 mcg inhaler Inhale 1 puff into the lungs.       Review of patient's allergies indicates:   Allergen Reactions    Hydrochlorothiazide      Other Reaction(s): Pancreatitis       Past Medical History:   Diagnosis Date    Anxiety disorder, unspecified     Arthritis     CAD (coronary artery disease)     Cervical radiculopathy     CHF (congestive heart failure)     Chronic pain syndrome     COPD (chronic obstructive pulmonary disease)     Diabetes mellitus     Gastro-esophageal reflux disease without esophagitis     High cholesterol     Hypertension     Lumbar radiculopathy     Myelomalacia of cervical cord     Other specified diseases of spinal cord     Pacemaker     PVD (peripheral vascular disease)     S/P triple vessel bypass     Tobacco use      Past Surgical History:   Procedure Laterality Date    ANTERIOR CERVICAL DISCECTOMY W/ FUSION  1995    C5-6 and C6-7.  Dr. Wall    ANTERIOR CERVICAL DISCECTOMY W/ FUSION  1996    Redo C5-6, C6-7.  Dr. Wall    CHOLECYSTECTOMY      CORONARY ARTERY BYPASS GRAFT      FUSION OF POSTERIOR COLUMN OF CERVICAL SPINE USING COMPUTER AIDED NAVIGATION N/A 1/24/2025    Procedure: FUSION, SPINE, POSTERIOR SPINAL COLUMN, CERVICAL, USING COMPUTER-ASSISTED NAVIGATION;  Surgeon: Darrell Watson MD;  Location: [facility];  Service: Neurosurgery;  Laterality: N/A;  C2-C6 PCF; C2/3-C4/5 decompression, possible additional levels  o-arm  NTI  TIVA setup  [employer] //  XX    INSERTION OF PACEMAKER      X2    TRIPPLE VESSEL BYPASS       Family History       Problem Relation (Age of Onset)    Cancer Mother, Father    Lumbar disc disease Brother          Tobacco Use    Smoking status: Every Day     Current packs/day: 1.00     Types: Cigarettes    Smokeless tobacco: Not on  file   Substance and Sexual Activity    Alcohol use: Not on file    Drug use: Not on file    Sexual activity: Not on file     Review of Systems   Constitutional:  Negative for chills and fever.   HENT:  Negative for ear pain and trouble swallowing.    Eyes:  Negative for pain and redness.   Respiratory:  Negative for cough and chest tightness.    Cardiovascular:  Negative for chest pain, palpitations and leg swelling.   Gastrointestinal:  Negative for abdominal distention, abdominal pain, nausea and vomiting.   Genitourinary:  Negative for difficulty urinating.   Musculoskeletal:  Negative for back pain and neck pain.   Skin:  Positive for wound. Negative for color change and pallor.   Neurological:  Negative for dizziness, syncope, speech difficulty, weakness, light-headedness, numbness and headaches.   Psychiatric/Behavioral:  Negative for agitation and suicidal ideas.    All other systems reviewed and are negative.    Objective:     Vital Signs (Most Recent):  Temp: 98.6 °F (37 °C) (02/20/25 1132)  Pulse: 79 (02/20/25 1132)  Resp: 14 (02/20/25 1132)  BP: (!) 156/69 (02/20/25 1132)  SpO2: 98 % (02/20/25 1132) Vital Signs (24h Range):  Temp:  [98.6 °F (37 °C)-98.7 °F (37.1 °C)] 98.6 °F (37 °C)  Pulse:  [79-80] 79  Resp:  [14-18] 14  SpO2:  [93 %-98 %] 98 %  BP: (155-156)/(69-71) 156/69     Weight: 72.6 kg (160 lb)  Body mass index is 25.82 kg/m².     Physical Exam  Constitutional:       Appearance: Normal appearance.   HENT:      Head: Normocephalic and atraumatic.      Nose: Nose normal.   Eyes:      Pupils: Pupils are equal, round, and reactive to light.   Cardiovascular:      Rate and Rhythm: Normal rate.      Pulses: Normal pulses.      Comments: Normal peripheral pulses  Pulmonary:      Effort: Pulmonary effort is normal. No respiratory distress.   Chest:      Chest wall: No tenderness.   Abdominal:      General: Abdomen is flat. Bowel sounds are normal. There is no distension.      Palpations: Abdomen is  soft.      Tenderness: There is no abdominal tenderness.   Musculoskeletal:         General: No swelling, tenderness, deformity or signs of injury.      Cervical back: Normal range of motion and neck supple. No tenderness.   Skin:     General: Skin is warm and dry.      Capillary Refill: Capillary refill takes less than 2 seconds.      Findings: No lesion.      Comments: As above   Neurological:      General: No focal deficit present.      Mental Status: He is alert and oriented to person, place, and time. Mental status is at baseline.   Psychiatric:         Mood and Affect: Mood normal.         Behavior: Behavior normal.         Thought Content: Thought content normal.         Judgment: Judgment normal.            I have reviewed all pertinent lab results within the past 24 hours.    Significant Diagnostics:  I have reviewed all pertinent imaging results/findings within the past 24 hours.    Assessment/Plan:     * Face lacerations  Repair per ER. Can see us in clinic if has non-absorbable suture. CT head/neck/face unremarkable. No indication for trauma admission, dispo per ER.      VTE Risk Mitigation (From admission, onward)      None            Thank you for your consult. I will sign off. Please contact us if you have any additional questions.    Wyatt Kebede, MARIVEL  Trauma  [facility] - Emergency Dept

## 2025-02-20 NOTE — DISCHARGE INSTRUCTIONS
Neosporin to facial lack twice a day the sutures will reabsorb, please hold your blood thinner for 24 hours then resume normal dosing

## 2025-02-20 NOTE — TELEPHONE ENCOUNTER
Patient was dc'd home. I called to let him know of his appt on 3/12 @ 2pm with Izzy and xray appt just prior at [facility] at 1pm. No answer, I left a detailed message with appointment info.

## 2025-02-20 NOTE — ED NOTES
Attempted to call reports x 2, voice message left with request to call back for report and transportation.

## 2025-02-21 NOTE — PT/OT/SLP DISCHARGE
Occupational Therapy Discharge Summary    Tyler Mai  MRN: [id number]   Principal Problem: Status post cervical spinal fusion      Patient Discharged from acute Occupational Therapy on 2/19/25.  Please refer to prior OT note dated 2/19/25 for functional status.    Assessment:      Pt made gains with therapy intervention; however, pt was unable to return home alone due to decreased sensation and FM coordination and strength with BUE which interfered with ADL independence as well as decreased FM.     Objective:     GOALS:   Multidisciplinary Problems       Occupational Therapy Goals          Problem: Occupational Therapy    Goal Priority Disciplines Outcome Interventions   Occupational Therapy Goal     OT, PT/OT Not Progressing    Description: ADLs: ongoing  Pt to perform grooming tasks with independence standing at sink with RW by d/c.   Pt to perform feeding tasks with independence using adaptive utensils as needed by d/c.    Pt to perform UB dressing with independence by d/c.    Pt to perform LB dressing with touch assist by d/c.    Pt to perform putting on/off footwear task with independence and AE as needed by d/c.   Pt to perform toileting with touch assist by d/c.    Pt to perform bathing with independence by d/c.     Functional Transfers:  Pt to perform toilet transfers with independence by d/c.    Pt to perform a tub transfer with independence by d/c.     IADLs:  Pt to perform simple meal prep standing at kitchen counter with touch assist by d/c.      Balance, Strengthening, Endurance, Balance:  Pt to consistently demonstrate adherence to spinal precautions during all ADL's as instructed by OT.  Pt to demonstrate good dynamic standing balance as required to perform ADL's from standing level.                         Care Scores:   Admission Assessment Current Status Discharge  Goal   Functional Area: Care Score:  Care Score:    Eating 5 5 Independent   Oral Hygiene 5 6 Independent   Toileting Hygiene 2 4  Supervision or touching assistance   Shower/Bathe Self 2 2 Independent   Upper Body Dressing 2 2 Independent   Lower Body Dressing 2 2 Supervision or touching assistance   Putting On/Taking Off Footwear 1 2 Independent   Toilet Transfer 4 4 Independent     Reasons for Discontinuation of Therapy Services   Transfer to alternate level of care.      Plan:     Patient Discharged to: Skilled Nursing Facility      2/21/2025

## 2025-02-21 NOTE — TELEPHONE ENCOUNTER
Patient was actually dc'd to [facility] for further rehab. Spoke with Sophie KANG LPN with [facility]. Patient is taking Percocet 10/325 every 6 hrs which is not controlling his pain. Izzy gave the order ok to give every 4 hrs prn. Sophie is aware we only monitor pain meds up to 30 days post op, which will be 2/28/25. He was also taking Robaxin and flexeril, so we discussed only taking one muscle relaxer at a time. She is aware of his appt on 3/12 @ 1pm at [facility] for xrays and appt w/ Izzy at 2pm. She will arrange transportation for him.

## 2025-03-11 ENCOUNTER — LAB REQUISITION (OUTPATIENT)
Dept: LAB | Facility: HOSPITAL | Age: 70
End: 2025-03-11
Payer: MEDICARE

## 2025-03-11 DIAGNOSIS — R41.82 ALTERED MENTAL STATUS, UNSPECIFIED: ICD-10-CM

## 2025-03-11 LAB
ALBUMIN SERPL-MCNC: 3.2 G/DL (ref 3.4–4.8)
ALBUMIN/GLOB SERPL: 1.1 RATIO (ref 1.1–2)
ALP SERPL-CCNC: 132 UNIT/L (ref 40–150)
ALT SERPL-CCNC: 14 UNIT/L (ref 0–55)
ANION GAP SERPL CALC-SCNC: 10 MEQ/L
AST SERPL-CCNC: 17 UNIT/L (ref 5–34)
BACTERIA #/AREA URNS AUTO: ABNORMAL /HPF
BASOPHILS # BLD AUTO: 0.02 X10(3)/MCL
BASOPHILS NFR BLD AUTO: 0.2 %
BILIRUB SERPL-MCNC: 0.4 MG/DL
BILIRUB UR QL STRIP.AUTO: NEGATIVE
BUN SERPL-MCNC: 38 MG/DL (ref 8.4–25.7)
CALCIUM SERPL-MCNC: 8.9 MG/DL (ref 8.8–10)
CHLORIDE SERPL-SCNC: 106 MMOL/L (ref 98–107)
CLARITY UR: CLEAR
CO2 SERPL-SCNC: 23 MMOL/L (ref 23–31)
COLOR UR AUTO: YELLOW
CREAT SERPL-MCNC: 1.58 MG/DL (ref 0.72–1.25)
CREAT/UREA NIT SERPL: 24
EOSINOPHIL # BLD AUTO: 0.01 X10(3)/MCL (ref 0–0.9)
EOSINOPHIL NFR BLD AUTO: 0.1 %
ERYTHROCYTE [DISTWIDTH] IN BLOOD BY AUTOMATED COUNT: 12.7 % (ref 11.5–17)
GFR SERPLBLD CREATININE-BSD FMLA CKD-EPI: 48 ML/MIN/1.73/M2
GLOBULIN SER-MCNC: 2.9 GM/DL (ref 2.4–3.5)
GLUCOSE SERPL-MCNC: 116 MG/DL (ref 82–115)
GLUCOSE UR QL STRIP: NEGATIVE
HCT VFR BLD AUTO: 22.7 % (ref 42–52)
HGB BLD-MCNC: 7.6 G/DL (ref 14–18)
HGB UR QL STRIP: NEGATIVE
IMM GRANULOCYTES # BLD AUTO: 0.06 X10(3)/MCL (ref 0–0.04)
IMM GRANULOCYTES NFR BLD AUTO: 0.6 %
KETONES UR QL STRIP: ABNORMAL
LEUKOCYTE ESTERASE UR QL STRIP: ABNORMAL
LYMPHOCYTES # BLD AUTO: 0.88 X10(3)/MCL (ref 0.6–4.6)
LYMPHOCYTES NFR BLD AUTO: 9 %
MCH RBC QN AUTO: 29.9 PG (ref 27–31)
MCHC RBC AUTO-ENTMCNC: 33.5 G/DL (ref 33–36)
MCV RBC AUTO: 89.4 FL (ref 80–94)
MONOCYTES # BLD AUTO: 0.86 X10(3)/MCL (ref 0.1–1.3)
MONOCYTES NFR BLD AUTO: 8.8 %
NEUTROPHILS # BLD AUTO: 7.91 X10(3)/MCL (ref 2.1–9.2)
NEUTROPHILS NFR BLD AUTO: 81.3 %
NITRITE UR QL STRIP: POSITIVE
NRBC BLD AUTO-RTO: 0 %
PH UR STRIP: 8.5 [PH]
PLATELET # BLD AUTO: 225 X10(3)/MCL (ref 130–400)
PMV BLD AUTO: 10.7 FL (ref 7.4–10.4)
POTASSIUM SERPL-SCNC: 4.8 MMOL/L (ref 3.5–5.1)
PROT SERPL-MCNC: 6.1 GM/DL (ref 5.8–7.6)
PROT UR QL STRIP: ABNORMAL
RBC # BLD AUTO: 2.54 X10(6)/MCL (ref 4.7–6.1)
RBC #/AREA URNS AUTO: ABNORMAL /HPF
SODIUM SERPL-SCNC: 139 MMOL/L (ref 136–145)
SP GR UR STRIP.AUTO: 1.02 (ref 1–1.03)
SQUAMOUS #/AREA URNS AUTO: ABNORMAL /HPF
TRI-PHOS CRY UR QL COMP ASSIST: ABNORMAL
UROBILINOGEN UR STRIP-ACNC: 0.2
WBC # BLD AUTO: 9.74 X10(3)/MCL (ref 4.5–11.5)
WBC #/AREA URNS AUTO: ABNORMAL /HPF

## 2025-03-11 PROCEDURE — 85025 COMPLETE CBC W/AUTO DIFF WBC: CPT | Performed by: NURSE PRACTITIONER

## 2025-03-11 PROCEDURE — 81003 URINALYSIS AUTO W/O SCOPE: CPT | Performed by: NURSE PRACTITIONER

## 2025-03-11 PROCEDURE — 80053 COMPREHEN METABOLIC PANEL: CPT | Performed by: NURSE PRACTITIONER

## 2025-03-12 PROBLEM — J96.01 ACUTE RESPIRATORY FAILURE WITH HYPOXIA AND HYPERCAPNIA: Status: ACTIVE | Noted: 2025-01-01

## 2025-03-12 PROBLEM — J96.02 ACUTE RESPIRATORY FAILURE WITH HYPOXIA AND HYPERCAPNIA: Status: ACTIVE | Noted: 2025-03-12

## 2025-03-12 NOTE — H&P
[facility] - ICU    History & Physical      Patient Name: Tyler Mai  MRN: [id number]  Admission Date: 3/12/2025  Attending Physician: Yoim Jeffery MD   Primary Care Provider: Paxton Connors MD         Patient information was obtained from patient and ER records.     Subjective:     Principal Problem:Acute respiratory failure with hypoxia and hypercapnia    Chief Complaint:   Chief Complaint   Patient presents with    Shortness of Breath     BIBA from NH  for SOB        HPI:   Mr. Mai is a 69-year-old male who was transferred to the ED earlier today from a local SNF due to persistent shortness of breath.  His symptoms apparently began yesterday at which time he was diagnosed with pneumonia.  Symptoms worsened today, O2 saturations noted to be in the 80s on RA prompting his transfer to this facility.  With supplemental O2 his O2 saturation improved into the low 90s.  He was subsequently converted to BiPAP in the ED.  His current issues began in mid January at which time he underwent a rather extensive C-spine surgery involving multiple levels following which he was transferred to an inpatient rehab facility.  During that time he also developed some urinary retention and had an indwelling catheter for an extended period.  Catheter was apparently removed in rehab.  He was subsequently transferred to a SNF for continued physical therapy.  His evaluation in the ED was significant for anemia, leukocytosis, acute kidney injury, acute respiratory failure with hypercapnia and hypoxia, urinalysis consistent with UTI and CXR consistent with multilobar pneumonia.  Other findings included a significantly elevated BNP and an elevated troponin.  He was managed with a single dose of IV antibiotics and a nebulizer treatment.  On seeing him following his arrival to the ICU he was quite lethargic with a respiratory rate of 10 to 12 on BiPAP.  Due to his history of opioid use for chronic pain and  recent C-spine surgery he was given a dose of Narcan after which he did awakened and become more responsive, Respiratory rate improved to the 20s.  A subsequent ABG revealed a normal pH with moderate hypoxemia.    Past Medical History:   Diagnosis Date    Anxiety disorder, unspecified     Arthritis     CAD (coronary artery disease)     Cervical radiculopathy     CHF (congestive heart failure)     Chronic pain syndrome     COPD (chronic obstructive pulmonary disease)     Diabetes mellitus     Gastro-esophageal reflux disease without esophagitis     High cholesterol     Hypertension     Lumbar radiculopathy     Myelomalacia of cervical cord     Other specified diseases of spinal cord     Pacemaker     PVD (peripheral vascular disease)     S/P triple vessel bypass     Tobacco use        Past Surgical History:   Procedure Laterality Date    ANTERIOR CERVICAL DISCECTOMY W/ FUSION  1995    C5-6 and C6-7.  Dr. Wall    ANTERIOR CERVICAL DISCECTOMY W/ FUSION  1996    Redo C5-6, C6-7.  Dr. Wall    CHOLECYSTECTOMY      CORONARY ARTERY BYPASS GRAFT      FUSION OF POSTERIOR COLUMN OF CERVICAL SPINE USING COMPUTER AIDED NAVIGATION N/A 1/24/2025    Procedure: FUSION, SPINE, POSTERIOR SPINAL COLUMN, CERVICAL, USING COMPUTER-ASSISTED NAVIGATION;  Surgeon: Darrell Watson MD;  Location: [facility];  Service: Neurosurgery;  Laterality: N/A;  C2-C6 PCF; C2/3-C4/5 decompression, possible additional levels  o-arm  NTI  TIVA setup  [employer] //  XX    INSERTION OF PACEMAKER      X2    TRIPPLE VESSEL BYPASS         Review of patient's allergies indicates:   Allergen Reactions    Hydrochlorothiazide      Other Reaction(s): Pancreatitis       No current facility-administered medications on file prior to encounter.     Current Outpatient Medications on File Prior to Encounter   Medication Sig    albuterol (PROVENTIL) 2.5 mg /3 mL (0.083 %) nebulizer solution Take 2.5 mg by nebulization every 6 (six) hours.    cholecalciferol, vitamin D3,  1,250 mcg (50,000 unit) capsule Take 50,000 Units by mouth every 7 days.    DULoxetine (CYMBALTA) 60 MG capsule Take 60 mg by mouth once daily.    montelukast (SINGULAIR) 10 mg tablet Take 10 mg by mouth once daily.    oxyCODONE (ROXICODONE) 10 mg Tab immediate release tablet Take 10 mg by mouth every 4 (four) hours as needed for Pain.    acetaminophen (TYLENOL) 325 MG tablet Take 650 mg by mouth every 4 (four) hours as needed for Pain.    albuterol (PROVENTIL/VENTOLIN HFA) 90 mcg/actuation inhaler 1 puff every 4 (four) hours as needed.    amLODIPine (NORVASC) 10 MG tablet Take 1 tablet (10 mg total) by mouth once daily.    ascorbic acid, vitamin C, (VITAMIN C) 500 MG tablet Take 500 mg by mouth once daily.    aspirin 81 MG Chew Take 1 tablet (81 mg total) by mouth once daily.    bisacodyL (DULCOLAX) 5 mg EC tablet Take 1 tablet (5 mg total) by mouth once daily.    carvediloL (COREG) 25 MG tablet Take 1 tablet (25 mg total) by mouth 2 (two) times daily before meals.    clopidogreL (PLAVIX) 75 mg tablet Take 1 tablet (75 mg total) by mouth once daily.    finasteride (PROSCAR) 5 mg tablet Take 1 tablet (5 mg total) by mouth once daily.    furosemide (LASIX) 20 MG tablet Take 1 tablet (20 mg total) by mouth once daily.    LINZESS 145 mcg Cap capsule Take 145 mcg by mouth every morning.    losartan (COZAAR) 50 MG tablet Take 1 tablet (50 mg total) by mouth before breakfast.    magnesium oxide (MAG-OX) 400 mg (241.3 mg magnesium) tablet Take 1 tablet (400 mg total) by mouth 3 (three) times daily.    multivitamin (ONE DAILY MULTIVITAMIN) per tablet Take 1 tablet by mouth once daily.    pantoprazole (PROTONIX) 40 MG tablet Take 1 tablet (40 mg total) by mouth once daily.    piperacillin sodium/tazobactam (PIPERACILLIN-TAZOBACTAM 3.375 G/100 ML D5W, READY TO MIX,) Inject 3.375 g into the vein every 6 (six) hours.    pregabalin (LYRICA) 100 MG capsule Take 1 capsule (100 mg total) by mouth 3 (three) times daily.     QUEtiapine (SEROQUEL) 25 MG Tab Take 1 tablet (25 mg total) by mouth every evening.    ranolazine (RANEXA) 500 MG Tb12 Take 500 mg by mouth 2 (two) times daily.    rosuvastatin (CRESTOR) 40 MG Tab Take 40 mg by mouth every evening.    zinc sulfate (ZINCATE) 50 mg zinc (220 mg) capsule Take 220 mg by mouth once daily.    [DISCONTINUED] cyclobenzaprine (FLEXERIL) 5 MG tablet Take 5 mg by mouth 3 (three) times daily as needed for Muscle spasms.    [DISCONTINUED] DULoxetine (CYMBALTA) 30 MG capsule Take 2 capsules (60 mg total) by mouth once daily.    [DISCONTINUED] montelukast (SINGULAIR) 5 MG chewable tablet Take 2 tablets (10 mg total) by mouth once daily.    [DISCONTINUED] TRELEGY ELLIPTA 200-62.5-25 mcg inhaler Inhale 1 puff into the lungs.     Family History       Problem Relation (Age of Onset)    Cancer Mother, Father    Lumbar disc disease Brother          Tobacco Use    Smoking status: Every Day     Current packs/day: 1.00     Types: Cigarettes    Smokeless tobacco: Not on file   Substance and Sexual Activity    Alcohol use: Not on file    Drug use: Not on file    Sexual activity: Not on file     Review of Systems   Unable to perform ROS: Acuity of condition     Objective:     Vital Signs (Most Recent):  Temp: 98.5 °F (36.9 °C) (03/12/25 1238)  Pulse: 80 (03/12/25 1330)  Resp: 16 (03/12/25 1330)  BP: (!) 131/58 (03/12/25 1330)  SpO2: 99 % (03/12/25 1330) Vital Signs (24h Range):  Temp:  [97.5 °F (36.4 °C)-98.5 °F (36.9 °C)] 98.5 °F (36.9 °C)  Pulse:  [70-80] 80  Resp:  [10-27] 16  SpO2:  [92 %-100 %] 99 %  BP: (103-145)/(58-86) 131/58     Weight: 79 kg (174 lb 2.6 oz)  Body mass index is 24.99 kg/m².    Physical exam  Constitution-obese, normally developed male in NAD  Eyes-PERRL, EOMI  HENT-normocephalic, atraumatic; external ears appear normal; BiPAP mask in place  Neck-supple; soft C-collar in place  Respiratory-normal respirations; scattered crackles bilaterally with good air movement  Heart-RRR; normal S1  and S2; no murmurs, gallops; pacemaker palpated left chest wall  Abdomen-soft, nontender, nondistended; active bowel sounds  Genitourinary-deferred  Musculoskeletal-no joint abnormalities, normal ROM throughout; no edema  Skin-warm, dry; stage I breakdown over coccyx  Neurologic-initially lethargic but following Narcan became more alert, answered questions and followed commands; nonfocal exam    Scheduled Meds:   albuterol-ipratropium  3 mL Nebulization Q6H    aspirin  81 mg Oral Daily    atorvastatin  40 mg Oral Daily    bisacodyL  5 mg Oral Daily    ceFEPime IV (PEDS and ADULTS)  1 g Intravenous Q8H    clopidogreL  75 mg Oral Daily    DULoxetine  60 mg Oral Daily    enoxparin  40 mg Subcutaneous Q24H (prophylaxis, 1700)    levoFLOXacin  500 mg Intravenous Q24H    methylPREDNISolone injection (PEDS and ADULTS)  80 mg Intravenous Q8H    montelukast  10 mg Oral Daily    mupirocin   Nasal BID    pantoprazole  40 mg Oral Daily    pregabalin  100 mg Oral TID    QUEtiapine  25 mg Oral QHS    ranolazine  500 mg Oral BID    sodium chloride 0.9%  10 mL Intravenous Q8H     Continuous Infusions:   lactated ringers   Intravenous Continuous 75 mL/hr at 03/12/25 1359 New Bag at 03/12/25 1359     PRN Meds:.  Current Facility-Administered Medications:     acetaminophen, 650 mg, Oral, Q8H PRN    dextrose 50%, 12.5 g, Intravenous, PRN    glucagon (human recombinant), 1 mg, Intramuscular, PRN    insulin aspart U-100, 0-10 Units, Subcutaneous, Q6H PRN    naloxone, 0.4 mg, Intravenous, PRN    ondansetron, 4 mg, Intravenous, Q8H PRN    oxyCODONE, 10 mg, Oral, Q4H PRN      Significant Labs: All pertinent labs within the past 24 hours have been reviewed.  A1C:   Recent Labs   Lab 01/23/25  1641   HGBA1C 5.6     ABGs:   Recent Labs   Lab 03/12/25  1125 03/12/25  1327   PH 7.294* 7.374   PCO2 49.6* 40.1   HCO3 24.1 23.4*   POCSATURATED 72 94   BE -2 -2   PO2 43* 74*     CBC:   Recent Labs   Lab 03/11/25  1429 03/12/25  0928   WBC 9.74  "24.08*   HGB 7.6* 7.5*   HCT 22.7* 23.8*    242     CMP:   Recent Labs   Lab 03/11/25  1429 03/12/25  0928    140   K 4.8 5.1    106   CO2 23 19*   BUN 38.0* 49.0*   CREATININE 1.58* 1.79*   CALCIUM 8.9 9.2   ALBUMIN 3.2* 3.2*   BILITOT 0.4 0.4   ALKPHOS 132 135   AST 17 29   ALT 14 26     Cardiac Markers:   Recent Labs   Lab 03/12/25  0928   BNP 3,135.4*     Coagulation: No results for input(s): "PT", "INR", "APTT" in the last 48 hours.  Lactic Acid:   Recent Labs   Lab 03/12/25  0928   LACTATE 0.9     Magnesium: No results for input(s): "MG" in the last 48 hours.  POCT Glucose: No results for input(s): "POCTGLUCOSE" in the last 48 hours.  Troponin:   Recent Labs   Lab 03/12/25  0928   TROPONINI 0.109*     TSH: No results for input(s): "TSH" in the last 4320 hours.  Urine Studies:   Recent Labs   Lab 03/12/25  0959   COLORU Other*   APPEARANCEUA Cloudy*   PHUR 8.5   PROTEINUA 2+*   GLUCUA Negative   BILIRUBINUA 1+*   OCCULTUA Trace-Intact*   NITRITE Positive*   UROBILINOGEN 1.0   LEUKOCYTESUR 3+*   RBCUA 0-2   WBCUA 21-50*   BACTERIA Moderate*       Significant Imaging:   CXR  Impression:  1. Interim increasing patchy hazy opacities throughout the right lung and left lower lung since the prior exam suspicious for infiltrate and or atelectasis  2. Borderline cardiomegaly  3. Atherosclerosis    Assessment/Plan:   Acute respiratory failure with hypoxia and hypercapnia  Continue BiPAP maintain adequate ventilation and oxygenation; wean support as tolerated    Multilobar healthcare acquired pneumonia with sepsis  Empiric antibiotic therapy with cefepime/levofloxacin  Bronchodilator therapy  Short course of IV corticosteroid  MRSA screen-add MRSA coverage if positive    Acute kidney injury  Possible etiologies include volume depletion, sepsis, obstructive uropathy  Place indwelling urinary catheter to monitor output and manage potential urinary retention  Monitor renal indices  Avoid nephrotoxic agents " as possible    Anemia  No active bleeding identified  Monitor H and H; transfuse for hemoglobin less than 7  If troponin rises further, transfuse 1 unit PRBCs now    Elevated troponin  Repeat troponin now    UTI  Manage with antibiotics as noted above    COPD  Respiratory measures as noted above    COVID positive  Undetermined significance  Pattern of pulmonary infiltrates inconsistent with COVID pneumonia    History of HFrEF  Clinically euvolemic  Obtain echo    CAD/CABG/ppm status  Currently in a-v paced rhythm  Continue ranolazine  Continue low-dose aspirin and clopidogrel    Diabetes mellitus  Monitor blood sugars with Accu-Cheks and SSI  Add basal insulin as necessary    Hypertension  Hold ARB due to JOHN  Consider addition of beta-blocker    PAD  DAPT as noted above    History of BPH with urinary retention  Place indwelling catheter as noted    Recent multilevel C-spine surgery 1/25  Maintain soft C-collar    GI prophylaxis: Pantoprazole    Code status: Full    Active Diagnoses:    Diagnosis Date Noted POA    PRINCIPAL PROBLEM:  Acute respiratory failure with hypoxia and hypercapnia [J96.01, J96.02] 03/12/2025 Yes      Problems Resolved During this Admission:     VTE Risk Mitigation (From admission, onward)           Ordered     enoxaparin injection 40 mg  Every 24 hours         03/12/25 1307     IP VTE HIGH RISK PATIENT  Once         03/12/25 1237     Place sequential compression device  Until discontinued         03/12/25 1237                  Critical care time:  48 minutes    Yomi Jeffery MD  Department of Hospital Medicine   [facility] - ICU

## 2025-03-12 NOTE — ED PROVIDER NOTES
Encounter Date: 3/12/2025  History from patient and medics     History     Chief Complaint   Patient presents with    Shortness of Breath     BIBA from NH  for SOB     HPI    Tyler Mai is 69 y.o. male who  has a past medical history of Anxiety disorder, unspecified, Arthritis, CAD (coronary artery disease), Cervical radiculopathy, CHF (congestive heart failure), Chronic pain syndrome, COPD (chronic obstructive pulmonary disease), Diabetes mellitus, Gastro-esophageal reflux disease without esophagitis, High cholesterol, Hypertension, Lumbar radiculopathy, Myelomalacia of cervical cord, Other specified diseases of spinal cord, Pacemaker, PVD (peripheral vascular disease), S/P triple vessel bypass, and Tobacco use. arrives in ER with c/o Shortness of Breath (BIBA from NH  for SOB)    Review of patient's allergies indicates:   Allergen Reactions    Hydrochlorothiazide      Other Reaction(s): Pancreatitis     Past Medical History:   Diagnosis Date    Anxiety disorder, unspecified     Arthritis     CAD (coronary artery disease)     Cervical radiculopathy     CHF (congestive heart failure)     Chronic pain syndrome     COPD (chronic obstructive pulmonary disease)     Diabetes mellitus     Gastro-esophageal reflux disease without esophagitis     High cholesterol     Hypertension     Lumbar radiculopathy     Myelomalacia of cervical cord     Other specified diseases of spinal cord     Pacemaker     PVD (peripheral vascular disease)     S/P triple vessel bypass     Tobacco use      Past Surgical History:   Procedure Laterality Date    ANTERIOR CERVICAL DISCECTOMY W/ FUSION  1995    C5-6 and C6-7.  Dr. Wall    ANTERIOR CERVICAL DISCECTOMY W/ FUSION  1996    Redo C5-6, C6-7.  Dr. Wall    CHOLECYSTECTOMY      CORONARY ARTERY BYPASS GRAFT      FUSION OF POSTERIOR COLUMN OF CERVICAL SPINE USING COMPUTER AIDED NAVIGATION N/A 1/24/2025    Procedure: FUSION, SPINE, POSTERIOR SPINAL COLUMN, CERVICAL, USING COMPUTER-ASSISTED  NAVIGATION;  Surgeon: Darrell Watson MD;  Location: [facility];  Service: Neurosurgery;  Laterality: N/A;  C2-C6 PCF; C2/3-C4/5 decompression, possible additional levels  o-arm  NTI  TIVA setup  [employer] //  XX    INSERTION OF PACEMAKER      X2    TRIPPLE VESSEL BYPASS       Family History   Problem Relation Name Age of Onset    Cancer Mother      Cancer Father      Lumbar disc disease Brother       Social History[1]  Review of Systems   Unable to perform ROS: Other   Constitutional:  Negative for fever.   HENT:  Negative for trouble swallowing and voice change.    Eyes:  Negative for visual disturbance.   Respiratory:  Positive for cough and shortness of breath.    Cardiovascular:  Negative for chest pain.   Gastrointestinal:  Negative for abdominal pain, diarrhea and vomiting.   Genitourinary:  Negative for dysuria and hematuria.   Musculoskeletal:  Positive for neck pain. Negative for back pain and gait problem.        Patient had surgery recently on the neck   Skin:  Negative for color change and rash.   Neurological:  Negative for headaches.   Psychiatric/Behavioral:  Negative for behavioral problems and sleep disturbance.        Physical Exam     Initial Vitals [03/12/25 0854]   BP Pulse Resp Temp SpO2   (!) 145/86 70 20 97.5 °F (36.4 °C) (!) 92 %      MAP       --         Physical Exam    Nursing note and vitals reviewed.  Constitutional: He appears well-developed. He appears distressed.   Slightly somnolent but able to answer questions appropriately, mild respiratory distress   HENT:   Head: Atraumatic.   Patient has bruising of the right face, he has some stitches in the area also apparently has been seen for that already.   Eyes: EOM are normal.   Neck: Neck supple.   Cardiovascular:  Normal rate.           Pulmonary/Chest: He is in respiratory distress. He has wheezes. He has rhonchi. He has rales.   Abdominal: Abdomen is soft. Bowel sounds are normal. He exhibits no distension. There is no abdominal  tenderness. There is no rebound.   Musculoskeletal:         General: Edema present.      Cervical back: Neck supple.     Neurological: He is alert.   Skin: Skin is warm.         ED Course   Critical Care    Date/Time: 3/12/2025 11:32 AM    Performed by: Robert Barnett MD  Authorized by: Robert Barnett MD  Direct patient critical care time: 40 minutes  Consulting other physicians critical care time: 5 minutes  Total critical care time (exclusive of procedural time) : 45 minutes  Critical care was necessary to treat or prevent imminent or life-threatening deterioration of the following conditions: cardiac failure, respiratory failure, renal failure and dehydration.  Critical care was time spent personally by me on the following activities: development of treatment plan with patient or surrogate, discussions with consultants, discussions with primary provider, evaluation of patient's response to treatment, examination of patient, obtaining history from patient or surrogate, ordering and performing treatments and interventions, ordering and review of radiographic studies, ordering and review of laboratory studies, pulse oximetry, re-evaluation of patient's condition and review of old charts.  Comments: BiPAP Management        Orders Placed This Encounter    Critical Care    Blood Culture x two cultures. Draw prior to antibiotics    Urine culture    X-Ray Chest AP Portable    X-Ray Chest 1 View    CBC auto differential    Comprehensive metabolic panel    Lactic acid, plasma    Urinalysis, Reflex to Urine Culture    Troponin I    Brain natriuretic peptide    CBC with Differential    Manual Differential    Urinalysis, Microscopic    COVID/FLU A&B PCR    CBC auto differential    Comprehensive metabolic panel    Magnesium    MRSA PCR    Basic Metabolic Panel    Magnesium    Troponin I    Troponin I    CBC with Differential    Diet NPO    ED Preference List Used to Initiate Sepsis Orders    Strict intake and output     Vital signs    Cardiac Monitoring - Adult    Intake and output    Notify physician     Place sequential compression device    Herrera to [place]    Herrera Catheter Care every 12 hours    Nurse to discontinue herrera when patient no longer meets criteria    Post Herrera Catheter Removal Protocol    If any glucose result is less than 50 or greater than 400:    If 2nd result is less than 50 or greater than 400:    If glucose greater than 400 mg/dL treat per correction scale.  If glucose remains elevated above 400 mg/dL at next scheduled check, notify provider    Re-check Blood Glucose    Full code    IP Wound Care consult Nurse    Airborne and Contact and Droplet Isolation Status    Inhalation Treatment Once    Oxygen Continuous    POCT ARTERIAL BLOOD GAS Blood Gas    Bipap Continuous    Inhalation Treatment Q6H    Pulse Oximetry Continuous    POCT ARTERIAL BLOOD GAS Blood Gas    Cardiac monitoring strips    Cardiac monitoring strips    Cardiac monitoring strips    Cardiac monitoring strips    POCT Glucose, Hand-Held Device    EKG 12-lead    Echo    Type & Screen    Saline lock IV    Saline lock IV    Admit to Inpatient    Transfuse RBC 1 Unit    ISTAT PROCEDURE    ISTAT PROCEDURE    POCT glucose    POCT glucose    POCT glucose    piperacillin-tazobactam (ZOSYN) 4.5 g in D5W 100 mL IVPB (MB+)    albuterol-ipratropium 2.5 mg-0.5 mg/3 mL nebulizer solution 3 mL    budesonide nebulizer solution 0.5 mg    sodium chloride 0.9% flush 10 mL    acetaminophen tablet 650 mg    ondansetron injection 4 mg    mupirocin 2 % ointment    aspirin chewable tablet 81 mg    bisacodyL EC tablet 5 mg    clopidogreL tablet 75 mg    DULoxetine DR capsule 60 mg    montelukast tablet 10 mg    oxyCODONE immediate release tablet 10 mg    ranolazine 12 hr tablet 500 mg    atorvastatin tablet 40 mg    QUEtiapine tablet 25 mg    pregabalin capsule 100 mg    albuterol-ipratropium 2.5 mg-0.5 mg/3 mL nebulizer solution 3 mL    enoxaparin injection 40 mg     ceFEPIme injection 1 g    levoFLOXacin 500 mg/100 mL IVPB 500 mg    pantoprazole EC tablet 40 mg    methylPREDNISolone sodium succinate injection 80 mg    naloxone 0.4 mg/mL injection 0.4 mg    naloxone (NARCAN) 0.4 mg/mL injection    lactated ringers infusion    dextrose 50% injection 12.5 g    glucagon (human recombinant) injection 1 mg    insulin aspart U-100 injection 0-10 Units    0.9%  NaCl infusion (for blood administration)    lactated ringers bolus 500 mL    morphine injection 2 mg    Prepare RBC 1 Unit    IP VTE HIGH RISK PATIENT    Bed rest    Progressive Mobility Protocol (mobilize patient to their highest level of functioning at least twice daily)    Turn patient every 2 hours       Labs Reviewed   COMPREHENSIVE METABOLIC PANEL - Abnormal       Result Value    Sodium 140      Potassium 5.1      Chloride 106      CO2 19 (*)     Glucose 206 (*)     Blood Urea Nitrogen 49.0 (*)     Creatinine 1.79 (*)     Calcium 9.2      Protein Total 6.8      Albumin 3.2 (*)     Globulin 3.6 (*)     Albumin/Globulin Ratio 0.9 (*)     Bilirubin Total 0.4            ALT 26      AST 29      eGFR 41      Anion Gap 15.0      BUN/Creatinine Ratio 27     URINALYSIS, REFLEX TO URINE CULTURE - Abnormal    Color, UA Other (*)     Appearance, UA Cloudy (*)     Specific Gravity, UA 1.015      pH, UA 8.5      Protein, UA 2+ (*)     Glucose, UA Negative      Ketones, UA 2+ (*)     Blood, UA Trace-Intact (*)     Bilirubin, UA 1+ (*)     Urobilinogen, UA 1.0      Nitrites, UA Positive (*)     Leukocyte Esterase, UA 3+ (*)    TROPONIN I - Abnormal    Troponin-I 0.109 (*)    B-TYPE NATRIURETIC PEPTIDE - Abnormal    Natriuretic Peptide 3,135.4 (*)    CBC WITH DIFFERENTIAL - Abnormal    WBC 24.08 (*)     RBC 2.56 (*)     Hgb 7.5 (*)     Hct 23.8 (*)     MCV 93.0      MCH 29.3      MCHC 31.5 (*)     RDW 12.7      Platelet 242      MPV 10.4      NRBC% 0.0     MANUAL DIFFERENTIAL - Abnormal    Neutrophils % 91 (*)     Bands % 2       Lymphs % 6 (*)     Monocytes % 1 (*)     Neutrophils Abs Calc 22.3944 (*)     Lymphs Abs 1.4448      Monocytes Abs 0.2408      Platelets Adequate      RBC Morph Normal     URINALYSIS, MICROSCOPIC - Abnormal    Bacteria, UA Moderate (*)     Mucous, UA Trace (*)     Triple Phosphate Crystals, UA Many (*)     Sperm, UA Few (*)     RBC, UA 0-2      WBC, UA 21-50 (*)     Squamous Epithelial Cells, UA Occasional     COVID/FLU A&B PCR - Abnormal    Influenza A PCR Not Detected      Influenza B PCR Not Detected      SARS-CoV-2 PCR Detected (*)     Narrative:     The Xpert Xpress SARS-CoV-2/FLU/RSV plus is a rapid, multiplexed real-time PCR test intended for the simultaneous qualitative detection and differentiation of SARS-CoV-2, Influenza A, Influenza B, and respiratory syncytial virus (RSV) viral RNA in either nasopharyngeal swab or nasal swab specimens.         ISTAT PROCEDURE - Abnormal    POC PH 7.294 (*)     POC PCO2 49.6 (*)     POC PO2 43 (*)     POC HCO3 24.1      POC BE -2      POC SATURATED O2 72      POC TCO2 26      Sample ARTERIAL      Site LB      Allens Test N/A      DelSys Venti Mask     LACTIC ACID, PLASMA - Normal    Lactic Acid Level 0.9     BLOOD CULTURE OLG   BLOOD CULTURE OLG   CULTURE, URINE   CBC W/ AUTO DIFFERENTIAL    Narrative:     The following orders were created for panel order CBC auto differential.  Procedure                               Abnormality         Status                     ---------                               -----------         ------                     CBC with Differential[[id number]]       Abnormal            Final result               Manual Differential[[id number]]         Abnormal            Final result                 Please view results for these tests on the individual orders.        ECG Results              EKG 12-lead (Final result)        Collection Time Result Time QRS Duration OHS QTC Calculation    03/12/25 09:03:34 03/12/25 15:46:30 136 487                      Final result by Interface, Lab In [facility] (03/12/25 15:46:42)                   Narrative:    Test Reason : Z13.6,    Vent. Rate :  78 BPM     Atrial Rate :  78 BPM     P-R Int : 152 ms          QRS Dur : 136 ms      QT Int : 428 ms       P-R-T Axes :  63  41 181 degrees    QTcB Int : 487 ms    Atrial-sensed ventricular-paced rhythm  Abnormal ECG  When compared with ECG of 27-Jan-2025 00:13,  Vent. rate has increased by  16 bpm  Confirmed by David Posadas ([id number]) on 3/12/2025 3:46:29 PM    Referred By: AAAREFERRAL SELF           Confirmed By: David Posadas                      Wet Read by Robert Barnett MD (03/12/25 10:58:26, [facility] - Emergency Dept, Emergency Medicine)    EKG: Independently reviewed and / or Interpreted by Robert Barnett MD. independently as Rate 78, No STEMI , atrial sensed ventricular paced rhythm.                                  Imaging Results              X-Ray Chest AP Portable (Final result)  Result time 03/12/25 10:48:16      Final result by Tyler Newby MD (03/12/25 10:48:16)                   Impression:      1. Interim increasing patchy hazy opacities throughout the right lung and left lower lung since the prior exam suspicious for infiltrate and or atelectasis  2. Borderline cardiomegaly  3. Atherosclerosis      Electronically signed by: Tyler Newby  Date:    03/12/2025  Time:    10:48               Narrative:    EXAMINATION:  XR CHEST AP PORTABLE    CLINICAL HISTORY:  Sepsis;, .    COMPARISON:  01/20/2025    FINDINGS:  An AP view or more reveals the heart to be borderline enlarged.  The trachea is just right of midline.  Atherosclerosis is seen within the aorta.  Interim increasing patchy hazy opacities throughout the right lung and left lower lung since the prior exam.  A cardiac device is noted to the left chest.  Post sternotomy changes are present.  Bony structures are osteopenic.                                       Medications   sodium chloride 0.9%  flush 10 mL (10 mLs Intravenous Not Given 3/13/25 0515)   acetaminophen tablet 650 mg (650 mg Oral Given 3/12/25 2255)   ondansetron injection 4 mg (has no administration in time range)   mupirocin 2 % ointment ( Nasal Not Given 3/12/25 2100)   aspirin chewable tablet 81 mg (81 mg Oral Not Given 3/12/25 1400)   bisacodyL EC tablet 5 mg (5 mg Oral Not Given 3/12/25 1400)   clopidogreL tablet 75 mg (75 mg Oral Not Given 3/12/25 1400)   DULoxetine DR capsule 60 mg (60 mg Oral Not Given 3/12/25 1400)   montelukast tablet 10 mg (10 mg Oral Not Given 3/12/25 1400)   oxyCODONE immediate release tablet 10 mg (10 mg Oral Not Given 3/13/25 0600)   ranolazine 12 hr tablet 500 mg (500 mg Oral Not Given 3/12/25 2100)   atorvastatin tablet 40 mg (40 mg Oral Not Given 3/12/25 1300)   QUEtiapine tablet 25 mg (25 mg Oral Not Given 3/12/25 2100)   pregabalin capsule 100 mg (100 mg Oral Not Given 3/12/25 2100)   albuterol-ipratropium 2.5 mg-0.5 mg/3 mL nebulizer solution 3 mL (3 mLs Nebulization Given 3/13/25 0115)   enoxaparin injection 40 mg (40 mg Subcutaneous Given 3/12/25 1654)   ceFEPIme injection 1 g (1 g Intravenous Given 3/13/25 0500)   levoFLOXacin 500 mg/100 mL IVPB 500 mg (0 mg Intravenous Stopped 3/12/25 1510)   pantoprazole EC tablet 40 mg (40 mg Oral Not Given 3/12/25 1315)   methylPREDNISolone sodium succinate injection 80 mg (80 mg Intravenous Given 3/13/25 0500)   naloxone 0.4 mg/mL injection 0.4 mg (has no administration in time range)   lactated ringers infusion ( Intravenous New Bag 3/13/25 0606)   dextrose 50% injection 12.5 g (has no administration in time range)   glucagon (human recombinant) injection 1 mg (has no administration in time range)   insulin aspart U-100 injection 0-10 Units (2 Units Subcutaneous Given 3/13/25 0459)   0.9%  NaCl infusion (for blood administration) (has no administration in time range)   piperacillin-tazobactam (ZOSYN) 4.5 g in D5W 100 mL IVPB (MB+) (0 g Intravenous Stopped 3/12/25  1104)   albuterol-ipratropium 2.5 mg-0.5 mg/3 mL nebulizer solution 3 mL (3 mLs Nebulization Given 3/12/25 1029)   budesonide nebulizer solution 0.5 mg (0.5 mg Nebulization Given 3/12/25 1029)   naloxone (NARCAN) 0.4 mg/mL injection (0.4 mg  Given 3/12/25 1322)   lactated ringers bolus 500 mL ( Intravenous Stopped 3/12/25 1642)   morphine injection 2 mg (2 mg Intravenous Given 3/12/25 2255)     Medical Decision Making    Tyler Mai is 69 y.o. male who  has a past medical history of Anxiety disorder, unspecified, Arthritis, CAD (coronary artery disease), Cervical radiculopathy, CHF (congestive heart failure), Chronic pain syndrome, COPD (chronic obstructive pulmonary disease), Diabetes mellitus, Gastro-esophageal reflux disease without esophagitis, High cholesterol, Hypertension, Lumbar radiculopathy, Myelomalacia of cervical cord, Other specified diseases of spinal cord, Pacemaker, PVD (peripheral vascular disease), S/P triple vessel bypass, and Tobacco use. arrives in ER with c/o Shortness of Breath (BIBA from NH  for SOB)    Patient who had neck surgery a few weeks back, was in the rehab in the nursing home it started having some shortness of breath since yesterday, was diagnosed with pneumonia, the shortness of breath got worse today so they sent him to the emergency room.  Patient on arrival says that he is okay, he does have some shortness of breath, and cough, patient O2 sat was in low 80s in the nursing home, they had him on 3 L nasal cannula with O2 sat in high 80s, according to medic he put him on 4 L nasal cannula in his O2 sat is in low 90s.    On examination has rhonchi and rales bilaterally, he does have some trace pedal edema.  I am going to do a sepsis workup on him also since he is having chest pain I will do a troponin and BNP, will put him on oxygen I will give him neb treatment, and re-evaluate.    Amount and/or Complexity of Data Reviewed  Labs: ordered. Decision-making details documented in  ED Course.  Radiology: ordered.    Risk  OTC drugs.  Prescription drug management.  Decision regarding hospitalization.               ED Course as of 03/13/25 0614   Wed Mar 12, 2025   1018 BNP(!): 3,135.4 [GQ]   1018 Leukocyte Esterase, UA(!): 3+ [GQ]   1019 BUN(!): 49.0 [GQ]   1019 Creatinine(!): 1.79 [GQ]   1019 WBC(!): 24.08  With the patient's white count in 24,000, UTI and pneumonia I will go ahead and give him Zosyn IV q.8 hours, although his BUN is 49 and creatinine is 1.79 but it is BNP is 3000, and he has some diffuse interstitial markings also, I feel if I give him fluid bolus he will go into florid congestive heart failure, I am not going to give him fluid bolus, his lactic acid is 0.9,    On examination he does have rales and rhonchi bilaterally, I will go ahead and give him neb treatment, he has a history of COPD. [GQ]   1059 After neb treatment, a little better, [GQ]   1106 SARS-CoV2 (COVID-19) Qualitative PCR(!): Detected  Patient's COVID-19 test is also positive.  I am putting on BiPAP, waiting for hospitalist to call me to admit him in the hospital. [GQ]   1121 Patient's symptoms are gradually getting worse since yesterday, I talked to Dr. gillis, and we decided that we are going to put him in the ICU continue him on BiPAP if needed they can always be intubated, but he does have COVID-19, patient was on Zosyn in the nursing home also.  But since it is getting worse I will have to put him in ICU. [GQ]   1131 Patient's blood gas although it appears like a venous sample shows a pH of 7.294 pCO2 of 49 bicarb 24 O2 sat of 72%, but he is showing 96% on his finger. [GQ]   1132 So he does have mild respiratory acidosis.  I am admitting him in ICU for further management.  If needed we can always start him on Levophed [GQ]   1134 BP: 103/72 [GQ]   1134 MAP (mmHg): 89 [GQ]   1134 Pulse: 79 [GQ]   1134 SpO2: 95 % [GQ]   1134 Resp(!): 27 [GQ]      ED Course User Index  [GQ] Robert Barnett MD                            Clinical Impression:  Final diagnoses:  [Z13.6] Screening for cardiovascular condition  [I50.9] Acute congestive heart failure, unspecified heart failure type (Primary)  [J20.9] Acute bronchitis, unspecified organism  [U07.1] COVID-19 virus infection  [N39.0] Urinary tract infection without hematuria, site unspecified  [J96.90] Respiratory failure          ED Disposition Condition    Admit Stable                    [1]   Social History  Tobacco Use    Smoking status: Every Day     Current packs/day: 1.00     Types: Cigarettes        Robert Barnett MD  03/13/25 0615

## 2025-03-12 NOTE — PLAN OF CARE
Problem: Adult Inpatient Plan of Care  Goal: Plan of Care Review  Outcome: Progressing  Goal: Patient-Specific Goal (Individualized)  Outcome: Progressing  Goal: Absence of Hospital-Acquired Illness or Injury  Outcome: Progressing  Goal: Optimal Comfort and Wellbeing  Outcome: Progressing  Goal: Readiness for Transition of Care  Outcome: Progressing     Problem: Skin Injury Risk Increased  Goal: Skin Health and Integrity  Outcome: Progressing     Problem: Diabetes Comorbidity  Goal: Blood Glucose Level Within Targeted Range  Outcome: Progressing     Problem: Wound  Goal: Optimal Coping  Outcome: Progressing  Goal: Optimal Functional Ability  Outcome: Progressing  Goal: Absence of Infection Signs and Symptoms  Outcome: Progressing  Goal: Improved Oral Intake  Outcome: Progressing  Goal: Optimal Pain Control and Function  Outcome: Progressing  Goal: Skin Health and Integrity  Outcome: Progressing  Goal: Optimal Wound Healing  Outcome: Progressing     Problem: Fall Injury Risk  Goal: Absence of Fall and Fall-Related Injury  Outcome: Progressing     Problem: Gas Exchange Impaired  Goal: Optimal Gas Exchange  Outcome: Progressing     Problem: Infection  Goal: Absence of Infection Signs and Symptoms  Outcome: Progressing

## 2025-03-13 NOTE — PROGRESS NOTES
Inpatient Nutrition Assessment    Admit Date: 3/12/2025   Total duration of encounter: 1 day   Patient Age: 69 y.o.    Nutrition Recommendation/Prescription     When able to advance diet beyond liquid diet, recommend 2000 Calorie Diabetic, Heart Healthy Diet as tolerated.   Add Boost Glucose Control, Chocolate, TID, when able to advance diet beyond CL. Provides 190 kcal, 16 g protein per serving.  Daily weight and labs.   Monitor diet advancement, intake, weight, and labs.     RD following and available as needed. Thank you.     Communication of Recommendations: reviewed with nurse and EMR.    Nutrition Assessment     Malnutrition Assessment/Nutrition-Focused Physical Exam                                                                    A minimum of two characteristics is recommended for diagnosis of either severe or non-severe malnutrition.    Chart Review    Reason Seen: continuous nutrition monitoring    Malnutrition Screening Tool Results   Have you recently lost weight without trying?: No  Have you been eating poorly because of a decreased appetite?: No   MST Score: 0   Diagnosis:  Acute Respiratory Failure with Hypoxia and Hypercapnia.     Relevant Medical History:   Anxiety disorder, unspecified      Arthritis      CAD (coronary artery disease)      Cervical radiculopathy      CHF (congestive heart failure)      Chronic pain syndrome      COPD (chronic obstructive pulmonary disease)      Diabetes mellitus      Gastro-esophageal reflux disease without esophagitis      High cholesterol      Hypertension      Lumbar radiculopathy      Myelomalacia of cervical cord      Other specified diseases of spinal cord      Pacemaker      PVD (peripheral vascular disease)      S/P triple vessel bypass      Tobacco use          Scheduled Medications:  albuterol-ipratropium, 3 mL, Q6H  aspirin, 81 mg, Daily  atorvastatin, 40 mg, Daily  bisacodyL, 5 mg, Daily  ceFEPime IV (PEDS and ADULTS), 1 g, Q8H  clopidogreL, 75 mg,  Daily  DULoxetine, 60 mg, Daily  enoxparin, 40 mg, Q24H (prophylaxis, 1700)  insulin glargine U-100, 5 Units, Daily  levoFLOXacin, 500 mg, Q24H  methylPREDNISolone injection (PEDS and ADULTS), 80 mg, Q8H  montelukast, 10 mg, Daily  mupirocin, , BID  pantoprazole, 40 mg, Daily  pregabalin, 100 mg, TID  QUEtiapine, 25 mg, QHS  ranolazine, 500 mg, BID  sodium chloride 0.9%, 10 mL, Q8H    Continuous Infusions:  lactated ringers, Last Rate: 75 mL/hr at 03/13/25 1242    PRN Medications:  0.9%  NaCl infusion (for blood administration), , Q24H PRN  acetaminophen, 650 mg, Q8H PRN  bisacodyL, 10 mg, Daily PRN  dextrose 50%, 12.5 g, PRN  glucagon (human recombinant), 1 mg, PRN  insulin aspart U-100, 0-10 Units, Q6H PRN  naloxone, 0.4 mg, PRN  ondansetron, 4 mg, Q8H PRN  oxyCODONE, 10 mg, Q4H PRN    Calorie Containing IV Medications: no significant kcals from medications at this time    Recent Labs   Lab 03/11/25  1429 03/12/25  0928 03/12/25  1338 03/13/25  0413    140 139 140   K 4.8 5.1 5.0 4.4   CALCIUM 8.9 9.2 9.4 9.1   MG  --   --  2.50 2.40    106 106 107   CO2 23 19* 22* 23   BUN 38.0* 49.0* 48.0* 54.0*   CREATININE 1.58* 1.79* 1.72* 1.65*   EGFRNORACEVR 48 41 43 45   GLUCOSE 116* 206* 195* 200*   BILITOT 0.4 0.4  --  0.4   ALKPHOS 132 135  --  120   ALT 14 26  --  26   AST 17 29  --  27   ALBUMIN 3.2* 3.2*  --  2.9*   WBC 9.74 24.08*  --  14.28*   HGB 7.6* 7.5*  --  8.8*   HCT 22.7* 23.8*  --  26.9*     Nutrition Orders:  Diet Clear Liquid Standard Tray      Appetite/Oral Intake: fair/25-50% of meals  Factors Affecting Nutritional Intake: clear liquid diet, respiratory status, shortness of breath, and  requiring oxymask..    Social Needs Impacting Access to Food: none identified  Food/Adventism/Cultural Preferences: unable to obtain  Food Allergies: none reported  Last Bowel Movement: 03/12/25  Wound(s):     Wound 03/12/25 1140 Fissure Right Buttocks-Tissue loss description: Partial thickness Noted.  "    Comments    3/13: Pt currently on CL diet, requiring oxymask. Discussed with nursing who reports pt is tolerating CL. No recent weight loss noted/reported. Labs and meds reviewed. Last A1C was 5.6 on 1/23/2025. Creatinine improving since admit. . Noted pt with good appetite prior to admit and pt prefers chocolate flavor ONS. Will add Boost Glucose Control Chocolate when able to advance diet beyond CL and continue to monitor during stay.         Anthropometrics    Height: 5' 10" (177.8 cm), Height Method: Stated  Last Weight: 79 kg (174 lb 2.6 oz) (03/12/25 2130), Weight Method: Bed Scale  BMI (Calculated): 25  BMI Classification: overweight (BMI 25-29.9)        Ideal Body Weight (IBW), Male: 166 lb     % Ideal Body Weight, Male (lb): 104.92 %                          Usual Weight Provided By: EMR weight history    Wt Readings from Last 5 Encounters:   03/12/25 79 kg (174 lb 2.6 oz)   02/20/25 72.6 kg (160 lb)   02/19/25 73.3 kg (161 lb 9.6 oz)   01/18/25 78 kg (172 lb)   12/03/24 78.5 kg (173 lb)     Weight Change(s) Since Admission:   Wt Readings from Last 1 Encounters:   03/12/25 2130 79 kg (174 lb 2.6 oz)   03/12/25 1341 79 kg (174 lb 2.6 oz)   03/12/25 0854 77.1 kg (170 lb)   Admit Weight: 77.1 kg (170 lb) (03/12/25 0854), Weight Method: Bed Scale    Estimated Needs    Weight Used For Calorie Calculations: 78 kg (171 lb 15.3 oz)  Energy Calorie Requirements (kcal): 1873 kcals/day (SF 1.2)  Energy Need Method: [place]-St Jeor  Weight Used For Protein Calculations: 78 kg (171 lb 15.3 oz)  Protein Requirements: 80 to 95 gm/day (1.0 to 1.2 g/kg/CBW).  Fluid Requirements (mL): 1873 mL/day (1mL/kcal) or per MD Guidance.  CHO Requirement: 203 to 225 g/day (45% of kcals).     Enteral Nutrition     Patient not receiving enteral nutrition at this time.    Parenteral Nutrition     Patient not receiving parenteral nutrition support at this time.    Evaluation of Received Nutrient Intake    Calories: not meeting " estimated needs  Protein: not meeting estimated needs    Patient Education     Not applicable.    Nutrition Diagnosis     PES: Altered nutrition related laboratory values related to acute illness as evidenced by BUN/Crea 54.0/1.65(H); . (new)     PES:            Nutrition Interventions     Intervention(s): collaboration with other providers  Intervention(s):      Goal: Meet greater than 80% of nutritional needs by follow-up. (new)    Nutrition Goals & Monitoring     Dietitian will monitor: food and beverage intake, energy intake, weight, weight change, electrolyte/renal panel, glucose/endocrine profile, and gastrointestinal profile  Discharge planning: too early to determine; pending clinical course  Nutrition Risk/Follow-Up: high (follow-up in 1-4 days)   Please consult if re-assessment needed sooner.

## 2025-03-13 NOTE — PROGRESS NOTES
[facility] - ICU  Wound Care    Patient Name: Tyler Mai  MRN: [id number]  Date: 3/13/2025  Diagnosis: Acute respiratory failure with hypoxia and hypercapnia      Subjective:           Patient ID: Tyler Mai is a 69 y.o. male.    Chief Complaint: Shortness of Breath (BIBA from NH  for SOB)      HPI      Past Medical History:     1. Acute congestive heart failure, unspecified heart failure type    2. Screening for cardiovascular condition    3. Acute bronchitis, unspecified organism    4. COVID-19 virus infection    5. Urinary tract infection without hematuria, site unspecified    6. Respiratory failure      Wound Assessment:           Wound 03/12/25 1140 Fissure Right Buttocks (Active)   03/12/25 1140 Buttocks   Present on Original Admission:    Primary Wound Type: Fissure   Side: Right   Orientation:    Wound Approximate Age at First Assessment (Weeks):    Wound Number:    Is this injury device related?:    Incision Type:    Closure Method:    Wound Description (Comments):    Type:    Additional Comments:    Ankle-Brachial Index:    Pulses:    Removal Indication and Assessment:    Wound Outcome:    Wound Image   03/13/25 0925   Dressing Appearance Open to air 03/13/25 0925   Drainage Amount Scant 03/13/25 0925   Drainage Characteristics/Odor Serosanguineous 03/13/25 0925   Appearance Red;Moist 03/13/25 0925   Tissue loss description Partial thickness 03/13/25 0925   Periwound Area Moist 03/13/25 0925   Wound Edges Open 03/13/25 0925   Wound Length (cm) 0.3 cm 03/13/25 0925   Wound Width (cm) 0.5 cm 03/13/25 0925   Wound Depth (cm) 0.2 cm 03/13/25 0925   Wound Volume (cm^3) 0.016 cm^3 03/13/25 0925   Wound Surface Area (cm^2) 0.12 cm^2 03/13/25 0925           Plan:     Daily dressing changes per nursing staff, re-evaluation per wound care in 5-7 days.        Recommendations:   Right buttocks: Keep clean and dry, apply bordered foam, change daily and prn soilage, apply moisture barrier cream to  scrotum daily and at each cleaning         Time spent in room:     Hailey Gallardo RN

## 2025-03-13 NOTE — PLAN OF CARE
Clinic updates sent [facility] to [facility] [place] stated that the pt was there for skilled services

## 2025-03-13 NOTE — PLAN OF CARE
03/13/25 1056   Discharge Assessment   Assessment Type Discharge Planning Assessment   Confirmed/corrected address, phone number and insurance Yes   Confirmed Demographics Correct on Facesheet   Source of Information health record   [facility] facility resident   Facility Arrived From: Patient is on SNF at [facility], they will have to get SNF auth from insurance prior to patient's d/c   Do you expect to return to your current living situation? Yes   Prior to hospitilization cognitive status: Unable to Assess   Current cognitive status: Unable to Assess   Equipment Currently Used at Home none   Are you on dialysis? No   Do you take coumadin? No   Discharge Plan A Skilled Nursing Facility   Discharge Plan B Skilled Nursing Facility   DME Needed Upon Discharge  none   Transition of Care Barriers None   Physical Activity   On average, how many days per week do you engage in moderate to strenuous exercise (like a brisk walk)? 0 days   On average, how many minutes do you engage in exercise at this level? 0 min   Financial Resource Strain   How hard is it for you to pay for the very basics like food, housing, medical care, and heating? Not very   Housing Stability   In the last 12 months, was there a time when you were not able to pay the mortgage or rent on time? N   At any time in the past 12 months, were you homeless or living in a shelter (including now)? N   Transportation Needs   Has the lack of transportation kept you from medical appointments, meetings, work or from getting things needed for daily living? No   Food Insecurity   Within the past 12 months, you worried that your food would run out before you got the money to buy more. Never true   Within the past 12 months, the food you bought just didn't last and you didn't have money to get more. Never true   Stress   Do you feel stress - tense, restless, nervous, or anxious, or unable to sleep at night because your mind is troubled all the time - these days?  Only a littl   Social Isolation   How often do you feel lonely or isolated from those around you?  Rarely   Alcohol Use   Q1: How often do you have a drink containing alcohol? Never   Q2: How many drinks containing alcohol do you have on a typical day when you are drinking? None   Q3: How often do you have six or more drinks on one occasion? Never   Utilities   In the past 12 months has the electric, gas, oil, or water company threatened to shut off services in your home? No   Health Literacy   How often do you need to have someone help you when you read instructions, pamphlets, or other written material from your doctor or pharmacy? Sometimes     Pt is on SNF at [facility], they will need to send to insurance for auth to take him back before d/c.

## 2025-03-13 NOTE — PROGRESS NOTES
[facility] - ICU Hospital Medicine  Progress Note    Patient Name: Tyler Mai  MRN: [id number]  Patient Class: IP- Inpatient   Admission Date: 3/12/2025  Length of Stay: 1 days  Attending Physician: Yomi Jeffery MD  Primary Care Provider: Paxton Connors MD        Subjective:     Principal Problem:Acute respiratory failure with hypoxia and hypercapnia    Interval History:   HPI: Mr. Mai is a 69-year-old male who was transferred to the ED earlier today from a local SNF due to persistent shortness of breath.  His symptoms apparently began yesterday at which time he was diagnosed with pneumonia.  Symptoms worsened today, O2 saturations noted to be in the 80s on RA prompting his transfer to this facility.  With supplemental O2 his O2 saturation improved into the low 90s.  He was subsequently converted to BiPAP in the ED.  His current issues began in mid January at which time he underwent a rather extensive C-spine surgery involving multiple levels following which he was transferred to an inpatient rehab facility.  During that time he also developed some urinary retention and had an indwelling catheter for an extended period.  Catheter was apparently removed in rehab.  He was subsequently transferred to a SNF for continued physical therapy.  His evaluation in the ED was significant for anemia, leukocytosis, acute kidney injury, acute respiratory failure with hypercapnia and hypoxia, urinalysis consistent with UTI and CXR consistent with multilobar pneumonia.  Other findings included a significantly elevated BNP and an elevated troponin.  He was managed with a single dose of IV antibiotics and a nebulizer treatment.  On seeing him following his arrival to the ICU he was quite lethargic with a respiratory rate of 10 to 12 on BiPAP.  Due to his history of opioid use for chronic pain and recent C-spine surgery he was given a dose of Narcan after which he did awakened and become more  responsive, Respiratory rate improved to the 20s.  A subsequent ABG revealed a normal pH with moderate hypoxemia.    3/13-overnight became more agitated, refused to wear BiPAP; fortunately has been able to maintain adequate O2 saturations on nasal cannula O2; when seen on rounds this morning was very lethargic, poorly responsive; he opened his eyes to voice and tactile stimuli but otherwise unresponsive.  Did spike a temp last night to 101.3° F; he was given Tylenol and has not had another spike since that time      Objective:     Vital Signs (Most Recent):  Temp: 97.3 °F (36.3 °C) (03/13/25 0756)  Pulse: 67 (03/13/25 1230)  Resp: 14 (03/13/25 1230)  BP: (!) 132/56 (03/13/25 1230)  SpO2: 97 % (03/13/25 1230) Vital Signs (24h Range):  Temp:  [97.3 °F (36.3 °C)-101.3 °F (38.5 °C)] 97.3 °F (36.3 °C)  Pulse:  [67-88] 67  Resp:  [12-26] 14  SpO2:  [72 %-100 %] 97 %  BP: (120-165)/(54-92) 132/56     Weight: 79 kg (174 lb 2.6 oz)  Body mass index is 24.99 kg/m².    Intake/Output Summary (Last 24 hours) at 3/13/2025 1314  Last data filed at 3/13/2025 1242  Gross per 24 hour   Intake 2290.65 ml   Output 1000 ml   Net 1290.65 ml      Physical exam  Constitution-obese, normally developed male in NAD; he is poorly responsive this morning  Eyes-PERRL, EOMI  HENT-normocephalic, atraumatic  Neck-supple; soft C-collar in place  Respiratory-normal respirations; expiratory wheezing noted bilaterally; good air movement  Heart-RRR; normal S1 and S2; no murmurs, gallops; pacemaker palpated left chest wall  Abdomen-soft, nontender, nondistended  Genitourinary-urinary catheter in place draining yellow urine  Musculoskeletal-no joint abnormalities, normal ROM throughout; no edema  Skin-warm, dry; stage I breakdown over coccyx  Neurologic-poorly responsive    Scheduled Meds:   albuterol-ipratropium  3 mL Nebulization Q6H    aspirin  81 mg Oral Daily    atorvastatin  40 mg Oral Daily    bisacodyL  5 mg Oral Daily    ceFEPime IV (PEDS and  ADULTS)  1 g Intravenous Q8H    clopidogreL  75 mg Oral Daily    DULoxetine  60 mg Oral Daily    enoxparin  40 mg Subcutaneous Q24H (prophylaxis, 1700)    insulin glargine U-100  5 Units Subcutaneous Daily    levoFLOXacin  500 mg Intravenous Q24H    methylPREDNISolone injection (PEDS and ADULTS)  80 mg Intravenous Q8H    montelukast  10 mg Oral Daily    mupirocin   Nasal BID    pantoprazole  40 mg Oral Daily    pregabalin  100 mg Oral TID    QUEtiapine  25 mg Oral QHS    ranolazine  500 mg Oral BID    sodium chloride 0.9%  10 mL Intravenous Q8H     Continuous Infusions:   lactated ringers   Intravenous Continuous 75 mL/hr at 03/13/25 1242 Rate Verify at 03/13/25 1242     PRN Meds:.  Current Facility-Administered Medications:     0.9%  NaCl infusion (for blood administration), , Intravenous, Q24H PRN    acetaminophen, 650 mg, Oral, Q8H PRN    dextrose 50%, 12.5 g, Intravenous, PRN    glucagon (human recombinant), 1 mg, Intramuscular, PRN    insulin aspart U-100, 0-10 Units, Subcutaneous, Q6H PRN    naloxone, 0.4 mg, Intravenous, PRN    ondansetron, 4 mg, Intravenous, Q8H PRN    oxyCODONE, 10 mg, Oral, Q4H PRN    Significant Labs: All pertinent labs within the past 24 hours have been reviewed.  ABGs:   Recent Labs   Lab 03/12/25  1125 03/12/25  1327   PH 7.294* 7.374   PCO2 49.6* 40.1   HCO3 24.1 23.4*   POCSATURATED 72 94   BE -2 -2   PO2 43* 74*     CBC:   Recent Labs   Lab 03/11/25  1429 03/12/25  0928 03/13/25  0413   WBC 9.74 24.08* 14.28*   HGB 7.6* 7.5* 8.8*   HCT 22.7* 23.8* 26.9*    242 234     CMP:   Recent Labs   Lab 03/11/25  1429 03/12/25  0928 03/12/25  1338 03/13/25  0413    140 139 140   K 4.8 5.1 5.0 4.4    106 106 107   CO2 23 19* 22* 23   BUN 38.0* 49.0* 48.0* 54.0*   CREATININE 1.58* 1.79* 1.72* 1.65*   CALCIUM 8.9 9.2 9.4 9.1   ALBUMIN 3.2* 3.2*  --  2.9*   BILITOT 0.4 0.4  --  0.4   ALKPHOS 132 135  --  120   AST 17 29  --  27   ALT 14 26  --  26     Cardiac Markers:   Recent  Labs   Lab 03/12/25  0928   BNP 3,135.4*     Lactic Acid:   Recent Labs   Lab 03/12/25  0928   LACTATE 0.9     Magnesium:   Recent Labs   Lab 03/12/25  1338 03/13/25  0413   MG 2.50 2.40     POCT Glucose:   Recent Labs   Lab 03/12/25  2210 03/13/25  0412 03/13/25  0935   POCTGLUCOSE 214* 189* 202*     Troponin:   Recent Labs   Lab 03/12/25  1338 03/12/25  1804 03/13/25  0759   TROPONINI 0.195* 0.222* 0.176*     Urine Studies:   Recent Labs   Lab 03/12/25  0959   COLORU Other*   APPEARANCEUA Cloudy*   PHUR 8.5   PROTEINUA 2+*   GLUCUA Negative   BILIRUBINUA 1+*   OCCULTUA Trace-Intact*   NITRITE Positive*   UROBILINOGEN 1.0   LEUKOCYTESUR 3+*   RBCUA 0-2   WBCUA 21-50*   BACTERIA Moderate*       Significant Imaging:   CXR, 3/12  Impression:  1. Interim increasing patchy hazy opacities throughout the right lung and left lower lung since the prior exam suspicious for infiltrate and or atelectasis  2. Borderline cardiomegaly  3. Atherosclerosis    CXR, 3/13  Impression:  1. Persistent scattered patchy hazy opacities throughout the lungs bilaterally suspicious for infiltrate, atelectasis, and or scarring  2. Atherosclerosis  3. Cardiac device left chest    Assessment/Plan:   Acute respiratory failure with hypoxia and hypercapnia  He did not tolerate BiPAP overnight; adequate, stable O2 saturations with NC O2     Multilobar healthcare acquired pneumonia with sepsis  Continue empiric antibiotic therapy with cefepime/levofloxacin  Bronchodilator therapy  Short course of IV corticosteroid  MRSA screen-add MRSA coverage if positive     Acute kidney injury  Possible etiologies include volume depletion, sepsis, obstructive uropathy  Place indwelling urinary catheter to monitor output and manage potential urinary retention  BUN trending up; CR trending down  Avoid nephrotoxic agents as possible    Severe ischemic cardiomyopathy/chronic HFrEF  Echo 1/25-EF less than 20% with moderate to severe aortic stenosis  Repeat echo 3/12-on  my review, EF remains less than 20%  Attempted to keep I/O's slightly negative     Anemia  No active bleeding identified  Due to elevated troponin, history of CAD patient transfused yesterday, 3/12,  with 1 unit PRBCs     Elevated troponin  Troponin peaked at 0.22 to, trending down this morning  Type 2 MI related to respiratory failure     UTI  Manage with antibiotics as noted above     COPD  Respiratory measures as noted above     COVID positive  Undetermined significance  Pattern of pulmonary infiltrates inconsistent with COVID pneumonia     CAD/CABG/ppm status  Currently in a-v paced rhythm  Continue ranolazine  Continue low-dose aspirin and clopidogrel     Diabetes mellitus  Monitor blood sugars with Accu-Cheks and SSI  Add basal insulin as necessary     Hypertension  Hold ARB due to JOHN  Consider addition of beta-blocker     PAD  DAPT as noted above     History of BPH with urinary retention  Maintain indwelling catheter to monitor urine output     Recent multilevel C-spine surgery 1/25  Maintain soft C-collar     GI prophylaxis: Pantoprazole     Code status: Full      Active Diagnoses:    Diagnosis Date Noted POA    PRINCIPAL PROBLEM:  Acute respiratory failure with hypoxia and hypercapnia [J96.01, J96.02] 03/12/2025 Yes      Problems Resolved During this Admission:     VTE Risk Mitigation (From admission, onward)           Ordered     enoxaparin injection 40 mg  Every 24 hours         03/12/25 1307     IP VTE HIGH RISK PATIENT  Once         03/12/25 1237     Place sequential compression device  Until discontinued         03/12/25 1237                  Critical care time: 40 minutes     Yomi Jeffery MD  Department of Hospital Medicine   [facility]

## 2025-03-13 NOTE — NURSING
"Crying and yelling. I asked if he was in pain. He said "yes" I asked if he wanted pain med. He said "yes" When I walked in the room he stated "I don't want that."   Returned medication to top drawer of [facility].  Witnessed by Romario Rollins.  "

## 2025-03-14 NOTE — PROGRESS NOTES
[facility] - ICU  Wound Care    Patient Name: Tyler Mai  MRN: [id number]  Date: 3/14/2025  Diagnosis: Acute respiratory failure with hypoxia and hypercapnia      Subjective:           Patient ID: Tyler Mai is a 69 y.o. male.    Chief Complaint: Shortness of Breath (BIBA from NH  for SOB)      Shortness of Breath          Past Medical History:     1. Acute congestive heart failure, unspecified heart failure type    2. Screening for cardiovascular condition    3. Acute bronchitis, unspecified organism    4. COVID-19 virus infection    5. Urinary tract infection without hematuria, site unspecified    6. Respiratory failure      Wound Assessment:           Wound 03/12/25 1140 Fissure Right Buttocks (Active)   03/12/25 1140 Buttocks   Present on Original Admission:    Primary Wound Type: Fissure   Side: Right   Orientation:    Wound Approximate Age at First Assessment (Weeks):    Wound Number:    Is this injury device related?:    Incision Type:    Closure Method:    Wound Description (Comments):    Type:    Additional Comments:    Ankle-Brachial Index:    Pulses:    Removal Indication and Assessment:    Wound Outcome:    Wound Image   03/13/25 0925   Dressing Appearance Dry;Intact 03/14/25 0720   Drainage Amount None 03/14/25 0720   Drainage Characteristics/Odor Serosanguineous 03/13/25 0925   Appearance Dressing in place, unable to visualize 03/14/25 0720   Tissue loss description Partial thickness 03/14/25 0600   Periwound Area Moist 03/13/25 0925   Wound Edges Open 03/13/25 0925   Wound Length (cm) 0.3 cm 03/13/25 0925   Wound Width (cm) 0.5 cm 03/13/25 0925   Wound Depth (cm) 0.2 cm 03/13/25 0925   Wound Volume (cm^3) 0.016 cm^3 03/13/25 0925   Wound Surface Area (cm^2) 0.12 cm^2 03/13/25 0925   Dressing Other (comment) 03/14/25 0600            Wound 03/14/25 0615 Pressure Injury Right posterior Heel (Active)   03/14/25 0615 Heel   Present on Original Admission: Y   Primary Wound Type: Pressure  inj   Side: Right   Orientation: posterior   Wound Approximate Age at First Assessment (Weeks):    Wound Number:    Is this injury device related?:    Incision Type:    Closure Method:    Wound Description (Comments):    Type:    Additional Comments:    Ankle-Brachial Index:    Pulses:    Removal Indication and Assessment:    Wound Outcome:    Wound Image   03/14/25 1124   Pressure Injury Stage U 03/14/25 1124   Dressing Appearance Moist drainage 03/14/25 1124   Drainage Amount Small 03/14/25 1124   Drainage Characteristics/Odor Serosanguineous 03/14/25 1124   Appearance Purple;Black;Eschar;Dry;Ecchymotic 03/14/25 1124   Periwound Area Ecchymotic;Redness 03/14/25 1124   Wound Edges Defined 03/14/25 1124   Wound Length (cm) 4.4 cm 03/14/25 1124   Wound Width (cm) 4.5 cm 03/14/25 1124   Wound Depth (cm) 0.1 cm 03/14/25 1124   Wound Volume (cm^3) 1.037 cm^3 03/14/25 1124   Wound Surface Area (cm^2) 15.55 cm^2 03/14/25 1124   Care Cleansed with:;Sterile normal saline 03/14/25 0600   Dressing Applied;Other (comment);Foam 03/14/25 0600           Plan:     Daily dressing changes per nursing staff, re-evaluation per wound care in 5-7 days. Consulted again today for an unstageable pressure injury to the right heel. Orders modified.         Recommendations:   Buttocks: Keep clean and dry, apply bordered foam, change daily and prn soilage, apply moisture barrier cream to scrotum daily and at each cleaning  Right heel: Cleanse with wound cleanser, paint with betadine and apply mesalt, cover with 4x4 gauze and wrap with kerlix, secure with tape, change daily.   Patient to wear heel protectors and float heels at all times       Time spent in room:     Hailey Gallardo RN

## 2025-03-14 NOTE — PLAN OF CARE
Problem: Adult Inpatient Plan of Care  Goal: Plan of Care Review  Outcome: Progressing  Goal: Patient-Specific Goal (Individualized)  Outcome: Progressing  Goal: Absence of Hospital-Acquired Illness or Injury  Outcome: Progressing  Goal: Optimal Comfort and Wellbeing  Outcome: Progressing  Goal: Readiness for Transition of Care  Outcome: Progressing     Problem: Skin Injury Risk Increased  Goal: Skin Health and Integrity  Outcome: Progressing     Problem: Diabetes Comorbidity  Goal: Blood Glucose Level Within Targeted Range  Outcome: Progressing     Problem: Wound  Goal: Optimal Coping  Outcome: Progressing  Goal: Optimal Functional Ability  Outcome: Progressing  Goal: Absence of Infection Signs and Symptoms  Outcome: Progressing  Goal: Improved Oral Intake  Outcome: Progressing  Goal: Optimal Pain Control and Function  Outcome: Progressing  Goal: Skin Health and Integrity  Outcome: Progressing  Goal: Optimal Wound Healing  Outcome: Progressing     Problem: Fall Injury Risk  Goal: Absence of Fall and Fall-Related Injury  Outcome: Progressing     Problem: Gas Exchange Impaired  Goal: Optimal Gas Exchange  Outcome: Progressing     Problem: Infection  Goal: Absence of Infection Signs and Symptoms  Outcome: Progressing     Problem: Comorbidity Management  Goal: Maintenance of COPD Symptom Control  Outcome: Progressing  Goal: Maintenance of Heart Failure Symptom Control  Outcome: Progressing  Goal: Blood Pressure in Desired Range  Outcome: Progressing  Goal: Maintenance of Osteoarthritis Symptom Control  Outcome: Progressing     Problem: Pain Acute  Goal: Optimal Pain Control and Function  Outcome: Progressing

## 2025-03-14 NOTE — PROGRESS NOTES
[facility] - ICU Hospital Medicine  Progress Note    Patient Name: Tyler Mai  MRN: [id number]  Patient Class: IP- Inpatient   Admission Date: 3/12/2025  Length of Stay: 2 days  Attending Physician: Yomi Jeffery MD  Primary Care Provider: Paxton Connors MD        Subjective:     Principal Problem:Acute respiratory failure with hypoxia and hypercapnia    Interval History:   HPI: Mr. Mai is a 69-year-old male who was transferred to the ED earlier today from a local SNF due to persistent shortness of breath.  His symptoms apparently began yesterday at which time he was diagnosed with pneumonia.  Symptoms worsened today, O2 saturations noted to be in the 80s on RA prompting his transfer to this facility.  With supplemental O2 his O2 saturation improved into the low 90s.  He was subsequently converted to BiPAP in the ED.  His current issues began in mid January at which time he underwent a rather extensive C-spine surgery involving multiple levels following which he was transferred to an inpatient rehab facility.  During that time he also developed some urinary retention and had an indwelling catheter for an extended period.  Catheter was apparently removed in rehab.  He was subsequently transferred to a SNF for continued physical therapy.  His evaluation in the ED was significant for anemia, leukocytosis, acute kidney injury, acute respiratory failure with hypercapnia and hypoxia, urinalysis consistent with UTI and CXR consistent with multilobar pneumonia.  Other findings included a significantly elevated BNP and an elevated troponin.  He was managed with a single dose of IV antibiotics and a nebulizer treatment.  On seeing him following his arrival to the ICU he was quite lethargic with a respiratory rate of 10 to 12 on BiPAP.  Due to his history of opioid use for chronic pain and recent C-spine surgery he was given a dose of Narcan after which he did awakened and become more  responsive, Respiratory rate improved to the 20s.  A subsequent ABG revealed a normal pH with moderate hypoxemia.    3/13-overnight became more agitated, refused to wear BiPAP; fortunately has been able to maintain adequate O2 saturations on nasal cannula O2; when seen on rounds this morning was very lethargic, poorly responsive; he opened his eyes to voice and tactile stimuli but otherwise unresponsive.  Did spike a temp last night to 101.3° F; he was given Tylenol and has not had another spike since that time    3/14-no events overnight; he has been afebrile over the past 24 hours; when seen on rounds this morning was sleepy but aroused easily to voice        Objective:     Vital Signs (Most Recent):  Temp: 97.9 °F (36.6 °C) (03/14/25 0715)  Pulse: 83 (03/14/25 1100)  Resp: 10 (03/14/25 1100)  BP: (!) 136/100 (03/14/25 1100)  SpO2: 96 % (03/14/25 1100) Vital Signs (24h Range):  Temp:  [97 °F (36.1 °C)-98.4 °F (36.9 °C)] 97.9 °F (36.6 °C)  Pulse:  [66-88] 83  Resp:  [7-22] 10  SpO2:  [92 %-100 %] 96 %  BP: (126-151)/() 136/100     Weight: 72 kg (158 lb 11.7 oz)  Body mass index is 22.78 kg/m².    Intake/Output Summary (Last 24 hours) at 3/14/2025 1257  Last data filed at 3/14/2025 0639  Gross per 24 hour   Intake 1365.99 ml   Output 2750 ml   Net -1384.01 ml      Physical exam  Constitution-obese, normally developed male in NAD  Eyes-PERRL, EOMI  HENT-normocephalic, atraumatic; oxymask in place  Neck-supple; soft C-collar in place  Respiratory-normal respirations; CTA across anterior and lateral fields; good air movement  Heart-RRR; normal S1 and S2; no murmurs, gallops; pacemaker palpated left chest wall  Abdomen-soft, nontender, nondistended  Genitourinary-urinary catheter in place draining yellow urine  Musculoskeletal-no joint abnormalities, normal ROM throughout; no edema  Skin-warm, dry; stage I breakdown over coccyx; unstageable pressure ulcer right heel  Neurologic-sleepy but easily arousable; follows  commands    Scheduled Meds:   albuterol-ipratropium  3 mL Nebulization Q6H    aspirin  81 mg Oral Daily    atorvastatin  40 mg Oral Daily    bisacodyL  5 mg Oral Daily    ceFEPime IV (PEDS and ADULTS)  1 g Intravenous Q8H    clopidogreL  75 mg Oral Daily    DULoxetine  60 mg Oral Daily    enoxparin  40 mg Subcutaneous Q24H (prophylaxis, 1700)    insulin glargine U-100  10 Units Subcutaneous Daily    levoFLOXacin  500 mg Intravenous Q24H    montelukast  10 mg Oral Daily    mupirocin   Nasal BID    pantoprazole  40 mg Oral Daily    pregabalin  100 mg Oral TID    QUEtiapine  25 mg Oral QHS    ranolazine  500 mg Oral BID    sodium chloride 0.9%  10 mL Intravenous Q8H     Continuous Infusions:   lactated ringers   Intravenous Continuous 50 mL/hr at 03/14/25 1112 New Bag at 03/14/25 1112     PRN Meds:.  Current Facility-Administered Medications:     0.9%  NaCl infusion (for blood administration), , Intravenous, Q24H PRN    acetaminophen, 650 mg, Oral, Q8H PRN    bisacodyL, 10 mg, Rectal, Daily PRN    dextrose 50%, 12.5 g, Intravenous, PRN    glucagon (human recombinant), 1 mg, Intramuscular, PRN    insulin aspart U-100, 0-10 Units, Subcutaneous, Q6H PRN    naloxone, 0.4 mg, Intravenous, PRN    ondansetron, 4 mg, Intravenous, Q8H PRN    oxyCODONE, 10 mg, Oral, Q4H PRN    Significant Labs: All pertinent labs within the past 24 hours have been reviewed.  ABGs:   Recent Labs   Lab 03/12/25  1327   PH 7.374   PCO2 40.1   HCO3 23.4*   POCSATURATED 94   BE -2   PO2 74*     CBC:   Recent Labs   Lab 03/13/25  0413 03/14/25  0410   WBC 14.28* 13.08*   HGB 8.8* 9.1*   HCT 26.9* 28.9*    235     CMP:   Recent Labs   Lab 03/12/25  1338 03/13/25  0413 03/14/25  0410    140 140   K 5.0 4.4 3.9    107 105   CO2 22* 23 26   BUN 48.0* 54.0* 52.0*   CREATININE 1.72* 1.65* 1.59*   CALCIUM 9.4 9.1 9.1   ALBUMIN  --  2.9* 2.9*   BILITOT  --  0.4 0.3   ALKPHOS  --  120 121   AST  --  27 42*   ALT  --  26 42     Cardiac  "Markers:   No results for input(s): "CKMB", "MYOGLOBIN", "BNP", "TROPISTAT" in the last 48 hours.    Lactic Acid:   No results for input(s): "LACTATE" in the last 48 hours.    Magnesium:   Recent Labs   Lab 03/12/25  1338 03/13/25  0413 03/14/25  0410   MG 2.50 2.40 2.10     POCT Glucose:   Recent Labs   Lab 03/14/25  0331 03/14/25  0905 03/14/25  1140   POCTGLUCOSE 195* 199* 240*     Troponin:   Recent Labs   Lab 03/12/25  1338 03/12/25  1804 03/13/25  0759   TROPONINI 0.195* 0.222* 0.176*     Urine Studies:   No results for input(s): "COLORU", "APPEARANCEUA", "PHUR", "SPECGRAV", "PROTEINUA", "GLUCUA", "KETONESU", "BILIRUBINUA", "OCCULTUA", "NITRITE", "UROBILINOGEN", "LEUKOCYTESUR", "RBCUA", "WBCUA", "BACTERIA", "SQUAMEPITHEL", "HYALINECASTS" in the last 48 hours.    Invalid input(s): "WRIGHTSUR"      Significant Imaging:   CXR, 3/12  Impression:  1. Interim increasing patchy hazy opacities throughout the right lung and left lower lung since the prior exam suspicious for infiltrate and or atelectasis  2. Borderline cardiomegaly  3. Atherosclerosis    CXR, 3/13  Impression:  1. Persistent scattered patchy hazy opacities throughout the lungs bilaterally suspicious for infiltrate, atelectasis, and or scarring  2. Atherosclerosis  3. Cardiac device left chest    Assessment/Plan:   Acute respiratory failure with hypoxia and hypercapnia  Stable O2 saturations on OxyMask 428 L, currently on 3 L NC O2 with O2 saturations in mid 90s  Respiratory status is slowly improving     Multilobar healthcare acquired pneumonia with sepsis  Continue empiric antibiotic therapy with cefepime/levofloxacin  Bronchodilator therapy  Short course of IV corticosteroid-completed  MRSA screen-negative     Acute kidney injury  Possible etiologies include volume depletion, sepsis, obstructive uropathy  Continue indwelling urinary catheter to monitor output and manage potential urinary retention  BUN trending up; CR trending down; azotemia likely " due to IV diuretic  Avoid nephrotoxic agents as possible    Severe ischemic cardiomyopathy/chronic HFrEF  Echo 1/25-EF less than 20% with moderate to severe aortic stenosis  Repeat echo 3/12-on my review, EF remains less than 20%  Attempt to keep I/O's slightly negative; I/O-890     Anemia  No active bleeding identified  Due to elevated troponin, history of CAD patient transfused, 3/12,  with 1 unit PRBCs     Elevated troponin  Troponin peaked at 0.22 to, trending down this morning  Type 2 MI related to respiratory failure     UTI  Manage with antibiotics as noted above     COPD  Respiratory measures as noted above     COVID positive  Undetermined significance  Pattern of pulmonary infiltrates inconsistent with COVID pneumonia     CAD/CABG/ppm status  Currently in a-v paced rhythm  Continue ranolazine  Continue low-dose aspirin and clopidogrel     Diabetes mellitus  Monitor blood sugars with Accu-Cheks and SSI  Adjust basal insulin as necessary     Hypertension  Hold ARB due to JOHN  Add low-dose beta-blocker     PAD  DAPT as noted above     History of BPH with urinary retention  Maintain indwelling catheter to monitor urine output     Recent multilevel C-spine surgery 1/25  Maintain soft C-collar    Severe debility  Start PT     GI prophylaxis: Pantoprazole     Code status: Full      Active Diagnoses:    Diagnosis Date Noted POA    PRINCIPAL PROBLEM:  Acute respiratory failure with hypoxia and hypercapnia [J96.01, J96.02] 03/12/2025 Yes      Problems Resolved During this Admission:     VTE Risk Mitigation (From admission, onward)           Ordered     enoxaparin injection 40 mg  Every 24 hours         03/12/25 1307     IP VTE HIGH RISK PATIENT  Once         03/12/25 1237     Place sequential compression device  Until discontinued         03/12/25 1237                  Critical care time: 34 minutes     Yomi Jeffery MD  Department of Hospital Medicine   [facility] - ICU

## 2025-03-14 NOTE — PT/OT/SLP EVAL
Physical Therapy Evaluation    Patient Name:  Tyler Mai   MRN:  [id number]    Recommendations:     Discharge Recommendations: Moderate Intensity Therapy (Would benefit from return to SNF)   Discharge Equipment Recommendations: to be determined by next level of care   Barriers to discharge:  medical condition, inability to care for self    Assessment:     Tyler Mai is a 69 y.o. male admitted with a medical diagnosis of Acute respiratory failure with hypoxia and hypercapnia, with history of cervical fusion in January 2025.  He presents with the following impairments/functional limitations: weakness, impaired endurance, impaired self care skills, impaired functional mobility, impaired balance, pain, decreased safety awareness, gait instability, decreased lower extremity function, decreased upper extremity function, orthopedic precautions .    Assessment and tx: Patient is a great candidate for a SNF due to need for extensive assist with mobility and ADLs at this time. Patient required max A for all aspects of bed mobility, was able to stand 2 times with max A for initial sit<>stand and tolerated static standing for ~30 seconds with moderate assist (cervical collar donned). Patient was exhibiting labile emotions, crying while sitting edge of bed but not complaining of any pain during mobility. Patient refused to attempt ambulation - will progress as tolerated with goal of transitioning to chair next session. Vitals WNL throughout tx session.    Rehab Prognosis: Good; patient would benefit from acute skilled PT services to address these deficits and reach maximum level of function.    Recent Surgery: * No surgery found *      Plan:     During this hospitalization, patient to be seen  (5-6x a week) to address the identified rehab impairments via gait training, therapeutic activities, therapeutic exercises and progress toward the following goals:    Plan of Care Expires:       Subjective     Chief Complaint:  PNA  Patient/Family Comments/goals: none stated at this time  Pain/Comfort:  Pain Rating 1: other (see comments) (patient complaining of pain in right heel - numerical rating not given)  Location - Side 1: Right  Location 1: heel  Pain Addressed 1: Reposition, Distraction, Nurse notified, Pre-medicate for activity    Patients cultural, spiritual, Adventist conflicts given the current situation:      Living Environment:  Per patient: Prior to his neck surgery, he lived alone in a 2nd floor apartment with steps to enter, was independent with mobility and ADLs.  Equipment used at home: none.  DME owned (not currently used): none.  Upon discharge, patient will have assistance from SNF staff.    Objective:     Communicated with nurse prior to session.  Patient found HOB elevated with pulse ox (continuous), telemetry, PICC line, oxygen, blood pressure cuff, cervical collar  upon PT entry to room.    General Precautions: Standard, airborne  Orthopedic Precautions:spinal precautions   Braces: Cervical collar  Respiratory Status: Nasal cannula, flow 3 L/min    Exams:  Cognitive Exam:  Patient is oriented to Person, Time, and Situation  RLE ROM: WFL  LLE ROM: WFL    Functional Mobility:  Bed Mobility:     Supine to Sit: maximal assistance via logroll  Sit to Supine: maximal assistance via logroll  Transfers:     Sit to Stand:  maximal assistance (x2 reps - tolerated static standing for 30 seconds with mod A)      Patient left HOB elevated with all lines intact, call button in reach, and nurse notified.    GOALS:   Multidisciplinary Problems       Physical Therapy Goals          Problem: Physical Therapy    Goal Priority Disciplines Outcome Interventions   Physical Therapy Goal     PT, PT/OT Progressing    Description: Goals to be met by: discharge     Patient will increase functional independence with mobility by performin. Supine to sit with MInimal Assistance  2. Bed to chair transfer with Minimal Assistance using  Rolling Walker  3. Gait  x 30 feet with Moderate Assistance using Rolling Walker.                          DME Justifications:  No DME recommended requiring DME justifications    History:     Past Medical History:   Diagnosis Date    Anxiety disorder, unspecified     Arthritis     CAD (coronary artery disease)     Cervical radiculopathy     CHF (congestive heart failure)     Chronic pain syndrome     COPD (chronic obstructive pulmonary disease)     Diabetes mellitus     Gastro-esophageal reflux disease without esophagitis     High cholesterol     Hypertension     Lumbar radiculopathy     Myelomalacia of cervical cord     Other specified diseases of spinal cord     Pacemaker     PVD (peripheral vascular disease)     S/P triple vessel bypass     Tobacco use        Past Surgical History:   Procedure Laterality Date    ANTERIOR CERVICAL DISCECTOMY W/ FUSION  1995    C5-6 and C6-7.  Dr. Wall    ANTERIOR CERVICAL DISCECTOMY W/ FUSION  1996    Redo C5-6, C6-7.  Dr. Wall    CHOLECYSTECTOMY      CORONARY ARTERY BYPASS GRAFT      FUSION OF POSTERIOR COLUMN OF CERVICAL SPINE USING COMPUTER AIDED NAVIGATION N/A 1/24/2025    Procedure: FUSION, SPINE, POSTERIOR SPINAL COLUMN, CERVICAL, USING COMPUTER-ASSISTED NAVIGATION;  Surgeon: Darrell Watson MD;  Location: [facility];  Service: Neurosurgery;  Laterality: N/A;  C2-C6 PCF; C2/3-C4/5 decompression, possible additional levels  o-arm  NTI  TIVA setup  [employer] //  XX    INSERTION OF PACEMAKER      X2    TRIPPLE VESSEL BYPASS         Time Tracking:     PT Received On: 03/14/25  PT Start Time: 1440     PT Stop Time: 1520  PT Total Time (min): 40 min     Billable Minutes: Evaluation 15 and Therapeutic Activity 25 03/14/2025

## 2025-03-15 NOTE — PROGRESS NOTES
[facility] - ICU Hospital Medicine  Progress Note    Patient Name: Tyler Mai  MRN: [id number]  Patient Class: IP- Inpatient   Admission Date: 3/12/2025  Length of Stay: 3 days  Attending Physician: Yomi Jeffery MD  Primary Care Provider: Paxton Connors MD        Subjective:     Principal Problem:Acute respiratory failure with hypoxia and hypercapnia    Interval History:   HPI: Mr. Mai is a 69-year-old male who was transferred to the ED earlier today from a local SNF due to persistent shortness of breath.  His symptoms apparently began yesterday at which time he was diagnosed with pneumonia.  Symptoms worsened today, O2 saturations noted to be in the 80s on RA prompting his transfer to this facility.  With supplemental O2 his O2 saturation improved into the low 90s.  He was subsequently converted to BiPAP in the ED.  His current issues began in mid January at which time he underwent a rather extensive C-spine surgery involving multiple levels following which he was transferred to an inpatient rehab facility.  During that time he also developed some urinary retention and had an indwelling catheter for an extended period.  Catheter was apparently removed in rehab.  He was subsequently transferred to a SNF for continued physical therapy.  His evaluation in the ED was significant for anemia, leukocytosis, acute kidney injury, acute respiratory failure with hypercapnia and hypoxia, urinalysis consistent with UTI and CXR consistent with multilobar pneumonia.  Other findings included a significantly elevated BNP and an elevated troponin.  He was managed with a single dose of IV antibiotics and a nebulizer treatment.  On seeing him following his arrival to the ICU he was quite lethargic with a respiratory rate of 10 to 12 on BiPAP.  Due to his history of opioid use for chronic pain and recent C-spine surgery he was given a dose of Narcan after which he did awakened and become more  responsive, Respiratory rate improved to the 20s.  A subsequent ABG revealed a normal pH with moderate hypoxemia.    3/13-overnight became more agitated, refused to wear BiPAP; fortunately has been able to maintain adequate O2 saturations on nasal cannula O2; when seen on rounds this morning was very lethargic, poorly responsive; he opened his eyes to voice and tactile stimuli but otherwise unresponsive.  Did spike a temp last night to 101.3° F; he was given Tylenol and has not had another spike since that time    3/14-no events overnight; he has been afebrile over the past 24 hours; when seen on rounds this morning was sleepy but aroused easily to voice    3/15-no events overnight; when seen on rounds this morning was seated on the side of the bed working with PT; he was alert, normally interactive seemed to be in good spirits  Downgrade to M/S status    Objective:     Vital Signs (Most Recent):  Temp: 98.1 °F (36.7 °C) (03/15/25 0400)  Pulse: 79 (03/15/25 0906)  Resp: 14 (03/15/25 1006)  BP: 128/68 (03/15/25 0906)  SpO2: 95 % (03/15/25 0728) Vital Signs (24h Range):  Temp:  [97.5 °F (36.4 °C)-98.1 °F (36.7 °C)] 98.1 °F (36.7 °C)  Pulse:  [55-92] 79  Resp:  [6-20] 14  SpO2:  [90 %-99 %] 95 %  BP: (119-154)/() 128/68     Weight: 75.5 kg (166 lb 7.2 oz)  Body mass index is 23.88 kg/m².    Intake/Output Summary (Last 24 hours) at 3/15/2025 1032  Last data filed at 3/15/2025 0626  Gross per 24 hour   Intake 4135.92 ml   Output 1000 ml   Net 3135.92 ml      Physical exam  Constitution-obese, normally developed male in NAD  Eyes-PERRL, EOMI  HENT-normocephalic, atraumatic; oxymask in place  Neck-supple; soft C-collar in place  Respiratory-normal respirations; CTA with good air movement  Heart-RRR; normal S1 and S2; no murmurs, gallops; AICD/ pacemaker palpated left chest wall  Abdomen-soft, nontender, nondistended  Genitourinary-urinary catheter in place draining yellow urine  Musculoskeletal-no joint  "abnormalities, normal ROM throughout; no edema  Skin-warm, dry; stage I breakdown over coccyx; unstageable pressure ulcer right heel  Neurologic-alert and oriented to person, place, situation    Scheduled Meds:   albuterol-ipratropium  3 mL Nebulization Q6H    aspirin  81 mg Oral Daily    atorvastatin  40 mg Oral Daily    bisacodyL  5 mg Oral Daily    ceFEPime IV (PEDS and ADULTS)  1 g Intravenous Q8H    clopidogreL  75 mg Oral Daily    DULoxetine  60 mg Oral Daily    enoxparin  40 mg Subcutaneous Q24H (prophylaxis, 1700)    insulin glargine U-100  15 Units Subcutaneous Daily    levoFLOXacin  750 mg Intravenous Q48H    metoprolol tartrate  12.5 mg Oral BID    montelukast  10 mg Oral Daily    mupirocin   Nasal BID    pantoprazole  40 mg Oral Daily    pregabalin  100 mg Oral TID    QUEtiapine  25 mg Oral QHS    ranolazine  500 mg Oral BID    sodium chloride 0.9%  10 mL Intravenous Q8H     Continuous Infusions:      PRN Meds:.  Current Facility-Administered Medications:     0.9%  NaCl infusion (for blood administration), , Intravenous, Q24H PRN    acetaminophen, 650 mg, Oral, Q8H PRN    bisacodyL, 10 mg, Rectal, Daily PRN    dextrose 50%, 12.5 g, Intravenous, PRN    glucagon (human recombinant), 1 mg, Intramuscular, PRN    insulin aspart U-100, 0-10 Units, Subcutaneous, Q6H PRN    naloxone, 0.4 mg, Intravenous, PRN    ondansetron, 4 mg, Intravenous, Q8H PRN    oxyCODONE, 10 mg, Oral, Q4H PRN    Significant Labs: All pertinent labs within the past 24 hours have been reviewed.  ABGs:   No results for input(s): "PH", "PCO2", "HCO3", "POCSATURATED", "BE", "TOTALHB", "COHB", "METHB", "O2HB", "POCFIO2", "PO2" in the last 48 hours.    CBC:   Recent Labs   Lab 03/14/25  0410 03/15/25  0431   WBC 13.08* 12.91*   HGB 9.1* 8.9*   HCT 28.9* 28.4*    222     CMP:   Recent Labs   Lab 03/14/25 0410 03/15/25  0431    137   K 3.9 4.1    102   CO2 26 27   BUN 52.0* 56.0*   CREATININE 1.59* 1.61*   CALCIUM 9.1 9.2 " "  ALBUMIN 2.9* 2.9*   BILITOT 0.3 0.3   ALKPHOS 121 130   AST 42* 91*   ALT 42 90*     Cardiac Markers:   No results for input(s): "CKMB", "MYOGLOBIN", "BNP", "TROPISTAT" in the last 48 hours.    Lactic Acid:   No results for input(s): "LACTATE" in the last 48 hours.    Magnesium:   Recent Labs   Lab 03/14/25  0410 03/15/25  0431   MG 2.10 2.10     POCT Glucose:   Recent Labs   Lab 03/14/25  1715 03/14/25  2102 03/15/25  0504   POCTGLUCOSE 229* 171* 181*     Troponin:   No results for input(s): "TROPONINI", "TROPONINIHS" in the last 48 hours.    Urine Studies:   No results for input(s): "COLORU", "APPEARANCEUA", "PHUR", "SPECGRAV", "PROTEINUA", "GLUCUA", "KETONESU", "BILIRUBINUA", "OCCULTUA", "NITRITE", "UROBILINOGEN", "LEUKOCYTESUR", "RBCUA", "WBCUA", "BACTERIA", "SQUAMEPITHEL", "HYALINECASTS" in the last 48 hours.    Invalid input(s): "WRIGHTSUR"      Significant Imaging:   CXR, 3/12  Impression:  1. Interim increasing patchy hazy opacities throughout the right lung and left lower lung since the prior exam suspicious for infiltrate and or atelectasis  2. Borderline cardiomegaly  3. Atherosclerosis    CXR, 3/13  Impression:  1. Persistent scattered patchy hazy opacities throughout the lungs bilaterally suspicious for infiltrate, atelectasis, and or scarring  2. Atherosclerosis  3. Cardiac device left chest    Echo, 3/12    Left Ventricle: The left ventricle is normal in size. There is severely reduced systolic function with a visually estimated ejection fraction of less than 30%.    Right Ventricle: The right ventricle is normal in size. Systolic function is normal.    Left Atrium: Mildly dilated    Aortic Valve: There is mild to moderate stenosis. Aortic valve peak velocity is 2.1 m/s.    Mitral Valve: There is mild regurgitation.    Assessment/Plan:   Acute respiratory failure with hypoxia and hypercapnia  Stable O2 saturations on 3 L NC with O2 saturation 95%  Respiratory status is slowly improving   "   Multilobar healthcare acquired pneumonia with sepsis  Continue empiric antibiotic therapy with cefepime/levofloxacin  Bronchodilator therapy  Short course of IV corticosteroid-completed  MRSA screen-negative  WBC has trended down     Acute kidney injury  Possible etiologies include volume depletion, sepsis, obstructive uropathy, sv CMO  Continue indwelling urinary catheter to monitor output and manage potential urinary retention  Indices have trended up slightly with azotemia likely due to IV diuresis  Avoid nephrotoxic agents as possible    Severe ischemic cardiomyopathy/chronic HFrEF/AICD-ppm status  Echo 1/25-EF less than 20% with moderate to sv aortic stenosis  Repeat echo 3/12-less than 30% with moderate aortic stenosis  Attempt to keep I/O's slightly negative; I/O+3136 primarily due to oral intake  Due to rising renal indices and azotemia will not give diuretic today     Anemia  No active bleeding identified  Due to elevated troponin, history of CAD patient transfused, 3/12,  with 1 unit PRBCs  H&H stable    Transaminitis  Transaminases mildly increased on labs today  Continue to monitor, if continues to rise will discontinue statin     Elevated troponin  Troponin peaked at 0.22 to, trending down this morning  Type 2 MI related to respiratory failure     UTI  Manage with antibiotics as noted above     COPD  Respiratory measures as noted above     COVID positive  Undetermined significance  Pattern of pulmonary infiltrates inconsistent with COVID pneumonia     CAD/CABG  Currently in a-v paced rhythm  Continue ranolazine  Continue low-dose aspirin and clopidogrel     Diabetes mellitus  Blood sugars acceptable  Monitor blood sugars with Accu-Cheks and SSI  Adjust basal insulin as necessary     Hypertension  Hold ARB due to JOHN  Cont  low-dose beta-blocker     PAD  DAPT as noted above     History of BPH with urinary retention  Maintain indwelling catheter to monitor urine output     Recent multilevel C-spine  surgery 1/25  Maintain soft C-collar    Severe debility  Start PT     GI prophylaxis: Pantoprazole     Code status: Full      Active Diagnoses:    Diagnosis Date Noted POA    PRINCIPAL PROBLEM:  Acute respiratory failure with hypoxia and hypercapnia [J96.01, J96.02] 03/12/2025 Yes      Problems Resolved During this Admission:     VTE Risk Mitigation (From admission, onward)           Ordered     enoxaparin injection 40 mg  Every 24 hours         03/12/25 1307     IP VTE HIGH RISK PATIENT  Once         03/12/25 1237     Place sequential compression device  Until discontinued         03/12/25 1237                  Critical care time: 32 minutes     Yomi Jeffery MD  Department of Hospital Medicine   [facility] - ICU

## 2025-03-15 NOTE — PT/OT/SLP PROGRESS
Physical Therapy Treatment    Patient Name:  Tyler Mai   MRN:  [id number]    Recommendations:     Discharge Recommendations: Moderate Intensity Therapy (Would benefit from return to SNF)  Discharge Equipment Recommendations: to be determined by next level of care  Barriers to discharge:  current medical condition    Assessment:     Tyler Mai is a 69 y.o. male admitted with a medical diagnosis of Acute respiratory failure with hypoxia and hypercapnia.  He presents with the following impairments/functional limitations: weakness, impaired endurance, impaired self care skills, impaired functional mobility, impaired balance, pain, decreased safety awareness, gait instability, decreased lower extremity function, decreased upper extremity function, orthopedic precautions .    Rehab Prognosis: Good; patient would benefit from acute skilled PT services to address these deficits and reach maximum level of function.    Recent Surgery: * No surgery found *      Plan:     During this hospitalization, patient to be seen  (5-6x a week) to address the identified rehab impairments via gait training, therapeutic activities, therapeutic exercises and progress toward the following goals:    Plan of Care Expires:       Subjective     Chief Complaint: Difficult to get up and move.  Patient/Family Comments/goals: Recover ability to stand and walk safely.  Go back to rehab.  Pain/Comfort:         Objective:     Communicated with nursg prior to session.  Patient found supine with oxygen, peripheral IV, herrera catheter, pulse ox (continuous), telemetry upon PT entry to room.     General Precautions: Standard, airborne, contact  Orthopedic Precautions: spinal precautions  Braces: Cervical collar  Respiratory Status: Nasal cannula, flow 4 L/min     Functional Mobility:  Bed Mobility:     Supine to Sit: moderate assistance  Sit to Supine: moderate assistance  Transfers:     Sit to Stand:  moderate assistance with hand-held  assist  Gait: With PT, holding onto PT's elbows, and PT holding onto both of his upper arms. Quite slow.  Very small steps.  Help to weight-shift. Held up his own weight.  Balance: Sitting and standing balance.  Development of stability therewith.      AM-PAC 6 CLICK MOBILITY  Turning over in bed (including adjusting bedclothes, sheets and blankets)?: 3  Sitting down on and standing up from a chair with arms (e.g., wheelchair, bedside commode, etc.): 2       Treatment & Education:  Provided safe environment for practice toward safe mobility and balance improvement.    Patient left supine with all lines intact, call button in reach, and his nurse present..    GOALS:   Multidisciplinary Problems       Physical Therapy Goals          Problem: Physical Therapy    Goal Priority Disciplines Outcome Interventions   Physical Therapy Goal     PT, PT/OT Progressing    Description: Goals to be met by: discharge     Patient will increase functional independence with mobility by performin. Supine to sit with MInimal Assistance  2. Bed to chair transfer with Minimal Assistance using Rolling Walker  3. Gait  x 30 feet with Moderate Assistance using Rolling Walker.                          DME Justifications:   Tyler's mobility limitation cannot be sufficiently resolved by the use of a cane. His functional mobility deficit can be sufficiently resolved with the use of a Rolling Walker. Patient's mobility limitation significantly impairs their ability to participate in one of more activities of daily living.  The use of a RW will significantly improve the patient's ability to participate in MRADLS and the patient will use it on regular basis in the home.    Time Tracking:     PT Received On:    PT Start Time: 0800     PT Stop Time: 0900  PT Total Time (min): 60 min     Billable Minutes: Therapeutic Activity 60    Treatment Type: Treatment  PT/PTA: PT           03/15/2025

## 2025-03-15 NOTE — PROGRESS NOTES
Pharmacist Renal Dose Adjustment Note    Tyler Mai is a 69 y.o. male being treated with the medication Levofloxacin    Patient Data:    Vital Signs (Most Recent):  Temp: 98.1 °F (36.7 °C) (03/15/25 0400)  Pulse: 72 (03/15/25 0728)  Resp: 16 (03/15/25 0728)  BP: (!) 154/65 (03/15/25 0600)  SpO2: 95 % (03/15/25 0728) Vital Signs (72h Range):  Temp:  [97 °F (36.1 °C)-101.3 °F (38.5 °C)]   Pulse:  [55-92]   Resp:  [6-27]   BP: (103-165)/()   SpO2:  [72 %-100 %]      Recent Labs   Lab 03/13/25  0413 03/14/25  0410 03/15/25  0431   CREATININE 1.65* 1.59* 1.61*     Serum creatinine: 1.61 mg/dL (H) 03/15/25 0431  Estimated creatinine clearance: 46 mL/min (A)    Medication:Levofloxacin dose: 500mg frequency q24h will be changed to medication:Levofloxacin dose:750mg frequency:q48h    Pharmacist's Name: Oh Ramirez  Pharmacist's Extension: 0637

## 2025-03-16 NOTE — NURSING
Bladder training initiated  Gray clamped  Patient instructed to call when and if he feels need to void.   250 cc output before clamping

## 2025-03-16 NOTE — NURSING
Herrera clamped 175 cc emptied from herrera after clamping tubing. Will pass along in report to assess patient and herrera 2 hours from now.

## 2025-03-16 NOTE — PROGRESS NOTES
[facility] - ICU Hospital Medicine  Progress Note    Patient Name: Tyler Mai  MRN: [id number]  Patient Class: IP- Inpatient   Admission Date: 3/12/2025  Length of Stay: 4 days  Attending Physician: Yomi Jeffery MD  Primary Care Provider: Paxton Connors MD        Subjective:     Principal Problem:Acute respiratory failure with hypoxia and hypercapnia    Interval History:   HPI: Mr. Mai is a 69-year-old male who was transferred to the ED earlier today from a local SNF due to persistent shortness of breath.  His symptoms apparently began yesterday at which time he was diagnosed with pneumonia.  Symptoms worsened today, O2 saturations noted to be in the 80s on RA prompting his transfer to this facility.  With supplemental O2 his O2 saturation improved into the low 90s.  He was subsequently converted to BiPAP in the ED.  His current issues began in mid January at which time he underwent a rather extensive C-spine surgery involving multiple levels following which he was transferred to an inpatient rehab facility.  During that time he also developed some urinary retention and had an indwelling catheter for an extended period.  Catheter was apparently removed in rehab.  He was subsequently transferred to a SNF for continued physical therapy.  His evaluation in the ED was significant for anemia, leukocytosis, acute kidney injury, acute respiratory failure with hypercapnia and hypoxia, urinalysis consistent with UTI and CXR consistent with multilobar pneumonia.  Other findings included a significantly elevated BNP and an elevated troponin.  He was managed with a single dose of IV antibiotics and a nebulizer treatment.  On seeing him following his arrival to the ICU he was quite lethargic with a respiratory rate of 10 to 12 on BiPAP.  Due to his history of opioid use for chronic pain and recent C-spine surgery he was given a dose of Narcan after which he did awakened and become more  responsive, Respiratory rate improved to the 20s.  A subsequent ABG revealed a normal pH with moderate hypoxemia.    3/13-overnight became more agitated, refused to wear BiPAP; fortunately has been able to maintain adequate O2 saturations on nasal cannula O2; when seen on rounds this morning was very lethargic, poorly responsive; he opened his eyes to voice and tactile stimuli but otherwise unresponsive.  Did spike a temp last night to 101.3° F; he was given Tylenol and has not had another spike since that time    3/14-no events overnight; he has been afebrile over the past 24 hours; when seen on rounds this morning was sleepy but aroused easily to voice    3/15-no events overnight; when seen on rounds this morning was seated on the side of the bed working with PT; he was alert, normally interactive seemed to be in good spirits  Downgrade to M/S status    3/16-transferred to M/S unit yesterday evening; no events overnight; Respiratory status stable; remains on 3 L NC with O2 saturations of 94%    Objective:     Vital Signs (Most Recent):  Temp: 98.8 °F (37.1 °C) (03/16/25 0745)  Pulse: 88 (03/16/25 0745)  Resp: (!) 22 (03/16/25 0859)  BP: 139/66 (03/16/25 0745)  SpO2: (!) 94 % (03/16/25 0745) Vital Signs (24h Range):  Temp:  [97.8 °F (36.6 °C)-98.8 °F (37.1 °C)] 98.8 °F (37.1 °C)  Pulse:  [76-88] 88  Resp:  [16-22] 22  SpO2:  [92 %-99 %] 94 %  BP: (133-140)/(56-74) 139/66     Weight: 75.5 kg (166 lb 7.2 oz)  Body mass index is 23.88 kg/m².    Intake/Output Summary (Last 24 hours) at 3/16/2025 1144  Last data filed at 3/16/2025 0400  Gross per 24 hour   Intake 415.06 ml   Output 2800 ml   Net -2384.94 ml      Physical exam  Constitution-obese, normally developed male in NAD  Eyes-PERRL, EOMI  HENT-normocephalic, atraumatic  Neck-supple; soft C-collar in place  Respiratory-normal respirations; CTA with good air movement  Heart-RRR; normal S1 and S2; no murmurs, gallops; AICD/ pacemaker palpated left chest  "wall  Abdomen-soft, nontender, nondistended  Genitourinary-urinary catheter in place draining yellow urine  Musculoskeletal-no joint abnormalities, normal ROM throughout; no edema  Skin-warm, dry; stage I breakdown over coccyx; unstageable pressure ulcer right heel  Neurologic-alert and oriented to person, place, situation    Scheduled Meds:   albuterol-ipratropium  3 mL Nebulization Q6H    aspirin  81 mg Oral Daily    atorvastatin  40 mg Oral Daily    bisacodyL  5 mg Oral Daily    ceFEPime IV (PEDS and ADULTS)  1 g Intravenous Q8H    clopidogreL  75 mg Oral Daily    DULoxetine  60 mg Oral Daily    enoxparin  40 mg Subcutaneous Q24H (prophylaxis, 1700)    insulin glargine U-100  15 Units Subcutaneous Daily    levoFLOXacin  750 mg Intravenous Q48H    metoprolol tartrate  12.5 mg Oral BID    montelukast  10 mg Oral Daily    mupirocin   Nasal BID    pantoprazole  40 mg Oral Daily    pregabalin  100 mg Oral TID    QUEtiapine  25 mg Oral QHS    ranolazine  500 mg Oral BID    sodium chloride 0.9%  10 mL Intravenous Q8H     Continuous Infusions:      PRN Meds:.  Current Facility-Administered Medications:     0.9%  NaCl infusion (for blood administration), , Intravenous, Q24H PRN    acetaminophen, 650 mg, Oral, Q8H PRN    bisacodyL, 10 mg, Rectal, Daily PRN    dextrose 50%, 12.5 g, Intravenous, PRN    glucagon (human recombinant), 1 mg, Intramuscular, PRN    insulin aspart U-100, 0-10 Units, Subcutaneous, QID (AC + HS) PRN    naloxone, 0.4 mg, Intravenous, PRN    ondansetron, 4 mg, Intravenous, Q8H PRN    oxyCODONE, 10 mg, Oral, Q4H PRN    Significant Labs: All pertinent labs within the past 24 hours have been reviewed.  ABGs:   No results for input(s): "PH", "PCO2", "HCO3", "POCSATURATED", "BE", "TOTALHB", "COHB", "METHB", "O2HB", "POCFIO2", "PO2" in the last 48 hours.    CBC:   Recent Labs   Lab 03/15/25  0431 03/16/25  0442   WBC 12.91* 8.64   HGB 8.9* 8.5*   HCT 28.4* 26.8*    189     CMP:   Recent Labs   Lab " "03/15/25  0431 03/16/25  0442    139   K 4.1 3.8    104   CO2 27 28   BUN 56.0* 52.0*   CREATININE 1.61* 1.58*   CALCIUM 9.2 8.7*   ALBUMIN 2.9* 2.7*   BILITOT 0.3 0.3   ALKPHOS 130 110   AST 91* 48*   ALT 90* 76*     Cardiac Markers:   No results for input(s): "CKMB", "MYOGLOBIN", "BNP", "TROPISTAT" in the last 48 hours.    Lactic Acid:   No results for input(s): "LACTATE" in the last 48 hours.    Magnesium:   Recent Labs   Lab 03/15/25  0431 03/16/25  0442   MG 2.10 2.60     POCT Glucose:   Recent Labs   Lab 03/16/25  0738 03/16/25  0853 03/16/25  1140   POCTGLUCOSE 69* 118* 122*     Troponin:   No results for input(s): "TROPONINI", "TROPONINIHS" in the last 48 hours.    Urine Studies:   No results for input(s): "COLORU", "APPEARANCEUA", "PHUR", "SPECGRAV", "PROTEINUA", "GLUCUA", "KETONESU", "BILIRUBINUA", "OCCULTUA", "NITRITE", "UROBILINOGEN", "LEUKOCYTESUR", "RBCUA", "WBCUA", "BACTERIA", "SQUAMEPITHEL", "HYALINECASTS" in the last 48 hours.    Invalid input(s): "WRIGHTSUR"      Significant Imaging:   CXR, 3/12  Impression:  1. Interim increasing patchy hazy opacities throughout the right lung and left lower lung since the prior exam suspicious for infiltrate and or atelectasis  2. Borderline cardiomegaly  3. Atherosclerosis    CXR, 3/13  Impression:  1. Persistent scattered patchy hazy opacities throughout the lungs bilaterally suspicious for infiltrate, atelectasis, and or scarring  2. Atherosclerosis  3. Cardiac device left chest    Echo, 3/12    Left Ventricle: The left ventricle is normal in size. There is severely reduced systolic function with a visually estimated ejection fraction of less than 30%.    Right Ventricle: The right ventricle is normal in size. Systolic function is normal.    Left Atrium: Mildly dilated    Aortic Valve: There is mild to moderate stenosis. Aortic valve peak velocity is 2.1 m/s.    Mitral Valve: There is mild regurgitation.    Assessment/Plan:   Acute respiratory " failure with hypoxia and hypercapnia  Stable O2 saturations on 3 L NC with O2 saturation mid 90s  Respiratory status is slowly improving     Multilobar healthcare acquired pneumonia with sepsis  Continue empiric antibiotic therapy with cefepime/levofloxacin  Bronchodilator therapy  Short course of IV corticosteroid-completed  MRSA screen-negative  WBC has trended down, now normal     Acute kidney injury  Possible etiologies include volume depletion, sepsis, obstructive uropathy, sv CMO  Continue indwelling urinary catheter to monitor output and manage potential urinary retention  Indices again trending down  Avoid nephrotoxic agents as possible    Severe ischemic cardiomyopathy/chronic HFrEF/AICD-ppm status  Echo 1/25-EF less than 20% with moderate to sv aortic stenosis  Repeat echo 3/12-less than 30% with moderate aortic stenosis  Attempt to keep I/O's slightly negative; [id number]/O-1560      Anemia  No active bleeding identified  Due to elevated troponin, history of CAD patient transfused, 3/12,  with 1 unit PRBCs  H&H stable    Transaminitis  He experienced bump in transaminases on 3/15, uncertain etiology  Transaminases trending down     Elevated troponin  Troponin peaked at 0.22 to, trending down this morning  Type 2 MI related to respiratory failure     UTI  Manage with antibiotics as noted above     COPD  Respiratory measures as noted above     COVID positive  Undetermined significance  Pattern of pulmonary infiltrates inconsistent with COVID pneumonia     CAD/CABG  Currently in a-v paced rhythm  Continue ranolazine  Continue low-dose aspirin and clopidogrel     Diabetes mellitus  Blood sugars acceptable  Monitor blood sugars with Accu-Cheks and SSI  Adjust basal insulin as necessary     Hypertension  BP acceptable  Holding ARB due to JOHN  Attempt to up-titrate beta-blocker     PAD  DAPT as noted above     History of BPH with urinary retention  Remove urinary catheter in a.m., monitor PVRs     Recent multilevel  C-spine surgery 1/25  Maintain soft C-collar    Severe debility  Continue PT     GI prophylaxis: Pantoprazole     Code status: Full    Discharge planning: Returned to SNF      Active Diagnoses:    Diagnosis Date Noted POA    PRINCIPAL PROBLEM:  Acute respiratory failure with hypoxia and hypercapnia [J96.01, J96.02] 03/12/2025 Yes      Problems Resolved During this Admission:     VTE Risk Mitigation (From admission, onward)           Ordered     enoxaparin injection 40 mg  Every 24 hours         03/12/25 1307     IP VTE HIGH RISK PATIENT  Once         03/12/25 1237     Place sequential compression device  Until discontinued         03/12/25 1237                    Yomi Jeffery MD  Department of Hospital Medicine   [facility] - ICU

## 2025-03-17 NOTE — CARE UPDATE
Notified by nursing staff that pt's SPO2 on 3LPM NC = 85%; Pt refusing to wear oxymask; NC now in use @ 5LPM; SPO2 ranging from 90% - 96% Pt requiring frequent coaching to keep O2 on

## 2025-03-17 NOTE — PT/OT/SLP PROGRESS
Physical Therapy      Patient Name:  Tyler Mai   MRN:  [id number]    Attempted physical therapy. Patient was initially calm and lethargic upon entering room. Upon encouraging patient to participate in therapy, patient began moaning stating he needed pain meds and declined therapy. Notified patient's nurse of patient's request for meds (per nurse, he cannot receive his next does until 245pm). Will check back after or next day pending patient/therapist availability.

## 2025-03-17 NOTE — PROGRESS NOTES
[facility] - ICU Hospital Medicine  Progress Note    Patient Name: Tyler Mai  MRN: [id number]  Patient Class: IP- Inpatient   Admission Date: 3/12/2025  Length of Stay: 5 days  Attending Physician: Yomi Jeffery MD  Primary Care Provider: Paxton Connors MD        Subjective:     Principal Problem:Acute respiratory failure with hypoxia and hypercapnia    Interval History:   HPI: Mr. Mai is a 69-year-old male who was transferred to the ED earlier today from a local SNF due to persistent shortness of breath.  His symptoms apparently began yesterday at which time he was diagnosed with pneumonia.  Symptoms worsened today, O2 saturations noted to be in the 80s on RA prompting his transfer to this facility.  With supplemental O2 his O2 saturation improved into the low 90s.  He was subsequently converted to BiPAP in the ED.  His current issues began in mid January at which time he underwent a rather extensive C-spine surgery involving multiple levels following which he was transferred to an inpatient rehab facility.  During that time he also developed some urinary retention and had an indwelling catheter for an extended period.  Catheter was apparently removed in rehab.  He was subsequently transferred to a SNF for continued physical therapy.  His evaluation in the ED was significant for anemia, leukocytosis, acute kidney injury, acute respiratory failure with hypercapnia and hypoxia, urinalysis consistent with UTI and CXR consistent with multilobar pneumonia.  Other findings included a significantly elevated BNP and an elevated troponin.  He was managed with a single dose of IV antibiotics and a nebulizer treatment.  On seeing him following his arrival to the ICU he was quite lethargic with a respiratory rate of 10 to 12 on BiPAP.  Due to his history of opioid use for chronic pain and recent C-spine surgery he was given a dose of Narcan after which he did awakened and become more  responsive, Respiratory rate improved to the 20s.  A subsequent ABG revealed a normal pH with moderate hypoxemia.    3/13-overnight became more agitated, refused to wear BiPAP; fortunately has been able to maintain adequate O2 saturations on nasal cannula O2; when seen on rounds this morning was very lethargic, poorly responsive; he opened his eyes to voice and tactile stimuli but otherwise unresponsive.  Did spike a temp last night to 101.3° F; he was given Tylenol and has not had another spike since that time    3/14-no events overnight; he has been afebrile over the past 24 hours; when seen on rounds this morning was sleepy but aroused easily to voice    3/15-no events overnight; when seen on rounds this morning was seated on the side of the bed working with PT; he was alert, normally interactive seemed to be in good spirits  Downgrade to M/S status    3/16-transferred to M/S unit yesterday evening; no events overnight; Respiratory status stable; remains on 3 L NC with O2 saturations of 94%    3/17-condition stable; when seen on rounds this morning was somewhat lethargic but this appears to be his baseline in the mornings; he becomes more interactive/attentive later in the day; he had no complaints; he remains generally very weak    Objective:     Vital Signs (Most Recent):  Temp: 99.2 °F (37.3 °C) (03/17/25 0816)  Pulse: 92 (03/17/25 0816)  Resp: 20 (03/17/25 0816)  BP: 134/66 (03/17/25 0816)  SpO2: 100 % (03/17/25 0816) Vital Signs (24h Range):  Temp:  [98.6 °F (37 °C)-99.7 °F (37.6 °C)] 99.2 °F (37.3 °C)  Pulse:  [88-96] 92  Resp:  [16-20] 20  SpO2:  [90 %-100 %] 100 %  BP: (121-152)/(65-67) 134/66     Weight: 75.5 kg (166 lb 7.2 oz)  Body mass index is 23.88 kg/m².    Intake/Output Summary (Last 24 hours) at 3/17/2025 1006  Last data filed at 3/17/2025 0800  Gross per 24 hour   Intake 570 ml   Output 1900 ml   Net -1330 ml      Physical exam  Constitution-obese, normally developed male in NAD  Eyes-PERRL,  EOMI  HENT-normocephalic, atraumatic  Neck-supple; soft C-collar in place  Respiratory-normal respirations; CTA with good air movement  Heart-RRR; normal S1 and S2; no murmurs, gallops; AICD/ pacemaker palpated left chest wall  Abdomen-soft, nontender, nondistended  Genitourinary-deferred  Musculoskeletal-no joint abnormalities, normal ROM throughout; no edema  Skin-warm, dry; stage I breakdown over coccyx; unstageable pressure ulcer right heel  Neurologic-alert and oriented to person, place, situation    Scheduled Meds:   albuterol-ipratropium  3 mL Nebulization Q6H    aspirin  81 mg Oral Daily    atorvastatin  40 mg Oral Daily    bisacodyL  5 mg Oral Daily    ceFEPime IV (PEDS and ADULTS)  1 g Intravenous Q8H    clopidogreL  75 mg Oral Daily    DULoxetine  60 mg Oral Daily    enoxparin  40 mg Subcutaneous Q24H (prophylaxis, 1700)    insulin glargine U-100  15 Units Subcutaneous Daily    levoFLOXacin  750 mg Intravenous Q48H    magnesium sulfate 2 g IVPB  2 g Intravenous Once    metoprolol tartrate  12.5 mg Oral BID    montelukast  10 mg Oral Daily    mupirocin   Nasal BID    pantoprazole  40 mg Oral Daily    potassium chloride  20 mEq Oral Daily    pregabalin  100 mg Oral TID    QUEtiapine  25 mg Oral QHS    ranolazine  500 mg Oral BID    sodium chloride 0.9%  10 mL Intravenous Q8H     Continuous Infusions:      PRN Meds:.  Current Facility-Administered Medications:     0.9%  NaCl infusion (for blood administration), , Intravenous, Q24H PRN    acetaminophen, 650 mg, Oral, Q8H PRN    bisacodyL, 10 mg, Rectal, Daily PRN    dextrose 50%, 12.5 g, Intravenous, PRN    glucagon (human recombinant), 1 mg, Intramuscular, PRN    insulin aspart U-100, 0-10 Units, Subcutaneous, QID (AC + HS) PRN    naloxone, 0.4 mg, Intravenous, PRN    ondansetron, 4 mg, Intravenous, Q8H PRN    oxyCODONE, 10 mg, Oral, Q4H PRN    Significant Labs: All pertinent labs within the past 24 hours have been reviewed.  ABGs:   No results for input(s):  ""PH", "PCO2", "HCO3", "POCSATURATED", "BE", "TOTALHB", "COHB", "METHB", "O2HB", "POCFIO2", "PO2" in the last 48 hours.    CBC:   Recent Labs   Lab 03/16/25  0442 03/17/25  0453   WBC 8.64 9.87   HGB 8.5* 8.9*   HCT 26.8* 28.4*    162     CMP:   Recent Labs   Lab 03/16/25 0442 03/17/25  0453    139   K 3.8 3.4*    103   CO2 28 29   BUN 52.0* 39.0*   CREATININE 1.58* 1.48*   CALCIUM 8.7* 8.4*   ALBUMIN 2.7* 2.5*   BILITOT 0.3 0.4   ALKPHOS 110 100   AST 48* 24   ALT 76* 49     Cardiac Markers:   No results for input(s): "CKMB", "MYOGLOBIN", "BNP", "TROPISTAT" in the last 48 hours.    Lactic Acid:   No results for input(s): "LACTATE" in the last 48 hours.    Magnesium:   Recent Labs   Lab 03/16/25  0442 03/17/25  0453   MG 2.60 1.70     POCT Glucose:   Recent Labs   Lab 03/16/25  1630 03/16/25  1910 03/17/25  0617   POCTGLUCOSE 114* 118* 110     Troponin:   No results for input(s): "TROPONINI", "TROPONINIHS" in the last 48 hours.    Urine Studies:   No results for input(s): "COLORU", "APPEARANCEUA", "PHUR", "SPECGRAV", "PROTEINUA", "GLUCUA", "KETONESU", "BILIRUBINUA", "OCCULTUA", "NITRITE", "UROBILINOGEN", "LEUKOCYTESUR", "RBCUA", "WBCUA", "BACTERIA", "SQUAMEPITHEL", "HYALINECASTS" in the last 48 hours.    Invalid input(s): "WRIGHTSUR"      Significant Imaging:   CXR, 3/12  Impression:  1. Interim increasing patchy hazy opacities throughout the right lung and left lower lung since the prior exam suspicious for infiltrate and or atelectasis  2. Borderline cardiomegaly  3. Atherosclerosis    CXR, 3/13  Impression:  1. Persistent scattered patchy hazy opacities throughout the lungs bilaterally suspicious for infiltrate, atelectasis, and or scarring  2. Atherosclerosis  3. Cardiac device left chest    Echo, 3/12    Left Ventricle: The left ventricle is normal in size. There is severely reduced systolic function with a visually estimated ejection fraction of less than 30%.    Right Ventricle: The right " ventricle is normal in size. Systolic function is normal.    Left Atrium: Mildly dilated    Aortic Valve: There is mild to moderate stenosis. Aortic valve peak velocity is 2.1 m/s.    Mitral Valve: There is mild regurgitation.    Assessment/Plan:   Acute respiratory failure with hypoxia and hypercapnia  Stable O2 saturations on 3 L NC with O2 saturation mid 90s  Respiratory status is slowly improving     Multilobar healthcare acquired pneumonia with sepsis  Continue empiric antibiotic therapy with cefepime/levofloxacin, day 6/7  Bronchodilator therapy  Short course of IV corticosteroid-completed  MRSA screen-negative  WBC has trended down, now normal     Acute kidney injury  Possible etiologies include volume depletion, sepsis, obstructive uropathy, sv CMO  Indices again trending down  Avoid nephrotoxic agents as possible    Severe ischemic cardiomyopathy/chronic HFrEF/AICD-ppm status  Echo 1/25-EF less than 20% with moderate to sv aortic stenosis  Repeat echo 3/12-less than 30% with moderate aortic stenosis  Attempt to keep I/O's slightly negative; I/O-[id number]     Anemia  No active bleeding identified  Due to elevated troponin, history of CAD patient transfused, 3/12,  with 1 unit PRBCs  H&H stable    Transaminitis  He experienced bump in transaminases on 3/15, uncertain etiology  Transaminases have trended back down to normal     Elevated troponin  Troponin peaked at 0.22 to, trending down this morning  Type 2 MI related to respiratory failure     UTI  Managed with antibiotics as noted above     COPD  Respiratory measures as noted above     COVID positive  Undetermined significance  Pattern of pulmonary infiltrates inconsistent with COVID pneumonia     CAD/CABG  Currently in a-v paced rhythm  Continue ranolazine  Continue low-dose aspirin and clopidogrel     Diabetes mellitus  Blood sugars acceptable  Monitor blood sugars with Accu-Cheks and SSI  Adjust basal insulin as necessary     Hypertension  BP  acceptable  Holding ARB due to JOHN  Attempt to up-titrate beta-blocker     PAD  DAPT as noted above     History of BPH with urinary retention  Urinary catheter removed this a.m., monitor PVRs     Recent multilevel C-spine surgery 1/25  Maintain soft C-collar    Severe debility  Continue PT     GI prophylaxis: Pantoprazole     Code status: Full    Discharge planning: Return to SNF      Active Diagnoses:    Diagnosis Date Noted POA    PRINCIPAL PROBLEM:  Acute respiratory failure with hypoxia and hypercapnia [J96.01, J96.02] 03/12/2025 Yes      Problems Resolved During this Admission:     VTE Risk Mitigation (From admission, onward)           Ordered     enoxaparin injection 40 mg  Every 24 hours         03/12/25 1307     IP VTE HIGH RISK PATIENT  Once         03/12/25 1237     Place sequential compression device  Until discontinued         03/12/25 1237                    Yomi Jeffery MD  Department of Hospital Medicine   [facility] - ICU

## 2025-03-17 NOTE — PROGRESS NOTES
Inpatient Nutrition Assessment    Admit Date: 3/12/2025   Total duration of encounter: 5 days   Patient Age: 69 y.o.    Nutrition Recommendation/Prescription     Continue Soft Diet as tolerated.  Sprite Zero with all meals.   Monitor the need for adding Diabetic Restriction to diet.    Add Boost Glucose Control, Chocolate, TID.  Provides 190 kcal, 16 g protein per serving.  Monitor lytes and replete as needed.   Daily weight and labs.   Monitor intake, tolerance, weight, and labs.     RD following and available as needed. Thank you.     Communication of Recommendations: reviewed with nurse and EMR.    Nutrition Assessment     Malnutrition Assessment/Nutrition-Focused Physical Exam           Malnutrition Level: other (see comments) (Unable to assess) (03/13/25 1635)        Subcutaneous Fat (Malnutrition): mild depletion (03/13/25 1635)           Muscle Mass (Malnutrition): mild depletion (03/13/25 1635)  [place] (Muscle Loss): mild depletion                                A minimum of two characteristics is recommended for diagnosis of either severe or non-severe malnutrition.    Chart Review    Reason Seen: continuous nutrition monitoring and follow-up    Malnutrition Screening Tool Results   Have you recently lost weight without trying?: No  Have you been eating poorly because of a decreased appetite?: No   MST Score: 0   Diagnosis:  Acute Respiratory Failure with Hypoxia and Hypercapnia.     Relevant Medical History:   Anxiety disorder, unspecified      Arthritis      CAD (coronary artery disease)      Cervical radiculopathy      CHF (congestive heart failure)      Chronic pain syndrome      COPD (chronic obstructive pulmonary disease)      Diabetes mellitus      Gastro-esophageal reflux disease without esophagitis      High cholesterol      Hypertension      Lumbar radiculopathy      Myelomalacia of cervical cord      Other specified diseases of spinal cord      Pacemaker      PVD (peripheral vascular  disease)      S/P triple vessel bypass      Tobacco use          Scheduled Medications:  albuterol-ipratropium, 3 mL, Q6H  aspirin, 81 mg, Daily  atorvastatin, 40 mg, Daily  bisacodyL, 5 mg, Daily  ceFEPime IV (PEDS and ADULTS), 1 g, Q8H  clopidogreL, 75 mg, Daily  DULoxetine, 60 mg, Daily  enoxparin, 40 mg, Q24H (prophylaxis, 1700)  insulin glargine U-100, 15 Units, Daily  levoFLOXacin, 750 mg, Q48H  metoprolol tartrate, 25 mg, BID  montelukast, 10 mg, Daily  mupirocin, , BID  pantoprazole, 40 mg, Daily  potassium chloride, 20 mEq, Daily  pregabalin, 100 mg, TID  QUEtiapine, 25 mg, QHS  ranolazine, 500 mg, BID  sodium chloride 0.9%, 10 mL, Q8H    Continuous Infusions:     PRN Medications:  0.9%  NaCl infusion (for blood administration), , Q24H PRN  acetaminophen, 650 mg, Q8H PRN  bisacodyL, 10 mg, Daily PRN  dextrose 50%, 12.5 g, PRN  glucagon (human recombinant), 1 mg, PRN  insulin aspart U-100, 0-10 Units, QID (AC + HS) PRN  naloxone, 0.4 mg, PRN  ondansetron, 4 mg, Q8H PRN  oxyCODONE, 10 mg, Q4H PRN    Calorie Containing IV Medications: no significant kcals from medications at this time    Recent Labs   Lab 03/11/25  1429 03/12/25  0928 03/12/25  1338 03/13/25  0413 03/14/25  0410 03/15/25  0431 03/16/25  0442 03/17/25  0453    140 139 140 140 137 139 139   K 4.8 5.1 5.0 4.4 3.9 4.1 3.8 3.4*   CALCIUM 8.9 9.2 9.4 9.1 9.1 9.2 8.7* 8.4*   PHOS  --   --   --   --  3.7  --   --   --    MG  --   --  2.50 2.40 2.10 2.10 2.60 1.70    106 106 107 105 102 104 103   CO2 23 19* 22* 23 26 27 28 29   BUN 38.0* 49.0* 48.0* 54.0* 52.0* 56.0* 52.0* 39.0*   CREATININE 1.58* 1.79* 1.72* 1.65* 1.59* 1.61* 1.58* 1.48*   EGFRNORACEVR 48 41 43 45 47 47 48 52   GLUCOSE 116* 206* 195* 200* 201* 169* 64* 115   BILITOT 0.4 0.4  --  0.4 0.3 0.3 0.3 0.4   ALKPHOS 132 135  --  120 121 130 110 100   ALT 14 26  --  26 42 90* 76* 49   AST 17 29  --  27 42* 91* 48* 24   ALBUMIN 3.2* 3.2*  --  2.9* 2.9* 2.9* 2.7* 2.5*   WBC 9.74  "24.08*  --  14.28* 13.08* 12.91* 8.64 9.87   HGB 7.6* 7.5*  --  8.8* 9.1* 8.9* 8.5* 8.9*   HCT 22.7* 23.8*  --  26.9* 28.9* 28.4* 26.8* 28.4*     Nutrition Orders:  Diet GI Soft      Appetite/Oral Intake: fair/25-50% of meals  Factors Affecting Nutritional Intake: respiratory status, shortness of breath, and  requiring oxymask..    Social Needs Impacting Access to Food: none identified  Food/Yazidi/Cultural Preferences:  Sprite Zero with all meals.   Food Allergies: none reported  Last Bowel Movement: 03/15/25  Wound(s):     Wound 03/12/25 1140 Fissure Right Buttocks-Tissue loss description: Partial thickness Noted.     Comments    3/17: Diet was advanced to GI Soft. Pt with good appetite, tolerating meals. Consumed 25% of breakfast and 50% of lunch so far today. Still requiring supplemental oxygen. Will add Boost Glucose Control to assist with need and continue to monitor during stay.     3/13: Pt currently on CL diet, requiring oxymask. Discussed with nursing who reports pt is tolerating CL. No recent weight loss noted/reported. Labs and meds reviewed. Last A1C was 5.6 on 1/23/2025. Creatinine improving since admit. . Noted pt with good appetite prior to admit and pt prefers chocolate flavor ONS. Will add Boost Glucose Control Chocolate when able to advance diet beyond CL and continue to monitor during stay.         Anthropometrics    Height: 5' 10" (177.8 cm), Height Method: Stated  Last Weight: 75.5 kg (166 lb 7.2 oz) (03/15/25 0602), Weight Method: Bed Scale  BMI (Calculated): 23.9  BMI Classification: overweight (BMI 25-29.9)        Ideal Body Weight (IBW), Male: 166 lb     % Ideal Body Weight, Male (lb): 104.92 %                          Usual Weight Provided By: EMR weight history    Wt Readings from Last 5 Encounters:   03/15/25 75.5 kg (166 lb 7.2 oz)   02/20/25 72.6 kg (160 lb)   02/19/25 73.3 kg (161 lb 9.6 oz)   01/18/25 78 kg (172 lb)   12/03/24 78.5 kg (173 lb)     Weight Change(s) Since " Admission:   Wt Readings from Last 1 Encounters:   03/15/25 0602 75.5 kg (166 lb 7.2 oz)   03/14/25 0600 72 kg (158 lb 11.7 oz)   03/12/25 2130 79 kg (174 lb 2.6 oz)   03/12/25 1341 79 kg (174 lb 2.6 oz)   03/12/25 0854 77.1 kg (170 lb)   Admit Weight: 77.1 kg (170 lb) (03/12/25 0854), Weight Method: Bed Scale    Estimated Needs    Weight Used For Calorie Calculations: 78 kg (171 lb 15.3 oz)  Energy Calorie Requirements (kcal): 1873 kcals/day (SF 1.2)  Energy Need Method: DeWitt-St Jeor  Weight Used For Protein Calculations: 78 kg (171 lb 15.3 oz)  Protein Requirements: 80 to 95 gm/day (1.0 to 1.2 g/kg/CBW).  Fluid Requirements (mL): 1873 mL/day (1mL/kcal) or per MD Guidance.  CHO Requirement: 203 to 225 g/day (45% of kcals).     Enteral Nutrition     Patient not receiving enteral nutrition at this time.    Parenteral Nutrition     Patient not receiving parenteral nutrition support at this time.    Evaluation of Received Nutrient Intake    Calories: not meeting estimated needs Progressing.  Protein: not meeting estimated needs Progressing.     Patient Education     Not applicable.    Nutrition Diagnosis     PES: Altered nutrition related laboratory values related to acute illness as evidenced by BUN/Crea 54.0/1.65(H); . (active) Improving.     PES:            Nutrition Interventions     Intervention(s): commercial beverage and collaboration with other providers  Intervention(s): Oral nutritional supplement (Boost Glucose Control Chocolate when diet advanced to FL. )    Goal: Meet greater than 80% of nutritional needs by follow-up. (goal progressing)    Nutrition Goals & Monitoring     Dietitian will monitor: food and beverage intake, energy intake, weight, weight change, electrolyte/renal panel, glucose/endocrine profile, and gastrointestinal profile  Discharge planning: too early to determine; pending clinical course  Nutrition Risk/Follow-Up: high (follow-up in 1-4 days)   Please consult if re-assessment  needed sooner.

## 2025-03-18 NOTE — PLAN OF CARE
Called and spoke to son Tyler with Dr. Hernández.  Dr. Hernández informed him that his father did code and that they were able to get his heartbeat back and that he is on a vent.  Son stated that he works in [employer] and that he is on his way here now.

## 2025-03-18 NOTE — PT/OT/SLP PROGRESS
"Physical Therapy Treatment    Patient Name:  Tyler Mai   MRN:  [id number]    Recommendations:     Discharge Recommendations: Moderate Intensity Therapy (Would benefit from return to SNF)  Discharge Equipment Recommendations: to be determined by next level of care  Barriers to discharge:  current medical condition    Assessment:     Tyler Mai is a 69 y.o. male admitted with a medical diagnosis of Acute respiratory failure with hypoxia and hypercapnia.  He presents with the following impairments/functional limitations: weakness, impaired endurance, impaired self care skills, impaired functional mobility, impaired balance, pain, decreased safety awareness, gait instability, decreased lower extremity function, decreased upper extremity function, orthopedic precautions .    Assessment: Patient was more lethargic and weak this session - regressed to total assist for supine<>sit, and total assist sitting edge of bed. Patient tolerated sitting edge of bed ~6 minutes only with total trunk support provided. Attempted to engage patient in LE AROM at edge of bed but patient stated "no" when encouraged to move legs - passive range performed without adverse reaction. Respiratory Therapist entered during session and increased supplemental O2 being given through oxymask - patient maintained O2 sats in 90s during session but has audible congestion. Placed patient's bed in chair position to facilitate better posturing for improved aerobic capacity in bed. Will continue to progress as able.    Rehab Prognosis: Good; patient would benefit from acute skilled PT services to address these deficits and reach maximum level of function.    Recent Surgery: * No surgery found *      Plan:     During this hospitalization, patient to be seen  (5-6x a week) to address the identified rehab impairments via gait training, therapeutic activities, therapeutic exercises and progress toward the following goals:    Plan of Care Expires:   "     Subjective     Chief Complaint: No complaints this day (Per previous session: Difficult to get up and move).  Patient/Family Comments/goals: Per chart: Go back to rehab.  Pain/Comfort:  Pain Rating 1: other (see comments) (rating not given - complaining of achiness all over)  Pain Addressed 1: Reposition, Distraction, Nurse notified      Objective:     Communicated with nursg prior to session.  Patient found HOB elevated with pulse ox (continuous), telemetry, PICC line, oxygen, blood pressure cuff, cervical collar upon PT entry to room.     General Precautions: Standard, airborne, contact  Orthopedic Precautions: spinal precautions  Braces: Cervical collar  Respiratory Status:  high flow mask 4L     Functional Mobility:  Bed Mobility:     Scooting: total assistance and of 2 persons  Supine to Sit: total assistance and of 2 persons  Sit to Supine: total assistance and of 2 persons        Patient left with bed in chair position with all lines intact, call button in reach, and nurse notified..    GOALS:   Multidisciplinary Problems       Physical Therapy Goals          Problem: Physical Therapy    Goal Priority Disciplines Outcome Interventions   Physical Therapy Goal     PT, PT/OT Progressing    Description: Goals to be met by: discharge     Patient will increase functional independence with mobility by performin. Supine to sit with MInimal Assistance  2. Bed to chair transfer with Minimal Assistance using Rolling Walker  3. Gait  x 30 feet with Moderate Assistance using Rolling Walker.                          DME Justifications:   Tyler's mobility limitation cannot be sufficiently resolved by the use of a cane. His functional mobility deficit can be sufficiently resolved with the use of a Rolling Walker. Patient's mobility limitation significantly impairs their ability to participate in one of more activities of daily living.  The use of a RW will significantly improve the patient's ability [facility] and the patient will use it on regular basis in the home.    Time Tracking:     PT Received On: 03/18/25  PT Start Time: 1101     PT Stop Time: 1121  PT Total Time (min): 20 min     Billable Minutes: Therapeutic Activity 20    Treatment Type: Treatment  PT/PTA: PT           03/18/2025

## 2025-03-18 NOTE — PLAN OF CARE
Problem: Gas Exchange Impaired  Goal: Optimal Gas Exchange  Outcome: Not Progressing     Problem: Infection  Goal: Absence of Infection Signs and Symptoms  Outcome: Not Progressing     Problem: Comorbidity Management  Goal: Maintenance of COPD Symptom Control  Outcome: Not Progressing  Goal: Maintenance of Heart Failure Symptom Control  Outcome: Not Progressing  Goal: Blood Pressure in Desired Range  Outcome: Progressing  Goal: Maintenance of Osteoarthritis Symptom Control  Outcome: Progressing

## 2025-03-18 NOTE — NURSING
Noted during bedside shift reported sob, oxygen saturation at 90% on 2l viz oxymask. Increased to 3L per RT and administered Neb tx as ordered. During rounding, disoriented to place, time and situation. Unable to follow directions and easily goes back to sleep after being aroused. Notified Dr. Hernández at 1016 and ongoing monitoring in progress. Respiratory notified of abdominal breathing and increased oxygen to 6L per oxymask.

## 2025-03-18 NOTE — NURSING
Arrived from rm 344. Cyanotic and unresponsive, with agonal resp.  Code Blue called, Chest compressions started  See code blue documentation.

## 2025-03-18 NOTE — PROGRESS NOTES
[facility] - ICU Hospital Medicine  Progress Note    Patient Name: Tyler Mai  MRN: [id number]  Patient Class: IP- Inpatient   Admission Date: 3/12/2025  Length of Stay: 6 days  Attending Physician: Yomi Jeffery MD  Primary Care Provider: Paxton Connors MD        Subjective:     Principal Problem:Acute respiratory failure with hypoxia and hypercapnia    Interval History:   HPI: Mr. Mai is a 69-year-old male who was transferred to the ED earlier today from a local SNF due to persistent shortness of breath.  His symptoms apparently began yesterday at which time he was diagnosed with pneumonia.  Symptoms worsened today, O2 saturations noted to be in the 80s on RA prompting his transfer to this facility.  With supplemental O2 his O2 saturation improved into the low 90s.  He was subsequently converted to BiPAP in the ED.  His current issues began in mid January at which time he underwent a rather extensive C-spine surgery involving multiple levels following which he was transferred to an inpatient rehab facility.  During that time he also developed some urinary retention and had an indwelling catheter for an extended period.  Catheter was apparently removed in rehab.  He was subsequently transferred to a SNF for continued physical therapy.  His evaluation in the ED was significant for anemia, leukocytosis, acute kidney injury, acute respiratory failure with hypercapnia and hypoxia, urinalysis consistent with UTI and CXR consistent with multilobar pneumonia.  Other findings included a significantly elevated BNP and an elevated troponin.  He was managed with a single dose of IV antibiotics and a nebulizer treatment.  On seeing him following his arrival to the ICU he was quite lethargic with a respiratory rate of 10 to 12 on BiPAP.  Due to his history of opioid use for chronic pain and recent C-spine surgery he was given a dose of Narcan after which he did awakened and become more  responsive, Respiratory rate improved to the 20s.  A subsequent ABG revealed a normal pH with moderate hypoxemia.    3/13-overnight became more agitated, refused to wear BiPAP; fortunately has been able to maintain adequate O2 saturations on nasal cannula O2; when seen on rounds this morning was very lethargic, poorly responsive; he opened his eyes to voice and tactile stimuli but otherwise unresponsive.  Did spike a temp last night to 101.3° F; he was given Tylenol and has not had another spike since that time    3/14-no events overnight; he has been afebrile over the past 24 hours; when seen on rounds this morning was sleepy but aroused easily to voice    3/15-no events overnight; when seen on rounds this morning was seated on the side of the bed working with PT; he was alert, normally interactive seemed to be in good spirits  Downgrade to M/S status    3/16-transferred to M/S unit yesterday evening; no events overnight; Respiratory status stable; remains on 3 L NC with O2 saturations of 94%    3/17-condition stable; when seen on rounds this morning was somewhat lethargic but this appears to be his baseline in the mornings; he becomes more interactive/attentive later in the day; he had no complaints; he remains generally very weak    March 18, 2025-the patient had low-grade fever 100.4, can maintain 92% O2 sat with 3 L OxyMask, complain of generalized weakness    Objective:     Vital Signs (Most Recent):  Temp: (!) 100.4 °F (38 °C) (03/18/25 0824)  Pulse: 98 (03/18/25 0824)  Resp: 16 (03/18/25 0824)  BP: (!) 150/69 (03/18/25 0824)  SpO2: 95 % (03/18/25 0824) Vital Signs (24h Range):  Temp:  [98.7 °F (37.1 °C)-100.4 °F (38 °C)] 100.4 °F (38 °C)  Pulse:  [84-98] 98  Resp:  [16-22] 16  SpO2:  [91 %-100 %] 95 %  BP: (123-150)/(69-73) 150/69     Weight: 75.5 kg (166 lb 7.2 oz)  Body mass index is 23.88 kg/m².    Intake/Output Summary (Last 24 hours) at 3/18/2025 1109  Last data filed at 3/18/2025 0907  Gross per 24  hour   Intake 729.18 ml   Output 1225 ml   Net -495.82 ml      Physical exam  Constitution-obese, normally developed male in NAD  Eyes-PERRL, EOMI  HENT-normocephalic, atraumatic  Neck-supple; soft C-collar in place  Respiratory-normal respirations; CTA with good air movement  Heart-RRR; normal S1 and S2; no murmurs, gallops; AICD/ pacemaker palpated left chest wall  Abdomen-soft, nontender, nondistended  Genitourinary-deferred  Musculoskeletal-no joint abnormalities, normal ROM throughout; no edema  Skin-warm, dry; stage I breakdown over coccyx; unstageable pressure ulcer right heel  Neurologic-alert and oriented to person, place, situation    Scheduled Meds:   albuterol-ipratropium  3 mL Nebulization Q6H    aspirin  81 mg Oral Daily    atorvastatin  40 mg Oral Daily    bisacodyL  5 mg Oral Daily    ceFEPime IV (PEDS and ADULTS)  1 g Intravenous Q8H    clopidogreL  75 mg Oral Daily    DULoxetine  60 mg Oral Daily    enoxparin  40 mg Subcutaneous Q24H (prophylaxis, 1700)    insulin glargine U-100  15 Units Subcutaneous Daily    levoFLOXacin  750 mg Intravenous Q48H    metoprolol tartrate  25 mg Oral BID    montelukast  10 mg Oral Daily    pantoprazole  40 mg Oral Daily    potassium chloride  20 mEq Oral Daily    pregabalin  100 mg Oral TID    QUEtiapine  25 mg Oral QHS    ranolazine  500 mg Oral BID    sodium chloride 0.9%  10 mL Intravenous Q8H     Continuous Infusions:      PRN Meds:.  Current Facility-Administered Medications:     0.9%  NaCl infusion (for blood administration), , Intravenous, Q24H PRN    acetaminophen, 650 mg, Oral, Q8H PRN    bisacodyL, 10 mg, Rectal, Daily PRN    dextrose 50%, 12.5 g, Intravenous, PRN    glucagon (human recombinant), 1 mg, Intramuscular, PRN    insulin aspart U-100, 0-10 Units, Subcutaneous, QID (AC + HS) PRN    naloxone, 0.4 mg, Intravenous, PRN    ondansetron, 4 mg, Intravenous, Q8H PRN    oxyCODONE, 10 mg, Oral, Q4H PRN    Significant Labs: All pertinent labs within the past  "24 hours have been reviewed.  ABGs:   No results for input(s): "PH", "PCO2", "HCO3", "POCSATURATED", "BE", "TOTALHB", "COHB", "METHB", "O2HB", "POCFIO2", "PO2" in the last 48 hours.    CBC:   Recent Labs   Lab 03/17/25  0453 03/18/25  0457   WBC 9.87 14.12*   HGB 8.9* 8.8*   HCT 28.4* 28.4*    157     CMP:   Recent Labs   Lab 03/17/25  0453 03/18/25  0457    140   K 3.4* 3.7    103   CO2 29 30   BUN 39.0* 30.0*   CREATININE 1.48* 1.34*   CALCIUM 8.4* 8.6*   ALBUMIN 2.5*  --    BILITOT 0.4  --    ALKPHOS 100  --    AST 24  --    ALT 49  --      Cardiac Markers:   No results for input(s): "CKMB", "MYOGLOBIN", "BNP", "TROPISTAT" in the last 48 hours.    Lactic Acid:   No results for input(s): "LACTATE" in the last 48 hours.    Magnesium:   Recent Labs   Lab 03/17/25  0453 03/18/25  0457   MG 1.70 1.70     POCT Glucose:   Recent Labs   Lab 03/17/25  1024 03/17/25  1517 03/17/25  2136   POCTGLUCOSE 117* 165* 126*     Troponin:   No results for input(s): "TROPONINI", "TROPONINIHS" in the last 48 hours.    Urine Studies:   No results for input(s): "COLORU", "APPEARANCEUA", "PHUR", "SPECGRAV", "PROTEINUA", "GLUCUA", "KETONESU", "BILIRUBINUA", "OCCULTUA", "NITRITE", "UROBILINOGEN", "LEUKOCYTESUR", "RBCUA", "WBCUA", "BACTERIA", "SQUAMEPITHEL", "HYALINECASTS" in the last 48 hours.    Invalid input(s): "WRIGHTSUR"      Significant Imaging:   CXR, 3/12  Impression:  1. Interim increasing patchy hazy opacities throughout the right lung and left lower lung since the prior exam suspicious for infiltrate and or atelectasis  2. Borderline cardiomegaly  3. Atherosclerosis    CXR, 3/13  Impression:  1. Persistent scattered patchy hazy opacities throughout the lungs bilaterally suspicious for infiltrate, atelectasis, and or scarring  2. Atherosclerosis  3. Cardiac device left chest    Echo, 3/12    Left Ventricle: The left ventricle is normal in size. There is severely reduced systolic function with a visually " estimated ejection fraction of less than 30%.    Right Ventricle: The right ventricle is normal in size. Systolic function is normal.    Left Atrium: Mildly dilated    Aortic Valve: There is mild to moderate stenosis. Aortic valve peak velocity is 2.1 m/s.    Mitral Valve: There is mild regurgitation.    Assessment/Plan:   Acute respiratory failure with hypoxia and hypercapnia  Stable O2 saturations on 3 L NC with O2 saturation mid 90s  Respiratory status is slowly improving     Multilobar healthcare acquired pneumonia with sepsis  Continue empiric antibiotic therapy with cefepime/levofloxacin  Bronchodilator therapy  Short course of IV corticosteroid-completed  MRSA screen-negative  WBC has trended down, now normal  Check CT angio of chest       Acute kidney injury  Possible etiologies include volume depletion, sepsis, obstructive uropathy, sv CMO  Indices again trending down  Avoid nephrotoxic agents as possible    Severe ischemic cardiomyopathy/chronic HFrEF/AICD-ppm status  Echo 1/25-EF less than 20% with moderate to sv aortic stenosis  Repeat echo 3/12-less than 30% with moderate aortic stenosis       Anemia  No active bleeding identified  Due to elevated troponin, history of CAD patient transfused, 3/12,  with 1 unit PRBCs  H&H stable    Transaminitis  He experienced bump in transaminases on 3/15, uncertain etiology  Transaminases have trended back down to normal     Elevated troponin  Troponin peaked at 0.22 to, trending down this morning  Type 2 MI related to respiratory failure     UTI  Managed with antibiotics as noted above     COPD  Respiratory measures as noted above     COVID positive  Undetermined significance  Pattern of pulmonary infiltrates inconsistent with COVID pneumonia     CAD/CABG  Currently in a-v paced rhythm  Continue ranolazine  Continue low-dose aspirin and clopidogrel     Diabetes mellitus  Blood sugars acceptable  Monitor blood sugars with Accu-Cheks and SSI  Adjust basal insulin as  necessary     Hypertension  BP acceptable  Holding ARB due to JOHN  Attempt to up-titrate beta-blocker     PAD  DAPT as noted above     History of BPH with urinary retention  Urinary catheter removed this a.m., monitor PVRs     Recent multilevel C-spine surgery 1/25  Maintain soft C-collar    Severe debility  Continue PT     GI prophylaxis: Pantoprazole     Code status: Full    Discharge planning: Return to SNF      Active Diagnoses:    Diagnosis Date Noted POA    PRINCIPAL PROBLEM:  Acute respiratory failure with hypoxia and hypercapnia [J96.01, J96.02] 03/12/2025 Yes      Problems Resolved During this Admission:     VTE Risk Mitigation (From admission, onward)           Ordered     enoxaparin injection 40 mg  Every 24 hours         03/12/25 1307     IP VTE HIGH RISK PATIENT  Once         03/12/25 1237     Place sequential compression device  Until discontinued         03/12/25 1237                    Gustavo Hernández MD  Department of Hospital Medicine   [facility] - ICU

## 2025-03-18 NOTE — CONSULTS
[facility] - ICU  Pulmonology  Consult Note    Patient Name: Tyler Mai  MRN: [id number]  Admission Date: 3/12/2025  Hospital Length of Stay: 6 days  Code Status: Full Code  Attending Physician: Yomi Jeffery MD  Primary Care Provider: Paxton Connors MD   Principal Problem: Acute respiratory failure with hypoxia and hypercapnia    Consults  Subjective:     HPI:  69-year-old male patient with a long complicated history.  Patient had significant weakness in his arms and was minimally mobile.  After evaluation and several delays in surgery he underwent surgery on January 28, 2025 by Dr. Zavala at [facility].  He had a C2-6 PCDF and a C2-3, 4-5 decompression with screws.  The patient was sent to a rehab facility and subsequently downgraded to a skilled nursing floor where he was admitted to [facility] on 03/12/25 with hypoxic and hypercapnic respiratory failure, multilobar healthcare acquired pneumonia, acute kidney NV, UTI (Proteus) and mild elevation in troponin.    He has known COPD (not quantitated) on Trelegy inhaler, systolic heart failure (ejection fraction approximately 30% with mild-to-moderate aortic stenosis), pacemaker/defibrillator, CAD with previous CABG.    The patient started having worsening shortness of breath and was being transferred to ICU this afternoon when placing him in the ICU bed he became agonal and had cardiorespiratory arrest.  He received epinephrine x1, was intubated and the length of the code is recorded as 30 minutes.  EKG is pending.  Chest x-ray shows proper placement of the endotracheal tube with bilateral infiltrates throughout both lungs.  Stat labs were performed and are as follows H&H 11.5/38, WBC 34.1, electrolytes within normal limits, anion gap 14, BUN 34, creatinine 1.75.  ABGs done shortly after the code is as follows: PH 7.36/47/225 on tidal volume 500 peep +5 100% rate of 20  .  Past Medical History:    Diagnosis Date    Anxiety disorder, unspecified     Arthritis     CAD (coronary artery disease)     Cervical radiculopathy     CHF (congestive heart failure)     Chronic pain syndrome     COPD (chronic obstructive pulmonary disease)     Diabetes mellitus     Gastro-esophageal reflux disease without esophagitis     High cholesterol     Hypertension     Lumbar radiculopathy     Myelomalacia of cervical cord     Other specified diseases of spinal cord     Pacemaker     PVD (peripheral vascular disease)     S/P triple vessel bypass     Tobacco use        Past Surgical History:   Procedure Laterality Date    ANTERIOR CERVICAL DISCECTOMY W/ FUSION  1995    C5-6 and C6-7.  Dr. Wall    ANTERIOR CERVICAL DISCECTOMY W/ FUSION  1996    Redo C5-6, C6-7.  Dr. Wall    CHOLECYSTECTOMY      CORONARY ARTERY BYPASS GRAFT      FUSION OF POSTERIOR COLUMN OF CERVICAL SPINE USING COMPUTER AIDED NAVIGATION N/A 1/24/2025    Procedure: FUSION, SPINE, POSTERIOR SPINAL COLUMN, CERVICAL, USING COMPUTER-ASSISTED NAVIGATION;  Surgeon: Darrell Watson MD;  Location: [facility];  Service: Neurosurgery;  Laterality: N/A;  C2-C6 PCF; C2/3-C4/5 decompression, possible additional levels  o-arm  NTI  TIVA setup  [employer] //  XX    INSERTION OF PACEMAKER      X2    TRIPPLE VESSEL BYPASS         Review of patient's allergies indicates:   Allergen Reactions    Hydrochlorothiazide      Other Reaction(s): Pancreatitis       Family History       Problem Relation (Age of Onset)    Cancer Mother, Father    Lumbar disc disease Brother          Tobacco Use    Smoking status: Every Day     Current packs/day: 1.00     Types: Cigarettes    Smokeless tobacco: Not on file   Substance and Sexual Activity    Alcohol use: Not on file    Drug use: Not on file    Sexual activity: Not on file        Review of Systems  Objective:     Vital Signs (Most Recent):  Temp: (!) 103.1 °F (39.5 °C) (03/18/25 1339)  Pulse: (!) 114 (03/18/25 1432)  Resp: (!) 24 (03/18/25  1247)  BP: 136/80 (03/18/25 1432)  SpO2: 95 % (03/18/25 1432) Vital Signs (24h Range):  Temp:  [98.7 °F (37.1 °C)-103.1 °F (39.5 °C)] 103.1 °F (39.5 °C)  Pulse:  [] 114  Resp:  [16-24] 24  SpO2:  [90 %-98 %] 95 %  BP: (123-150)/(69-80) 136/80     Body mass index is 23.63 kg/m².      Intake/Output Summary (Last 24 hours) at 3/18/2025 1636  Last data filed at 3/18/2025 1200  Gross per 24 hour   Intake 549.18 ml   Output 800 ml   Net -250.82 ml       Physical Exam    Vents:  Oxygen Concentration (%): 36 (03/18/25 0711)    Lines/Drains/Airways       Drain  Duration                  Urethral Catheter 03/17/25 1545 Straight-tip 16 Fr. 1 day              Airway  Duration                  Airway - Non-Surgical 03/18/25 1500 Endotracheal Tube <1 day              Peripheral Intravenous Line  Duration                  Peripheral IV - Single Lumen 03/12/25 0913 22 G Right;Distal Forearm 6 days         Peripheral IV - Single Lumen 03/12/25 0931 18 G Right;Anterior Forearm 6 days                    Significant Labs:    Lab Results   Component Value Date    WBC 34.15 (H) 03/18/2025    HGB 11.5 (L) 03/18/2025    HCT 38.8 (L) 03/18/2025    MCV 97.2 (H) 03/18/2025     03/18/2025         BMP  Lab Results   Component Value Date     03/18/2025    K 4.5 03/18/2025     03/18/2025    CO2 23 03/18/2025    BUN 34.0 (H) 03/18/2025    CREATININE 1.75 (H) 03/18/2025    CALCIUM 8.6 (L) 03/18/2025    ANIONGAP 12 01/07/2025    ESTGFRAFRICA 36 01/07/2025    EGFRNONAA >60 08/17/2020       ABG  Recent Labs   Lab 03/18/25  1546   PH 7.363   PO2 225*   PCO2 47.1*   HCO3 26.8   BE 1         All pertinent labs within the past 24 hours have been reviewed.    Significant Imaging:   I have reviewed all pertinent imaging results/findings within the past 24 hours.  I have reviewed and interpreted all pertinent imaging results/findings within the past 24 hours.    Assessment/Plan:       Status post cardiorespiratory arrest with code  time of 30 minutes.  Hypoxemic and hypercarbic respiratory failure  Healthcare acquired pneumonia with bilateral infiltrates  Mild renal insufficiency (creatinine 1.75)  Etiology of arrest unknown (need to rule out PE-patient on Lovenox)  Severe deconditioning pre/ post C2-6 PC df and C2-3, 4-5 decompression with screws on 01/28/2025  Comorbidities:  COPD not quantitated, systolic heart failure with ejection fraction 30%, mild-to-moderate aortic stenosis, status post AICD and pacemaker, diabetes mellitus, hypertension, hyperlipidemia   Plan:  Venous NIVA of lower extremities to rule out DVT (patient too unstable for V/Q scan or CT scan PE protocol at this time (begin full-dose Lovenox if venous NIVA positive for DVT)  Blood cultures x2, urine culture, sputum culture now  Trend troponins and get EKG  Continue broad-spectrum antibiotics  Use lowest FiO2 to keep sat greater than equal to 92%  Recheck labs and chest x-ray in a.m.    I have spoken with the patient's son via telemedicine and gave him an update of the present circumstances and the plan of care.  They had decided prior to our conversation to make their father a DNR under the present circumstances.  Thank you for your consult. I will follow-up with patient. Please contact us if you have any additional questions.     David Adkins MD  Pulmonology  [facility] - ICU

## 2025-03-19 NOTE — NURSING
Dr. Adkins and Dr. Hernández rounded this AM, informed on pt's status and reviewed events prior to Code, and  pt's neuro status post Code.

## 2025-03-19 NOTE — NURSING
Noted at 0824, pt had low Tidal volume on vent, air noted moving around ETT coming out of pt's mouth, pt's ETT bulb was check, no air pressure noted, Resp. T was called stat, attempt to inflat air into bulb   however it didn't inflat, pt's spo2 remained greater than 97%, during episode. Resp T, exchanged ETT, pt returned to baseline Tidal Volume, no distress noted Dr. Adkins at bedside, and Dr. Hernández made aware.

## 2025-03-19 NOTE — NURSING
Dr. Carrizales requested that the pt's family be asked what type of PM/defib the pt's may have, a call was place to the son Mr. Scott and he stated that he didn't have that info or the name of the cardiologist.

## 2025-03-19 NOTE — RESPIRATORY THERAPY
Patients endotracheal tube  balloon not patent, broken.  Tube changer used to replace with a new 7.5 ET tube.  Positioned at 22 at the lip.  X-ray ordered to confirm placement.

## 2025-03-19 NOTE — PLAN OF CARE
Problem: Adult Inpatient Plan of Care  Goal: Plan of Care Review  Outcome: Progressing  Goal: Patient-Specific Goal (Individualized)  Outcome: Progressing  Goal: Absence of Hospital-Acquired Illness or Injury  Outcome: Progressing  Goal: Optimal Comfort and Wellbeing  Outcome: Progressing  Goal: Readiness for Transition of Care  Outcome: Progressing     Problem: Skin Injury Risk Increased  Goal: Skin Health and Integrity  Outcome: Progressing     Problem: Diabetes Comorbidity  Goal: Blood Glucose Level Within Targeted Range  Outcome: Progressing     Problem: Wound  Goal: Optimal Coping  Outcome: Progressing  Goal: Optimal Functional Ability  Outcome: Progressing  Goal: Absence of Infection Signs and Symptoms  Outcome: Progressing  Goal: Improved Oral Intake  Outcome: Progressing  Goal: Optimal Pain Control and Function  Outcome: Progressing  Goal: Skin Health and Integrity  Outcome: Progressing  Goal: Optimal Wound Healing  Outcome: Progressing     Problem: Fall Injury Risk  Goal: Absence of Fall and Fall-Related Injury  Outcome: Progressing     Problem: Infection  Goal: Absence of Infection Signs and Symptoms  Outcome: Progressing     Problem: Comorbidity Management  Goal: Maintenance of COPD Symptom Control  Outcome: Progressing  Goal: Maintenance of Heart Failure Symptom Control  Outcome: Progressing  Goal: Blood Pressure in Desired Range  Outcome: Progressing  Goal: Maintenance of Osteoarthritis Symptom Control  Outcome: Progressing     Problem: Mechanical Ventilation Invasive  Goal: Effective Communication  Outcome: Progressing  Goal: Optimal Device Function  Outcome: Progressing  Goal: Mechanical Ventilation Liberation  Outcome: Progressing  Goal: Optimal Nutrition Delivery  Outcome: Progressing  Goal: Absence of Device-Related Skin and Tissue Injury  Outcome: Progressing  Goal: Absence of Ventilator-Induced Lung Injury  Outcome: Progressing

## 2025-03-19 NOTE — NURSING
Update  Spoke with Mr. Scott pt's son this AM, updated him on pt's night and current status including pt's neuro status

## 2025-03-19 NOTE — NURSING
Noted on rounds with agonal breathing on 6l per oxymask. Incomprehensible words when asked questions. Oxygen noted at 84% on 6L. Summoned RT and Sue JON present at bedside. Placed on 15 l per non rebreather. Dr. Hernández notified at 1245 of decline in condition and febrile at 103.2, respirations at 26 and labored. New orders noted and carried out.

## 2025-03-19 NOTE — CONSULTS
[facility]  Cardiology  Consult Note    Patient Name: Tyler Mai  MRN: [id number]  Admission Date: 3/12/2025  Hospital Length of Stay: 7 days  Code Status: DNR   Attending Provider: Yomi Jeffery MD   Consulting Provider: MARIVEL Zavala  Primary Care Physician: Paxton Connors MD  Principal Problem:Acute respiratory failure with hypoxia and hypercapnia    Patient information was obtained from primary team.     Inpatient consult to Cardiology  Consult performed by: Isaac Stevenson FNP  Consult ordered by: Gustavo Hernández MD  Reason for consult: Cardiopulmonary arrest        Subjective:     Chief Complaint:  SOB     HPI: Patient is a 68 yo male unknown to [facility]. He is followed by Cardiology in [place]. He was in SNF post c-spine surgery. He presented to ED from SNF due to increase SOB. He was admitted for respiratory failure due to pneumonia on 3/12/25.Yesterday his respiratory status worsened and was transferred to ICU. Once he arrived in ICU he had agonal respirations and a code was called. He was coded for approximately 30 minutes. He received 1 dose of epi and defibrillated once. He is currently intubated on the vent. He is not responsive and is on no sedation. He is hemodynamically stable.     Past Medical History:   Diagnosis Date    Anxiety disorder, unspecified     Arthritis     CAD (coronary artery disease)     Cervical radiculopathy     CHF (congestive heart failure)     Chronic pain syndrome     COPD (chronic obstructive pulmonary disease)     Diabetes mellitus     Gastro-esophageal reflux disease without esophagitis     High cholesterol     Hypertension     Lumbar radiculopathy     Myelomalacia of cervical cord     Other specified diseases of spinal cord     Pacemaker     PVD (peripheral vascular disease)     S/P triple vessel bypass     Tobacco use        Past Surgical History:   Procedure Laterality Date    ANTERIOR CERVICAL DISCECTOMY W/ FUSION  1995     C5-6 and C6-7.  Dr. Wall    ANTERIOR CERVICAL DISCECTOMY W/ FUSION  1996    Redo C5-6, C6-7.  Dr. Wall    CHOLECYSTECTOMY      CORONARY ARTERY BYPASS GRAFT      FUSION OF POSTERIOR COLUMN OF CERVICAL SPINE USING COMPUTER AIDED NAVIGATION N/A 1/24/2025    Procedure: FUSION, SPINE, POSTERIOR SPINAL COLUMN, CERVICAL, USING COMPUTER-ASSISTED NAVIGATION;  Surgeon: Darrell Watson MD;  Location: [facility];  Service: Neurosurgery;  Laterality: N/A;  C2-C6 PCF; C2/3-C4/5 decompression, possible additional levels  o-arm  NTI  TIVA setup  [employer] //  XX    INSERTION OF PACEMAKER      X2    TRIPPLE VESSEL BYPASS         Review of patient's allergies indicates:   Allergen Reactions    Hydrochlorothiazide      Other Reaction(s): Pancreatitis       No current facility-administered medications on file prior to encounter.     Current Outpatient Medications on File Prior to Encounter   Medication Sig    albuterol (PROVENTIL) 2.5 mg /3 mL (0.083 %) nebulizer solution Take 2.5 mg by nebulization every 6 (six) hours.    cholecalciferol, vitamin D3, 1,250 mcg (50,000 unit) capsule Take 50,000 Units by mouth every 7 days.    DULoxetine (CYMBALTA) 60 MG capsule Take 60 mg by mouth once daily.    montelukast (SINGULAIR) 10 mg tablet Take 10 mg by mouth once daily.    oxyCODONE (ROXICODONE) 10 mg Tab immediate release tablet Take 10 mg by mouth every 4 (four) hours as needed for Pain.    acetaminophen (TYLENOL) 325 MG tablet Take 650 mg by mouth every 4 (four) hours as needed for Pain.    albuterol (PROVENTIL/VENTOLIN HFA) 90 mcg/actuation inhaler 1 puff every 4 (four) hours as needed.    amLODIPine (NORVASC) 10 MG tablet Take 1 tablet (10 mg total) by mouth once daily.    ascorbic acid, vitamin C, (VITAMIN C) 500 MG tablet Take 500 mg by mouth once daily.    aspirin 81 MG Chew Take 1 tablet (81 mg total) by mouth once daily.    bisacodyL (DULCOLAX) 5 mg EC tablet Take 1 tablet (5 mg total) by mouth once daily.    carvediloL (COREG)  25 MG tablet Take 1 tablet (25 mg total) by mouth 2 (two) times daily before meals.    clopidogreL (PLAVIX) 75 mg tablet Take 1 tablet (75 mg total) by mouth once daily.    finasteride (PROSCAR) 5 mg tablet Take 1 tablet (5 mg total) by mouth once daily.    furosemide (LASIX) 20 MG tablet Take 1 tablet (20 mg total) by mouth once daily.    LINZESS 145 mcg Cap capsule Take 145 mcg by mouth every morning.    losartan (COZAAR) 50 MG tablet Take 1 tablet (50 mg total) by mouth before breakfast.    magnesium oxide (MAG-OX) 400 mg (241.3 mg magnesium) tablet Take 1 tablet (400 mg total) by mouth 3 (three) times daily.    multivitamin (ONE DAILY MULTIVITAMIN) per tablet Take 1 tablet by mouth once daily.    pantoprazole (PROTONIX) 40 MG tablet Take 1 tablet (40 mg total) by mouth once daily.    piperacillin sodium/tazobactam (PIPERACILLIN-TAZOBACTAM 3.375 G/100 ML D5W, READY TO MIX,) Inject 3.375 g into the vein every 6 (six) hours.    pregabalin (LYRICA) 100 MG capsule Take 1 capsule (100 mg total) by mouth 3 (three) times daily.    QUEtiapine (SEROQUEL) 25 MG Tab Take 1 tablet (25 mg total) by mouth every evening.    ranolazine (RANEXA) 500 MG Tb12 Take 500 mg by mouth 2 (two) times daily.    rosuvastatin (CRESTOR) 40 MG Tab Take 40 mg by mouth every evening.    zinc sulfate (ZINCATE) 50 mg zinc (220 mg) capsule Take 220 mg by mouth once daily.     Family History       Problem Relation (Age of Onset)    Cancer Mother, Father    Lumbar disc disease Brother          Tobacco Use    Smoking status: Every Day     Current packs/day: 1.00     Types: Cigarettes    Smokeless tobacco: Not on file   Substance and Sexual Activity    Alcohol use: Not on file    Drug use: Not on file    Sexual activity: Not on file     Review of Systems   Unable to perform ROS: Patient unresponsive     Objective:     Vital Signs (Most Recent):  Temp: 98.1 °F (36.7 °C) (03/19/25 0715)  Pulse: 95 (03/19/25 0900)  Resp: 19 (03/19/25 0900)  BP: 122/83  (03/19/25 0900)  SpO2: 99 % (03/19/25 0900) Vital Signs (24h Range):  Temp:  [98.1 °F (36.7 °C)-103.1 °F (39.5 °C)] 98.1 °F (36.7 °C)  Pulse:  [] 95  Resp:  [0-30] 19  SpO2:  [90 %-100 %] 99 %  BP: (108-155)/() 122/83     Weight: 71.7 kg (158 lb 1.1 oz)  Body mass index is 22.68 kg/m².    SpO2: 99 %         Intake/Output Summary (Last 24 hours) at 3/19/2025 1055  Last data filed at 3/19/2025 0525  Gross per 24 hour   Intake 0 ml   Output 1050 ml   Net -1050 ml       Lines/Drains/Airways       Drain  Duration                  Urethral Catheter 03/17/25 1545 Straight-tip 16 Fr. 1 day         NG/OG Tube 03/19/25 0830 [place] sump 18 Fr. Right mouth <1 day              Airway  Duration                  Airway - Non-Surgical 03/18/25 1500 Endotracheal Tube <1 day              Peripheral Intravenous Line  Duration                  Peripheral IV - Single Lumen 03/12/25 0913 22 G Right;Distal Forearm 7 days         Peripheral IV - Single Lumen 03/12/25 0931 18 G Right;Anterior Forearm 7 days                    Physical Exam  Constitutional:       Comments: Unresponsive   Cardiovascular:      Rate and Rhythm: Normal rate and regular rhythm.      Heart sounds: No murmur heard.  Pulmonary:      Breath sounds: Normal breath sounds.      Comments: Intubated  Musculoskeletal:         General: No swelling.   Skin:     General: Skin is warm and dry.   Neurological:      Comments: unresponsive         Significant Labs: CMP   Recent Labs   Lab 03/18/25  0457 03/18/25  1510 03/19/25  0339    142 143   K 3.7 4.5 4.1    105 105   CO2 30 23 26   BUN 30.0* 34.0* 56.0*   CREATININE 1.34* 1.75* 2.88*   CALCIUM 8.6* 8.6* 8.5*   ALBUMIN  --  2.6* 2.4*   BILITOT  --  0.8 1.0   ALKPHOS  --  155* 182*   AST  --  403* 2,958*   ALT  --  268* 1,623*   , CBC   Recent Labs   Lab 03/18/25  0457 03/18/25  1510 03/19/25  0339   WBC 14.12* 34.15* 12.89*   HGB 8.8* 11.5* 8.9*   HCT 28.4* 38.8* 27.5*    280 151   , and Troponin  "No results for input(s): "TROPONINIHS" in the last 48 hours.    Significant Imaging:   Echocardiogram 3/12/25:     Left Ventricle: The left ventricle is normal in size. There is severely reduced systolic function with a visually estimated ejection fraction of less than 30%.    Right Ventricle: The right ventricle is normal in size. Systolic function is normal.    Left Atrium: Mildly dilated    Aortic Valve: There is mild to moderate stenosis. Aortic valve peak velocity is 2.1 m/s.    Mitral Valve: There is mild regurgitation.  Assessment and Plan:     Cardiopulmonary arrest  Required 1 amp of epi and shock x 1  Patient currently hemodynamically stable  Respiratory failure s/t pneumonia  Currently intubated  Abx per primary  Elevated trop  Max is 3.4 and trended down  Continue aspirin, plavix and BB  Possible anoxic encephalopathy  +gag reflex but unresponsive without sedation  JOHN  Worsened post code  Transaminates post code  Ischemic cardiomyopathy  Continue BB  Currently holding any ACEi/ARB/ARNI due to renal function   ICD in place  CAD/CABG  DM    Patient is currently a DNR    Thank you for your consult.     Isaac Stevenson, FNP  Cardiology   [facility] - ICU    I personally reviewed the NP history and physical above.  See below MDM component performed by me (Keon Carrizales MD):    Cardiac arrest related to agonal breathing (some VT during ACLS)    Trop 3, prob type 2, EKG paced  PNA  ? Neuro status  JOHN and shock liver  Elevated troponin post code  CAD/CABG  DM  CMO    EF 30%  AICD    Recs:  Have ICD interrogated to see if VT started the code (or was during it)  Cont DAPT  Cont BB  Monitor neuro status  If more VT, then could consider Amio to suppress      "

## 2025-03-19 NOTE — PROGRESS NOTES
[facility] - ICU Hospital Medicine  Progress Note    Patient Name: Tyler Mai  MRN: [id number]  Patient Class: IP- Inpatient   Admission Date: 3/12/2025  Length of Stay: 7 days  Attending Physician: Yomi Jeffery MD  Primary Care Provider: Paxton Connors MD        Subjective:     Principal Problem:Acute respiratory failure with hypoxia and hypercapnia    Interval History:   HPI: Mr. Mai is a 69-year-old male who was transferred to the ED earlier today from a local SNF due to persistent shortness of breath.  His symptoms apparently began yesterday at which time he was diagnosed with pneumonia.  Symptoms worsened today, O2 saturations noted to be in the 80s on RA prompting his transfer to this facility.  With supplemental O2 his O2 saturation improved into the low 90s.  He was subsequently converted to BiPAP in the ED.  His current issues began in mid January at which time he underwent a rather extensive C-spine surgery involving multiple levels following which he was transferred to an inpatient rehab facility.  During that time he also developed some urinary retention and had an indwelling catheter for an extended period.  Catheter was apparently removed in rehab.  He was subsequently transferred to a SNF for continued physical therapy.  His evaluation in the ED was significant for anemia, leukocytosis, acute kidney injury, acute respiratory failure with hypercapnia and hypoxia, urinalysis consistent with UTI and CXR consistent with multilobar pneumonia.  Other findings included a significantly elevated BNP and an elevated troponin.  He was managed with a single dose of IV antibiotics and a nebulizer treatment.  On seeing him following his arrival to the ICU he was quite lethargic with a respiratory rate of 10 to 12 on BiPAP.  Due to his history of opioid use for chronic pain and recent C-spine surgery he was given a dose of Narcan after which he did awakened and become more  responsive, Respiratory rate improved to the 20s.  A subsequent ABG revealed a normal pH with moderate hypoxemia.    3/13-overnight became more agitated, refused to wear BiPAP; fortunately has been able to maintain adequate O2 saturations on nasal cannula O2; when seen on rounds this morning was very lethargic, poorly responsive; he opened his eyes to voice and tactile stimuli but otherwise unresponsive.  Did spike a temp last night to 101.3° F; he was given Tylenol and has not had another spike since that time    3/14-no events overnight; he has been afebrile over the past 24 hours; when seen on rounds this morning was sleepy but aroused easily to voice    3/15-no events overnight; when seen on rounds this morning was seated on the side of the bed working with PT; he was alert, normally interactive seemed to be in good spirits  Downgrade to M/S status    3/16-transferred to M/S unit yesterday evening; no events overnight; Respiratory status stable; remains on 3 L NC with O2 saturations of 94%    3/17-condition stable; when seen on rounds this morning was somewhat lethargic but this appears to be his baseline in the mornings; he becomes more interactive/attentive later in the day; he had no complaints; he remains generally very weak    March 18, 2025-the patient had low-grade fever 100.4, can maintain 92% O2 sat with 3 L OxyMask, complain of generalized weakness    March 19, 2025-the patient was coded around 3:00 p.m. yesterday secondary to cardiopulmonary arrest, chest compression was performed the, the patient was intubated, after 1 ampule of epinephrine, pulse came back, the patient V-tach and shocked with a 200 joule, converted to normal sinus rhythm, the patient is intubated on PRVC mode of ventilator, without sedation, patient is unresponsive, patient has gag reflex but no corneal reflex, pupil is dilated not responding to light, family signed DNR    Objective:     Vital Signs (Most Recent):  Temp: 98.1 °F  (36.7 °C) (03/19/25 0715)  Pulse: 98 (03/19/25 1100)  Resp: 20 (03/19/25 1100)  BP: 128/63 (03/19/25 1100)  SpO2: 99 % (03/19/25 1100) Vital Signs (24h Range):  Temp:  [98.1 °F (36.7 °C)-103.1 °F (39.5 °C)] 98.1 °F (36.7 °C)  Pulse:  [] 98  Resp:  [0-30] 20  SpO2:  [94 %-100 %] 99 %  BP: (108-155)/() 128/63     Weight: 71.7 kg (158 lb 1.1 oz)  Body mass index is 22.68 kg/m².    Intake/Output Summary (Last 24 hours) at 3/19/2025 1122  Last data filed at 3/19/2025 0525  Gross per 24 hour   Intake 0 ml   Output 1050 ml   Net -1050 ml      Physical exam  Constitution-obese, normally developed male in NAD  Eyes-pupil is dilated not responding to light  HENT-normocephalic, atraumatic  Neck-supple; soft C-collar in place  Respiratory-normal respirations; CTA with good air movement  Heart-RRR; normal S1 and S2; no murmurs, gallops; AICD/ pacemaker palpated left chest wall  Abdomen-soft, nontender, nondistended  Genitourinary-deferred  Musculoskeletal-no joint abnormalities, ; no edema  Skin-warm, dry; stage I breakdown over coccyx; unstageable pressure ulcer right heel  Neurologic-intubated    Scheduled Meds:   albuterol-ipratropium  3 mL Nebulization Q6H    aspirin  81 mg Oral Daily    atorvastatin  40 mg Oral Daily    bisacodyL  5 mg Oral Daily    ceFEPime IV (PEDS and ADULTS)  1 g Intravenous Q8H    clopidogreL  75 mg Oral Daily    DULoxetine  60 mg Oral Daily    furosemide (LASIX) injection  40 mg Intravenous BID    insulin glargine U-100  15 Units Subcutaneous Daily    levoFLOXacin  750 mg Intravenous Q48H    methylPREDNISolone injection (PEDS and ADULTS)  60 mg Intravenous Q6H    metoprolol tartrate  25 mg Oral BID    montelukast  10 mg Oral Daily    pantoprazole  40 mg Oral Daily    potassium chloride  20 mEq Oral Daily    pregabalin  100 mg Oral TID    propofoL        QUEtiapine  25 mg Oral QHS    ranolazine  500 mg Oral BID    sodium chloride 0.9%  10 mL Intravenous Q8H     Continuous Infusions:    "propofoL  0-50 mcg/kg/min (Dosing Weight) Intravenous Continuous           PRN Meds:.  Current Facility-Administered Medications:     0.9%  NaCl infusion (for blood administration), , Intravenous, Q24H PRN    acetaminophen, 650 mg, Oral, Q8H PRN    bisacodyL, 10 mg, Rectal, Daily PRN    dextrose 50%, 12.5 g, Intravenous, PRN    glucagon (human recombinant), 1 mg, Intramuscular, PRN    insulin aspart U-100, 0-10 Units, Subcutaneous, QID (AC + HS) PRN    iopamidoL, 100 mL, Intravenous, ONCE PRN    naloxone, 0.4 mg, Intravenous, PRN    ondansetron, 4 mg, Intravenous, Q8H PRN    oxyCODONE, 10 mg, Oral, Q4H PRN    propofoL, , ,     Significant Labs: All pertinent labs within the past 24 hours have been reviewed.  ABGs:   Recent Labs   Lab 03/18/25  1325 03/18/25  1546 03/19/25  0453   PH 7.259* 7.363 7.500*   PCO2 68.0* 47.1* 33.0*   HCO3 30.5* 26.8 25.7   POCSATURATED 89 100 98   BE 3* 1 3*   PO2 67* 225* 96       CBC:   Recent Labs   Lab 03/18/25  0457 03/18/25  1510 03/19/25  0339   WBC 14.12* 34.15* 12.89*   HGB 8.8* 11.5* 8.9*   HCT 28.4* 38.8* 27.5*    280 151     CMP:   Recent Labs   Lab 03/18/25  0457 03/18/25  1510 03/19/25  0339    142 143   K 3.7 4.5 4.1    105 105   CO2 30 23 26   BUN 30.0* 34.0* 56.0*   CREATININE 1.34* 1.75* 2.88*   CALCIUM 8.6* 8.6* 8.5*   ALBUMIN  --  2.6* 2.4*   BILITOT  --  0.8 1.0   ALKPHOS  --  155* 182*   AST  --  403* 2,958*   ALT  --  268* 1,623*     Cardiac Markers:   No results for input(s): "CKMB", "MYOGLOBIN", "BNP", "TROPISTAT" in the last 48 hours.    Lactic Acid:   No results for input(s): "LACTATE" in the last 48 hours.    Magnesium:   Recent Labs   Lab 03/18/25  0457 03/19/25  0339   MG 1.70 1.90     POCT Glucose:   Recent Labs   Lab 03/18/25  1239 03/18/25  1721 03/18/25  2129   POCTGLUCOSE 155* 192* 216*     Troponin:   Recent Labs   Lab 03/18/25  1958 03/19/25  0120 03/19/25  0811   TROPONINI 0.524* 3.426* 3.004*       Urine Studies:   Recent Labs "   Lab 03/18/25  1742   COLORU Dark Yellow   APPEARANCEUA Cloudy*   PHUR 5.5   PROTEINUA 3+*   GLUCUA Negative   BILIRUBINUA 1+*   OCCULTUA 3+*   NITRITE Negative   UROBILINOGEN 1.0   LEUKOCYTESUR Negative   RBCUA >100*   WBCUA 0-5   BACTERIA Rare         Significant Imaging:   CXR, 3/12  Impression:  1. Interim increasing patchy hazy opacities throughout the right lung and left lower lung since the prior exam suspicious for infiltrate and or atelectasis  2. Borderline cardiomegaly  3. Atherosclerosis    CXR, 3/13  Impression:  1. Persistent scattered patchy hazy opacities throughout the lungs bilaterally suspicious for infiltrate, atelectasis, and or scarring  2. Atherosclerosis  3. Cardiac device left chest    Echo, 3/12    Left Ventricle: The left ventricle is normal in size. There is severely reduced systolic function with a visually estimated ejection fraction of less than 30%.    Right Ventricle: The right ventricle is normal in size. Systolic function is normal.    Left Atrium: Mildly dilated    Aortic Valve: There is mild to moderate stenosis. Aortic valve peak velocity is 2.1 m/s.    Mitral Valve: There is mild regurgitation.    Assessment/Plan:   Acute respiratory failure with hypoxia and hypercapnia  Intubated on PRVC mode of ventilator  Consult pulmonology     Multilobar healthcare acquired pneumonia with sepsis  Continue empiric antibiotic therapy with cefepime/levofloxacin  Bronchodilator therapy  Short course of IV corticosteroid-completed  MRSA screen-negative  WBC has trended down, now normal  Check CT angio of chest--a bilateral moderate pulmonary edema      Acute kidney injury  Possible etiologies include volume depletion, sepsis, obstructive uropathy, sv CMO  Avoid nephrotoxic agents as possible    Severe ischemic cardiomyopathy/chronic HFrEF/AICD-ppm status  Echo 1/25-EF less than 20% with moderate to sv aortic stenosis  Repeat echo 3/12-less than 30% with moderate aortic stenosis  Consult  cardiology     Anemia  No active bleeding identified  Due to elevated troponin, history of CAD patient transfused, 3/12,  with 1 unit PRBCs  H&H stable    Transaminitis  He experienced bump in transaminases on 3/15, uncertain etiology  Transaminases have trended back down to normal     Elevated troponin  Troponin peaked at 0.22 to, trending down this morning  Type 2 MI related to respiratory failure     UTI  Managed with antibiotics as noted above     COPD  Respiratory measures as noted above     COVID positive  Undetermined significance  Pattern of pulmonary infiltrates inconsistent with COVID pneumonia     CAD/CABG  Currently in a-v paced rhythm  Continue ranolazine  Continue low-dose aspirin and clopidogrel     Diabetes mellitus  Blood sugars acceptable  Monitor blood sugars with Accu-Cheks and SSI  Adjust basal insulin as necessary     Hypertension  BP acceptable  Holding ARB due to JOHN  Attempt to up-titrate beta-blocker     PAD in bilateral carotid artery and bilateral lower extremity  DAPT as noted above     History of BPH with urinary retention  Urinary catheter removed this a.m., monitor PVRs     Recent multilevel C-spine surgery 1/25  Maintain soft C-collar    Severe debility  Continue PT     GI prophylaxis: Pantoprazole     Code status:  The patient is on DNR comfort measures only          Active Diagnoses:    Diagnosis Date Noted POA    PRINCIPAL PROBLEM:  Acute respiratory failure with hypoxia and hypercapnia [J96.01, J96.02] 03/12/2025 Yes      Problems Resolved During this Admission:     VTE Risk Mitigation (From admission, onward)           Ordered     IP VTE HIGH RISK PATIENT  Once         03/12/25 1237     Place sequential compression device  Until discontinued         03/12/25 1237                    Gustavo Hernández MD  Department of Hospital Medicine   [facility] - ICU

## 2025-03-19 NOTE — NURSING
SOB did not improved after administration of Lasix 40 mg ivp/Solumedrol 60 and remains febrile. Per MD sent to ICU for observation and possible Vapotherm or Bipap. Abg reveals CO2 @65 and PH @7.2. Moved to ICU 1415, prior to transfer to ICU bed, placed on 15 l per nonrebreather, and noted cyanotic with agonal breathing. Code Blue initiated. JR Tyler(son) notified of move and change in condition per MEG Vicente

## 2025-03-19 NOTE — PROGRESS NOTES
[facility] - ICU  Pulmonology  Consult Note    Patient Name: Tyler Mai  MRN: [id number]  Admission Date: 3/12/2025  Hospital Length of Stay: 7 days  Code Status: DNR  Attending Physician: Yomi Jeffery MD  Primary Care Provider: Paxton Connors MD   Principal Problem: Acute respiratory failure with hypoxia and hypercapnia    Consults  Subjective:     HPI:  69-year-old male patient with a long complicated history.  Patient had significant weakness in his arms and was minimally mobile.  After evaluation and several delays in surgery he underwent surgery on January 28, 2025 by Dr. Zavala at [facility].  He had a C2-6 PCDF and a C2-3, 4-5 decompression with screws.  The patient was sent to a rehab facility and subsequently downgraded to a skilled nursing floor where he was admitted to [facility] on 03/12/25 with hypoxic and hypercapnic respiratory failure, multilobar healthcare acquired pneumonia, acute kidney NV, UTI (Proteus) and mild elevation in troponin.    He has known COPD (not quantitated) on Trelegy inhaler, systolic heart failure (ejection fraction approximately 30% with mild-to-moderate aortic stenosis), pacemaker/defibrillator, CAD with previous CABG.    The patient started having worsening shortness of breath and was being transferred to ICU this afternoon when placing him in the ICU bed he became agonal and had cardiorespiratory arrest.  He received epinephrine x1, was intubated and the length of the code is recorded as 30 minutes.  EKG is pending.  Chest x-ray shows proper placement of the endotracheal tube with bilateral infiltrates throughout both lungs.  Stat labs were performed and are as follows H&H 11.5/38, WBC 34.1, electrolytes within normal limits, anion gap 14, BUN 34, creatinine 1.75.  ABGs done shortly after the code is as follows: PH 7.36/47/225 on tidal volume 500 peep +5 100% rate of 20  .  Interval 24 hours:  Patient seen  via telemedicine this a.m..  He has remained hemodynamically stable.  He is had worsening in his renal function and elevation of his transaminases as 1 would expect following the code.  Chest x-ray shows proper placement of the endotracheal tube this morning.  Labs were reviewed, as mentioned creatinine up to 2.88, AST and ALT 2958 and 1623 respectively.  Troponin did bump up to 3.0.  ABG this morning pH 7.50/33/96.  Venous NIVA has not been performed but it is to be performed this morning    Had a long conversation with his son Tyler and gave him an update of the present circumstances and plan of care.  And specific we discussed that if he does not wake up after 72 hours his prognosis is dinner.  Currently he is not responding although he does have a gag but he has no sedation on board either.  Past Medical History:   Diagnosis Date    Anxiety disorder, unspecified     Arthritis     CAD (coronary artery disease)     Cervical radiculopathy     CHF (congestive heart failure)     Chronic pain syndrome     COPD (chronic obstructive pulmonary disease)     Diabetes mellitus     Gastro-esophageal reflux disease without esophagitis     High cholesterol     Hypertension     Lumbar radiculopathy     Myelomalacia of cervical cord     Other specified diseases of spinal cord     Pacemaker     PVD (peripheral vascular disease)     S/P triple vessel bypass     Tobacco use        Past Surgical History:   Procedure Laterality Date    ANTERIOR CERVICAL DISCECTOMY W/ FUSION  1995    C5-6 and C6-7.  Dr. Wall    ANTERIOR CERVICAL DISCECTOMY W/ FUSION  1996    Redo C5-6, C6-7.  Dr. Wall    CHOLECYSTECTOMY      CORONARY ARTERY BYPASS GRAFT      FUSION OF POSTERIOR COLUMN OF CERVICAL SPINE USING COMPUTER AIDED NAVIGATION N/A 1/24/2025    Procedure: FUSION, SPINE, POSTERIOR SPINAL COLUMN, CERVICAL, USING COMPUTER-ASSISTED NAVIGATION;  Surgeon: Darrell Watson MD;  Location: [facility];  Service: Neurosurgery;  Laterality: N/A;  C2-C6  PCF; C2/3-C4/5 decompression, possible additional levels  o-arm  NTI  TIVA setup  [employer] //  XX    INSERTION OF PACEMAKER      X2    TRIPPLE VESSEL BYPASS         Review of patient's allergies indicates:   Allergen Reactions    Hydrochlorothiazide      Other Reaction(s): Pancreatitis       Family History       Problem Relation (Age of Onset)    Cancer Mother, Father    Lumbar disc disease Brother          Tobacco Use    Smoking status: Every Day     Current packs/day: 1.00     Types: Cigarettes    Smokeless tobacco: Not on file   Substance and Sexual Activity    Alcohol use: Not on file    Drug use: Not on file    Sexual activity: Not on file        Review of Systems  Objective:     Vital Signs (Most Recent):  Temp: 98.1 °F (36.7 °C) (03/19/25 0715)  Pulse: 93 (03/19/25 0750)  Resp: 20 (03/19/25 0750)  BP: (!) 155/78 (03/19/25 0747)  SpO2: 100 % (03/19/25 0750) Vital Signs (24h Range):  Temp:  [98.1 °F (36.7 °C)-103.1 °F (39.5 °C)] 98.1 °F (36.7 °C)  Pulse:  [] 93  Resp:  [0-30] 20  SpO2:  [90 %-100 %] 100 %  BP: (108-155)/() 155/78     Body mass index is 22.68 kg/m².      Intake/Output Summary (Last 24 hours) at 3/19/2025 0859  Last data filed at 3/19/2025 0525  Gross per 24 hour   Intake 0 ml   Output 1200 ml   Net -1200 ml       Physical Exam    Vents:  Vent Mode: A/C (03/19/25 0700)  Ventilator Initiated: Yes (03/18/25 1615)  Set Rate: 20 BPM (03/19/25 0700)  Vt Set: 500 mL (03/19/25 0700)  PEEP/CPAP: 5 cmH20 (03/19/25 0700)  Oxygen Concentration (%): 40 (03/19/25 0750)  Peak Airway Pressure: 17 cmH20 (03/19/25 0700)  Plateau Pressure: 0 cmH20 (03/19/25 0700)  Total Ve: 11.2 L/m (03/19/25 0700)  Negative Inspiratory Force (cm H2O): 0 (03/19/25 0700)  F/VT Ratio<105 (RSBI): (!) 35.46 (03/19/25 0700)    Lines/Drains/Airways       Drain  Duration                  Urethral Catheter 03/17/25 1545 Straight-tip 16 Fr. 1 day              Airway  Duration                  Airway - Non-Surgical 03/18/25 1500  Endotracheal Tube <1 day              Peripheral Intravenous Line  Duration                  Peripheral IV - Single Lumen 03/12/25 0913 22 G Right;Distal Forearm 6 days         Peripheral IV - Single Lumen 03/12/25 0931 18 G Right;Anterior Forearm 6 days                    Significant Labs:    Lab Results   Component Value Date    WBC 12.89 (H) 03/19/2025    HGB 8.9 (L) 03/19/2025    HCT 27.5 (L) 03/19/2025    MCV 87.6 03/19/2025     03/19/2025         BMP  Lab Results   Component Value Date     03/19/2025    K 4.1 03/19/2025     03/19/2025    CO2 26 03/19/2025    BUN 56.0 (H) 03/19/2025    CREATININE 2.88 (H) 03/19/2025    CALCIUM 8.5 (L) 03/19/2025    ANIONGAP 12 01/07/2025    ESTGFRAFRICA 36 01/07/2025    EGFRNONAA >60 08/17/2020       ABG  Recent Labs   Lab 03/19/25  0453   PH 7.500*   PO2 96   PCO2 33.0*   HCO3 25.7   BE 3*         All pertinent labs within the past 24 hours have been reviewed.    Significant Imaging:   I have reviewed all pertinent imaging results/findings within the past 24 hours.  I have reviewed and interpreted all pertinent imaging results/findings within the past 24 hours.    Assessment/Plan:       Status post cardiorespiratory arrest with code time of 30 minutes.  Hypoxemic and hypercarbic respiratory failure  Healthcare acquired pneumonia with bilateral infiltrates  Mild renal insufficiency   Etiology of arrest unknown (need to rule out PE-patient on Lovenox)-creatinine 2.88  Transaminitis following the code.  Severe deconditioning pre/ post C2-6 PC df and C2-3, 4-5 decompression with screws on 01/28/2025  Possible anoxic encephalopathy  Comorbidities:  COPD not quantitated, systolic heart failure with ejection fraction 30%, mild-to-moderate aortic stenosis, status post AICD and pacemaker, diabetes mellitus, hypertension, hyperlipidemia   Plan:  Venous NIVA of lower extremities to rule out DVT (patient too unstable for V/Q scan or CT scan PE protocol at this time  (begin full-dose Lovenox if venous NIVA positive for DVT)  Blood cultures x2, urine culture, sputum culture now  Trend troponins and get EKG  Continue broad-spectrum antibiotics  Use lowest FiO2 to keep sat greater than equal to 92%  Recheck labs and chest x-ray in a.m.       David Adkins MD  Pulmonology  [facility] - ICU

## 2025-03-19 NOTE — NURSING
BRO with [employer] called and he passed on that  a [employer]  Rep would be coming today to interrogate the pt's PM, BRO was informed of episodes of asystole on the monitor with paced beats noted, with no loss of a pulse.

## 2025-03-19 NOTE — PT/OT/SLP PROGRESS
Physical Therapy      Patient Name:  Tyler Mai   MRN:  [id number]    Physical Therapy on hold due to medical decline. Please re-consult if status changes.

## 2025-03-19 NOTE — PLAN OF CARE
Problem: Adult Inpatient Plan of Care  Goal: Plan of Care Review  Outcome: Progressing  Goal: Patient-Specific Goal (Individualized)  Outcome: Progressing  Goal: Absence of Hospital-Acquired Illness or Injury  Outcome: Progressing  Goal: Optimal Comfort and Wellbeing  Outcome: Progressing  Goal: Readiness for Transition of Care  Outcome: Progressing     Problem: Skin Injury Risk Increased  Goal: Skin Health and Integrity  Outcome: Progressing     Problem: Diabetes Comorbidity  Goal: Blood Glucose Level Within Targeted Range  Outcome: Progressing     Problem: Wound  Goal: Optimal Coping  Outcome: Progressing  Goal: Optimal Functional Ability  Outcome: Progressing  Goal: Absence of Infection Signs and Symptoms  Outcome: Progressing  Goal: Improved Oral Intake  Outcome: Progressing  Goal: Optimal Pain Control and Function  Outcome: Progressing  Goal: Skin Health and Integrity  Outcome: Progressing  Goal: Optimal Wound Healing  Outcome: Progressing     Problem: Fall Injury Risk  Goal: Absence of Fall and Fall-Related Injury  Outcome: Progressing     Problem: Gas Exchange Impaired  Goal: Optimal Gas Exchange  Outcome: Progressing     Problem: Infection  Goal: Absence of Infection Signs and Symptoms  Outcome: Progressing     Problem: Comorbidity Management  Goal: Maintenance of COPD Symptom Control  Outcome: Progressing  Goal: Maintenance of Heart Failure Symptom Control  Outcome: Progressing  Goal: Blood Pressure in Desired Range  Outcome: Progressing  Goal: Maintenance of Osteoarthritis Symptom Control  Outcome: Progressing     Problem: Pain Acute  Goal: Optimal Pain Control and Function  Outcome: Progressing     Problem: Mechanical Ventilation Invasive  Goal: Effective Communication  Outcome: Progressing  Goal: Optimal Device Function  Outcome: Progressing  Goal: Mechanical Ventilation Liberation  Outcome: Progressing  Goal: Optimal Nutrition Delivery  Outcome: Progressing  Goal: Absence of Device-Related Skin and  Tissue Injury  Outcome: Progressing  Goal: Absence of Ventilator-Induced Lung Injury  Outcome: Progressing

## 2025-03-20 PROBLEM — E44.0 MODERATE MALNUTRITION: Status: ACTIVE | Noted: 2025-03-20

## 2025-03-20 NOTE — NURSING
[employer] rep. Mr. Scott rounded this AM, and interrogated pt's PM/defib, and his report has been emailed to BRO with [employer], and a copy has been placed on pt's chart.

## 2025-03-20 NOTE — CONSULTS
Inpatient Nutrition Assessment    Admit Date: 3/12/2025   Total duration of encounter: 8 days   Patient Age: 69 y.o.    Nutrition Recommendation/Prescription     Continue enteral tube feedings with Diabetisource AC and increase rate to 25 mL/hr x 8 hrs if pt remains hemodynamically stable increase rate by 10 mL every 8 hours to goal rate of 75 mL/hr to provide 1800 kcals; 90 g protein; 1224 mL water.   Consider free water flush of 30 mL every hour to provide an additional 600 mL of free water.   Monitor renal function. Pt currently has good urine output per nursing.   Monitor lytes and replete as needed.   Daily weight and labs.   Monitor, tolerance, rate, weight, and labs.     RD following and available as needed. Thank you.     Communication of Recommendations: reviewed with nurse and EMR.    Nutrition Assessment     Malnutrition Assessment/Nutrition-Focused Physical Exam           Malnutrition Level: moderate (03/20/25 1437)  Energy Intake (Malnutrition): other (see comments) (Does not meet criteria) (03/20/25 1437)     Subcutaneous Fat (Malnutrition): moderate depletion (03/20/25 1437)  Orbital Region (Subcutaneous Fat Loss): moderate depletion        Muscle Mass (Malnutrition): moderate depletion (03/20/25 1437)  [place] Region (Muscle Loss): moderate depletion  Clavicle Bone Region (Muscle Loss): moderate depletion  Clavicle and Acromion Bone Region (Muscle Loss): moderate depletion                          A minimum of two characteristics is recommended for diagnosis of either severe or non-severe malnutrition.    Chart Review    Reason Seen: continuous nutrition monitoring, physician consult for Tube Feeding Management, and follow-up    Malnutrition Screening Tool Results   Have you recently lost weight without trying?: No  Have you been eating poorly because of a decreased appetite?: No   MST Score: 0   Diagnosis:  Acute Respiratory Failure with Hypoxia and Hypercapnia.     Relevant Medical History:    Anxiety disorder, unspecified      Arthritis      CAD (coronary artery disease)      Cervical radiculopathy      CHF (congestive heart failure)      Chronic pain syndrome      COPD (chronic obstructive pulmonary disease)      Diabetes mellitus      Gastro-esophageal reflux disease without esophagitis      High cholesterol      Hypertension      Lumbar radiculopathy      Myelomalacia of cervical cord      Other specified diseases of spinal cord      Pacemaker      PVD (peripheral vascular disease)      S/P triple vessel bypass      Tobacco use          Scheduled Medications:  albuterol-ipratropium, 3 mL, Q6H  aspirin, 81 mg, Daily  atorvastatin, 40 mg, Daily  bisacodyL, 5 mg, Daily  ceFEPime IV (PEDS and ADULTS), 1 g, Q8H  clopidogreL, 75 mg, Daily  DULoxetine, 60 mg, Daily  insulin glargine U-100, 15 Units, Daily  levoFLOXacin, 750 mg, Q48H  methylPREDNISolone injection (PEDS and ADULTS), 60 mg, Q6H  montelukast, 10 mg, Daily  pantoprazole, 40 mg, Daily  potassium bicarbonate, 20 mEq, Once  pregabalin, 100 mg, TID  QUEtiapine, 25 mg, QHS  ranolazine, 500 mg, BID  sodium chloride 0.9%, 10 mL, Q8H    Continuous Infusions:  0.9% NaCl, Last Rate: 100 mL/hr at 03/20/25 1333  propofoL      PRN Medications:  0.9%  NaCl infusion (for blood administration), , Q24H PRN  acetaminophen, 1,000 mg, Q6H PRN  bisacodyL, 10 mg, Daily PRN  dextrose 50%, 12.5 g, PRN  glucagon (human recombinant), 1 mg, PRN  insulin aspart U-100, 0-10 Units, QID (AC + HS) PRN  iopamidoL, 100 mL, ONCE PRN  naloxone, 0.4 mg, PRN  ondansetron, 4 mg, Q8H PRN  oxyCODONE, 10 mg, Q4H PRN  peg 400-hypromellose-glycerin, 1 drop, PRN    Calorie Containing IV Medications: no significant kcals from medications at this time    Recent Labs   Lab 03/14/25  0410 03/15/25  0431 03/16/25  0442 03/17/25  0453 03/18/25  0457 03/18/25  1510 03/19/25  0339 03/20/25  0411    137 139 139 140 142 143 141   K 3.9 4.1 3.8 3.4* 3.7 4.5 4.1 4.4   CALCIUM 9.1 9.2 8.7* 8.4*  8.6* 8.6* 8.5* 8.0*   PHOS 3.7  --   --   --   --   --  2.3  --    MG 2.10 2.10 2.60 1.70 1.70  --  1.90 2.10    102 104 103 103 105 105 104   CO2 26 27 28 29 30 23 26 23   BUN 52.0* 56.0* 52.0* 39.0* 30.0* 34.0* 56.0* 99.0*   CREATININE 1.59* 1.61* 1.58* 1.48* 1.34* 1.75* 2.88* 4.43*   EGFRNORACEVR 47 47 48 52 58 42 23 14   GLUCOSE 201* 169* 64* 115 124* 134* 210* 191*   BILITOT 0.3 0.3 0.3 0.4  --  0.8 1.0  --    ALKPHOS 121 130 110 100  --  155* 182*  --    ALT 42 90* 76* 49  --  268* 1,623*  --    AST 42* 91* 48* 24  --  403* 2,958*  --    ALBUMIN 2.9* 2.9* 2.7* 2.5*  --  2.6* 2.4*  --    WBC 13.08* 12.91* 8.64 9.87 14.12* 34.15* 12.89* 12.76*   HGB 9.1* 8.9* 8.5* 8.9* 8.8* 11.5* 8.9* 8.6*   HCT 28.9* 28.4* 26.8* 28.4* 28.4* 38.8* 27.5* 26.4*     Nutrition Orders:  No diet orders on file  Tube Feedings/Formulas 10; 300; Diabetisource AC; OG    Appetite/Oral Intake: NPO/NPO  Factors Affecting Nutritional Intake: NPO, on mechanical ventilation, respiratory status, and shortness of breath  Social Needs Impacting Access to Food: none identified  Food/Rastafarian/Cultural Preferences:  Sprite Zero with all meals.   Food Allergies: none reported  Last Bowel Movement: 03/15/25  Wound(s):     Wound 03/12/25 1140 Fissure Right Buttocks-Tissue loss description: Partial thickness Noted.     Comments      3/20: Consult received for Tube Feeding Management. Pt currently intubated on vent. Trickle feedings with Diabetisource AC were started last night @ 10 mL/hr and currently infusing @ 10 mL/hr with 10 mL of free water infusing every hour. 0.9%NaCl infusing @100 mL/hr. Renal indices concerning; however, nursing reports pt has good urine output. Current MAP 77. Needs reassessed and recommendations provided. Will continue to monitor during stay.     3/17: Diet was advanced to GI Soft. Pt with good appetite, tolerating meals. Consumed 25% of breakfast and 50% of lunch so far today. Still requiring supplemental oxygen. Will  "add Boost Glucose Control to assist with need and continue to monitor during stay.     3/13: Pt currently on CL diet, requiring oxymask. Discussed with nursing who reports pt is tolerating CL. No recent weight loss noted/reported. Labs and meds reviewed. Last A1C was 5.6 on 1/23/2025. Creatinine improving since admit. . Noted pt with good appetite prior to admit and pt prefers chocolate flavor ONS. Will add Boost Glucose Control Chocolate when able to advance diet beyond CL and continue to monitor during stay.         Anthropometrics    Height: 5' 10" (177.8 cm), Height Method: Stated  Last Weight: 71.7 kg (158 lb 1.1 oz) (03/19/25 0525), Weight Method: Bed Scale  BMI (Calculated): 22.7  BMI Classification: overweight (BMI 25-29.9)        Ideal Body Weight (IBW), Male: 166 lb     % Ideal Body Weight, Male (lb): 104.92 %                          Usual Weight Provided By: EMR weight history    Wt Readings from Last 5 Encounters:   03/19/25 71.7 kg (158 lb 1.1 oz)   02/20/25 72.6 kg (160 lb)   02/19/25 73.3 kg (161 lb 9.6 oz)   01/18/25 78 kg (172 lb)   12/03/24 78.5 kg (173 lb)     Weight Change(s) Since Admission:   Wt Readings from Last 1 Encounters:   03/19/25 0525 71.7 kg (158 lb 1.1 oz)   03/18/25 0928 74.7 kg (164 lb 10.9 oz)   03/15/25 0602 75.5 kg (166 lb 7.2 oz)   03/14/25 0600 72 kg (158 lb 11.7 oz)   03/12/25 2130 79 kg (174 lb 2.6 oz)   03/12/25 1341 79 kg (174 lb 2.6 oz)   03/12/25 0854 77.1 kg (170 lb)   Admit Weight: 77.1 kg (170 lb) (03/12/25 0854), Weight Method: Bed Scale    Estimated Needs    Weight Used For Calorie Calculations: 72 kg (158 lb 11.7 oz)  Energy Calorie Requirements (kcal): 1800 kcals/day (SF 1.2)  [facility]: [facility]  Weight Used For Protein Calculations: 75 kg (165 lb 5.5 oz) (IBW used to estimate protein needs.)  Protein Requirements: 90 gm/day (1.2 g/kg/IBW).  Fluid Requirements (mL): 1800 mL/day (1mL/kcal) or per MD Guidance.  CHO Requirement: 203 g/day " (45% of Kcals).     Enteral Nutrition     Formula: Diabetisource AC  Rate/Volume: 10  Water Flushes: 10 mL every hour.  Additives/Modulars: none at this time  Route: orogastric tube  Method: continuous  Total Nutrition Provided by Tube Feeding, Additives, and Flushes:  Calories Provided  240 kcal/d, 13% needs   Protein Provided  12 g/d, 13% needs   Fluid Provided  363 ml/d, 20% needs   Continuous feeding calculations based on estimated 20 hr/d run time unless otherwise stated.    Parenteral Nutrition     Patient not receiving parenteral nutrition support at this time.    Evaluation of Received Nutrient Intake    Calories: not meeting estimated needs   Protein: not meeting estimated needs      Patient Education     Not applicable.    Nutrition Diagnosis     PES: Inadequate oral intake related to acute illness as evidenced by Pt NPO, intubated on vent. (new)      PES:   Related to Inadequate energy intake. Pt intubated on vent.  As Evidenced by:  - muscle mass depletion: 1 area of mild muscle loss (Temporalis) - loss of subcutaneous fat: 1 area of mild fat loss (Buccal) active    Nutrition Interventions     Intervention(s): modified composition of enteral nutrition, modified rate of enteral nutrition, and collaboration with other providers  Intervention(s): Enteral nutrition (Initiate trickle feedings.)    Goal: Tolerate enteral feeding at goal rate by follow-up. (new)    Nutrition Goals & Monitoring     Dietitian will monitor: energy intake, enteral nutrition intake, weight, weight change, electrolyte/renal panel, glucose/endocrine profile, and gastrointestinal profile  Discharge planning: too early to determine; pending clinical course  Nutrition Risk/Follow-Up: high (follow-up in 1-4 days)   Please consult if re-assessment needed sooner.

## 2025-03-20 NOTE — CODE/ RAPID DOCUMENTATION
Intubated by Dr. Barnett with 7.5 tube 23 at lip  Defib at 200 for v tach  Defib at 200 for v tach

## 2025-03-20 NOTE — PROGRESS NOTES
[facility] - ICU  Wound Care    Patient Name: Tyler Mai  MRN: [id number]  Date: 3/20/2025  Diagnosis: Acute respiratory failure with hypoxia and hypercapnia      Subjective:           Patient ID: Tyler Mai is a 69 y.o. male.    Chief Complaint: Shortness of Breath (BIBA from NH  for SOB)      Shortness of Breath          Past Medical History:     1. Acute congestive heart failure, unspecified heart failure type    2. Screening for cardiovascular condition    3. Acute bronchitis, unspecified organism    4. COVID-19 virus infection    5. Urinary tract infection without hematuria, site unspecified    6. Respiratory failure    7. Cardiac arrest      Wound Assessment:           Wound 03/12/25 1140 Fissure Right Buttocks (Active)   03/12/25 1140 Buttocks   Present on Original Admission:    Primary Wound Type: Fissure   Side: Right   Orientation:    Wound Approximate Age at First Assessment (Weeks):    Wound Number:    Is this injury device related?:    Incision Type:    Closure Method:    Wound Description (Comments):    Type:    Additional Comments:    Ankle-Brachial Index:    Pulses:    Removal Indication and Assessment:    Wound Outcome:    Wound Image   03/20/25 0818   Dressing Appearance Moist drainage 03/20/25 0818   Drainage Amount Scant 03/20/25 0818   Drainage Characteristics/Odor Serosanguineous 03/20/25 0818   Appearance Red;Moist 03/20/25 0818   Tissue loss description Partial thickness 03/20/25 0818   Periwound Area Dry 03/20/25 0818   Wound Edges Open 03/13/25 0925   Wound Length (cm) 1.3 cm 03/20/25 0818   Wound Width (cm) 0.5 cm 03/20/25 0818   Wound Depth (cm) 0.2 cm 03/20/25 0818   Wound Volume (cm^3) 0.068 cm^3 03/20/25 0818   Wound Surface Area (cm^2) 0.51 cm^2 03/20/25 0818   Care Cleansed with:;Wound cleanser 03/19/25 2310   Dressing Removed;Other (comment) 03/19/25 2310            Wound 03/14/25 0615 Pressure Injury Right posterior Heel (Active)   03/14/25 0615 Heel   Present  on Original Admission: Y   Primary Wound Type: Pressure inj   Side: Right   Orientation: posterior   Wound Approximate Age at First Assessment (Weeks):    Wound Number:    Is this injury device related?:    Incision Type:    Closure Method:    Wound Description (Comments):    Type:    Additional Comments:    Ankle-Brachial Index:    Pulses:    Removal Indication and Assessment:    Wound Outcome:    Wound Image   03/20/25 0818   Pressure Injury Stage U 03/20/25 0818   Dressing Appearance Dry;Intact 03/20/25 0818   Drainage Amount None 03/20/25 0818   Drainage Characteristics/Odor Serosanguineous 03/15/25 0500   Appearance Black;Eschar;Dry 03/20/25 0818   Periwound Area Ecchymotic;Redness;Other (see comments) 03/20/25 0818   Wound Edges Defined 03/20/25 0818   Wound Length (cm) 2.8 cm 03/20/25 0818   Wound Width (cm) 4.2 cm 03/20/25 0818   Wound Depth (cm) 0.1 cm 03/20/25 0818   Wound Volume (cm^3) 0.616 cm^3 03/20/25 0818   Wound Surface Area (cm^2) 9.24 cm^2 03/20/25 0818   Care Cleansed with:;Wound cleanser;Povidone iodine 03/19/25 2310   Dressing Changed;Sodium chloride impregnated;Gauze;Rolled gauze 03/19/25 2310           Plan:     Daily dressing changes per nursing staff, re-evaluation per wound care in 5-7 days. Re-evaluation done today. Right heel is now a firm eschar cap with a soft ecchymotic shakila wound, will continue with same orders for this week. Buttocks is measuring larger but it appears that a small area opened up near original wound possibly due to friction         Recommendations:   Buttocks: Keep clean and dry, apply bordered foam, change daily and prn soilage, apply moisture barrier cream to scrotum daily and at each cleaning  Right heel: Cleanse with wound cleanser, paint with betadine and apply mesalt, cover with 4x4 gauze and wrap with kerlix, secure with tape, change daily.   Patient to wear heel protectors and float heels at all times       Time spent in room:     Hailey Gallardo RN

## 2025-03-20 NOTE — PLAN OF CARE
Problem: Adult Inpatient Plan of Care  Goal: Plan of Care Review  Outcome: Not Progressing  Goal: Patient-Specific Goal (Individualized)  Outcome: Not Progressing  Goal: Absence of Hospital-Acquired Illness or Injury  Outcome: Progressing  Goal: Optimal Comfort and Wellbeing  Outcome: Progressing  Goal: Readiness for Transition of Care  Outcome: Not Progressing     Problem: Skin Injury Risk Increased  Goal: Skin Health and Integrity  Outcome: Progressing     Problem: Diabetes Comorbidity  Goal: Blood Glucose Level Within Targeted Range  Outcome: Progressing     Problem: Wound  Goal: Optimal Coping  Outcome: Progressing  Goal: Optimal Functional Ability  Outcome: Not Progressing  Goal: Absence of Infection Signs and Symptoms  Outcome: Progressing  Goal: Improved Oral Intake  Outcome: Not Progressing  Goal: Optimal Pain Control and Function  Outcome: Not Progressing  Goal: Skin Health and Integrity  Outcome: Progressing  Goal: Optimal Wound Healing  Outcome: Progressing     Problem: Fall Injury Risk  Goal: Absence of Fall and Fall-Related Injury  Outcome: Progressing     Problem: Gas Exchange Impaired  Goal: Optimal Gas Exchange  Outcome: Progressing     Problem: Infection  Goal: Absence of Infection Signs and Symptoms  Outcome: Progressing     Problem: Comorbidity Management  Goal: Maintenance of COPD Symptom Control  Outcome: Progressing  Goal: Maintenance of Heart Failure Symptom Control  Outcome: Progressing  Goal: Blood Pressure in Desired Range  Outcome: Progressing  Goal: Maintenance of Osteoarthritis Symptom Control  Outcome: Progressing     Problem: Pain Acute  Goal: Optimal Pain Control and Function  Outcome: Progressing     Problem: Mechanical Ventilation Invasive  Goal: Effective Communication  Outcome: Progressing  Goal: Optimal Device Function  Outcome: Progressing  Goal: Mechanical Ventilation Liberation  Outcome: Progressing  Goal: Optimal Nutrition Delivery  Outcome: Progressing  Goal: Absence of  Device-Related Skin and Tissue Injury  Outcome: Progressing  Goal: Absence of Ventilator-Induced Lung Injury  Outcome: Progressing

## 2025-03-20 NOTE — PROGRESS NOTES
[facility] - ICU  Cardiology  Progress Note    Patient Name: Tyler Mai  MRN: [id number]  Admission Date: 3/12/2025  Hospital Length of Stay: 8 days  Code Status: DNR   Attending Provider: Yomi Jeffery MD   Consulting Provider: MARIVEL Zavala  Primary Care Physician: Paxton Connors MD  Principal Problem:Acute respiratory failure with hypoxia and hypercapnia    Patient information was obtained from primary team.       Subjective:     Chief Complaint:  SOB     HPI: Patient is a 68 yo male unknown to [facility]. He is followed by Cardiology in [place]. He was in SNF post c-spine surgery. He presented to ED from SNF due to increase SOB. He was admitted for respiratory failure due to pneumonia on 3/12/25.Yesterday his respiratory status worsened and was transferred to ICU. Once he arrived in ICU he had agonal respirations and a code was called. He was coded for approximately 30 minutes. He received 1 dose of epi and defibrillated once. He is currently intubated on the vent. He is not responsive and is on no sedation. He is hemodynamically stable.     3/20/25: Patient remains intubated. Hemodynamically stable. Patient remains non-responsive without sedation. ICD was interrogated and found to be functioning properly. There was no evidence of VT noted on interrogation.     Past Medical History:   Diagnosis Date    Anxiety disorder, unspecified     Arthritis     CAD (coronary artery disease)     Cervical radiculopathy     CHF (congestive heart failure)     Chronic pain syndrome     COPD (chronic obstructive pulmonary disease)     Diabetes mellitus     Gastro-esophageal reflux disease without esophagitis     High cholesterol     Hypertension     Lumbar radiculopathy     Myelomalacia of cervical cord     Other specified diseases of spinal cord     Pacemaker     PVD (peripheral vascular disease)     S/P triple vessel bypass     Tobacco use        Past Surgical History:   Procedure Laterality  Date    ANTERIOR CERVICAL DISCECTOMY W/ FUSION  1995    C5-6 and C6-7.  Dr. Wall    ANTERIOR CERVICAL DISCECTOMY W/ FUSION  1996    Redo C5-6, C6-7.  Dr. Wall    CHOLECYSTECTOMY      CORONARY ARTERY BYPASS GRAFT      FUSION OF POSTERIOR COLUMN OF CERVICAL SPINE USING COMPUTER AIDED NAVIGATION N/A 1/24/2025    Procedure: FUSION, SPINE, POSTERIOR SPINAL COLUMN, CERVICAL, USING COMPUTER-ASSISTED NAVIGATION;  Surgeon: Darrell Watson MD;  Location: [facility];  Service: Neurosurgery;  Laterality: N/A;  C2-C6 PCF; C2/3-C4/5 decompression, possible additional levels  o-arm  NTI  TIVA setup  [employer] //  XX    INSERTION OF PACEMAKER      X2    TRIPPLE VESSEL BYPASS         Review of patient's allergies indicates:   Allergen Reactions    Hydrochlorothiazide      Other Reaction(s): Pancreatitis       No current facility-administered medications on file prior to encounter.     Current Outpatient Medications on File Prior to Encounter   Medication Sig    albuterol (PROVENTIL) 2.5 mg /3 mL (0.083 %) nebulizer solution Take 2.5 mg by nebulization every 6 (six) hours.    cholecalciferol, vitamin D3, 1,250 mcg (50,000 unit) capsule Take 50,000 Units by mouth every 7 days.    DULoxetine (CYMBALTA) 60 MG capsule Take 60 mg by mouth once daily.    montelukast (SINGULAIR) 10 mg tablet Take 10 mg by mouth once daily.    oxyCODONE (ROXICODONE) 10 mg Tab immediate release tablet Take 10 mg by mouth every 4 (four) hours as needed for Pain.    acetaminophen (TYLENOL) 325 MG tablet Take 650 mg by mouth every 4 (four) hours as needed for Pain.    albuterol (PROVENTIL/VENTOLIN HFA) 90 mcg/actuation inhaler 1 puff every 4 (four) hours as needed.    amLODIPine (NORVASC) 10 MG tablet Take 1 tablet (10 mg total) by mouth once daily.    ascorbic acid, vitamin C, (VITAMIN C) 500 MG tablet Take 500 mg by mouth once daily.    aspirin 81 MG Chew Take 1 tablet (81 mg total) by mouth once daily.    bisacodyL (DULCOLAX) 5 mg EC tablet Take 1 tablet  (5 mg total) by mouth once daily.    carvediloL (COREG) 25 MG tablet Take 1 tablet (25 mg total) by mouth 2 (two) times daily before meals.    clopidogreL (PLAVIX) 75 mg tablet Take 1 tablet (75 mg total) by mouth once daily.    finasteride (PROSCAR) 5 mg tablet Take 1 tablet (5 mg total) by mouth once daily.    furosemide (LASIX) 20 MG tablet Take 1 tablet (20 mg total) by mouth once daily.    LINZESS 145 mcg Cap capsule Take 145 mcg by mouth every morning.    losartan (COZAAR) 50 MG tablet Take 1 tablet (50 mg total) by mouth before breakfast.    magnesium oxide (MAG-OX) 400 mg (241.3 mg magnesium) tablet Take 1 tablet (400 mg total) by mouth 3 (three) times daily.    multivitamin (ONE DAILY MULTIVITAMIN) per tablet Take 1 tablet by mouth once daily.    pantoprazole (PROTONIX) 40 MG tablet Take 1 tablet (40 mg total) by mouth once daily.    piperacillin sodium/tazobactam (PIPERACILLIN-TAZOBACTAM 3.375 G/100 ML D5W, READY TO MIX,) Inject 3.375 g into the vein every 6 (six) hours.    pregabalin (LYRICA) 100 MG capsule Take 1 capsule (100 mg total) by mouth 3 (three) times daily.    QUEtiapine (SEROQUEL) 25 MG Tab Take 1 tablet (25 mg total) by mouth every evening.    ranolazine (RANEXA) 500 MG Tb12 Take 500 mg by mouth 2 (two) times daily.    rosuvastatin (CRESTOR) 40 MG Tab Take 40 mg by mouth every evening.    zinc sulfate (ZINCATE) 50 mg zinc (220 mg) capsule Take 220 mg by mouth once daily.     Family History       Problem Relation (Age of Onset)    Cancer Mother, Father    Lumbar disc disease Brother          Tobacco Use    Smoking status: Every Day     Current packs/day: 1.00     Types: Cigarettes    Smokeless tobacco: Not on file   Substance and Sexual Activity    Alcohol use: Not on file    Drug use: Not on file    Sexual activity: Not on file     Review of Systems   Unable to perform ROS: Patient unresponsive     Objective:     Vital Signs (Most Recent):  Temp: 99.3 °F (37.4 °C) (03/20/25 1115)  Pulse: 95  (03/20/25 1244)  Resp: 20 (03/20/25 1244)  BP: (!) 101/53 (03/20/25 1232)  SpO2: 95 % (03/20/25 1244) Vital Signs (24h Range):  Temp:  [97.9 °F (36.6 °C)-100.2 °F (37.9 °C)] 99.3 °F (37.4 °C)  Pulse:  [] 95  Resp:  [12-25] 20  SpO2:  [94 %-100 %] 95 %  BP: ()/(48-78) 101/53     Weight: 71.7 kg (158 lb 1.1 oz)  Body mass index is 22.68 kg/m².    SpO2: 95 %         Intake/Output Summary (Last 24 hours) at 3/20/2025 1315  Last data filed at 3/20/2025 0443  Gross per 24 hour   Intake 329.49 ml   Output 300 ml   Net 29.49 ml       Lines/Drains/Airways       Drain  Duration                  Urethral Catheter 03/17/25 1545 Straight-tip 16 Fr. 2 days         NG/OG Tube 03/19/25 0830 [place] sump 18 Fr. Right mouth 1 day              Airway  Duration                  Airway - Non-Surgical 03/18/25 1500 Endotracheal Tube 1 day              Peripheral Intravenous Line  Duration                  Peripheral IV - Single Lumen 03/12/25 0913 22 G Right;Distal Forearm 8 days         Peripheral IV - Single Lumen 03/12/25 0931 18 G Right;Anterior Forearm 8 days                    Physical Exam  Constitutional:       Comments: Unresponsive   Cardiovascular:      Rate and Rhythm: Normal rate and regular rhythm.      Heart sounds: No murmur heard.  Pulmonary:      Breath sounds: Normal breath sounds.      Comments: Intubated  Musculoskeletal:         General: No swelling.   Skin:     General: Skin is warm and dry.   Neurological:      Comments: unresponsive         Significant Labs: CMP   Recent Labs   Lab 03/18/25  1510 03/19/25  0339 03/20/25  0411    143 141   K 4.5 4.1 4.4    105 104   CO2 23 26 23   BUN 34.0* 56.0* 99.0*   CREATININE 1.75* 2.88* 4.43*   CALCIUM 8.6* 8.5* 8.0*   ALBUMIN 2.6* 2.4*  --    BILITOT 0.8 1.0  --    ALKPHOS 155* 182*  --    * 2,958*  --    * 1,623*  --    , CBC   Recent Labs   Lab 03/18/25  1510 03/19/25  0339 03/20/25  0411   WBC 34.15* 12.89* 12.76*   HGB 11.5* 8.9*  "8.6*   HCT 38.8* 27.5* 26.4*    151 160   , and Troponin No results for input(s): "TROPONINIHS" in the last 48 hours.    Significant Imaging:   Echocardiogram 3/12/25:     Left Ventricle: The left ventricle is normal in size. There is severely reduced systolic function with a visually estimated ejection fraction of less than 30%.    Right Ventricle: The right ventricle is normal in size. Systolic function is normal.    Left Atrium: Mildly dilated    Aortic Valve: There is mild to moderate stenosis. Aortic valve peak velocity is 2.1 m/s.    Mitral Valve: There is mild regurgitation.  Assessment and Plan:     Cardiopulmonary arrest  Required 1 amp of epi and shock x 1  Patient currently hemodynamically stable  No evidence of VT noted on ICD interrogation.  Respiratory failure s/t pneumonia  Currently intubated  Abx per primary  Elevated trop  Max is 3.4 and trended down  Continue aspirin, plavix and BB  Possible anoxic encephalopathy  unresponsive without sedation  JOHN  Worsened post code  Transaminates post code  Ischemic cardiomyopathy  Continue BB  Currently holding any ACEi/ARB/ARNI due to renal function   ICD in place  CAD/CABG  DM    Patient is currently a DNR    Thank you for your consult.  Please call with any further questions.    Isaac Stevenson, RONEYP  Cardiology   [facility] - ICU  "

## 2025-03-20 NOTE — PROGRESS NOTES
[facility] - ICU  Pulmonology  Consult Note    Patient Name: Tyler Mai  MRN: [id number]  Admission Date: 3/12/2025  Hospital Length of Stay: 8 days  Code Status: DNR  Attending Physician: Yomi Jeffery MD  Primary Care Provider: Paxton Connors MD   Principal Problem: Acute respiratory failure with hypoxia and hypercapnia    Consults  Subjective:     HPI:  69-year-old male patient with a long complicated history.  Patient had significant weakness in his arms and was minimally mobile.  After evaluation and several delays in surgery he underwent surgery on January 28, 2025 by Dr. Zavala at [facility].  He had a C2-6 PCDF and a C2-3, 4-5 decompression with screws.  The patient was sent to a rehab facility and subsequently downgraded to a skilled nursing floor where he was admitted to [facility] on 03/12/25 with hypoxic and hypercapnic respiratory failure, multilobar healthcare acquired pneumonia, acute kidney NV, UTI (Proteus) and mild elevation in troponin.    He has known COPD (not quantitated) on Trelegy inhaler, systolic heart failure (ejection fraction approximately 30% with mild-to-moderate aortic stenosis), pacemaker/defibrillator, CAD with previous CABG.    The patient started having worsening shortness of breath and was being transferred to ICU this afternoon when placing him in the ICU bed he became agonal and had cardiorespiratory arrest.  He received epinephrine x1, was intubated and the length of the code is recorded as 30 minutes.  EKG is pending.  Chest x-ray shows proper placement of the endotracheal tube with bilateral infiltrates throughout both lungs.  Stat labs were performed and are as follows H&H 11.5/38, WBC 34.1, electrolytes within normal limits, anion gap 14, BUN 34, creatinine 1.75.  ABGs done shortly after the code is as follows: PH 7.36/47/225 on tidal volume 500 peep +5 100% rate of 20  .  Interval 24 hours:  Patient seen  via telemedicine this a.m..  The patient has remained afebrile with stable vital signs.  Currently saturation at 96% on 40% FiO2.  As expected renal function has worsened with BUN 99 creatinine 4.43.  Electrolytes are within normal limits.  Urine output has also decreased he is now on a saline drip.  ABGs from this morning are as follows: PH 7.47/33/89.  Venous NIVA of lower extremities shows no evidence of DVT.  Blood cultures are negative after 24 hours, sputum cultures showing yeast.  Troponins are trending down last level 0.5.  Unfortunately his neurological exam has not changed for the better.  He does have pupil reaction and gag and does react or withdrawal from painful stimuli to his lower extremities.  He is still has no purposeful movement and is not following any commands.    I did call and speak with his son Tyler and gave him an update of the present circumstances and the ongoing plan of care.  Past Medical History:   Diagnosis Date    Anxiety disorder, unspecified     Arthritis     CAD (coronary artery disease)     Cervical radiculopathy     CHF (congestive heart failure)     Chronic pain syndrome     COPD (chronic obstructive pulmonary disease)     Diabetes mellitus     Gastro-esophageal reflux disease without esophagitis     High cholesterol     Hypertension     Lumbar radiculopathy     Myelomalacia of cervical cord     Other specified diseases of spinal cord     Pacemaker     PVD (peripheral vascular disease)     S/P triple vessel bypass     Tobacco use        Past Surgical History:   Procedure Laterality Date    ANTERIOR CERVICAL DISCECTOMY W/ FUSION  1995    C5-6 and C6-7.  Dr. Wall    ANTERIOR CERVICAL DISCECTOMY W/ FUSION  1996    Redo C5-6, C6-7.  Dr. Wall    CHOLECYSTECTOMY      CORONARY ARTERY BYPASS GRAFT      FUSION OF POSTERIOR COLUMN OF CERVICAL SPINE USING COMPUTER AIDED NAVIGATION N/A 1/24/2025    Procedure: FUSION, SPINE, POSTERIOR SPINAL COLUMN, CERVICAL, USING COMPUTER-ASSISTED  NAVIGATION;  Surgeon: Darrell Watson MD;  Location: [facility];  Service: Neurosurgery;  Laterality: N/A;  C2-C6 PCF; C2/3-C4/5 decompression, possible additional levels  o-arm  NTI  TIVA setup  [employer] //  XX    INSERTION OF PACEMAKER      X2    TRIPPLE VESSEL BYPASS         Review of patient's allergies indicates:   Allergen Reactions    Hydrochlorothiazide      Other Reaction(s): Pancreatitis       Family History       Problem Relation (Age of Onset)    Cancer Mother, Father    Lumbar disc disease Brother          Tobacco Use    Smoking status: Every Day     Current packs/day: 1.00     Types: Cigarettes    Smokeless tobacco: Not on file   Substance and Sexual Activity    Alcohol use: Not on file    Drug use: Not on file    Sexual activity: Not on file        Review of Systems  Objective:     Vital Signs (Most Recent):  Temp: 100.2 °F (37.9 °C) (03/20/25 0715)  Pulse: 75 (03/20/25 0807)  Resp: 20 (03/20/25 0807)  BP: 102/63 (03/20/25 0807)  SpO2: 96 % (03/20/25 0807) Vital Signs (24h Range):  Temp:  [97.9 °F (36.6 °C)-100.2 °F (37.9 °C)] 100.2 °F (37.9 °C)  Pulse:  [] 75  Resp:  [12-25] 20  SpO2:  [94 %-100 %] 96 %  BP: ()/(48-78) 102/63     Body mass index is 22.68 kg/m².      Intake/Output Summary (Last 24 hours) at 3/20/2025 1001  Last data filed at 3/20/2025 0443  Gross per 24 hour   Intake 329.49 ml   Output 300 ml   Net 29.49 ml       Physical Exam    Vents:  Vent Mode: A/C (03/20/25 0807)  Ventilator Initiated: Yes (03/18/25 1615)  Set Rate: 20 BPM (03/20/25 0807)  Vt Set: 500 mL (03/20/25 0807)  PEEP/CPAP: 5 cmH20 (03/20/25 0807)  Oxygen Concentration (%): 40 (03/20/25 0807)  Peak Airway Pressure: 23 cmH20 (03/20/25 0807)  Plateau Pressure: 18 cmH20 (03/20/25 0807)  Total Ve: 12.1 L/m (03/20/25 0807)  Negative Inspiratory Force (cm H2O): 0 (03/20/25 0807)  F/VT Ratio<105 (RSBI): (!) 33.67 (03/20/25 0807)    Lines/Drains/Airways       Drain  Duration                  Urethral Catheter  03/17/25 1545 Straight-tip 16 Fr. 2 days         NG/OG Tube 03/19/25 0830 [place] sump 18 Fr. Right mouth 1 day              Airway  Duration                  Airway - Non-Surgical 03/18/25 1500 Endotracheal Tube 1 day              Peripheral Intravenous Line  Duration                  Peripheral IV - Single Lumen 03/12/25 0913 22 G Right;Distal Forearm 8 days         Peripheral IV - Single Lumen 03/12/25 0931 18 G Right;Anterior Forearm 8 days                    Significant Labs:    Lab Results   Component Value Date    WBC 12.76 (H) 03/20/2025    HGB 8.6 (L) 03/20/2025    HCT 26.4 (L) 03/20/2025    MCV 86.6 03/20/2025     03/20/2025         BMP  Lab Results   Component Value Date     03/20/2025    K 4.4 03/20/2025     03/20/2025    CO2 23 03/20/2025    BUN 99.0 (H) 03/20/2025    CREATININE 4.43 (H) 03/20/2025    CALCIUM 8.0 (L) 03/20/2025    ANIONGAP 12 01/07/2025    ESTGFRAFRICA 36 01/07/2025    EGFRNONAA >60 08/17/2020       ABG  Recent Labs   Lab 03/20/25  0422   PH 7.472*   PO2 89   PCO2 33.1*   HCO3 24.2   BE 1         All pertinent labs within the past 24 hours have been reviewed.    Significant Imaging:   I have reviewed all pertinent imaging results/findings within the past 24 hours.  I have reviewed and interpreted all pertinent imaging results/findings within the past 24 hours.    Assessment/Plan:       Status post cardiorespiratory arrest with Rosc after 1 round of epi  Hypoxemic and hypercarbic respiratory failure  Healthcare acquired pneumonia with bilateral infiltrates  Worsening renal failure  Transaminitis following the code.  Severe deconditioning pre/ post C2-6 PC df and C2-3, 4-5 decompression with screws on 01/28/2025  Probable anoxic encephalopathy  Positive COVID serology PCR of unknown clinical significance  Comorbidities:  COPD not quantitated, systolic heart failure with ejection fraction 30%, mild-to-moderate aortic stenosis, status post AICD and pacemaker, diabetes  mellitus, hypertension, hyperlipidemia   Plan:  Continue supportive care to see if he will regain significant neurological function  Continue broad-spectrum antibiotics  Use lowest FiO2 to keep sat greater than equal to 92%       David Adkins MD  Pulmonology  [facility] - ICU

## 2025-03-20 NOTE — PLAN OF CARE
3/20/25 Fly leaning toward removal of life support, should have answer in 1-2 days, per Dr. Hernández.

## 2025-03-20 NOTE — PROGRESS NOTES
[facility] - ICU Hospital Medicine  Progress Note    Patient Name: Tyler Mai  MRN: [id number]  Patient Class: IP- Inpatient   Admission Date: 3/12/2025  Length of Stay: 8 days  Attending Physician: Yomi Jeffery MD  Primary Care Provider: Paxton Connors MD        Subjective:     Principal Problem:Acute respiratory failure with hypoxia and hypercapnia    Interval History:   HPI: Mr. Mai is a 69-year-old male who was transferred to the ED earlier today from a local SNF due to persistent shortness of breath.  His symptoms apparently began yesterday at which time he was diagnosed with pneumonia.  Symptoms worsened today, O2 saturations noted to be in the 80s on RA prompting his transfer to this facility.  With supplemental O2 his O2 saturation improved into the low 90s.  He was subsequently converted to BiPAP in the ED.  His current issues began in mid January at which time he underwent a rather extensive C-spine surgery involving multiple levels following which he was transferred to an inpatient rehab facility.  During that time he also developed some urinary retention and had an indwelling catheter for an extended period.  Catheter was apparently removed in rehab.  He was subsequently transferred to a SNF for continued physical therapy.  His evaluation in the ED was significant for anemia, leukocytosis, acute kidney injury, acute respiratory failure with hypercapnia and hypoxia, urinalysis consistent with UTI and CXR consistent with multilobar pneumonia.  Other findings included a significantly elevated BNP and an elevated troponin.  He was managed with a single dose of IV antibiotics and a nebulizer treatment.  On seeing him following his arrival to the ICU he was quite lethargic with a respiratory rate of 10 to 12 on BiPAP.  Due to his history of opioid use for chronic pain and recent C-spine surgery he was given a dose of Narcan after which he did awakened and become more  responsive, Respiratory rate improved to the 20s.  A subsequent ABG revealed a normal pH with moderate hypoxemia.    3/13-overnight became more agitated, refused to wear BiPAP; fortunately has been able to maintain adequate O2 saturations on nasal cannula O2; when seen on rounds this morning was very lethargic, poorly responsive; he opened his eyes to voice and tactile stimuli but otherwise unresponsive.  Did spike a temp last night to 101.3° F; he was given Tylenol and has not had another spike since that time    3/14-no events overnight; he has been afebrile over the past 24 hours; when seen on rounds this morning was sleepy but aroused easily to voice    3/15-no events overnight; when seen on rounds this morning was seated on the side of the bed working with PT; he was alert, normally interactive seemed to be in good spirits  Downgrade to M/S status    3/16-transferred to M/S unit yesterday evening; no events overnight; Respiratory status stable; remains on 3 L NC with O2 saturations of 94%    3/17-condition stable; when seen on rounds this morning was somewhat lethargic but this appears to be his baseline in the mornings; he becomes more interactive/attentive later in the day; he had no complaints; he remains generally very weak    March 18, 2025-the patient had low-grade fever 100.4, can maintain 92% O2 sat with 3 L OxyMask, complain of generalized weakness    March 19, 2025-the patient was coded around 3:00 p.m. yesterday secondary to cardiopulmonary arrest, chest compression was performed the, the patient was intubated, after 1 ampule of epinephrine, pulse came back, the patient V-tach and shocked with a 200 joule, converted to normal sinus rhythm, the patient is intubated on PRVC mode of ventilator, without sedation, patient is unresponsive, patient has gag reflex but no corneal reflex, pupil is dilated not responding to light, family signed DNR    March 20, 2025-patient is intubated on PRVC mode of  ventilator, the patient is unresponsive without sedation, the patient has gag reflex but no corneal reflex, family is leaning toward withdrawal of life support next 1 or 2 days    Objective:     Vital Signs (Most Recent):  Temp: 100.2 °F (37.9 °C) (03/20/25 0715)  Pulse: 96 (03/20/25 1110)  Resp: 20 (03/20/25 1110)  BP: (!) 106/49 (03/20/25 0910)  SpO2: 98 % (03/20/25 1110) Vital Signs (24h Range):  Temp:  [97.9 °F (36.6 °C)-100.2 °F (37.9 °C)] 100.2 °F (37.9 °C)  Pulse:  [] 96  Resp:  [12-25] 20  SpO2:  [94 %-100 %] 98 %  BP: ()/(48-78) 106/49     Weight: 71.7 kg (158 lb 1.1 oz)  Body mass index is 22.68 kg/m².    Intake/Output Summary (Last 24 hours) at 3/20/2025 1210  Last data filed at 3/20/2025 0443  Gross per 24 hour   Intake 329.49 ml   Output 300 ml   Net 29.49 ml      Physical exam  Constitution-obese, normally developed male in NAD  Eyes-pupil is dilated not responding to light  HENT-normocephalic, atraumatic  Neck-supple; soft C-collar in place  Respiratory-normal respirations; CTA with good air movement  Heart-RRR; normal S1 and S2; no murmurs, gallops; AICD/ pacemaker palpated left chest wall  Abdomen-soft, nontender, nondistended  Genitourinary-deferred  Musculoskeletal-no joint abnormalities, ; no edema  Skin-warm, dry; stage I breakdown over coccyx; unstageable pressure ulcer right heel  Neurologic-intubated    Scheduled Meds:   albuterol-ipratropium  3 mL Nebulization Q6H    aspirin  81 mg Oral Daily    atorvastatin  40 mg Oral Daily    bisacodyL  5 mg Oral Daily    ceFEPime IV (PEDS and ADULTS)  1 g Intravenous Q8H    clopidogreL  75 mg Oral Daily    DULoxetine  60 mg Oral Daily    insulin glargine U-100  15 Units Subcutaneous Daily    levoFLOXacin  750 mg Intravenous Q48H    methylPREDNISolone injection (PEDS and ADULTS)  60 mg Intravenous Q6H    metoprolol tartrate  25 mg Oral BID    montelukast  10 mg Oral Daily    pantoprazole  40 mg Intravenous Daily    potassium bicarbonate  20  "mEq Oral Once    pregabalin  100 mg Oral TID    QUEtiapine  25 mg Oral QHS    ranolazine  500 mg Oral BID    sodium chloride 0.9%  10 mL Intravenous Q8H     Continuous Infusions:   0.9% NaCl   Intravenous Continuous 100 mL/hr at 03/20/25 0735 New Bag at 03/20/25 0735    propofoL  0-50 mcg/kg/min (Dosing Weight) Intravenous Continuous           PRN Meds:.  Current Facility-Administered Medications:     0.9%  NaCl infusion (for blood administration), , Intravenous, Q24H PRN    acetaminophen, 1,000 mg, Oral, Q6H PRN    bisacodyL, 10 mg, Rectal, Daily PRN    dextrose 50%, 12.5 g, Intravenous, PRN    glucagon (human recombinant), 1 mg, Intramuscular, PRN    insulin aspart U-100, 0-10 Units, Subcutaneous, QID (AC + HS) PRN    iopamidoL, 100 mL, Intravenous, ONCE PRN    naloxone, 0.4 mg, Intravenous, PRN    ondansetron, 4 mg, Intravenous, Q8H PRN    oxyCODONE, 10 mg, Oral, Q4H PRN    peg 400-hypromellose-glycerin, 1 drop, Both Eyes, PRN    Significant Labs: All pertinent labs within the past 24 hours have been reviewed.  ABGs:   Recent Labs   Lab 03/18/25  1546 03/19/25  0453 03/20/25  0422   PH 7.363 7.500* 7.472*   PCO2 47.1* 33.0* 33.1*   HCO3 26.8 25.7 24.2   POCSATURATED 100 98 98   BE 1 3* 1   PO2 225* 96 89       CBC:   Recent Labs   Lab 03/18/25  1510 03/19/25  0339 03/20/25  0411   WBC 34.15* 12.89* 12.76*   HGB 11.5* 8.9* 8.6*   HCT 38.8* 27.5* 26.4*    151 160     CMP:   Recent Labs   Lab 03/18/25  1510 03/19/25  0339 03/20/25  0411    143 141   K 4.5 4.1 4.4    105 104   CO2 23 26 23   BUN 34.0* 56.0* 99.0*   CREATININE 1.75* 2.88* 4.43*   CALCIUM 8.6* 8.5* 8.0*   ALBUMIN 2.6* 2.4*  --    BILITOT 0.8 1.0  --    ALKPHOS 155* 182*  --    * 2,958*  --    * 1,623*  --      Cardiac Markers:   No results for input(s): "CKMB", "MYOGLOBIN", "BNP", "TROPISTAT" in the last 48 hours.    Lactic Acid:   No results for input(s): "LACTATE" in the last 48 hours.    Magnesium:   Recent Labs "   Lab 03/19/25  0339 03/20/25  0411   MG 1.90 2.10     POCT Glucose:   Recent Labs   Lab 03/19/25  2221 03/20/25  0405 03/20/25  1014   POCTGLUCOSE 163* 202* 226*     Troponin:   Recent Labs   Lab 03/18/25  1958 03/19/25  0120 03/19/25  0811   TROPONINI 0.524* 3.426* 3.004*       Urine Studies:   Recent Labs   Lab 03/18/25  1742   COLORU Dark Yellow   APPEARANCEUA Cloudy*   PHUR 5.5   PROTEINUA 3+*   GLUCUA Negative   BILIRUBINUA 1+*   OCCULTUA 3+*   NITRITE Negative   UROBILINOGEN 1.0   LEUKOCYTESUR Negative   RBCUA >100*   WBCUA 0-5   BACTERIA Rare         Significant Imaging:   CXR, 3/12  Impression:  1. Interim increasing patchy hazy opacities throughout the right lung and left lower lung since the prior exam suspicious for infiltrate and or atelectasis  2. Borderline cardiomegaly  3. Atherosclerosis    CXR, 3/13  Impression:  1. Persistent scattered patchy hazy opacities throughout the lungs bilaterally suspicious for infiltrate, atelectasis, and or scarring  2. Atherosclerosis  3. Cardiac device left chest    Echo, 3/12    Left Ventricle: The left ventricle is normal in size. There is severely reduced systolic function with a visually estimated ejection fraction of less than 30%.    Right Ventricle: The right ventricle is normal in size. Systolic function is normal.    Left Atrium: Mildly dilated    Aortic Valve: There is mild to moderate stenosis. Aortic valve peak velocity is 2.1 m/s.    Mitral Valve: There is mild regurgitation.    Assessment/Plan:   Acute respiratory failure with hypoxia and hypercapnia  Intubated on PRVC mode of ventilator  Consult pulmonology     Multilobar healthcare acquired pneumonia with sepsis  Continue empiric antibiotic therapy with cefepime/levofloxacin  Bronchodilator therapy  Short course of IV corticosteroid-completed  MRSA screen-negative  WBC has trended down, now normal  Check CT angio of chest--a bilateral moderate pulmonary edema      Acute kidney injury  Possible  etiologies include volume depletion, sepsis, obstructive uropathy, sv CMO  Avoid nephrotoxic agents as possible    Severe ischemic cardiomyopathy/chronic HFrEF/AICD-ppm status  Echo 1/25-EF less than 20% with moderate to sv aortic stenosis  Repeat echo 3/12-less than 30% with moderate aortic stenosis  Consult cardiology     Anemia  No active bleeding identified  Due to elevated troponin, history of CAD patient transfused, 3/12,  with 1 unit PRBCs  H&H stable    Transaminitis  He experienced bump in transaminases on 3/15, uncertain etiology  Transaminases have trended back down to normal     Elevated troponin  Troponin peaked at 0.22 to, trending down this morning  Type 2 MI related to respiratory failure     UTI  Managed with antibiotics as noted above     COPD  Respiratory measures as noted above     COVID positive  Undetermined significance  Pattern of pulmonary infiltrates inconsistent with COVID pneumonia     CAD/CABG  Currently in a-v paced rhythm  Continue ranolazine  Continue low-dose aspirin and clopidogrel     Diabetes mellitus  Blood sugars acceptable  Monitor blood sugars with Accu-Cheks and SSI  Adjust basal insulin as necessary     Hypertension  BP acceptable  Holding ARB due to JOHN  Attempt to up-titrate beta-blocker     PAD in bilateral carotid artery and bilateral lower extremity  DAPT as noted above     History of BPH with urinary retention  Urinary catheter removed this a.m., monitor PVRs     Recent multilevel C-spine surgery 1/25  Maintain soft C-collar    Severe debility  Continue PT     GI prophylaxis: Pantoprazole     Code status:  The patient is on DNR comfort measures only, withdrawal of life support next 1 or 2 days          Active Diagnoses:    Diagnosis Date Noted POA    PRINCIPAL PROBLEM:  Acute respiratory failure with hypoxia and hypercapnia [J96.01, J96.02] 03/12/2025 Yes      Problems Resolved During this Admission:     VTE Risk Mitigation (From admission, onward)           Ordered      IP VTE HIGH RISK PATIENT  Once         03/12/25 1237     Place sequential compression device  Until discontinued         03/12/25 1237                    Gustavo Hernández MD  Department of Hospital Medicine   [facility] - ICU

## 2025-03-21 NOTE — PROGRESS NOTES
[facility] - ICU Hospital Medicine  Progress Note    Patient Name: Tyler Mai  MRN: [id number]  Patient Class: IP- Inpatient   Admission Date: 3/12/2025  Length of Stay: 9 days  Attending Physician: Yomi Jeffery MD  Primary Care Provider: Paxton Connors MD        Subjective:     Principal Problem:Acute respiratory failure with hypoxia and hypercapnia    Interval History:   HPI: Mr. Mai is a 69-year-old male who was transferred to the ED earlier today from a local SNF due to persistent shortness of breath.  His symptoms apparently began yesterday at which time he was diagnosed with pneumonia.  Symptoms worsened today, O2 saturations noted to be in the 80s on RA prompting his transfer to this facility.  With supplemental O2 his O2 saturation improved into the low 90s.  He was subsequently converted to BiPAP in the ED.  His current issues began in mid January at which time he underwent a rather extensive C-spine surgery involving multiple levels following which he was transferred to an inpatient rehab facility.  During that time he also developed some urinary retention and had an indwelling catheter for an extended period.  Catheter was apparently removed in rehab.  He was subsequently transferred to a SNF for continued physical therapy.  His evaluation in the ED was significant for anemia, leukocytosis, acute kidney injury, acute respiratory failure with hypercapnia and hypoxia, urinalysis consistent with UTI and CXR consistent with multilobar pneumonia.  Other findings included a significantly elevated BNP and an elevated troponin.  He was managed with a single dose of IV antibiotics and a nebulizer treatment.  On seeing him following his arrival to the ICU he was quite lethargic with a respiratory rate of 10 to 12 on BiPAP.  Due to his history of opioid use for chronic pain and recent C-spine surgery he was given a dose of Narcan after which he did awakened and become more  responsive, Respiratory rate improved to the 20s.  A subsequent ABG revealed a normal pH with moderate hypoxemia.    3/13-overnight became more agitated, refused to wear BiPAP; fortunately has been able to maintain adequate O2 saturations on nasal cannula O2; when seen on rounds this morning was very lethargic, poorly responsive; he opened his eyes to voice and tactile stimuli but otherwise unresponsive.  Did spike a temp last night to 101.3° F; he was given Tylenol and has not had another spike since that time    3/14-no events overnight; he has been afebrile over the past 24 hours; when seen on rounds this morning was sleepy but aroused easily to voice    3/15-no events overnight; when seen on rounds this morning was seated on the side of the bed working with PT; he was alert, normally interactive seemed to be in good spirits  Downgrade to M/S status    3/16-transferred to M/S unit yesterday evening; no events overnight; Respiratory status stable; remains on 3 L NC with O2 saturations of 94%    3/17-condition stable; when seen on rounds this morning was somewhat lethargic but this appears to be his baseline in the mornings; he becomes more interactive/attentive later in the day; he had no complaints; he remains generally very weak    March 18, 2025-the patient had low-grade fever 100.4, can maintain 92% O2 sat with 3 L OxyMask, complain of generalized weakness    March 19, 2025-the patient was coded around 3:00 p.m. yesterday secondary to cardiopulmonary arrest, chest compression was performed the, the patient was intubated, after 1 ampule of epinephrine, pulse came back, the patient V-tach and shocked with a 200 joule, converted to normal sinus rhythm, the patient is intubated on PRVC mode of ventilator, without sedation, patient is unresponsive, patient has gag reflex but no corneal reflex, pupil is dilated not responding to light, family signed DNR    March 20, 2025-patient is intubated on PRVC mode of  ventilator, the patient is unresponsive without sedation, the patient has gag reflex but no corneal reflex, family is leaning toward withdrawal of life support next 1 or 2 days    March 21, 2025-the patient is intubated on PRVC mode of ventilator, the patient is not moving at all without sedation, the patient is afebrile, creatinine is worse to 5.3 from 4.4, waiting for Nephrology recommendation    Objective:     Vital Signs (Most Recent):  Temp: 98 °F (36.7 °C) (03/21/25 0736)  Pulse: 90 (03/21/25 0900)  Resp: (!) 21 (03/21/25 0900)  BP: 109/61 (03/21/25 0900)  SpO2: 100 % (03/21/25 0900) Vital Signs (24h Range):  Temp:  [98 °F (36.7 °C)-99.3 °F (37.4 °C)] 98 °F (36.7 °C)  Pulse:  [] 90  Resp:  [1-23] 21  SpO2:  [95 %-100 %] 100 %  BP: ()/(46-72) 109/61     Weight: 71.7 kg (158 lb 1.1 oz)  Body mass index is 22.68 kg/m².    Intake/Output Summary (Last 24 hours) at 3/21/2025 1104  Last data filed at 3/21/2025 0946  Gross per 24 hour   Intake 3222.55 ml   Output 275 ml   Net 2947.55 ml      Physical exam  Constitution-obese, normally developed male in NAD  Eyes-pupil is dilated not responding to light  HENT-normocephalic, atraumatic  Neck-supple; soft C-collar in place  Respiratory-normal respirations; CTA with good air movement  Heart-RRR; normal S1 and S2; no murmurs, gallops; AICD/ pacemaker palpated left chest wall  Abdomen-soft, nontender, nondistended  Genitourinary-deferred  Musculoskeletal-no joint abnormalities, ; no edema  Skin-warm, dry; stage I breakdown over coccyx; unstageable pressure ulcer right heel  Neurologic-intubated    Scheduled Meds:   albuterol-ipratropium  3 mL Nebulization Q6H    aspirin  81 mg Oral Daily    atorvastatin  40 mg Oral Daily    bisacodyL  5 mg Oral Daily    ceFEPime IV (PEDS and ADULTS)  1 g Intravenous Q8H    clopidogreL  75 mg Oral Daily    DULoxetine  60 mg Oral Daily    insulin glargine U-100  15 Units Subcutaneous Daily    levoFLOXacin  750 mg Intravenous Q48H  "   methylPREDNISolone injection (PEDS and ADULTS)  60 mg Intravenous Q6H    montelukast  10 mg Oral Daily    pantoprazole  40 mg Intravenous Daily    potassium bicarbonate  20 mEq Oral Once    pregabalin  100 mg Oral TID    QUEtiapine  25 mg Oral QHS    ranolazine  500 mg Oral BID    sodium chloride 0.9%  10 mL Intravenous Q8H     Continuous Infusions:   0.9% NaCl   Intravenous Continuous 100 mL/hr at 03/21/25 0531 Rate Verify at 03/21/25 0531    propofoL  0-50 mcg/kg/min (Dosing Weight) Intravenous Continuous           PRN Meds:.  Current Facility-Administered Medications:     0.9%  NaCl infusion (for blood administration), , Intravenous, Q24H PRN    acetaminophen, 1,000 mg, Oral, Q6H PRN    bisacodyL, 10 mg, Rectal, Daily PRN    dextrose 50%, 12.5 g, Intravenous, PRN    glucagon (human recombinant), 1 mg, Intramuscular, PRN    insulin aspart U-100, 0-10 Units, Subcutaneous, QID (AC + HS) PRN    iopamidoL, 100 mL, Intravenous, ONCE PRN    naloxone, 0.4 mg, Intravenous, PRN    ondansetron, 4 mg, Intravenous, Q8H PRN    oxyCODONE, 10 mg, Oral, Q4H PRN    peg 400-hypromellose-glycerin, 1 drop, Both Eyes, PRN    Significant Labs: All pertinent labs within the past 24 hours have been reviewed.  ABGs:   Recent Labs   Lab 03/20/25 0422 03/21/25  0345   PH 7.472* 7.432   PCO2 33.1* 30.1*   HCO3 24.2 20.1*   POCSATURATED 98 98   BE 1 -4*   PO2 89 102*       CBC:   Recent Labs   Lab 03/20/25 0411 03/21/25  0342   WBC 12.76* 14.79*   HGB 8.6* 8.5*   HCT 26.4* 26.8*    153     CMP:   Recent Labs   Lab 03/20/25  0411 03/21/25  0342    139   K 4.4 4.7    106   CO2 23 18*   BUN 99.0* 125.0*   CREATININE 4.43* 5.39*   CALCIUM 8.0* 7.9*   ALBUMIN  --  2.1*   BILITOT  --  0.6   ALKPHOS  --  149   AST  --  258*   ALT  --  661*     Cardiac Markers:   No results for input(s): "CKMB", "MYOGLOBIN", "BNP", "TROPISTAT" in the last 48 hours.    Lactic Acid:   No results for input(s): "LACTATE" in the last 48 " "hours.    Magnesium:   Recent Labs   Lab 03/20/25  0411 03/21/25  0342   MG 2.10 2.30     POCT Glucose:   Recent Labs   Lab 03/20/25  0405 03/20/25  1014 03/20/25  1606   POCTGLUCOSE 202* 226* 191*     Troponin:   No results for input(s): "TROPONINI", "TROPONINIHS" in the last 48 hours.      Urine Studies:   No results for input(s): "COLORU", "APPEARANCEUA", "PHUR", "SPECGRAV", "PROTEINUA", "GLUCUA", "KETONESU", "BILIRUBINUA", "OCCULTUA", "NITRITE", "UROBILINOGEN", "LEUKOCYTESUR", "RBCUA", "WBCUA", "BACTERIA", "SQUAMEPITHEL", "HYALINECASTS" in the last 48 hours.    Invalid input(s): "WRIGHTSUR"        Significant Imaging:   CXR, 3/12  Impression:  1. Interim increasing patchy hazy opacities throughout the right lung and left lower lung since the prior exam suspicious for infiltrate and or atelectasis  2. Borderline cardiomegaly  3. Atherosclerosis    CXR, 3/13  Impression:  1. Persistent scattered patchy hazy opacities throughout the lungs bilaterally suspicious for infiltrate, atelectasis, and or scarring  2. Atherosclerosis  3. Cardiac device left chest    Echo, 3/12    Left Ventricle: The left ventricle is normal in size. There is severely reduced systolic function with a visually estimated ejection fraction of less than 30%.    Right Ventricle: The right ventricle is normal in size. Systolic function is normal.    Left Atrium: Mildly dilated    Aortic Valve: There is mild to moderate stenosis. Aortic valve peak velocity is 2.1 m/s.    Mitral Valve: There is mild regurgitation.    Assessment/Plan:   Acute respiratory failure with hypoxia and hypercapnia  Intubated on PRVC mode of ventilator  Consult pulmonology     Multilobar healthcare acquired pneumonia with sepsis  Continue empiric antibiotic therapy with cefepime/levofloxacin  Bronchodilator therapy  Short course of IV corticosteroid-completed  MRSA screen-negative  WBC has trended down, now normal  Check CT angio of chest--a bilateral moderate pulmonary " edema      Acute kidney injury  Possible etiologies include volume depletion, sepsis, obstructive uropathy, sv CMO  Avoid nephrotoxic agents as possible  Consult Nephrology       Severe ischemic cardiomyopathy/chronic HFrEF/AICD-ppm status  Echo 1/25-EF less than 20% with moderate to sv aortic stenosis  Repeat echo 3/12-less than 30% with moderate aortic stenosis  Consult cardiology     Anemia  No active bleeding identified  Due to elevated troponin, history of CAD patient transfused, 3/12,  with 1 unit PRBCs  H&H stable    Transaminitis  He experienced bump in transaminases on 3/15, uncertain etiology  Transaminases have trended back down to normal     Elevated troponin  Troponin peaked at 0.22 to, trending down this morning  Type 2 MI related to respiratory failure     UTI  Managed with antibiotics as noted above     COPD  Respiratory measures as noted above     COVID positive  Undetermined significance  Pattern of pulmonary infiltrates inconsistent with COVID pneumonia     CAD/CABG  Currently in a-v paced rhythm  Continue ranolazine  Continue low-dose aspirin and clopidogrel     Diabetes mellitus  Blood sugars acceptable  Monitor blood sugars with Accu-Cheks and SSI  Adjust basal insulin as necessary     Hypertension  BP acceptable  Holding ARB due to JOHN  Attempt to up-titrate beta-blocker     PAD in bilateral carotid artery and bilateral lower extremity  DAPT as noted above     History of BPH with urinary retention  Urinary catheter removed this a.m., monitor PVRs     Recent multilevel C-spine surgery 1/25  Maintain soft C-collar    Severe debility  Continue PT     GI prophylaxis: Pantoprazole     Code status:  The patient is on DNR comfort measures only          Active Diagnoses:    Diagnosis Date Noted POA    PRINCIPAL PROBLEM:  Acute respiratory failure with hypoxia and hypercapnia [J96.01, J96.02] 03/12/2025 Yes    Moderate malnutrition [E44.0] 03/20/2025 Unknown      Problems Resolved During this  Admission:     VTE Risk Mitigation (From admission, onward)           Ordered     IP VTE HIGH RISK PATIENT  Once         03/12/25 1237     Place sequential compression device  Until discontinued         03/12/25 1237                    Gustavo Hernández MD  Department of Hospital Medicine   [facility] - ICU

## 2025-03-21 NOTE — CONSULTS
[facility]  [address] [place] [address]  Phone: [phone]    Department of Nephrology  Consult Note      PATIENT NAME: Tyler Mai    MRN: [id number]  TODAY'S DATE: 03/21/2025  ADMIT DATE: 3/12/2025                          CONSULT REQUESTED BY: Yomi Jeffery MD    SUBJECTIVE     PRINCIPAL PROBLEM: Acute respiratory failure with hypoxia and hypercapnia      REASON FOR CONSULT:  Oliguric renal failure      HPI:  Patient is a 70-year-old  male noted past medical history of coronary artery disease peripheral vascular disease status post coronary artery bypass in the past developed COVID was admitted around the 12th to our hospital.  Patient progressively declined to around the 19th where he was shipped into the ICU and eventually coded went into V-tach was shocked a couple of times the complete notes are not from me on the code.  The patient developed oligo aneuric renal failure.  BUN over 130 creatinine 5.36 with no urine output.        Review of patient's allergies indicates:   Allergen Reactions    Hydrochlorothiazide      Other Reaction(s): Pancreatitis       Past Medical History:   Diagnosis Date    Anxiety disorder, unspecified     Arthritis     CAD (coronary artery disease)     Cervical radiculopathy     CHF (congestive heart failure)     Chronic pain syndrome     COPD (chronic obstructive pulmonary disease)     Diabetes mellitus     Gastro-esophageal reflux disease without esophagitis     High cholesterol     Hypertension     Lumbar radiculopathy     Myelomalacia of cervical cord     Other specified diseases of spinal cord     Pacemaker     PVD (peripheral vascular disease)     S/P triple vessel bypass     Tobacco use      Past Surgical History:   Procedure Laterality Date    ANTERIOR CERVICAL DISCECTOMY W/ FUSION  1995    C5-6 and C6-7.  Dr. Wall    ANTERIOR CERVICAL DISCECTOMY W/ FUSION  1996    Redo C5-6, C6-7.  Dr. Wall    CHOLECYSTECTOMY       CORONARY ARTERY BYPASS GRAFT      FUSION OF POSTERIOR COLUMN OF CERVICAL SPINE USING COMPUTER AIDED NAVIGATION N/A 1/24/2025    Procedure: FUSION, SPINE, POSTERIOR SPINAL COLUMN, CERVICAL, USING COMPUTER-ASSISTED NAVIGATION;  Surgeon: Darrell Watson MD;  Location: [facility];  Service: Neurosurgery;  Laterality: N/A;  C2-C6 PCF; C2/3-C4/5 decompression, possible additional levels  o-arm  NTI  TIVA setup  [employer] //  XX    INSERTION OF PACEMAKER      X2    TRIPPLE VESSEL BYPASS       Social History[1]     Review of Systems   Unable to perform ROS: Intubated       OBJECTIVE     VITAL SIGNS (Most Recent)  Temp: 98 °F (36.7 °C) (03/21/25 0736)  Pulse: 90 (03/21/25 0900)  Resp: (!) 21 (03/21/25 0900)  BP: 109/61 (03/21/25 0900)  SpO2: 100 % (03/21/25 0900)    VENTILATION STATUS  Resp: (!) 21 (03/21/25 0900)  SpO2: 100 % (03/21/25 0900)  Vent Mode: A/C  Oxygen Concentration (%):  [40] 40  Resp Rate Total:  [20 br/min-25 br/min] 21 br/min  Vt Set:  [500 mL] 500 mL  PEEP/CPAP:  [5 cmH20] 5 cmH20  Mean Airway Pressure:  [8.9 yiQ73-35 cmH20] 11 cmH20        I & O (Last 24H):  Intake/Output Summary (Last 24 hours) at 3/21/2025 1112  Last data filed at 3/21/2025 0946  Gross per 24 hour   Intake 3222.55 ml   Output 275 ml   Net 2947.55 ml       WEIGHTS  Wt Readings from Last 3 Encounters:   03/19/25 0525 71.7 kg (158 lb 1.1 oz)   03/18/25 0928 74.7 kg (164 lb 10.9 oz)   03/15/25 0602 75.5 kg (166 lb 7.2 oz)   03/14/25 0600 72 kg (158 lb 11.7 oz)   03/12/25 2130 79 kg (174 lb 2.6 oz)   03/12/25 1341 79 kg (174 lb 2.6 oz)   03/12/25 0854 77.1 kg (170 lb)   02/20/25 1109 72.6 kg (160 lb)   02/19/25 0715 73.3 kg (161 lb 9.6 oz)   02/18/25 0537 73.3 kg (161 lb 9.6 oz)   02/16/25 0516 73.4 kg (161 lb 13.1 oz)   02/15/25 0500 69.6 kg (153 lb 7 oz)   02/13/25 0500 (P) 68.9 kg (152 lb)   02/12/25 0500 68.5 kg (151 lb 0.2 oz)   01/31/25 1321 62.2 kg (137 lb 2 oz)       Physical Exam  Constitutional:       Comments: Patient is  comatose   HENT:      Head: Normocephalic.      Mouth/Throat:      Mouth: Mucous membranes are dry.   Eyes:      Comments: Pupils are nonresponsive to light.   Neck:      Comments: No JVD.  Cardiovascular:      Rate and Rhythm: Normal rate.      Heart sounds: No murmur heard.  Pulmonary:      Breath sounds: Normal breath sounds.   Abdominal:      General: Abdomen is flat. There is no distension.      Comments: Hypoactive bowel sounds   Skin:     General: Skin is warm.      Findings: Bruising present. No rash.   Neurological:      Mental Status: He is unresponsive.         HOME MEDICATIONS:Medications Ordered Prior to Encounter[2]    SCHEDULED MEDS:   albuterol-ipratropium  3 mL Nebulization Q6H    aspirin  81 mg Oral Daily    atorvastatin  40 mg Oral Daily    bisacodyL  5 mg Oral Daily    ceFEPime IV (PEDS and ADULTS)  1 g Intravenous Q8H    clopidogreL  75 mg Oral Daily    DULoxetine  60 mg Oral Daily    insulin glargine U-100  15 Units Subcutaneous Daily    levoFLOXacin  750 mg Intravenous Q48H    methylPREDNISolone injection (PEDS and ADULTS)  60 mg Intravenous Q6H    montelukast  10 mg Oral Daily    pantoprazole  40 mg Intravenous Daily    potassium bicarbonate  20 mEq Oral Once    pregabalin  100 mg Oral TID    QUEtiapine  25 mg Oral QHS    ranolazine  500 mg Oral BID    sodium chloride 0.9%  10 mL Intravenous Q8H       CONTINUOUS INFUSIONS:   0.9% NaCl   Intravenous Continuous 100 mL/hr at 03/21/25 0531 Rate Verify at 03/21/25 0531    propofoL  0-50 mcg/kg/min (Dosing Weight) Intravenous Continuous           PRN MEDS:  Current Facility-Administered Medications:     0.9%  NaCl infusion (for blood administration), , Intravenous, Q24H PRN    acetaminophen, 1,000 mg, Oral, Q6H PRN    bisacodyL, 10 mg, Rectal, Daily PRN    dextrose 50%, 12.5 g, Intravenous, PRN    glucagon (human recombinant), 1 mg, Intramuscular, PRN    insulin aspart U-100, 0-10 Units, Subcutaneous, QID (AC + HS) PRN    iopamidoL, 100 mL,  "Intravenous, ONCE PRN    naloxone, 0.4 mg, Intravenous, PRN    ondansetron, 4 mg, Intravenous, Q8H PRN    oxyCODONE, 10 mg, Oral, Q4H PRN    peg 400-hypromellose-glycerin, 1 drop, Both Eyes, PRN    LABS AND DIAGNOSTICS     CBC LAST 3 DAYS  Recent Labs   Lab 03/19/25  0339 03/20/25  0411 03/21/25  0342   WBC 12.89* 12.76* 14.79*   RBC 3.14* 3.05* 3.01*   HGB 8.9* 8.6* 8.5*   HCT 27.5* 26.4* 26.8*   MCV 87.6 86.6 89.0   MCH 28.3 28.2 28.2   MCHC 32.4* 32.6* 31.7*   RDW 13.2 13.6 13.8    160 153   MPV 12.1* 12.4* 12.4*       COAGULATION LAST 3 DAYS  No results for input(s): "LABPT", "INR", "APTT" in the last 168 hours.    CHEMISTRY LAST 3 DAYS  Recent Labs   Lab 03/18/25  1510 03/18/25  1546 03/19/25  0339 03/19/25  0453 03/20/25  0411 03/20/25  0422 03/21/25  0342 03/21/25  0345     --  143  --  141  --  139  --    K 4.5  --  4.1  --  4.4  --  4.7  --      --  105  --  104  --  106  --    CO2 23  --  26  --  23  --  18*  --    BUN 34.0*  --  56.0*  --  99.0*  --  125.0*  --    CREATININE 1.75*  --  2.88*  --  4.43*  --  5.39*  --    CALCIUM 8.6*  --  8.5*  --  8.0*  --  7.9*  --    PH  --    < >  --  7.500*  --  7.472*  --  7.432   MG  --   --  1.90  --  2.10  --  2.30  --    ALBUMIN 2.6*  --  2.4*  --   --   --  2.1*  --    ALKPHOS 155*  --  182*  --   --   --  149  --    *  --  1,623*  --   --   --  661*  --    *  --  2,958*  --   --   --  258*  --    BILITOT 0.8  --  1.0  --   --   --  0.6  --     < > = values in this interval not displayed.       CARDIAC PROFILE LAST 3 DAYS  Recent Labs   Lab 03/18/25  1958 03/19/25  0120 03/19/25  0811   TROPONINI 0.524* 3.426* 3.004*       ENDOCRINE LAST 3 DAYS  No results for input(s): "TSH", "PROCAL" in the last 168 hours.    LAST ARTERIAL BLOOD GAS  ABG  Recent Labs   Lab 03/21/25  0345   PH 7.432   PO2 102*   PCO2 30.1*   HCO3 20.1*   BE -4*       LAST 7 DAYS MICROBIOLOGY   Microbiology Results (last 7 days)       Procedure Component Value " Units Date/Time    Respiratory Culture [[id number]]  (Abnormal) Collected: 03/18/25 1742    Order Status: Completed Specimen: Sputum, Expectorated Updated: 03/21/25 0942     Respiratory Culture Moderate Yeast     Comment: with no normal respiratory abdi        GRAM STAIN Quality 2+      Few Yeast    Blood Culture [[id number]]  (Normal) Collected: 03/18/25 1710    Order Status: Completed Specimen: Blood from Arm, Right Updated: 03/21/25 0900     Blood Culture No Growth At 48 Hours    Blood Culture [[id number]]  (Normal) Collected: 03/18/25 1710    Order Status: Completed Specimen: Blood from Arm, Right Updated: 03/21/25 0900     Blood Culture No Growth At 48 Hours    Blood Culture x two cultures. Draw prior to antibiotics [[id number]]  (Normal) Collected: 03/12/25 0928    Order Status: Completed Specimen: Blood Updated: 03/17/25 1401     Blood Culture No Growth at 5 days    Blood Culture x two cultures. Draw prior to antibiotics [[id number]]  (Normal) Collected: 03/12/25 0930    Order Status: Completed Specimen: Blood Updated: 03/17/25 1401     Blood Culture No Growth at 5 days    Respiratory Culture [[id number]] Collected: 03/12/25 1415    Order Status: Completed Specimen: NP MRSA from Nasopharyngeal Swab Updated: 03/15/25 0715     Respiratory Culture No growth of MRSA    Urine culture [[id number]]  (Abnormal) Collected: 03/12/25 0959    Order Status: Completed Specimen: Urine Updated: 03/14/25 1300     Urine Culture Less than 10,000 colonies/ml Proteus mirabilis     Comment: Guthrie counts of <10,000colonies/ml are of questionable significance. Therefore organisms are identified only.               MOST RECENT IMAGING  US Lower Extremity Veins Bilateral  Narrative: EXAMINATION:  VENOUS ULTRASOUND OF  THE BILATERAL LOWER EXTREMITY:    CLINICAL HISTORY:  , coded;    COMPARISON:  None available    FINDINGS:  There is normal compression and flow noted in the  common femoral vein, superficial femoral vein, popliteal  vein, and  posterior tibial veinbilaterally.  No evidence of deep venous thrombosis is identified.  Impression: 1. No deep venous thrombosis identified to the lower extremities bilaterally.    Electronically signed by: Tyler Newby  Date:    03/19/2025  Time:    14:34  X-Ray Chest 1 View  Narrative: EXAMINATION:  XR CHEST 1 VIEW    CLINICAL HISTORY:  OG tube placement;, .    COMPARISON:  03/19/2025    FINDINGS:  An AP view or more reveals the heart to be normal in size.  There is interim advancement of the NG tube with the tip and side hole at the left upper abdomen will beyond the GE junction.  The upper lungs not included on the exam.  Visualized bowel gas pattern is nonspecific without evidence of obstruction.  Impression: 1. NG tube and side hole at the left upper abdomen well beyond the GE junction    Electronically signed by: Tyler Newby  Date:    03/19/2025  Time:    14:33  X-Ray Chest AP Portable  Narrative: EXAMINATION:  XR CHEST AP PORTABLE    CLINICAL HISTORY:  replaced ET tube;, .    COMPARISON:  03/19/2025    FINDINGS:  An AP view or more reveals the heart to be normal in size.  There is suspect slight interim retraction of the ET tube since the prior exam with the tip below the thoracic inlet and approximately 8 cm above the cait.  There is interim placement of a NG tube with the tip at the left upper abdomen and side hole likely near just above the GE junction.  There is continued mild improved aeration lungs bilaterally with mild residual hazy interstitial opacities diffusely throughout.  A cardiac device is noted to the left chest.  Impression: 1. Interim placement NG tube with tip at the left upper abdomen and side hole believed to be at or just above the GE junction  2. Suspect slight interim retraction of ET tube  3. Continued improved aeration lungs bilaterally with mild residual hazy interstitial opacities diffusely throughout    Electronically signed by: Tyler  Odin  Date:    03/19/2025  Time:    10:51  X-Ray Chest 1 View  Narrative: EXAMINATION:  XR CHEST 1 VIEW    CLINICAL HISTORY:  Resp failure-on vent;, .    COMPARISON:  03/18/2025    FINDINGS:  An AP view or more reveals the heart to be normal in size.  The trachea is just left midline.  Atherosclerosis seen within the aorta.  A cardiac device is noted to the left chest.  Atherosclerosis seen within the aorta.  There is mild improved aeration of the lungs bilaterally with residual hazy interstitial opacities throughout the right lung and left upper and lower lung.  No consolidative infiltrate is identified.  Post sternotomy changes are present.  Degenerative changes and curvature are noted to the thoracic spine.  Impression: 1. Mild improved aeration with mild residual hazy interstitial opacities at the right lung and left upper and lower lung  2. Atherosclerosis  3. ET tube    Electronically signed by: Tyler Newby  Date:    03/19/2025  Time:    09:19      ECHOCARDIOGRAM RESULTS (last 5)  Results for orders placed during the hospital encounter of 03/12/25    Echo    Interpretation Summary    Left Ventricle: The left ventricle is normal in size. There is severely reduced systolic function with a visually estimated ejection fraction of less than 30%.    Right Ventricle: The right ventricle is normal in size. Systolic function is normal.    Left Atrium: Mildly dilated    Aortic Valve: There is mild to moderate stenosis. Aortic valve peak velocity is 2.1 m/s.    Mitral Valve: There is mild regurgitation.      CURRENT/PREVIOUS VISIT EKG  Results for orders placed or performed during the hospital encounter of 03/12/25   EKG 12-lead    Collection Time: 03/18/25  4:43 PM   Result Value Ref Range    QRS Duration 124 ms    [facility] QTC Calculation 464 ms    Narrative    Test Reason : I46.9,    Vent. Rate :  88 BPM     Atrial Rate :  88 BPM     P-R Int : 112 ms          QRS Dur : 124 ms      QT Int : 384 ms       P-R-T Axes :  -13  75 217 degrees    QTcB Int : 464 ms    Atrial-sensed ventricular-paced rhythm  Abnormal ECG  When compared with ECG of 12-Mar-2025 09:03,  Vent. rate has increased by  10 bpm  Confirmed by David Posadas ([id number]) on 3/19/2025 8:39:12 AM    Referred By: LAMINE SELF           Confirmed By: David Posadas           ASSESSMENT/PLAN:     Active Hospital Problems    Diagnosis    *Acute respiratory failure with hypoxia and hypercapnia    Moderate malnutrition       ASSESSMENT & PLAN:   62-year-old  male with noted past medical history with COVID intubated sedated status post cardio respiratory arrest with presumed severe neurologic damage.  Family does want to try hemodialysis.      RECOMMENDATIONS:  Consult surgery for a femoral catheter attempt hemodialysis.  Very poor prognosis     Thanks for the consultation        Tank Sahni MD  Department of Nephrology  Date of Service: 03/21/2025  11:12 AM       [1]   Social History  Tobacco Use    Smoking status: Every Day     Current packs/day: 1.00     Types: Cigarettes   [2]   No current facility-administered medications on file prior to encounter.     Current Outpatient Medications on File Prior to Encounter   Medication Sig Dispense Refill    albuterol (PROVENTIL) 2.5 mg /3 mL (0.083 %) nebulizer solution Take 2.5 mg by nebulization every 6 (six) hours.      cholecalciferol, vitamin D3, 1,250 mcg (50,000 unit) capsule Take 50,000 Units by mouth every 7 days.      DULoxetine (CYMBALTA) 60 MG capsule Take 60 mg by mouth once daily.      montelukast (SINGULAIR) 10 mg tablet Take 10 mg by mouth once daily.      oxyCODONE (ROXICODONE) 10 mg Tab immediate release tablet Take 10 mg by mouth every 4 (four) hours as needed for Pain.      acetaminophen (TYLENOL) 325 MG tablet Take 650 mg by mouth every 4 (four) hours as needed for Pain.      albuterol (PROVENTIL/VENTOLIN HFA) 90 mcg/actuation inhaler 1 puff every 4 (four) hours as needed.      amLODIPine (NORVASC) 10  MG tablet Take 1 tablet (10 mg total) by mouth once daily.      ascorbic acid, vitamin C, (VITAMIN C) 500 MG tablet Take 500 mg by mouth once daily.      aspirin 81 MG Chew Take 1 tablet (81 mg total) by mouth once daily.      bisacodyL (DULCOLAX) 5 mg EC tablet Take 1 tablet (5 mg total) by mouth once daily.      carvediloL (COREG) 25 MG tablet Take 1 tablet (25 mg total) by mouth 2 (two) times daily before meals.      clopidogreL (PLAVIX) 75 mg tablet Take 1 tablet (75 mg total) by mouth once daily. 30 tablet 11    finasteride (PROSCAR) 5 mg tablet Take 1 tablet (5 mg total) by mouth once daily.      furosemide (LASIX) 20 MG tablet Take 1 tablet (20 mg total) by mouth once daily.      LINZESS 145 mcg Cap capsule Take 145 mcg by mouth every morning.      losartan (COZAAR) 50 MG tablet Take 1 tablet (50 mg total) by mouth before breakfast.      magnesium oxide (MAG-OX) 400 mg (241.3 mg magnesium) tablet Take 1 tablet (400 mg total) by mouth 3 (three) times daily.      multivitamin (ONE DAILY MULTIVITAMIN) per tablet Take 1 tablet by mouth once daily.      pantoprazole (PROTONIX) 40 MG tablet Take 1 tablet (40 mg total) by mouth once daily. 30 tablet 0    piperacillin sodium/tazobactam (PIPERACILLIN-TAZOBACTAM 3.375 G/100 ML D5W, READY TO MIX,) Inject 3.375 g into the vein every 6 (six) hours.      pregabalin (LYRICA) 100 MG capsule Take 1 capsule (100 mg total) by mouth 3 (three) times daily. 90 capsule 0    QUEtiapine (SEROQUEL) 25 MG Tab Take 1 tablet (25 mg total) by mouth every evening.      ranolazine (RANEXA) 500 MG Tb12 Take 500 mg by mouth 2 (two) times daily.      rosuvastatin (CRESTOR) 40 MG Tab Take 40 mg by mouth every evening.      zinc sulfate (ZINCATE) 50 mg zinc (220 mg) capsule Take 220 mg by mouth once daily.

## 2025-03-21 NOTE — PROGRESS NOTES
[facility] - ICU  Pulmonology  Consult Note    Patient Name: Tyler Mai  MRN: [id number]  Admission Date: 3/12/2025  Hospital Length of Stay: 9 days  Code Status: DNR  Attending Physician: Yomi Jeffery MD  Primary Care Provider: Paxton Connors MD   Principal Problem: Acute respiratory failure with hypoxia and hypercapnia    Consults  Subjective:     HPI:  69-year-old male patient with a long complicated history.  Patient had significant weakness in his arms and was minimally mobile.  After evaluation and several delays in surgery he underwent surgery on January 28, 2025 by Dr. Zavala at [facility].  He had a C2-6 PCDF and a C2-3, 4-5 decompression with screws.  The patient was sent to a rehab facility and subsequently downgraded to a skilled nursing floor where he was admitted to [facility] on 03/12/25 with hypoxic and hypercapnic respiratory failure, multilobar healthcare acquired pneumonia, acute kidney NV, UTI (Proteus) and mild elevation in troponin.    He has known COPD (not quantitated) on Trelegy inhaler, systolic heart failure (ejection fraction approximately 30% with mild-to-moderate aortic stenosis), pacemaker/defibrillator, CAD with previous CABG.    The patient started having worsening shortness of breath and was being transferred to ICU this afternoon when placing him in the ICU bed he became agonal and had cardiorespiratory arrest.  He received epinephrine x1, was intubated and the length of the code is recorded as 30 minutes.  EKG is pending.  Chest x-ray shows proper placement of the endotracheal tube with bilateral infiltrates throughout both lungs.  Stat labs were performed and are as follows H&H 11.5/38, WBC 34.1, electrolytes within normal limits, anion gap 14, BUN 34, creatinine 1.75.  ABGs done shortly after the code is as follows: PH 7.36/47/225 on tidal volume 500 peep +5 100% rate of 20  .  Interval 24 hours:  Patient seen  via telemedicine this a.m..  The patient has had a stable past 24 hours.  He remains hemodynamically stable.  LFTs have improved as expected.  However, renal function has also worsened as expected.  BUN this morning 125, creatinine 5.39 and patient is oliguric.  Blood cultures remain negative.    Neurologically he has not improve.  He still is breathing over the vent and has a weak cough and gag and moves lower extremities to painful stimuli but no purposeful movement.  Today is day 3 post code.    I had a long talk with the patient's son Tyler this morning.  We outlined the present circumstances and the plan of care which likely will include dialysis if the decision is to continue aggressive care.  He would be higher risk for dialysis since he has systolic heart failure.  He will be conferring with his sibling and a decision regarding continuing aggressive care versus transitioning to palliative care will be made some time this weekend.  Past Medical History:   Diagnosis Date    Anxiety disorder, unspecified     Arthritis     CAD (coronary artery disease)     Cervical radiculopathy     CHF (congestive heart failure)     Chronic pain syndrome     COPD (chronic obstructive pulmonary disease)     Diabetes mellitus     Gastro-esophageal reflux disease without esophagitis     High cholesterol     Hypertension     Lumbar radiculopathy     Myelomalacia of cervical cord     Other specified diseases of spinal cord     Pacemaker     PVD (peripheral vascular disease)     S/P triple vessel bypass     Tobacco use        Past Surgical History:   Procedure Laterality Date    ANTERIOR CERVICAL DISCECTOMY W/ FUSION  1995    C5-6 and C6-7.  Dr. Wall    ANTERIOR CERVICAL DISCECTOMY W/ FUSION  1996    Redo C5-6, C6-7.  Dr. Wall    CHOLECYSTECTOMY      CORONARY ARTERY BYPASS GRAFT      FUSION OF POSTERIOR COLUMN OF CERVICAL SPINE USING COMPUTER AIDED NAVIGATION N/A 1/24/2025    Procedure: FUSION, SPINE, POSTERIOR SPINAL COLUMN,  CERVICAL, USING COMPUTER-ASSISTED NAVIGATION;  Surgeon: Darrell Watson MD;  Location: [facility];  Service: Neurosurgery;  Laterality: N/A;  C2-C6 PCF; C2/3-C4/5 decompression, possible additional levels  o-arm  NTI  TIVA setup  [employer] //  XX    INSERTION OF PACEMAKER      X2    TRIPPLE VESSEL BYPASS         Review of patient's allergies indicates:   Allergen Reactions    Hydrochlorothiazide      Other Reaction(s): Pancreatitis       Family History       Problem Relation (Age of Onset)    Cancer Mother, Father    Lumbar disc disease Brother          Tobacco Use    Smoking status: Every Day     Current packs/day: 1.00     Types: Cigarettes    Smokeless tobacco: Not on file   Substance and Sexual Activity    Alcohol use: Not on file    Drug use: Not on file    Sexual activity: Not on file        Review of Systems  Objective:     Vital Signs (Most Recent):  Temp: 98 °F (36.7 °C) (03/21/25 0736)  Pulse: 91 (03/21/25 0747)  Resp: 20 (03/21/25 0747)  BP: (!) 109/55 (03/21/25 0700)  SpO2: 100 % (03/21/25 0747) Vital Signs (24h Range):  Temp:  [98 °F (36.7 °C)-99.3 °F (37.4 °C)] 98 °F (36.7 °C)  Pulse:  [] 91  Resp:  [1-23] 20  SpO2:  [95 %-100 %] 100 %  BP: ()/(46-72) 109/55     Body mass index is 22.68 kg/m².      Intake/Output Summary (Last 24 hours) at 3/21/2025 0803  Last data filed at 3/21/2025 0531  Gross per 24 hour   Intake 3142.55 ml   Output 275 ml   Net 2867.55 ml       Physical Exam    Vents:  Vent Mode: A/C (03/21/25 0700)  Ventilator Initiated: Yes (03/18/25 1615)  Set Rate: 20 BPM (03/21/25 0700)  Vt Set: 500 mL (03/21/25 0700)  PEEP/CPAP: 5 cmH20 (03/21/25 0700)  Oxygen Concentration (%): 40 (03/21/25 0700)  Peak Airway Pressure: 19 cmH20 (03/21/25 0700)  Plateau Pressure: 18 cmH20 (03/21/25 0700)  Total Ve: 11.1 L/m (03/21/25 0700)  Negative Inspiratory Force (cm H2O): 0 (03/21/25 0700)  F/VT Ratio<105 (RSBI): (!) 34.25 (03/21/25 0700)    Lines/Drains/Airways       Drain  Duration                   Urethral Catheter 03/17/25 1545 Straight-tip 16 Fr. 3 days         NG/OG Tube 03/19/25 0830 [place] sump 18 Fr. Right mouth 1 day              Airway  Duration                  Airway - Non-Surgical 03/18/25 1500 Endotracheal Tube 2 days              Peripheral Intravenous Line  Duration                  Peripheral IV - Single Lumen 03/12/25 0913 22 G Right;Distal Forearm 8 days         Peripheral IV - Single Lumen 03/12/25 0931 18 G Right;Anterior Forearm 8 days                    Significant Labs:    Lab Results   Component Value Date    WBC 14.79 (H) 03/21/2025    HGB 8.5 (L) 03/21/2025    HCT 26.8 (L) 03/21/2025    MCV 89.0 03/21/2025     03/21/2025         BMP  Lab Results   Component Value Date     03/21/2025    K 4.7 03/21/2025     03/21/2025    CO2 18 (L) 03/21/2025    .0 (H) 03/21/2025    CREATININE 5.39 (H) 03/21/2025    CALCIUM 7.9 (L) 03/21/2025    ANIONGAP 12 01/07/2025    ESTGFRAFRICA 36 01/07/2025    EGFRNONAA >60 08/17/2020       ABG  Recent Labs   Lab 03/21/25  0345   PH 7.432   PO2 102*   PCO2 30.1*   HCO3 20.1*   BE -4*         All pertinent labs within the past 24 hours have been reviewed.    Significant Imaging:   I have reviewed all pertinent imaging results/findings within the past 24 hours.  I have reviewed and interpreted all pertinent imaging results/findings within the past 24 hours.    Assessment/Plan:       Status post cardiorespiratory arrest with Rosc after 1 round of epi  Hypoxemic and hypercarbic respiratory failure  Healthcare acquired pneumonia with bilateral infiltrates  Worsening renal failure  Transaminitis following the code.-improved  Severe deconditioning pre/ post C2-6 PC df and C2-3, 4-5 decompression with screws on 01/28/2025  Probable anoxic encephalopathy  Positive COVID serology PCR of unknown clinical significance  Comorbidities:  COPD not quantitated, systolic heart failure with ejection fraction 30%, mild-to-moderate aortic stenosis,  status post AICD and pacemaker, diabetes mellitus, hypertension, hyperlipidemia   Plan:  Continue supportive care to see if he will regain significant neurological function  Continue broad-spectrum antibiotics  Use lowest FiO2 to keep sat greater than equal to 92%  Family to decide whether to pursue dialysis or transition to palliative care.       David Adkins MD  Pulmonology  [facility] - ICU

## 2025-03-22 ENCOUNTER — ANESTHESIA EVENT (OUTPATIENT)
Dept: SURGERY | Facility: HOSPITAL | Age: 70
End: 2025-03-22
Payer: MEDICARE

## 2025-03-22 ENCOUNTER — ANESTHESIA (OUTPATIENT)
Dept: SURGERY | Facility: HOSPITAL | Age: 70
End: 2025-03-22
Payer: MEDICARE

## 2025-03-22 PROCEDURE — D9220A PRA ANESTHESIA: Mod: ,,, | Performed by: NURSE ANESTHETIST, CERTIFIED REGISTERED

## 2025-03-22 PROCEDURE — 25000003 PHARM REV CODE 250: Performed by: NURSE ANESTHETIST, CERTIFIED REGISTERED

## 2025-03-22 PROCEDURE — 63600175 PHARM REV CODE 636 W HCPCS: Performed by: NURSE ANESTHETIST, CERTIFIED REGISTERED

## 2025-03-22 RX ORDER — ROCURONIUM BROMIDE 10 MG/ML
INJECTION, SOLUTION INTRAVENOUS
Status: DISCONTINUED | OUTPATIENT
Start: 2025-03-22 | End: 2025-03-22

## 2025-03-22 RX ORDER — MIDAZOLAM HYDROCHLORIDE 1 MG/ML
INJECTION INTRAMUSCULAR; INTRAVENOUS
Status: DISCONTINUED | OUTPATIENT
Start: 2025-03-22 | End: 2025-03-22

## 2025-03-22 RX ADMIN — ROCURONIUM BROMIDE 30 MG: 10 INJECTION, SOLUTION INTRAVENOUS at 01:03

## 2025-03-22 RX ADMIN — MIDAZOLAM 2 MG: 1 INJECTION INTRAMUSCULAR; INTRAVENOUS at 01:03

## 2025-03-22 NOTE — PROGRESS NOTES
[facility] - ICU Hospital Medicine  Progress Note    Patient Name: Tyler Mai  MRN: [id number]  Patient Class: IP- Inpatient   Admission Date: 3/12/2025  Length of Stay: 10 days  Attending Physician: Yomi Jeffery MD  Primary Care Provider: Paxton Connors MD        Subjective:     Principal Problem:Acute respiratory failure with hypoxia and hypercapnia    Interval History:   HPI: Mr. Mai is a 69-year-old male who was transferred to the ED earlier today from a local SNF due to persistent shortness of breath.  His symptoms apparently began yesterday at which time he was diagnosed with pneumonia.  Symptoms worsened today, O2 saturations noted to be in the 80s on RA prompting his transfer to this facility.  With supplemental O2 his O2 saturation improved into the low 90s.  He was subsequently converted to BiPAP in the ED.  His current issues began in mid January at which time he underwent a rather extensive C-spine surgery involving multiple levels following which he was transferred to an inpatient rehab facility.  During that time he also developed some urinary retention and had an indwelling catheter for an extended period.  Catheter was apparently removed in rehab.  He was subsequently transferred to a SNF for continued physical therapy.  His evaluation in the ED was significant for anemia, leukocytosis, acute kidney injury, acute respiratory failure with hypercapnia and hypoxia, urinalysis consistent with UTI and CXR consistent with multilobar pneumonia.  Other findings included a significantly elevated BNP and an elevated troponin.  He was managed with a single dose of IV antibiotics and a nebulizer treatment.  On seeing him following his arrival to the ICU he was quite lethargic with a respiratory rate of 10 to 12 on BiPAP.  Due to his history of opioid use for chronic pain and recent C-spine surgery he was given a dose of Narcan after which he did awakened and become more  responsive, Respiratory rate improved to the 20s.  A subsequent ABG revealed a normal pH with moderate hypoxemia.    3/13-overnight became more agitated, refused to wear BiPAP; fortunately has been able to maintain adequate O2 saturations on nasal cannula O2; when seen on rounds this morning was very lethargic, poorly responsive; he opened his eyes to voice and tactile stimuli but otherwise unresponsive.  Did spike a temp last night to 101.3° F; he was given Tylenol and has not had another spike since that time    3/14-no events overnight; he has been afebrile over the past 24 hours; when seen on rounds this morning was sleepy but aroused easily to voice    3/15-no events overnight; when seen on rounds this morning was seated on the side of the bed working with PT; he was alert, normally interactive seemed to be in good spirits  Downgrade to M/S status    3/16-transferred to M/S unit yesterday evening; no events overnight; Respiratory status stable; remains on 3 L NC with O2 saturations of 94%    3/17-condition stable; when seen on rounds this morning was somewhat lethargic but this appears to be his baseline in the mornings; he becomes more interactive/attentive later in the day; he had no complaints; he remains generally very weak    March 18, 2025-the patient had low-grade fever 100.4, can maintain 92% O2 sat with 3 L OxyMask, complain of generalized weakness    March 19, 2025-the patient was coded around 3:00 p.m. yesterday secondary to cardiopulmonary arrest, chest compression was performed the, the patient was intubated, after 1 ampule of epinephrine, pulse came back, the patient V-tach and shocked with a 200 joule, converted to normal sinus rhythm, the patient is intubated on PRVC mode of ventilator, without sedation, patient is unresponsive, patient has gag reflex but no corneal reflex, pupil is dilated not responding to light, family signed DNR    March 20, 2025-patient is intubated on PRVC mode of  ventilator, the patient is unresponsive without sedation, the patient has gag reflex but no corneal reflex, family is leaning toward withdrawal of life support next 1 or 2 days    March 21, 2025-the patient is intubated on PRVC mode of ventilator, the patient is not moving at all without sedation, the patient is afebrile, creatinine is worse to 5.3 from 4.4, waiting for Nephrology recommendation    March 22, 2025-creatinine is worse to 5.8 from 5.3, waiting for dialysis cath placement, no corneal reflex, no gag reflex, I had a long conversation with the daughter and son about poor prognosis    Objective:     Vital Signs (Most Recent):  Temp: 98.5 °F (36.9 °C) (03/22/25 0955)  Pulse: 87 (03/22/25 0955)  Resp: (!) 22 (03/22/25 0955)  BP: 112/68 (03/22/25 0955)  SpO2: 99 % (03/22/25 0955) Vital Signs (24h Range):  Temp:  [97.6 °F (36.4 °C)-98.5 °F (36.9 °C)] 98.5 °F (36.9 °C)  Pulse:  [73-95] 87  Resp:  [0-25] 22  SpO2:  [97 %-100 %] 99 %  BP: ()/(52-71) 112/68     Weight: 71.7 kg (158 lb 1.1 oz)  Body mass index is 22.68 kg/m².    Intake/Output Summary (Last 24 hours) at 3/22/2025 1017  Last data filed at 3/22/2025 0427  Gross per 24 hour   Intake 3419.35 ml   Output 30 ml   Net 3389.35 ml      Physical exam  Constitution-obese, normally developed male in NAD  Eyes-pupil is dilated not responding to light  HENT-normocephalic, atraumatic  Neck-supple; soft C-collar in place  Respiratory-normal respirations; CTA with good air movement  Heart-RRR; normal S1 and S2; no murmurs, gallops; AICD/ pacemaker palpated left chest wall  Abdomen-soft, nontender, nondistended  Genitourinary-deferred  Musculoskeletal-no joint abnormalities, ; no edema  Skin-warm, dry; stage I breakdown over coccyx; unstageable pressure ulcer right heel  Neurologic-intubated    Scheduled Meds:   albuterol-ipratropium  3 mL Nebulization Q6H    aspirin  81 mg Oral Daily    atorvastatin  40 mg Oral Daily    bisacodyL  5 mg Oral Daily    ceFEPime IV  (PEDS and ADULTS)  1 g Intravenous Q8H    clopidogreL  75 mg Oral Daily    DULoxetine  60 mg Oral Daily    insulin glargine U-100  15 Units Subcutaneous Daily    levoFLOXacin  750 mg Intravenous Q48H    methylPREDNISolone injection (PEDS and ADULTS)  60 mg Intravenous Q6H    montelukast  10 mg Oral Daily    pantoprazole  40 mg Intravenous Daily    potassium bicarbonate  20 mEq Oral Once    pregabalin  100 mg Oral TID    QUEtiapine  25 mg Oral QHS    ranolazine  500 mg Oral BID    sodium chloride 0.9%  10 mL Intravenous Q8H     Continuous Infusions:   0.9% NaCl   Intravenous Continuous 100 mL/hr at 03/22/25 0427 Rate Verify at 03/22/25 0427    propofoL  0-50 mcg/kg/min (Dosing Weight) Intravenous Continuous           PRN Meds:.  Current Facility-Administered Medications:     0.9%  NaCl infusion (for blood administration), , Intravenous, Q24H PRN    acetaminophen, 1,000 mg, Oral, Q6H PRN    bisacodyL, 10 mg, Rectal, Daily PRN    dextrose 50%, 12.5 g, Intravenous, PRN    glucagon (human recombinant), 1 mg, Intramuscular, PRN    insulin aspart U-100, 0-10 Units, Subcutaneous, QID (AC + HS) PRN    iopamidoL, 100 mL, Intravenous, ONCE PRN    naloxone, 0.4 mg, Intravenous, PRN    ondansetron, 4 mg, Intravenous, Q8H PRN    oxyCODONE, 10 mg, Oral, Q4H PRN    peg 400-hypromellose-glycerin, 1 drop, Both Eyes, PRN    Significant Labs: All pertinent labs within the past 24 hours have been reviewed.  ABGs:   Recent Labs   Lab 03/21/25 0345 03/22/25  0404   PH 7.432 7.396   PCO2 30.1* 29.8*   HCO3 20.1* 18.3*   POCSATURATED 98 96   BE -4* -7*   PO2 102* 81       CBC:   Recent Labs   Lab 03/21/25 0342 03/22/25  0344   WBC 14.79* 14.36*   HGB 8.5* 8.6*   HCT 26.8* 26.5*    139     CMP:   Recent Labs   Lab 03/21/25 0342 03/22/25  0344    137   K 4.7 5.2*    106   CO2 18* 15*   .0* 144.0*   CREATININE 5.39* 5.88*   CALCIUM 7.9* 7.6*   ALBUMIN 2.1*  --    BILITOT 0.6  --    ALKPHOS 149  --    *  --   "  *  --      Cardiac Markers:   No results for input(s): "CKMB", "MYOGLOBIN", "BNP", "TROPISTAT" in the last 48 hours.    Lactic Acid:   No results for input(s): "LACTATE" in the last 48 hours.    Magnesium:   Recent Labs   Lab 03/21/25  0342   MG 2.30     POCT Glucose:   Recent Labs   Lab 03/21/25  1637 03/22/25  0030 03/22/25  0400   POCTGLUCOSE 251* 215* 339*     Troponin:   No results for input(s): "TROPONINI", "TROPONINIHS" in the last 48 hours.      Urine Studies:   No results for input(s): "COLORU", "APPEARANCEUA", "PHUR", "SPECGRAV", "PROTEINUA", "GLUCUA", "KETONESU", "BILIRUBINUA", "OCCULTUA", "NITRITE", "UROBILINOGEN", "LEUKOCYTESUR", "RBCUA", "WBCUA", "BACTERIA", "SQUAMEPITHEL", "HYALINECASTS" in the last 48 hours.    Invalid input(s): "WRIGHTSUR"        Significant Imaging:   CXR, 3/12  Impression:  1. Interim increasing patchy hazy opacities throughout the right lung and left lower lung since the prior exam suspicious for infiltrate and or atelectasis  2. Borderline cardiomegaly  3. Atherosclerosis    CXR, 3/13  Impression:  1. Persistent scattered patchy hazy opacities throughout the lungs bilaterally suspicious for infiltrate, atelectasis, and or scarring  2. Atherosclerosis  3. Cardiac device left chest    Echo, 3/12    Left Ventricle: The left ventricle is normal in size. There is severely reduced systolic function with a visually estimated ejection fraction of less than 30%.    Right Ventricle: The right ventricle is normal in size. Systolic function is normal.    Left Atrium: Mildly dilated    Aortic Valve: There is mild to moderate stenosis. Aortic valve peak velocity is 2.1 m/s.    Mitral Valve: There is mild regurgitation.    Assessment/Plan:   Acute respiratory failure with hypoxia and hypercapnia  Intubated on PRVC mode of ventilator  Consult pulmonology     Multilobar healthcare acquired pneumonia with sepsis  Continue empiric antibiotic therapy with " cefepime/levofloxacin  Bronchodilator therapy  Short course of IV corticosteroid-completed  MRSA screen-negative  WBC has trended down, now normal  Check CT angio of chest--a bilateral moderate pulmonary edema      Acute kidney injury  Possible etiologies include volume depletion, sepsis, obstructive uropathy, sv CMO  Avoid nephrotoxic agents as possible  Consult Nephrology   Consult general surgeon for dialysis cath    Severe ischemic cardiomyopathy/chronic HFrEF/AICD-ppm status  Echo 1/25-EF less than 20% with moderate to sv aortic stenosis  Repeat echo 3/12-less than 30% with moderate aortic stenosis  Consult cardiology     Anemia  No active bleeding identified  Due to elevated troponin, history of CAD patient transfused, 3/12,  with 1 unit PRBCs  H&H stable    Transaminitis  He experienced bump in transaminases on 3/15, uncertain etiology  Transaminases have trended back down to normal     Elevated troponin  Troponin peaked at 0.22 to, trending down this morning  Type 2 MI related to respiratory failure     UTI  Managed with antibiotics as noted above     COPD  Respiratory measures as noted above     COVID positive  Undetermined significance  Pattern of pulmonary infiltrates inconsistent with COVID pneumonia     CAD/CABG  Currently in a-v paced rhythm  Continue ranolazine  Continue low-dose aspirin and clopidogrel     Diabetes mellitus  Blood sugars acceptable  Monitor blood sugars with Accu-Cheks and SSI  Adjust basal insulin as necessary     Hypertension  BP acceptable  Holding ARB due to JOHN  Attempt to up-titrate beta-blocker     PAD in bilateral carotid artery and bilateral lower extremity  DAPT as noted above     History of BPH with urinary retention  Urinary catheter removed this a.m., monitor PVRs     Recent multilevel C-spine surgery 1/25  Maintain soft C-collar    Severe debility  Continue PT     GI prophylaxis: Pantoprazole     Code status:  The patient is on DNR comfort measures only          Active  Diagnoses:    Diagnosis Date Noted POA    PRINCIPAL PROBLEM:  Acute respiratory failure with hypoxia and hypercapnia [J96.01, J96.02] 03/12/2025 Yes    Moderate malnutrition [E44.0] 03/20/2025 Unknown      Problems Resolved During this Admission:     VTE Risk Mitigation (From admission, onward)           Ordered     IP VTE HIGH RISK PATIENT  Once         03/12/25 1237     Place sequential compression device  Until discontinued         03/12/25 1237                    Gustavo Hernández MD  Department of Hospital Medicine   [facility]

## 2025-03-22 NOTE — ANESTHESIA PREPROCEDURE EVALUATION
03/22/2025  Tyler Mai is a 70 y.o., male with CAD status post CABG (EF 20-43%), CHF with pacemaker, moderate aortic stenosis peripheral artery disease status post left peripheral intervention, bilateral carotid stenosis (left greater than right), AAA, COPD and chronic pain syndrome  Patient was eventually deemed to be a moderate risk by cardio and during our hospitalization is now deemed a low risk.     Transthoracic echo January 6, 2025 ([facility])  EF less than 20% (25-30% in 2021)  Mild-to-moderate AS, trace AR/MR      Nucleolar stress January 8 2025   No evidence of reversible defect   EF 43-50%    Left heart catheterization 2018   Left main distal 40%   Lad 70% 1st diagonal  Left circumflex mild luminal irregularities  RCA distal 98% stenosis   LVEDP 10     Pre-op Assessment    I have reviewed the Patient Summary Reports.    I have reviewed the NPO Status.   I have reviewed the Medications.     Review of Systems  Anesthesia Hx:  No problems with previous Anesthesia               Denies Personal Hx of Anesthesia complications.                    Social:  Non-Smoker       Hematology/Oncology:       -- Anemia:                                  Cardiovascular:  Exercise tolerance: poor  Pacemaker Hypertension       CHF       ECG has been reviewed.    Functional Capacity 2 METS    Aortic Stenosis (AS), moderate       Congestive Heart Failure (CHF)      Peripheral Arterial Disease      Carotoid Artery Disease    Abdominal Aortic Aneurysm            Pulmonary:   COPD, mild                     Renal/:  Chronic Renal Disease, ARF                Hepatic/GI:     GERD, well controlled                Musculoskeletal:         Spine Disorders: cervical            Neurological:    Neuromuscular Disease,             Chronic Pain Syndrome                         Endocrine:  Diabetes, type 2           Psych:  Psychiatric  History                  Physical Exam  General: Unconscious    Airway:  Mallampati: II   Mouth Opening: Normal  TM Distance: Normal  Tongue: Normal  Neck ROM: Normal ROM  Pre-Existing Airway: Oral Endotracheal tube  Full beard  Dental:  Periodontal disease  All teeth chipped missing are cracked  Chest/Lungs:  Clear to auscultation, Normal Respiratory Rate    Heart:  Rate: Normal  Rhythm: Regular Rhythm        Anesthesia Plan  Type of Anesthesia, risks & benefits discussed:    Anesthesia Type: Gen ETT  Intra-op Monitoring Plan: Standard ASA Monitors  Post Op Pain Control Plan: multimodal analgesia  Induction:  IV  Informed Consent: Informed consent signed with the Patient and all parties understand the risks and agree with anesthesia plan.  All questions answered. Patient consented to blood products? Yes  ASA Score: 4 Emergent  Day of Surgery Review of History & Physical: H&P Update referred to the surgeon/provider.    Ready For Surgery From Anesthesia Perspective.     .

## 2025-03-22 NOTE — OP NOTE
[facility] - ICU  Surgery Department  Operative Note    SUMMARY     Date of Procedure: 3/22/2025     Procedure: Procedure(s) (LRB):  INSERTION, CATHETER, HEMODIALYSIS, DUAL LUMEN (N/A)   Right femoral groin  Surgeons and Role:     * Quang Escobedo MD - Primary    Assisting Surgeon: None    Pre-Operative Diagnosis: * No pre-op diagnosis entered *    Post-Operative Diagnosis: Post-Op Diagnosis Codes:     * Acute renal failure, unspecified acute renal failure type [N17.9]  INSERTION, CATHETER, HEMODIALYSIS, DUAL LUMEN (N/A)   Right femoral groin  Anesthesia: General    Description of the Procedure:  Patient was in the supine position prepped and draped in a sterile fashion in the right groin had a Finders needle access of the right femoral vein with insertion of the vascular access needle and a guidewire was placed.  Dilator was then placed behind the guidewire and a double-lumen catheter was placed over the guidewire after the dilator was removed.  Pelvic film was used to verify position of the catheter good blood return was noted no pulsatile flow was noted about coloration of blood was appropriate.  Should be noted catheter was sutured with 0 silk.  Sterile dressings were applied no problems were encountered.        Complications: Yes -         Estimated Blood Loss (EBL): * No values recorded between 3/22/2025  1:28 PM and 3/22/2025  1:49 PM *           Implants: * No implants in log *    Specimens:   Specimen (24h ago, onward)      None                    Condition: Good    disposition: PACU - hemodynamically stable.

## 2025-03-22 NOTE — CONSULTS
[facility] - ICU  General Surgery  Consult Note    Patient Name: Tyler Mai  MRN: [id number]  Code Status: DNR  Admission Date: 3/12/2025  Hospital Length of Stay: 10 days  Attending Physician: Yomi Jeffery MD  Primary Care Provider: Paxton Connors MD      Staff present during examination : Owen Cuevas RN      Consults  Subjective:     Chief Complaint/Reason for Admission:  Patient is a 69-year-old male with a history of persistent shortness of breath transferred from the local sniff unit.  Patient came to the emergency room was noted to be anemic with a leuks leukocytosis acute kidney injury acute respiratory failure hypercapnic with hypoxemia.  Patient also was noted to have a UTI and chest x-ray showed multilobar pneumonia.  Patient was started on IV antibiotics and nebulizer treatments.  Patient has had a rather extensive C-spine surgery multiple levels requiring collar in January of 2025.  Patient was transferred to the intensive care unit for further treatment and care.  Ultimately while in ICU he required intubation and ventilatory support.  He now has renal failure and needs a temporary Medcomp catheter for placement.  I was consulted for placement of a temporary dialysis catheter.  Right groin was used as a point of access primarily because of his soft C-collar already in place   History of Present Illness:  Noted above       No current facility-administered medications on file prior to encounter.     Current Outpatient Medications on File Prior to Encounter   Medication Sig    albuterol (PROVENTIL) 2.5 mg /3 mL (0.083 %) nebulizer solution Take 2.5 mg by nebulization every 6 (six) hours.    cholecalciferol, vitamin D3, 1,250 mcg (50,000 unit) capsule Take 50,000 Units by mouth every 7 days.    DULoxetine (CYMBALTA) 60 MG capsule Take 60 mg by mouth once daily.    montelukast (SINGULAIR) 10 mg tablet Take 10 mg by mouth once daily.    oxyCODONE (ROXICODONE) 10 mg Tab  immediate release tablet Take 10 mg by mouth every 4 (four) hours as needed for Pain.    acetaminophen (TYLENOL) 325 MG tablet Take 650 mg by mouth every 4 (four) hours as needed for Pain.    albuterol (PROVENTIL/VENTOLIN HFA) 90 mcg/actuation inhaler 1 puff every 4 (four) hours as needed.    amLODIPine (NORVASC) 10 MG tablet Take 1 tablet (10 mg total) by mouth once daily.    ascorbic acid, vitamin C, (VITAMIN C) 500 MG tablet Take 500 mg by mouth once daily.    aspirin 81 MG Chew Take 1 tablet (81 mg total) by mouth once daily.    [] bisacodyL (DULCOLAX) 5 mg EC tablet Take 1 tablet (5 mg total) by mouth once daily.    carvediloL (COREG) 25 MG tablet Take 1 tablet (25 mg total) by mouth 2 (two) times daily before meals.    clopidogreL (PLAVIX) 75 mg tablet Take 1 tablet (75 mg total) by mouth once daily.    finasteride (PROSCAR) 5 mg tablet Take 1 tablet (5 mg total) by mouth once daily.    furosemide (LASIX) 20 MG tablet Take 1 tablet (20 mg total) by mouth once daily.    LINZESS 145 mcg Cap capsule Take 145 mcg by mouth every morning.    losartan (COZAAR) 50 MG tablet Take 1 tablet (50 mg total) by mouth before breakfast.    [] magnesium oxide (MAG-OX) 400 mg (241.3 mg magnesium) tablet Take 1 tablet (400 mg total) by mouth 3 (three) times daily.    multivitamin (ONE DAILY MULTIVITAMIN) per tablet Take 1 tablet by mouth once daily.    pantoprazole (PROTONIX) 40 MG tablet Take 1 tablet (40 mg total) by mouth once daily.    [] piperacillin sodium/tazobactam (PIPERACILLIN-TAZOBACTAM 3.375 G/100 ML D5W, READY TO MIX,) Inject 3.375 g into the vein every 6 (six) hours.    pregabalin (LYRICA) 100 MG capsule Take 1 capsule (100 mg total) by mouth 3 (three) times daily.    QUEtiapine (SEROQUEL) 25 MG Tab Take 1 tablet (25 mg total) by mouth every evening.    ranolazine (RANEXA) 500 MG Tb12 Take 500 mg by mouth 2 (two) times daily.    rosuvastatin (CRESTOR) 40 MG Tab Take 40 mg by mouth every evening.     zinc sulfate (ZINCATE) 50 mg zinc (220 mg) capsule Take 220 mg by mouth once daily.       Review of patient's allergies indicates:   Allergen Reactions    Hydrochlorothiazide      Other Reaction(s): Pancreatitis       Immunization History   Administered Date(s) Administered    COVID-19, mRNA, LNP-S, bivalent booster, PF (Moderna Omicron)12 + YEARS 04/03/2023    Tdap 02/20/2025       Past Medical History:   Diagnosis Date    Anxiety disorder, unspecified     Arthritis     CAD (coronary artery disease)     Cervical radiculopathy     CHF (congestive heart failure)     Chronic pain syndrome     COPD (chronic obstructive pulmonary disease)     Diabetes mellitus     Gastro-esophageal reflux disease without esophagitis     High cholesterol     Hypertension     Lumbar radiculopathy     Myelomalacia of cervical cord     Other specified diseases of spinal cord     Pacemaker     PVD (peripheral vascular disease)     S/P triple vessel bypass     Tobacco use      Past Surgical History:   Procedure Laterality Date    ANTERIOR CERVICAL DISCECTOMY W/ FUSION  1995    C5-6 and C6-7.  Dr. Wall    ANTERIOR CERVICAL DISCECTOMY W/ FUSION  1996    Redo C5-6, C6-7.  Dr. Wall    CHOLECYSTECTOMY      CORONARY ARTERY BYPASS GRAFT      FUSION OF POSTERIOR COLUMN OF CERVICAL SPINE USING COMPUTER AIDED NAVIGATION N/A 1/24/2025    Procedure: FUSION, SPINE, POSTERIOR SPINAL COLUMN, CERVICAL, USING COMPUTER-ASSISTED NAVIGATION;  Surgeon: Darrell Watson MD;  Location: [facility];  Service: Neurosurgery;  Laterality: N/A;  C2-C6 PCF; C2/3-C4/5 decompression, possible additional levels  o-arm  NTI  TIVA setup  [employer] //  XX    INSERTION OF PACEMAKER      X2    TRIPPLE VESSEL BYPASS       Family History       Problem Relation (Age of Onset)    Cancer Mother, Father    Lumbar disc disease Brother          Tobacco Use    Smoking status: Every Day     Current packs/day: 1.00     Types: Cigarettes    Smokeless tobacco: Not on file   Substance  and Sexual Activity    Alcohol use: Not on file    Drug use: Not on file    Sexual activity: Not on file       Review of Systems:  12 point ROS negative except as stated in HPI.      Objective:     Vital Signs (Most Recent):  Temp: 99.2 °F (37.3 °C) (03/22/25 1159)  Pulse: 93 (03/22/25 1240)  Resp: (!) 21 (03/22/25 1240)  BP: 110/65 (03/22/25 1200)  SpO2: 99 % (03/22/25 1240) Vital Signs (24h Range):  Temp:  [97.6 °F (36.4 °C)-99.2 °F (37.3 °C)] 99.2 °F (37.3 °C)  Pulse:  [84-95] 93  Resp:  [0-25] 21  SpO2:  [97 %-100 %] 99 %  BP: ()/(52-71) 110/65     Weight: 71.7 kg (158 lb 1.1 oz)  Body mass index is 22.68 kg/m².      Intake/Output Summary (Last 24 hours) at 3/22/2025 1357  Last data filed at 3/22/2025 0427  Gross per 24 hour   Intake 3419.35 ml   Output 30 ml   Net 3389.35 ml       Physical Exam:  General:  Well developed, well nourished, no acute distress  HEENT:  Normocephalic, atraumatic, PERRL, EOMI, clear sclera, ears normal, neck supple, throat clear without erythema or exudates  CVS:  RRR, S1 and S2 normal, no murmurs, rubs, gallops  Resp:  Lungs clear to auscultation, no wheezes, rales, rhonchi, cough  GI:  Abdomen soft, non-tender, non-distended, normoactive bowel sounds, no masses.     :  Deferred  MSK:  No muscle atrophy, cyanosis, peripheral edema, full range of motion  Skin:  No rashes, ulcers, erythema  Neuro:  CNII-XII grossly intact  Psych:  Alert and oriented to person, place, and time    Labs:  Recent Labs     03/20/25  0411 03/21/25  0342 03/22/25  0344   WBC 12.76* 14.79* 14.36*   HGB 8.6* 8.5* 8.6*   HCT 26.4* 26.8* 26.5*    153 139     Recent Labs     03/20/25  0411 03/21/25  0342 03/22/25  0344    139 137   K 4.4 4.7 5.2*    106 106   CO2 23 18* 15*   BUN 99.0* 125.0* 144.0*   CREATININE 4.43* 5.39* 5.88*   CALCIUM 8.0* 7.9* 7.6*   MG 2.10 2.30  --    ALBUMIN  --  2.1*  --    BILITOT  --  0.6  --    AST  --  258*  --    ALKPHOS  --  149  --    ALT  --  661*  --         Imaging:  No results found in the last 24 hours.      Assessment/Plan:   Patient is a 69-year-old male with a history of persistent shortness of breath transferred from the local sniff unit.  Patient came to the emergency room was noted to be anemic with a leuks leukocytosis acute kidney injury acute respiratory failure hypercapnic with hypoxemia.  Patient also was noted to have a UTI and chest x-ray showed multilobar pneumonia.  Patient was started on IV antibiotics and nebulizer treatments.  Patient has had a rather extensive C-spine surgery multiple levels requiring collar in January of 2025.  Patient was transferred to the intensive care unit for further treatment and care.  Ultimately while in ICU he required intubation and ventilatory support.  He now has renal failure and needs a temporary Medcomp catheter for placement.  I was consulted for placement of a temporary dialysis catheter.  Right groin was used as a point of access primarily because of his soft C-collar already in place        Thank you for your consult. I will sign off. Please contact us if you have any additional questions.    Quang Escobedo MD  General Surgery  [facility] - ICU

## 2025-03-22 NOTE — PLAN OF CARE
Problem: Adult Inpatient Plan of Care  Goal: Plan of Care Review  Outcome: Progressing  Goal: Patient-Specific Goal (Individualized)  Outcome: Progressing  Goal: Absence of Hospital-Acquired Illness or Injury  Outcome: Progressing  Goal: Optimal Comfort and Wellbeing  Outcome: Progressing  Goal: Readiness for Transition of Care  Outcome: Progressing     Problem: Skin Injury Risk Increased  Goal: Skin Health and Integrity  Outcome: Progressing     Problem: Diabetes Comorbidity  Goal: Blood Glucose Level Within Targeted Range  Outcome: Progressing     Problem: Wound  Goal: Optimal Coping  Outcome: Progressing  Goal: Optimal Functional Ability  Outcome: Progressing  Goal: Absence of Infection Signs and Symptoms  Outcome: Progressing  Goal: Improved Oral Intake  Outcome: Progressing  Goal: Optimal Pain Control and Function  Outcome: Progressing  Goal: Skin Health and Integrity  Outcome: Progressing  Goal: Optimal Wound Healing  Outcome: Progressing     Problem: Fall Injury Risk  Goal: Absence of Fall and Fall-Related Injury  Outcome: Progressing     Problem: Gas Exchange Impaired  Goal: Optimal Gas Exchange  Outcome: Progressing     Problem: Infection  Goal: Absence of Infection Signs and Symptoms  Outcome: Progressing     Problem: Comorbidity Management  Goal: Maintenance of COPD Symptom Control  Outcome: Progressing  Goal: Maintenance of Heart Failure Symptom Control  Outcome: Progressing  Goal: Blood Pressure in Desired Range  Outcome: Progressing  Goal: Maintenance of Osteoarthritis Symptom Control  Outcome: Progressing     Problem: Pain Acute  Goal: Optimal Pain Control and Function  Outcome: Progressing     Problem: Mechanical Ventilation Invasive  Goal: Effective Communication  Outcome: Progressing  Goal: Optimal Device Function  Outcome: Progressing  Goal: Mechanical Ventilation Liberation  Outcome: Progressing  Goal: Optimal Nutrition Delivery  Outcome: Progressing  Goal: Absence of Device-Related Skin and  Tissue Injury  Outcome: Progressing  Goal: Absence of Ventilator-Induced Lung Injury  Outcome: Progressing     Problem: Hemodialysis  Goal: Safe, Effective Therapy Delivery  Outcome: Progressing  Goal: Effective Tissue Perfusion  Outcome: Progressing  Goal: Absence of Infection Signs and Symptoms  Outcome: Progressing

## 2025-03-22 NOTE — ANESTHESIA POSTPROCEDURE EVALUATION
Anesthesia Post Evaluation    Patient: Tyler Mai    Procedure(s) Performed: Procedure(s) (LRB):  INSERTION, CATHETER, HEMODIALYSIS, DUAL LUMEN (N/A)    Final Anesthesia Type: general      Patient location during evaluation: ICU  Patient participation: Yes- Able to Participate  Level of consciousness: awake and alert  Post-procedure vital signs: reviewed and stable  Pain management: adequate  Airway patency: patent  REGINE mitigation strategies: Multimodal analgesia  PONV status at discharge: No PONV  Anesthetic complications: no      Cardiovascular status: blood pressure returned to baseline  Respiratory status: ETT and intubated  Hydration status: euvolemic  Follow-up not needed.              Vitals Value Taken Time   /56 03/22/25 13:49   Temp 37.3 °C (99.2 °F) 03/22/25 11:59   Pulse 94 03/22/25 13:49   Resp 20 03/22/25 13:49   SpO2 97 % 03/22/25 13:49   Vitals shown include unfiled device data.      No case tracking events are documented in the log.      Pain/Christopher Score: No data recorded

## 2025-03-22 NOTE — CONSULTS
Inpatient Nutrition Assessment    Admit Date: 3/12/2025   Total duration of encounter: 10 days   Patient Age: 70 y.o.    Nutrition Recommendation/Prescription     Continue enteral tube feedings with Diabetisource AC and increase rate to 25 mL/hr x 8 hrs if pt remains hemodynamically stable increase rate by 10 mL every 8 hours to goal rate of 75 mL/hr to provide 1800 kcals; 90 g protein; 1224 mL water.   Consider free water flush of 30 mL every hour to provide an additional 600 mL of free water.   Monitor renal function. Pt currently has good urine output per nursing.   Monitor lytes and replete as needed.   Daily weight and labs.   Monitor, tolerance, rate, weight, and labs.     RD following and available as needed. Thank you.     Communication of Recommendations: reviewed with nurse and EMR.    Nutrition Assessment     Malnutrition Assessment/Nutrition-Focused Physical Exam       Malnutrition Level: moderate (03/20/25 1437)  Energy Intake (Malnutrition): other (see comments) (Does not meet criteria) (03/20/25 1437)     Subcutaneous Fat (Malnutrition): moderate depletion (03/20/25 1437)  Orbital Region (Subcutaneous Fat Loss): moderate depletion        Muscle Mass (Malnutrition): moderate depletion (03/20/25 1437)  [place] Region (Muscle Loss): moderate depletion  Clavicle Bone Region (Muscle Loss): moderate depletion  Clavicle and Acromion Bone Region (Muscle Loss): moderate depletion                          A minimum of two characteristics is recommended for diagnosis of either severe or non-severe malnutrition.    Chart Review    Reason Seen: continuous nutrition monitoring, physician consult for Tube Feeding Management, and follow-up    Malnutrition Screening Tool Results   Have you recently lost weight without trying?: No  Have you been eating poorly because of a decreased appetite?: No   MST Score: 0   Diagnosis:  Acute Respiratory Failure with Hypoxia and Hypercapnia.     Relevant Medical History:   Anxiety  disorder, unspecified      Arthritis      CAD (coronary artery disease)      Cervical radiculopathy      CHF (congestive heart failure)      Chronic pain syndrome      COPD (chronic obstructive pulmonary disease)      Diabetes mellitus      Gastro-esophageal reflux disease without esophagitis      High cholesterol      Hypertension      Lumbar radiculopathy      Myelomalacia of cervical cord      Other specified diseases of spinal cord      Pacemaker      PVD (peripheral vascular disease)      S/P triple vessel bypass      Tobacco use          Scheduled Medications:  albuterol-ipratropium, 3 mL, Q6H  aspirin, 81 mg, Daily  atorvastatin, 40 mg, Daily  bisacodyL, 5 mg, Daily  ceFEPime IV (PEDS and ADULTS), 1 g, Q8H  clopidogreL, 75 mg, Daily  DULoxetine, 60 mg, Daily  insulin glargine U-100, 15 Units, Daily  levoFLOXacin, 750 mg, Q48H  methylPREDNISolone injection (PEDS and ADULTS), 60 mg, Q6H  montelukast, 10 mg, Daily  pantoprazole, 40 mg, Daily  potassium bicarbonate, 20 mEq, Once  pregabalin, 100 mg, TID  QUEtiapine, 25 mg, QHS  ranolazine, 500 mg, BID  sodium chloride 0.9%, 10 mL, Q8H    Continuous Infusions:  0.9% NaCl, Last Rate: 100 mL/hr at 03/22/25 0427  propofoL      PRN Medications:  0.9%  NaCl infusion (for blood administration), , Q24H PRN  acetaminophen, 1,000 mg, Q6H PRN  bisacodyL, 10 mg, Daily PRN  dextrose 50%, 12.5 g, PRN  glucagon (human recombinant), 1 mg, PRN  insulin aspart U-100, 0-10 Units, QID (AC + HS) PRN  iopamidoL, 100 mL, ONCE PRN  naloxone, 0.4 mg, PRN  ondansetron, 4 mg, Q8H PRN  oxyCODONE, 10 mg, Q4H PRN  peg 400-hypromellose-glycerin, 1 drop, PRN    Calorie Containing IV Medications: no significant kcals from medications at this time    Recent Labs   Lab 03/16/25  0442 03/17/25  0453 03/18/25  0457 03/18/25  1510 03/19/25  0339 03/20/25  0411 03/21/25  0342 03/22/25  0344    139 140 142 143 141 139 137   K 3.8 3.4* 3.7 4.5 4.1 4.4 4.7 5.2*   CALCIUM 8.7* 8.4* 8.6* 8.6* 8.5*  8.0* 7.9* 7.6*   PHOS  --   --   --   --  2.3  --   --   --    MG 2.60 1.70 1.70  --  1.90 2.10 2.30  --     103 103 105 105 104 106 106   CO2 28 29 30 23 26 23 18* 15*   BUN 52.0* 39.0* 30.0* 34.0* 56.0* 99.0* 125.0* 144.0*   CREATININE 1.58* 1.48* 1.34* 1.75* 2.88* 4.43* 5.39* 5.88*   EGFRNORACEVR 48 52 58 42 23 14 11 10   GLUCOSE 64* 115 124* 134* 210* 191* 242* 266*   BILITOT 0.3 0.4  --  0.8 1.0  --  0.6  --    ALKPHOS 110 100  --  155* 182*  --  149  --    ALT 76* 49  --  268* 1,623*  --  661*  --    AST 48* 24  --  403* 2,958*  --  258*  --    ALBUMIN 2.7* 2.5*  --  2.6* 2.4*  --  2.1*  --    WBC 8.64 9.87 14.12* 34.15* 12.89* 12.76* 14.79* 14.36*   HGB 8.5* 8.9* 8.8* 11.5* 8.9* 8.6* 8.5* 8.6*   HCT 26.8* 28.4* 28.4* 38.8* 27.5* 26.4* 26.8* 26.5*     Nutrition Orders:  No diet orders on file  Tube Feedings/Formulas 10; 300; Diabetisource AC; OG    Appetite/Oral Intake: NPO/NPO  Factors Affecting Nutritional Intake: NPO, on mechanical ventilation, respiratory status, and shortness of breath  Social Needs Impacting Access to Food: none identified  Food/Anglican/Cultural Preferences:  Sprite Zero with all meals.   Food Allergies: none reported  Last Bowel Movement: 03/21/25  Wound(s):     Wound 03/12/25 1140 Fissure Right Buttocks-Tissue loss description: Partial thickness Noted.     Comments      3/20: Consult received for Tube Feeding Management. Pt currently intubated on vent. Trickle feedings with Diabetisource AC were started last night @ 10 mL/hr and currently infusing @ 10 mL/hr with 10 mL of free water infusing every hour. 0.9%NaCl infusing @100 mL/hr. Renal indices concerning; however, nursing reports pt has good urine output. Current MAP 77. Needs reassessed and recommendations provided. Will continue to monitor during stay.     3/17: Diet was advanced to GI Soft. Pt with good appetite, tolerating meals. Consumed 25% of breakfast and 50% of lunch so far today. Still requiring supplemental  "oxygen. Will add Boost Glucose Control to assist with need and continue to monitor during stay.     3/13: Pt currently on CL diet, requiring oxymask. Discussed with nursing who reports pt is tolerating CL. No recent weight loss noted/reported. Labs and meds reviewed. Last A1C was 5.6 on 1/23/2025. Creatinine improving since admit. . Noted pt with good appetite prior to admit and pt prefers chocolate flavor ONS. Will add Boost Glucose Control Chocolate when able to advance diet beyond CL and continue to monitor during stay.     3/22/25: RD consulted for TF. Rec'd Diabetisource @ 75 mL/hr. Cr trending down from 5.8 to 5.3. pt awaiting dialysis cath placement. Poor prognosis noted. Labs/meds reviewed.    Anthropometrics    Height: 5' 10" (177.8 cm), Height Method: Stated  Last Weight: 71.7 kg (158 lb 1.1 oz) (03/19/25 0525), Weight Method: Bed Scale  BMI (Calculated): 22.7  BMI Classification: normal (BMI 18.5-24.9)        Ideal Body Weight (IBW), Male: 166 lb     % Ideal Body Weight, Male (lb): 104.92 %                          Usual Weight Provided By: EMR weight history    Wt Readings from Last 5 Encounters:   03/19/25 71.7 kg (158 lb 1.1 oz)   02/20/25 72.6 kg (160 lb)   02/19/25 73.3 kg (161 lb 9.6 oz)   01/18/25 78 kg (172 lb)   12/03/24 78.5 kg (173 lb)     Weight Change(s) Since Admission:   3/22: wt fluctuating; will continue to monitor and trend wts  Wt Readings from Last 1 Encounters:   03/19/25 0525 71.7 kg (158 lb 1.1 oz)   03/18/25 0928 74.7 kg (164 lb 10.9 oz)   03/15/25 0602 75.5 kg (166 lb 7.2 oz)   03/14/25 0600 72 kg (158 lb 11.7 oz)   03/12/25 2130 79 kg (174 lb 2.6 oz)   03/12/25 1341 79 kg (174 lb 2.6 oz)   03/12/25 0854 77.1 kg (170 lb)   Admit Weight: 77.1 kg (170 lb) (03/12/25 0854), Weight Method: Bed Scale    Estimated Needs    Weight Used For Calorie Calculations: 72 kg (158 lb 11.7 oz)  Energy Calorie Requirements (kcal): 1800 kcals/day (SF 1.2)  Energy Need Method: Draper-St " Jeor  Weight Used For Protein Calculations: 75 kg (165 lb 5.5 oz) (IBW used to estimate protein needs.)  Protein Requirements: 90 gm/day (1.2 g/kg/IBW).  Fluid Requirements (mL): 1800 mL/day (1mL/kcal) or per MD Guidance.  CHO Requirement: 203 g/day (45% of Kcals).     Enteral Nutrition     Formula: Diabetisource AC  Rate/Volume: 75   Water Flushes: 30 mL QH  Additives/Modulars: none at this time  Route: orogastric tube  Method: continuous  Total Nutrition Provided by Tube Feeding, Additives, and Flushes:  Calories Provided  1800 kcal/d, 100% needs   Protein Provided  90 g/d, 100% needs   Fluid Provided  1824 ml/d, 101% needs   Continuous feeding calculations based on estimated 20 hr/d run time unless otherwise stated.    Parenteral Nutrition     Patient not receiving parenteral nutrition support at this time.    Evaluation of Received Nutrient Intake    Calories: not meeting estimated needs   Protein: not meeting estimated needs      Patient Education     Not applicable.    Nutrition Diagnosis     PES: Inadequate oral intake related to acute illness as evidenced by Pt NPO, intubated on vent. (active)      PES:   Related to Inadequate energy intake. Pt intubated on vent.  As Evidenced by:  - muscle mass depletion: 1 area of mild muscle loss (Temporalis) - loss of subcutaneous fat: 1 area of mild fat loss (Buccal) active    Nutrition Interventions     Intervention(s): modified composition of enteral nutrition, modified rate of enteral nutrition, and collaboration with other providers  Intervention(s): Enteral nutrition (Initiate trickle feedings.)    Goal: Tolerate enteral feeding at goal rate by follow-up. (goal progressing)    Nutrition Goals & Monitoring     Dietitian will monitor: energy intake, enteral nutrition intake, weight, weight change, electrolyte/renal panel, glucose/endocrine profile, and gastrointestinal profile  Discharge planning: too early to determine; pending clinical course  Nutrition  Risk/Follow-Up: high (follow-up in 1-4 days)   Please consult if re-assessment needed sooner.

## 2025-03-23 NOTE — PLAN OF CARE
Problem: Adult Inpatient Plan of Care  Goal: Plan of Care Review  3/23/2025 1638 by Owen Cuevas RN  Outcome: Progressing  3/23/2025 1638 by Owen Cuevsa RN  Outcome: Not Progressing  3/23/2025 1638 by Owen Cuevas RN  Outcome: Progressing  Goal: Patient-Specific Goal (Individualized)  3/23/2025 1638 by Owen Cuevas RN  Outcome: Progressing  3/23/2025 1638 by Owen Cuevas RN  Outcome: Not Progressing  3/23/2025 1638 by Owen Cuevas RN  Outcome: Progressing  Goal: Absence of Hospital-Acquired Illness or Injury  3/23/2025 1638 by Owen Cuevas RN  Outcome: Progressing  3/23/2025 1638 by Owen Cuevas RN  Outcome: Not Progressing  3/23/2025 1638 by Owen Cuevas RN  Outcome: Progressing  Goal: Optimal Comfort and Wellbeing  3/23/2025 1638 by Owen Cuevas RN  Outcome: Progressing  3/23/2025 1638 by Owen Cuevas RN  Outcome: Not Progressing  3/23/2025 1638 by Owen Cuevas RN  Outcome: Progressing  Goal: Readiness for Transition of Care  3/23/2025 1638 by Owen Cuevas RN  Outcome: Progressing  3/23/2025 1638 by Owen Cuevas RN  Outcome: Not Progressing  3/23/2025 1638 by Owen Cuevas RN  Outcome: Progressing     Problem: Skin Injury Risk Increased  Goal: Skin Health and Integrity  3/23/2025 1638 by Owen Cuevas RN  Outcome: Progressing  3/23/2025 1638 by Owen Cuevas RN  Outcome: Not Progressing  3/23/2025 1638 by Owen Cuevas RN  Outcome: Progressing     Problem: Diabetes Comorbidity  Goal: Blood Glucose Level Within Targeted Range  3/23/2025 1638 by Owen Cuevas RN  Outcome: Progressing  3/23/2025 1638 by Owen Cuevas RN  Outcome: Not Progressing  3/23/2025 1638 by Owen Cuevas RN  Outcome: Progressing     Problem: Wound  Goal: Optimal Coping  3/23/2025 1638 by Owen Cuevas RN  Outcome: Progressing  3/23/2025 1638 by Owen Cuevas RN  Outcome: Not Progressing  3/23/2025 1638 by Owen Cuevas RN  Outcome: Progressing  Goal: Optimal Functional Ability  3/23/2025 1638 by Owen Cuevas RN  Outcome: Progressing  3/23/2025 1638  by Faith, Owen, RN  Outcome: Not Progressing  3/23/2025 1638 by Owen Cuevas RN  Outcome: Progressing  Goal: Absence of Infection Signs and Symptoms  3/23/2025 1638 by Owen Cuevas RN  Outcome: Progressing  3/23/2025 1638 by Owen Cuevas RN  Outcome: Not Progressing  3/23/2025 1638 by Oewn Cuevas RN  Outcome: Progressing  Goal: Improved Oral Intake  3/23/2025 1638 by Owen Cuevas RN  Outcome: Progressing  3/23/2025 1638 by Owen Cuevas RN  Outcome: Not Progressing  3/23/2025 1638 by Owen Cuevas RN  Outcome: Progressing  Goal: Optimal Pain Control and Function  3/23/2025 1638 by Owen Cuevas RN  Outcome: Progressing  3/23/2025 1638 by Owen Cuevas RN  Outcome: Not Progressing  3/23/2025 1638 by Owen Cuevas RN  Outcome: Progressing  Goal: Skin Health and Integrity  3/23/2025 1638 by Owen Cuevas RN  Outcome: Progressing  3/23/2025 1638 by Owen Cuevas RN  Outcome: Not Progressing  3/23/2025 1638 by Owen Cuevas RN  Outcome: Progressing  Goal: Optimal Wound Healing  3/23/2025 1638 by Owen Cuevas RN  Outcome: Progressing  3/23/2025 1638 by Owen Cuevas RN  Outcome: Not Progressing  3/23/2025 1638 by Owen Cuevas RN  Outcome: Progressing     Problem: Fall Injury Risk  Goal: Absence of Fall and Fall-Related Injury  3/23/2025 1638 by Owen Cuevas RN  Outcome: Progressing  3/23/2025 1638 by Owen Cuevas RN  Outcome: Not Progressing  3/23/2025 1638 by Owen Cuevas RN  Outcome: Progressing     Problem: Gas Exchange Impaired  Goal: Optimal Gas Exchange  3/23/2025 1638 by Owen Cuevas RN  Outcome: Progressing  3/23/2025 1638 by Owen Cuevas RN  Outcome: Not Progressing  3/23/2025 1638 by Owen Cuevas RN  Outcome: Progressing     Problem: Infection  Goal: Absence of Infection Signs and Symptoms  3/23/2025 1638 by Owen Cuevas RN  Outcome: Progressing  3/23/2025 1638 by Owen Cuevas RN  Outcome: Not Progressing  3/23/2025 1638 by Owen Cuevas RN  Outcome: Progressing     Problem: Comorbidity Management  Goal: Maintenance of  COPD Symptom Control  3/23/2025 1638 by Owen Cuevas RN  Outcome: Progressing  3/23/2025 1638 by Owen Cuevas RN  Outcome: Not Progressing  3/23/2025 1638 by Owen Cuevas RN  Outcome: Progressing  Goal: Maintenance of Heart Failure Symptom Control  3/23/2025 1638 by Owen Cuevas RN  Outcome: Progressing  3/23/2025 1638 by Owen Cuevas RN  Outcome: Not Progressing  3/23/2025 1638 by Owen Cuevas RN  Outcome: Progressing  Goal: Blood Pressure in Desired Range  3/23/2025 1638 by Owen Cuevas RN  Outcome: Progressing  3/23/2025 1638 by Owen Cuevas RN  Outcome: Not Progressing  3/23/2025 1638 by Owen Cuevas RN  Outcome: Progressing  Goal: Maintenance of Osteoarthritis Symptom Control  3/23/2025 1638 by Owen Cuevas RN  Outcome: Progressing  3/23/2025 1638 by Owen Cuevas RN  Outcome: Not Progressing  3/23/2025 1638 by Owen Cuevas RN  Outcome: Progressing     Problem: Pain Acute  Goal: Optimal Pain Control and Function  3/23/2025 1638 by Owen Cuevas RN  Outcome: Progressing  3/23/2025 1638 by Owen Cuevas RN  Outcome: Not Progressing  3/23/2025 1638 by Owen Cuevas RN  Outcome: Progressing     Problem: Mechanical Ventilation Invasive  Goal: Effective Communication  3/23/2025 1638 by Owen Cuevas RN  Outcome: Progressing  3/23/2025 1638 by Owen Cuevas RN  Outcome: Not Progressing  3/23/2025 1638 by Owen Cuevas RN  Outcome: Progressing  Goal: Optimal Device Function  3/23/2025 1638 by Owen Cuevas RN  Outcome: Progressing  3/23/2025 1638 by Owen Cuevas RN  Outcome: Not Progressing  3/23/2025 1638 by Owen Cuevas RN  Outcome: Progressing  Goal: Mechanical Ventilation Liberation  3/23/2025 1638 by Owen Cuevas RN  Outcome: Progressing  3/23/2025 1638 by Owen Cuevas RN  Outcome: Not Progressing  3/23/2025 1638 by Owen Cuevas RN  Outcome: Progressing  Goal: Optimal Nutrition Delivery  3/23/2025 1638 by Owen Cuevas RN  Outcome: Progressing  3/23/2025 1638 by Owen Cuevas RN  Outcome: Not Progressing  3/23/2025 1638 by  Faith, Owen, RN  Outcome: Progressing  Goal: Absence of Device-Related Skin and Tissue Injury  3/23/2025 1638 by Owen Cuevas RN  Outcome: Progressing  3/23/2025 1638 by Owen Cuevas RN  Outcome: Not Progressing  3/23/2025 1638 by Owen Cuevas RN  Outcome: Progressing  Goal: Absence of Ventilator-Induced Lung Injury  3/23/2025 1638 by Owen Cuevas RN  Outcome: Progressing  3/23/2025 1638 by Owen Cuevas RN  Outcome: Not Progressing  3/23/2025 1638 by Owen Cuevas RN  Outcome: Progressing     Problem: Hemodialysis  Goal: Safe, Effective Therapy Delivery  3/23/2025 1638 by Owen Cuevas RN  Outcome: Progressing  3/23/2025 1638 by Owen Cuevas RN  Outcome: Not Progressing  3/23/2025 1638 by Owen Cuevas RN  Outcome: Progressing  Goal: Effective Tissue Perfusion  3/23/2025 1638 by Owen Cuevas RN  Outcome: Progressing  3/23/2025 1638 by Owen Cuevas RN  Outcome: Not Progressing  3/23/2025 1638 by Owen Cuevas RN  Outcome: Progressing  Goal: Absence of Infection Signs and Symptoms  3/23/2025 1638 by Owen Cuevas RN  Outcome: Progressing  3/23/2025 1638 by Owen Cuevas RN  Outcome: Not Progressing  3/23/2025 1638 by Owen Cuevas RN  Outcome: Progressing

## 2025-03-23 NOTE — PLAN OF CARE
Problem: Adult Inpatient Plan of Care  Goal: Plan of Care Review  Outcome: Progressing  Goal: Patient-Specific Goal (Individualized)  Outcome: Progressing  Goal: Absence of Hospital-Acquired Illness or Injury  Outcome: Progressing  Goal: Optimal Comfort and Wellbeing  Outcome: Progressing  Goal: Readiness for Transition of Care  Outcome: Progressing     Problem: Skin Injury Risk Increased  Goal: Skin Health and Integrity  Outcome: Progressing     Problem: Diabetes Comorbidity  Goal: Blood Glucose Level Within Targeted Range  Outcome: Progressing     Problem: Wound  Goal: Optimal Coping  Outcome: Progressing  Goal: Optimal Functional Ability  Outcome: Progressing  Goal: Absence of Infection Signs and Symptoms  Outcome: Progressing  Goal: Improved Oral Intake  Outcome: Progressing  Goal: Optimal Pain Control and Function  Outcome: Progressing  Goal: Skin Health and Integrity  Outcome: Progressing  Goal: Optimal Wound Healing  Outcome: Progressing     Problem: Fall Injury Risk  Goal: Absence of Fall and Fall-Related Injury  Outcome: Progressing     Problem: Gas Exchange Impaired  Goal: Optimal Gas Exchange  Outcome: Progressing     Problem: Comorbidity Management  Goal: Maintenance of COPD Symptom Control  Outcome: Progressing  Goal: Maintenance of Heart Failure Symptom Control  Outcome: Progressing  Goal: Blood Pressure in Desired Range  Outcome: Progressing  Goal: Maintenance of Osteoarthritis Symptom Control  Outcome: Progressing     Problem: Infection  Goal: Absence of Infection Signs and Symptoms  Outcome: Progressing     Problem: Pain Acute  Goal: Optimal Pain Control and Function  Outcome: Progressing     Problem: Mechanical Ventilation Invasive  Goal: Effective Communication  Outcome: Progressing  Goal: Optimal Device Function  Outcome: Progressing  Goal: Mechanical Ventilation Liberation  Outcome: Progressing  Goal: Optimal Nutrition Delivery  Outcome: Progressing  Goal: Absence of Device-Related Skin and  Tissue Injury  Outcome: Progressing  Goal: Absence of Ventilator-Induced Lung Injury  Outcome: Progressing     Problem: Hemodialysis  Goal: Safe, Effective Therapy Delivery  Outcome: Progressing  Goal: Effective Tissue Perfusion  Outcome: Progressing  Goal: Absence of Infection Signs and Symptoms  Outcome: Progressing

## 2025-03-23 NOTE — PROGRESS NOTES
[facility] - ICU Hospital Medicine  Progress Note    Patient Name: Tyler Mai  MRN: [id number]  Patient Class: IP- Inpatient   Admission Date: 3/12/2025  Length of Stay: 11 days  Attending Physician: Yomi Jeffery MD  Primary Care Provider: Paxton Connors MD        Subjective:     Principal Problem:Acute respiratory failure with hypoxia and hypercapnia    Interval History:   HPI: Mr. Mai is a 69-year-old male who was transferred to the ED earlier today from a local SNF due to persistent shortness of breath.  His symptoms apparently began yesterday at which time he was diagnosed with pneumonia.  Symptoms worsened today, O2 saturations noted to be in the 80s on RA prompting his transfer to this facility.  With supplemental O2 his O2 saturation improved into the low 90s.  He was subsequently converted to BiPAP in the ED.  His current issues began in mid January at which time he underwent a rather extensive C-spine surgery involving multiple levels following which he was transferred to an inpatient rehab facility.  During that time he also developed some urinary retention and had an indwelling catheter for an extended period.  Catheter was apparently removed in rehab.  He was subsequently transferred to a SNF for continued physical therapy.  His evaluation in the ED was significant for anemia, leukocytosis, acute kidney injury, acute respiratory failure with hypercapnia and hypoxia, urinalysis consistent with UTI and CXR consistent with multilobar pneumonia.  Other findings included a significantly elevated BNP and an elevated troponin.  He was managed with a single dose of IV antibiotics and a nebulizer treatment.  On seeing him following his arrival to the ICU he was quite lethargic with a respiratory rate of 10 to 12 on BiPAP.  Due to his history of opioid use for chronic pain and recent C-spine surgery he was given a dose of Narcan after which he did awakened and become more  responsive, Respiratory rate improved to the 20s.  A subsequent ABG revealed a normal pH with moderate hypoxemia.    3/13-overnight became more agitated, refused to wear BiPAP; fortunately has been able to maintain adequate O2 saturations on nasal cannula O2; when seen on rounds this morning was very lethargic, poorly responsive; he opened his eyes to voice and tactile stimuli but otherwise unresponsive.  Did spike a temp last night to 101.3° F; he was given Tylenol and has not had another spike since that time    3/14-no events overnight; he has been afebrile over the past 24 hours; when seen on rounds this morning was sleepy but aroused easily to voice    3/15-no events overnight; when seen on rounds this morning was seated on the side of the bed working with PT; he was alert, normally interactive seemed to be in good spirits  Downgrade to M/S status    3/16-transferred to M/S unit yesterday evening; no events overnight; Respiratory status stable; remains on 3 L NC with O2 saturations of 94%    3/17-condition stable; when seen on rounds this morning was somewhat lethargic but this appears to be his baseline in the mornings; he becomes more interactive/attentive later in the day; he had no complaints; he remains generally very weak    March 18, 2025-the patient had low-grade fever 100.4, can maintain 92% O2 sat with 3 L OxyMask, complain of generalized weakness    March 19, 2025-the patient was coded around 3:00 p.m. yesterday secondary to cardiopulmonary arrest, chest compression was performed the, the patient was intubated, after 1 ampule of epinephrine, pulse came back, the patient V-tach and shocked with a 200 joule, converted to normal sinus rhythm, the patient is intubated on PRVC mode of ventilator, without sedation, patient is unresponsive, patient has gag reflex but no corneal reflex, pupil is dilated not responding to light, family signed DNR    March 20, 2025-patient is intubated on PRVC mode of  ventilator, the patient is unresponsive without sedation, the patient has gag reflex but no corneal reflex, family is leaning toward withdrawal of life support next 1 or 2 days    March 21, 2025-the patient is intubated on PRVC mode of ventilator, the patient is not moving at all without sedation, the patient is afebrile, creatinine is worse to 5.3 from 4.4, waiting for Nephrology recommendation    March 22, 2025-creatinine is worse to 5.8 from 5.3, waiting for dialysis cath placement, no corneal reflex, no gag reflex, I had a long conversation with the daughter and son about poor prognosis    March 23, 2025-nephrology removed 2 L yesterday and 1.8 L today, patient is intubated on PRVC mode of ventilator    Objective:     Vital Signs (Most Recent):  Temp: 97.9 °F (36.6 °C) (03/23/25 0900)  Pulse: 86 (03/23/25 0900)  Resp: 17 (03/23/25 0900)  BP: 104/65 (03/23/25 0900)  SpO2: 96 % (03/23/25 0900) Vital Signs (24h Range):  Temp:  [97.9 °F (36.6 °C)-99.2 °F (37.3 °C)] 97.9 °F (36.6 °C)  Pulse:  [85-93] 86  Resp:  [16-24] 17  SpO2:  [96 %-99 %] 96 %  BP: ()/(51-81) 104/65     Weight: 79 kg (174 lb 2.6 oz)  Body mass index is 24.99 kg/m².    Intake/Output Summary (Last 24 hours) at 3/23/2025 1007  Last data filed at 3/23/2025 0948  Gross per 24 hour   Intake 4271.16 ml   Output 3910 ml   Net 361.16 ml      Physical exam  Constitution-obese, normally developed male in NAD  Eyes-pupil is dilated not responding to light  HENT-normocephalic, atraumatic  Neck-supple; soft C-collar in place  Respiratory-normal respirations; CTA with good air movement  Heart-RRR; normal S1 and S2; no murmurs, gallops; AICD/ pacemaker palpated left chest wall  Abdomen-soft, nontender, nondistended  Genitourinary-deferred  Musculoskeletal-no joint abnormalities, bilateral upper extremity 2+ edema, bilateral lower extremity 2+ edema, scrotal edema  Skin-warm, dry; stage I breakdown over coccyx; unstageable pressure ulcer right  heel  Neurologic-intubated    Scheduled Meds:   albuterol-ipratropium  3 mL Nebulization Q6H    aspirin  81 mg Oral Daily    atorvastatin  40 mg Oral Daily    bisacodyL  5 mg Oral Daily    ceFEPime IV (PEDS and ADULTS)  1 g Intravenous Q8H    clopidogreL  75 mg Oral Daily    DULoxetine  60 mg Oral Daily    insulin glargine U-100  15 Units Subcutaneous Daily    levoFLOXacin  750 mg Intravenous Q48H    methylPREDNISolone injection (PEDS and ADULTS)  60 mg Intravenous Q6H    montelukast  10 mg Oral Daily    pantoprazole  40 mg Intravenous Daily    potassium bicarbonate  20 mEq Oral Once    pregabalin  75 mg Oral Daily    QUEtiapine  25 mg Oral QHS    ranolazine  500 mg Oral BID    sodium chloride 0.9%  10 mL Intravenous Q8H     Continuous Infusions:   0.9% NaCl   Intravenous Continuous 100 mL/hr at 03/23/25 0916 Rate Verify at 03/23/25 0916    propofoL  0-50 mcg/kg/min (Dosing Weight) Intravenous Continuous           PRN Meds:.  Current Facility-Administered Medications:     0.9%  NaCl infusion (for blood administration), , Intravenous, Q24H PRN    acetaminophen, 1,000 mg, Oral, Q6H PRN    bisacodyL, 10 mg, Rectal, Daily PRN    dextrose 50%, 12.5 g, Intravenous, PRN    glucagon (human recombinant), 1 mg, Intramuscular, PRN    insulin aspart U-100, 0-10 Units, Subcutaneous, QID (AC + HS) PRN    iopamidoL, 100 mL, Intravenous, ONCE PRN    naloxone, 0.4 mg, Intravenous, PRN    ondansetron, 4 mg, Intravenous, Q8H PRN    oxyCODONE, 10 mg, Oral, Q4H PRN    peg 400-hypromellose-glycerin, 1 drop, Both Eyes, PRN    Significant Labs: All pertinent labs within the past 24 hours have been reviewed.  ABGs:   Recent Labs   Lab 03/22/25  0404 03/23/25  0501   PH 7.396 7.411   PCO2 29.8* 29.2*   HCO3 18.3* 18.5*   POCSATURATED 96 97   BE -7* -6*   PO2 81 92       CBC:   Recent Labs   Lab 03/22/25  0344 03/23/25  0350   WBC 14.36* 16.63*   HGB 8.6* 8.8*   HCT 26.5* 26.3*    127*     CMP:   Recent Labs   Lab 03/22/25  6831  "03/23/25  0350    135*   K 5.2* 5.3*    105   CO2 15* 15*   .0* 123.0*   CREATININE 5.88* 5.56*   CALCIUM 7.6* 8.0*     Cardiac Markers:   No results for input(s): "CKMB", "MYOGLOBIN", "BNP", "TROPISTAT" in the last 48 hours.    Lactic Acid:   No results for input(s): "LACTATE" in the last 48 hours.    Magnesium:   No results for input(s): "MG" in the last 48 hours.    POCT Glucose:   Recent Labs   Lab 03/22/25  1641 03/22/25 2011 03/23/25  0349   POCTGLUCOSE 200* 207* 277*     Troponin:   No results for input(s): "TROPONINI", "TROPONINIHS" in the last 48 hours.      Urine Studies:   No results for input(s): "COLORU", "APPEARANCEUA", "PHUR", "SPECGRAV", "PROTEINUA", "GLUCUA", "KETONESU", "BILIRUBINUA", "OCCULTUA", "NITRITE", "UROBILINOGEN", "LEUKOCYTESUR", "RBCUA", "WBCUA", "BACTERIA", "SQUAMEPITHEL", "HYALINECASTS" in the last 48 hours.    Invalid input(s): "WRIGHTSUR"        Significant Imaging:   CXR, 3/12  Impression:  1. Interim increasing patchy hazy opacities throughout the right lung and left lower lung since the prior exam suspicious for infiltrate and or atelectasis  2. Borderline cardiomegaly  3. Atherosclerosis    CXR, 3/13  Impression:  1. Persistent scattered patchy hazy opacities throughout the lungs bilaterally suspicious for infiltrate, atelectasis, and or scarring  2. Atherosclerosis  3. Cardiac device left chest    Echo, 3/12    Left Ventricle: The left ventricle is normal in size. There is severely reduced systolic function with a visually estimated ejection fraction of less than 30%.    Right Ventricle: The right ventricle is normal in size. Systolic function is normal.    Left Atrium: Mildly dilated    Aortic Valve: There is mild to moderate stenosis. Aortic valve peak velocity is 2.1 m/s.    Mitral Valve: There is mild regurgitation.    Assessment/Plan:   Acute respiratory failure with hypoxia and hypercapnia  Intubated on PRVC mode of ventilator  Consult pulmonology   "   Multilobar healthcare acquired pneumonia with sepsis  Continue empiric antibiotic therapy with cefepime/levofloxacin  Bronchodilator therapy  Short course of IV corticosteroid-completed  MRSA screen-negative  WBC has trended down, now normal  Check CT angio of chest--a bilateral moderate pulmonary edema      Acute kidney injury  Possible etiologies include volume depletion, sepsis, obstructive uropathy, sv CMO  Avoid nephrotoxic agents as possible  Consult Nephrology   Consult general surgeon for dialysis cath  Continue hemodialysis       Severe ischemic cardiomyopathy/chronic HFrEF/AICD-ppm status  Echo 1/25-EF less than 20% with moderate to sv aortic stenosis  Repeat echo 3/12-less than 30% with moderate aortic stenosis  Consult cardiology     Anemia  No active bleeding identified  Due to elevated troponin, history of CAD patient transfused, 3/12,  with 1 unit PRBCs  H&H stable    Transaminitis  He experienced bump in transaminases on 3/15, uncertain etiology  Transaminases have trended back down to normal     Elevated troponin  Troponin peaked at 0.22 to, trending down this morning  Type 2 MI related to respiratory failure     UTI  Managed with antibiotics as noted above     COPD  Respiratory measures as noted above     COVID positive  Undetermined significance  Pattern of pulmonary infiltrates inconsistent with COVID pneumonia     CAD/CABG  Currently in a-v paced rhythm  Continue ranolazine  Continue low-dose aspirin and clopidogrel     Diabetes mellitus  Blood sugars acceptable  Monitor blood sugars with Accu-Cheks and SSI  Adjust basal insulin as necessary     Hypertension  BP acceptable  Holding ARB due to JOHN       PAD in bilateral carotid artery and bilateral lower extremity  DAPT as noted above     History of BPH with urinary retention  Urinary catheter removed this a.m., monitor PVRs     Recent multilevel C-spine surgery 1/25  Maintain soft C-collar    Severe debility     GI prophylaxis: Pantoprazole      Code status:  The patient is on DNR comfort measures only  Critical care time 30 minutes        Active Diagnoses:    Diagnosis Date Noted POA    PRINCIPAL PROBLEM:  Acute respiratory failure with hypoxia and hypercapnia [J96.01, J96.02] 03/12/2025 Yes    Moderate malnutrition [E44.0] 03/20/2025 Unknown      Problems Resolved During this Admission:     VTE Risk Mitigation (From admission, onward)           Ordered     IP VTE HIGH RISK PATIENT  Once         03/12/25 1237     Place sequential compression device  Until discontinued         03/12/25 1237                    Gustavo Hernández MD  Department of Hospital Medicine   [facility] - ICU

## 2025-03-23 NOTE — PROGRESS NOTES
Forwarded to Dr. Pinto for review.   Results in EPIC, please review and send to result notes pool.   See msg below      ASSESSMENT/PLAN:  1. Acute kidney injury -resolved  2. CKD stage 3b  3. Hyperkalemia   4. Hyponatremia   5. Anemia secondary to CKD/acute blood loss   6. Hematuria secondary to bladder tumor   7. Vit D deficiency  8. Edema     He knows to avoid NSAIDS  Labs in 2 weeks with PCP labs  Continue current meds  Further recs after labs  RTC in 4 months     Electronically Signed by Blair Posada MD on 5/31/23 at 13:08     Pharmacist Renal Dose Adjustment Note    Tyler Mai is a 70 y.o. male being treated with the medication cefepime    Patient Data:    Vital Signs (Most Recent):  Temp: 98.6 °F (37 °C) (03/23/25 1149)  Pulse: 87 (03/23/25 1243)  Resp: 20 (03/23/25 1243)  BP: 108/61 (03/23/25 1200)  SpO2: 95 % (03/23/25 1243) Vital Signs (72h Range):  Temp:  [97.6 °F (36.4 °C)-99.3 °F (37.4 °C)]   Pulse:  []   Resp:  [0-25]   BP: ()/(46-81)   SpO2:  [95 %-100 %]      Recent Labs   Lab 03/21/25  0342 03/22/25  0344 03/23/25  0350   CREATININE 5.39* 5.88* 5.56*     Serum creatinine: 5.56 mg/dL (H) 03/23/25 0350  Estimated creatinine clearance: 13.1 mL/min (A)    Medication:cefepime dose: 1gm frequency  q8h will be changed to medication:cefepime dose:2gm frequency:q24h    Pharmacist's Name: Royal Riddle  Pharmacist's Extension: [phone]

## 2025-03-23 NOTE — PROGRESS NOTES
Pharmacist Renal Dose Adjustment Note    Tyler Mai is a 70 y.o. male being treated with the medication levofloxacin    Patient Data:    Vital Signs (Most Recent):  Temp: 98.6 °F (37 °C) (03/23/25 1149)  Pulse: 87 (03/23/25 1243)  Resp: 20 (03/23/25 1243)  BP: 108/61 (03/23/25 1200)  SpO2: 95 % (03/23/25 1243) Vital Signs (72h Range):  Temp:  [97.6 °F (36.4 °C)-99.3 °F (37.4 °C)]   Pulse:  []   Resp:  [0-25]   BP: ()/(46-81)   SpO2:  [95 %-100 %]      Recent Labs   Lab 03/21/25  0342 03/22/25  0344 03/23/25  0350   CREATININE 5.39* 5.88* 5.56*     Serum creatinine: 5.56 mg/dL (H) 03/23/25 0350  Estimated creatinine clearance: 13.1 mL/min (A)    Medication:levofloxacin dose: 750mg frequency q48h will be changed to medication:levofloxacin dose:500mg frequency:q48h    Pharmacist's Name: Royal Riddle  Pharmacist's Extension: [phone]

## 2025-03-24 NOTE — PROGRESS NOTES
[facility] - ICU Hospital Medicine  Progress Note    Patient Name: Tyler Mai  MRN: [id number]  Patient Class: IP- Inpatient   Admission Date: 3/12/2025  Length of Stay: 12 days  Attending Physician: Yomi Jeffery MD  Primary Care Provider: Paxton Connors MD        Subjective:     Principal Problem:Acute respiratory failure with hypoxia and hypercapnia    Interval History:   HPI: Mr. Mai is a 69-year-old male who was transferred to the ED earlier today from a local SNF due to persistent shortness of breath.  His symptoms apparently began yesterday at which time he was diagnosed with pneumonia.  Symptoms worsened today, O2 saturations noted to be in the 80s on RA prompting his transfer to this facility.  With supplemental O2 his O2 saturation improved into the low 90s.  He was subsequently converted to BiPAP in the ED.  His current issues began in mid January at which time he underwent a rather extensive C-spine surgery involving multiple levels following which he was transferred to an inpatient rehab facility.  During that time he also developed some urinary retention and had an indwelling catheter for an extended period.  Catheter was apparently removed in rehab.  He was subsequently transferred to a SNF for continued physical therapy.  His evaluation in the ED was significant for anemia, leukocytosis, acute kidney injury, acute respiratory failure with hypercapnia and hypoxia, urinalysis consistent with UTI and CXR consistent with multilobar pneumonia.  Other findings included a significantly elevated BNP and an elevated troponin.  He was managed with a single dose of IV antibiotics and a nebulizer treatment.  On seeing him following his arrival to the ICU he was quite lethargic with a respiratory rate of 10 to 12 on BiPAP.  Due to his history of opioid use for chronic pain and recent C-spine surgery he was given a dose of Narcan after which he did awakened and become more  responsive, Respiratory rate improved to the 20s.  A subsequent ABG revealed a normal pH with moderate hypoxemia.    3/13-overnight became more agitated, refused to wear BiPAP; fortunately has been able to maintain adequate O2 saturations on nasal cannula O2; when seen on rounds this morning was very lethargic, poorly responsive; he opened his eyes to voice and tactile stimuli but otherwise unresponsive.  Did spike a temp last night to 101.3° F; he was given Tylenol and has not had another spike since that time    3/14-no events overnight; he has been afebrile over the past 24 hours; when seen on rounds this morning was sleepy but aroused easily to voice    3/15-no events overnight; when seen on rounds this morning was seated on the side of the bed working with PT; he was alert, normally interactive seemed to be in good spirits  Downgrade to M/S status    3/16-transferred to M/S unit yesterday evening; no events overnight; Respiratory status stable; remains on 3 L NC with O2 saturations of 94%    3/17-condition stable; when seen on rounds this morning was somewhat lethargic but this appears to be his baseline in the mornings; he becomes more interactive/attentive later in the day; he had no complaints; he remains generally very weak    March 18, 2025-the patient had low-grade fever 100.4, can maintain 92% O2 sat with 3 L OxyMask, complain of generalized weakness    March 19, 2025-the patient was coded around 3:00 p.m. yesterday secondary to cardiopulmonary arrest, chest compression was performed the, the patient was intubated, after 1 ampule of epinephrine, pulse came back, the patient V-tach and shocked with a 200 joule, converted to normal sinus rhythm, the patient is intubated on PRVC mode of ventilator, without sedation, patient is unresponsive, patient has gag reflex but no corneal reflex, pupil is dilated not responding to light, family signed DNR    March 20, 2025-patient is intubated on PRVC mode of  ventilator, the patient is unresponsive without sedation, the patient has gag reflex but no corneal reflex, family is leaning toward withdrawal of life support next 1 or 2 days    March 21, 2025-the patient is intubated on PRVC mode of ventilator, the patient is not moving at all without sedation, the patient is afebrile, creatinine is worse to 5.3 from 4.4, waiting for Nephrology recommendation    March 22, 2025-creatinine is worse to 5.8 from 5.3, waiting for dialysis cath placement, no corneal reflex, no gag reflex, I had a long conversation with the daughter and son about poor prognosis    March 23, 2025-nephrology removed 2 L yesterday and 1.8 L today, patient is intubated on PRVC mode of ventilator    March 24, 2025-the patient is intubated on PRVC mode of ventilator, no corneal reflex, no gag reflex pulmonology recommended brain MRI    Objective:     Vital Signs (Most Recent):  Temp: 98.7 °F (37.1 °C) (03/24/25 1115)  Pulse: 87 (03/24/25 1115)  Resp: (!) 3 (03/24/25 1115)  BP: (!) 124/59 (03/24/25 1115)  SpO2: 96 % (03/24/25 1115) Vital Signs (24h Range):  Temp:  [98.3 °F (36.8 °C)-98.9 °F (37.2 °C)] 98.7 °F (37.1 °C)  Pulse:  [86-97] 87  Resp:  [0-21] 3  SpO2:  [94 %-98 %] 96 %  BP: ()/(52-70) 124/59     Weight: 78.8 kg (173 lb 11.6 oz)  Body mass index is 24.93 kg/m².    Intake/Output Summary (Last 24 hours) at 3/24/2025 1129  Last data filed at 3/24/2025 0532  Gross per 24 hour   Intake 1339.15 ml   Output 100 ml   Net 1239.15 ml      Physical exam  Constitution-obese, normally developed male in NAD  Eyes-pupil is dilated not responding to light  HENT-normocephalic, atraumatic  Neck-supple; soft C-collar in place  Respiratory-normal respirations; CTA with good air movement  Heart-RRR; normal S1 and S2; no murmurs, gallops; AICD/ pacemaker palpated left chest wall  Abdomen-soft, nontender, nondistended  Genitourinary-deferred  Musculoskeletal-no joint abnormalities, bilateral upper extremity 2+ edema,  bilateral lower extremity 2+ edema, scrotal edema  Skin-warm, dry; stage I breakdown over coccyx; unstageable pressure ulcer right heel  Neurologic-intubated    Scheduled Meds:   albuterol-ipratropium  3 mL Nebulization Q6H    aspirin  81 mg Oral Daily    atorvastatin  40 mg Oral Daily    bisacodyL  5 mg Oral Daily    ceFEPime IV (PEDS and ADULTS)  2 g Intravenous Q24H    clopidogreL  75 mg Oral Daily    DULoxetine  60 mg Oral Daily    insulin glargine U-100  15 Units Subcutaneous Daily    levoFLOXacin  500 mg Intravenous Q48H    methylPREDNISolone injection (PEDS and ADULTS)  60 mg Intravenous Q6H    montelukast  10 mg Oral Daily    pantoprazole  40 mg Intravenous Daily    potassium bicarbonate  20 mEq Oral Once    pregabalin  75 mg Oral Daily    QUEtiapine  25 mg Oral QHS    ranolazine  500 mg Oral BID    sodium chloride 0.9%  10 mL Intravenous Q8H     Continuous Infusions:   0.9% NaCl   Intravenous Continuous 10 mL/hr at 03/24/25 0531 Rate Change at 03/24/25 0531    propofoL  0-50 mcg/kg/min (Dosing Weight) Intravenous Continuous           PRN Meds:.  Current Facility-Administered Medications:     0.9%  NaCl infusion (for blood administration), , Intravenous, Q24H PRN    acetaminophen, 1,000 mg, Oral, Q6H PRN    bisacodyL, 10 mg, Rectal, Daily PRN    dextrose 50%, 12.5 g, Intravenous, PRN    glucagon (human recombinant), 1 mg, Intramuscular, PRN    insulin aspart U-100, 0-10 Units, Subcutaneous, QID (AC + HS) PRN    iopamidoL, 100 mL, Intravenous, ONCE PRN    naloxone, 0.4 mg, Intravenous, PRN    ondansetron, 4 mg, Intravenous, Q8H PRN    oxyCODONE, 10 mg, Oral, Q4H PRN    peg 400-hypromellose-glycerin, 1 drop, Both Eyes, PRN    Significant Labs: All pertinent labs within the past 24 hours have been reviewed.  ABGs:   Recent Labs   Lab 03/23/25  0501 03/24/25  0518   PH 7.411 7.383   PCO2 29.2* 36.9   HCO3 18.5* 21.9*   POCSATURATED 97 95   BE -6* -3*   PO2 92 75*       CBC:   Recent Labs   Lab 03/23/25  0350  "03/24/25  0344   WBC 16.63* 21.68*   HGB 8.8* 8.3*   HCT 26.3* 26.6*   * 114*     CMP:   Recent Labs   Lab 03/23/25  0350 03/24/25  0344   * 133*   K 5.3* 5.7*    105   CO2 15* 15*   .0* 123.0*   CREATININE 5.56* 5.12*   CALCIUM 8.0* 8.0*     Cardiac Markers:   No results for input(s): "CKMB", "MYOGLOBIN", "BNP", "TROPISTAT" in the last 48 hours.    Lactic Acid:   No results for input(s): "LACTATE" in the last 48 hours.    Magnesium:   No results for input(s): "MG" in the last 48 hours.    POCT Glucose:   Recent Labs   Lab 03/23/25  1619 03/23/25  2218 03/24/25  0343   POCTGLUCOSE 295* 342* 328*     Troponin:   No results for input(s): "TROPONINI", "TROPONINIHS" in the last 48 hours.      Urine Studies:   No results for input(s): "COLORU", "APPEARANCEUA", "PHUR", "SPECGRAV", "PROTEINUA", "GLUCUA", "KETONESU", "BILIRUBINUA", "OCCULTUA", "NITRITE", "UROBILINOGEN", "LEUKOCYTESUR", "RBCUA", "WBCUA", "BACTERIA", "SQUAMEPITHEL", "HYALINECASTS" in the last 48 hours.    Invalid input(s): "WRIGHTSUR"        Significant Imaging:   CXR, 3/12  Impression:  1. Interim increasing patchy hazy opacities throughout the right lung and left lower lung since the prior exam suspicious for infiltrate and or atelectasis  2. Borderline cardiomegaly  3. Atherosclerosis    CXR, 3/13  Impression:  1. Persistent scattered patchy hazy opacities throughout the lungs bilaterally suspicious for infiltrate, atelectasis, and or scarring  2. Atherosclerosis  3. Cardiac device left chest    Echo, 3/12    Left Ventricle: The left ventricle is normal in size. There is severely reduced systolic function with a visually estimated ejection fraction of less than 30%.    Right Ventricle: The right ventricle is normal in size. Systolic function is normal.    Left Atrium: Mildly dilated    Aortic Valve: There is mild to moderate stenosis. Aortic valve peak velocity is 2.1 m/s.    Mitral Valve: There is mild " regurgitation.    Assessment/Plan:     Cardiopulmonary arrest March 19, 2025   Treated with a 1 amp of epinephrine and chest compression   Ventricular tachycardia-shocked with  200 joule    Acute respiratory failure with hypoxia and hypercapnia  Intubated on PRVC mode of ventilator  Consult pulmonology     Multilobar healthcare acquired pneumonia with sepsis  Continue empiric antibiotic therapy with cefepime/levofloxacin  Bronchodilator therapy  Short course of IV corticosteroid-completed  MRSA screen-negative  WBC has trended down, now normal  Check CT angio of chest--a bilateral moderate pulmonary edema      Acute kidney injury  Possible etiologies include volume depletion, sepsis, obstructive uropathy, sv CMO  Avoid nephrotoxic agents as possible  Consult Nephrology   Consult general surgeon for dialysis cath  Continue hemodialysis       Severe ischemic cardiomyopathy/chronic HFrEF/AICD-ppm status  Echo 1/25-EF less than 20% with moderate to sv aortic stenosis  Repeat echo 3/12-less than 30% with moderate aortic stenosis  Consult cardiology     Anemia  No active bleeding identified  Due to elevated troponin, history of CAD patient transfused, 3/12,  with 1 unit PRBCs  H&H stable    Transaminitis  He experienced bump in transaminases on 3/15, uncertain etiology  Transaminases have trended back down to normal     Elevated troponin  Troponin peaked at 0.22 to, trending down this morning  Type 2 MI related to respiratory failure     UTI  Managed with antibiotics as noted above     COPD  Respiratory measures as noted above     COVID positive  Undetermined significance  Pattern of pulmonary infiltrates inconsistent with COVID pneumonia     CAD/CABG  Currently in a-v paced rhythm  Continue ranolazine  Continue low-dose aspirin and clopidogrel     Diabetes mellitus  Blood sugars acceptable  Monitor blood sugars with Accu-Cheks and SSI  Adjust basal insulin as necessary     Hypertension  BP acceptable  Holding ARB due to  JOHN       PAD in bilateral carotid artery and bilateral lower extremity  DAPT as noted above     History of BPH with urinary retention  Urinary catheter removed this a.m., monitor PVRs     Recent multilevel C-spine surgery 1/25  Maintain soft C-collar    Anoxic encephalopathy and Severe debility--no gag reflex, no corneal reflex  Pulmonology recommended a brain MRI    GI prophylaxis: Pantoprazole     Code status:  The patient is on DNR comfort measures only  Critical care time 30 minutes        Active Diagnoses:    Diagnosis Date Noted POA    PRINCIPAL PROBLEM:  Acute respiratory failure with hypoxia and hypercapnia [J96.01, J96.02] 03/12/2025 Yes    Moderate malnutrition [E44.0] 03/20/2025 Unknown      Problems Resolved During this Admission:     VTE Risk Mitigation (From admission, onward)           Ordered     IP VTE HIGH RISK PATIENT  Once         03/12/25 1237     Place sequential compression device  Until discontinued         03/12/25 1237                    Gustavo Hernández MD  Department of Hospital Medicine   [facility] - ICU

## 2025-03-24 NOTE — PROGRESS NOTES
[facility] - ICU  Pulmonology  Consult Note    Patient Name: Tyler Mai  MRN: [id number]  Admission Date: 3/12/2025  Hospital Length of Stay: 12 days  Code Status: DNR  Attending Physician: Yomi Jeffery MD  Primary Care Provider: Paxton Connors MD   Principal Problem: Acute respiratory failure with hypoxia and hypercapnia    Consults  Subjective:     HPI:  69-year-old male patient with a long complicated history.  Patient had significant weakness in his arms and was minimally mobile.  After evaluation and several delays in surgery he underwent surgery on January 28, 2025 by Dr. Zavala at [facility].  He had a C2-6 PCDF and a C2-3, 4-5 decompression with screws.  The patient was sent to a rehab facility and subsequently downgraded to a skilled nursing floor where he was admitted to [facility] on 03/12/25 with hypoxic and hypercapnic respiratory failure, multilobar healthcare acquired pneumonia, acute kidney NV, UTI (Proteus) and mild elevation in troponin.    He has known COPD (not quantitated) on Trelegy inhaler, systolic heart failure (ejection fraction approximately 30% with mild-to-moderate aortic stenosis), pacemaker/defibrillator, CAD with previous CABG.    The patient started having worsening shortness of breath and was being transferred to ICU this afternoon when placing him in the ICU bed he became agonal and had cardiorespiratory arrest.  He received epinephrine x1, was intubated and the length of the code is recorded as 30 minutes.  EKG is pending.  Chest x-ray shows proper placement of the endotracheal tube with bilateral infiltrates throughout both lungs.  Stat labs were performed and are as follows H&H 11.5/38, WBC 34.1, electrolytes within normal limits, anion gap 14, BUN 34, creatinine 1.75.  ABGs done shortly after the code is as follows: PH 7.36/47/225 on tidal volume 500 peep +5 100% rate of 20  .  Interval 24 hours:  Patient seen  via telemedicine this a.m..  The patient had a dialysis catheter placed and underwent dialysis over the weekend.  Events of weekend noted.  Nurse Lo help with a neuro exam which appears to have slightly worsened.  Now he does not have a cough or gag or corneal reflex.  His pupils are however pinpoint.  He is not responding to painful stimuli.  He has remained afebrile with stable vital signs.  O2 saturation 97% on 40% FiO2.  Labs from this morning H&H 8.3/26, WBC 21.  Electrolytes potassium 5.7 bicarb 15,  creatinine 5.12.  .  ABG this morning pH 7.38/36/75.      I had a long talk with Gianluca son.  He is really struggling with trying to make a decision whether to continue aggressive care versus withdrawal to palliation.  He would like to give his dad a few more days to see if he will have improvement in his neurological function.  A consideration would be to do a MRI of the brain to see if there is significant anoxic findings which may help him to make a decision.  Past Medical History:   Diagnosis Date    Anxiety disorder, unspecified     Arthritis     CAD (coronary artery disease)     Cervical radiculopathy     CHF (congestive heart failure)     Chronic pain syndrome     COPD (chronic obstructive pulmonary disease)     Diabetes mellitus     Gastro-esophageal reflux disease without esophagitis     High cholesterol     Hypertension     Lumbar radiculopathy     Myelomalacia of cervical cord     Other specified diseases of spinal cord     Pacemaker     PVD (peripheral vascular disease)     S/P triple vessel bypass     Tobacco use        Past Surgical History:   Procedure Laterality Date    ANTERIOR CERVICAL DISCECTOMY W/ FUSION  1995    C5-6 and C6-7.  Dr. Wall    ANTERIOR CERVICAL DISCECTOMY W/ FUSION  1996    Redo C5-6, C6-7.  Dr. Wall    CHOLECYSTECTOMY      CORONARY ARTERY BYPASS GRAFT      FUSION OF POSTERIOR COLUMN OF CERVICAL SPINE USING COMPUTER AIDED NAVIGATION N/A 1/24/2025    Procedure: FUSION,  SPINE, POSTERIOR SPINAL COLUMN, CERVICAL, USING COMPUTER-ASSISTED NAVIGATION;  Surgeon: Darrell Watson MD;  Location: [facility] OR;  Service: Neurosurgery;  Laterality: N/A;  C2-C6 PCF; C2/3-C4/5 decompression, possible additional levels  o-arm  NTI  TIVA setup  [employer] //  XX    INSERTION OF PACEMAKER      X2    TRIPPLE VESSEL BYPASS         Review of patient's allergies indicates:   Allergen Reactions    Hydrochlorothiazide      Other Reaction(s): Pancreatitis       Family History       Problem Relation (Age of Onset)    Cancer Mother, Father    Lumbar disc disease Brother          Tobacco Use    Smoking status: Every Day     Current packs/day: 1.00     Types: Cigarettes    Smokeless tobacco: Not on file   Substance and Sexual Activity    Alcohol use: Not on file    Drug use: Not on file    Sexual activity: Not on file        Review of Systems  Objective:     Vital Signs (Most Recent):  Temp: 98.9 °F (37.2 °C) (03/24/25 0715)  Pulse: 89 (03/24/25 0803)  Resp: 20 (03/24/25 0803)  BP: 118/63 (03/24/25 0803)  SpO2: 97 % (03/24/25 0803) Vital Signs (24h Range):  Temp:  [97.9 °F (36.6 °C)-98.9 °F (37.2 °C)] 98.9 °F (37.2 °C)  Pulse:  [86-97] 89  Resp:  [0-21] 20  SpO2:  [94 %-98 %] 97 %  BP: ()/(52-70) 118/63     Body mass index is 24.93 kg/m².      Intake/Output Summary (Last 24 hours) at 3/24/2025 0830  Last data filed at 3/24/2025 0532  Gross per 24 hour   Intake 1632.42 ml   Output 1900 ml   Net -267.58 ml       Physical Exam    Vents:  Vent Mode: A/C (03/24/25 0803)  Ventilator Initiated: Yes (03/18/25 1615)  Set Rate: 20 BPM (03/24/25 0803)  Vt Set: 500 mL (03/24/25 0803)  PEEP/CPAP: 5 cmH20 (03/24/25 0803)  Oxygen Concentration (%): 40 (03/24/25 0803)  Peak Airway Pressure: 23 cmH20 (03/24/25 0803)  Plateau Pressure: 18 cmH20 (03/24/25 0803)  Total Ve: 12.5 L/m (03/24/25 0803)  Negative Inspiratory Force (cm H2O): 0 (03/24/25 0803)  F/VT Ratio<105 (RSBI): (!) 36.23 (03/24/25  0803)    Lines/Drains/Airways       Central Venous Catheter Line  Duration                  Hemodialysis Catheter 03/22/25 1345 right femoral 1 day              Drain  Duration                  Urethral Catheter 03/17/25 1545 Straight-tip 16 Fr. 6 days         NG/OG Tube 03/19/25 0830 [place] sump 18 Fr. Right mouth 5 days              Airway  Duration                  Airway - Non-Surgical 03/18/25 1500 Endotracheal Tube 5 days              Peripheral Intravenous Line  Duration                  Peripheral IV - Single Lumen 03/12/25 0913 22 G Right;Distal Forearm 11 days         Peripheral IV - Single Lumen 03/12/25 0931 18 G Right;Anterior Forearm 11 days                    Significant Labs:    Lab Results   Component Value Date    WBC 21.68 (H) 03/24/2025    HGB 8.3 (L) 03/24/2025    HCT 26.6 (L) 03/24/2025    MCV 90.5 03/24/2025     (L) 03/24/2025         BMP  Lab Results   Component Value Date     (L) 03/24/2025    K 5.7 (H) 03/24/2025     03/24/2025    CO2 15 (L) 03/24/2025    .0 (H) 03/24/2025    CREATININE 5.12 (H) 03/24/2025    CALCIUM 8.0 (L) 03/24/2025    ANIONGAP 12 01/07/2025    ESTGFRAFRICA 36 01/07/2025    EGFRNONAA >60 08/17/2020       ABG  Recent Labs   Lab 03/24/25  0518   PH 7.383   PO2 75*   PCO2 36.9   HCO3 21.9*   BE -3*         All pertinent labs within the past 24 hours have been reviewed.    Significant Imaging:   I have reviewed all pertinent imaging results/findings within the past 24 hours.  I have reviewed and interpreted all pertinent imaging results/findings within the past 24 hours.    Assessment/Plan:       Status post cardiorespiratory arrest with Rosc after 1 round of epi on 03/18/2025  Hypoxemic and hypercarbic respiratory failure  Healthcare acquired pneumonia with bilateral infiltrates  Worsening renal failure-now on dialysis  Severe deconditioning pre/ post C2-6 PC df and C2-3, 4-5 decompression with screws on 01/28/2025  Probable anoxic  encephalopathy  Positive COVID serology PCR of unknown clinical significance  Comorbidities:  COPD not quantitated, systolic heart failure with ejection fraction 30%, mild-to-moderate aortic stenosis, status post AICD and pacemaker, diabetes mellitus, hypertension, hyperlipidemia   Plan:  Continue supportive care to see if he will regain significant neurological function (consider MRI of brain)  Continue broad-spectrum antibiotics  Use lowest FiO2 to keep sat greater than equal to 92%       David Adkins MD  Pulmonology  [facility] - ICU

## 2025-03-24 NOTE — PROGRESS NOTES
Inpatient Nutrition Assessment    Admit Date: 3/12/2025   Total duration of encounter: 12 days   Patient Age: 70 y.o.    Nutrition Recommendation/Prescription     Continue enteral feedings @ 65 mL/hr hrs if pt remains hemodynamically stable increase rate by 10 mL every 8 hours to goal rate of 75 mL/hr to provide 1800 kcals; 90 g protein; 1224 mL water.   Continue free water flush of 30 mL every hour to provide an additional 600 mL of free water or per Nephrologist recommendations.   May consider changing formula to Novasource Renal or Suplena secondary to renal indices. Pt received HD yesterday and nursing reports plans are for pt to receive HD again today.    Monitor lytes and replete as needed.   Daily weight and labs.   Monitor, tolerance, rate, weight, and labs.     RD following and available as needed. Thank you.     Communication of Recommendations: reviewed with nurse and EMR.    Nutrition Assessment     Malnutrition Assessment/Nutrition-Focused Physical Exam       Malnutrition Level: moderate (03/20/25 1437)  Energy Intake (Malnutrition): other (see comments) (Does not meet criteria) (03/20/25 1437)     Subcutaneous Fat (Malnutrition): moderate depletion (03/20/25 1437)  Orbital Region (Subcutaneous Fat Loss): moderate depletion        Muscle Mass (Malnutrition): moderate depletion (03/20/25 1437)  Buddhist Region (Muscle Loss): moderate depletion  Clavicle Bone Region (Muscle Loss): moderate depletion  Clavicle and Acromion Bone Region (Muscle Loss): moderate depletion                          A minimum of two characteristics is recommended for diagnosis of either severe or non-severe malnutrition.    Chart Review    Reason Seen: continuous nutrition monitoring, physician consult for Tube Feeding Management, and follow-up    Malnutrition Screening Tool Results   Have you recently lost weight without trying?: No  Have you been eating poorly because of a decreased appetite?: No   MST Score: 0    Diagnosis:  Acute Respiratory Failure with Hypoxia and Hypercapnia.     Relevant Medical History:   Anxiety disorder, unspecified      Arthritis      CAD (coronary artery disease)      Cervical radiculopathy      CHF (congestive heart failure)      Chronic pain syndrome      COPD (chronic obstructive pulmonary disease)      Diabetes mellitus      Gastro-esophageal reflux disease without esophagitis      High cholesterol      Hypertension      Lumbar radiculopathy      Myelomalacia of cervical cord      Other specified diseases of spinal cord      Pacemaker      PVD (peripheral vascular disease)      S/P triple vessel bypass      Tobacco use          Scheduled Medications:  albuterol-ipratropium, 3 mL, Q6H  aspirin, 81 mg, Daily  atorvastatin, 40 mg, Daily  bisacodyL, 5 mg, Daily  ceFEPime IV (PEDS and ADULTS), 2 g, Q24H  clopidogreL, 75 mg, Daily  DULoxetine, 60 mg, Daily  insulin glargine U-100, 15 Units, Daily  levoFLOXacin, 500 mg, Q48H  methylPREDNISolone injection (PEDS and ADULTS), 60 mg, Q6H  montelukast, 10 mg, Daily  pantoprazole, 40 mg, Daily  potassium bicarbonate, 20 mEq, Once  pregabalin, 75 mg, Daily  QUEtiapine, 25 mg, QHS  ranolazine, 500 mg, BID  sodium chloride 0.9%, 10 mL, Q8H    Continuous Infusions:       PRN Medications:  0.9%  NaCl infusion (for blood administration), , Q24H PRN  acetaminophen, 1,000 mg, Q6H PRN  bisacodyL, 10 mg, Daily PRN  dextrose 50%, 12.5 g, PRN  glucagon (human recombinant), 1 mg, PRN  insulin aspart U-100, 0-10 Units, QID (AC + HS) PRN  iopamidoL, 100 mL, ONCE PRN  naloxone, 0.4 mg, PRN  ondansetron, 4 mg, Q8H PRN  oxyCODONE, 10 mg, Q4H PRN  peg 400-hypromellose-glycerin, 1 drop, PRN    Calorie Containing IV Medications: no significant kcals from medications at this time    Recent Labs   Lab 03/18/25  0457 03/18/25  1510 03/19/25  0339 03/20/25  0411 03/21/25  0342 03/22/25  0344 03/23/25  0350 03/24/25  0344 03/24/25  1357    142 143 141 139 137 135* 133* 134*    K 3.7 4.5 4.1 4.4 4.7 5.2* 5.3* 5.7* 5.8*   CALCIUM 8.6* 8.6* 8.5* 8.0* 7.9* 7.6* 8.0* 8.0* 8.4*   PHOS  --   --  2.3  --   --   --   --   --   --    MG 1.70  --  1.90 2.10 2.30  --   --   --   --     105 105 104 106 106 105 105 102   CO2 30 23 26 23 18* 15* 15* 15* 17*   BUN 30.0* 34.0* 56.0* 99.0* 125.0* 144.0* 123.0* 123.0* 134.0*   CREATININE 1.34* 1.75* 2.88* 4.43* 5.39* 5.88* 5.56* 5.12* 5.33*   EGFRNORACEVR 58 42 23 14 11 10 10 12 11   GLUCOSE 124* 134* 210* 191* 242* 266* 287* 308* 308*   BILITOT  --  0.8 1.0  --  0.6  --   --   --  0.5   ALKPHOS  --  155* 182*  --  149  --   --   --  211*   ALT  --  268* 1,623*  --  661*  --   --   --  243*   AST  --  403* 2,958*  --  258*  --   --   --  75*   ALBUMIN  --  2.6* 2.4*  --  2.1*  --   --   --  1.8*   WBC 14.12* 34.15* 12.89* 12.76* 14.79* 14.36* 16.63* 21.68*  --    HGB 8.8* 11.5* 8.9* 8.6* 8.5* 8.6* 8.8* 8.3*  --    HCT 28.4* 38.8* 27.5* 26.4* 26.8* 26.5* 26.3* 26.6*  --      Nutrition Orders:  No diet orders on file  Tube Feedings/Formulas 75; 1,500; Diabetisource AC; OG; 30; Every hour    Appetite/Oral Intake: NPO/NPO  Factors Affecting Nutritional Intake: NPO, on mechanical ventilation, respiratory status, and shortness of breath  Social Needs Impacting Access to Food: none identified  Food/Caodaism/Cultural Preferences:  Sprite Zero with all meals.   Food Allergies: none reported  Last Bowel Movement: 03/23/25  Wound(s):     Wound 03/12/25 1140 Fissure Right Buttocks-Tissue loss description: Partial thickness Noted.     Comments      3/24: Tube feedings infusing @ 65 mL/hr with FWF of 30 mL every hour. Nursing reports pt is tolerating feedings and will advance to goal of 75 mL/hr. Feedings were held for testing today then resumed. Also reports that pt received HD yesterday and plans are for pt to receive HD this evening. Pt remains intubated, unresponsive on vent. May consider changing formula to Novasource Renal or Suplena. Nursing reports pt  "received femoral HD catheter on Friday which will possibly be removed tomorrow and replaced. Will continue to monitor and follow up with additional recommendations as needed.     3/20: Consult received for Tube Feeding Management. Pt currently intubated on vent. Trickle feedings with Diabetisource AC were started last night @ 10 mL/hr and currently infusing @ 10 mL/hr with 10 mL of free water infusing every hour. 0.9%NaCl infusing @100 mL/hr. Renal indices concerning; however, nursing reports pt has good urine output. Current MAP 77. Needs reassessed and recommendations provided. Will continue to monitor during stay.     3/17: Diet was advanced to GI Soft. Pt with good appetite, tolerating meals. Consumed 25% of breakfast and 50% of lunch so far today. Still requiring supplemental oxygen. Will add Boost Glucose Control to assist with need and continue to monitor during stay.     3/13: Pt currently on CL diet, requiring oxymask. Discussed with nursing who reports pt is tolerating CL. No recent weight loss noted/reported. Labs and meds reviewed. Last A1C was 5.6 on 1/23/2025. Creatinine improving since admit. . Noted pt with good appetite prior to admit and pt prefers chocolate flavor ONS. Will add Boost Glucose Control Chocolate when able to advance diet beyond CL and continue to monitor during stay.     3/22/25: RD consulted for TF. Rec'd Diabetisource @ 75 mL/hr. Cr trending down from 5.8 to 5.3. pt awaiting dialysis cath placement. Poor prognosis noted. Labs/meds reviewed.    Anthropometrics    Height: 5' 10" (177.8 cm), Height Method: Stated  Last Weight: 78.8 kg (173 lb 11.6 oz) (03/24/25 0510), Weight Method: Bed Scale  BMI (Calculated): 24.9  BMI Classification: normal (BMI 18.5-24.9)        Ideal Body Weight (IBW), Male: 166 lb     % Ideal Body Weight, Male (lb): 104.92 %                          Usual Weight Provided By: EMR weight history    Wt Readings from Last 5 Encounters:   03/24/25 78.8 kg (173 " lb 11.6 oz)   02/20/25 72.6 kg (160 lb)   02/19/25 73.3 kg (161 lb 9.6 oz)   01/18/25 78 kg (172 lb)   12/03/24 78.5 kg (173 lb)     Weight Change(s) Since Admission:   3/22: wt fluctuating; will continue to monitor and trend wts  Wt Readings from Last 1 Encounters:   03/24/25 0510 78.8 kg (173 lb 11.6 oz)   03/23/25 0445 79 kg (174 lb 2.6 oz)   03/19/25 0525 71.7 kg (158 lb 1.1 oz)   03/18/25 0928 74.7 kg (164 lb 10.9 oz)   03/15/25 0602 75.5 kg (166 lb 7.2 oz)   03/14/25 0600 72 kg (158 lb 11.7 oz)   03/12/25 2130 79 kg (174 lb 2.6 oz)   03/12/25 1341 79 kg (174 lb 2.6 oz)   03/12/25 0854 77.1 kg (170 lb)   Admit Weight: 77.1 kg (170 lb) (03/12/25 0854), Weight Method: Bed Scale    Estimated Needs    Weight Used For Calorie Calculations: 72 kg (158 lb 11.7 oz)  Energy Calorie Requirements (kcal): 1800 kcals/day (SF 1.2)  Energy Need Method: Crosby-St Jeor  Weight Used For Protein Calculations: 75 kg (165 lb 5.5 oz) (IBW used to estimate protein needs.)  Protein Requirements: 90 gm/day (1.2 g/kg/IBW).  Fluid Requirements (mL): 1800 mL/day (1mL/kcal) or per MD Guidance.  CHO Requirement: 203 g/day (45% of Kcals).     Enteral Nutrition     Formula: Diabetisource AC  Rate/Volume: 75   Water Flushes: 30 mL QH  Additives/Modulars: none at this time  Route: orogastric tube  Method: continuous  Total Nutrition Provided by Tube Feeding, Additives, and Flushes:  Calories Provided  1800 kcal/d, 100% needs   Protein Provided  90 g/d, 100% needs   Fluid Provided  1824 ml/d, 101% needs   Continuous feeding calculations based on estimated 20 hr/d run time unless otherwise stated.    Parenteral Nutrition     Patient not receiving parenteral nutrition support at this time.    Evaluation of Received Nutrient Intake    Calories: meeting estimated needs   Protein: meeting estimated needs      Patient Education     Not applicable.    Nutrition Diagnosis     PES: Inadequate oral intake related to acute illness as evidenced by Pt NPO,  intubated on vent. (active)      PES:   Related to Inadequate energy intake. Pt intubated on vent.  As Evidenced by:  - muscle mass depletion: 1 area of mild muscle loss (Temporalis) - loss of subcutaneous fat: 1 area of mild fat loss (Buccal) active    Nutrition Interventions     Intervention(s): modified composition of enteral nutrition, modified rate of enteral nutrition, and collaboration with other providers  Intervention(s): Enteral nutrition (Initiate trickle feedings.)    Goal: Tolerate enteral feeding at goal rate by follow-up. (goal progressing)    Nutrition Goals & Monitoring     Dietitian will monitor: energy intake, enteral nutrition intake, weight, weight change, electrolyte/renal panel, glucose/endocrine profile, and gastrointestinal profile  Discharge planning: too early to determine; pending clinical course  Nutrition Risk/Follow-Up: high (follow-up in 1-4 days)   Please consult if re-assessment needed sooner.

## 2025-03-25 PROBLEM — Z51.5 COMFORT MEASURES ONLY STATUS: Status: ACTIVE | Noted: 2025-01-01

## 2025-03-25 NOTE — PROGRESS NOTES
[facility] - ICU Hospital Medicine  Progress Note    Patient Name: Tyler Mai  MRN: [id number]  Patient Class: IP- Inpatient   Admission Date: 3/12/2025  Length of Stay: 13 days  Attending Physician: Yomi Jeffery MD  Primary Care Provider: Paxton Connors MD        Subjective:     Principal Problem:Acute respiratory failure with hypoxia and hypercapnia    Interval History:   HPI: Mr. Mai is a 69-year-old male who was transferred to the ED earlier today from a local SNF due to persistent shortness of breath.  His symptoms apparently began yesterday at which time he was diagnosed with pneumonia.  Symptoms worsened today, O2 saturations noted to be in the 80s on RA prompting his transfer to this facility.  With supplemental O2 his O2 saturation improved into the low 90s.  He was subsequently converted to BiPAP in the ED.  His current issues began in mid January at which time he underwent a rather extensive C-spine surgery involving multiple levels following which he was transferred to an inpatient rehab facility.  During that time he also developed some urinary retention and had an indwelling catheter for an extended period.  Catheter was apparently removed in rehab.  He was subsequently transferred to a SNF for continued physical therapy.  His evaluation in the ED was significant for anemia, leukocytosis, acute kidney injury, acute respiratory failure with hypercapnia and hypoxia, urinalysis consistent with UTI and CXR consistent with multilobar pneumonia.  Other findings included a significantly elevated BNP and an elevated troponin.  He was managed with a single dose of IV antibiotics and a nebulizer treatment.  On seeing him following his arrival to the ICU he was quite lethargic with a respiratory rate of 10 to 12 on BiPAP.  Due to his history of opioid use for chronic pain and recent C-spine surgery he was given a dose of Narcan after which he did awakened and become more  responsive, Respiratory rate improved to the 20s.  A subsequent ABG revealed a normal pH with moderate hypoxemia.    3/13-overnight became more agitated, refused to wear BiPAP; fortunately has been able to maintain adequate O2 saturations on nasal cannula O2; when seen on rounds this morning was very lethargic, poorly responsive; he opened his eyes to voice and tactile stimuli but otherwise unresponsive.  Did spike a temp last night to 101.3° F; he was given Tylenol and has not had another spike since that time    3/14-no events overnight; he has been afebrile over the past 24 hours; when seen on rounds this morning was sleepy but aroused easily to voice    3/15-no events overnight; when seen on rounds this morning was seated on the side of the bed working with PT; he was alert, normally interactive seemed to be in good spirits  Downgrade to M/S status    3/16-transferred to M/S unit yesterday evening; no events overnight; Respiratory status stable; remains on 3 L NC with O2 saturations of 94%    3/17-condition stable; when seen on rounds this morning was somewhat lethargic but this appears to be his baseline in the mornings; he becomes more interactive/attentive later in the day; he had no complaints; he remains generally very weak    March 18, 2025-the patient had low-grade fever 100.4, can maintain 92% O2 sat with 3 L OxyMask, complain of generalized weakness    March 19, 2025-the patient was coded around 3:00 p.m. yesterday secondary to cardiopulmonary arrest, chest compression was performed the, the patient was intubated, after 1 ampule of epinephrine, pulse came back, the patient V-tach and shocked with a 200 joule, converted to normal sinus rhythm, the patient is intubated on PRVC mode of ventilator, without sedation, patient is unresponsive, patient has gag reflex but no corneal reflex, pupil is dilated not responding to light, family signed DNR    March 20, 2025-patient is intubated on PRVC mode of  ventilator, the patient is unresponsive without sedation, the patient has gag reflex but no corneal reflex, family is leaning toward withdrawal of life support next 1 or 2 days    March 21, 2025-the patient is intubated on PRVC mode of ventilator, the patient is not moving at all without sedation, the patient is afebrile, creatinine is worse to 5.3 from 4.4, waiting for Nephrology recommendation    March 22, 2025-creatinine is worse to 5.8 from 5.3, waiting for dialysis cath placement, no corneal reflex, no gag reflex, I had a long conversation with the daughter and son about poor prognosis    March 23, 2025-nephrology removed 2 L yesterday and 1.8 L today, patient is intubated on PRVC mode of ventilator    March 24, 2025-the patient is intubated on PRVC mode of ventilator, no corneal reflex, no gag reflex pulmonology recommended brain MRI    3/25-no significant events overnight, no change in condition; he remains unresponsive on mechanical ventilation    Objective:     Vital Signs (Most Recent):  Temp: 98.2 °F (36.8 °C) (03/25/25 1115)  Pulse: 91 (03/25/25 1317)  Resp: 20 (03/25/25 1317)  BP: (!) 105/59 (03/25/25 1230)  SpO2: 98 % (03/25/25 1317) Vital Signs (24h Range):  Temp:  [98.2 °F (36.8 °C)-98.8 °F (37.1 °C)] 98.2 °F (36.8 °C)  Pulse:  [73-96] 91  Resp:  [0-22] 20  SpO2:  [95 %-100 %] 98 %  BP: ()/(45-70) 105/59     Weight: 77.9 kg (171 lb 11.8 oz)  Body mass index is 24.64 kg/m².    Intake/Output Summary (Last 24 hours) at 3/25/2025 1323  Last data filed at 3/25/2025 0500  Gross per 24 hour   Intake 1883 ml   Output 70 ml   Net 1813 ml      Physical exam  Constitution-obese, normally developed male was unresponsive  Eyes-dysconjugate gaze, pupils 2 mm bilaterally, sluggish  HENT-normocephalic, atraumatic  Neck-supple; soft C-collar in place  Respiratory-normal respirations; CTA with good air movement  Heart-RRR; normal S1 and S2; no murmurs, gallops; AICD/ pacemaker palpated left chest  wall  Abdomen-soft, nontender, nondistended  Genitourinary-marked scrotal edema; urinary catheter in place draining yellow urine  Musculoskeletal-no joint abnormalities, bilateral upper extremity 2+ edema, bilateral lower extremity 2+ edema, scrotal edema  Skin-warm, dry; stage I breakdown over coccyx; unstageable pressure ulcer right heel  Neurologic-unresponsive; sluggish pupils, intact corneal reflexes bilaterally; nursing staff reports no gag reflex with suctioning    Scheduled Meds:   albuterol-ipratropium  3 mL Nebulization Q6H    aspirin  81 mg Oral Daily    atorvastatin  40 mg Oral Daily    bisacodyL  5 mg Oral Daily    ceFEPime IV (PEDS and ADULTS)  2 g Intravenous Q24H    clopidogreL  75 mg Oral Daily    DULoxetine  60 mg Oral Daily    insulin glargine U-100  15 Units Subcutaneous Daily    levoFLOXacin  500 mg Intravenous Q48H    methylPREDNISolone injection (PEDS and ADULTS)  60 mg Intravenous Q6H    montelukast  10 mg Oral Daily    pantoprazole  40 mg Intravenous Daily    potassium bicarbonate  20 mEq Oral Once    pregabalin  75 mg Oral Daily    QUEtiapine  25 mg Oral QHS    ranolazine  500 mg Oral BID    sodium chloride 0.9%  10 mL Intravenous Q8H     Continuous Infusions:        PRN Meds:.  Current Facility-Administered Medications:     0.9%  NaCl infusion (for blood administration), , Intravenous, Q24H PRN    acetaminophen, 1,000 mg, Oral, Q6H PRN    bisacodyL, 10 mg, Rectal, Daily PRN    dextrose 50%, 12.5 g, Intravenous, PRN    glucagon (human recombinant), 1 mg, Intramuscular, PRN    insulin aspart U-100, 0-10 Units, Subcutaneous, QID (AC + HS) PRN    iopamidoL, 100 mL, Intravenous, ONCE PRN    naloxone, 0.4 mg, Intravenous, PRN    ondansetron, 4 mg, Intravenous, Q8H PRN    oxyCODONE, 10 mg, Oral, Q4H PRN    peg 400-hypromellose-glycerin, 1 drop, Both Eyes, PRN    Significant Labs: All pertinent labs within the past 24 hours have been reviewed.  ABGs:   Recent Labs   Lab 03/24/25  2582  "03/25/25  0519   PH 7.383 7.379   PCO2 36.9 32.8*   HCO3 21.9* 19.4*   POCSATURATED 95 98   BE -3* -6*   PO2 75* 97       CBC:   Recent Labs   Lab 03/24/25  0344 03/25/25  0418   WBC 21.68* 23.23*   HGB 8.3* 8.1*   HCT 26.6* 25.1*   * 102*     CMP:   Recent Labs   Lab 03/24/25  0344 03/24/25  1357 03/25/25  0418   * 134* 131*   K 5.7* 5.8* 6.3*    102 100   CO2 15* 17* 18*   .0* 134.0* 152.0*   CREATININE 5.12* 5.33* 5.94*   CALCIUM 8.0* 8.4* 8.2*   ALBUMIN  --  1.8*  --    BILITOT  --  0.5  --    ALKPHOS  --  211*  --    AST  --  75*  --    ALT  --  243*  --      Cardiac Markers:   No results for input(s): "CKMB", "MYOGLOBIN", "BNP", "TROPISTAT" in the last 48 hours.    Lactic Acid:   No results for input(s): "LACTATE" in the last 48 hours.    Magnesium:   No results for input(s): "MG" in the last 48 hours.    POCT Glucose:   Recent Labs   Lab 03/24/25  2150 03/25/25  0010 03/25/25  1030   POCTGLUCOSE 420* 354* 368*     Troponin:   No results for input(s): "TROPONINI", "TROPONINIHS" in the last 48 hours.      Urine Studies:   No results for input(s): "COLORU", "APPEARANCEUA", "PHUR", "SPECGRAV", "PROTEINUA", "GLUCUA", "KETONESU", "BILIRUBINUA", "OCCULTUA", "NITRITE", "UROBILINOGEN", "LEUKOCYTESUR", "RBCUA", "WBCUA", "BACTERIA", "SQUAMEPITHEL", "HYALINECASTS" in the last 48 hours.    Invalid input(s): "WRIGHTSUR"        Significant Imaging:   CXR, 3/12  Impression:  1. Interim increasing patchy hazy opacities throughout the right lung and left lower lung since the prior exam suspicious for infiltrate and or atelectasis  2. Borderline cardiomegaly  3. Atherosclerosis    CXR, 3/13  Impression:  1. Persistent scattered patchy hazy opacities throughout the lungs bilaterally suspicious for infiltrate, atelectasis, and or scarring  2. Atherosclerosis  3. Cardiac device left chest    Echo, 3/12    Left Ventricle: The left ventricle is normal in size. There is severely reduced systolic function " with a visually estimated ejection fraction of less than 30%.    Right Ventricle: The right ventricle is normal in size. Systolic function is normal.    Left Atrium: Mildly dilated    Aortic Valve: There is mild to moderate stenosis. Aortic valve peak velocity is 2.1 m/s.    Mitral Valve: There is mild regurgitation.    Venous ultrasound lower extremities  Impression:  1. No deep venous thrombosis identified to the lower extremities bilaterally.    CT head, 3/24  Impression:  1. Motion artifact  2. No acute intracranial abnormality identified  3. Generalized cerebral and cerebellar atrophy    Assessment/Plan:     Cardiopulmonary arrest March 19, 2025   Treated with a 1 amp of epinephrine and chest compression with subsequent ROSC   Ventricular tachycardia-shocked with  200 joule    Anoxic encephalopathy  He has been unresponsive since CP arrest on 03/19  Overall, condition has deteriorated    Acute respiratory failure with hypoxia and hypercapnia  Intubated on PRVC mode of ventilator, day 7  Pulmonary following     Multilobar healthcare acquired pneumonia with sepsis  Continue empiric antibiotic therapy with cefepime/levofloxacin  Bronchodilator therapy  Initial short hort course of IV corticosteroid-completed  Corticosteroid resumed 3/18  MRSA screen-negative  WBC trending up  Check CT angio of chest-- bilateral moderate pulmonary edema      Acute kidney injury with oliguria  Possible etiologies include volume depletion, sepsis, obstructive uropathy, sv CMO  Post CP arrest, patient developed oliguria  Nephrology consulted and after discussion with family initiated hemodialysis  Dialysis catheter was placed in the right groin  HD was attempted this morning however patient developed profound hypotension necessitating cessation of HD  At this point it appears patient may not tolerate continued HD    Severe ischemic cardiomyopathy/chronic HFrEF/AICD-ppm status  Echo 1/25-EF less than 20% with moderate to sv aortic  stenosis  Repeat echo 3/12-less than 30% with moderate aortic stenosis  Cardiology has seen patient     Anemia  No active bleeding identified  Due to elevated troponin, history of CAD patient transfused, 3/12,  with 1 unit PRBCs  H&H stable    Transaminitis  He experienced bump in transaminases on 3/15, uncertain etiology  Transaminases have trended back down to normal     Elevated troponin  Troponin peaked at 0.22 to, trending down this morning  Type 2 MI related to respiratory failure     UTI  Managed with antibiotics as noted above     COPD  Respiratory measures as noted above     COVID positive  Undetermined significance  Initial pattern of pulmonary infiltrates was inconsistent with COVID pneumonia     CAD/CABG  Currently in a-v paced rhythm  Continue ranolazine  Continue low-dose aspirin and clopidogrel     Diabetes mellitus  Blood sugars acceptable  Monitor blood sugars with Accu-Cheks and SSI  Adjust basal insulin as necessary     Hypertension  BP acceptable  Holding ARB due to JOHN       PAD in bilateral carotid artery and bilateral lower extremity  DAPT as noted above     History of BPH with urinary retention  Urinary catheter replaced post arrest     Recent multilevel C-spine surgery 1/25  Maintain soft C-collar    GI prophylaxis: Pantoprazole     Code status:  The patient is on DNR  Continue to update patient's son on current condition and prognosis and discuss goals of care    Prognosis: Poor            Active Diagnoses:    Diagnosis Date Noted POA    PRINCIPAL PROBLEM:  Acute respiratory failure with hypoxia and hypercapnia [J96.01, J96.02] 03/12/2025 Yes    Moderate malnutrition [E44.0] 03/20/2025 Unknown      Problems Resolved During this Admission:     VTE Risk Mitigation (From admission, onward)           Ordered     IP VTE HIGH RISK PATIENT  Once         03/12/25 1237     Place sequential compression device  Until discontinued         03/12/25 1237                  Critical care time:  35  minutes    Yomi Jeffery MD  Department of Hospital Medicine   [facility] - ICU    Addendum  His son and daughter visited this afternoon; they have requested withdrawal of care and procedures with which I concur  He has been extubated with plan to transfer to M/S unit for ongoing comfort measures.  His son and daughter remain at bedside.

## 2025-03-25 NOTE — PROGRESS NOTES
[facility]  [address] [place] [address]  Phone: [phone]    Department of Nephrology  Progress Note      PATIENT NAME: Tyler Mai  MRN: [id number]  TODAY'S DATE: 03/24/2025  ADMIT DATE: 3/12/2025    SUBJECTIVE     PRINCIPLE PROBLEM: Acute respiratory failure with hypoxia and hypercapnia    INTERVAL HISTORY:      Interval changes noted.  Patient is a 70-year-old  male who developed COVID and subsequently degraded and underwent a cardiorespiratory arrest event was intubated and placed in the ICU.  On initial evaluation it was apparent that he had significant neurologic damage by his neuro exam which only seems to be worsening at this point.  He does not respond to deep pain nor gag reflex.  He tolerated his hemodialysis well on both the 22nd and 23rd.  We successfully removed 2 L on the 22nd and attempted another 2 L on the 23rd but only got 1.8.  Today is potassium is trending up as this is blood urea nitrogen he will need longer treatment today.  His blood gas was 7.383 with a pCO2 of 36.9 a few who of 75 on continued FiO2 of 40.    Review of patient's allergies indicates:   Allergen Reactions    Hydrochlorothiazide      Other Reaction(s): Pancreatitis       Review of Systems   Unable to perform ROS: Intubated       OBJECTIVE     VITAL SIGNS (Most Recent)  Temp: 98.8 °F (37.1 °C) (03/25/25 0645)  Pulse: 87 (03/25/25 0645)  Resp: 20 (03/25/25 0645)  BP: 132/65 (03/25/25 0630)  SpO2: 99 % (03/25/25 0645)    VENTILATION STATUS  Resp: 20 (03/25/25 0645)  SpO2: 99 % (03/25/25 0645)  Vent Mode: A/C  Oxygen Concentration (%):  [40] 40  Resp Rate Total:  [15 br/min-27 br/min] 20 br/min  Vt Set:  [500 mL] 500 mL  PEEP/CPAP:  [5 cmH20] 5 cmH20  Pressure Support:  [0 cmH20] 0 cmH20  Mean Airway Pressure:  [5.1 peB52-22 cmH20] 11 cmH20        I & O (Last 24H):  Intake/Output Summary (Last 24 hours) at 3/25/2025 0707  Last data filed at 3/25/2025 0500  Gross per 24 hour    Intake 1883 ml   Output 70 ml   Net 1813 ml       WEIGHTS  Wt Readings from Last 3 Encounters:   03/25/25 0500 77.9 kg (171 lb 11.8 oz)   03/24/25 0510 78.8 kg (173 lb 11.6 oz)   03/23/25 0445 79 kg (174 lb 2.6 oz)   03/19/25 0525 71.7 kg (158 lb 1.1 oz)   03/18/25 0928 74.7 kg (164 lb 10.9 oz)   03/15/25 0602 75.5 kg (166 lb 7.2 oz)   03/14/25 0600 72 kg (158 lb 11.7 oz)   03/12/25 2130 79 kg (174 lb 2.6 oz)   03/12/25 1341 79 kg (174 lb 2.6 oz)   03/12/25 0854 77.1 kg (170 lb)   02/20/25 1109 72.6 kg (160 lb)   02/19/25 0715 73.3 kg (161 lb 9.6 oz)   02/18/25 0537 73.3 kg (161 lb 9.6 oz)   02/16/25 0516 73.4 kg (161 lb 13.1 oz)   02/15/25 0500 69.6 kg (153 lb 7 oz)   02/13/25 0500 (P) 68.9 kg (152 lb)   02/12/25 0500 68.5 kg (151 lb 0.2 oz)   01/31/25 1321 62.2 kg (137 lb 2 oz)       Physical Exam  Constitutional:       Appearance: He is ill-appearing.   Eyes:      Comments: Cloudy conjunctiva with sluggish pupils reaction to light   Cardiovascular:      Rate and Rhythm: Normal rate.      Heart sounds: No murmur heard.     No gallop.   Pulmonary:      Breath sounds: No wheezing or rales.      Comments: Respirations per ventilator  Musculoskeletal:      Right lower leg: No edema.      Left lower leg: No edema.   Skin:     Capillary Refill: Capillary refill takes less than 2 seconds.   Neurological:      Deep Tendon Reflexes: Reflexes abnormal.         SCHEDULED MEDS:   albuterol-ipratropium  3 mL Nebulization Q6H    aspirin  81 mg Oral Daily    atorvastatin  40 mg Oral Daily    bisacodyL  5 mg Oral Daily    ceFEPime IV (PEDS and ADULTS)  2 g Intravenous Q24H    clopidogreL  75 mg Oral Daily    DULoxetine  60 mg Oral Daily    insulin glargine U-100  15 Units Subcutaneous Daily    levoFLOXacin  500 mg Intravenous Q48H    methylPREDNISolone injection (PEDS and ADULTS)  60 mg Intravenous Q6H    montelukast  10 mg Oral Daily    pantoprazole  40 mg Intravenous Daily    potassium bicarbonate  20 mEq Oral Once     "pregabalin  75 mg Oral Daily    QUEtiapine  25 mg Oral QHS    ranolazine  500 mg Oral BID    sodium chloride 0.9%  10 mL Intravenous Q8H       CONTINUOUS INFUSIONS:    PRN MEDS:  Current Facility-Administered Medications:     0.9%  NaCl infusion (for blood administration), , Intravenous, Q24H PRN    acetaminophen, 1,000 mg, Oral, Q6H PRN    bisacodyL, 10 mg, Rectal, Daily PRN    dextrose 50%, 12.5 g, Intravenous, PRN    glucagon (human recombinant), 1 mg, Intramuscular, PRN    insulin aspart U-100, 0-10 Units, Subcutaneous, QID (AC + HS) PRN    iopamidoL, 100 mL, Intravenous, ONCE PRN    naloxone, 0.4 mg, Intravenous, PRN    ondansetron, 4 mg, Intravenous, Q8H PRN    oxyCODONE, 10 mg, Oral, Q4H PRN    peg 400-hypromellose-glycerin, 1 drop, Both Eyes, PRN    LABS AND DIAGNOSTICS     CBC LAST 3 DAYS  Recent Labs   Lab 03/23/25  0350 03/24/25  0344 03/25/25  0418   WBC 16.63* 21.68* 23.23*   RBC 3.04* 2.94* 2.85*   HGB 8.8* 8.3* 8.1*   HCT 26.3* 26.6* 25.1*   MCV 86.5 90.5 88.1   MCH 28.9 28.2 28.4   MCHC 33.5 31.2* 32.3*   RDW 14.0 14.1 14.0   * 114* 102*   MPV 12.6* 12.9* 13.6*       COAGULATION LAST 3 DAYS  No results for input(s): "LABPT", "INR", "APTT" in the last 168 hours.    CHEMISTRY LAST 3 DAYS  Recent Labs   Lab 03/19/25  0339 03/19/25  0453 03/20/25  0411 03/20/25  0422 03/21/25  0342 03/21/25  0345 03/23/25  0501 03/24/25  0344 03/24/25  0518 03/24/25  1357 03/25/25  0418 03/25/25  0519     --  141  --  139   < >  --  133*  --  134* 131*  --    K 4.1  --  4.4  --  4.7   < >  --  5.7*  --  5.8* 6.3*  --      --  104  --  106   < >  --  105  --  102 100  --    CO2 26  --  23  --  18*   < >  --  15*  --  17* 18*  --    BUN 56.0*  --  99.0*  --  125.0*   < >  --  123.0*  --  134.0* 152.0*  --    CREATININE 2.88*  --  4.43*  --  5.39*   < >  --  5.12*  --  5.33* 5.94*  --    CALCIUM 8.5*  --  8.0*  --  7.9*   < >  --  8.0*  --  8.4* 8.2*  --    PH  --    < >  --    < >  --    < > 7.411  --  " "7.383  --   --  7.379   MG 1.90  --  2.10  --  2.30  --   --   --   --   --   --   --    ALBUMIN 2.4*  --   --   --  2.1*  --   --   --   --  1.8*  --   --    ALKPHOS 182*  --   --   --  149  --   --   --   --  211*  --   --    ALT 1,623*  --   --   --  661*  --   --   --   --  243*  --   --    AST 2,958*  --   --   --  258*  --   --   --   --  75*  --   --    BILITOT 1.0  --   --   --  0.6  --   --   --   --  0.5  --   --     < > = values in this interval not displayed.       CARDIAC PROFILE LAST 3 DAYS  Recent Labs   Lab 03/18/25  1958 03/19/25  0120 03/19/25  0811   TROPONINI 0.524* 3.426* 3.004*       ENDOCRINE LAST 3 DAYS  No results for input(s): "TSH", "PROCAL" in the last 168 hours.    LAST ARTERIAL BLOOD GAS  ABG  Recent Labs   Lab 03/25/25  0519   PH 7.379   PO2 97   PCO2 32.8*   HCO3 19.4*   BE -6*       LAST 7 DAYS MICROBIOLOGY   Microbiology Results (last 7 days)       Procedure Component Value Units Date/Time    Blood Culture [[id number]]  (Normal) Collected: 03/18/25 1710    Order Status: Completed Specimen: Blood from Arm, Right Updated: 03/24/25 0900     Blood Culture No Growth at 5 days    Blood Culture [[id number]]  (Normal) Collected: 03/18/25 1710    Order Status: Completed Specimen: Blood from Arm, Right Updated: 03/24/25 0900     Blood Culture No Growth at 5 days    Respiratory Culture [[id number]]  (Abnormal) Collected: 03/18/25 1742    Order Status: Completed Specimen: Sputum, Expectorated Updated: 03/21/25 0942     Respiratory Culture Moderate Yeast     Comment: with no normal respiratory abdi        GRAM STAIN Quality 2+      Few Yeast            MOST RECENT IMAGING  CT Head Without Contrast  Narrative: EXAMINATION:  CT HEAD WITHOUT CONTRAST    CLINICAL HISTORY:  Neuro deficit, acute, stroke suspected;, .    TECHNIQUE:  PATIENT RADIATION DOSE: DLP(mGycm) 1007    As per PQRS measures, all CT scans at this facility used dose modulation, iterative reconstruction, and/or weight based dose " adjustment when appropriate to reduce radiation dose to as low as reasonably achievable.    COMPARISON:  02/20/2025    FINDINGS:  Serial axial images were obtained of the head without the administration of IV contrast. Both brain and bone parenchymal windows were obtained.  Additional coronal and sagittal reconstructions were obtained.  Ventricles, cisterns, and sulci are prominent in size.  There is no evidence of intracranial hemorrhage, midline shift, mass effect, or abnormal extra-axial fluid collections.  There is mild motion artifact.  Mild scattered hypodensities noted to the periventricular white matter suspicious for small vessel ischemic changes.  Intracranial atherosclerosis is seen.  Cerebellar tonsils extend caudally to the level of foramen magnum.  There is beam hardening artifact at the skull base.  There is mild mucosal thickening at the right ethmoid sinus.  Frontal sinuses are under developed.  Mastoid air cells are aerated bilaterally.  Few filling defects noted external auditory canals bilaterally suggesting cerumen.  Clinical correlation is indicated.  Bony structures are mildly osteopenic.  Impression: 1. Motion artifact  2. No acute intracranial abnormality identified  3. Generalized cerebral and cerebellar atrophy  4. Findings and other details as above    Electronically signed by: Tyler Newby  Date:    03/24/2025  Time:    14:18      [facility]  Results for orders placed during the hospital encounter of 03/12/25    Echo    Interpretation Summary    Left Ventricle: The left ventricle is normal in size. There is severely reduced systolic function with a visually estimated ejection fraction of less than 30%.    Right Ventricle: The right ventricle is normal in size. Systolic function is normal.    Left Atrium: Mildly dilated    Aortic Valve: There is mild to moderate stenosis. Aortic valve peak velocity is 2.1 m/s.    Mitral Valve: There is mild regurgitation.      CURRENT/PREVIOUS VISIT  EKG  Results for orders placed or performed during the hospital encounter of 03/12/25   EKG 12-lead    Collection Time: 03/18/25  4:43 PM   Result Value Ref Range    QRS Duration 124 ms    [facility] QTC Calculation 464 ms    Narrative    Test Reason : I46.9,    Vent. Rate :  88 BPM     Atrial Rate :  88 BPM     P-R Int : 112 ms          QRS Dur : 124 ms      QT Int : 384 ms       P-R-T Axes : -13  75 217 degrees    QTcB Int : 464 ms    Atrial-sensed ventricular-paced rhythm  Abnormal ECG  When compared with ECG of 12-Mar-2025 09:03,  Vent. rate has increased by  10 bpm  Confirmed by David Posadas ([id number]) on 3/19/2025 8:39:12 AM    Referred By: AAAREFERRAL SELF           Confirmed By: David Posadas       ASSESSMENT/PLAN:     Active Hospital Problems    Diagnosis    *Acute respiratory failure with hypoxia and hypercapnia    Moderate malnutrition       ASSESSMENT & PLAN:   70-year-old  male with COVID in cardiorespiratory arrest remains intubated with degradation in neurologic exam.      RECOMMENDATIONS:  Developing hyperkalemia and increase in blood urea nitrogen he will require longer treatments.  Continued avoidance of nephrotoxins and renally dosing medications and given neurologic exam would recommend a EEG.        Tank Sahni MD  Department of Nephrology  Date of Service: 03/24/2025  12:40 PM

## 2025-03-25 NOTE — PROGRESS NOTES
[facility]  Pulmonology  Consult Note    Patient Name: Tyler Mai  MRN: [id number]  Admission Date: 3/12/2025  Hospital Length of Stay: 13 days  Code Status: DNR  Attending Physician: Yomi Jeffery MD  Primary Care Provider: Paxton Connors MD   Principal Problem: Acute respiratory failure with hypoxia and hypercapnia    Consults  Subjective:     HPI:  69-year-old male patient with a long complicated history.  Patient had significant weakness in his arms and was minimally mobile.  After evaluation and several delays in surgery he underwent surgery on January 28, 2025 by Dr. Zavala at [facility].  He had a C2-6 PCDF and a C2-3, 4-5 decompression with screws.  The patient was sent to a rehab facility and subsequently downgraded to a skilled nursing floor where he was admitted to [facility] on 03/12/25 with hypoxic and hypercapnic respiratory failure, multilobar healthcare acquired pneumonia, acute kidney NV, UTI (Proteus) and mild elevation in troponin.    He has known COPD (not quantitated) on Trelegy inhaler, systolic heart failure (ejection fraction approximately 30% with mild-to-moderate aortic stenosis), pacemaker/defibrillator, CAD with previous CABG.    The patient started having worsening shortness of breath and was being transferred to ICU this afternoon when placing him in the ICU bed he became agonal and had cardiorespiratory arrest.  He received epinephrine x1, was intubated and the length of the code is recorded as 30 minutes.  EKG is pending.  Chest x-ray shows proper placement of the endotracheal tube with bilateral infiltrates throughout both lungs.  Stat labs were performed and are as follows H&H 11.5/38, WBC 34.1, electrolytes within normal limits, anion gap 14, BUN 34, creatinine 1.75.  ABGs done shortly after the code is as follows: PH 7.36/47/225 on tidal volume 500 peep +5 100% rate of 20  .  Interval 24 hours:  Patient seen  via telemedicine this a.m..  Patient has remained afebrile with stable vital signs.  CT brain was performed yesterday which showed some areas of ischemia and atrophy but no acute intracranial abnormality.  He remains oliguric.  Labs from this morning are reviewed.  Potassium 6.3, bicarb 18,  and creatinine 5.94.  He is currently on dialysis and tolerating well.  Pupils remain small and not seen to react.  Yesterday he did have some spontaneous breaths while going down to take a CT of the brain.  He still has absent cough gag and corneals.  I called and spoke with his son Tyler and gave him an update on his present circumstances and plan of care. Struggling with making a decision.  Past Medical History:   Diagnosis Date    Anxiety disorder, unspecified     Arthritis     CAD (coronary artery disease)     Cervical radiculopathy     CHF (congestive heart failure)     Chronic pain syndrome     COPD (chronic obstructive pulmonary disease)     Diabetes mellitus     Gastro-esophageal reflux disease without esophagitis     High cholesterol     Hypertension     Lumbar radiculopathy     Myelomalacia of cervical cord     Other specified diseases of spinal cord     Pacemaker     PVD (peripheral vascular disease)     S/P triple vessel bypass     Tobacco use        Past Surgical History:   Procedure Laterality Date    ANTERIOR CERVICAL DISCECTOMY W/ FUSION  1995    C5-6 and C6-7.  Dr. Wall    ANTERIOR CERVICAL DISCECTOMY W/ FUSION  1996    Redo C5-6, C6-7.  Dr. Wall    CHOLECYSTECTOMY      CORONARY ARTERY BYPASS GRAFT      FUSION OF POSTERIOR COLUMN OF CERVICAL SPINE USING COMPUTER AIDED NAVIGATION N/A 1/24/2025    Procedure: FUSION, SPINE, POSTERIOR SPINAL COLUMN, CERVICAL, USING COMPUTER-ASSISTED NAVIGATION;  Surgeon: Darrell Watson MD;  Location: [facility];  Service: Neurosurgery;  Laterality: N/A;  C2-C6 PCF; C2/3-C4/5 decompression, possible additional levels  o-arm  NTI  TIVA setup  [employer] //  XX    INSERTION  OF PACEMAKER      X2    INSERTION OF TUNNELED CENTRAL VENOUS HEMODIALYSIS CATHETER Right 3/22/2025    Procedure: INSERTION, CATHETER, HEMODIALYSIS, DUAL LUMEN;  Surgeon: Quang Escobedo MD;  Location: [facility];  Service: General;  Laterality: Right;  right femoral    TRIPPLE VESSEL BYPASS         Review of patient's allergies indicates:   Allergen Reactions    Hydrochlorothiazide      Other Reaction(s): Pancreatitis       Family History       Problem Relation (Age of Onset)    Cancer Mother, Father    Lumbar disc disease Brother          Tobacco Use    Smoking status: Every Day     Current packs/day: 1.00     Types: Cigarettes    Smokeless tobacco: Not on file   Substance and Sexual Activity    Alcohol use: Not on file    Drug use: Not on file    Sexual activity: Not on file        Review of Systems  Objective:     Vital Signs (Most Recent):  Temp: 98.8 °F (37.1 °C) (03/25/25 0730)  Pulse: 87 (03/25/25 0742)  Resp: 18 (03/25/25 0742)  BP: 117/64 (03/25/25 0730)  SpO2: 99 % (03/25/25 0742) Vital Signs (24h Range):  Temp:  [98.6 °F (37 °C)-98.8 °F (37.1 °C)] 98.8 °F (37.1 °C)  Pulse:  [83-95] 87  Resp:  [0-22] 18  SpO2:  [95 %-100 %] 99 %  BP: ()/(55-70) 117/64     Body mass index is 24.64 kg/m².      Intake/Output Summary (Last 24 hours) at 3/25/2025 0803  Last data filed at 3/25/2025 0500  Gross per 24 hour   Intake 1883 ml   Output 70 ml   Net 1813 ml       Physical Exam    Vents:  Vent Mode: A/C (03/25/25 0742)  Ventilator Initiated: Yes (03/18/25 1615)  Set Rate: 20 BPM (03/25/25 0742)  Vt Set: 500 mL (03/25/25 0742)  Pressure Support: 0 cmH20 (03/24/25 1328)  PEEP/CPAP: 5 cmH20 (03/25/25 0742)  Oxygen Concentration (%): 40 (03/25/25 0742)  Peak Airway Pressure: 20 cmH20 (03/25/25 0742)  Plateau Pressure: 0 cmH20 (03/25/25 0742)  Total Ve: 11.1 L/m (03/25/25 0742)  Negative Inspiratory Force (cm H2O): 0 (03/25/25 0742)  F/VT Ratio<105 (RSBI): (!) 32.32 (03/25/25 0742)    Lines/Drains/Airways        Central Venous Catheter Line  Duration                  Hemodialysis Catheter 03/22/25 1345 right femoral 2 days              Drain  Duration                  Urethral Catheter 03/17/25 1545 Straight-tip 16 Fr. 7 days         NG/OG Tube 03/19/25 0830 Blount sump 18 Fr. Right mouth 5 days              Airway  Duration                  Airway - Non-Surgical 03/18/25 1500 Endotracheal Tube 6 days              Peripheral Intravenous Line  Duration                  Peripheral IV - Single Lumen 03/12/25 0913 22 G Right;Distal Forearm 12 days         Peripheral IV - Single Lumen 03/12/25 0931 18 G Right;Anterior Forearm 12 days                    Significant Labs:    Lab Results   Component Value Date    WBC 23.23 (H) 03/25/2025    HGB 8.1 (L) 03/25/2025    HCT 25.1 (L) 03/25/2025    MCV 88.1 03/25/2025     (L) 03/25/2025         BMP  Lab Results   Component Value Date     (L) 03/25/2025    K 6.3 (HH) 03/25/2025     03/25/2025    CO2 18 (L) 03/25/2025    .0 (H) 03/25/2025    CREATININE 5.94 (H) 03/25/2025    CALCIUM 8.2 (L) 03/25/2025    ANIONGAP 12 01/07/2025    ESTGFRAFRICA 36 01/07/2025    EGFRNONAA >60 08/17/2020       ABG  Recent Labs   Lab 03/25/25  0519   PH 7.379   PO2 97   PCO2 32.8*   HCO3 19.4*   BE -6*         All pertinent labs within the past 24 hours have been reviewed.    Significant Imaging:   I have reviewed all pertinent imaging results/findings within the past 24 hours.  I have reviewed and interpreted all pertinent imaging results/findings within the past 24 hours.    Assessment/Plan:       Status post cardiorespiratory arrest with Rosc after 1 round of epi on 03/18/2025 (day 7 endotracheal tube)  Hypoxemic and hypercarbic respiratory failure  Healthcare acquired pneumonia with bilateral infiltrates  Worsening renal failure-now on dialysis  Severe deconditioning pre/ post C2-6 PC df and C2-3, 4-5 decompression with screws on 01/28/2025  Probable anoxic encephalopathy  Positive  COVID serology PCR of unknown clinical significance  Comorbidities:  COPD not quantitated, systolic heart failure with ejection fraction 30%, mild-to-moderate aortic stenosis, status post AICD and pacemaker, diabetes mellitus, hypertension, hyperlipidemia   Plan:  Continue supportive care to see if he will regain significant neurological function   Continue broad-spectrum antibiotics  Use lowest FiO2 to keep sat greater than equal to 92%       David Adkins MD  Pulmonology  [facility] - ICU

## 2025-03-25 NOTE — PLAN OF CARE
Problem: Adult Inpatient Plan of Care  Goal: Plan of Care Review  Outcome: Progressing  Goal: Patient-Specific Goal (Individualized)  Outcome: Progressing  Goal: Absence of Hospital-Acquired Illness or Injury  Outcome: Progressing  Goal: Optimal Comfort and Wellbeing  Outcome: Progressing  Goal: Readiness for Transition of Care  Outcome: Progressing     Problem: Skin Injury Risk Increased  Goal: Skin Health and Integrity  Outcome: Progressing     Problem: Diabetes Comorbidity  Goal: Blood Glucose Level Within Targeted Range  Outcome: Progressing     Problem: Wound  Goal: Optimal Coping  Outcome: Progressing  Goal: Optimal Functional Ability  Outcome: Progressing  Goal: Absence of Infection Signs and Symptoms  Outcome: Progressing  Goal: Improved Oral Intake  Outcome: Progressing  Goal: Optimal Pain Control and Function  Outcome: Progressing  Goal: Skin Health and Integrity  Outcome: Progressing  Goal: Optimal Wound Healing  Outcome: Progressing     Problem: Fall Injury Risk  Goal: Absence of Fall and Fall-Related Injury  Outcome: Progressing     Problem: Gas Exchange Impaired  Goal: Optimal Gas Exchange  Outcome: Progressing     Problem: Infection  Goal: Absence of Infection Signs and Symptoms  Outcome: Progressing     Problem: Comorbidity Management  Goal: Maintenance of COPD Symptom Control  Outcome: Progressing  Goal: Maintenance of Heart Failure Symptom Control  Outcome: Progressing  Goal: Blood Pressure in Desired Range  Outcome: Progressing  Goal: Maintenance of Osteoarthritis Symptom Control  Outcome: Progressing     Problem: Pain Acute  Goal: Optimal Pain Control and Function  Outcome: Progressing     Problem: Hemodialysis  Goal: Safe, Effective Therapy Delivery  Outcome: Progressing  Goal: Effective Tissue Perfusion  Outcome: Progressing  Goal: Absence of Infection Signs and Symptoms  Outcome: Progressing

## 2025-03-26 NOTE — PLAN OF CARE
Pt is a DNR, comfort measures only.  Met with Dr. Jeffery this morning and he asked that I speak to fly about possible inpatient hospice.  Went to room.  Patient's son Jacek at bedside.  Patient with eyes open, glazed, no blinking.  Breathing slow, steady.  Patient not responding to verbal stimuli.  Son and I spoke about IP hospice and he said no, he wants patient to remain here and feels like it wont be long before patient will pass.  He asked that the nurse please give him some morphine.  I told him that I would go and speak to them.  Nurse Ceci and charge nurse both spoke to me.  No morphine is ordered, no prn's noted.  Nurse states pt has a pacer.  Informed nurse to call Dr. Jeffery now, to get morphine ordered and that they need to get the pacer turned off.

## 2025-03-26 NOTE — PROGRESS NOTES
[facility]  [address] [place] [address]  Phone: [phone]    Department of Nephrology  Progress Note      PATIENT NAME: Tyler Mai  MRN: [id number]  TODAY'S DATE: 03/26/2025  ADMIT DATE: 3/12/2025    SUBJECTIVE     PRINCIPLE PROBLEM: Acute respiratory failure with hypoxia and hypercapnia    INTERVAL HISTORY:    3/26/2025  Interval changes noted.  We attempted another dialysis session on yesterday where patient is mean arterial pressure dropped dramatically.  He was decided that we would cease anymore attempts to do dialysis.  Patient was extubated and brought to the floor proximally 330 p.m..  I am in the room with him this morning with a some.  The patient is blind left lateral decubitus with pink frothy fluid arising from patient's mouth as well as audible rhonchi without auscultation.    Review of patient's allergies indicates:   Allergen Reactions    Hydrochlorothiazide      Other Reaction(s): Pancreatitis       Review of Systems   Unable to perform ROS: Acuity of condition       OBJECTIVE     VITAL SIGNS (Most Recent)  Temp: 98.6 °F (37 °C) (03/25/25 1915)  Pulse: 93 (03/26/25 0735)  Resp: 16 (03/26/25 0735)  BP: (!) 107/53 (03/25/25 1915)  SpO2: (!) 85 % (03/26/25 0735)    VENTILATION STATUS  Resp: 16 (03/26/25 0735)  SpO2: (!) 85 % (03/26/25 0735)  Vent Mode: A/C  Oxygen Concentration (%):  [35-40] 35  Resp Rate Total:  [20 br/min-23 br/min] 20 br/min  Vt Set:  [500 mL] 500 mL  PEEP/CPAP:  [5 cmH20] 5 cmH20  Mean Airway Pressure:  [10 kbD16-32 cmH20] 11 cmH20        I & O (Last 24H):No intake or output data in the 24 hours ending 03/26/25 0918    WEIGHTS  Wt Readings from Last 3 Encounters:   03/25/25 0500 77.9 kg (171 lb 11.8 oz)   03/24/25 0510 78.8 kg (173 lb 11.6 oz)   03/23/25 0445 79 kg (174 lb 2.6 oz)   03/19/25 0525 71.7 kg (158 lb 1.1 oz)   03/18/25 0928 74.7 kg (164 lb 10.9 oz)   03/15/25 0602 75.5 kg (166 lb 7.2 oz)   03/14/25 0600 72 kg (158 lb 11.7 oz)    03/12/25 2130 79 kg (174 lb 2.6 oz)   03/12/25 1341 79 kg (174 lb 2.6 oz)   03/12/25 0854 77.1 kg (170 lb)   02/20/25 1109 72.6 kg (160 lb)   02/19/25 0715 73.3 kg (161 lb 9.6 oz)   02/18/25 0537 73.3 kg (161 lb 9.6 oz)   02/16/25 0516 73.4 kg (161 lb 13.1 oz)   02/15/25 0500 69.6 kg (153 lb 7 oz)   02/13/25 0500 (P) 68.9 kg (152 lb)   02/12/25 0500 68.5 kg (151 lb 0.2 oz)   01/31/25 1321 62.2 kg (137 lb 2 oz)       Physical Exam  Constitutional:       Appearance: He is ill-appearing and toxic-appearing.   Cardiovascular:      Rate and Rhythm: Tachycardia present.   Pulmonary:      Effort: Accessory muscle usage present.      Breath sounds: Rhonchi and rales present.   Abdominal:      General: Abdomen is flat. Bowel sounds are normal.   Musculoskeletal:      Right lower leg: No edema.      Left lower leg: No edema.   Skin:     Capillary Refill: Capillary refill takes 2 to 3 seconds.         SCHEDULED MEDS:   albuterol-ipratropium  3 mL Nebulization Q6H    aspirin  81 mg Oral Daily    atorvastatin  40 mg Oral Daily    bisacodyL  5 mg Oral Daily    ceFEPime IV (PEDS and ADULTS)  2 g Intravenous Q24H    clopidogreL  75 mg Oral Daily    DULoxetine  60 mg Oral Daily    insulin glargine U-100  15 Units Subcutaneous Daily    levoFLOXacin  500 mg Intravenous Q48H    methylPREDNISolone injection (PEDS and ADULTS)  60 mg Intravenous Q6H    montelukast  10 mg Oral Daily    potassium bicarbonate  20 mEq Oral Once    pregabalin  75 mg Oral Daily    QUEtiapine  25 mg Oral QHS    ranolazine  500 mg Oral BID    sodium chloride 0.9%  10 mL Intravenous Q8H       CONTINUOUS INFUSIONS:    PRN MEDS:  Current Facility-Administered Medications:     0.9%  NaCl infusion (for blood administration), , Intravenous, Q24H PRN    acetaminophen, 1,000 mg, Oral, Q6H PRN    bisacodyL, 10 mg, Rectal, Daily PRN    dextrose 50%, 12.5 g, Intravenous, PRN    glucagon (human recombinant), 1 mg, Intramuscular, PRN    insulin aspart U-100, 0-10 Units,  "Subcutaneous, QID (AC + HS) PRN    iopamidoL, 100 mL, Intravenous, ONCE PRN    naloxone, 0.4 mg, Intravenous, PRN    ondansetron, 4 mg, Intravenous, Q8H PRN    oxyCODONE, 10 mg, Oral, Q4H PRN    peg 400-hypromellose-glycerin, 1 drop, Both Eyes, PRN    LABS AND DIAGNOSTICS     CBC LAST 3 DAYS  Recent Labs   Lab 03/24/25  0344 03/25/25  0418 03/26/25  0529   WBC 21.68* 23.23* 22.36*   RBC 2.94* 2.85* 2.48*   HGB 8.3* 8.1* 7.2*   HCT 26.6* 25.1* 21.7*   MCV 90.5 88.1 87.5   MCH 28.2 28.4 29.0   MCHC 31.2* 32.3* 33.2   RDW 14.1 14.0 14.0   * 102* 88*   MPV 12.9* 13.6* 14.0*       COAGULATION LAST 3 DAYS  No results for input(s): "LABPT", "INR", "APTT" in the last 168 hours.    CHEMISTRY LAST 3 DAYS  Recent Labs   Lab 03/20/25  0411 03/20/25  0422 03/21/25  0342 03/21/25  0345 03/23/25  0501 03/24/25  0344 03/24/25  0518 03/24/25  1357 03/25/25  0418 03/25/25  0519 03/26/25  0529     --  139   < >  --    < >  --  134* 131*  --  132*   K 4.4  --  4.7   < >  --    < >  --  5.8* 6.3*  --  6.6*     --  106   < >  --    < >  --  102 100  --  100   CO2 23  --  18*   < >  --    < >  --  17* 18*  --  19*   BUN 99.0*  --  125.0*   < >  --    < >  --  134.0* 152.0*  --  >125*   CREATININE 4.43*  --  5.39*   < >  --    < >  --  5.33* 5.94*  --  5.74*   CALCIUM 8.0*  --  7.9*   < >  --    < >  --  8.4* 8.2*  --  8.2*   PH  --    < >  --    < > 7.411  --  7.383  --   --  7.379  --    MG 2.10  --  2.30  --   --   --   --   --   --   --  2.20   ALBUMIN  --   --  2.1*  --   --   --   --  1.8*  --   --  1.7*   ALKPHOS  --   --  149  --   --   --   --  211*  --   --  177*   ALT  --   --  661*  --   --   --   --  243*  --   --  146*   AST  --   --  258*  --   --   --   --  75*  --   --  53*   BILITOT  --   --  0.6  --   --   --   --  0.5  --   --  0.5    < > = values in this interval not displayed.       CARDIAC PROFILE LAST 3 DAYS  No results for input(s): "BNP", "CPK", "CPKMB", "LDH", "TROPONINI" in the last 168 " "hours.    ENDOCRINE LAST 3 DAYS  No results for input(s): "TSH", "PROCAL" in the last 168 hours.    LAST ARTERIAL BLOOD GAS  ABG  Recent Labs   Lab 03/25/25  0519   PH 7.379   PO2 97   PCO2 32.8*   HCO3 19.4*   BE -6*       LAST 7 DAYS MICROBIOLOGY   Microbiology Results (last 7 days)       Procedure Component Value Units Date/Time    Blood Culture [[id number]]  (Normal) Collected: 03/18/25 1710    Order Status: Completed Specimen: Blood from Arm, Right Updated: 03/24/25 0900     Blood Culture No Growth at 5 days    Blood Culture [[id number]]  (Normal) Collected: 03/18/25 1710    Order Status: Completed Specimen: Blood from Arm, Right Updated: 03/24/25 0900     Blood Culture No Growth at 5 days    Respiratory Culture [[id number]]  (Abnormal) Collected: 03/18/25 1742    Order Status: Completed Specimen: Sputum, Expectorated Updated: 03/21/25 0942     Respiratory Culture Moderate Yeast     Comment: with no normal respiratory abdi        GRAM STAIN Quality 2+      Few Yeast            MOST RECENT IMAGING  CT Head Without Contrast  Narrative: EXAMINATION:  CT HEAD WITHOUT CONTRAST    CLINICAL HISTORY:  Neuro deficit, acute, stroke suspected;, .    TECHNIQUE:  PATIENT RADIATION DOSE: DLP(mGycm) 1007    As per PQRS measures, all CT scans at this facility used dose modulation, iterative reconstruction, and/or weight based dose adjustment when appropriate to reduce radiation dose to as low as reasonably achievable.    COMPARISON:  02/20/2025    FINDINGS:  Serial axial images were obtained of the head without the administration of IV contrast. Both brain and bone parenchymal windows were obtained.  Additional coronal and sagittal reconstructions were obtained.  Ventricles, cisterns, and sulci are prominent in size.  There is no evidence of intracranial hemorrhage, midline shift, mass effect, or abnormal extra-axial fluid collections.  There is mild motion artifact.  Mild scattered hypodensities noted to the periventricular " white matter suspicious for small vessel ischemic changes.  Intracranial atherosclerosis is seen.  Cerebellar tonsils extend caudally to the level of foramen magnum.  There is beam hardening artifact at the skull base.  There is mild mucosal thickening at the right ethmoid sinus.  Frontal sinuses are under developed.  Mastoid air cells are aerated bilaterally.  Few filling defects noted external auditory canals bilaterally suggesting cerumen.  Clinical correlation is indicated.  Bony structures are mildly osteopenic.  Impression: 1. Motion artifact  2. No acute intracranial abnormality identified  3. Generalized cerebral and cerebellar atrophy  4. Findings and other details as above    Electronically signed by: Tyler Newby  Date:    03/24/2025  Time:    14:18      [facility]  Results for orders placed during the hospital encounter of 03/12/25    Echo    Interpretation Summary    Left Ventricle: The left ventricle is normal in size. There is severely reduced systolic function with a visually estimated ejection fraction of less than 30%.    Right Ventricle: The right ventricle is normal in size. Systolic function is normal.    Left Atrium: Mildly dilated    Aortic Valve: There is mild to moderate stenosis. Aortic valve peak velocity is 2.1 m/s.    Mitral Valve: There is mild regurgitation.      CURRENT/PREVIOUS VISIT EKG  Results for orders placed or performed during the hospital encounter of 03/12/25   EKG 12-lead    Collection Time: 03/18/25  4:43 PM   Result Value Ref Range    QRS Duration 124 ms    [facility] QTC Calculation 464 ms    Narrative    Test Reason : I46.9,    Vent. Rate :  88 BPM     Atrial Rate :  88 BPM     P-R Int : 112 ms          QRS Dur : 124 ms      QT Int : 384 ms       P-R-T Axes : -13  75 217 degrees    QTcB Int : 464 ms    Atrial-sensed ventricular-paced rhythm  Abnormal ECG  When compared with ECG of 12-Mar-2025 09:03,  Vent. rate has increased by  10 bpm  Confirmed by David Posadas ([id number]) on  3/19/2025 8:39:12 AM    Referred By: LAMINE SELF           Confirmed By: David Posadas       ASSESSMENT/PLAN:     Active Hospital Problems    Diagnosis    *Acute respiratory failure with hypoxia and hypercapnia    Comfort measures only status    Moderate malnutrition       ASSESSMENT & PLAN:   70-year-old  male history of lobar pneumonia as well as COVID pneumonia status post code with severe neurologic sequelae and aneuric renal failure requiring hemodialysis no longer tolerating dialysis he is withdrawn from dialysis extubated and placed on the floor.      RECOMMENDATIONS:  Comfort care only        Tank Sahni MD  Department of Nephrology  Date of Service: 03/26/2025  9:18 AM

## 2025-03-26 NOTE — NURSING
[employer] pacemaker rep arrived to hospital to deactivate defibrillator. Verbal order received from MD Latia to d/c defibrillator.

## 2025-03-26 NOTE — NURSING TRANSFER
Nursing Transfer Note      3/25/2025   10:07 PM    Nurse giving handoff:GÓMEZ Dubose   Nurse receiving handoff:MADAI King RN    Reason patient is being transferred: Downgraded    Transfer To: [id number]    Transfer via bed    Transfer with  to O2    Transported by [facility]    Transfer Vital Signs:  Blood Pressure:112/62  Heart Rate:89  O2:89  Temperature:37  Respirations:16    Telemetry: Rate NSR  Order for Tele Monitor? Yes  Additional Lines: Oxygen    Medicines sent: NA    Any special needs or follow-up needed: NA    Patient belongings transferred with patient: Yes    Chart send with patient: Yes    Notified: son    Patient reassessed at: 03/25/25 2150     Upon arrival to floor: bed in lowest position

## 2025-03-26 NOTE — DISCHARGE SUMMARY
Death summary    Interval History:   HPI: Mr. Mai is a 69-year-old male who was transferred to the ED earlier today from a local SNF due to persistent shortness of breath.  His symptoms apparently began yesterday at which time he was diagnosed with pneumonia.  Symptoms worsened today, O2 saturations noted to be in the 80s on RA prompting his transfer to this facility.  With supplemental O2 his O2 saturation improved into the low 90s.  He was subsequently converted to BiPAP in the ED.  His current issues began in mid January at which time he underwent a rather extensive C-spine surgery involving multiple levels following which he was transferred to an inpatient rehab facility.  During that time he also developed some urinary retention and had an indwelling catheter for an extended period.  Catheter was apparently removed in rehab.  He was subsequently transferred to a SNF for continued physical therapy.  His evaluation in the ED was significant for anemia, leukocytosis, acute kidney injury, acute respiratory failure with hypercapnia and hypoxia, urinalysis consistent with UTI and CXR consistent with multilobar pneumonia.  Other findings included a significantly elevated BNP and an elevated troponin.  He was managed with a single dose of IV antibiotics and a nebulizer treatment.  On seeing him following his arrival to the ICU he was quite lethargic with a respiratory rate of 10 to 12 on BiPAP.  Due to his history of opioid use for chronic pain and recent C-spine surgery he was given a dose of Narcan after which he did awakened and become more responsive, Respiratory rate improved to the 20s.  A subsequent ABG revealed a normal pH with moderate hypoxemia.     3/13-overnight became more agitated, refused to wear BiPAP; fortunately has been able to maintain adequate O2 saturations on nasal cannula O2; when seen on rounds this morning was very lethargic, poorly responsive; he opened his eyes to voice and tactile  stimuli but otherwise unresponsive.  Did spike a temp last night to 101.3° F; he was given Tylenol and has not had another spike since that time     3/14-no events overnight; he has been afebrile over the past 24 hours; when seen on rounds this morning was sleepy but aroused easily to voice     3/15-no events overnight; when seen on rounds this morning was seated on the side of the bed working with PT; he was alert, normally interactive seemed to be in good spirits  Downgrade to M/S status     3/16-transferred to M/S unit yesterday evening; no events overnight; Respiratory status stable; remains on 3 L NC with O2 saturations of 94%     3/17-condition stable; when seen on rounds this morning was somewhat lethargic but this appears to be his baseline in the mornings; he becomes more interactive/attentive later in the day; he had no complaints; he remains generally very weak     March 18, 2025-the patient had low-grade fever 100.4, can maintain 92% O2 sat with 3 L OxyMask, complain of generalized weakness     March 19, 2025-the patient was coded around 3:00 p.m. yesterday secondary to cardiopulmonary arrest, chest compression was performed the, the patient was intubated, after 1 ampule of epinephrine, pulse came back, the patient V-tach and shocked with a 200 joule, converted to normal sinus rhythm, the patient is intubated on PRVC mode of ventilator, without sedation, patient is unresponsive, patient has gag reflex but no corneal reflex, pupil is dilated not responding to light, family signed DNR     March 20, 2025-patient is intubated on PRVC mode of ventilator, the patient is unresponsive without sedation, the patient has gag reflex but no corneal reflex, family is leaning toward withdrawal of life support next 1 or 2 days     March 21, 2025-the patient is intubated on PRVC mode of ventilator, the patient is not moving at all without sedation, the patient is afebrile, creatinine is worse to 5.3 from 4.4, waiting for  Nephrology recommendation     2025-creatinine is worse to 5.8 from 5.3, waiting for dialysis cath placement, no corneal reflex, no gag reflex, I had a long conversation with the daughter and son about poor prognosis     2025-nephrology removed 2 L yesterday and 1.8 L today, patient is intubated on PRVC mode of ventilator     2025-the patient is intubated on PRVC mode of ventilator, no corneal reflex, no gag reflex pulmonology recommended brain MRI     3/25-no significant events overnight, no change in condition; he remains unresponsive on mechanical ventilation    3/26-on yesterday evening his son and daughter elected to withdraw supportive measures; he was subsequently extubated to nasal cannula, therapeutic interventions discontinued and was subsequently transferred to M/S unit.  He maintained his airway and breathing through the night.  This morning his breathing slowly deteriorated and he subsequently  at 12:37 p.m. and was pronounced by me at that time.  His daughter was at bedside.    Diagnoses    Cardiopulmonary arrest 2025   Treated with a 1 amp of epinephrine and chest compression with subsequent ROSC   Ventricular tachycardia-shocked with  200 joule; interestingly, AICD did not fire     Anoxic encephalopathy  He has been unresponsive since CP arrest on   Overall, condition deteriorated     Acute respiratory failure with hypoxia and hypercapnia  Intubated 3/19; withdrawal of supportive measures 3/25  Pulmonary followed through much of his hospital stay     Multilobar healthcare acquired pneumonia with sepsis      Acute kidney injury with oliguria  Cardiorenal syndrome     Severe ischemic cardiomyopathy/chronic HFrEF/AICD-ppm status  Echo -EF less than 20% with moderate to sv aortic stenosis  Repeat echo 3/12-less than 30% with moderate aortic stenosis  Cardiology saw patient while in hospital     Anemia     Transaminitis     Elevated troponin  Troponin  peaked at 0.22 to, trending down this morning  Type 2 MI related to respiratory failure     UTI     COPD     COVID positive  Undetermined significance     CAD/CABG  a-v paced rhythm through hospital stay     Diabetes mellitus     Hypertension     PAD in bilateral carotid arteries and bilateral lower extremity     History of BPH with urinary retention     Recent multilevel C-spine surgery 1/25

## 2025-03-26 NOTE — NURSING
Family reported decreased respirations. At bedside pt continued to have RR of 4 until 1235. Last heart beat palpated 1237. MD Latia notified.

## (undated) DEVICE — KIT DRAIN WOUND RND SPRNG RESV

## (undated) DEVICE — BUR BONE CUT MICRO TPS 3X3.8MM

## (undated) DEVICE — KIT POS JACKSON TABLE NO HDRST

## (undated) DEVICE — SUT SILK 2.0 BLK 18

## (undated) DEVICE — GLOVE PROTEXIS BLUE LATEX 8

## (undated) DEVICE — SYR LUER LOCK 20ML

## (undated) DEVICE — GLOVE SIGNATURE ESSNTL LTX 7

## (undated) DEVICE — GLOVE PROTEXIS BLUE LATEX 7.5

## (undated) DEVICE — DRESSING TELFA N ADH 3X8

## (undated) DEVICE — ELECTRODE E-CLEAN MOD 4IN

## (undated) DEVICE — TOOL MR8 MATCH HEAD 14CM 3MM

## (undated) DEVICE — TUBING SILICON CLR 3/16IN 10FT

## (undated) DEVICE — Device

## (undated) DEVICE — SUT VICRYL 2-0 8-18 CP-2

## (undated) DEVICE — GLOVE SENSICARE PI GRN 7

## (undated) DEVICE — DRAPE C-ARMOR EQUIPMENT COVER

## (undated) DEVICE — NDL HYPO 22GX1 1/2 SYR 10ML LL

## (undated) DEVICE — SPONGE SURGIFOAM 100 8.5X12X10

## (undated) DEVICE — BENZOIN TINCTURE 0.66ML

## (undated) DEVICE — DRAPE C-ARM COVER EZ 36X28IN

## (undated) DEVICE — SYR 0.9% NACL 10ML STERILE

## (undated) DEVICE — CONNECTOR NEEDLELESS MAXZERO

## (undated) DEVICE — SET CATH 2 LMN 15CM 11.5FR

## (undated) DEVICE — APPLICATOR CHLORAPREP ORN 26ML

## (undated) DEVICE — ELECTRODE BLADE INSULATED 1 IN

## (undated) DEVICE — STAPLER SKIN PROXIMATE WIDE

## (undated) DEVICE — DRAPE ORTH TIBURON 77X108IN

## (undated) DEVICE — PAD DERMAPROX XL 22X14X.5IN

## (undated) DEVICE — TRAY CATH 1-LYR URIMTR 16FR

## (undated) DEVICE — SYR 10CC LUER LOCK

## (undated) DEVICE — SPONGE COTTON TRAY 4X4IN

## (undated) DEVICE — SYR SLIP TIP 10ML SHIELD

## (undated) DEVICE — DRAPE SURG W/TWL 17 5/8X23

## (undated) DEVICE — DRAPE STERILE Z BACK TABLE

## (undated) DEVICE — SHEET DRAPE TOP BAR - EMERALD

## (undated) DEVICE — GLOVE PROTEXIS PI SYN SURG 7.5

## (undated) DEVICE — SUT 2-0 ETHILON 18 FS

## (undated) DEVICE — PENCIL SMOKE EVAC TELSCP 15FT

## (undated) DEVICE — TUBE SUCTION FRAZIER VENT 10FR

## (undated) DEVICE — GUIDEWIRE STF STR .035X150CM

## (undated) DEVICE — KIT SURGICAL TURNOVER

## (undated) DEVICE — KIT SURGIFLO HEMOSTATIC MATRIX

## (undated) DEVICE — DRESSING TRANS 4X4 TEGADERM

## (undated) DEVICE — SPHERE NDI PASSIVE

## (undated) DEVICE — GLOVE PROTEXIS PI SYN SURG 7

## (undated) DEVICE — GLOVE SIGNATURE ESSNTL LTX 6.5

## (undated) DEVICE — COVER HD BACK TABLE 6FT

## (undated) DEVICE — SYR 3CC LUER LOC

## (undated) DEVICE — DRESSING ANTIMICROBIAL 1 INCH

## (undated) DEVICE — NDL HYPDRM 23X15

## (undated) DEVICE — SYR DISP LL 5CC

## (undated) DEVICE — NDL ECLIPSE HYPO 22GA 1IN

## (undated) DEVICE — GLOVE SENSICARE NEOPRENE 6.5

## (undated) DEVICE — SUT VICRYL PLUS 0 CT-1 18IN

## (undated) DEVICE — DRAPE TOP 53X102IN

## (undated) DEVICE — SOL NACL IRR 1000ML BTL